# Patient Record
Sex: FEMALE | Race: WHITE | NOT HISPANIC OR LATINO | Employment: FULL TIME | URBAN - METROPOLITAN AREA
[De-identification: names, ages, dates, MRNs, and addresses within clinical notes are randomized per-mention and may not be internally consistent; named-entity substitution may affect disease eponyms.]

---

## 2017-01-10 RX ORDER — DICLOFENAC SODIUM 75 MG/1
TABLET, DELAYED RELEASE ORAL
Qty: 60 TABLET | Refills: 2 | Status: SHIPPED | OUTPATIENT
Start: 2017-01-10 | End: 2017-04-18 | Stop reason: SDUPTHER

## 2017-01-18 ENCOUNTER — OFFICE VISIT (OUTPATIENT)
Dept: DERMATOLOGY | Facility: CLINIC | Age: 77
End: 2017-01-18
Payer: MEDICARE

## 2017-01-18 DIAGNOSIS — L82.1 SK (SEBORRHEIC KERATOSIS): ICD-10-CM

## 2017-01-18 DIAGNOSIS — L82.0 INFLAMED SEBORRHEIC KERATOSIS: Primary | ICD-10-CM

## 2017-01-18 DIAGNOSIS — L81.4 LENTIGO: ICD-10-CM

## 2017-01-18 DIAGNOSIS — D22.9 NEVUS: ICD-10-CM

## 2017-01-18 DIAGNOSIS — Z85.828 PERSONAL HISTORY OF SKIN CANCER: ICD-10-CM

## 2017-01-18 DIAGNOSIS — L90.5 SCAR: ICD-10-CM

## 2017-01-18 DIAGNOSIS — L72.0 MILIA: ICD-10-CM

## 2017-01-18 PROCEDURE — 99213 OFFICE O/P EST LOW 20 MIN: CPT | Mod: 25,S$GLB,, | Performed by: DERMATOLOGY

## 2017-01-18 PROCEDURE — 1157F ADVNC CARE PLAN IN RCRD: CPT | Mod: S$GLB,,, | Performed by: DERMATOLOGY

## 2017-01-18 PROCEDURE — 1159F MED LIST DOCD IN RCRD: CPT | Mod: S$GLB,,, | Performed by: DERMATOLOGY

## 2017-01-18 PROCEDURE — 99999 PR PBB SHADOW E&M-EST. PATIENT-LVL II: CPT | Mod: PBBFAC,,, | Performed by: DERMATOLOGY

## 2017-01-18 PROCEDURE — 1160F RVW MEDS BY RX/DR IN RCRD: CPT | Mod: S$GLB,,, | Performed by: DERMATOLOGY

## 2017-01-18 PROCEDURE — 17110 DESTRUCTION B9 LES UP TO 14: CPT | Mod: S$GLB,,, | Performed by: DERMATOLOGY

## 2017-01-18 NOTE — PROGRESS NOTES
Subjective:       Patient ID:  Jo-Ann Escobedo is a 76 y.o. female who presents for   Chief Complaint   Patient presents with    Lesion     Lesion  - Initial  Affected locations: face (left lower eyelid)  Duration: 2 years  Signs and Symptoms: increased in size.  Severity: mild  Timing: constant  Aggravated by: nothing  Relieving factors/Treatments tried: nothing        Review of Systems   Skin: Positive for activity-related sunscreen use. Negative for daily sunscreen use and recent sunburn.   Hematologic/Lymphatic: Does not bruise/bleed easily.        Objective:    Physical Exam   Constitutional: She appears well-developed and well-nourished. No distress.   Neurological: She is alert and oriented to person, place, and time. She is not disoriented.   Psychiatric: She has a normal mood and affect.   Skin:   Areas Examined (abnormalities noted in diagram):   Scalp / Hair Palpated and Inspected  Head / Face Inspection Performed  Neck Inspection Performed  Chest / Axilla Inspection Performed  Abdomen Inspection Performed  Back Inspection Performed  RUE Inspected  LUE Inspection Performed  Nails and Digits Inspection Performed                       Diagram Legend     Erythematous scaling macule/papule c/w actinic keratosis       Vascular papule c/w angioma      Pigmented verrucoid papule/plaque c/w seborrheic keratosis      Yellow umbilicated papule c/w sebaceous hyperplasia      Irregularly shaped tan macule c/w lentigo     1-2 mm smooth white papules consistent with Milia      Movable subcutaneous cyst with punctum c/w epidermal inclusion cyst      Subcutaneous movable cyst c/w pilar cyst      Firm pink to brown papule c/w dermatofibroma      Pedunculated fleshy papule(s) c/w skin tag(s)      Evenly pigmented macule c/w junctional nevus     Mildly variegated pigmented, slightly irregular-bordered macule c/w mildly atypical nevus      Flesh colored to evenly pigmented papule c/w intradermal nevus       Pink pearly  papule/plaque c/w basal cell carcinoma      Erythematous hyperkeratotic cursted plaque c/w SCC      Surgical scar with no sign of skin cancer recurrence      Open and closed comedones      Inflammatory papules and pustules      Verrucoid papule consistent consistent with wart     Erythematous eczematous patches and plaques     Dystrophic onycholytic nail with subungual debris c/w onychomycosis     Umbilicated papule    Erythematous-base heme-crusted tan verrucoid plaque consistent with inflamed seborrheic keratosis     Erythematous Silvery Scaling Plaque c/w Psoriasis     See annotation      Assessment / Plan:        Inflamed seborrheic keratosis  Cryosurgery procedure note:    Verbal consent from the patient is obtained. Liquid nitrogen cryosurgery is applied to 1 lesions to produce a freeze injury. The patient is aware that blisters may form and is instructed on wound care with gentle cleansing and use of vaseline ointment to keep moist until healed. The patient is supplied a handout on cryosurgery and is instructed to call if lesions do not completely resolve.    Lentigo  This is a benign hyperpigmented sun induced lesion. Daily sun protection will reduce the number of new lesions. Treatment of these benign lesions are considered cosmetic.  The nature of sun-induced photo-aging and skin cancers is discussed.  Sun avoidance, protective clothing, and the use of 30-SPF sunscreens is advised. Observe closely for skin damage/changes, and call if such occurs.    Nevus  Discussed ABCDE's of nevi.  Monitor for new mole or moles that are becoming bigger, darker, irritated, or developing irregular borders. Brochure provided.    SK (seborrheic keratosis)  These are benign inherited growths without a malignant potential. Reassurance given to patient. No treatment is necessary.     Yousufia  Reassurance given to patient. No treatment is necessary.   Treatment of benign, asymptomatic lesions may be considered  cosmetic.    Personal history of skin cancer  Scar  Area(s) of previous NMSC evaluated with no signs of recurrence.    Upper body skin examination performed today including at least 6 points as noted in physical examination. No lesions suspicious for malignancy noted.           Return in about 1 year (around 1/18/2018).

## 2017-01-25 ENCOUNTER — OFFICE VISIT (OUTPATIENT)
Dept: OPTOMETRY | Facility: CLINIC | Age: 77
End: 2017-01-25
Payer: MEDICARE

## 2017-01-25 DIAGNOSIS — Z13.5 SCREENING FOR GLAUCOMA: ICD-10-CM

## 2017-01-25 DIAGNOSIS — H52.4 PRESBYOPIA OU: ICD-10-CM

## 2017-01-25 DIAGNOSIS — H26.493 CLOUDY POSTERIOR CAPSULE, BILATERAL: Primary | ICD-10-CM

## 2017-01-25 DIAGNOSIS — H04.123 DRY EYES, BILATERAL: ICD-10-CM

## 2017-01-25 PROCEDURE — 99999 PR PBB SHADOW E&M-EST. PATIENT-LVL II: CPT | Mod: PBBFAC,,, | Performed by: OPTOMETRIST

## 2017-01-25 PROCEDURE — 92014 COMPRE OPH EXAM EST PT 1/>: CPT | Mod: S$GLB,,, | Performed by: OPTOMETRIST

## 2017-01-25 PROCEDURE — 92015 DETERMINE REFRACTIVE STATE: CPT | Mod: S$GLB,,, | Performed by: OPTOMETRIST

## 2017-01-25 NOTE — PROGRESS NOTES
HPI     DLS: 4/19/2016  Pt states reading has decreased with glasses. Pt states she noticed   flashes of light 4 months ago and has not seen any since    Systane ou PRN              Last edited by Rosey Miranda on 1/25/2017  2:12 PM.     ROS     Positive for: Endocrine, Cardiovascular, Eyes    Negative for: Constitutional, Gastrointestinal, Neurological, Skin,   Genitourinary, Musculoskeletal, HENT, Respiratory, Psychiatric,   Allergic/Imm, Heme/Lymph    Last edited by Ariel Hinojosa, OD on 1/25/2017  2:32 PM. (History)        Assessment /Plan     For exam results, see Encounter Report.    Cloudy posterior capsule, bilateral    Dry eyes, bilateral    Screening for glaucoma    Presbyopia OU      1. Mild pco OD>OS--not ready for YAG.  Pt wishes new spex Rx  2. COREY--pt to inc SYSTANE ATs to QID    PLAN:    rtc 1 yr

## 2017-04-10 RX ORDER — LISINOPRIL 20 MG/1
20 TABLET ORAL NIGHTLY
Qty: 90 TABLET | Refills: 3 | Status: SHIPPED | OUTPATIENT
Start: 2017-04-10 | End: 2018-03-26 | Stop reason: SDUPTHER

## 2017-04-10 RX ORDER — DICLOFENAC SODIUM 75 MG/1
TABLET, DELAYED RELEASE ORAL
Qty: 60 TABLET | Refills: 2 | OUTPATIENT
Start: 2017-04-10

## 2017-04-18 ENCOUNTER — PATIENT MESSAGE (OUTPATIENT)
Dept: INTERNAL MEDICINE | Facility: CLINIC | Age: 77
End: 2017-04-18

## 2017-04-18 RX ORDER — DICLOFENAC SODIUM 75 MG/1
TABLET, DELAYED RELEASE ORAL
Qty: 60 TABLET | Refills: 2 | Status: SHIPPED | OUTPATIENT
Start: 2017-04-18 | End: 2017-08-03 | Stop reason: SDUPTHER

## 2017-05-01 ENCOUNTER — LAB VISIT (OUTPATIENT)
Dept: LAB | Facility: HOSPITAL | Age: 77
End: 2017-05-01
Attending: FAMILY MEDICINE
Payer: MEDICARE

## 2017-05-01 ENCOUNTER — PATIENT MESSAGE (OUTPATIENT)
Dept: INTERNAL MEDICINE | Facility: CLINIC | Age: 77
End: 2017-05-01

## 2017-05-01 ENCOUNTER — OFFICE VISIT (OUTPATIENT)
Dept: INTERNAL MEDICINE | Facility: CLINIC | Age: 77
End: 2017-05-01
Payer: MEDICARE

## 2017-05-01 VITALS
WEIGHT: 200.81 LBS | BODY MASS INDEX: 36.95 KG/M2 | SYSTOLIC BLOOD PRESSURE: 123 MMHG | HEART RATE: 70 BPM | HEIGHT: 62 IN | DIASTOLIC BLOOD PRESSURE: 62 MMHG

## 2017-05-01 DIAGNOSIS — E78.5 HYPERLIPIDEMIA, UNSPECIFIED HYPERLIPIDEMIA TYPE: ICD-10-CM

## 2017-05-01 DIAGNOSIS — Z00.00 PREVENTATIVE HEALTH CARE: ICD-10-CM

## 2017-05-01 DIAGNOSIS — E03.9 HYPOTHYROIDISM, UNSPECIFIED TYPE: ICD-10-CM

## 2017-05-01 DIAGNOSIS — M25.50 ARTHRALGIA, UNSPECIFIED JOINT: ICD-10-CM

## 2017-05-01 DIAGNOSIS — E03.9 HYPOTHYROIDISM, UNSPECIFIED TYPE: Primary | ICD-10-CM

## 2017-05-01 LAB
ALBUMIN SERPL BCP-MCNC: 4 G/DL
ALP SERPL-CCNC: 76 U/L
ALT SERPL W/O P-5'-P-CCNC: 25 U/L
ANION GAP SERPL CALC-SCNC: 15 MMOL/L
AST SERPL-CCNC: 23 U/L
BASOPHILS # BLD AUTO: 0.05 K/UL
BASOPHILS NFR BLD: 0.5 %
BILIRUB SERPL-MCNC: 0.2 MG/DL
BUN SERPL-MCNC: 23 MG/DL
CALCIUM SERPL-MCNC: 10.1 MG/DL
CHLORIDE SERPL-SCNC: 108 MMOL/L
CHOLEST/HDLC SERPL: 6.9 {RATIO}
CO2 SERPL-SCNC: 22 MMOL/L
CREAT SERPL-MCNC: 1.1 MG/DL
DIFFERENTIAL METHOD: NORMAL
EOSINOPHIL # BLD AUTO: 0.2 K/UL
EOSINOPHIL NFR BLD: 1.8 %
ERYTHROCYTE [DISTWIDTH] IN BLOOD BY AUTOMATED COUNT: 13.2 %
EST. GFR  (AFRICAN AMERICAN): 56.4 ML/MIN/1.73 M^2
EST. GFR  (NON AFRICAN AMERICAN): 48.9 ML/MIN/1.73 M^2
GLUCOSE SERPL-MCNC: 93 MG/DL
HCT VFR BLD AUTO: 44 %
HDL/CHOLESTEROL RATIO: 14.4 %
HDLC SERPL-MCNC: 243 MG/DL
HDLC SERPL-MCNC: 35 MG/DL
HGB BLD-MCNC: 14.2 G/DL
LDLC SERPL CALC-MCNC: ABNORMAL MG/DL
LYMPHOCYTES # BLD AUTO: 2.9 K/UL
LYMPHOCYTES NFR BLD: 31.9 %
MCH RBC QN AUTO: 30.9 PG
MCHC RBC AUTO-ENTMCNC: 32.3 %
MCV RBC AUTO: 96 FL
MONOCYTES # BLD AUTO: 1 K/UL
MONOCYTES NFR BLD: 10.8 %
NEUTROPHILS # BLD AUTO: 5 K/UL
NEUTROPHILS NFR BLD: 54.6 %
NONHDLC SERPL-MCNC: 208 MG/DL
PLATELET # BLD AUTO: 209 K/UL
PMV BLD AUTO: 11.1 FL
POTASSIUM SERPL-SCNC: 4.6 MMOL/L
PROT SERPL-MCNC: 8 G/DL
RBC # BLD AUTO: 4.59 M/UL
SODIUM SERPL-SCNC: 145 MMOL/L
T4 SERPL-MCNC: 8.3 UG/DL
TRIGL SERPL-MCNC: 519 MG/DL
TSH SERPL DL<=0.005 MIU/L-ACNC: 0.54 UIU/ML
WBC # BLD AUTO: 9.11 K/UL

## 2017-05-01 PROCEDURE — 3078F DIAST BP <80 MM HG: CPT | Mod: S$GLB,,, | Performed by: FAMILY MEDICINE

## 2017-05-01 PROCEDURE — 36415 COLL VENOUS BLD VENIPUNCTURE: CPT

## 2017-05-01 PROCEDURE — 84436 ASSAY OF TOTAL THYROXINE: CPT

## 2017-05-01 PROCEDURE — 84443 ASSAY THYROID STIM HORMONE: CPT

## 2017-05-01 PROCEDURE — 99397 PER PM REEVAL EST PAT 65+ YR: CPT | Mod: S$GLB,,, | Performed by: FAMILY MEDICINE

## 2017-05-01 PROCEDURE — 3074F SYST BP LT 130 MM HG: CPT | Mod: S$GLB,,, | Performed by: FAMILY MEDICINE

## 2017-05-01 PROCEDURE — 99999 PR PBB SHADOW E&M-EST. PATIENT-LVL III: CPT | Mod: PBBFAC,,, | Performed by: FAMILY MEDICINE

## 2017-05-01 PROCEDURE — 80061 LIPID PANEL: CPT

## 2017-05-01 PROCEDURE — 99499 UNLISTED E&M SERVICE: CPT | Mod: S$PBB,,, | Performed by: FAMILY MEDICINE

## 2017-05-01 PROCEDURE — 85025 COMPLETE CBC W/AUTO DIFF WBC: CPT

## 2017-05-01 PROCEDURE — 80053 COMPREHEN METABOLIC PANEL: CPT

## 2017-05-01 RX ORDER — SCOLOPAMINE TRANSDERMAL SYSTEM 1 MG/1
1 PATCH, EXTENDED RELEASE TRANSDERMAL
Qty: 3 PATCH | Refills: 0 | Status: SHIPPED | OUTPATIENT
Start: 2017-05-01 | End: 2017-05-31

## 2017-05-01 NOTE — MR AVS SNAPSHOT
[facility] - Internal Medicine  [address]  [facility] [address]  Phone: [phone]  Fax: [phone]                  Jo-Ann Escobedo   2017 1:00 PM   Office Visit    Description:  Female : 1940   Provider:  Jesse Ureña MD   Department:  [facility] - Internal Medicine           Reason for Visit     Annual Exam           Diagnoses this Visit        Comments    Hypothyroidism, unspecified type    -  Primary     Hyperlipidemia, unspecified hyperlipidemia type         Preventative health care         Arthralgia, unspecified joint                To Do List           Future Appointments        Provider Department Dept Phone    2017 1:45 PM [facility] MAMMO5 DX [facility] [phone]    2017 3:00 PM Logan Peace MD [place] - Hematology Oncology [phone]      Goals (5 Years of Data)     None      Follow-Up and Disposition     Return in about 6 months (around 2017), or if symptoms worsen or fail to improve.    Follow-up and Disposition History      [facility]     [facility] -  Assistance  Unless otherwise directed by your provider, please contact [facility], our nurse care line that is available for  assistance.     Registered nurses in the [facility] provide: appointment scheduling, clinical advisement, health education, and other advisory services.  Call: [phone] (toll free)               Medications           Message regarding Medications     Verify the changes and/or additions to your medication regime listed below are the same as discussed with your clinician today.  If any of these changes or additions are incorrect, please notify your healthcare provider.             Verify that the below list of medications is an accurate representation of the medications you are currently taking.  If none reported, the list may be blank. If incorrect, please contact your healthcare provider. Carry this  "list with you in case of emergency.           Current Medications     co-enzyme Q-10 30 mg capsule Take 30 mg by mouth once daily.     diclofenac (VOLTAREN) 75 MG EC tablet Take 1 tablet (75 mg total) by mouth 2 (two) times daily as needed.    diclofenac (VOLTAREN) 75 MG EC tablet TAKE 1 TABLET (75 MG TOTAL) BY MOUTH 2 (TWO) TIMES DAILY AS NEEDED.    ergocalciferol (VITAMIN D2) 50,000 unit Cap Take by mouth. * Capsule Oral As directed.   tab once a week times 8 weeksThen Vit D 2000 units once daily    levothyroxine (SYNTHROID) 125 MCG tablet TAKE 1 TABLET (125 MCG TOTAL) BY MOUTH ONCE DAILY.    lisinopril (PRINIVIL,ZESTRIL) 20 MG tablet Take 1 tablet (20 mg total) by mouth nightly.    senna-docusate 8.6-50 mg (PERICOLACE) 8.6-50 mg per tablet Take 1 tablet by mouth 2 (two) times daily. Take twice daily on any days that you take your prescription pain medications.           Clinical Reference Information           Your Vitals Were     BP Pulse Height Weight Last Period BMI    123/62 70 5' 2" (1.575 m) 91.1 kg (200 lb 13.4 oz) (LMP Unknown) 36.73 kg/m2      Blood Pressure          Most Recent Value    BP  123/62      Allergies as of 5/1/2017     Codeine    Niacin Preparations      Immunizations Administered on Date of Encounter - 5/1/2017     None      Orders Placed During Today's Visit      Normal Orders This Visit    Ambulatory Referral to Rheumatology     Future Labs/Procedures Expected by Expires    CBC auto differential  5/1/2017 5/1/2018    Comprehensive metabolic panel  5/1/2017 5/1/2018    Lipid panel  5/1/2017 5/1/2018    T4  5/1/2017 7/30/2017    TSH  5/1/2017 7/30/2017      Language Assistance Services     ATTENTION: Language assistance services are available, free of charge. Please call [phone].      ATENCIÓN: Si astrid mildred, tiene a escoto disposición servicios gratuitos de asistencia lingüística. Llame al [phone].     CHÚ Ý: N?u b?n nói Ti?ng Vi?t, có các d?ch v? h? tr? ngôn ng? mi?n phí dành " radha robin?n. G?i s? [phone].         [facility] complies with applicable Federal civil rights laws and does not discriminate on the basis of race, color, national origin, age, disability, or sex.

## 2017-05-01 NOTE — PROGRESS NOTES
"Subjective:       Patient ID: Jo-Ann Escobedo is a 76 y.o. female.    Chief Complaint: Annual Exam  Jo-Ann Escobedo 76 y.o. female is here for office visit to review care and physical exam, reports "needs Diclofenac" every day or has pain in joints.  Has discussed this with Rheumatology.  Reports has been told that this class of medicne has potentially serious sfx.  Also has fairly chronic runny nose.      HPI  Review of Systems   Constitutional: Negative for activity change and unexpected weight change.   HENT: Positive for rhinorrhea. Negative for hearing loss and trouble swallowing.    Eyes: Negative for discharge and visual disturbance.   Respiratory: Negative for chest tightness and wheezing.    Cardiovascular: Negative for chest pain and palpitations.   Gastrointestinal: Negative for blood in stool, constipation, diarrhea and vomiting.   Endocrine: Negative for polydipsia and polyuria.   Genitourinary: Negative for difficulty urinating, dysuria, hematuria and menstrual problem.   Musculoskeletal: Positive for arthralgias. Negative for joint swelling.   Neurological: Negative for weakness and headaches.   Psychiatric/Behavioral: Negative for confusion and dysphoric mood.       Objective:      Physical Exam   Constitutional: She is oriented to person, place, and time. She appears well-developed and well-nourished. No distress.   HENT:   Head: Normocephalic.   Mouth/Throat: No oropharyngeal exudate.   Normal looking sinus mucosa   Eyes: EOM are normal. Pupils are equal, round, and reactive to light. No scleral icterus.   Neck: Neck supple. No JVD present. No thyromegaly present.   Cardiovascular: Normal rate, regular rhythm and normal heart sounds.  Exam reveals no gallop and no friction rub.    No murmur heard.  Pulmonary/Chest: Effort normal and breath sounds normal. She has no wheezes. She has no rales.   Abdominal: Soft. Bowel sounds are normal. She exhibits no distension and no mass. There is no tenderness. " There is no guarding.   Musculoskeletal: Normal range of motion. She exhibits no edema.   Lymphadenopathy:     She has no cervical adenopathy.   Neurological: She is alert and oriented to person, place, and time. She has normal reflexes. She displays normal reflexes. No cranial nerve deficit. She exhibits normal muscle tone.   Skin: Skin is warm. No rash noted. No erythema.   Psychiatric: She has a normal mood and affect. Thought content normal.       Assessment:       No diagnosis found.    Plan:       Jo-Ann was seen today for annual exam.    Diagnoses and all orders for this visit:    Hypothyroidism, unspecified type  -     T4; Future  -     TSH; Future    Hyperlipidemia, unspecified hyperlipidemia type  -     Comprehensive metabolic panel; Future  -     Lipid panel; Future    Preventative health care  -     Comprehensive metabolic panel; Future  -     Lipid panel; Future  -     CBC auto differential; Future  -     T4; Future    Arthralgia, unspecified joint  -     Ambulatory Referral to Rheumatology

## 2017-05-02 ENCOUNTER — TELEPHONE (OUTPATIENT)
Dept: INTERNAL MEDICINE | Facility: CLINIC | Age: 77
End: 2017-05-02

## 2017-05-03 ENCOUNTER — TELEPHONE (OUTPATIENT)
Dept: INTERNAL MEDICINE | Facility: CLINIC | Age: 77
End: 2017-05-03

## 2017-05-03 NOTE — TELEPHONE ENCOUNTER
----- Message from Jennifer Boo sent at 5/2/2017  4:28 PM CDT -----  Contact: Self/ [phone]  Patient is returning a phone call.  Who left a message for the patient:  Anton  Does patient know what this is regarding: No   Comments: Returning call. Please call and advise.           Thank you.

## 2017-05-05 ENCOUNTER — OFFICE VISIT (OUTPATIENT)
Dept: INTERNAL MEDICINE | Facility: CLINIC | Age: 77
End: 2017-05-05
Payer: MEDICARE

## 2017-05-05 VITALS
HEIGHT: 63 IN | HEART RATE: 80 BPM | BODY MASS INDEX: 35.35 KG/M2 | SYSTOLIC BLOOD PRESSURE: 138 MMHG | DIASTOLIC BLOOD PRESSURE: 80 MMHG | WEIGHT: 199.5 LBS

## 2017-05-05 DIAGNOSIS — M19.079 ARTHRITIS OF FOOT: ICD-10-CM

## 2017-05-05 DIAGNOSIS — E03.9 HYPOTHYROIDISM, UNSPECIFIED TYPE: ICD-10-CM

## 2017-05-05 DIAGNOSIS — E78.5 HYPERLIPIDEMIA, UNSPECIFIED HYPERLIPIDEMIA TYPE: Primary | ICD-10-CM

## 2017-05-05 DIAGNOSIS — N18.30 CHRONIC KIDNEY INSUFFICIENCY, STAGE 3 (MODERATE): ICD-10-CM

## 2017-05-05 DIAGNOSIS — M88.9 PAGET'S DISEASE OF BONE: ICD-10-CM

## 2017-05-05 PROBLEM — N18.9 CHRONIC KIDNEY INSUFFICIENCY: Status: ACTIVE | Noted: 2017-05-05

## 2017-05-05 PROCEDURE — 3079F DIAST BP 80-89 MM HG: CPT | Mod: S$GLB,,, | Performed by: FAMILY MEDICINE

## 2017-05-05 PROCEDURE — 3075F SYST BP GE 130 - 139MM HG: CPT | Mod: S$GLB,,, | Performed by: FAMILY MEDICINE

## 2017-05-05 PROCEDURE — 99999 PR PBB SHADOW E&M-EST. PATIENT-LVL III: CPT | Mod: PBBFAC,,, | Performed by: FAMILY MEDICINE

## 2017-05-05 PROCEDURE — 1160F RVW MEDS BY RX/DR IN RCRD: CPT | Mod: S$GLB,,, | Performed by: FAMILY MEDICINE

## 2017-05-05 PROCEDURE — 1126F AMNT PAIN NOTED NONE PRSNT: CPT | Mod: S$GLB,,, | Performed by: FAMILY MEDICINE

## 2017-05-05 PROCEDURE — 99215 OFFICE O/P EST HI 40 MIN: CPT | Mod: S$GLB,,, | Performed by: FAMILY MEDICINE

## 2017-05-05 PROCEDURE — 1159F MED LIST DOCD IN RCRD: CPT | Mod: S$GLB,,, | Performed by: FAMILY MEDICINE

## 2017-05-05 PROCEDURE — 1157F ADVNC CARE PLAN IN RCRD: CPT | Mod: S$GLB,,, | Performed by: FAMILY MEDICINE

## 2017-05-05 PROCEDURE — 99499 UNLISTED E&M SERVICE: CPT | Mod: S$PBB,,, | Performed by: FAMILY MEDICINE

## 2017-05-05 RX ORDER — FENOFIBRATE 160 MG/1
160 TABLET ORAL DAILY
Qty: 90 TABLET | Refills: 3 | Status: SHIPPED | OUTPATIENT
Start: 2017-05-05 | End: 2018-04-15 | Stop reason: SDUPTHER

## 2017-05-05 NOTE — MR AVS SNAPSHOT
[facility]  [address]  [facility] [address]  Phone: [phone]  Fax: [phone]                  Jo-Ann Escobedo   2017 1:40 PM   Office Visit    Description:  Female : 1940   Provider:  Jesse Ureña MD   Department:  [facility]           Diagnoses this Visit        Comments    Hyperlipidemia, unspecified hyperlipidemia type    -  Primary     Chronic kidney insufficiency, stage 3 (moderate)         Hypothyroidism, unspecified type         Paget's disease of bone         Arthritis of foot                To Do List           Future Appointments        Provider Department Dept Phone    2017 1:45 PM [facility] DX [facility] [phone]    2017 3:00 PM MD [facility] - Hematology Oncology [phone]    2017 1:30 PM Yary Sparks MD [facility] - Rheumatology [phone]      Goals (5 Years of Data)     None      Follow-Up and Disposition     Return in about 3 months (around 2017), or if symptoms worsen or fail to improve.    Follow-up and Disposition History       These Medications        Disp Refills Start End    fenofibrate 160 MG Tab 90 tablet 3 2017    Take 1 tablet (160 mg total) by mouth once daily. - Oral    Pharmacy: [facility]/pharmacy #[id number] - [place], [place] - [address] [facility] Ph #: [phone]         [facility] On Call     [facility] -  Assistance  Unless otherwise directed by your provider, please contact [facility], our nurse care line that is available for  assistance.     Registered nurses in the [facility] provide: appointment scheduling, clinical advisement, health education, and other advisory services.  Call: [phone] (toll free)               Medications           Message regarding Medications     Verify the changes and/or additions to your medication regime listed below are the same as  "discussed with your clinician today.  If any of these changes or additions are incorrect, please notify your healthcare provider.        START taking these NEW medications        Refills    fenofibrate 160 MG Tab 3    Sig: Take 1 tablet (160 mg total) by mouth once daily.    Class: Normal    Route: Oral           Verify that the below list of medications is an accurate representation of the medications you are currently taking.  If none reported, the list may be blank. If incorrect, please contact your healthcare provider. Carry this list with you in case of emergency.           Current Medications     co-enzyme Q-10 30 mg capsule Take 30 mg by mouth once daily.     diclofenac (VOLTAREN) 75 MG EC tablet Take 1 tablet (75 mg total) by mouth 2 (two) times daily as needed.    diclofenac (VOLTAREN) 75 MG EC tablet TAKE 1 TABLET (75 MG TOTAL) BY MOUTH 2 (TWO) TIMES DAILY AS NEEDED.    ergocalciferol (VITAMIN D2) 50,000 unit Cap Take by mouth. * Capsule Oral As directed.   tab once a week times 8 weeksThen Vit D 2000 units once daily    levothyroxine (SYNTHROID) 125 MCG tablet TAKE 1 TABLET (125 MCG TOTAL) BY MOUTH ONCE DAILY.    lisinopril (PRINIVIL,ZESTRIL) 20 MG tablet Take 1 tablet (20 mg total) by mouth nightly.    senna-docusate 8.6-50 mg (PERICOLACE) 8.6-50 mg per tablet Take 1 tablet by mouth 2 (two) times daily. Take twice daily on any days that you take your prescription pain medications.    fenofibrate 160 MG Tab Take 1 tablet (160 mg total) by mouth once daily.    scopolamine (TRANSDERM-SCOP) 1.5 mg (1 mg over 3 days) Place 1 patch (1.5 mg total) onto the skin every 72 hours.           Clinical Reference Information           Your Vitals Were     BP Pulse Height Weight Last Period BMI    138/80 80 5' 3" (1.6 m) 90.5 kg (199 lb 8.3 oz) (LMP Unknown) 35.34 kg/m2      Blood Pressure          Most Recent Value    BP  138/80      Allergies as of 5/5/2017     Codeine    Niacin Preparations      Immunizations " Administered on Date of Encounter - 5/5/2017     None      Orders Placed During Today's Visit     Future Labs/Procedures Expected by Expires    Comprehensive metabolic panel  8/3/2017 7/4/2018    Lipid panel  8/3/2017 7/4/2018      Language Assistance Services     ATTENTION: Language assistance services are available, free of charge. Please call [phone].      ATENCIÓN: Si habla español, tiene a escoto disposición servicios gratuitos de asistencia lingüística. Llame al [phone].     CHÚ Ý: N?u b?n nói Ti?ng Vi?t, có các d?ch v? h? tr? ngôn ng? mi?n phí dành cho b?n. G?i s? [phone].         [facility] complies with applicable Federal civil rights laws and does not discriminate on the basis of race, color, national origin, age, disability, or sex.

## 2017-05-05 NOTE — PROGRESS NOTES
Subjective:       Patient ID: Jo-Ann Escobedo is a 76 y.o. female.    Chief Complaint: No chief complaint on file.  Jo-Ann Escobedo 76 y.o. female is here for office visit to review care and physical exam, here for lab review, has decreased GFR.  Advised when seen recently that Diclofenac can be toxic to kidneys, stated that it was felt fairly necessary for arthritic pain.  Advised to stop.  Has cut-down to one aday, though advise was to stop.  Has appt with her Rheumatologist but not until the end of June.  Note TG high.  Has essentially doubled since last year.  Diet not changed too much, does occ have ice cream.      HPI  Review of Systems   Constitutional: Negative for activity change, fatigue, fever and unexpected weight change.   HENT: Negative for congestion, hearing loss, postnasal drip and rhinorrhea.    Eyes: Negative for redness and visual disturbance.   Respiratory: Negative for chest tightness, shortness of breath and wheezing.    Cardiovascular: Negative for chest pain, palpitations and leg swelling.   Gastrointestinal: Negative for abdominal distention.   Genitourinary: Negative for decreased urine volume, dysuria, flank pain, hematuria, pelvic pain and urgency.   Musculoskeletal: Negative for back pain, gait problem, joint swelling and neck stiffness.   Skin: Negative for color change, rash and wound.   Neurological: Negative for dizziness, syncope, weakness and headaches.   Psychiatric/Behavioral: Negative for behavioral problems, confusion and sleep disturbance. The patient is not nervous/anxious.        Objective:      Physical Exam   Constitutional: She is oriented to person, place, and time. She appears well-developed and well-nourished. No distress.   HENT:   Head: Normocephalic.   Mouth/Throat: No oropharyngeal exudate.   Eyes: EOM are normal. Pupils are equal, round, and reactive to light. No scleral icterus.   Neck: Neck supple. No JVD present. No thyromegaly present.   Cardiovascular: Normal  rate, regular rhythm and normal heart sounds.  Exam reveals no gallop and no friction rub.    No murmur heard.  Pulmonary/Chest: Effort normal and breath sounds normal. She has no wheezes. She has no rales.   Abdominal: Soft. Bowel sounds are normal. She exhibits no distension and no mass. There is no tenderness. There is no guarding.   Musculoskeletal: Normal range of motion. She exhibits no edema.   Lymphadenopathy:     She has no cervical adenopathy.   Neurological: She is alert and oriented to person, place, and time. She has normal reflexes. She displays normal reflexes. No cranial nerve deficit. She exhibits normal muscle tone.   Skin: Skin is warm. No rash noted. No erythema.   Psychiatric: She has a normal mood and affect. Thought content normal.       Assessment:       No diagnosis found.    Plan:       Diagnoses and all orders for this visit:    Hyperlipidemia, unspecified hyperlipidemia type  -     Comprehensive metabolic panel; Future  -     Lipid panel; Future  -     fenofibrate 160 MG Tab; Take 1 tablet (160 mg total) by mouth once daily.    Chronic kidney insufficiency, stage 3 (moderate)  -     Comprehensive metabolic panel; Future    Hypothyroidism, unspecified type  - reviewed  Paget's disease of bone  - will see if can f/u with Rheumatology sooner  Arthritis of foot  - discussed

## 2017-05-22 ENCOUNTER — TELEPHONE (OUTPATIENT)
Dept: RHEUMATOLOGY | Facility: CLINIC | Age: 77
End: 2017-05-22

## 2017-05-22 NOTE — TELEPHONE ENCOUNTER
Called patient. Told her that since she cannot take anti-inflammatories, I cannot be of assistance in her pain management and recommend that she sees pain specialist.  Patient wishes to  discuss plan with Dr. Ureña.

## 2017-05-25 ENCOUNTER — HOSPITAL ENCOUNTER (OUTPATIENT)
Dept: RADIOLOGY | Facility: HOSPITAL | Age: 77
Discharge: HOME | End: 2017-05-25
Attending: INTERNAL MEDICINE
Payer: MEDICARE

## 2017-05-25 ENCOUNTER — OFFICE VISIT (OUTPATIENT)
Dept: HEMATOLOGY/ONCOLOGY | Facility: CLINIC | Age: 77
End: 2017-05-25
Payer: MEDICARE

## 2017-05-25 VITALS
HEART RATE: 70 BPM | TEMPERATURE: 98 F | WEIGHT: 191 LBS | RESPIRATION RATE: 18 BRPM | HEIGHT: 65 IN | SYSTOLIC BLOOD PRESSURE: 143 MMHG | OXYGEN SATURATION: 100 % | BODY MASS INDEX: 31.82 KG/M2 | DIASTOLIC BLOOD PRESSURE: 67 MMHG

## 2017-05-25 DIAGNOSIS — C50.612 MALIGNANT NEOPLASM OF AXILLARY TAIL OF LEFT FEMALE BREAST: Primary | ICD-10-CM

## 2017-05-25 DIAGNOSIS — C50.912 MALIGNANT NEOPLASM OF LEFT BREAST: ICD-10-CM

## 2017-05-25 PROCEDURE — 77061 BREAST TOMOSYNTHESIS UNI: CPT | Mod: 26,,, | Performed by: RADIOLOGY

## 2017-05-25 PROCEDURE — 77065 DX MAMMO INCL CAD UNI: CPT | Mod: 26,,, | Performed by: RADIOLOGY

## 2017-05-25 PROCEDURE — 1159F MED LIST DOCD IN RCRD: CPT | Mod: S$GLB,,, | Performed by: INTERNAL MEDICINE

## 2017-05-25 PROCEDURE — 99213 OFFICE O/P EST LOW 20 MIN: CPT | Mod: S$GLB,,, | Performed by: INTERNAL MEDICINE

## 2017-05-25 PROCEDURE — 99999 PR PBB SHADOW E&M-EST. PATIENT-LVL III: CPT | Mod: PBBFAC,,, | Performed by: INTERNAL MEDICINE

## 2017-05-25 NOTE — PROGRESS NOTES
Subjective:       Patient ID: Jo-Ann Escobedo is a 76 y.o. female.    Chief Complaint: No chief complaint on file.    HPI   Ms. Escobedo presents today for follow up. Briefly, she is a 75 year-old female with a remote history of breast cancer treated 16 years ago with a left modified radical   mastectomy. Her tumor was a T2 N1 M0 carcinoma. She was treated with four cycles of AC and 4 cycles of Taxotere in the adjuvant setting and has remained cancer free since then.     A mammogram earlier today was read as BIRADS I, and a one year follow up was recommended.    Review of Systems  Overall she feels well and she has no complaints.  ECOG PS remains 0.   She denies any anxiety, depression, easy bruising, fevers, chills, night sweats, weight loss, nausea, vomiting, diarrhea, constipation, diplopia, blurred vision, headache, chest pain, abdominal pain, or difficulty ambulating. All other systems are negative.     Objective:      Physical Exam  GENERAL: She is alert, oriented to time, place, person; well nourished;   pleasant; in no acute physical distress.   VITAL SIGNS: Reviewed.   HEENT: Normal. There are no nasal, oral, lip, gingival, auricular, lid,   or conjunctival lesions. Mucosae are moist and pink, and there is no   thrush. Pupils are equal, reactive to light and accommodation.   Extraocular muscle movements are intact.   NECK: Supple without JVD, thyromegaly, or adenopathy.   LUNGS: Clear to auscultation without wheezing, rales, or rhonchi.   CARDIOVASCULAR: Reveals an S1, S2, no murmurs, no rubs, no gallops.   BREASTS: She is status post left mastectomy with no evidence of chest wall   recurrence. There are no masses in the right breast.   ABDOMEN: Soft, nontender without organomegaly. Bowel sounds are   identified.   EXTREMITIES: Have no cyanosis, clubbing, or edema.   SKIN: Does not have petechiae, rashes, induration, or ecchymoses.   NEUROLOGIC: Motor function is 5/5, DTRs are 0-1+ bilaterally, symmetrical,    and cranial nerves within normal limits.   LYMPHATIC: There is no cervical, axillary, or supraclavicular adenopathy.    Assessment:       1. 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        Plan:        She will return in a year with another mammogram.  Her questions were answered to her satisfaction.

## 2017-05-26 ENCOUNTER — PATIENT MESSAGE (OUTPATIENT)
Dept: RHEUMATOLOGY | Facility: CLINIC | Age: 77
End: 2017-05-26

## 2017-05-26 RX ORDER — LEVOTHYROXINE SODIUM 125 UG/1
125 TABLET ORAL
Qty: 30 TABLET | Refills: 11 | Status: SHIPPED | OUTPATIENT
Start: 2017-05-26 | End: 2018-04-15 | Stop reason: SDUPTHER

## 2017-08-01 ENCOUNTER — LAB VISIT (OUTPATIENT)
Dept: LAB | Facility: HOSPITAL | Age: 77
End: 2017-08-01
Attending: FAMILY MEDICINE
Payer: MEDICARE

## 2017-08-01 DIAGNOSIS — E78.5 HYPERLIPIDEMIA, UNSPECIFIED HYPERLIPIDEMIA TYPE: ICD-10-CM

## 2017-08-01 DIAGNOSIS — N18.30 CHRONIC KIDNEY INSUFFICIENCY, STAGE 3 (MODERATE): ICD-10-CM

## 2017-08-01 LAB
ALBUMIN SERPL BCP-MCNC: 4 G/DL
ALP SERPL-CCNC: 56 U/L
ALT SERPL W/O P-5'-P-CCNC: 23 U/L
ANION GAP SERPL CALC-SCNC: 10 MMOL/L
AST SERPL-CCNC: 22 U/L
BILIRUB SERPL-MCNC: 0.6 MG/DL
BUN SERPL-MCNC: 21 MG/DL
CALCIUM SERPL-MCNC: 9.8 MG/DL
CHLORIDE SERPL-SCNC: 106 MMOL/L
CHOLEST/HDLC SERPL: 3.7 {RATIO}
CO2 SERPL-SCNC: 25 MMOL/L
CREAT SERPL-MCNC: 0.8 MG/DL
EST. GFR  (AFRICAN AMERICAN): >60 ML/MIN/1.73 M^2
EST. GFR  (NON AFRICAN AMERICAN): >60 ML/MIN/1.73 M^2
GLUCOSE SERPL-MCNC: 86 MG/DL
HDL/CHOLESTEROL RATIO: 27.1 %
HDLC SERPL-MCNC: 181 MG/DL
HDLC SERPL-MCNC: 49 MG/DL
LDLC SERPL CALC-MCNC: 106.8 MG/DL
NONHDLC SERPL-MCNC: 132 MG/DL
POTASSIUM SERPL-SCNC: 3.8 MMOL/L
PROT SERPL-MCNC: 7.4 G/DL
SODIUM SERPL-SCNC: 141 MMOL/L
TRIGL SERPL-MCNC: 126 MG/DL

## 2017-08-01 PROCEDURE — 80061 LIPID PANEL: CPT

## 2017-08-01 PROCEDURE — 80053 COMPREHEN METABOLIC PANEL: CPT

## 2017-08-01 PROCEDURE — 36415 COLL VENOUS BLD VENIPUNCTURE: CPT

## 2017-08-03 ENCOUNTER — OFFICE VISIT (OUTPATIENT)
Dept: ORTHOPEDICS | Facility: CLINIC | Age: 77
End: 2017-08-03
Payer: MEDICARE

## 2017-08-03 VITALS — WEIGHT: 188.94 LBS | BODY MASS INDEX: 33.48 KG/M2 | HEIGHT: 63 IN

## 2017-08-03 DIAGNOSIS — M19.079 ARTHRITIS OF FOOT: Primary | ICD-10-CM

## 2017-08-03 PROCEDURE — 3008F BODY MASS INDEX DOCD: CPT | Mod: S$GLB,,, | Performed by: ORTHOPAEDIC SURGERY

## 2017-08-03 PROCEDURE — 99999 PR PBB SHADOW E&M-EST. PATIENT-LVL II: CPT | Mod: PBBFAC,,, | Performed by: ORTHOPAEDIC SURGERY

## 2017-08-03 PROCEDURE — 99213 OFFICE O/P EST LOW 20 MIN: CPT | Mod: 25,S$GLB,, | Performed by: ORTHOPAEDIC SURGERY

## 2017-08-03 PROCEDURE — 1159F MED LIST DOCD IN RCRD: CPT | Mod: S$GLB,,, | Performed by: ORTHOPAEDIC SURGERY

## 2017-08-03 PROCEDURE — 20605 DRAIN/INJ JOINT/BURSA W/O US: CPT | Mod: RT,S$GLB,, | Performed by: ORTHOPAEDIC SURGERY

## 2017-08-03 RX ORDER — METHYLPREDNISOLONE ACETATE 80 MG/ML
80 INJECTION, SUSPENSION INTRA-ARTICULAR; INTRALESIONAL; INTRAMUSCULAR; SOFT TISSUE
Status: COMPLETED | OUTPATIENT
Start: 2017-08-03 | End: 2017-08-03

## 2017-08-03 RX ADMIN — METHYLPREDNISOLONE ACETATE 80 MG: 80 INJECTION, SUSPENSION INTRA-ARTICULAR; INTRALESIONAL; INTRAMUSCULAR; SOFT TISSUE at 01:08

## 2017-08-03 NOTE — PROGRESS NOTES
Ms Escobedo returns today.  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  I last saw her on 6/20/2016 and that was the last time she had had an injection.  She reports that for most of the past year she is actually done very well by being consistent with her shoewear as well as continuing to use diclofenac.  She reports that Dr. Ureña took her off of her diclofenac back in May and since that time she said recurrent symptoms.  She returns today to discuss possible surgery as well as to get another injection.  Exam: Ms. Escobedo walks in with an antalgic gait.  She continues to have marked decreased motion of her right subtalar joint with pain.  She has mild decreased motion of her ankle with no pain on motion.  She has tenderness over the sinus tarsi area of her hindfoot.  She is neurovascularly intact.  I did not obtain any new x-rays today.  Recommendation: After verbal consent and sterile prep I injected her right subtalar joint with 3 cc of lidocaine and 80 mg of Depo-Medrol.  Once again her joint was difficult to inject.                                 Subtalar arthrodesis was discussed with Mrs. Escobedo.  I explained the procedure and the postoperative course as well as the risks and benefits of surgery.                                 I reviewed some recent lab work and I did not see any evidence of any kidney disease and this Gregg does not report any difficulty and taking nonsteroidal anti-inflammatories from a GI                                standpoint.  She is going to see Dr. Ureña next week and I suggested she might ask him if he she could get back on the diclofenac unless it were some other reason not to.  Didn't ever make a return appointment in 3 months

## 2017-08-08 ENCOUNTER — OFFICE VISIT (OUTPATIENT)
Dept: INTERNAL MEDICINE | Facility: CLINIC | Age: 77
End: 2017-08-08
Payer: MEDICARE

## 2017-08-08 VITALS
HEART RATE: 78 BPM | HEIGHT: 63 IN | SYSTOLIC BLOOD PRESSURE: 120 MMHG | WEIGHT: 187.63 LBS | DIASTOLIC BLOOD PRESSURE: 60 MMHG | BODY MASS INDEX: 33.25 KG/M2

## 2017-08-08 DIAGNOSIS — M19.90 ARTHRITIS: ICD-10-CM

## 2017-08-08 DIAGNOSIS — M88.9 PAGET'S DISEASE OF BONE: ICD-10-CM

## 2017-08-08 DIAGNOSIS — E03.9 HYPOTHYROIDISM, UNSPECIFIED TYPE: Primary | ICD-10-CM

## 2017-08-08 DIAGNOSIS — E78.5 HYPERLIPIDEMIA, UNSPECIFIED HYPERLIPIDEMIA TYPE: ICD-10-CM

## 2017-08-08 PROCEDURE — 99999 PR PBB SHADOW E&M-EST. PATIENT-LVL IV: CPT | Mod: PBBFAC,,, | Performed by: FAMILY MEDICINE

## 2017-08-08 PROCEDURE — 3074F SYST BP LT 130 MM HG: CPT | Mod: S$GLB,,, | Performed by: FAMILY MEDICINE

## 2017-08-08 PROCEDURE — 1159F MED LIST DOCD IN RCRD: CPT | Mod: S$GLB,,, | Performed by: FAMILY MEDICINE

## 2017-08-08 PROCEDURE — 99499 UNLISTED E&M SERVICE: CPT | Mod: S$PBB,,, | Performed by: FAMILY MEDICINE

## 2017-08-08 PROCEDURE — 1126F AMNT PAIN NOTED NONE PRSNT: CPT | Mod: S$GLB,,, | Performed by: FAMILY MEDICINE

## 2017-08-08 PROCEDURE — 3078F DIAST BP <80 MM HG: CPT | Mod: S$GLB,,, | Performed by: FAMILY MEDICINE

## 2017-08-08 PROCEDURE — 99215 OFFICE O/P EST HI 40 MIN: CPT | Mod: S$GLB,,, | Performed by: FAMILY MEDICINE

## 2017-08-08 PROCEDURE — 3008F BODY MASS INDEX DOCD: CPT | Mod: S$GLB,,, | Performed by: FAMILY MEDICINE

## 2017-08-08 NOTE — PROGRESS NOTES
"Subjective:       Patient ID: Jo-Ann Escobedo is a 76 y.o. female.    Chief Complaint: Follow-up  Jo-Ann Escobedo 76 y.o. female is here for office visit to review care and physical exam, reports doing well other than pain.  Apparently told by Rheumatology that "she couldn't help her."  Was offered Pain mgt.  Pt did see Ortho for foot problem.  Told "come back in 90 days for injection.  Notes wants more Syntrhoid, says "I;m hyper thyroid one week and hypothyroid the next."      HPI  Review of Systems   Constitutional: Negative for activity change and unexpected weight change.   HENT: Positive for rhinorrhea. Negative for hearing loss and trouble swallowing.    Eyes: Negative for discharge and visual disturbance.   Respiratory: Positive for wheezing. Negative for chest tightness.    Cardiovascular: Negative for chest pain and palpitations.   Gastrointestinal: Negative for blood in stool, constipation, diarrhea and vomiting.   Endocrine: Negative for polydipsia and polyuria.   Genitourinary: Negative for difficulty urinating, dysuria, hematuria and menstrual problem.   Musculoskeletal: Positive for arthralgias and joint swelling. Negative for neck pain.   Neurological: Negative for weakness and headaches.   Psychiatric/Behavioral: Negative for confusion and dysphoric mood.       Objective:      Physical Exam   Constitutional: She is oriented to person, place, and time. She appears well-developed and well-nourished. No distress.   HENT:   Head: Normocephalic.   Mouth/Throat: No oropharyngeal exudate.   Boggy turbinates   Eyes: EOM are normal. Pupils are equal, round, and reactive to light. No scleral icterus.   Neck: Neck supple. No JVD present. No thyromegaly present.   Cardiovascular: Normal rate, regular rhythm and normal heart sounds.  Exam reveals no gallop and no friction rub.    No murmur heard.  Pulmonary/Chest: Effort normal and breath sounds normal. She has no wheezes. She has no rales.   Abdominal: Soft. Bowel " sounds are normal. She exhibits no distension and no mass. There is no tenderness. There is no guarding.   Musculoskeletal: Normal range of motion. She exhibits no edema.   Lymphadenopathy:     She has no cervical adenopathy.   Neurological: She is alert and oriented to person, place, and time. She has normal reflexes. She displays normal reflexes. No cranial nerve deficit. She exhibits normal muscle tone.   Skin: Skin is warm. No rash noted. No erythema.   Psychiatric: She has a normal mood and affect. Thought content normal.       Assessment:       No diagnosis found.    Plan:       Jo-Ann was seen today for follow-up.    Diagnoses and all orders for this visit:    Hypothyroidism, unspecified type  -     Ambulatory Referral to Endocrinology    Paget's disease of bone  -     Ambulatory Referral to Endocrinology    Arthritis  -     Ambulatory Referral to Physical Medicine Rehab    Hyperlipidemia, unspecified hyperlipidemia type    - Chart reviewed, labs and diet discussed

## 2017-11-03 ENCOUNTER — OFFICE VISIT (OUTPATIENT)
Dept: ORTHOPEDICS | Facility: CLINIC | Age: 77
End: 2017-11-03
Payer: MEDICARE

## 2017-11-03 ENCOUNTER — HOSPITAL ENCOUNTER (OUTPATIENT)
Dept: RADIOLOGY | Facility: HOSPITAL | Age: 77
Discharge: HOME | End: 2017-11-03
Attending: ORTHOPAEDIC SURGERY
Payer: MEDICARE

## 2017-11-03 DIAGNOSIS — M19.079 ARTHRITIS OF FOOT: ICD-10-CM

## 2017-11-03 DIAGNOSIS — M19.079 ARTHRITIS OF FOOT: Primary | ICD-10-CM

## 2017-11-03 PROCEDURE — 73610 X-RAY EXAM OF ANKLE: CPT | Mod: 26,RT,, | Performed by: RADIOLOGY

## 2017-11-03 PROCEDURE — 99213 OFFICE O/P EST LOW 20 MIN: CPT | Mod: 25,S$GLB,, | Performed by: ORTHOPAEDIC SURGERY

## 2017-11-03 PROCEDURE — 99999 PR PBB SHADOW E&M-EST. PATIENT-LVL I: CPT | Mod: PBBFAC,,, | Performed by: ORTHOPAEDIC SURGERY

## 2017-11-03 PROCEDURE — 20605 DRAIN/INJ JOINT/BURSA W/O US: CPT | Mod: RT,S$GLB,, | Performed by: ORTHOPAEDIC SURGERY

## 2017-11-03 PROCEDURE — 73610 X-RAY EXAM OF ANKLE: CPT | Mod: TC,RT

## 2017-11-03 RX ORDER — METHYLPREDNISOLONE ACETATE 80 MG/ML
80 INJECTION, SUSPENSION INTRA-ARTICULAR; INTRALESIONAL; INTRAMUSCULAR; SOFT TISSUE
Status: COMPLETED | OUTPATIENT
Start: 2017-11-03 | End: 2017-11-03

## 2017-11-03 RX ADMIN — METHYLPREDNISOLONE ACETATE 80 MG: 80 INJECTION, SUSPENSION INTRA-ARTICULAR; INTRALESIONAL; INTRAMUSCULAR; SOFT TISSUE at 01:11

## 2017-11-04 NOTE — PROGRESS NOTES
Ms. Escobedo returns today for followup of her right posttraumatic subtalar   arthritis.  I saw her last three months ago.  Prior to that, it had been over a   year since she had had an injection.  She reports that she was using diclofenac   at that time and was helping her quite a bit.  Dr. Ureña had taken her off of   the diclofenac and her symptoms recurred.  I injected her right subtalar joint   in August and at that time, I remember it was difficult to get the medicine in.    She reports that she had relief for about 30 days and then she started   developing some sharp, which she describes as vertical type pain in the lateral   aspect of her ankle.  This has only recently started to resolve and she is   almost back to baseline at this point.  Examination today does not reveal any   significant swelling.  She has a lot of tenderness in the sinus tarsi area of   her hindfoot and some tenderness over distal fibula.  It has been about two   years since I ordered an x-ray, so I ordered a new x-ray today and there is no   evidence of any acute injury.  The subtalar joint arthritis may have progressed   a bit, but not significantly since 2015.  She has been thinking about fusion   surgery, but she has some family matters she needs to take care of.  I offered   her another injection today, which she agreed to, so after verbal consent and a   sterile prep, I attempted to inject her right subtalar joint again with 3 mL of   lidocaine and 80 mg of Depo-Medrol.  It was again a very difficult injection.  I   was only able to get about two-thirds of the fluid into the joint and it caused   quite a bit of pain for Ms. Escobedo.  I do not think future injections would be   recommended at this time.  She is going to contact Dr. Ureña again to see if   she can get back on the diclofenac.  If and when she decides to do the fusion   surgery, she will let us know.      ARIELLE  dd: 11/03/2017 13:49:09 (JESSICA)  td: 11/04/2017 11:01:10  (CDT)  Doc ID   #[id number]  Job ID #[id number]    CC:

## 2018-01-25 ENCOUNTER — OFFICE VISIT (OUTPATIENT)
Dept: OPTOMETRY | Facility: CLINIC | Age: 78
End: 2018-01-25
Payer: MEDICARE

## 2018-01-25 DIAGNOSIS — Z96.1 PSEUDOPHAKIA OF BOTH EYES: Primary | ICD-10-CM

## 2018-01-25 DIAGNOSIS — Z13.5 SCREENING FOR GLAUCOMA: ICD-10-CM

## 2018-01-25 DIAGNOSIS — H52.4 PRESBYOPIA: ICD-10-CM

## 2018-01-25 DIAGNOSIS — H04.123 DRY EYES, BILATERAL: ICD-10-CM

## 2018-01-25 PROCEDURE — 92015 DETERMINE REFRACTIVE STATE: CPT | Mod: S$GLB,,, | Performed by: OPTOMETRIST

## 2018-01-25 PROCEDURE — 99999 PR PBB SHADOW E&M-EST. PATIENT-LVL II: CPT | Mod: PBBFAC,,, | Performed by: OPTOMETRIST

## 2018-01-25 PROCEDURE — 92014 COMPRE OPH EXAM EST PT 1/>: CPT | Mod: S$GLB,,, | Performed by: OPTOMETRIST

## 2018-01-25 NOTE — PROGRESS NOTES
[facility]     Ms. Jo-Ann Escobedo for annual eye exam.  Patient complains of dry eyes , uses drops systane 5 times  daily     Would patient like a refraction today? yes    (-)drops  (-)flashes  (-)floaters  (-)diplopia    Diabetic -  Hemoglobin A1C       Date                     Value               Ref Range             Status                06/16/2009               5.9                 4.0 - 6.2 %           Final                 02/12/2004               5.6                 4.5 - 6.2 %           Final            ----------    OCULAR HISTORY  Last Eye Exam 2017  (+)eye surgery Cataract   (-)diagnosed or treated for any eye conditions or diseases -    FAMILY HISTORY  (-)Glaucoma -        Last edited by Ariel Hinojosa, OD on 1/25/2018 10:24 AM. (History)        ROS     Positive for: Eyes (cat surgery OU)    Negative for: Constitutional, Gastrointestinal, Neurological, Skin,   Genitourinary, Musculoskeletal, HENT, Endocrine, Cardiovascular,   Respiratory, Psychiatric, Allergic/Imm, Heme/Lymph    Last edited by Ariel Hinojosa, OD on 1/25/2018 10:24 AM. (History)        Assessment /Plan     For exam results, see Encounter Report.    Pseudophakia of both eyes    Dry eyes, bilateral    Screening for glaucoma    Presbyopia        1. Mild pco OD>OS--not ready for YAG.  Pt wishes new spex Rx  2. COREY--pt to cont w SYSTANE ATs  QID    PLAN:    rtc 1 yr

## 2018-02-09 ENCOUNTER — OFFICE VISIT (OUTPATIENT)
Dept: ORTHOPEDICS | Facility: CLINIC | Age: 78
End: 2018-02-09
Payer: MEDICARE

## 2018-02-09 VITALS — WEIGHT: 187.63 LBS | BODY MASS INDEX: 33.25 KG/M2 | HEIGHT: 63 IN

## 2018-02-09 DIAGNOSIS — M19.079 ARTHRITIS OF FOOT: Primary | ICD-10-CM

## 2018-02-09 PROCEDURE — 99999 PR PBB SHADOW E&M-EST. PATIENT-LVL II: CPT | Mod: PBBFAC,,, | Performed by: ORTHOPAEDIC SURGERY

## 2018-02-09 PROCEDURE — 1159F MED LIST DOCD IN RCRD: CPT | Mod: S$GLB,,, | Performed by: ORTHOPAEDIC SURGERY

## 2018-02-09 PROCEDURE — 1125F AMNT PAIN NOTED PAIN PRSNT: CPT | Mod: S$GLB,,, | Performed by: ORTHOPAEDIC SURGERY

## 2018-02-09 PROCEDURE — 20605 DRAIN/INJ JOINT/BURSA W/O US: CPT | Mod: RT,S$GLB,, | Performed by: ORTHOPAEDIC SURGERY

## 2018-02-09 PROCEDURE — 3008F BODY MASS INDEX DOCD: CPT | Mod: S$GLB,,, | Performed by: ORTHOPAEDIC SURGERY

## 2018-02-09 PROCEDURE — 99213 OFFICE O/P EST LOW 20 MIN: CPT | Mod: 25,S$GLB,, | Performed by: ORTHOPAEDIC SURGERY

## 2018-02-09 RX ORDER — METHYLPREDNISOLONE ACETATE 80 MG/ML
80 INJECTION, SUSPENSION INTRA-ARTICULAR; INTRALESIONAL; INTRAMUSCULAR; SOFT TISSUE
Status: COMPLETED | OUTPATIENT
Start: 2018-02-09 | End: 2018-02-09

## 2018-02-09 RX ADMIN — METHYLPREDNISOLONE ACETATE 80 MG: 80 INJECTION, SUSPENSION INTRA-ARTICULAR; INTRALESIONAL; INTRAMUSCULAR; SOFT TISSUE at 02:02

## 2018-02-28 ENCOUNTER — HOSPITAL ENCOUNTER (OUTPATIENT)
Dept: RADIOLOGY | Facility: HOSPITAL | Age: 78
Discharge: HOME | End: 2018-02-28
Attending: FAMILY MEDICINE
Payer: MEDICARE

## 2018-02-28 ENCOUNTER — OFFICE VISIT (OUTPATIENT)
Dept: INTERNAL MEDICINE | Facility: CLINIC | Age: 78
End: 2018-02-28
Payer: MEDICARE

## 2018-02-28 VITALS
DIASTOLIC BLOOD PRESSURE: 76 MMHG | BODY MASS INDEX: 33.71 KG/M2 | SYSTOLIC BLOOD PRESSURE: 123 MMHG | OXYGEN SATURATION: 94 % | HEART RATE: 83 BPM | WEIGHT: 190.25 LBS | HEIGHT: 63 IN

## 2018-02-28 DIAGNOSIS — E03.9 ACQUIRED HYPOTHYROIDISM: ICD-10-CM

## 2018-02-28 DIAGNOSIS — S29.9XXA INJURY OF CHEST WALL, INITIAL ENCOUNTER: ICD-10-CM

## 2018-02-28 DIAGNOSIS — E89.0 HYPOTHYROIDISM, POSTSURGICAL: ICD-10-CM

## 2018-02-28 DIAGNOSIS — E78.00 PURE HYPERCHOLESTEROLEMIA: ICD-10-CM

## 2018-02-28 DIAGNOSIS — N18.30 CHRONIC RENAL IMPAIRMENT, STAGE 3 (MODERATE): Primary | ICD-10-CM

## 2018-02-28 DIAGNOSIS — I10 ESSENTIAL HYPERTENSION: ICD-10-CM

## 2018-02-28 DIAGNOSIS — C50.012 MALIGNANT NEOPLASM OF NIPPLE OF LEFT BREAST IN FEMALE, UNSPECIFIED ESTROGEN RECEPTOR STATUS: ICD-10-CM

## 2018-02-28 PROCEDURE — 99999 PR PBB SHADOW E&M-EST. PATIENT-LVL IV: CPT | Mod: PBBFAC,,, | Performed by: FAMILY MEDICINE

## 2018-02-28 PROCEDURE — 72100 X-RAY EXAM L-S SPINE 2/3 VWS: CPT | Mod: TC

## 2018-02-28 PROCEDURE — 99214 OFFICE O/P EST MOD 30 MIN: CPT | Mod: S$GLB,,, | Performed by: FAMILY MEDICINE

## 2018-02-28 PROCEDURE — 99499 UNLISTED E&M SERVICE: CPT | Mod: S$GLB,,, | Performed by: FAMILY MEDICINE

## 2018-02-28 PROCEDURE — 71100 X-RAY EXAM RIBS UNI 2 VIEWS: CPT | Mod: TC

## 2018-02-28 PROCEDURE — 72100 X-RAY EXAM L-S SPINE 2/3 VWS: CPT | Mod: 26,,, | Performed by: RADIOLOGY

## 2018-02-28 PROCEDURE — 71100 X-RAY EXAM RIBS UNI 2 VIEWS: CPT | Mod: 26,,, | Performed by: RADIOLOGY

## 2018-02-28 NOTE — PROGRESS NOTES
Subjective:       Patient ID: Jo-Ann Escobedo is a 77 y.o. female.    Chief Complaint: Rib Injury (2 weeks)  Jo-Ann Escobedo 77 y.o. female is here for office visit to review care and physical exam, though has pre-visit c/o hearing loss and sinus issues, really has one overriding concern being right chest-wall pain.  Reports noted when started after bending and twisting.  Pain worse with inspiration, but not with exertion.  No SOB or other concern.  Has some LBP as well, but temporally related to the chest-wall [paiin.  HPI  Review of Systems   Constitutional: Negative for activity change, fatigue, fever and unexpected weight change.   HENT: Positive for hearing loss and rhinorrhea. Negative for congestion, postnasal drip and trouble swallowing.    Eyes: Negative for discharge, redness and visual disturbance.   Respiratory: Positive for chest tightness. Negative for shortness of breath and wheezing.    Cardiovascular: Positive for chest pain. Negative for palpitations and leg swelling.   Gastrointestinal: Negative for abdominal distention, blood in stool, constipation, diarrhea and vomiting.   Endocrine: Negative for polydipsia and polyuria.   Genitourinary: Negative for decreased urine volume, difficulty urinating, dysuria, flank pain, hematuria, menstrual problem, pelvic pain and urgency.   Musculoskeletal: Negative for arthralgias, back pain, gait problem, joint swelling, neck pain and neck stiffness.   Skin: Negative for color change, rash and wound.   Neurological: Negative for dizziness, syncope, weakness and headaches.   Psychiatric/Behavioral: Negative for behavioral problems, confusion, dysphoric mood and sleep disturbance. The patient is not nervous/anxious.        Objective:      Physical Exam   Constitutional: She is oriented to person, place, and time. She appears well-developed and well-nourished. No distress.   HENT:   Head: Normocephalic.   Mouth/Throat: No oropharyngeal exudate.   Eyes: EOM are  normal. Pupils are equal, round, and reactive to light. No scleral icterus.   Neck: Neck supple. No JVD present. No thyromegaly present.   Cardiovascular: Normal rate, regular rhythm and normal heart sounds.  Exam reveals no gallop and no friction rub.    No murmur heard.  Pulmonary/Chest: Effort normal and breath sounds normal. She has no wheezes. She has no rales.   Abdominal: Soft. Bowel sounds are normal. She exhibits no distension and no mass. There is no tenderness. There is no guarding.   Musculoskeletal: Normal range of motion. She exhibits no edema.   Lymphadenopathy:     She has no cervical adenopathy.   Neurological: She is alert and oriented to person, place, and time. She has normal reflexes. She displays normal reflexes. No cranial nerve deficit. She exhibits normal muscle tone.   Skin: Skin is warm. No rash noted. No erythema.   Psychiatric: She has a normal mood and affect. Thought content normal.       Assessment:       1. Chronic renal impairment, stage 3 (moderate)    2. Essential hypertension    3. Acquired hypothyroidism    4. Hypothyroidism, postsurgical    5. Pure hypercholesterolemia    6. Malignant neoplasm of nipple of left breast in female, unspecified estrogen receptor status    7. Injury of chest wall, initial encounter        Plan:       Jo-Ann was seen today for rib injury.    Diagnoses and all orders for this visit:    Chronic renal impairment, stage 3 (moderate)  - Chart reviewed, risk controled  Essential hypertension  - Controled  Acquired hypothyroidism  - Followed  Hypothyroidism, postsurgical  - Chart reviewed  Pure hypercholesterolemia  - Controlled and followed, discussed  Malignant neoplasm of nipple of left breast in female, unspecified estrogen receptor status  - has f/u  Injury of chest wall, initial encounter  -     X-Ray Ribs 2 View Right; Future  -     X-Ray Lumbar Spine Ap And Lateral; Future  -     Ambulatory Referral to Pain Clinic

## 2018-03-05 ENCOUNTER — OFFICE VISIT (OUTPATIENT)
Dept: DERMATOLOGY | Facility: CLINIC | Age: 78
End: 2018-03-05
Payer: MEDICARE

## 2018-03-05 DIAGNOSIS — D22.9 NEVUS: ICD-10-CM

## 2018-03-05 DIAGNOSIS — L21.9 SEBORRHEIC DERMATITIS, UNSPECIFIED: Primary | ICD-10-CM

## 2018-03-05 DIAGNOSIS — L81.4 LENTIGO: ICD-10-CM

## 2018-03-05 DIAGNOSIS — Z85.828 PERSONAL HISTORY OF SKIN CANCER: ICD-10-CM

## 2018-03-05 DIAGNOSIS — L82.0 INFLAMED SEBORRHEIC KERATOSIS: ICD-10-CM

## 2018-03-05 DIAGNOSIS — L82.1 SK (SEBORRHEIC KERATOSIS): ICD-10-CM

## 2018-03-05 DIAGNOSIS — L90.5 SCAR: ICD-10-CM

## 2018-03-05 PROCEDURE — 99214 OFFICE O/P EST MOD 30 MIN: CPT | Mod: 25,S$GLB,, | Performed by: DERMATOLOGY

## 2018-03-05 PROCEDURE — 99999 PR PBB SHADOW E&M-EST. PATIENT-LVL II: CPT | Mod: PBBFAC,,, | Performed by: DERMATOLOGY

## 2018-03-05 PROCEDURE — 17110 DESTRUCTION B9 LES UP TO 14: CPT | Mod: S$GLB,,, | Performed by: DERMATOLOGY

## 2018-03-05 RX ORDER — GUAIFENESIN AND PHENYLEPHRINE HCL 400; 10 MG/1; MG/1
TABLET ORAL DAILY
COMMUNITY

## 2018-03-05 NOTE — PROGRESS NOTES
Subjective:       Patient ID:  Jo-Ann Escobedo is a 77 y.o. female who presents for   Chief Complaint   Patient presents with    Skin Check     High risk pt with hx NMSC here to check for the development of new lesions.    Pt c/o lesions on right forearm and left leg x several months. . Getting larger. No tx.  Not tender or bleeding but they are scaly. Lesion on left leg is irritated  C/o scaling around hairline for several weeks,  intermittent.  Does not use medicated shampoo. occ itches        Review of Systems   Constitutional: Negative for fever, chills, fatigue and malaise.   Skin: Positive for daily sunscreen use.   Hematologic/Lymphatic: Does not bruise/bleed easily.        Objective:    Physical Exam   Constitutional: She appears well-developed and well-nourished. No distress.   Neurological: She is alert and oriented to person, place, and time. She is not disoriented.   Psychiatric: She has a normal mood and affect.   Skin:   Areas Examined (abnormalities noted in diagram):   Scalp / Hair Palpated and Inspected  Head / Face Inspection Performed  Neck Inspection Performed  Chest / Axilla Inspection Performed  Abdomen Inspection Performed  Back Inspection Performed  RUE Inspected  LUE Inspection Performed  RLE Inspected  LLE Inspection Performed  Nails and Digits Inspection Performed                           Diagram Legend     Erythematous scaling macule/papule c/w actinic keratosis       Vascular papule c/w angioma      Pigmented verrucoid papule/plaque c/w seborrheic keratosis      Yellow umbilicated papule c/w sebaceous hyperplasia      Irregularly shaped tan macule c/w lentigo     1-2 mm smooth white papules consistent with Milia      Movable subcutaneous cyst with punctum c/w epidermal inclusion cyst      Subcutaneous movable cyst c/w pilar cyst      Firm pink to brown papule c/w dermatofibroma      Pedunculated fleshy papule(s) c/w skin tag(s)      Evenly pigmented macule c/w junctional nevus      Mildly variegated pigmented, slightly irregular-bordered macule c/w mildly atypical nevus      Flesh colored to evenly pigmented papule c/w intradermal nevus       Pink pearly papule/plaque c/w basal cell carcinoma      Erythematous hyperkeratotic cursted plaque c/w SCC      Surgical scar with no sign of skin cancer recurrence      Open and closed comedones      Inflammatory papules and pustules      Verrucoid papule consistent consistent with wart     Erythematous eczematous patches and plaques     Dystrophic onycholytic nail with subungual debris c/w onychomycosis     Umbilicated papule    Erythematous-base heme-crusted tan verrucoid plaque consistent with inflamed seborrheic keratosis     Erythematous Silvery Scaling Plaque c/w Psoriasis     See annotation      Assessment / Plan:        Seborrheic dermatitis, unspecified  otc medicated shampoos recommended    Inflamed seborrheic keratosis  Cryosurgery procedure note:    Verbal consent from the patient is obtained. Liquid nitrogen cryosurgery is applied to 3 lesions to produce a freeze injury. The patient is aware that blisters may form and is instructed on wound care with gentle cleansing and use of vaseline ointment to keep moist until healed. The patient is supplied a handout on cryosurgery and is instructed to call if lesions do not completely resolve.    SK (seborrheic keratosis)  These are benign inherited growths without a malignant potential. Reassurance given to patient. No treatment is necessary.     Lentigo  This is a benign hyperpigmented sun induced lesion. Daily sun protection will reduce the number of new lesions. Treatment of these benign lesions are considered cosmetic.  The nature of sun-induced photo-aging and skin cancers is discussed.  Sun avoidance, protective clothing, and the use of 30-SPF sunscreens is advised. Observe closely for skin damage/changes, and call if such occurs.    Nevus  Discussed ABCDE's of nevi.  Monitor for new mole or  moles that are becoming bigger, darker, irritated, or developing irregular borders. Brochure provided.    Personal history of skin cancer  Scar  Pt with history of non melanoma skin cancer  Total body skin examination performed today including at least 12 points as noted in physical examination. No suspicious lesions noted.             Follow-up in about 1 year (around 3/5/2019).

## 2018-03-19 ENCOUNTER — LAB VISIT (OUTPATIENT)
Dept: LAB | Facility: HOSPITAL | Age: 78
End: 2018-03-19
Attending: COLON & RECTAL SURGERY
Payer: MEDICARE

## 2018-03-19 ENCOUNTER — PATIENT MESSAGE (OUTPATIENT)
Dept: INTERNAL MEDICINE | Facility: CLINIC | Age: 78
End: 2018-03-19

## 2018-03-19 ENCOUNTER — OFFICE VISIT (OUTPATIENT)
Dept: SURGERY | Facility: CLINIC | Age: 78
End: 2018-03-19
Payer: MEDICARE

## 2018-03-19 VITALS
DIASTOLIC BLOOD PRESSURE: 73 MMHG | BODY MASS INDEX: 34.02 KG/M2 | SYSTOLIC BLOOD PRESSURE: 114 MMHG | HEIGHT: 63 IN | WEIGHT: 192 LBS | HEART RATE: 83 BPM

## 2018-03-19 DIAGNOSIS — K62.5 BRBPR (BRIGHT RED BLOOD PER RECTUM): Primary | ICD-10-CM

## 2018-03-19 DIAGNOSIS — K92.2 LOWER GI BLEED: ICD-10-CM

## 2018-03-19 DIAGNOSIS — K92.2 LOWER GI BLEED: Primary | ICD-10-CM

## 2018-03-19 LAB
BASOPHILS # BLD AUTO: 0.05 K/UL
BASOPHILS NFR BLD: 0.6 %
DIFFERENTIAL METHOD: ABNORMAL
EOSINOPHIL # BLD AUTO: 0.2 K/UL
EOSINOPHIL NFR BLD: 2.3 %
ERYTHROCYTE [DISTWIDTH] IN BLOOD BY AUTOMATED COUNT: 12.8 %
HCT VFR BLD AUTO: 40.8 %
HGB BLD-MCNC: 13 G/DL
IMM GRANULOCYTES # BLD AUTO: 0.08 K/UL
IMM GRANULOCYTES NFR BLD AUTO: 1 %
LYMPHOCYTES # BLD AUTO: 3.1 K/UL
LYMPHOCYTES NFR BLD: 36.5 %
MCH RBC QN AUTO: 31 PG
MCHC RBC AUTO-ENTMCNC: 31.9 G/DL
MCV RBC AUTO: 97 FL
MONOCYTES # BLD AUTO: 1 K/UL
MONOCYTES NFR BLD: 11.5 %
NEUTROPHILS # BLD AUTO: 4.1 K/UL
NEUTROPHILS NFR BLD: 48.1 %
NRBC BLD-RTO: 0 /100 WBC
PLATELET # BLD AUTO: 247 K/UL
PMV BLD AUTO: 10.6 FL
RBC # BLD AUTO: 4.19 M/UL
WBC # BLD AUTO: 8.42 K/UL

## 2018-03-19 PROCEDURE — 85025 COMPLETE CBC W/AUTO DIFF WBC: CPT

## 2018-03-19 PROCEDURE — 99213 OFFICE O/P EST LOW 20 MIN: CPT | Mod: 25,S$GLB,, | Performed by: COLON & RECTAL SURGERY

## 2018-03-19 PROCEDURE — 36415 COLL VENOUS BLD VENIPUNCTURE: CPT

## 2018-03-19 PROCEDURE — 99999 PR PBB SHADOW E&M-EST. PATIENT-LVL III: CPT | Mod: PBBFAC,,, | Performed by: COLON & RECTAL SURGERY

## 2018-03-19 PROCEDURE — 45330 DIAGNOSTIC SIGMOIDOSCOPY: CPT | Mod: S$GLB,,, | Performed by: COLON & RECTAL SURGERY

## 2018-03-19 PROCEDURE — 3078F DIAST BP <80 MM HG: CPT | Mod: CPTII,S$GLB,, | Performed by: COLON & RECTAL SURGERY

## 2018-03-19 PROCEDURE — 3074F SYST BP LT 130 MM HG: CPT | Mod: CPTII,S$GLB,, | Performed by: COLON & RECTAL SURGERY

## 2018-03-21 NOTE — PROGRESS NOTES
Subjective:       Patient ID: Jo-Ann Escobedo is a 77 y.o. female.    Chief Complaint: Rectal Bleeding    HPI Established pt. colonoscopy 2015.  Presents with 2 day history of painless dark blood mixed with stool.  No diarrhea.  No constipation or diarrhea.  No lightheadedness or palpitations    Review of Systems   Constitutional: Negative for chills and fever.   Respiratory: Negative for cough and shortness of breath.    Cardiovascular: Negative for chest pain and palpitations.   Gastrointestinal: Negative for nausea and vomiting.   Genitourinary: Negative for dysuria and urgency.   Neurological: Negative for seizures and numbness.       Objective:      Physical Exam   Constitutional: She is oriented to person, place, and time. She appears well-developed and well-nourished.   Eyes: Conjunctivae and EOM are normal.   Pulmonary/Chest: Effort normal. No respiratory distress.   Abdominal: Soft. She exhibits no distension.   Genitourinary:   Genitourinary Comments: Sigmoidoscopy: Verbal consent.  2 enema prep.  Scope number [id number].  Digital rectal exam-no mass, nontender.  Scope was entered via the anus and inserted up to 40 cm.  There was diverticulosis.  There was some dark old blood between 20 and 40 cm with formed green stool without any blood mixed in.  Patient of procedure well discharged home ambulatory.   Musculoskeletal: Normal range of motion. She exhibits no edema.   Neurological: She is alert and oriented to person, place, and time.   Skin: Skin is warm and dry.   Psychiatric: She has a normal mood and affect. Her behavior is normal.       Assessment:       1. Lower GI bleed      stable.  Plan:       CBC. Call pt for f/u.  Pt given instructions to call or return for significant bleeding.     Addendum: Hemoglobin 13, over the past 2 years which range from 13.2-14.2.  I called patient at about 11:30 on 3/ 21  - she reported no bleeding on 3/20, but had a small amount with her most recent bowel movement.   We'll continue to follow and instructions given for her to return immediately for significant bleeding.

## 2018-03-26 ENCOUNTER — TELEPHONE (OUTPATIENT)
Dept: SURGERY | Facility: CLINIC | Age: 78
End: 2018-03-26

## 2018-03-26 RX ORDER — LISINOPRIL 20 MG/1
20 TABLET ORAL NIGHTLY
Qty: 90 TABLET | Refills: 3 | Status: SHIPPED | OUTPATIENT
Start: 2018-03-26 | End: 2019-03-11 | Stop reason: SDUPTHER

## 2018-03-26 NOTE — TELEPHONE ENCOUNTER
----- Message from Artur Monet MD sent at 3/21/2018 11:47 AM CDT -----  Priyanka Gao, please call pt to make sure she has no further bleeding - Thursday    Thanks  cbw

## 2018-04-09 ENCOUNTER — OFFICE VISIT (OUTPATIENT)
Dept: PAIN MEDICINE | Facility: CLINIC | Age: 78
End: 2018-04-09
Attending: ANESTHESIOLOGY
Payer: MEDICARE

## 2018-04-09 VITALS — WEIGHT: 191.38 LBS | BODY MASS INDEX: 33.91 KG/M2 | HEIGHT: 63 IN

## 2018-04-09 DIAGNOSIS — M54.50 CHRONIC BILATERAL LOW BACK PAIN WITHOUT SCIATICA: ICD-10-CM

## 2018-04-09 DIAGNOSIS — M47.816 LUMBAR SPONDYLOSIS: Primary | ICD-10-CM

## 2018-04-09 DIAGNOSIS — M51.36 DDD (DEGENERATIVE DISC DISEASE), LUMBAR: ICD-10-CM

## 2018-04-09 DIAGNOSIS — G89.29 CHRONIC BILATERAL LOW BACK PAIN WITHOUT SCIATICA: ICD-10-CM

## 2018-04-09 PROCEDURE — 99204 OFFICE O/P NEW MOD 45 MIN: CPT | Mod: S$GLB,,, | Performed by: ANESTHESIOLOGY

## 2018-04-09 NOTE — LETTER
April 9, 2018      Jesse Ureña MD  [address]  [facility] [address]           [facility]  [address]  [facility] [address]  Phone: [phone]  Fax: [phone]          Patient: Jo-Ann Escobedo   MR Number: [id number]   YOB: 1940   Date of Visit: 4/9/2018       Dear Dr. Jesse Ureña:    Thank you for referring Jo-Ann Escobedo to me for evaluation. Attached you will find relevant portions of my assessment and plan of care.    If you have questions, please do not hesitate to call me. I look forward to following Jo-Ann Escobedo along with you.    Sincerely,    Jarvis Morales III, MD    Enclosure  CC:  No Recipients    If you would like to receive this communication electronically, please contact [email or web address] or [phone] to request more information on [facility] access.    For providers and/or their staff who would like to refer a patient to [facility], please contact us through our one-stop-shop provider referral line, [facility], at [phone].    If you feel you have received this communication in error or would no longer like to receive these types of communications, please e-mail [email or web address]

## 2018-04-09 NOTE — PROGRESS NOTES
Chronic Pain - New Consult    Referring Physician: Jesse Ureña MD      Chief Complaint   Patient presents with    Low-back Pain        SUBJECTIVE:    Jo-Ann Escobedo is a 76 y/o female with hx of lumbar surgery who presents to the clinic for the evaluation of low back pain. The low back pain started > 1 year ago and symptoms have been worsening.  She began to notice the pain after discontinuing the use of NSAIDs.  The pain is located in the bilateral lower back area and is non-radiating.  The pain is described as aching and is rated as 6/10. The pain is rated with a score of  1/10 on the BEST day and a score of 9/10 on the WORST day. The pain is exacerbated by morning time, laying down, getting out of bed/chair, cold weather and sitting for a long time.  The pain is mitigated by heat, medications (NSAIDS), rest, and walking. She reports spending 0 hours per day reclining. The patient reports 8 hours of uninterrupted sleep per night.    Patient reports chronic urinary incontinence. She denies bowel incontinence, significant motor weakness, and loss of sensation.    Physical Therapy/Home Exercise: yes, tried in the past      Pain Disability Index Review:  Last 3 PDI Scores 4/9/2018   Pain Disability Index (PDI) 25       Pain Medications:    - None     report:  Reviewed    Imaging:     Lumbar X-rays (2/28/2018):    DJD.  The disc spaces are narrowed between L3 and S1 vertebral segments.  No fracture, spondylolisthesis or bone destruction identified    Past Medical History:   Diagnosis Date    After-cataract of both eyes - Both Eyes 9/26/2012    Allergy     Arthritis     Diverticulosis     Hyperlipidemia     Hypertension     Hypothyroidism     Paget disease of bone     SQUAMOUS CELL CARCINOMA     in situ right neck     Thyroid disease      Past Surgical History:   Procedure Laterality Date    CATARACT EXTRACTION W/  INTRAOCULAR LENS IMPLANT Bilateral     EYE SURGERY      HYSTERECTOMY      KNEE  ARTHROSCOPY N/A     pt unsure of which knee     MASTECTOMY      SPINE SURGERY      TOTAL THYROIDECTOMY       Social History     Social History    Marital status:      Spouse name: N/A    Number of children: N/A    Years of education: N/A     Occupational History    realtor      [employer]     Social History Main Topics    Smoking status: Former Smoker     Packs/day: 2.00     Years: 44.00     Types: Cigarettes     Quit date: 1/1/1969    Smokeless tobacco: Never Used    Alcohol use 0.0 oz/week      Comment: rarely    Drug use: No    Sexual activity: Not Currently     Other Topics Concern    Are You Pregnant Or Think You May Be? No    Breast-Feeding No     Social History Narrative    No narrative on file     Family History   Problem Relation Age of Onset    Cancer Father      lung    Dementia Mother     Glaucoma Brother     Macular degeneration Brother     Diabetes Neg Hx     Amblyopia Neg Hx     Blindness Neg Hx     Cataracts Neg Hx     Retinal detachment Neg Hx     Strabismus Neg Hx     Melanoma Neg Hx        Review of patient's allergies indicates:   Allergen Reactions    Codeine      Other reaction(s): MENTAL CHANGES  Other reaction(s): Diarrhea    Niacin preparations        Current Outpatient Prescriptions   Medication Sig    co-enzyme Q-10 30 mg capsule Take 30 mg by mouth once daily.     ergocalciferol (VITAMIN D2) 50,000 unit Cap Take by mouth. * Capsule Oral As directed.   tab once a week times 8 weeksThen Vit D 2000 units once daily    fenofibrate 160 MG Tab Take 1 tablet (160 mg total) by mouth once daily.    LACTOBACILLUS COMBINATION NO.8 (ADULT PROBIOTIC ORAL) Take by mouth.    levothyroxine (SYNTHROID) 125 MCG tablet Take 1 tablet (125 mcg total) by mouth after dinner.    lisinopril (PRINIVIL,ZESTRIL) 20 MG tablet TAKE 1 TABLET (20 MG TOTAL) BY MOUTH NIGHTLY.    turmeric root extract 500 mg Cap Take by mouth.     No current  "facility-administered medications for this visit.        REVIEW OF SYSTEMS:    GENERAL:  No weight loss, malaise or fevers.  HEENT:  Negative for frequent or significant headaches.  NECK:  Negative for pain and significant neck swelling.  RESPIRATORY:  Negative for  wheezing or shortness of breath.  CARDIOVASCULAR:  Negative for chest pain  or palpitations.  GI:  Negative for abdominal discomfort, blood in stools or black stools or change in bowel habits.  MUSCULOSKELETAL:  See HPI.  SKIN:  Negative for lesions, rash, and itching.  PSYCH:  Negative for sleep disturbance, mood disorder and recent psychosocial stressors.  HEMATOLOGY/LYMPHOLOGY:  Negative for prolonged bleeding, bruising easily or swollen nodes.  NEURO:   No history of paralysis, seizures or tremors.  All other reviewed and negative other than HPI.    OBJECTIVE:    Ht 5' 3" (1.6 m)   Wt 86.8 kg (191 lb 5.8 oz)   LMP  (LMP Unknown)   BMI 33.90 kg/m²     PHYSICAL EXAMINATION:    General appearance: Well appearing, in no acute distress, alert and oriented x3.  Psych:  Mood and affect appropriate.  Skin: Skin color, texture, turgor normal, no rashes or lesions, in both upper and lower body.  Head/face:  Normocephalic, atraumatic.   Neck: No pain with neck flexion, extension, or lateral flexion.   Cor: RRR  Pulm: Breathing unlabored.  GI:  Soft and non-tender.  Back: Straight leg raising in the sitting and supine positions is negative to radicular pain. Tenderness to palpation over the lower lumbar spine and paraspinous muscles. + pain with facet loading.  Decreased range of motion on flexion and extension of lumbar spine.  Extremities: Peripheral joint ROM is full and pain free without obvious instability or laxity in all four extremities. No deformities, edema, or skin discoloration.   Musculoskeletal:  Bilateral upper and lower extremity strength is normal and symmetric.  No atrophy or tone abnormalities are noted. - ZEE  Neuro: Bilateral upper and " lower extremity coordination and muscle stretch reflexes are physiologic and symmetric. No loss of sensation is noted.  Gait: Normal.    ASSESSMENT: 77 y.o.  female with chronic low back pain, consistent with lumbar facet arthopathy.     1. Lumbar spondylosis    2. DDD (degenerative disc disease), lumbar    3. Chronic bilateral low back pain without sciatica      PLAN:     - I have stressed the importance of physical activity and a home exercise plan to help with pain and improve health.  - Okay to start Tylenol arthritis as needed for pain.  - We discussed performing bilateral lumbar facet medial branch blocks to determine if her back pain is arising from the lumbar facet joints.  The patient would like some time to consider procedure and will contact office if she elects to proceed    The above plan and management options were discussed at length with patient. Patient is in agreement with the above and verbalized understanding. It will be communicated with the referring physician via electronic record, fax, or mail.    Jarvis Morales III  04/09/2018

## 2018-04-15 DIAGNOSIS — E78.5 HYPERLIPIDEMIA, UNSPECIFIED HYPERLIPIDEMIA TYPE: ICD-10-CM

## 2018-04-16 RX ORDER — FENOFIBRATE 160 MG/1
160 TABLET ORAL DAILY
Qty: 90 TABLET | Refills: 3 | Status: SHIPPED | OUTPATIENT
Start: 2018-04-16 | End: 2019-04-05 | Stop reason: SDUPTHER

## 2018-04-16 RX ORDER — LEVOTHYROXINE SODIUM 125 UG/1
125 TABLET ORAL
Qty: 30 TABLET | Refills: 6 | Status: SHIPPED | OUTPATIENT
Start: 2018-04-16 | End: 2018-10-29 | Stop reason: SDUPTHER

## 2018-05-04 ENCOUNTER — OFFICE VISIT (OUTPATIENT)
Dept: INTERNAL MEDICINE | Facility: CLINIC | Age: 78
End: 2018-05-04
Payer: MEDICARE

## 2018-05-04 ENCOUNTER — LAB VISIT (OUTPATIENT)
Dept: LAB | Facility: HOSPITAL | Age: 78
End: 2018-05-04
Attending: FAMILY MEDICINE
Payer: MEDICARE

## 2018-05-04 VITALS
SYSTOLIC BLOOD PRESSURE: 124 MMHG | OXYGEN SATURATION: 98 % | HEIGHT: 63 IN | HEART RATE: 84 BPM | WEIGHT: 192.88 LBS | BODY MASS INDEX: 34.18 KG/M2 | DIASTOLIC BLOOD PRESSURE: 80 MMHG

## 2018-05-04 DIAGNOSIS — E78.00 PURE HYPERCHOLESTEROLEMIA: ICD-10-CM

## 2018-05-04 DIAGNOSIS — I10 ESSENTIAL HYPERTENSION: Primary | ICD-10-CM

## 2018-05-04 DIAGNOSIS — Z00.00 PREVENTATIVE HEALTH CARE: ICD-10-CM

## 2018-05-04 DIAGNOSIS — Z91.09 ENVIRONMENTAL ALLERGIES: ICD-10-CM

## 2018-05-04 DIAGNOSIS — E89.0 POSTABLATIVE HYPOTHYROIDISM: ICD-10-CM

## 2018-05-04 LAB
CHOLEST SERPL-MCNC: 196 MG/DL
CHOLEST/HDLC SERPL: 4.2 {RATIO}
ESTIMATED AVG GLUCOSE: 117 MG/DL
HBA1C MFR BLD HPLC: 5.7 %
HDLC SERPL-MCNC: 47 MG/DL
HDLC SERPL: 24 %
LDLC SERPL CALC-MCNC: 120.6 MG/DL
NONHDLC SERPL-MCNC: 149 MG/DL
TRIGL SERPL-MCNC: 142 MG/DL
TSH SERPL DL<=0.005 MIU/L-ACNC: 0.83 UIU/ML

## 2018-05-04 PROCEDURE — 99999 PR PBB SHADOW E&M-EST. PATIENT-LVL III: CPT | Mod: PBBFAC,,, | Performed by: FAMILY MEDICINE

## 2018-05-04 PROCEDURE — 3074F SYST BP LT 130 MM HG: CPT | Mod: CPTII,S$GLB,, | Performed by: FAMILY MEDICINE

## 2018-05-04 PROCEDURE — 99397 PER PM REEVAL EST PAT 65+ YR: CPT | Mod: S$GLB,,, | Performed by: FAMILY MEDICINE

## 2018-05-04 PROCEDURE — 3079F DIAST BP 80-89 MM HG: CPT | Mod: CPTII,S$GLB,, | Performed by: FAMILY MEDICINE

## 2018-05-04 PROCEDURE — 36415 COLL VENOUS BLD VENIPUNCTURE: CPT

## 2018-05-04 PROCEDURE — 83036 HEMOGLOBIN GLYCOSYLATED A1C: CPT

## 2018-05-04 PROCEDURE — 80061 LIPID PANEL: CPT

## 2018-05-04 PROCEDURE — 84443 ASSAY THYROID STIM HORMONE: CPT

## 2018-05-04 RX ORDER — MONTELUKAST SODIUM 10 MG/1
10 TABLET ORAL NIGHTLY
Qty: 30 TABLET | Refills: 6 | Status: SHIPPED | OUTPATIENT
Start: 2018-05-04 | End: 2018-06-03

## 2018-05-04 NOTE — PROGRESS NOTES
Subjective:       Patient ID: Jo-Ann Escobedo is a 77 y.o. female.    Chief Complaint: Annual Exam  Jo-Ann Escobedo 77 y.o. female is here for office visit to review care and physical exam, reports doing well in general.  Has allergies, thinks to her dog, controled mostly with Flonase and otc.  Has joint pains, using topuical tx, stopped NSAIDs.    HPI  Review of Systems   Constitutional: Positive for chills and unexpected weight change. Negative for activity change and fever.   HENT: Positive for hearing loss, postnasal drip, rhinorrhea and sore throat. Negative for ear pain and trouble swallowing.    Eyes: Negative for discharge and visual disturbance.   Respiratory: Positive for cough. Negative for chest tightness, shortness of breath and wheezing.    Cardiovascular: Negative for chest pain and palpitations.   Gastrointestinal: Negative for blood in stool, constipation, diarrhea and vomiting.   Endocrine: Negative for polydipsia and polyuria.   Genitourinary: Negative for difficulty urinating, dysuria, hematuria and menstrual problem.   Musculoskeletal: Positive for arthralgias and neck pain. Negative for joint swelling.   Neurological: Negative for weakness and headaches.   Psychiatric/Behavioral: Negative for confusion and dysphoric mood.       Objective:      Physical Exam   Constitutional: She is oriented to person, place, and time. She appears well-developed and well-nourished. No distress.   HENT:   Head: Normocephalic.   Mouth/Throat: No oropharyngeal exudate.   Eyes: EOM are normal. Pupils are equal, round, and reactive to light. No scleral icterus.   Neck: Neck supple. No JVD present. No thyromegaly present.   Cardiovascular: Normal rate, regular rhythm and normal heart sounds.  Exam reveals no gallop and no friction rub.    No murmur heard.  Pulmonary/Chest: Effort normal and breath sounds normal. She has no wheezes. She has no rales.   Abdominal: Soft. Bowel sounds are normal. She exhibits no distension  and no mass. There is no tenderness. There is no guarding.   Musculoskeletal: Normal range of motion. She exhibits no edema.   Lymphadenopathy:     She has no cervical adenopathy.   Neurological: She is alert and oriented to person, place, and time. She has normal reflexes. She displays normal reflexes. No cranial nerve deficit. She exhibits normal muscle tone.   Skin: Skin is warm. No rash noted. No erythema.   Psychiatric: She has a normal mood and affect. Thought content normal.       Assessment:       1. Essential hypertension    2. Pure hypercholesterolemia    3. Postablative hypothyroidism    4. Preventative health care    5. Environmental allergies        Plan:       Jo-Ann was seen today for annual exam.    Diagnoses and all orders for this visit:    Essential hypertension  - controled, stopped NSAIDs  Pure hypercholesterolemia  - review  Postablative hypothyroidism  - review, stable  Preventative health care  -     Lipid panel; Future  -     Hemoglobin A1c; Future  -     TSH; Future    Environmental allergies  -     montelukast (SINGULAIR) 10 mg tablet; Take 1 tablet (10 mg total) by mouth every evening.

## 2018-05-08 ENCOUNTER — TELEPHONE (OUTPATIENT)
Dept: SURGERY | Facility: CLINIC | Age: 78
End: 2018-05-08

## 2018-05-08 RX ORDER — CIPROFLOXACIN 500 MG/1
500 TABLET ORAL 2 TIMES DAILY
Qty: 20 TABLET | Refills: 0 | Status: SHIPPED | OUTPATIENT
Start: 2018-05-08 | End: 2018-05-18

## 2018-05-08 RX ORDER — METRONIDAZOLE 500 MG/1
500 TABLET ORAL 3 TIMES DAILY
Qty: 30 TABLET | Refills: 0 | Status: SHIPPED | OUTPATIENT
Start: 2018-05-08 | End: 2018-05-18

## 2018-05-08 NOTE — TELEPHONE ENCOUNTER
----- Message from Nadia Sethi MA sent at 5/8/2018  8:12 AM CDT -----  Contact: [phone]  Same Day Appointment Request    Caller is requesting a same day appointment.    Was an appointment with another provider offered?   No appt's available  Name of Caller: Self  When is the first available appointment? n/a  Symptoms: Diverticulitis flare up  Communication Preference (MyChart response to Pt. (or) Call Back # and timeframe): garry back [phone]  Additional Information: pt of Dr Monet but will see any provider available

## 2018-05-08 NOTE — TELEPHONE ENCOUNTER
Spoke with patient and offered an appointment with Vanessa, NP or offered an antibiotic per Vanessa and a follow up appointment with Dr. Monet 2 weeks from now.  She said that the symptoms have eased up and would like to have antibiotics called and and come see Dr. Monet on 5/24.  She said that if the symptoms get worse, she will call back.

## 2018-05-10 ENCOUNTER — OFFICE VISIT (OUTPATIENT)
Dept: ORTHOPEDICS | Facility: CLINIC | Age: 78
End: 2018-05-10
Payer: MEDICARE

## 2018-05-10 VITALS
WEIGHT: 191 LBS | HEART RATE: 78 BPM | BODY MASS INDEX: 33.84 KG/M2 | DIASTOLIC BLOOD PRESSURE: 72 MMHG | SYSTOLIC BLOOD PRESSURE: 124 MMHG | HEIGHT: 63 IN

## 2018-05-10 DIAGNOSIS — M19.079 ARTHRITIS OF FOOT: Primary | ICD-10-CM

## 2018-05-10 PROCEDURE — 99213 OFFICE O/P EST LOW 20 MIN: CPT | Mod: 25,S$GLB,, | Performed by: ORTHOPAEDIC SURGERY

## 2018-05-10 PROCEDURE — 3078F DIAST BP <80 MM HG: CPT | Mod: CPTII,S$GLB,, | Performed by: ORTHOPAEDIC SURGERY

## 2018-05-10 PROCEDURE — 3074F SYST BP LT 130 MM HG: CPT | Mod: CPTII,S$GLB,, | Performed by: ORTHOPAEDIC SURGERY

## 2018-05-10 PROCEDURE — 99999 PR PBB SHADOW E&M-EST. PATIENT-LVL II: CPT | Mod: PBBFAC,,, | Performed by: ORTHOPAEDIC SURGERY

## 2018-05-11 PROCEDURE — 20605 DRAIN/INJ JOINT/BURSA W/O US: CPT | Mod: RT,S$GLB,, | Performed by: ORTHOPAEDIC SURGERY

## 2018-05-11 RX ORDER — METHYLPREDNISOLONE ACETATE 80 MG/ML
80 INJECTION, SUSPENSION INTRA-ARTICULAR; INTRALESIONAL; INTRAMUSCULAR; SOFT TISSUE
Status: COMPLETED | OUTPATIENT
Start: 2018-05-11 | End: 2018-05-11

## 2018-05-11 RX ADMIN — METHYLPREDNISOLONE ACETATE 80 MG: 80 INJECTION, SUSPENSION INTRA-ARTICULAR; INTRALESIONAL; INTRAMUSCULAR; SOFT TISSUE at 07:05

## 2018-05-12 NOTE — PROGRESS NOTES
Ms. Escobedo returns today for followup of her right posttraumatic subtalar   arthritis.  Her last injection did very well and is currently having some degree   of relief, although her pain is starting to return.  She wants to go ahead and   get another injection today.  Her examination reveals continued tenderness and   decreased motion of the sinus tarsi area of her hindfoot.  After verbal consent   and sterile prep, I injected her right subtalar joint with 2 mL of lidocaine, 80   mg of Depo-Medrol.  I did have some difficulty injecting the medication unlike   last time.  We have had continued discussion about possible fusion surgery.  I   am going to have her return to see me in three to four months if necessary.      CR/DANIELA  dd: 05/11/2018 07:36:58 (CDT)  td: 05/11/2018 20:36:30 (CDSHAMIKA)  Doc ID   #[id number]  Job ID #[id number]    CC:

## 2018-05-28 ENCOUNTER — OFFICE VISIT (OUTPATIENT)
Dept: SURGERY | Facility: CLINIC | Age: 78
End: 2018-05-28
Payer: MEDICARE

## 2018-05-28 ENCOUNTER — OFFICE VISIT (OUTPATIENT)
Dept: HEMATOLOGY/ONCOLOGY | Facility: CLINIC | Age: 78
End: 2018-05-28
Payer: MEDICARE

## 2018-05-28 ENCOUNTER — HOSPITAL ENCOUNTER (OUTPATIENT)
Dept: RADIOLOGY | Facility: HOSPITAL | Age: 78
Discharge: HOME | End: 2018-05-28
Attending: INTERNAL MEDICINE
Payer: MEDICARE

## 2018-05-28 VITALS
BODY MASS INDEX: 33.51 KG/M2 | HEIGHT: 63 IN | HEART RATE: 81 BPM | SYSTOLIC BLOOD PRESSURE: 129 MMHG | WEIGHT: 189.13 LBS | DIASTOLIC BLOOD PRESSURE: 67 MMHG

## 2018-05-28 VITALS
HEART RATE: 68 BPM | DIASTOLIC BLOOD PRESSURE: 59 MMHG | HEIGHT: 63 IN | WEIGHT: 188.69 LBS | SYSTOLIC BLOOD PRESSURE: 121 MMHG | TEMPERATURE: 98 F | BODY MASS INDEX: 33.43 KG/M2 | RESPIRATION RATE: 16 BRPM | OXYGEN SATURATION: 94 %

## 2018-05-28 DIAGNOSIS — Z85.3 HISTORY OF BREAST CANCER IN FEMALE: ICD-10-CM

## 2018-05-28 DIAGNOSIS — C50.612 MALIGNANT NEOPLASM OF AXILLARY TAIL OF LEFT FEMALE BREAST: ICD-10-CM

## 2018-05-28 DIAGNOSIS — K57.32 DIVERTICULITIS OF LARGE INTESTINE WITHOUT PERFORATION OR ABSCESS WITHOUT BLEEDING: Primary | ICD-10-CM

## 2018-05-28 PROCEDURE — 3078F DIAST BP <80 MM HG: CPT | Mod: CPTII,S$GLB,, | Performed by: COLON & RECTAL SURGERY

## 2018-05-28 PROCEDURE — 77065 DX MAMMO INCL CAD UNI: CPT | Mod: TC,PO

## 2018-05-28 PROCEDURE — 77061 BREAST TOMOSYNTHESIS UNI: CPT | Mod: TC,PO

## 2018-05-28 PROCEDURE — 3074F SYST BP LT 130 MM HG: CPT | Mod: CPTII,S$GLB,, | Performed by: COLON & RECTAL SURGERY

## 2018-05-28 PROCEDURE — 77061 BREAST TOMOSYNTHESIS UNI: CPT | Mod: 26,,, | Performed by: RADIOLOGY

## 2018-05-28 PROCEDURE — 3078F DIAST BP <80 MM HG: CPT | Mod: CPTII,S$GLB,, | Performed by: INTERNAL MEDICINE

## 2018-05-28 PROCEDURE — 99213 OFFICE O/P EST LOW 20 MIN: CPT | Mod: S$GLB,,, | Performed by: INTERNAL MEDICINE

## 2018-05-28 PROCEDURE — 3074F SYST BP LT 130 MM HG: CPT | Mod: CPTII,S$GLB,, | Performed by: INTERNAL MEDICINE

## 2018-05-28 PROCEDURE — 99213 OFFICE O/P EST LOW 20 MIN: CPT | Mod: S$GLB,,, | Performed by: COLON & RECTAL SURGERY

## 2018-05-28 PROCEDURE — 99999 PR PBB SHADOW E&M-EST. PATIENT-LVL III: CPT | Mod: PBBFAC,,, | Performed by: COLON & RECTAL SURGERY

## 2018-05-28 PROCEDURE — 99999 PR PBB SHADOW E&M-EST. PATIENT-LVL III: CPT | Mod: PBBFAC,,, | Performed by: INTERNAL MEDICINE

## 2018-05-28 PROCEDURE — 77065 DX MAMMO INCL CAD UNI: CPT | Mod: 26,,, | Performed by: RADIOLOGY

## 2018-05-28 RX ORDER — MOXIFLOXACIN HYDROCHLORIDE 400 MG/1
400 TABLET ORAL DAILY
Qty: 10 TABLET | Refills: 1 | Status: SHIPPED | OUTPATIENT
Start: 2018-05-28 | End: 2018-10-25

## 2018-05-28 NOTE — PROGRESS NOTES
Subjective:       Patient ID: Jo-Ann Escobedo is a 77 y.o. female.    Chief Complaint: No chief complaint on file.    HPI   Ms. Escobedo presents today for follow up. Briefly, she is a 75 year-old female with a remote history of breast cancer treated 17 years ago with a left modified radical   mastectomy. Her tumor was a T2 N1 M0 carcinoma. She was treated with four cycles of AC and 4 cycles of Taxotere in the adjuvant setting and has remained cancer free since then.     A mammogram earlier today was read as BIRADS I, and a one year follow up was recommended.    Review of Systems  Overall she feels well and she has no complaints.  ECOG PS remains 0.   She denies any anxiety, depression, easy bruising, fevers, chills, night sweats, weight loss, nausea, vomiting, diarrhea, constipation, diplopia, blurred vision, headache, chest pain, abdominal pain, or difficulty ambulating. All other systems are negative.     Objective:      Physical Exam  GENERAL: She is alert, oriented to time, place, person; well nourished;   pleasant; in no acute physical distress.   VITAL SIGNS: Reviewed.   HEENT: Normal. There are no nasal, oral, lip, gingival, auricular, lid,   or conjunctival lesions. Mucosae are moist and pink, and there is no   thrush. Pupils are equal, reactive to light and accommodation.   Extraocular muscle movements are intact.   NECK: Supple without JVD, thyromegaly, or adenopathy.   LUNGS: Clear to auscultation without wheezing, rales, or rhonchi.   CARDIOVASCULAR: Reveals an S1, S2, no murmurs, no rubs, no gallops.   BREASTS: She is status post left mastectomy with no evidence of chest wall   recurrence. There are no masses in the right breast.   ABDOMEN: Soft, nontender without organomegaly. Bowel sounds are   identified.   EXTREMITIES: Have no cyanosis, clubbing, or edema.   SKIN: Does not have petechiae, rashes, induration, or ecchymoses.   NEUROLOGIC: Motor function is 5/5, DTRs are 0-1+ bilaterally, symmetrical,    and cranial nerves within normal limits.   LYMPHATIC: There is no cervical, axillary, or supraclavicular adenopathy.    Assessment:       1. 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        Plan:        She will return in a year with another mammogram.  Her questions were answered to her satisfaction.

## 2018-06-04 NOTE — PROGRESS NOTES
Subjective:       Patient ID: Jo-Ann Escobedo is a 77 y.o. female.    Chief Complaint: Follow-up and Diverticulitis    HPI Established pt.  Recent LGI bleed.  Single episode of presumed diverticulitis- managed as outpt - oral abx.  Pain has resolved.  Tolerating regular diet.  Cscope 2015.     Review of Systems   Constitutional: Negative for chills and fever.   Respiratory: Negative for cough and shortness of breath.    Cardiovascular: Negative for chest pain and palpitations.   Gastrointestinal: Negative for nausea and vomiting.   Genitourinary: Negative for dysuria and urgency.   Neurological: Negative for seizures and numbness.       Objective:      Physical Exam   Constitutional: She is oriented to person, place, and time. She appears well-developed and well-nourished.   Eyes: Conjunctivae and EOM are normal.   Pulmonary/Chest: Effort normal. No respiratory distress.   Abdominal: Soft. She exhibits no distension.   Musculoskeletal: Normal range of motion. She exhibits no edema.   Neurological: She is alert and oriented to person, place, and time.   Skin: Skin is warm and dry.   Psychiatric: She has a normal mood and affect. Her behavior is normal.       Assessment:       1. Diverticulitis of large intestine without perforation or abscess without bleeding        Plan:       Resolved.  F/u prn pain.

## 2018-07-31 ENCOUNTER — PATIENT MESSAGE (OUTPATIENT)
Dept: HEMATOLOGY/ONCOLOGY | Facility: CLINIC | Age: 78
End: 2018-07-31

## 2018-08-01 ENCOUNTER — PATIENT MESSAGE (OUTPATIENT)
Dept: HEMATOLOGY/ONCOLOGY | Facility: CLINIC | Age: 78
End: 2018-08-01

## 2018-08-01 DIAGNOSIS — Z85.3 HISTORY OF BREAST CANCER: Primary | ICD-10-CM

## 2018-08-10 ENCOUNTER — TELEPHONE (OUTPATIENT)
Dept: HEMATOLOGY/ONCOLOGY | Facility: CLINIC | Age: 78
End: 2018-08-10

## 2018-08-10 NOTE — TELEPHONE ENCOUNTER
Spoke w/ [facility] who is requesting orders and clinic notes be faxed for approval of breast prosthesis and bra orders. Fax number obtained will fax this afternoon.

## 2018-08-10 NOTE — TELEPHONE ENCOUNTER
----- Message from Jennifer Thomas sent at 8/10/2018 11:25 AM CDT -----  Contact: melissa torres with [facility] Needs orders   Callback#[phone]  Thank You  NOEMY Thomas

## 2018-08-27 ENCOUNTER — TELEPHONE (OUTPATIENT)
Dept: HEMATOLOGY/ONCOLOGY | Facility: CLINIC | Age: 78
End: 2018-08-27

## 2018-08-27 NOTE — TELEPHONE ENCOUNTER
----- Message from Mansi Butler sent at 8/27/2018  9:27 AM CDT -----  Contact: Radha Arceo calling regarding medical necessity form for prosthesis       Fax number [phone]    Marielos call back number  [phone]

## 2018-08-27 NOTE — TELEPHONE ENCOUNTER
Spoke to [facility], the only order sent was for the bra, they need an order for the prosthesis and the bra for both to be covered?

## 2018-08-29 ENCOUNTER — PATIENT MESSAGE (OUTPATIENT)
Dept: HEMATOLOGY/ONCOLOGY | Facility: CLINIC | Age: 78
End: 2018-08-29

## 2018-10-17 ENCOUNTER — TELEPHONE (OUTPATIENT)
Dept: INTERNAL MEDICINE | Facility: CLINIC | Age: 78
End: 2018-10-17

## 2018-10-17 ENCOUNTER — PATIENT MESSAGE (OUTPATIENT)
Dept: INTERNAL MEDICINE | Facility: CLINIC | Age: 78
End: 2018-10-17

## 2018-10-17 DIAGNOSIS — R73.03 PREDIABETES: ICD-10-CM

## 2018-10-17 DIAGNOSIS — I10 ESSENTIAL HYPERTENSION: Primary | ICD-10-CM

## 2018-10-17 NOTE — TELEPHONE ENCOUNTER
I have sent the patient a message in the portal as she requested after lab orders has been placed

## 2018-10-17 NOTE — TELEPHONE ENCOUNTER
Has already had some lab work previously this year which does not need to be repeated at this time.  Will recheck A1C (was elevated on last check 5 months ago) and ordered CMP.  Please process and notify patient once complete.

## 2018-10-24 ENCOUNTER — LAB VISIT (OUTPATIENT)
Dept: LAB | Facility: HOSPITAL | Age: 78
End: 2018-10-24
Payer: MEDICARE

## 2018-10-24 DIAGNOSIS — I10 ESSENTIAL HYPERTENSION: ICD-10-CM

## 2018-10-24 DIAGNOSIS — R73.03 PREDIABETES: ICD-10-CM

## 2018-10-24 LAB
ALBUMIN SERPL BCP-MCNC: 4 G/DL
ALP SERPL-CCNC: 59 U/L
ALT SERPL W/O P-5'-P-CCNC: 17 U/L
ANION GAP SERPL CALC-SCNC: 8 MMOL/L
AST SERPL-CCNC: 21 U/L
BILIRUB SERPL-MCNC: 0.4 MG/DL
BUN SERPL-MCNC: 21 MG/DL
CALCIUM SERPL-MCNC: 10.5 MG/DL
CHLORIDE SERPL-SCNC: 105 MMOL/L
CO2 SERPL-SCNC: 28 MMOL/L
CREAT SERPL-MCNC: 0.9 MG/DL
EST. GFR  (AFRICAN AMERICAN): >60 ML/MIN/1.73 M^2
EST. GFR  (NON AFRICAN AMERICAN): >60 ML/MIN/1.73 M^2
ESTIMATED AVG GLUCOSE: 117 MG/DL
GLUCOSE SERPL-MCNC: 133 MG/DL
HBA1C MFR BLD HPLC: 5.7 %
POTASSIUM SERPL-SCNC: 4.2 MMOL/L
PROT SERPL-MCNC: 7.4 G/DL
SODIUM SERPL-SCNC: 141 MMOL/L

## 2018-10-24 PROCEDURE — 80053 COMPREHEN METABOLIC PANEL: CPT

## 2018-10-24 PROCEDURE — 83036 HEMOGLOBIN GLYCOSYLATED A1C: CPT

## 2018-10-24 PROCEDURE — 36415 COLL VENOUS BLD VENIPUNCTURE: CPT

## 2018-10-25 ENCOUNTER — IMMUNIZATION (OUTPATIENT)
Dept: INTERNAL MEDICINE | Facility: CLINIC | Age: 78
End: 2018-10-25
Payer: MEDICARE

## 2018-10-25 ENCOUNTER — OFFICE VISIT (OUTPATIENT)
Dept: INTERNAL MEDICINE | Facility: CLINIC | Age: 78
End: 2018-10-25
Payer: MEDICARE

## 2018-10-25 VITALS
HEART RATE: 70 BPM | TEMPERATURE: 99 F | DIASTOLIC BLOOD PRESSURE: 60 MMHG | SYSTOLIC BLOOD PRESSURE: 122 MMHG | OXYGEN SATURATION: 96 % | WEIGHT: 191.38 LBS | HEIGHT: 63 IN | BODY MASS INDEX: 33.91 KG/M2

## 2018-10-25 DIAGNOSIS — E66.9 OBESITY (BMI 30.0-34.9): ICD-10-CM

## 2018-10-25 DIAGNOSIS — Z78.0 POST-MENOPAUSAL: ICD-10-CM

## 2018-10-25 DIAGNOSIS — Z23 NEED FOR TETANUS BOOSTER: ICD-10-CM

## 2018-10-25 DIAGNOSIS — N18.30 CHRONIC RENAL IMPAIRMENT, STAGE 3 (MODERATE): ICD-10-CM

## 2018-10-25 DIAGNOSIS — J32.9 CHRONIC SINUSITIS, UNSPECIFIED LOCATION: ICD-10-CM

## 2018-10-25 DIAGNOSIS — R73.03 PREDIABETES: ICD-10-CM

## 2018-10-25 DIAGNOSIS — E78.00 PURE HYPERCHOLESTEROLEMIA: ICD-10-CM

## 2018-10-25 DIAGNOSIS — M88.9 PAGET'S DISEASE OF BONE: ICD-10-CM

## 2018-10-25 DIAGNOSIS — Z85.3 HISTORY OF BREAST CANCER: ICD-10-CM

## 2018-10-25 DIAGNOSIS — I10 ESSENTIAL HYPERTENSION: ICD-10-CM

## 2018-10-25 DIAGNOSIS — E89.0 POSTABLATIVE HYPOTHYROIDISM: ICD-10-CM

## 2018-10-25 DIAGNOSIS — Z23 NEED FOR INFLUENZA VACCINATION: ICD-10-CM

## 2018-10-25 PROCEDURE — 90471 IMMUNIZATION ADMIN: CPT | Mod: PBBFAC

## 2018-10-25 PROCEDURE — 90662 IIV NO PRSV INCREASED AG IM: CPT | Mod: PBBFAC

## 2018-10-25 PROCEDURE — 1101F PT FALLS ASSESS-DOCD LE1/YR: CPT | Mod: CPTII,,, | Performed by: FAMILY MEDICINE

## 2018-10-25 PROCEDURE — 99999 PR PBB SHADOW E&M-EST. PATIENT-LVL IV: CPT | Mod: PBBFAC,,, | Performed by: FAMILY MEDICINE

## 2018-10-25 PROCEDURE — 90472 IMMUNIZATION ADMIN EACH ADD: CPT | Mod: PBBFAC

## 2018-10-25 PROCEDURE — 3078F DIAST BP <80 MM HG: CPT | Mod: CPTII,,, | Performed by: FAMILY MEDICINE

## 2018-10-25 PROCEDURE — 99213 OFFICE O/P EST LOW 20 MIN: CPT | Mod: S$PBB,,, | Performed by: FAMILY MEDICINE

## 2018-10-25 PROCEDURE — 3074F SYST BP LT 130 MM HG: CPT | Mod: CPTII,,, | Performed by: FAMILY MEDICINE

## 2018-10-25 PROCEDURE — 99214 OFFICE O/P EST MOD 30 MIN: CPT | Mod: PBBFAC | Performed by: FAMILY MEDICINE

## 2018-10-25 RX ORDER — IBUPROFEN 600 MG/1
TABLET ORAL
Refills: 0 | COMMUNITY
Start: 2018-09-12 | End: 2018-10-25

## 2018-10-25 RX ORDER — CHLORHEXIDINE GLUCONATE ORAL RINSE 1.2 MG/ML
SOLUTION DENTAL
Refills: 0 | COMMUNITY
Start: 2018-09-12 | End: 2018-12-11

## 2018-10-25 RX ORDER — AMOXICILLIN 500 MG/1
CAPSULE ORAL
Refills: 0 | COMMUNITY
Start: 2018-09-12 | End: 2018-10-25

## 2018-10-25 NOTE — PROGRESS NOTES
Subjective:      Patient ID: Jo-Ann Escobedo is a 77 y.o. female.    Chief Complaint: Establish Care and Follow-up      HPI:  Jo-Ann Escobedo is a 77 year old female with arthritis, history of CKD, DJD lumbar spine, history of breast cancer s/p left mastectomy, hyperlipidemia, hypertension, hypothyroidism, lipoma, multinodular goiter, obesity, Paget's disease of bone, and prediabetes who presents to clinic today to establish care and requesting ENT referral.    Patient complains of chronic rhinorrhea and voice change.  States she deals with congestion and rhinorrhea constantly for several years.  Was given a medication by her previous PCP which did not help.  Uses Flonase with transient relief of her symptoms.  Requests ENT referral.    CKD:  Normal GFR noted on most recent CMP 10/24/18.    Hx of breast cancer:  S/p left mastectomy 2001.  Last mammogram 5/28/18--no evidence of malignancy.    HLD:  Lipid panel 5/4/18 within normal limits.  Previously with elevated total cholesterol and hypertriglyceridemia; taking fenofibrate 160 mg by mouth daily.    HTN:  Within goal today.  Taking lisinopril 20 mg by mouth daily.    Hypothyroidism:  TSH within normal limits 5/4/18.  Taking levothyroxine 125 mcg by mouth daily.    Obesity:  Walks dog 1 mile daily.    Paget's disease of bone:  Stable.    Prediabetes:  A1C 5.7% 10/24/18, stable from previous.  States she does enjoy eating ice cream frequently.    Health Care Maintenance:  Influenza vaccination:  Will get today.  Last tetanus vaccination:  Due for this, will get today.  Last mammogram:  5/28/18  Last colonocopsy:  1/19/15  Pneumococcal vaccination:  Has not had.  DEXA:  Due for this.      Past Medical History:   Diagnosis Date    After-cataract of both eyes - Both Eyes 9/26/2012    Allergy     Arthritis     Breast cancer     Diverticulosis     Hyperlipidemia     Hypertension     Hypothyroidism     Paget disease of bone     Squamous Cell Carcinoma     in situ  right neck     Thyroid disease        Past Surgical History:   Procedure Laterality Date    BIOPSY-EXCISIONAL Right 2015    Performed by Joshua Goldberg[facility] at [facility] 2ND FLR    CATARACT EXTRACTION W/  INTRAOCULAR LENS IMPLANT Bilateral     COLONOSCOPY N/A 2015    Performed by Artur Monet MD at [facility] ENDO (4TH FLR)    EYE SURGERY      HYSTERECTOMY      KNEE ARTHROSCOPY N/A     pt unsure of which knee     MASTECTOMY      SPINE SURGERY      TOTAL THYROIDECTOMY         Family History   Problem Relation Age of Onset    Cancer Father         lung    Dementia Mother     Glaucoma Brother     Macular degeneration Brother     Diabetes Neg Hx     Amblyopia Neg Hx     Blindness Neg Hx     Cataracts Neg Hx     Retinal detachment Neg Hx     Strabismus Neg Hx     Melanoma Neg Hx        Social History     Socioeconomic History    Marital status:      Spouse name: None    Number of children: None    Years of education: None    Highest education level: None   Social Needs    Financial resource strain: None    Food insecurity - worry: None    Food insecurity - inability: None    Transportation needs - medical: None    Transportation needs - non-medical: None   Occupational History    Occupation: realtor     Employer: [employer]     Employer: [employer]   Tobacco Use    Smoking status: Former Smoker     Packs/day: 2.00     Years: 44.00     Pack years: 88.00     Types: Cigarettes     Last attempt to quit: 1969     Years since quittin.8    Smokeless tobacco: Never Used   Substance and Sexual Activity    Alcohol use: Yes     Alcohol/week: 0.0 oz     Comment: rarely    Drug use: No    Sexual activity: Not Currently   Other Topics Concern    Are you pregnant or think you may be? No    Breast-feeding No   Social History Narrative    None       Review of Systems   Constitutional: Negative for activity change, chills, fatigue, fever and unexpected weight  "change.   HENT: Positive for hearing loss and rhinorrhea. Negative for congestion, nosebleeds, sore throat and trouble swallowing.    Eyes: Positive for visual disturbance. Negative for pain and discharge.   Respiratory: Positive for wheezing. Negative for cough, chest tightness and shortness of breath.    Cardiovascular: Negative for chest pain and palpitations.   Gastrointestinal: Negative for abdominal distention, abdominal pain, blood in stool, constipation, diarrhea, nausea and vomiting.   Endocrine: Negative for polydipsia and polyuria.   Genitourinary: Negative for decreased urine volume, difficulty urinating, dysuria, hematuria, menstrual problem and urgency.   Musculoskeletal: Positive for arthralgias and neck pain. Negative for back pain, joint swelling and myalgias.   Skin: Negative for color change and rash.   Neurological: Negative for dizziness, tremors, weakness, light-headedness, numbness and headaches.   Psychiatric/Behavioral: Negative for agitation, behavioral problems, confusion and dysphoric mood. The patient is not nervous/anxious.      Objective:     Vitals:    10/25/18 1315   BP: 122/60   BP Location: Right arm   Patient Position: Sitting   BP Method: Large (Manual)   Pulse: 70   Temp: 98.6 °F (37 °C)   SpO2: 96%   Weight: 86.8 kg (191 lb 5.8 oz)   Height: 5' 3" (1.6 m)       Physical Exam   Constitutional: She is oriented to person, place, and time. She appears well-developed and well-nourished.   HENT:   Head: Normocephalic and atraumatic.   Right Ear: External ear normal.   Left Ear: External ear normal.   Nose: Nose normal.   Mouth/Throat: Oropharynx is clear and moist.   Eyes: Conjunctivae and EOM are normal. Pupils are equal, round, and reactive to light.   Neck: Normal range of motion. Neck supple. No tracheal deviation present.   Cardiovascular: Normal rate, regular rhythm and normal heart sounds. Exam reveals no gallop and no friction rub.   No murmur heard.  Pulmonary/Chest: Effort " normal and breath sounds normal. No respiratory distress. She has no wheezes. She has no rales.   Abdominal: Soft. Bowel sounds are normal. She exhibits no distension. There is no tenderness. There is no rebound and no guarding.   Musculoskeletal: Normal range of motion. She exhibits no edema or deformity.   Neurological: She is alert and oriented to person, place, and time. Coordination normal.   Skin: Skin is warm and dry.   Psychiatric: She has a normal mood and affect. Her behavior is normal.   Nursing note and vitals reviewed.     Assessment:      1. Chronic sinusitis, unspecified location    2. Chronic renal impairment, stage 3 (moderate)    3. History of breast cancer    4. Pure hypercholesterolemia    5. Essential hypertension    6. Postablative hypothyroidism    7. Obesity (BMI 30.0-34.9)    8. Paget's disease of bone    9. Post-menopausal    10. Prediabetes    11. Need for tetanus booster    12. Need for influenza vaccination      Plan:   Jo-Ann was seen today for establish care and follow-up.    Diagnoses and all orders for this visit:    Chronic sinusitis, unspecified location  -     Ambulatory Referral to ENT  -     Symptoms stable.  Continue Flonase PRN.    Chronic renal impairment, stage 3 (moderate)        -     Resolved per most recent CMP; continue to monitor with routine annual blood work.    History of breast cancer        -     S/p mastectomy.  Continue routine breast cancer screening.    Pure hypercholesterolemia        -     Improved; continue fibrate.    Essential hypertension        -     Within goal today.  Continue current regimen.    Postablative hypothyroidism        -     Stable, continue current regimen.    Obesity (BMI 30.0-34.9)        -     Counseled patient on the importance of increased daily aerobic exercise.    Paget's disease of bone        -     Stable; reviewed.    Post-menopausal  -     DXA Bone Density Spine And Hip; Future    Prediabetes        -     Counseled patient on  the importance of dietary modifications and diabetic diet.  Recheck A1C with routine labs in May.    Need for tetanus booster        -     Boostrix to be administered today.    Need for influenza vaccination        -     FluzoneHD to be administered today.

## 2018-10-29 RX ORDER — LEVOTHYROXINE SODIUM 125 UG/1
125 TABLET ORAL
Qty: 90 TABLET | Refills: 0 | Status: SHIPPED | OUTPATIENT
Start: 2018-10-29 | End: 2019-01-25 | Stop reason: SDUPTHER

## 2018-12-11 ENCOUNTER — OFFICE VISIT (OUTPATIENT)
Dept: OTOLARYNGOLOGY | Facility: CLINIC | Age: 78
End: 2018-12-11
Payer: MEDICARE

## 2018-12-11 VITALS
HEIGHT: 63 IN | BODY MASS INDEX: 33.87 KG/M2 | DIASTOLIC BLOOD PRESSURE: 60 MMHG | HEART RATE: 70 BPM | SYSTOLIC BLOOD PRESSURE: 126 MMHG | WEIGHT: 191.13 LBS

## 2018-12-11 DIAGNOSIS — R49.0 DYSPHONIA: ICD-10-CM

## 2018-12-11 DIAGNOSIS — K21.9 LARYNGOPHARYNGEAL REFLUX (LPR): ICD-10-CM

## 2018-12-11 DIAGNOSIS — J31.0 CHRONIC RHINITIS: Primary | ICD-10-CM

## 2018-12-11 PROCEDURE — 1101F PT FALLS ASSESS-DOCD LE1/YR: CPT | Mod: CPTII,S$GLB,, | Performed by: OTOLARYNGOLOGY

## 2018-12-11 PROCEDURE — 31575 DIAGNOSTIC LARYNGOSCOPY: CPT | Mod: S$GLB,,, | Performed by: OTOLARYNGOLOGY

## 2018-12-11 PROCEDURE — 99999 PR PBB SHADOW E&M-EST. PATIENT-LVL III: CPT | Mod: PBBFAC,,, | Performed by: OTOLARYNGOLOGY

## 2018-12-11 PROCEDURE — 3074F SYST BP LT 130 MM HG: CPT | Mod: CPTII,S$GLB,, | Performed by: OTOLARYNGOLOGY

## 2018-12-11 PROCEDURE — 3078F DIAST BP <80 MM HG: CPT | Mod: CPTII,S$GLB,, | Performed by: OTOLARYNGOLOGY

## 2018-12-11 PROCEDURE — 99204 OFFICE O/P NEW MOD 45 MIN: CPT | Mod: 25,S$GLB,, | Performed by: OTOLARYNGOLOGY

## 2018-12-11 RX ORDER — OMEPRAZOLE 40 MG/1
40 CAPSULE, DELAYED RELEASE ORAL
Qty: 90 CAPSULE | Refills: 1 | Status: SHIPPED | OUTPATIENT
Start: 2018-12-11 | End: 2019-03-28

## 2018-12-11 NOTE — PROGRESS NOTES
Subjective:      Jo-Ann Escobedo is a 78 y.o. female who was referred to me by Dr. Dakota Kerr in consultation for post-nasal drip.    She relates a long history for many years of chronic, daily, perennial, moderately severe postnasal drip, which is associated with hoarseness.  She describes a colorless phlegm when she expectorates, and notes associated variable aural fullness.  Flonase seems to improve these symptoms when used as needed.  She describes an appreciable amount of mucus when she blows her nose, and notes pruritic symptoms.  She is concerned that she might be allergic to her dog.  She denies notable sinus infections.    Current sinonasal medications as above.  She does not regularly use nasal decongestant sprays.    She does not recall previously having allergy testing.    She denies a history of asthma.    She relates a history of reflux symptoms which is currently managed with occasional Tums.  She has not previously had an EGD.    She denies have a diagnosis of obstructive sleep apnea.     She has not had sinonasal surgery.    She does not recall a prior history of nasal trauma.    SNOT-22 score = 32, NOSE score = 45%, ETDQ-7 score = 1.6    Global QOL = 55%    Days missed = Less than 5      Past Medical History  She has a past medical history of After-cataract of both eyes - Both Eyes, Allergy, Arthritis, Breast cancer, Diverticulosis, Hyperlipidemia, Hypertension, Hypothyroidism, Paget disease of bone, Squamous Cell Carcinoma, and Thyroid disease.    Past Surgical History  She has a past surgical history that includes Hysterectomy; Eye surgery; Spine surgery; Mastectomy; Total thyroidectomy; Cataract extraction w/  intraocular lens implant (Bilateral); Knee arthroscopy (N/A); BIOPSY-EXCISIONAL (Right, 7/27/2015); and COLONOSCOPY (N/A, 1/19/2015).    Family History  Her family history includes Cancer in her father; Dementia in her mother; Glaucoma in her brother; Macular degeneration in her  "brother.    Social History  She reports that she quit smoking about 49 years ago. Her smoking use included cigarettes. She has a 88.00 pack-year smoking history. she has never used smokeless tobacco. She reports that she drinks alcohol. She reports that she does not use drugs.    Allergies  She is allergic to codeine and niacin preparations.    Medications   She has a current medication list which includes the following prescription(s): co-enzyme q-10, ergocalciferol, fenofibrate, lactobacillus combination no.8, levothyroxine, lisinopril, and turmeric root extract.    Review of Systems  Review of Systems   Constitutional: Negative for fatigue, fever and unexpected weight change.   HENT: Positive for postnasal drip, rhinorrhea, sinus pressure and voice change. Negative for congestion, dental problem, ear discharge, ear pain, facial swelling, hearing loss, hoarse voice, nosebleeds, sore throat, tinnitus and trouble swallowing.    Eyes: Negative for photophobia, discharge, itching and visual disturbance.   Respiratory: Positive for wheezing. Negative for apnea, cough and shortness of breath.    Cardiovascular: Negative for chest pain and palpitations.   Gastrointestinal: Negative for abdominal pain, nausea and vomiting.   Endocrine: Negative for cold intolerance and heat intolerance.   Genitourinary: Negative for difficulty urinating.   Musculoskeletal: Negative for arthralgias, back pain, myalgias and neck pain.   Skin: Negative for rash.   Allergic/Immunologic: Negative for environmental allergies and food allergies.   Neurological: Negative for dizziness, seizures, syncope, weakness and headaches.   Hematological: Negative for adenopathy. Does not bruise/bleed easily.   Psychiatric/Behavioral: Negative for decreased concentration, dysphoric mood and sleep disturbance. The patient is not nervous/anxious.           Objective:     /60   Pulse 70   Ht 5' 3" (1.6 m)   Wt 86.7 kg (191 lb 2.2 oz)   LMP  (LMP " Unknown)   BMI 33.86 kg/m²        Constitutional:   She appears well-developed. She is cooperative. Normal speech.  No hoarse voice.      Head:  Normocephalic. Salivary glands normal.  Facial strength is normal.      Ears:    Right Ear: No drainage or tenderness. Tympanic membrane is not perforated. Tympanic membrane mobility is normal. No middle ear effusion. No decreased hearing is noted.   Left Ear: No drainage or tenderness. Tympanic membrane is not perforated. Tympanic membrane mobility is normal.  No middle ear effusion. No decreased hearing is noted.     Nose:  No mucosal edema, rhinorrhea, septal deviation or polyps. No epistaxis. Turbinates normal, no turbinate masses and no turbinate hypertrophy.  Right sinus exhibits no maxillary sinus tenderness and no frontal sinus tenderness. Left sinus exhibits no maxillary sinus tenderness and no frontal sinus tenderness.     Mouth/Throat  Oropharynx clear and moist without lesions or asymmetry and normal uvula midline. She does not have dentures. Normal dentition. No oral lesions or mucous membrane lesions. No oropharyngeal exudate or posterior oropharyngeal erythema. Mirror exam not performed due to patient tolerance.  Mirror exam not performed due to patient tolerance.      Neck:  Neck normal without thyromegaly masses, asymmetry, normal tracheal structure, crepitus, and tenderness, thyroid normal, trachea normal and no adenopathy. Normal range of motion present.     She has no cervical adenopathy.     Cardiovascular:   Regular rhythm.      Pulmonary/Chest:   Effort normal.     Psychiatric:   She has a normal mood and affect. Her speech is normal and behavior is normal.     Neurological:   No cranial nerve deficit.     Skin:   No rash noted.       Procedure    Flexible laryngoscopy performed.  See procedure note.     Left nasal valve     Left MT     Left septal deviation     Left posterior mucus flow     Right nasal valve     Right MT     Right choana     Larynx  with mild reflux changes        Data Reviewed    WBC (K/uL)   Date Value   03/19/2018 8.42     Eosinophil% (%)   Date Value   03/19/2018 2.3     Eos # (K/uL)   Date Value   03/19/2018 0.2     Platelets (K/uL)   Date Value   03/19/2018 247     Glucose (mg/dL)   Date Value   10/24/2018 133 (H)     No results found for: IGE    No sinus imaging available.       Assessment:     1. Chronic rhinitis    2. Laryngopharyngeal reflux (LPR)    3. Dysphonia         Plan:     I had a long discussion with the patient regarding her condition and the further workup and management options.  I reassured her as to the benign nature of today's findings, including the absence of an obvious sinus infection.  I prescribed omeprazole 40 mg nightly for empiric treatment of her reflux.  I also counseled her on the benefit of reducing her caffeine intake.  I prescribed the daily use of loratadine as needed to treat the allergic inflammation.    Follow-up in about 3 months (around 3/11/2019), or if symptoms worsen or fail to improve.

## 2018-12-11 NOTE — LETTER
2018    Dakota Kerr MD  [address] [place] [address]           OTOLARYNGOLOGY AND COMMUNICATION SCIENCES    Ariel Mae MD, FACS          SURGICAL AND MEDICAL DISEASES OF HEAD AND NECK  MD Ariel Hoffman MD, FACS  Porfirio Machuca III, MD    OTOLOGY, NEUROTOLOGY and SKULL-BASE SURGERY  Jimbo Melara MD, FACS  Anthony Bruner MD, Director    PEDIATRIC OTOLARYNGOLOGY & OTOLOGY  CRISTIAN Childress MD, FAAP  Jordan Pedraza MD, FACS    FACIAL PLASTIC and RECONSTRUCTIVE SURGERY  ROCIO Gambino III, MD, FACS    RHINOLOGY and SINUS SURGERY  Benji Prasad MD, MPH, FACS  Director   ROCIO Gambino III, MD, FACS    LARYNGOLOGY  Hugo Anaya MD    SPEECH-LANGUAGE PATHOLOGY  Kadie Gould, PhD, [facility]-SLP  Jeannie Cox, MS, CCC-SLP  Amie Peck, MS, CCC-SLP  Maribell Cosme MA, [facility]-SLP    AUDIOLOGY SECTION  Monet Sheets, MS, CCC-A  SAVANNA Brandon, CCC-A  Kimi Salgado, Silvestre, CCC-A  Silvestre Mares, CCC-A  Eleazar Lynne Jr., SAVANNA, CCA-A  SAVANNA Barth, CCC-A  Silvestre Helm, CCC-A    [facility]  Head and Neck Surgical Oncology  ANJELICA Garcia, FNP-C  Pedatric Otolaryngology  ANJELICA Hanley, PNP-C       Re:  Jo-Ann Michelleee  :  1940    Dear Dr. Kerr,    Thank you for referring your patient, Jo-Ann Escobedo, to us for evaluation and treatment.  I have enclosed a copy of my clinic note for your review and records.  If you have any questions please do not hesitate to contact our office.     Thank you for allowing me to participate in the care of your patient.    Sincerely,        Benji Prasad MD, MPH, FACS    Director, Rhinology and Sinus Surgery  Department of Otorhinolaryngology  [facility]    Encl:  Progress note       [facility] [address]  phone [phone]  fax [phone]   [email or web address]

## 2018-12-11 NOTE — Clinical Note
December 11, 2018      Dakota Kerr MD  [address]  [facility] [address]           [facility]  [address]  [facility] [address]  Phone: [phone]  Fax: [phone]          Patient: Jo-Ann Escobedo   MR Number: [id number]   YOB: 1940   Date of Visit: 12/11/2018       Dear Dr. Dakota Kerr:    Thank you for referring Jo-Ann Escobedo to me for evaluation. Attached you will find relevant portions of my assessment and plan of care.    If you have questions, please do not hesitate to call me. I look forward to following Jo-Ann Escobedo along with you.    Sincerely,    Benji Prasad MD    Enclosure  CC:  No Recipients    If you would like to receive this communication electronically, please contact [email or web address] or [phone] to request more information on [employer] access.    For providers and/or their staff who would like to refer a patient to [facility], please contact us through our one-stop-shop provider referral line, [facility] [place], at [phone].    If you feel you have received this communication in error or would no longer like to receive these types of communications, please e-mail [email or web address]

## 2018-12-11 NOTE — PROCEDURES
Laryngoscopy  Date/Time: 12/11/2018 3:03 PM  Performed by: Benji Prasad MD  Authorized by: Benji Prasad MD     Consent Done?:  Yes (Verbal)  Anesthesia:     Local anesthetic:  Lidocaine 4% and Talha-Synephrine 1/2%    Patient tolerance:  Patient tolerated the procedure well with no immediate complications  Laryngoscopy:     Areas examined:  Nasopharynx, oropharynx, hypopharynx, larynx, vocal cords and nasal cavities    Laryngoscope size:  4 mm  Nose Intranasal:      Mucosa no polyps     No mucosa lesions present     Septum gross deformity     Turbinates not enlarged  Nasopharynx:      No mucosa lesions     Adenoids not present     Posterior choanae patent     Eustachian tube patent  Larynx/hypopharynx:      No epiglottis lesions     No epiglottis edema     No AE folds lesions     No vocal cord polyps     Equal and normal bilateral     No hypopharynx lesions     No piriform sinus pooling     No piriform sinus lesions     Post cricoid edema     Post cricoid erythema     Posterior flow of nonpurulent mucus on left.  No polyps or purulence.  Isolated scab in left nasal valve.  Mild laryngeal reflux changes.

## 2018-12-18 ENCOUNTER — TELEPHONE (OUTPATIENT)
Dept: OPTOMETRY | Facility: CLINIC | Age: 78
End: 2018-12-18

## 2019-01-11 ENCOUNTER — HOSPITAL ENCOUNTER (OUTPATIENT)
Dept: RADIOLOGY | Facility: CLINIC | Age: 79
Discharge: HOME | End: 2019-01-11
Attending: FAMILY MEDICINE
Payer: MEDICARE

## 2019-01-11 DIAGNOSIS — Z78.0 POST-MENOPAUSAL: ICD-10-CM

## 2019-01-11 PROCEDURE — 77080 DXA BONE DENSITY AXIAL: CPT | Mod: TC

## 2019-01-11 PROCEDURE — 77080 DEXA BONE DENSITY SPINE HIP: ICD-10-PCS | Mod: 26,,, | Performed by: INTERNAL MEDICINE

## 2019-01-11 PROCEDURE — 77080 DXA BONE DENSITY AXIAL: CPT | Mod: 26,,, | Performed by: INTERNAL MEDICINE

## 2019-01-16 ENCOUNTER — PATIENT MESSAGE (OUTPATIENT)
Dept: INTERNAL MEDICINE | Facility: CLINIC | Age: 79
End: 2019-01-16

## 2019-01-16 NOTE — TELEPHONE ENCOUNTER
Please contact patient and ask how she would like this sent.  Send copies of most recent labs ordered by me (CMP and A1c) however patient would like this sent.

## 2019-01-25 RX ORDER — LEVOTHYROXINE SODIUM 125 UG/1
125 TABLET ORAL
Qty: 90 TABLET | Refills: 0 | Status: SHIPPED | OUTPATIENT
Start: 2019-01-25 | End: 2019-04-22 | Stop reason: SDUPTHER

## 2019-01-29 ENCOUNTER — TELEPHONE (OUTPATIENT)
Dept: OPTOMETRY | Facility: CLINIC | Age: 79
End: 2019-01-29

## 2019-02-07 ENCOUNTER — OFFICE VISIT (OUTPATIENT)
Dept: DERMATOLOGY | Facility: CLINIC | Age: 79
End: 2019-02-07
Payer: MEDICARE

## 2019-02-07 DIAGNOSIS — D23.10 HIDROCYSTOMA OF EYELID, RIGHT: ICD-10-CM

## 2019-02-07 DIAGNOSIS — L81.4 LENTIGO: ICD-10-CM

## 2019-02-07 DIAGNOSIS — Z85.828 PERSONAL HISTORY OF SKIN CANCER: ICD-10-CM

## 2019-02-07 DIAGNOSIS — L82.1 SK (SEBORRHEIC KERATOSIS): Primary | ICD-10-CM

## 2019-02-07 DIAGNOSIS — D22.9 NEVUS: ICD-10-CM

## 2019-02-07 DIAGNOSIS — L90.5 SCAR: ICD-10-CM

## 2019-02-07 PROCEDURE — 99999 PR PBB SHADOW E&M-EST. PATIENT-LVL II: ICD-10-PCS | Mod: PBBFAC,,, | Performed by: DERMATOLOGY

## 2019-02-07 PROCEDURE — 99999 PR PBB SHADOW E&M-EST. PATIENT-LVL II: CPT | Mod: PBBFAC,,, | Performed by: DERMATOLOGY

## 2019-02-07 PROCEDURE — 1101F PT FALLS ASSESS-DOCD LE1/YR: CPT | Mod: CPTII,S$GLB,, | Performed by: DERMATOLOGY

## 2019-02-07 PROCEDURE — 1101F PR PT FALLS ASSESS DOC 0-1 FALLS W/OUT INJ PAST YR: ICD-10-PCS | Mod: CPTII,S$GLB,, | Performed by: DERMATOLOGY

## 2019-02-07 PROCEDURE — 99214 OFFICE O/P EST MOD 30 MIN: CPT | Mod: S$GLB,,, | Performed by: DERMATOLOGY

## 2019-02-07 PROCEDURE — 99214 PR OFFICE/OUTPT VISIT, EST, LEVL IV, 30-39 MIN: ICD-10-PCS | Mod: S$GLB,,, | Performed by: DERMATOLOGY

## 2019-02-07 NOTE — PROGRESS NOTES
"  Subjective:       Patient ID:  Jo-Ann Escobedo is a 78 y.o. female who presents for   Chief Complaint   Patient presents with    Skin Check     Pt here today for a TBSE. Pt c/o bump on right eyelid x many months. No bleeding, pain or prev tx.  This is a high risk patient here to check for the development of new lesions.      Patient is here today for a "mole" check.   Pt has a history of extensive sun exposure in the past.   Pt recalls several blistering sunburns in the past- yes  Pt has history of tanning bed use- no  Pt has had moles removed in the past- yes, benign per pt  Pt has history of melanoma in first degree relatives- no          Review of Systems   Skin: Positive for daily sunscreen use and activity-related sunscreen use. Negative for tendency to form keloidal scars and recent sunburn.   Hematologic/Lymphatic: Does not bruise/bleed easily.        Objective:    Physical Exam   Constitutional: She appears well-developed and well-nourished. No distress.   Neurological: She is alert and oriented to person, place, and time. She is not disoriented.   Psychiatric: She has a normal mood and affect.   Skin:   Areas Examined (abnormalities noted in diagram):   Scalp / Hair Palpated and Inspected  Head / Face Inspection Performed  Neck Inspection Performed  Chest / Axilla Inspection Performed  Abdomen Inspection Performed  Back Inspection Performed  RUE Inspected  LUE Inspection Performed  RLE Inspected  LLE Inspection Performed  Nails and Digits Inspection Performed                           Diagram Legend     Erythematous scaling macule/papule c/w actinic keratosis       Vascular papule c/w angioma      Pigmented verrucoid papule/plaque c/w seborrheic keratosis      Yellow umbilicated papule c/w sebaceous hyperplasia      Irregularly shaped tan macule c/w lentigo     1-2 mm smooth white papules consistent with Milia      Movable subcutaneous cyst with punctum c/w epidermal inclusion cyst      Subcutaneous " movable cyst c/w pilar cyst      Firm pink to brown papule c/w dermatofibroma      Pedunculated fleshy papule(s) c/w skin tag(s)      Evenly pigmented macule c/w junctional nevus     Mildly variegated pigmented, slightly irregular-bordered macule c/w mildly atypical nevus      Flesh colored to evenly pigmented papule c/w intradermal nevus       Pink pearly papule/plaque c/w basal cell carcinoma      Erythematous hyperkeratotic cursted plaque c/w SCC      Surgical scar with no sign of skin cancer recurrence      Open and closed comedones      Inflammatory papules and pustules      Verrucoid papule consistent consistent with wart     Erythematous eczematous patches and plaques     Dystrophic onycholytic nail with subungual debris c/w onychomycosis     Umbilicated papule    Erythematous-base heme-crusted tan verrucoid plaque consistent with inflamed seborrheic keratosis     Erythematous Silvery Scaling Plaque c/w Psoriasis     See annotation      Assessment / Plan:        SK (seborrheic keratosis)  These are benign inherited growths without a malignant potential. Reassurance given to patient. No treatment is necessary.     Lentigo  This is a benign hyperpigmented sun induced lesion. Daily sun protection will reduce the number of new lesions. Treatment of these benign lesions are considered cosmetic.  The nature of sun-induced photo-aging and skin cancers is discussed.  Sun avoidance, protective clothing, and the use of 30-SPF sunscreens is advised. Observe closely for skin damage/changes, and call if such occurs.    Nevus  Discussed ABCDE's of nevi.  Monitor for new mole or moles that are becoming bigger, darker, irritated, or developing irregular borders. Brochure provided.    Hidrocystoma of eyelid, right  Lesion incised with 11 blade after prep with alcohol. Lesion collapsed. Discussed risk of recurrence    Personal history of skin cancer  Scar  Pt with history of non melanoma skin cancer  Total body skin  examination performed today including at least 12 points as noted in physical examination. No suspicious lesions noted.             Follow-up in about 1 year (around 2/7/2020).

## 2019-02-20 ENCOUNTER — OFFICE VISIT (OUTPATIENT)
Dept: OPTOMETRY | Facility: CLINIC | Age: 79
End: 2019-02-20
Payer: MEDICARE

## 2019-02-20 DIAGNOSIS — Z13.5 SCREENING FOR GLAUCOMA: ICD-10-CM

## 2019-02-20 DIAGNOSIS — H52.4 PRESBYOPIA: ICD-10-CM

## 2019-02-20 DIAGNOSIS — H04.123 DRY EYES, BILATERAL: Primary | ICD-10-CM

## 2019-02-20 PROCEDURE — 92015 PR REFRACTION: ICD-10-PCS | Mod: S$GLB,,, | Performed by: OPTOMETRIST

## 2019-02-20 PROCEDURE — 99999 PR PBB SHADOW E&M-EST. PATIENT-LVL II: CPT | Mod: PBBFAC,,, | Performed by: OPTOMETRIST

## 2019-02-20 PROCEDURE — 92014 COMPRE OPH EXAM EST PT 1/>: CPT | Mod: S$GLB,,, | Performed by: OPTOMETRIST

## 2019-02-20 PROCEDURE — 99999 PR PBB SHADOW E&M-EST. PATIENT-LVL II: ICD-10-PCS | Mod: PBBFAC,,, | Performed by: OPTOMETRIST

## 2019-02-20 PROCEDURE — 92014 PR EYE EXAM, EST PATIENT,COMPREHESV: ICD-10-PCS | Mod: S$GLB,,, | Performed by: OPTOMETRIST

## 2019-02-20 PROCEDURE — 92015 DETERMINE REFRACTIVE STATE: CPT | Mod: S$GLB,,, | Performed by: OPTOMETRIST

## 2019-02-20 NOTE — PROGRESS NOTES
[facility]     Ms. Jo-Ann Escoebdo for annual eye exam.  Patient complains of dry eyes , uses drops systane 5 times  daily     Would patient like a refraction today? yes    (-)drops  (-)flashes  (-)floaters  (-)diplopia    Diabetic -  Hemoglobin A1C       Date                     Value               Ref Range             Status                06/16/2009               5.9                 4.0 - 6.2 %           Final                 02/12/2004               5.6                 4.5 - 6.2 %           Final            ----------    OCULAR HISTORY  Last Eye Exam 2017  (+)eye surgery Cataract   (-)diagnosed or treated for any eye conditions or diseases -    FAMILY HISTORY  (-)Glaucoma -        Last edited by Jyotsna Hodges on 2/20/2019  2:12 PM. (History)        ROS     Positive for: Eyes (cat surgery OU)    Negative for: Constitutional, Gastrointestinal, Neurological, Skin,   Genitourinary, Musculoskeletal, HENT, Endocrine, Cardiovascular,   Respiratory, Psychiatric, Allergic/Imm, Heme/Lymph    Last edited by Ariel Hinojosa, OD on 2/20/2019  2:27 PM. (History)        Assessment /Plan     For exam results, see Encounter Report.    Dry eyes, bilateral    Screening for glaucoma    Presbyopia          1. Mild pco OD>OS--not ready for YAG.  Pt wishes new spex Rx  2. COREY--pt to cont w SYSTANE ATs  QID    PLAN:    rtc 1 yr

## 2019-03-11 RX ORDER — LISINOPRIL 20 MG/1
20 TABLET ORAL NIGHTLY
Qty: 90 TABLET | Refills: 3 | Status: SHIPPED | OUTPATIENT
Start: 2019-03-11 | End: 2019-04-18 | Stop reason: ALTCHOICE

## 2019-03-26 ENCOUNTER — TELEPHONE (OUTPATIENT)
Dept: SURGERY | Facility: CLINIC | Age: 79
End: 2019-03-26

## 2019-03-26 NOTE — TELEPHONE ENCOUNTER
Spoke with patient. Reports bright red blood mixed with stool x 4 days. States has lessened today. Denies fever, abdominal pain, diarrhea. Offered appointment today with NP. Patient prefers to wait to see Dr. Monet 3/28/19. She will call clinic if symptoms change/worsen before then.

## 2019-03-28 ENCOUNTER — LAB VISIT (OUTPATIENT)
Dept: LAB | Facility: HOSPITAL | Age: 79
End: 2019-03-28
Attending: COLON & RECTAL SURGERY
Payer: MEDICARE

## 2019-03-28 ENCOUNTER — OFFICE VISIT (OUTPATIENT)
Dept: SURGERY | Facility: CLINIC | Age: 79
End: 2019-03-28
Payer: MEDICARE

## 2019-03-28 VITALS
HEART RATE: 64 BPM | HEIGHT: 63 IN | WEIGHT: 184.94 LBS | DIASTOLIC BLOOD PRESSURE: 71 MMHG | BODY MASS INDEX: 32.77 KG/M2 | SYSTOLIC BLOOD PRESSURE: 128 MMHG

## 2019-03-28 DIAGNOSIS — K62.5 RECTAL BLEEDING: Primary | ICD-10-CM

## 2019-03-28 DIAGNOSIS — K62.5 RECTAL BLEEDING: ICD-10-CM

## 2019-03-28 LAB
BASOPHILS # BLD AUTO: 0.07 K/UL (ref 0–0.2)
BASOPHILS NFR BLD: 1.1 % (ref 0–1.9)
DIFFERENTIAL METHOD: ABNORMAL
EOSINOPHIL # BLD AUTO: 0.2 K/UL (ref 0–0.5)
EOSINOPHIL NFR BLD: 2.4 % (ref 0–8)
ERYTHROCYTE [DISTWIDTH] IN BLOOD BY AUTOMATED COUNT: 13.1 % (ref 11.5–14.5)
HCT VFR BLD AUTO: 37.3 % (ref 37–48.5)
HGB BLD-MCNC: 12.1 G/DL (ref 12–16)
IMM GRANULOCYTES # BLD AUTO: 0.05 K/UL (ref 0–0.04)
IMM GRANULOCYTES NFR BLD AUTO: 0.8 % (ref 0–0.5)
LYMPHOCYTES # BLD AUTO: 1.9 K/UL (ref 1–4.8)
LYMPHOCYTES NFR BLD: 29.5 % (ref 18–48)
MCH RBC QN AUTO: 30.9 PG (ref 27–31)
MCHC RBC AUTO-ENTMCNC: 32.4 G/DL (ref 32–36)
MCV RBC AUTO: 95 FL (ref 82–98)
MONOCYTES # BLD AUTO: 0.8 K/UL (ref 0.3–1)
MONOCYTES NFR BLD: 13.1 % (ref 4–15)
NEUTROPHILS # BLD AUTO: 3.4 K/UL (ref 1.8–7.7)
NEUTROPHILS NFR BLD: 53.1 % (ref 38–73)
NRBC BLD-RTO: 0 /100 WBC
PLATELET # BLD AUTO: 245 K/UL (ref 150–350)
PMV BLD AUTO: 10.2 FL (ref 9.2–12.9)
RBC # BLD AUTO: 3.91 M/UL (ref 4–5.4)
WBC # BLD AUTO: 6.33 K/UL (ref 3.9–12.7)

## 2019-03-28 PROCEDURE — 99999 PR PBB SHADOW E&M-EST. PATIENT-LVL III: CPT | Mod: PBBFAC,,, | Performed by: COLON & RECTAL SURGERY

## 2019-03-28 PROCEDURE — 1101F PR PT FALLS ASSESS DOC 0-1 FALLS W/OUT INJ PAST YR: ICD-10-PCS | Mod: CPTII,S$GLB,, | Performed by: COLON & RECTAL SURGERY

## 2019-03-28 PROCEDURE — 3074F PR MOST RECENT SYSTOLIC BLOOD PRESSURE < 130 MM HG: ICD-10-PCS | Mod: CPTII,S$GLB,, | Performed by: COLON & RECTAL SURGERY

## 2019-03-28 PROCEDURE — 3078F PR MOST RECENT DIASTOLIC BLOOD PRESSURE < 80 MM HG: ICD-10-PCS | Mod: CPTII,S$GLB,, | Performed by: COLON & RECTAL SURGERY

## 2019-03-28 PROCEDURE — 3078F DIAST BP <80 MM HG: CPT | Mod: CPTII,S$GLB,, | Performed by: COLON & RECTAL SURGERY

## 2019-03-28 PROCEDURE — 99999 PR PBB SHADOW E&M-EST. PATIENT-LVL III: ICD-10-PCS | Mod: PBBFAC,,, | Performed by: COLON & RECTAL SURGERY

## 2019-03-28 PROCEDURE — 85025 COMPLETE CBC W/AUTO DIFF WBC: CPT

## 2019-03-28 PROCEDURE — 36415 COLL VENOUS BLD VENIPUNCTURE: CPT

## 2019-03-28 PROCEDURE — 1101F PT FALLS ASSESS-DOCD LE1/YR: CPT | Mod: CPTII,S$GLB,, | Performed by: COLON & RECTAL SURGERY

## 2019-03-28 PROCEDURE — 3074F SYST BP LT 130 MM HG: CPT | Mod: CPTII,S$GLB,, | Performed by: COLON & RECTAL SURGERY

## 2019-03-28 PROCEDURE — 99212 PR OFFICE/OUTPT VISIT, EST, LEVL II, 10-19 MIN: ICD-10-PCS | Mod: S$GLB,,, | Performed by: COLON & RECTAL SURGERY

## 2019-03-28 PROCEDURE — 99212 OFFICE O/P EST SF 10 MIN: CPT | Mod: S$GLB,,, | Performed by: COLON & RECTAL SURGERY

## 2019-03-28 RX ORDER — IBUPROFEN 600 MG/1
600 TABLET ORAL EVERY 6 HOURS PRN
COMMUNITY
End: 2019-09-18 | Stop reason: ALTCHOICE

## 2019-03-28 NOTE — LETTER
March 28, 2019      Dakota Kerr MD  [address]  [facility] [address]           [facility]  [address]  [facility] [address]  Phone: [phone]          Patient: Jo-Ann Escobedo   MR Number: [id number]   YOB: 1940   Date of Visit: 3/28/2019       Dear Dr. Dakota Kerr:    Thank you for referring Jo-Ann Escobedo to me for evaluation. Attached you will find relevant portions of my assessment and plan of care.    If you have questions, please do not hesitate to call me. I look forward to following Jo-Ann Escobedo along with you.    Sincerely,    Artur Monet MD    Enclosure  CC:  No Recipients    If you would like to receive this communication electronically, please contact [email or web address] or [phone] to request more information on [employer] [facility] access.    For providers and/or their staff who would like to refer a patient to [facility], please contact us through our one-stop-shop provider referral line, [facility], at [phone].    If you feel you have received this communication in error or would no longer like to receive these types of communications, please e-mail [email or web address]

## 2019-03-28 NOTE — PROGRESS NOTES
"Subjective:       Patient ID: Jo-Ann Escobedo is a 78 y.o. female.    Chief Complaint: Rectal Bleeding    HPI established patient. 3 days of painless blood per rectum with multiple episodes each day.  Pattern most consistent with a diverticular bleed, not consistent with hemorrhoidal.  At this point it is cleared.  No similar episodes recently.  Does note that she had almonds last week prior to this starting.   Last colonoscopy was 2015.  I did do a flexible sigmoidoscopy last year with similar complaints.      Review of Systems   Constitutional: Negative for chills and fever.   Respiratory: Negative for cough and shortness of breath.    Cardiovascular: Negative for chest pain and palpitations.   Gastrointestinal: Negative for nausea and vomiting.   Genitourinary: Negative for dysuria and urgency.   Neurological: Negative for seizures and numbness.       Objective:      Physical Exam  /71 (BP Location: Right arm, Patient Position: Sitting, BP Method: Large (Automatic))   Pulse 64   Ht 5' 3" (1.6 m)   Wt 83.9 kg (184 lb 15.5 oz)   LMP  (LMP Unknown)   BMI 32.77 kg/m²   Well appearing  Assessment:       1. Rectal bleeding        Plan:       CBC today.  Colonoscopy within the next month.  Patient to call if she has bleeding prior to that time.       "

## 2019-04-01 DIAGNOSIS — K62.5 RECTAL BLEEDING: Primary | ICD-10-CM

## 2019-04-01 RX ORDER — POLYETHYLENE GLYCOL 3350, SODIUM SULFATE ANHYDROUS, SODIUM BICARBONATE, SODIUM CHLORIDE, POTASSIUM CHLORIDE 236; 22.74; 6.74; 5.86; 2.97 G/4L; G/4L; G/4L; G/4L; G/4L
POWDER, FOR SOLUTION ORAL
Qty: 4000 ML | Refills: 0 | Status: SHIPPED | OUTPATIENT
Start: 2019-04-01 | End: 2019-06-10 | Stop reason: ALTCHOICE

## 2019-04-05 ENCOUNTER — PATIENT OUTREACH (OUTPATIENT)
Dept: ADMINISTRATIVE | Facility: HOSPITAL | Age: 79
End: 2019-04-05

## 2019-04-05 DIAGNOSIS — E78.5 HYPERLIPIDEMIA, UNSPECIFIED HYPERLIPIDEMIA TYPE: ICD-10-CM

## 2019-04-05 RX ORDER — FENOFIBRATE 160 MG/1
160 TABLET ORAL DAILY
Qty: 90 TABLET | Refills: 3 | Status: SHIPPED | OUTPATIENT
Start: 2019-04-05 | End: 2020-04-14

## 2019-04-05 NOTE — PROGRESS NOTES
Chart review completed. Pt due for prevnar 13. Immunizations reconciled, HM modifiers updated, HM duplicate entries deleted, care team updated, old orders deleted.

## 2019-04-08 ENCOUNTER — PES CALL (OUTPATIENT)
Dept: ADMINISTRATIVE | Facility: CLINIC | Age: 79
End: 2019-04-08

## 2019-04-15 ENCOUNTER — HOSPITAL ENCOUNTER (OUTPATIENT)
Facility: HOSPITAL | Age: 79
Discharge: HOME | End: 2019-04-15
Attending: COLON & RECTAL SURGERY | Admitting: COLON & RECTAL SURGERY
Payer: MEDICARE

## 2019-04-15 ENCOUNTER — ANESTHESIA EVENT (OUTPATIENT)
Dept: ENDOSCOPY | Facility: HOSPITAL | Age: 79
End: 2019-04-15
Payer: MEDICARE

## 2019-04-15 ENCOUNTER — ANESTHESIA (OUTPATIENT)
Dept: ENDOSCOPY | Facility: HOSPITAL | Age: 79
End: 2019-04-15
Payer: MEDICARE

## 2019-04-15 VITALS
RESPIRATION RATE: 18 BRPM | HEIGHT: 63 IN | BODY MASS INDEX: 31.54 KG/M2 | WEIGHT: 178 LBS | SYSTOLIC BLOOD PRESSURE: 103 MMHG | HEART RATE: 75 BPM | TEMPERATURE: 98 F | DIASTOLIC BLOOD PRESSURE: 70 MMHG | OXYGEN SATURATION: 100 %

## 2019-04-15 DIAGNOSIS — Z12.11 SCREENING FOR COLON CANCER: ICD-10-CM

## 2019-04-15 PROCEDURE — 25000003 PHARM REV CODE 250: Performed by: NURSE PRACTITIONER

## 2019-04-15 PROCEDURE — 45385 PR COLONOSCOPY,REMV LESN,SNARE: ICD-10-PCS | Mod: ,,, | Performed by: COLON & RECTAL SURGERY

## 2019-04-15 PROCEDURE — 45385 COLONOSCOPY W/LESION REMOVAL: CPT | Mod: ,,, | Performed by: COLON & RECTAL SURGERY

## 2019-04-15 PROCEDURE — 37000008 HC ANESTHESIA 1ST 15 MINUTES: Performed by: COLON & RECTAL SURGERY

## 2019-04-15 PROCEDURE — 37000009 HC ANESTHESIA EA ADD 15 MINS: Performed by: COLON & RECTAL SURGERY

## 2019-04-15 PROCEDURE — 88305 TISSUE EXAM BY PATHOLOGIST: CPT | Mod: 26,,, | Performed by: PATHOLOGY

## 2019-04-15 PROCEDURE — 63600175 PHARM REV CODE 636 W HCPCS: Performed by: NURSE ANESTHETIST, CERTIFIED REGISTERED

## 2019-04-15 PROCEDURE — E9220 PRA ENDO ANESTHESIA: ICD-10-PCS | Mod: ,,, | Performed by: NURSE ANESTHETIST, CERTIFIED REGISTERED

## 2019-04-15 PROCEDURE — 27201089 HC SNARE, DISP (ANY): Performed by: COLON & RECTAL SURGERY

## 2019-04-15 PROCEDURE — 88305 TISSUE EXAM BY PATHOLOGIST: CPT | Mod: 59 | Performed by: PATHOLOGY

## 2019-04-15 PROCEDURE — E9220 PRA ENDO ANESTHESIA: HCPCS | Mod: ,,, | Performed by: NURSE ANESTHETIST, CERTIFIED REGISTERED

## 2019-04-15 PROCEDURE — 88305 TISSUE SPECIMEN TO PATHOLOGY - SURGERY: ICD-10-PCS | Mod: 26,,, | Performed by: PATHOLOGY

## 2019-04-15 PROCEDURE — 45385 COLONOSCOPY W/LESION REMOVAL: CPT | Performed by: COLON & RECTAL SURGERY

## 2019-04-15 RX ORDER — SODIUM CHLORIDE 9 MG/ML
INJECTION, SOLUTION INTRAVENOUS CONTINUOUS
Status: DISCONTINUED | OUTPATIENT
Start: 2019-04-15 | End: 2019-04-15 | Stop reason: HOSPADM

## 2019-04-15 RX ORDER — SODIUM CHLORIDE 0.9 % (FLUSH) 0.9 %
10 SYRINGE (ML) INJECTION
Status: DISCONTINUED | OUTPATIENT
Start: 2019-04-15 | End: 2019-04-15 | Stop reason: HOSPADM

## 2019-04-15 RX ORDER — LIDOCAINE HCL/PF 100 MG/5ML
SYRINGE (ML) INTRAVENOUS
Status: DISCONTINUED | OUTPATIENT
Start: 2019-04-15 | End: 2019-04-15

## 2019-04-15 RX ORDER — PROPOFOL 10 MG/ML
VIAL (ML) INTRAVENOUS CONTINUOUS PRN
Status: DISCONTINUED | OUTPATIENT
Start: 2019-04-15 | End: 2019-04-15

## 2019-04-15 RX ADMIN — PROPOFOL 125 MCG/KG/MIN: 10 INJECTION, EMULSION INTRAVENOUS at 02:04

## 2019-04-15 RX ADMIN — LIDOCAINE HYDROCHLORIDE 50 MG: 20 INJECTION, SOLUTION INTRAVENOUS at 02:04

## 2019-04-15 RX ADMIN — SODIUM CHLORIDE: 0.9 INJECTION, SOLUTION INTRAVENOUS at 01:04

## 2019-04-15 RX ADMIN — SODIUM CHLORIDE: 0.9 INJECTION, SOLUTION INTRAVENOUS at 02:04

## 2019-04-15 NOTE — [employer] PATIENT INSTRUCTIONS
Discharge Summary/Instructions after an Endoscopic Procedure  Patient Name: Jo-Ann Orr  Patient MRN: [id number]  Patient YOB: 1940  Monday, April 15, 2019  Artur Monet MD  RESTRICTIONS:  During your procedure today, you received medications for sedation.  These   medications may affect your judgment, balance and coordination.  Therefore,   for 24 hours, you have the following restrictions:   - DO NOT drive a car, operate machinery, make legal/financial decisions,   sign important papers or drink alcohol.    ACTIVITY:  Today: no heavy lifting, straining or running due to procedural   sedation/anesthesia.  The following day: return to full activity including work.  DIET:  Eat and drink normally unless instructed otherwise.     TREATMENT FOR COMMON SIDE EFFECTS:  - Mild abdominal pain, nausea, belching, bloating or excessive gas:  rest,   eat lightly and use a heating pad.  - Sore Throat: treat with throat lozenges and/or gargle with warm salt   water.  - Because air was used during the procedure, expelling large amounts of air   from your rectum or belching is normal.  - If a bowel prep was taken, you may not have a bowel movement for 1-3 days.    This is normal.  SYMPTOMS TO WATCH FOR AND REPORT TO YOUR PHYSICIAN:  1. Abdominal pain or bloating, other than gas cramps.  2. Chest pain.  3. Back pain.  4. Signs of infection such as: chills or fever occurring within 24 hours   after the procedure.  5. Rectal bleeding, which would show as bright red, maroon, or black stools.   (A tablespoon of blood from the rectum is not serious, especially if   hemorrhoids are present.)  6. Vomiting.  7. Weakness or dizziness.  GO DIRECTLY TO THE NEAREST EMERGENCY ROOM IF YOU HAVE ANY OF THE FOLLOWING:      Difficulty breathing              Chills and/or fever over 101 F   Persistent vomiting and/or vomiting blood   Severe abdominal pain   Severe chest pain   Black, tarry stools   Bleeding- more than one  tablespoon   Any other symptom or condition that you feel may need urgent attention  Your doctor recommends these additional instructions:  If any biopsies were taken, your doctors clinic will contact you in 1 to 2   weeks with any results.  - Patient has a contact number available for emergencies.  The signs and   symptoms of potential delayed complications were discussed with the   patient.  Return to normal activities tomorrow.  Written discharge   instructions were provided to the patient.   - Discharge patient to home (ambulatory).   - Resume regular diet indefinitely.   - Repeat colonoscopy in 5 years for surveillance.   - Continue present medications.  For questions, problems or results please call your physician - Artur Monet MD at Work:  [phone].  [facility] [place], EMERGENCY ROOM PHONE NUMBER: [phone]  IF A COMPLICATION OR EMERGENCY SITUATION ARISES AND YOU ARE UNABLE TO REACH   YOUR PHYSICIAN - GO DIRECTLY TO THE EMERGENCY ROOM.  Artur Monet MD  4/15/2019 3:43:36 PM  This report has been verified and signed electronically.  [employer]

## 2019-04-15 NOTE — TRANSFER OF CARE
"Anesthesia Transfer of Care Note    Patient: Jo-Ann Escobedo    Procedure(s) Performed: Procedure(s) (LRB):  COLONOSCOPY (N/A)    Patient location: PACU    Anesthesia Type: general    Transport from OR: Transported from OR on 100% O2 by closed face mask with adequate spontaneous ventilation    Post pain: adequate analgesia    Post assessment: no apparent anesthetic complications    Post vital signs: stable    Level of consciousness: awake    Nausea/Vomiting: no nausea/vomiting    Complications: none    Transfer of care protocol was followed      Last vitals:   Visit Vitals  BP (!) 155/71 (BP Location: Right arm, Patient Position: Lying)   Pulse 65   Temp 36.6 °C (97.9 °F) (Temporal)   Resp 16   Ht 5' 3" (1.6 m)   Wt 80.7 kg (178 lb)   LMP  (LMP Unknown)   SpO2 95%   Breastfeeding? No   BMI 31.53 kg/m²     "

## 2019-04-15 NOTE — DISCHARGE INSTRUCTIONS
Colonoscopy     A camera attached to a flexible tube with a viewing lens is used to take video pictures.     Colonoscopy is a test to view the inside of your lower digestive tract (colon and rectum). Sometimes it can show the last part of the small intestine (ileum). During the test, small pieces of tissue may be removed for testing. This is called a biopsy. Small growths, such as polyps, may also be removed.   Why is colonoscopy done?  The test is done to help look for colon cancer. And it can help find the source of abdominal pain, bleeding, and changes in bowel habits. It may be needed once a year, depending on factors such as your:  · Age  · Health history  · Family health history  · Symptoms  · Results from any prior colonoscopy  Risks and possible complications  These include:  · Bleeding               · A puncture or tear in the colon   · Risks of anesthesia  · A cancer lesion not being seen  Getting ready   To prepare for the test:  · Talk with your healthcare provider about the risks of the test (see below). Also ask your healthcare provider about alternatives to the test.  · Tell your healthcare provider about any medicines you take. Also tell him or her about any health conditions you may have.  · Make sure your rectum and colon are empty for the test. Follow the diet and bowel prep instructions exactly. If you dont, the test may need to be rescheduled.  · Plan for a friend or family member to drive you home after the test.     Colonoscopy provides an inside view of the entire colon.     You may discuss the results with your doctor right away or at a future visit.  During the test   The test is usually done in the hospital on an outpatient basis. This means you go home the same day. The procedure takes about 30 minutes. During that time:  · You are given relaxing (sedating) medicine through an IV line. You may be drowsy, or fully asleep.  · The healthcare provider will first give you a physical exam to  check for anal and rectal problems.  · Then the anus is lubricated and the scope inserted.  · If you are awake, you may have a feeling similar to needing to have a bowel movement. You may also feel pressure as air is pumped into the colon. Its OK to pass gas during the procedure.  · Biopsy, polyp removal, or other treatments may be done during the test.  After the test   You may have gas right after the test. It can help to try to pass it to help prevent later bloating. Your healthcare provider may discuss the results with you right away. Or you may need to schedule a follow-up visit to talk about the results. After the test, you can go back to your normal eating and other activities. You may be tired from the sedation and need to rest for a few hours.  Date Last Reviewed: 11/1/2016 © 2000-2017 [employer]. [address] [place] [address], [place] [address]. All rights reserved. This information is not intended as a substitute for professional medical care. Always follow your healthcare professional's instructions.

## 2019-04-15 NOTE — ANESTHESIA PREPROCEDURE EVALUATION
04/15/2019  Jo-Ann Escobedo is a 78 y.o., female.  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 - Both Eyes    Hypothyroid    Hyperlipidemia    Sensorineural hearing loss, bilateral    History of breast cancer    Paget's disease of bone    Loss of balance    Arthritis of foot    Obesity (BMI 30.0-34.9)    Multinodular goiter    DJD (degenerative joint disease) of lumbar spine    Diverticulitis    Encounter for screening colonoscopy for non-high-risk patient    Hypothyroidism, postsurgical    Lipoma of arm    Lipoma    Malignant neoplasm of left breast    Chronic kidney insufficiency    Arthritis    Hypertension    History of breast cancer in female    Chronic sinusitis    Prediabetes     Past Medical History:   Diagnosis Date    After-cataract of both eyes - Both Eyes 9/26/2012    Allergy     Arthritis     Breast cancer     Diverticulosis     Hyperlipidemia     Hypertension     Hypothyroidism     Paget disease of bone     Squamous Cell Carcinoma     in situ right neck     Thyroid disease      Past Surgical History:   Procedure Laterality Date    BIOPSY-EXCISIONAL Right 7/27/2015    Performed by Joshua Goldberg, [facility] at [facility] OR 2ND FLR    CATARACT EXTRACTION W/  INTRAOCULAR LENS IMPLANT Bilateral     COLONOSCOPY N/A 1/19/2015    Performed by Artur Monet MD at [facility] ENDO (4TH FLR)    EYE SURGERY      HYSTERECTOMY      KNEE ARTHROSCOPY N/A     pt unsure of which knee     MASTECTOMY      SPINE SURGERY      TOTAL THYROIDECTOMY           Anesthesia Evaluation    I have reviewed the Patient Summary Reports.     I have reviewed the Medications.     Review of Systems  Anesthesia Hx:   Denies Personal Hx of Anesthesia complications.       Physical Exam  General:  Well nourished    Airway/Jaw/Neck:  Airway Findings: Mouth  Opening: Normal Tongue: Normal  General Airway Assessment: Adult  Mallampati: II  TM Distance: Normal, at least 6 cm      Dental:  Dental Findings: In tact   Chest/Lungs:  Chest/Lungs Findings: Clear to auscultation, Normal Respiratory Rate     Heart/Vascular:  Heart Findings: Rate: Normal  Rhythm: Regular Rhythm  Sounds: Normal        Mental Status:  Mental Status Findings:  Cooperative, Alert and Oriented         Anesthesia Plan  Type of Anesthesia, risks & benefits discussed:  Anesthesia Type:  general  Patient's Preference:   Intra-op Monitoring Plan: standard ASA monitors  Intra-op Monitoring Plan Comments:   Post Op Pain Control Plan: per primary service following discharge from PACU  Post Op Pain Control Plan Comments:   Induction:   IV  Beta Blocker:  Patient is not currently on a Beta-Blocker (No further documentation required).       Informed Consent: Patient understands risks and agrees with Anesthesia plan.  Questions answered. Anesthesia consent signed with patient.  ASA Score: 2     Day of Surgery Review of History & Physical: I have interviewed and examined the patient. I have reviewed the patient's H&P dated:  There are no significant changes.  H&P update referred to the provider.         Ready For Surgery From Anesthesia Perspective.

## 2019-04-18 ENCOUNTER — OFFICE VISIT (OUTPATIENT)
Dept: INTERNAL MEDICINE | Facility: CLINIC | Age: 79
End: 2019-04-18
Payer: MEDICARE

## 2019-04-18 ENCOUNTER — LAB VISIT (OUTPATIENT)
Dept: LAB | Facility: HOSPITAL | Age: 79
End: 2019-04-18
Payer: MEDICARE

## 2019-04-18 ENCOUNTER — IMMUNIZATION (OUTPATIENT)
Dept: PHARMACY | Facility: CLINIC | Age: 79
End: 2019-04-18
Payer: MEDICARE

## 2019-04-18 VITALS
BODY MASS INDEX: 32.93 KG/M2 | HEART RATE: 69 BPM | TEMPERATURE: 99 F | SYSTOLIC BLOOD PRESSURE: 146 MMHG | DIASTOLIC BLOOD PRESSURE: 70 MMHG | OXYGEN SATURATION: 97 % | WEIGHT: 185.88 LBS | HEIGHT: 63 IN

## 2019-04-18 DIAGNOSIS — H90.3 SENSORINEURAL HEARING LOSS, BILATERAL: ICD-10-CM

## 2019-04-18 DIAGNOSIS — Z85.3 HISTORY OF BREAST CANCER: ICD-10-CM

## 2019-04-18 DIAGNOSIS — M47.816 OSTEOARTHRITIS OF LUMBAR SPINE, UNSPECIFIED SPINAL OSTEOARTHRITIS COMPLICATION STATUS: ICD-10-CM

## 2019-04-18 DIAGNOSIS — R73.03 PREDIABETES: ICD-10-CM

## 2019-04-18 DIAGNOSIS — Z12.39 SCREENING FOR BREAST CANCER: ICD-10-CM

## 2019-04-18 DIAGNOSIS — E78.00 PURE HYPERCHOLESTEROLEMIA: ICD-10-CM

## 2019-04-18 DIAGNOSIS — I10 ESSENTIAL HYPERTENSION: ICD-10-CM

## 2019-04-18 DIAGNOSIS — K57.91 DIVERTICULOSIS OF INTESTINE WITH BLEEDING, UNSPECIFIED INTESTINAL TRACT LOCATION: ICD-10-CM

## 2019-04-18 DIAGNOSIS — E89.0 POSTABLATIVE HYPOTHYROIDISM: ICD-10-CM

## 2019-04-18 LAB
ESTIMATED AVG GLUCOSE: 114 MG/DL (ref 68–131)
HBA1C MFR BLD HPLC: 5.6 % (ref 4–5.6)

## 2019-04-18 PROCEDURE — 3078F PR MOST RECENT DIASTOLIC BLOOD PRESSURE < 80 MM HG: ICD-10-PCS | Mod: CPTII,S$GLB,, | Performed by: FAMILY MEDICINE

## 2019-04-18 PROCEDURE — 99999 PR PBB SHADOW E&M-EST. PATIENT-LVL IV: CPT | Mod: PBBFAC,,, | Performed by: FAMILY MEDICINE

## 2019-04-18 PROCEDURE — 83036 HEMOGLOBIN GLYCOSYLATED A1C: CPT

## 2019-04-18 PROCEDURE — 36415 COLL VENOUS BLD VENIPUNCTURE: CPT

## 2019-04-18 PROCEDURE — 3077F PR MOST RECENT SYSTOLIC BLOOD PRESSURE >= 140 MM HG: ICD-10-PCS | Mod: CPTII,S$GLB,, | Performed by: FAMILY MEDICINE

## 2019-04-18 PROCEDURE — 3077F SYST BP >= 140 MM HG: CPT | Mod: CPTII,S$GLB,, | Performed by: FAMILY MEDICINE

## 2019-04-18 PROCEDURE — 99214 PR OFFICE/OUTPT VISIT, EST, LEVL IV, 30-39 MIN: ICD-10-PCS | Mod: S$GLB,,, | Performed by: FAMILY MEDICINE

## 2019-04-18 PROCEDURE — 99999 PR PBB SHADOW E&M-EST. PATIENT-LVL IV: ICD-10-PCS | Mod: PBBFAC,,, | Performed by: FAMILY MEDICINE

## 2019-04-18 PROCEDURE — 99214 OFFICE O/P EST MOD 30 MIN: CPT | Mod: S$GLB,,, | Performed by: FAMILY MEDICINE

## 2019-04-18 PROCEDURE — 3078F DIAST BP <80 MM HG: CPT | Mod: CPTII,S$GLB,, | Performed by: FAMILY MEDICINE

## 2019-04-18 RX ORDER — LOSARTAN POTASSIUM 50 MG/1
50 TABLET ORAL DAILY
Qty: 90 TABLET | Refills: 3 | Status: SHIPPED | OUTPATIENT
Start: 2019-04-18 | End: 2019-10-31

## 2019-04-18 NOTE — ANESTHESIA POSTPROCEDURE EVALUATION
Anesthesia Post Evaluation    Patient: Jo-Ann Escobedo    Procedure(s) Performed: Procedure(s) (LRB):  COLONOSCOPY (N/A)    Final Anesthesia Type: general  Patient location during evaluation: PACU  Patient participation: Yes- Able to Participate  Level of consciousness: awake and alert and oriented  Post-procedure vital signs: reviewed and stable  Pain management: adequate  Airway patency: patent  PONV status at discharge: No PONV  Anesthetic complications: no      Cardiovascular status: blood pressure returned to baseline  Respiratory status: unassisted  Hydration status: euvolemic  Follow-up not needed.          Vitals Value Taken Time   /70 4/18/2019 11:28 AM   Temp 37 °C (98.6 °F) 4/18/2019 10:31 AM   Pulse 69 4/18/2019 10:31 AM   Resp 18 4/15/2019  4:09 PM   SpO2 97 % 4/18/2019 10:31 AM         No case tracking events are documented in the log.      Pain/Parris Score: No data recorded

## 2019-04-18 NOTE — PROGRESS NOTES
Subjective:      Patient ID: Jo-Ann Escobedo is a 78 y.o. female.    Chief Complaint: Annual Exam      HPI:  Jo-Ann Escobedo is a 78 year old female with arthritis, history of CKD, DJD lumbar spine, history of breast cancer s/p left mastectomy, hyperlipidemia, hypertension, hypothyroidism, lipoma, multinodular goiter, obesity, Paget's disease of bone, and prediabetes who presents to clinic today for annual physical exam and complaining of knee pain.    Complains of back pain today.  Affects right and left low back.  States she met with a pain doctor 1 year ago; discussed performing bilateral lumbar facet medial branch blocks to determine if her back pain is arising from the lumbar facet joints.  Pains first began several years ago.  Will take Aleve when pain is really bad, takes this once every 3 weeks on average.  Uses heating pad.  Denies associated numbness, tingling, weakness in the legs.    States she had substantial rectal bleeding 2.5 weeks prior to her colonoscopy.  Dr. Monet, GI, stated it may be related to a diverticular bleed.  Bleeding stopped after 5 days.  No further bleeding since that time.  Had colonoscopy which did show some protruding granulation tissue from 2 large diverticula.  Path results still pending.    Endorses total hearing loss in L ear for 50 years, right ear hearing now decreasing as well.  States she has seen audiology in the past.      ROS pre visit questionnaire positive for hearing loss, blood in stool, weakness, and confusion.    CKD:  Normal GFR noted on most recent CMP 10/24/18.    Hx of breast cancer:  S/p left mastectomy 2001.  Last mammogram 5/28/18--no evidence of malignancy.    HLD:  Lipid panel 5/4/18 within normal limits.  Previously with elevated total cholesterol and hypertriglyceridemia; taking fenofibrate 160 mg by mouth daily.    HTN:  Above goal today.  Taking lisinopril 20 mg by mouth daily.    Hypothyroidism:  TSH within normal limits 5/4/18.  Taking levothyroxine  125 mcg by mouth daily.    Obesity:  Walks dog 1 mile daily.    Paget's disease of bone:  Stable.    Prediabetes:  A1C 5.7% 10/24/18, stable from previous.  States she does enjoy eating ice cream frequently.    Health Care Maintenance:  Influenza vaccination:  10/25/18.  Last tetanus vaccination:  10/25/18.  Last mammogram:  5/28/18  Last colonocopsy:  4/15/19; Three 3 to 6 mm polyps in the transverse colon; repeat 5 years  Pneumococcal vaccination:  Has not had.  DEXA:  10/25/18; normal        Past Medical History:   Diagnosis Date    After-cataract of both eyes - Both Eyes 9/26/2012    Allergy     Arthritis     Breast cancer     Diverticulosis     Hyperlipidemia     Hypertension     Hypothyroidism     Paget disease of bone     Squamous Cell Carcinoma     in situ right neck     Thyroid disease        Past Surgical History:   Procedure Laterality Date    BIOPSY-EXCISIONAL Right 7/27/2015    Performed by [facility] Goldberg[facility] at [facility] OR 2ND FLR    CATARACT EXTRACTION W/  INTRAOCULAR LENS IMPLANT Bilateral     COLONOSCOPY N/A 4/15/2019    [facility]formed by Artur [facility]et [facility] at [facility] ENDO (4TH FLR)    COLONOSCOPY N/A 1/19/2015    [facility] at [facility] ENDO (4TH FLR)    EYE SURGERY      HYSTERECTOMY      KNEE ARTHROSCOPY N/A     pt unsure of which knee     MASTECTOMY      SPINE SURGERY      TOTAL THYROIDECTOMY         Family History   Problem Relation Age of Onset    Cancer Father         lung    Dementia Mother     Glaucoma Brother     Macular degeneration Brother     Diabetes Neg Hx     Amblyopia Neg Hx     Blindness Neg Hx     Cataracts Neg Hx     Retinal detachment Neg Hx     Strabismus Neg Hx     Melanoma Neg Hx        Social History     Socioeconomic History    Marital status:      Spouse name: Not on file    Number of children: Not on file    Years of education: Not on file    Highest education level: Not on file   Occupational History    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     Employer: [employer]     Employer: [employer]   Social Needs    Financial resource strain: Not on file    Food insecurity:     Worry: Not on file     Inability: Not on file    Transportation needs:     Medical: Not on file     Non-medical: Not on file   Tobacco Use    Smoking status: Former Smoker     Packs/day: 2.00     Years: 44.00     Pack years: 88.00     Types: Cigarettes     Last attempt to quit: 1969     Years since quittin.3    Smokeless tobacco: Never Used   Substance and Sexual Activity    Alcohol use: Yes     Alcohol/week: 0.0 oz     Comment: rarely    Drug use: No    Sexual activity: Not Currently   Lifestyle    Physical activity:     Days per week: Not on file     Minutes per session: Not on file    Stress: Not on file   Relationships    Social connections:     Talks on phone: Not on file     Gets together: Not on file     Attends Pent[employer]al service: Not on file     Active member of club or organization: Not on file     Attends meetings of clubs or organizations: Not on file     Relationship status: Not on file   Other Topics Concern    Are you pregnant or think you may be? No    Breast-feeding No   Social History Narrative    Not on file       Review of Systems   Constitutional: Positive for activity change. Negative for chills, fatigue, fever and unexpected weight change.   HENT: Positive for hearing loss. Negative for congestion, nosebleeds, rhinorrhea, sore throat and trouble swallowing.    Eyes: Negative for pain, discharge and visual disturbance.   Respiratory: Negative for cough, chest tightness, shortness of breath and wheezing.    Cardiovascular: Negative for chest pain and palpitations.   Gastrointestinal: Negative for abdominal distention, abdominal pain, blood in stool, constipation, diarrhea, nausea and vomiting.   Endocrine: Negative for polydipsia and polyuria.   Genitourinary: Negative for decreased urine volume, difficulty urinating, dysuria,  "hematuria, menstrual problem and urgency.   Musculoskeletal: Positive for arthralgias and back pain. Negative for joint swelling, myalgias and neck pain.   Skin: Negative for color change and rash.   Neurological: Negative for dizziness, tremors, light-headedness, numbness and headaches.   Psychiatric/Behavioral: Negative for agitation, behavioral problems and dysphoric mood. The patient is not nervous/anxious.      Objective:     Vitals:    04/18/19 1031   BP: (!) 144/76   BP Location: Left arm   Patient Position: Sitting   BP Method: Medium (Manual)   Pulse: 69   Temp: 98.6 °F (37 °C)   TempSrc: Oral   SpO2: 97%   Weight: 84.3 kg (185 lb 13.6 oz)   Height: 5' 3" (1.6 m)       Physical Exam   Constitutional: She is oriented to person, place, and time. She appears well-developed and well-nourished.   HENT:   Head: Normocephalic and atraumatic.   Right Ear: External ear normal.   Left Ear: External ear normal.   Nose: Nose normal.   Mouth/Throat: Oropharynx is clear and moist.   Eyes: Pupils are equal, round, and reactive to light. Conjunctivae and EOM are normal.   Neck: Normal range of motion. Neck supple. No tracheal deviation present.   Cardiovascular: Normal rate, regular rhythm and normal heart sounds. Exam reveals no gallop and no friction rub.   No murmur heard.  Pulmonary/Chest: Effort normal and breath sounds normal. No respiratory distress. She has no wheezes. She has no rales.   Abdominal: Soft. Bowel sounds are normal. She exhibits no distension. There is no tenderness. There is no rebound and no guarding.   Musculoskeletal: Normal range of motion. She exhibits no edema or deformity.   Neurological: She is alert and oriented to person, place, and time. Coordination normal.   Skin: Skin is warm and dry.   Psychiatric: She has a normal mood and affect. Her behavior is normal.   Nursing note and vitals reviewed.     Assessment:      1. Diverticulosis of intestine with bleeding, unspecified intestinal tract " location    2. Pure hypercholesterolemia    3. Essential hypertension    4. Osteoarthritis of lumbar spine, unspecified spinal osteoarthritis complication status    5. Postablative hypothyroidism    6. Prediabetes    7. Sensorineural hearing loss, bilateral    8. Screening for breast cancer    9. History of breast cancer      Plan:   Jo-Ann was seen today for annual exam.    Diagnoses and all orders for this visit:    Diverticulosis of intestine with bleeding, unspecified intestinal tract location        -     Bleeding resolved.  Follow up with GI PRN.    Pure hypercholesterolemia  -     Lipid panel; Future    Essential hypertension  -     CBC auto differential; Future  -     Comprehensive metabolic panel; Future  -     Above goal today.  Discontinue lisinopril after discussion regarding ACEi study and risk for lung cancer.  Start losartan (COZAAR) 50 MG tablet; Take 1 tablet (50 mg total) by mouth once daily.  Low sodium diet.  Return to clinic in 1-2 weeks for nursing blood pressure check.    Osteoarthritis of lumbar spine, unspecified spinal osteoarthritis complication status        -     Continue current regimen, follow up with Dr. Morales    Postablative hypothyroidism  -     TSH; Future  -     T4, free; Future  -     Continue current regimen.    Prediabetes  -     Hemoglobin A1c; Future    Sensorineural hearing loss, bilateral  -     Ambulatory Referral to Audiology    Screening for breast cancer  -     Mammo Digital Diagnostic Bilateral With CAD; Future (5/29/19)    History of breast cancer  -     Mammo Digital Diagnostic Bilateral With CAD; Future (5/29/19)

## 2019-04-22 ENCOUNTER — CLINICAL SUPPORT (OUTPATIENT)
Dept: AUDIOLOGY | Facility: CLINIC | Age: 79
End: 2019-04-22
Payer: MEDICARE

## 2019-04-22 ENCOUNTER — TELEPHONE (OUTPATIENT)
Dept: ENDOSCOPY | Facility: HOSPITAL | Age: 79
End: 2019-04-22

## 2019-04-22 DIAGNOSIS — H90.3 SENSORINEURAL HEARING LOSS, BILATERAL: Primary | ICD-10-CM

## 2019-04-22 PROCEDURE — 92557 COMPREHENSIVE HEARING TEST: CPT | Mod: S$GLB,,, | Performed by: PHYSICIAN ASSISTANT

## 2019-04-22 PROCEDURE — 92557 PR COMPREHENSIVE HEARING TEST: ICD-10-PCS | Mod: S$GLB,,, | Performed by: PHYSICIAN ASSISTANT

## 2019-04-22 RX ORDER — LEVOTHYROXINE SODIUM 125 UG/1
125 TABLET ORAL
Qty: 90 TABLET | Refills: 3 | Status: ON HOLD | OUTPATIENT
Start: 2019-04-22 | End: 2019-12-14 | Stop reason: HOSPADM

## 2019-04-23 NOTE — PROGRESS NOTES
Audiological Evaluation:    Ms. Jo-Ann Escobedo was seen today for an audiological evaluation.  Test results indicated a mild to severe SNHL AD with good speech discrimination ability.  A profound, SNHL was noted AS with no measurable word discrimination ability, which she has suffered with for many years.  Ms. Escobedo currently has a Phonak hearing aid for her right ear.  Recommend:  1.  Otologic evaluation  2.  Annual hearing evaluations  3.  Noise protection  4.  Hearing Aid Follow-up appointment to check programming settings

## 2019-04-29 ENCOUNTER — CLINICAL SUPPORT (OUTPATIENT)
Dept: INTERNAL MEDICINE | Facility: CLINIC | Age: 79
End: 2019-04-29
Payer: MEDICARE

## 2019-04-29 DIAGNOSIS — Z01.30 BLOOD PRESSURE CHECK: Primary | ICD-10-CM

## 2019-04-30 ENCOUNTER — PATIENT MESSAGE (OUTPATIENT)
Dept: SURGERY | Facility: CLINIC | Age: 79
End: 2019-04-30

## 2019-05-02 ENCOUNTER — OFFICE VISIT (OUTPATIENT)
Dept: INTERNAL MEDICINE | Facility: CLINIC | Age: 79
End: 2019-05-02
Payer: MEDICARE

## 2019-05-02 ENCOUNTER — CLINICAL SUPPORT (OUTPATIENT)
Dept: AUDIOLOGY | Facility: CLINIC | Age: 79
End: 2019-05-02

## 2019-05-02 ENCOUNTER — HOSPITAL ENCOUNTER (OUTPATIENT)
Dept: RADIOLOGY | Facility: HOSPITAL | Age: 79
Discharge: HOME | End: 2019-05-02
Attending: FAMILY MEDICINE
Payer: MEDICARE

## 2019-05-02 VITALS
SYSTOLIC BLOOD PRESSURE: 110 MMHG | WEIGHT: 182.31 LBS | BODY MASS INDEX: 32.3 KG/M2 | HEART RATE: 73 BPM | TEMPERATURE: 99 F | HEIGHT: 63 IN | DIASTOLIC BLOOD PRESSURE: 70 MMHG | OXYGEN SATURATION: 97 %

## 2019-05-02 DIAGNOSIS — R05.9 COUGH: ICD-10-CM

## 2019-05-02 DIAGNOSIS — H90.3 SENSORINEURAL HEARING LOSS, BILATERAL: Primary | ICD-10-CM

## 2019-05-02 DIAGNOSIS — G89.29 CHRONIC PAIN OF BOTH SHOULDERS: ICD-10-CM

## 2019-05-02 DIAGNOSIS — M25.512 CHRONIC PAIN OF BOTH SHOULDERS: ICD-10-CM

## 2019-05-02 DIAGNOSIS — M25.511 CHRONIC PAIN OF BOTH SHOULDERS: ICD-10-CM

## 2019-05-02 DIAGNOSIS — R05.9 COUGH: Primary | ICD-10-CM

## 2019-05-02 PROCEDURE — 1101F PR PT FALLS ASSESS DOC 0-1 FALLS W/OUT INJ PAST YR: ICD-10-PCS | Mod: CPTII,S$GLB,, | Performed by: FAMILY MEDICINE

## 2019-05-02 PROCEDURE — 99999 PR PBB SHADOW E&M-EST. PATIENT-LVL V: CPT | Mod: PBBFAC,,, | Performed by: FAMILY MEDICINE

## 2019-05-02 PROCEDURE — 3074F PR MOST RECENT SYSTOLIC BLOOD PRESSURE < 130 MM HG: ICD-10-PCS | Mod: CPTII,S$GLB,, | Performed by: FAMILY MEDICINE

## 2019-05-02 PROCEDURE — 3078F DIAST BP <80 MM HG: CPT | Mod: CPTII,S$GLB,, | Performed by: FAMILY MEDICINE

## 2019-05-02 PROCEDURE — 3078F PR MOST RECENT DIASTOLIC BLOOD PRESSURE < 80 MM HG: ICD-10-PCS | Mod: CPTII,S$GLB,, | Performed by: FAMILY MEDICINE

## 2019-05-02 PROCEDURE — 73030 X-RAY EXAM OF SHOULDER: CPT | Mod: TC,50

## 2019-05-02 PROCEDURE — 71046 X-RAY EXAM CHEST 2 VIEWS: CPT | Mod: 26,,, | Performed by: RADIOLOGY

## 2019-05-02 PROCEDURE — 99213 PR OFFICE/OUTPT VISIT, EST, LEVL III, 20-29 MIN: ICD-10-PCS | Mod: S$GLB,,, | Performed by: FAMILY MEDICINE

## 2019-05-02 PROCEDURE — 3074F SYST BP LT 130 MM HG: CPT | Mod: CPTII,S$GLB,, | Performed by: FAMILY MEDICINE

## 2019-05-02 PROCEDURE — 71046 XR CHEST PA AND LATERAL: ICD-10-PCS | Mod: 26,,, | Performed by: RADIOLOGY

## 2019-05-02 PROCEDURE — 1101F PT FALLS ASSESS-DOCD LE1/YR: CPT | Mod: CPTII,S$GLB,, | Performed by: FAMILY MEDICINE

## 2019-05-02 PROCEDURE — 73030 XR SHOULDER COMPLETE 2 OR MORE VIEWS BILATERAL: ICD-10-PCS | Mod: 26,50,, | Performed by: RADIOLOGY

## 2019-05-02 PROCEDURE — 99999 PR PBB SHADOW E&M-EST. PATIENT-LVL V: ICD-10-PCS | Mod: PBBFAC,,, | Performed by: FAMILY MEDICINE

## 2019-05-02 PROCEDURE — 73030 X-RAY EXAM OF SHOULDER: CPT | Mod: 26,50,, | Performed by: RADIOLOGY

## 2019-05-02 PROCEDURE — 99499 UNLISTED E&M SERVICE: CPT | Mod: S$GLB,,, | Performed by: AUDIOLOGIST

## 2019-05-02 PROCEDURE — 99499 NO LOS: ICD-10-PCS | Mod: S$GLB,,, | Performed by: AUDIOLOGIST

## 2019-05-02 PROCEDURE — 71046 X-RAY EXAM CHEST 2 VIEWS: CPT | Mod: TC

## 2019-05-02 PROCEDURE — 99213 OFFICE O/P EST LOW 20 MIN: CPT | Mod: S$GLB,,, | Performed by: FAMILY MEDICINE

## 2019-05-02 NOTE — PROGRESS NOTES
"Subjective:      Patient ID: Jo-Ann Escobedo is a 78 y.o. female.    Chief Complaint: Shoulder Pain (4 months )      HPI:  Jo-Ann Escobedo is a 78 year old female with arthritis, history of CKD, DJD lumbar spine, history of breast cancer s/p left mastectomy, hyperlipidemia, hypertension, hypothyroidism, lipoma, multinodular goiter, obesity, Paget's disease of bone, and prediabetes who presents to clinic today with a chief complaint of shoulder pain.    Complains of bilateral shoulder pain which began 5-6 months ago.  States she noticed pain to her right shoulder around 5-6 months ago.  States she initially couldn't raise her right arm to brush her teeth. States she got online and determined she had a frozen shoulder.  Symptoms improved after a couple of weeks.  States she had pain to her right shoulder yesterday but denies pain currently.  States she can have pain to bilateral shoulders but it changes from day to day.  States the pain is "gnawing."  Symptoms gradually improved over the past 5-6 months.   States she might have taken ibuprofen (200 mg once) for her symptoms but tries to avoid medications if possible.  Denies associated numbness, tingling, or weakness.    States she ran across an article on the Internet which stated shoulder pain was in indicator of lung cancer.  States she has lost 12-13 lbs over the last year and 10 lbs over the last week.  States she is trying to lose weight currently, states she is trying to eat more healthy.  Smoked from age 16 to age 28, 1 PPD at that time.    States she can never clear her throat and has a raspy voice for the past 2-3 years.  Denies associated shortness of breath.  Can intermittently produce clear phlegm.  Denies associated fevers/chills.    States when she's walking she drifts off balance.  States the article mentioned this may be a sign of lung cancer.    Previously reported intermittent bilateral shoulder pain left worse than right relieved with diclofenac 75 mg " by mouth twice daily to Dr. Sparks, rheumatology.  X-ray bilateral shoulders 1/25/16 showing three views of each shoulder reveal moderate osteoarthrosis of the acromioclavicular joints and glenohumeral joints; no fracture, dislocation, radiopaque retained foreign body, lytic or blastic lesion or extraosseous calcification detected.      Past Medical History:   Diagnosis Date    After-cataract of both eyes - Both Eyes 9/26/2012    Allergy     Arthritis     Breast cancer     Diverticulosis     Hyperlipidemia     Hypertension     Hypothyroidism     Paget disease of bone     Squamous Cell Carcinoma     in situ right neck     Thyroid disease        Past Surgical History:   Procedure Laterality Date    BIOPSY-EXCISIONAL Right 7/27/2015    [facility] OR 2ND FLR    CATARACT EXTRACTION W/  INTRAOCULAR LENS IMPLANT Bilateral     COLONOSCOPY N/A 4/15/2019    [facility]ur [facility]et [facility] at [facility] ENDO (4TH FLR)    COLONOSCOPY N/A 1/19/2015    [facility] at [facility] ENDO (4TH FLR)    EYE SURGERY      HYSTERECTOMY      KNEE ARTHROSCOPY N/A     pt unsure of which knee     MASTECTOMY      SPINE SURGERY      TOTAL THYROIDECTOMY         Family History   Problem Relation Age of Onset    Cancer Father         lung    Dementia Mother     Glaucoma Brother     Macular degeneration Brother     Diabetes Neg Hx     Amblyopia Neg Hx     Blindness Neg Hx     Cataracts Neg Hx     Retinal detachment Neg Hx     Strabismus Neg Hx     Melanoma Neg Hx        Social History     Socioeconomic History    Marital status:      Spouse name: Not on file    Number of children: Not on file    Years of education: Not on file    Highest education level: Not on file   Occupational History    Occupation: realtor     Employer: [employer]     Employer: [employer]   Social Needs    Financial resource strain: Not hard at all    Food insecurity:      Worry: Never true     Inability: Never true    Transportation needs:     Medical: No     Non-medical: No   Tobacco Use    Smoking status: Former Smoker     Packs/day: 2.00     Years: 44.00     Pack years: 88.00     Types: Cigarettes     Last attempt to quit: 1969     Years since quittin.3    Smokeless tobacco: Never Used   Substance and Sexual Activity    Alcohol use: Yes     Alcohol/week: 0.0 oz     Frequency: Monthly or less     Drinks per session: 1 or 2     Binge frequency: Never     Comment: rarely    Drug use: No    Sexual activity: Not Currently   Lifestyle    Physical activity:     Days per week: 2 days     Minutes per session: 30 min    Stress: Only a little   Relationships    Social connections:     Talks on phone: More than three times a week     Gets together: More than three times a week     Attends Christianity service: Not on file     Active member of club or organization: Yes     Attends meetings of clubs or organizations: More than 4 times per year     Relationship status:    Other Topics Concern    Are you pregnant or think you may be? No    Breast-feeding No   Social History Narrative    Not on file       Review of Systems   Constitutional: Negative for activity change, chills, fatigue, fever and unexpected weight change.   HENT: Negative for congestion, hearing loss, nosebleeds, rhinorrhea, sore throat and trouble swallowing.    Eyes: Negative for pain and discharge.   Respiratory: Positive for cough and wheezing. Negative for chest tightness and shortness of breath.    Cardiovascular: Negative for chest pain and palpitations.   Gastrointestinal: Negative for abdominal distention, abdominal pain, blood in stool, constipation, diarrhea, nausea and vomiting.   Endocrine: Negative for polydipsia and polyuria.   Genitourinary: Negative for decreased urine volume, difficulty urinating, dysuria, hematuria, menstrual problem and urgency.   Musculoskeletal: Positive for  "arthralgias and joint swelling. Negative for back pain, myalgias and neck pain.   Skin: Negative for color change and rash.   Neurological: Negative for dizziness, tremors, weakness, light-headedness, numbness and headaches.   Psychiatric/Behavioral: Negative for agitation, behavioral problems and dysphoric mood. The patient is not nervous/anxious.      Objective:     Vitals:    05/02/19 1621   BP: 110/70   BP Location: Right arm   Patient Position: Sitting   BP Method: Medium (Manual)   Pulse: 73   Temp: 98.7 °F (37.1 °C)   TempSrc: Oral   SpO2: 97%   Weight: 82.7 kg (182 lb 5.1 oz)   Height: 5' 3" (1.6 m)       Physical Exam   Constitutional: She is oriented to person, place, and time. She appears well-developed and well-nourished.   HENT:   Head: Normocephalic and atraumatic.   Right Ear: External ear normal.   Left Ear: External ear normal.   Nose: Nose normal.   Mouth/Throat: Oropharynx is clear and moist.   Eyes: Pupils are equal, round, and reactive to light. Conjunctivae and EOM are normal.   Neck: Normal range of motion. Neck supple. No tracheal deviation present.   Cardiovascular: Normal rate, regular rhythm and normal heart sounds. Exam reveals no gallop and no friction rub.   No murmur heard.  Pulmonary/Chest: Effort normal and breath sounds normal. No respiratory distress. She has no wheezes. She has no rales.   Abdominal: Soft. Bowel sounds are normal. She exhibits no distension. There is no tenderness. There is no rebound and no guarding.   Musculoskeletal: Normal range of motion. She exhibits no edema or deformity.        Right shoulder: She exhibits tenderness. She exhibits no swelling, no effusion, no crepitus, no deformity, no laceration and normal strength.        Left shoulder: She exhibits no tenderness, no bony tenderness, no swelling, no effusion, no crepitus, no laceration and normal strength.   Neurological: She is alert and oriented to person, place, and time. Coordination normal.   Skin: " Skin is warm and dry.   Psychiatric: She has a normal mood and affect. Her behavior is normal.   Nursing note and vitals reviewed.     Assessment:      1. Cough    2. Chronic pain of both shoulders      Plan:   Jo-Ann was seen today for shoulder pain.    Diagnoses and all orders for this visit:    Cough  -     X-Ray Chest PA And Lateral; Future; notified patient that I suspect her shoulder pain is related to osteoarthritis rather than referred pain from any pulmonary malignancy.  Will check CXR to ensure no obvious masses or abnormalities considering patient's history of remote tobacco use.    Chronic pain of both shoulders  -     X-Ray Shoulder 2 or More views Bilateral; Future  -     Recommended ice packs/cold compresses, can continue OTC ibuprofen with food PRN, avoid exacerbating activities.  Consider PT vs. Ortho referral pending imaging results.

## 2019-05-02 NOTE — PROGRESS NOTES
Patient seen for HAFU. Patient has never worn hearing aids due to extreme problems with acclimatization. We discussed the acclimatization process in great detail. Hearing aid was programmed to updated audiogram. Patient reported that she has never had a BiCROS aid despite TOMMIE settings. We discussed pros of a BiCROS hearing aid. Hearing aids were set at a level that was subjectively comfortable for the patient. She was counseled that she was very much below her target settings. She was counseled on realistic expectations with these settings. She will return in 2 weeks for HAFU appointment and to possible begin to increase her settings closer to target.

## 2019-05-04 ENCOUNTER — LAB VISIT (OUTPATIENT)
Dept: LAB | Facility: HOSPITAL | Age: 79
End: 2019-05-04
Attending: FAMILY MEDICINE
Payer: MEDICARE

## 2019-05-04 DIAGNOSIS — I10 ESSENTIAL HYPERTENSION: ICD-10-CM

## 2019-05-04 DIAGNOSIS — E89.0 POSTABLATIVE HYPOTHYROIDISM: ICD-10-CM

## 2019-05-04 DIAGNOSIS — E78.00 PURE HYPERCHOLESTEROLEMIA: ICD-10-CM

## 2019-05-04 LAB
ALBUMIN SERPL BCP-MCNC: 3.7 G/DL (ref 3.5–5.2)
ALP SERPL-CCNC: 69 U/L (ref 55–135)
ALT SERPL W/O P-5'-P-CCNC: 14 U/L (ref 10–44)
ANION GAP SERPL CALC-SCNC: 10 MMOL/L (ref 8–16)
AST SERPL-CCNC: 20 U/L (ref 10–40)
BASOPHILS # BLD AUTO: 0.05 K/UL (ref 0–0.2)
BASOPHILS NFR BLD: 0.9 % (ref 0–1.9)
BILIRUB SERPL-MCNC: 0.4 MG/DL (ref 0.1–1)
BUN SERPL-MCNC: 22 MG/DL (ref 8–23)
CALCIUM SERPL-MCNC: 9.9 MG/DL (ref 8.7–10.5)
CHLORIDE SERPL-SCNC: 108 MMOL/L (ref 95–110)
CHOLEST SERPL-MCNC: 173 MG/DL (ref 120–199)
CHOLEST/HDLC SERPL: 4.3 {RATIO} (ref 2–5)
CO2 SERPL-SCNC: 25 MMOL/L (ref 23–29)
CREAT SERPL-MCNC: 0.9 MG/DL (ref 0.5–1.4)
DIFFERENTIAL METHOD: ABNORMAL
EOSINOPHIL # BLD AUTO: 0.1 K/UL (ref 0–0.5)
EOSINOPHIL NFR BLD: 2.6 % (ref 0–8)
ERYTHROCYTE [DISTWIDTH] IN BLOOD BY AUTOMATED COUNT: 13.3 % (ref 11.5–14.5)
EST. GFR  (AFRICAN AMERICAN): >60 ML/MIN/1.73 M^2
EST. GFR  (NON AFRICAN AMERICAN): >60 ML/MIN/1.73 M^2
GLUCOSE SERPL-MCNC: 107 MG/DL (ref 70–110)
HCT VFR BLD AUTO: 41.6 % (ref 37–48.5)
HDLC SERPL-MCNC: 40 MG/DL (ref 40–75)
HDLC SERPL: 23.1 % (ref 20–50)
HGB BLD-MCNC: 13 G/DL (ref 12–16)
LDLC SERPL CALC-MCNC: 114.6 MG/DL (ref 63–159)
LYMPHOCYTES # BLD AUTO: 1.6 K/UL (ref 1–4.8)
LYMPHOCYTES NFR BLD: 28.9 % (ref 18–48)
MCH RBC QN AUTO: 30.5 PG (ref 27–31)
MCHC RBC AUTO-ENTMCNC: 31.3 G/DL (ref 32–36)
MCV RBC AUTO: 98 FL (ref 82–98)
MONOCYTES # BLD AUTO: 0.7 K/UL (ref 0.3–1)
MONOCYTES NFR BLD: 13.6 % (ref 4–15)
NEUTROPHILS # BLD AUTO: 2.9 K/UL (ref 1.8–7.7)
NEUTROPHILS NFR BLD: 53.6 % (ref 38–73)
NONHDLC SERPL-MCNC: 133 MG/DL
PLATELET # BLD AUTO: 237 K/UL (ref 150–350)
PMV BLD AUTO: 10.8 FL (ref 9.2–12.9)
POTASSIUM SERPL-SCNC: 4.2 MMOL/L (ref 3.5–5.1)
PROT SERPL-MCNC: 7.1 G/DL (ref 6–8.4)
RBC # BLD AUTO: 4.26 M/UL (ref 4–5.4)
SODIUM SERPL-SCNC: 143 MMOL/L (ref 136–145)
T4 FREE SERPL-MCNC: 1.31 NG/DL (ref 0.71–1.51)
TRIGL SERPL-MCNC: 92 MG/DL (ref 30–150)
TSH SERPL DL<=0.005 MIU/L-ACNC: 0.54 UIU/ML (ref 0.4–4)
WBC # BLD AUTO: 5.44 K/UL (ref 3.9–12.7)

## 2019-05-04 PROCEDURE — 80053 COMPREHEN METABOLIC PANEL: CPT

## 2019-05-04 PROCEDURE — 80061 LIPID PANEL: CPT

## 2019-05-04 PROCEDURE — 85025 COMPLETE CBC W/AUTO DIFF WBC: CPT

## 2019-05-04 PROCEDURE — 36415 COLL VENOUS BLD VENIPUNCTURE: CPT

## 2019-05-04 PROCEDURE — 84443 ASSAY THYROID STIM HORMONE: CPT

## 2019-05-04 PROCEDURE — 84439 ASSAY OF FREE THYROXINE: CPT

## 2019-05-09 ENCOUNTER — TELEPHONE (OUTPATIENT)
Dept: INTERNAL MEDICINE | Facility: CLINIC | Age: 79
End: 2019-05-09

## 2019-05-09 DIAGNOSIS — R93.89 ABNORMAL CHEST X-RAY: Primary | ICD-10-CM

## 2019-05-11 ENCOUNTER — TELEPHONE (OUTPATIENT)
Dept: INTERNAL MEDICINE | Facility: CLINIC | Age: 79
End: 2019-05-11

## 2019-05-11 NOTE — TELEPHONE ENCOUNTER
----- Message from Dakota Kerr MD sent at 5/9/2019  2:38 PM CDT -----  Please also notify patient her blood work was normal; repeat in 1 year.

## 2019-05-11 NOTE — TELEPHONE ENCOUNTER
Spoke with pt and informed------placed on recall list for labs-----scheduled Ct---PT requested appointment notification via my [facility].

## 2019-05-15 ENCOUNTER — TELEPHONE (OUTPATIENT)
Dept: INTERNAL MEDICINE | Facility: CLINIC | Age: 79
End: 2019-05-15

## 2019-05-15 NOTE — TELEPHONE ENCOUNTER
----- Message from Bharati Roberts MA sent at 5/15/2019  3:32 PM CDT -----  Contact: PT Portal Request      ----- Message -----  From: Marielos Roque  Sent: 5/15/2019   3:29 PM  To: [facility] Staff    Appointment Request From: Jo-Ann Escobedo    With Provider: Dakota Kerr MD [[facility] - Internal Medicine]    Preferred Date Range: 5/14/2019 - 5/15/2019    Preferred Times: Any time    Reason for visit: Existing Patient    Comments:  High fever, back pain, dehydrated,  dark urine

## 2019-05-20 ENCOUNTER — OFFICE VISIT (OUTPATIENT)
Dept: PAIN MEDICINE | Facility: CLINIC | Age: 79
End: 2019-05-20
Attending: ANESTHESIOLOGY
Payer: MEDICARE

## 2019-05-20 VITALS
HEIGHT: 63 IN | WEIGHT: 184.31 LBS | DIASTOLIC BLOOD PRESSURE: 71 MMHG | HEART RATE: 89 BPM | SYSTOLIC BLOOD PRESSURE: 141 MMHG | BODY MASS INDEX: 32.66 KG/M2

## 2019-05-20 DIAGNOSIS — M54.50 CHRONIC BILATERAL LOW BACK PAIN WITHOUT SCIATICA: ICD-10-CM

## 2019-05-20 DIAGNOSIS — M51.36 DDD (DEGENERATIVE DISC DISEASE), LUMBAR: ICD-10-CM

## 2019-05-20 DIAGNOSIS — M47.816 LUMBAR SPONDYLOSIS: Primary | ICD-10-CM

## 2019-05-20 DIAGNOSIS — G89.29 CHRONIC BILATERAL LOW BACK PAIN WITHOUT SCIATICA: ICD-10-CM

## 2019-05-20 PROCEDURE — 99214 OFFICE O/P EST MOD 30 MIN: CPT | Mod: S$GLB,,, | Performed by: ANESTHESIOLOGY

## 2019-05-20 PROCEDURE — 3078F DIAST BP <80 MM HG: CPT | Mod: CPTII,S$GLB,, | Performed by: ANESTHESIOLOGY

## 2019-05-20 PROCEDURE — 3077F SYST BP >= 140 MM HG: CPT | Mod: CPTII,S$GLB,, | Performed by: ANESTHESIOLOGY

## 2019-05-20 PROCEDURE — 99214 PR OFFICE/OUTPT VISIT, EST, LEVL IV, 30-39 MIN: ICD-10-PCS | Mod: S$GLB,,, | Performed by: ANESTHESIOLOGY

## 2019-05-20 PROCEDURE — 1101F PR PT FALLS ASSESS DOC 0-1 FALLS W/OUT INJ PAST YR: ICD-10-PCS | Mod: CPTII,S$GLB,, | Performed by: ANESTHESIOLOGY

## 2019-05-20 PROCEDURE — 3078F PR MOST RECENT DIASTOLIC BLOOD PRESSURE < 80 MM HG: ICD-10-PCS | Mod: CPTII,S$GLB,, | Performed by: ANESTHESIOLOGY

## 2019-05-20 PROCEDURE — 3077F PR MOST RECENT SYSTOLIC BLOOD PRESSURE >= 140 MM HG: ICD-10-PCS | Mod: CPTII,S$GLB,, | Performed by: ANESTHESIOLOGY

## 2019-05-20 PROCEDURE — 1101F PT FALLS ASSESS-DOCD LE1/YR: CPT | Mod: CPTII,S$GLB,, | Performed by: ANESTHESIOLOGY

## 2019-05-20 NOTE — PROGRESS NOTES
Chronic Pain - Established    INTERVAL HISTORY (05/20/2019):    Jo-Ann Escobedo presents to the clinic today for a follow-up appointment for low back pain. Since the last visit, the pain has been persistent. The pain is located in the bilateral lumbosacral area. There is no radiation of the pain. Current pain intensity is 6/10. She describes the pain as grinding. The pain is exacerbated by activity. The pain is mitigated by heat, rest and walking for exercise. She will on rare occasion take Ibuprofen which helps.  At the last visit, we discussed trying lumbar medial branch blocks to determine if her back pain was facet mediated.  She would like to schedule procedure at this time.      SUBJECTIVE:    Jo-Ann Escobedo is a 76 y/o female with hx of lumbar surgery who presents to the clinic for the evaluation of low back pain. The low back pain started > 1 year ago and symptoms have been worsening.  She began to notice the pain after discontinuing the use of NSAIDs.  The pain is located in the bilateral lower back area and is non-radiating.  The pain is described as aching and is rated as 6/10. The pain is rated with a score of  1/10 on the BEST day and a score of 9/10 on the WORST day. The pain is exacerbated by morning time, laying down, getting out of bed/chair, cold weather and sitting for a long time.  The pain is mitigated by heat, medications (NSAIDS), rest, and walking. She reports spending 0 hours per day reclining. The patient reports 8 hours of uninterrupted sleep per night.    Patient reports chronic urinary incontinence. She denies bowel incontinence, significant motor weakness, and loss of sensation.    Physical Therapy/Home Exercise: yes, tried in the past      Pain Disability Index Review:  Last 3 PDI Scores 5/20/2019 4/9/2018   Pain Disability Index (PDI) 34 25       Pain Medications:    - Ibuprofen OTC as needed (takes seldom)  - Tylenol     report:  Reviewed    Imaging:     Lumbar X-rays  (2018):    DJD.  The disc spaces are narrowed between L3 and S1 vertebral segments.  No fracture, spondylolisthesis or bone destruction identified    Past Medical History:   Diagnosis Date    After-cataract of both eyes - Both Eyes 2012    Allergy     Arthritis     Breast cancer     Diverticulosis     Hyperlipidemia     Hypertension     Hypothyroidism     Paget disease of bone     Squamous Cell Carcinoma     in situ right neck     Thyroid disease      Past Surgical History:   Procedure Laterality Date    BIOPSY-EXCISIONAL Right 2015    [facility] by [facility] 2ND FLR    CATARACT EXTRACTION W/  INTRAOCULAR LENS IMPLANT Bilateral     COLONOSCOPY N/A 4/15/2019    Performed by Artur Monet MD at [facility] ENDO (4TH FLR)    COLONOSCOPY N/A 2015    [facility] at [facility] ENDO (4TH FLR)    EYE SURGERY      HYSTERECTOMY      KNEE ARTHROSCOPY N/A     pt unsure of which knee     MASTECTOMY      SPINE SURGERY      TOTAL THYROIDECTOMY       Social History     Socioeconomic History    Marital status:      Spouse name: Not on file    Number of children: Not on file    Years of education: Not on file    Highest education level: Not on file   Occupational History    Occupation: realtor     Employer: [employer]     Employer: [employer]   Social Needs    Financial resource strain: Not hard at all    Food insecurity:     Worry: Never true     Inability: Never true    Transportation needs:     Medical: No     Non-medical: No   Tobacco Use    Smoking status: Former Smoker     Packs/day: 2.00     Years: 44.00     Pack years: 88.00     Types: Cigarettes     Last attempt to quit: 1969     Years since quittin.4    Smokeless tobacco: Never Used   Substance and Sexual Activity    Alcohol use: Yes     Alcohol/week: 0.0 oz     Frequency: Monthly or less     Drinks per session: 1 or 2     Binge frequency: Never     Comment:  rarely    Drug use: No    Sexual activity: Not Currently   Lifestyle    Physical activity:     Days per week: 2 days     Minutes per session: 30 min    Stress: Only a little   Relationships    Social connections:     Talks on phone: More than three times a week     Gets together: More than three times a week     Attends Lutheran service: Not on file     Active member of club or organization: Yes     Attends meetings of clubs or organizations: More than 4 times per year     Relationship status:    Other Topics Concern    Are you pregnant or think you may be? No    Breast-feeding No   Social History Narrative    Not on file     Family History   Problem Relation Age of Onset    Cancer Father         lung    Dementia Mother     Glaucoma Brother     Macular degeneration Brother     Diabetes Neg Hx     Amblyopia Neg Hx     Blindness Neg Hx     Cataracts Neg Hx     Retinal detachment Neg Hx     Strabismus Neg Hx     Melanoma Neg Hx        Review of patient's allergies indicates:   Allergen Reactions    Codeine      Other reaction(s): MENTAL CHANGES  Other reaction(s): Diarrhea    Niacin preparations        Current Outpatient Medications   Medication Sig    AMOXICILLIN ORAL Take by mouth.    co-enzyme Q-10 30 mg capsule Take 30 mg by mouth once daily.     ergocalciferol (VITAMIN D2) 50,000 unit Cap Take by mouth. * Capsule Oral As directed.   tab once a week times 8 weeksThen Vit D 2000 units once daily    fenofibrate 160 MG Tab Take 1 tablet (160 mg total) by mouth once daily.    ferrous sulfate (IRON ORAL) Take by mouth.    ibuprofen (ADVIL,MOTRIN) 600 MG tablet Take 600 mg by mouth every 6 (six) hours as needed for Pain.    LACTOBACILLUS COMBINATION NO.8 (ADULT PROBIOTIC ORAL) Take by mouth.    levothyroxine (SYNTHROID) 125 MCG tablet TAKE 1 TABLET (125 MCG TOTAL) BY MOUTH AFTER DINNER.    losartan (COZAAR) 50 MG tablet Take 1 tablet (50 mg total) by mouth once daily.    turmeric  "root extract 500 mg Cap Take by mouth.    polyethylene glycol (GOLYTELY,NULYTELY) 236-22.74-6.74 -5.86 gram suspension As directed     No current facility-administered medications for this visit.        REVIEW OF SYSTEMS:    GENERAL:  No weight loss, malaise or fevers.  HEENT:  Negative for frequent or significant headaches.  NECK:  Negative for pain and significant neck swelling.  RESPIRATORY:  Negative for  wheezing or shortness of breath.  CARDIOVASCULAR:  Negative for chest pain  or palpitations.  GI:  Negative for abdominal discomfort, blood in stools or black stools or change in bowel habits.  MUSCULOSKELETAL:  See HPI.  SKIN:  Negative for lesions, rash, and itching.  PSYCH:  Negative for sleep disturbance, mood disorder and recent psychosocial stressors.  HEMATOLOGY/LYMPHOLOGY:  Negative for prolonged bleeding, bruising easily or swollen nodes.  NEURO:   No history of paralysis, seizures or tremors.  All other reviewed and negative other than HPI.    OBJECTIVE:    BP (!) 141/71   Pulse 89   Ht 5' 3" (1.6 m)   Wt 83.6 kg (184 lb 4.9 oz)   LMP  (LMP Unknown)   BMI 32.65 kg/m²     PHYSICAL EXAMINATION:    General appearance: Well appearing, in no acute distress, alert and oriented x3.  Psych:  Mood and affect appropriate.  Skin: Skin color, texture, turgor normal, no rashes or lesions, in both upper and lower body.  Head/face:  Normocephalic, atraumatic.   Cor: RRR  Pulm: Breathing unlabored.  GI:  Soft and non-tender.  Back: Straight leg raising in the sitting and supine positions is negative to radicular pain. Tenderness to palpation over the lower lumbar spine and paraspinous muscles. + pain with facet loading.  Decreased range of motion on flexion and extension of lumbar spine.  Extremities: Peripheral joint ROM is full and pain free without obvious instability or laxity in all four extremities. No deformities, edema, or skin discoloration.   Musculoskeletal:  No atrophy or tone abnormalities are " noted.   Neuro: Bilateral lower extremity coordination and muscle stretch reflexes are physiologic and symmetric. No loss of sensation is noted.  Strength testing:    Right hip flexion: 5/5  Left hip flexion: 5/5  Right knee extension: 5/5  Left knee extension: 5/5  Right knee flexion: 5/5  Left knee flexion: 5/5  Right ankle dorsiflexion: 5/5  Left ankle dorsiflexion: 5/5      Gait: Normal.    ASSESSMENT: 78 y.o.  female with chronic axial low back pain, consistent with lumbar facet arthopathy.     1. Lumbar spondylosis    2. DDD (degenerative disc disease), lumbar    3. Chronic bilateral low back pain without sciatica        PLAN:     - I have stressed the importance of physical activity and a home exercise plan to help with pain and improve health.  - Schedule for bilateral L4/5 and L5/S1 facet medial branch blocks.  If diagnostic will plan for RFA.  - RTC after procedure.    Over 25 minutes spent with the patient today with greater than 50% of the time spent in face-to-face counseling.      The above plan and management options were discussed at length with patient. Patient is in agreement with the above and verbalized understanding. It will be communicated with the referring physician via electronic record, fax, or mail.    Jarvis Morales III  05/20/2019

## 2019-05-20 NOTE — LETTER
May 20, 2019      Dakota Kerr MD  [address]  [facility] [address]           [facility]  [address]  [facility] [address]  Phone: [phone]  Fax: [phone]          Patient: Jo-Ann Escobedo   MR Number: [id number]   YOB: 1940   Date of Visit: 5/20/2019       Dear Dr. Dakota Kerr:    Thank you for referring Jo-Ann Escobedo to me for evaluation. Attached you will find relevant portions of my assessment and plan of care.    If you have questions, please do not hesitate to call me. I look forward to following Jo-Ann Escobedo along with you.    Sincerely,    Jarvis Morales III, MD    Enclosure  CC:  No Recipients    If you would like to receive this communication electronically, please contact [email or web address] or [phone] to request more information on [email or web address] Link access.    For providers and/or their staff who would like to refer a patient to [facility], please contact us through our one-stop-shop provider referral line, [facility] [place], at [phone].    If you feel you have received this communication in error or would no longer like to receive these types of communications, please e-mail [email or web address]

## 2019-05-20 NOTE — H&P (VIEW-ONLY)
Chronic Pain - Established    INTERVAL HISTORY (05/20/2019):    Jo-Ann Escobedo presents to the clinic today for a follow-up appointment for low back pain. Since the last visit, the pain has been persistent. The pain is located in the bilateral lumbosacral area. There is no radiation of the pain. Current pain intensity is 6/10. She describes the pain as grinding. The pain is exacerbated by activity. The pain is mitigated by heat, rest and walking for exercise. She will on rare occasion take Ibuprofen which helps.  At the last visit, we discussed trying lumbar medial branch blocks to determine if her back pain was facet mediated.  She would like to schedule procedure at this time.      SUBJECTIVE:    Jo-Ann Escobedo is a 78 y/o female with hx of lumbar surgery who presents to the clinic for the evaluation of low back pain. The low back pain started > 1 year ago and symptoms have been worsening.  She began to notice the pain after discontinuing the use of NSAIDs.  The pain is located in the bilateral lower back area and is non-radiating.  The pain is described as aching and is rated as 6/10. The pain is rated with a score of  1/10 on the BEST day and a score of 9/10 on the WORST day. The pain is exacerbated by morning time, laying down, getting out of bed/chair, cold weather and sitting for a long time.  The pain is mitigated by heat, medications (NSAIDS), rest, and walking. She reports spending 0 hours per day reclining. The patient reports 8 hours of uninterrupted sleep per night.    Patient reports chronic urinary incontinence. She denies bowel incontinence, significant motor weakness, and loss of sensation.    Physical Therapy/Home Exercise: yes, tried in the past      Pain Disability Index Review:  Last 3 PDI Scores 5/20/2019 4/9/2018   Pain Disability Index (PDI) 34 25       Pain Medications:    - Ibuprofen OTC as needed (takes seldom)  - Tylenol     report:  Reviewed    Imaging:     Lumbar X-rays  (2018):    DJD.  The disc spaces are narrowed between L3 and S1 vertebral segments.  No fracture, spondylolisthesis or bone destruction identified    Past Medical History:   Diagnosis Date    After-cataract of both eyes - Both Eyes 2012    Allergy     Arthritis     Breast cancer     Diverticulosis     Hyperlipidemia     Hypertension     Hypothyroidism     Paget disease of bone     Squamous Cell Carcinoma     in situ right neck     Thyroid disease      Past Surgical History:   Procedure Laterality Date    BIOPSY-EXCISIONAL Right 2015    [facility] 2ND FLR    CATARACT EXTRACTION W/  INTRAOCULAR LENS IMPLANT Bilateral     COLONOSCOPY N/A 4/15/2019    [facility]ur [facility] at [facility] ENDO (4TH FLR)    COLONOSCOPY N/A 2015    [facility] ENDO (4TH FLR)    EYE SURGERY      HYSTERECTOMY      KNEE ARTHROSCOPY N/A     pt unsure of which knee     MASTECTOMY      SPINE SURGERY      TOTAL THYROIDECTOMY       Social History     Socioeconomic History    Marital status:      Spouse name: Not on file    Number of children: Not on file    Years of education: Not on file    Highest education level: Not on file   Occupational History    Occupation: realtor     Employer: [employer]     Employer: [employer]   Social Needs    Financial resource strain: Not hard at all    Food insecurity:     Worry: Never true     Inability: Never true    Transportation needs:     Medical: No     Non-medical: No   Tobacco Use    Smoking status: Former Smoker     Packs/day: 2.00     Years: 44.00     Pack years: 88.00     Types: Cigarettes     Last attempt to quit: 1969     Years since quittin.4    Smokeless tobacco: Never Used   Substance and Sexual Activity    Alcohol use: Yes     Alcohol/week: 0.0 oz     Frequency: Monthly or less     Drinks per session: 1 or 2     Binge frequency: Never     Comment:  rarely    Drug use: No    Sexual activity: Not Currently   Lifestyle    Physical activity:     Days per week: 2 days     Minutes per session: 30 min    Stress: Only a little   Relationships    Social connections:     Talks on phone: More than three times a week     Gets together: More than three times a week     Attends Mandaen service: Not on file     Active member of club or organization: Yes     Attends meetings of clubs or organizations: More than 4 times per year     Relationship status:    Other Topics Concern    Are you pregnant or think you may be? No    Breast-feeding No   Social History Narrative    Not on file     Family History   Problem Relation Age of Onset    Cancer Father         lung    Dementia Mother     Glaucoma Brother     Macular degeneration Brother     Diabetes Neg Hx     Amblyopia Neg Hx     Blindness Neg Hx     Cataracts Neg Hx     Retinal detachment Neg Hx     Strabismus Neg Hx     Melanoma Neg Hx        Review of patient's allergies indicates:   Allergen Reactions    Codeine      Other reaction(s): MENTAL CHANGES  Other reaction(s): Diarrhea    Niacin preparations        Current Outpatient Medications   Medication Sig    AMOXICILLIN ORAL Take by mouth.    co-enzyme Q-10 30 mg capsule Take 30 mg by mouth once daily.     ergocalciferol (VITAMIN D2) 50,000 unit Cap Take by mouth. * Capsule Oral As directed.   tab once a week times 8 weeksThen Vit D 2000 units once daily    fenofibrate 160 MG Tab Take 1 tablet (160 mg total) by mouth once daily.    ferrous sulfate (IRON ORAL) Take by mouth.    ibuprofen (ADVIL,MOTRIN) 600 MG tablet Take 600 mg by mouth every 6 (six) hours as needed for Pain.    LACTOBACILLUS COMBINATION NO.8 (ADULT PROBIOTIC ORAL) Take by mouth.    levothyroxine (SYNTHROID) 125 MCG tablet TAKE 1 TABLET (125 MCG TOTAL) BY MOUTH AFTER DINNER.    losartan (COZAAR) 50 MG tablet Take 1 tablet (50 mg total) by mouth once daily.    turmeric  "root extract 500 mg Cap Take by mouth.    polyethylene glycol (GOLYTELY,NULYTELY) 236-22.74-6.74 -5.86 gram suspension As directed     No current facility-administered medications for this visit.        REVIEW OF SYSTEMS:    GENERAL:  No weight loss, malaise or fevers.  HEENT:  Negative for frequent or significant headaches.  NECK:  Negative for pain and significant neck swelling.  RESPIRATORY:  Negative for  wheezing or shortness of breath.  CARDIOVASCULAR:  Negative for chest pain  or palpitations.  GI:  Negative for abdominal discomfort, blood in stools or black stools or change in bowel habits.  MUSCULOSKELETAL:  See HPI.  SKIN:  Negative for lesions, rash, and itching.  PSYCH:  Negative for sleep disturbance, mood disorder and recent psychosocial stressors.  HEMATOLOGY/LYMPHOLOGY:  Negative for prolonged bleeding, bruising easily or swollen nodes.  NEURO:   No history of paralysis, seizures or tremors.  All other reviewed and negative other than HPI.    OBJECTIVE:    BP (!) 141/71   Pulse 89   Ht 5' 3" (1.6 m)   Wt 83.6 kg (184 lb 4.9 oz)   LMP  (LMP Unknown)   BMI 32.65 kg/m²     PHYSICAL EXAMINATION:    General appearance: Well appearing, in no acute distress, alert and oriented x3.  Psych:  Mood and affect appropriate.  Skin: Skin color, texture, turgor normal, no rashes or lesions, in both upper and lower body.  Head/face:  Normocephalic, atraumatic.   Cor: RRR  Pulm: Breathing unlabored.  GI:  Soft and non-tender.  Back: Straight leg raising in the sitting and supine positions is negative to radicular pain. Tenderness to palpation over the lower lumbar spine and paraspinous muscles. + pain with facet loading.  Decreased range of motion on flexion and extension of lumbar spine.  Extremities: Peripheral joint ROM is full and pain free without obvious instability or laxity in all four extremities. No deformities, edema, or skin discoloration.   Musculoskeletal:  No atrophy or tone abnormalities are " noted.   Neuro: Bilateral lower extremity coordination and muscle stretch reflexes are physiologic and symmetric. No loss of sensation is noted.  Strength testing:    Right hip flexion: 5/5  Left hip flexion: 5/5  Right knee extension: 5/5  Left knee extension: 5/5  Right knee flexion: 5/5  Left knee flexion: 5/5  Right ankle dorsiflexion: 5/5  Left ankle dorsiflexion: 5/5      Gait: Normal.    ASSESSMENT: 78 y.o.  female with chronic axial low back pain, consistent with lumbar facet arthopathy.     1. Lumbar spondylosis    2. DDD (degenerative disc disease), lumbar    3. Chronic bilateral low back pain without sciatica        PLAN:     - I have stressed the importance of physical activity and a home exercise plan to help with pain and improve health.  - Schedule for bilateral L4/5 and L5/S1 facet medial branch blocks.  If diagnostic will plan for RFA.  - RTC after procedure.    Over 25 minutes spent with the patient today with greater than 50% of the time spent in face-to-face counseling.      The above plan and management options were discussed at length with patient. Patient is in agreement with the above and verbalized understanding. It will be communicated with the referring physician via electronic record, fax, or mail.    Jarvis Morales III  05/20/2019

## 2019-05-29 ENCOUNTER — HOSPITAL ENCOUNTER (OUTPATIENT)
Dept: RADIOLOGY | Facility: HOSPITAL | Age: 79
Discharge: HOME | End: 2019-05-29
Attending: FAMILY MEDICINE
Payer: MEDICARE

## 2019-05-29 ENCOUNTER — TELEPHONE (OUTPATIENT)
Dept: INTERNAL MEDICINE | Facility: CLINIC | Age: 79
End: 2019-05-29

## 2019-05-29 DIAGNOSIS — Z85.3 HISTORY OF BREAST CANCER: ICD-10-CM

## 2019-05-29 PROCEDURE — 77065 DX MAMMO INCL CAD UNI: CPT | Mod: 26,,, | Performed by: RADIOLOGY

## 2019-05-29 PROCEDURE — 77061 MAMMO DIGITAL DIAGNOSTIC RIGHT WITH TOMOSYNTHESIS_CAD: ICD-10-PCS | Mod: 26,,, | Performed by: RADIOLOGY

## 2019-05-29 PROCEDURE — 77065 DX MAMMO INCL CAD UNI: CPT | Mod: TC,PO

## 2019-05-29 PROCEDURE — 77065 MAMMO DIGITAL DIAGNOSTIC RIGHT WITH TOMOSYNTHESIS_CAD: ICD-10-PCS | Mod: 26,,, | Performed by: RADIOLOGY

## 2019-05-29 PROCEDURE — 77061 BREAST TOMOSYNTHESIS UNI: CPT | Mod: TC,PO

## 2019-05-29 PROCEDURE — 77061 BREAST TOMOSYNTHESIS UNI: CPT | Mod: 26,,, | Performed by: RADIOLOGY

## 2019-05-29 NOTE — TELEPHONE ENCOUNTER
Called patient to review mammogram findings with suspicious calcifications in right breast.  Stated she was told the breast center would contact her later today about setting up breast biopsy.  States she was not able to complete the CT scan of the chest due to a stomach virus, would like to reschedule.  Please contact patient and assist with rescheduling CT scan of chest.

## 2019-05-30 ENCOUNTER — HOSPITAL ENCOUNTER (OUTPATIENT)
Dept: RADIOLOGY | Facility: HOSPITAL | Age: 79
Discharge: HOME | End: 2019-05-30
Attending: FAMILY MEDICINE
Payer: MEDICARE

## 2019-05-30 ENCOUNTER — TELEPHONE (OUTPATIENT)
Dept: RADIOLOGY | Facility: HOSPITAL | Age: 79
End: 2019-05-30

## 2019-05-30 DIAGNOSIS — K21.9 LARYNGOPHARYNGEAL REFLUX (LPR): ICD-10-CM

## 2019-05-30 DIAGNOSIS — R93.89 ABNORMAL CHEST X-RAY: ICD-10-CM

## 2019-05-30 PROCEDURE — 71260 CT THORAX DX C+: CPT | Mod: 26,,, | Performed by: RADIOLOGY

## 2019-05-30 PROCEDURE — 25500020 PHARM REV CODE 255: Performed by: FAMILY MEDICINE

## 2019-05-30 PROCEDURE — 71260 CT THORAX DX C+: CPT | Mod: TC

## 2019-05-30 PROCEDURE — 71260 CT CHEST WITH CONTRAST: ICD-10-PCS | Mod: 26,,, | Performed by: RADIOLOGY

## 2019-05-30 RX ADMIN — IOHEXOL 75 ML: 350 INJECTION, SOLUTION INTRAVENOUS at 10:05

## 2019-05-30 NOTE — TELEPHONE ENCOUNTER
Spoke with patient. Reviewed breast biopsy procedure and reviewed instructions for breast biopsy. Patient expressed understanding and all questions were answered. Provided patient with my phone number to call for any further concerns or questions.   Patient scheduled breast biopsy at the [facility] on 6/3/2019.

## 2019-06-03 ENCOUNTER — HOSPITAL ENCOUNTER (OUTPATIENT)
Dept: RADIOLOGY | Facility: HOSPITAL | Age: 79
Discharge: HOME | End: 2019-06-03
Attending: FAMILY MEDICINE
Payer: MEDICARE

## 2019-06-03 ENCOUNTER — RESEARCH ENCOUNTER (OUTPATIENT)
Dept: RESEARCH | Facility: HOSPITAL | Age: 79
End: 2019-06-03

## 2019-06-03 DIAGNOSIS — R92.8 ABNORMAL MAMMOGRAM: ICD-10-CM

## 2019-06-03 PROCEDURE — 19081 BX BREAST 1ST LESION STRTCTC: CPT | Mod: RT,,, | Performed by: RADIOLOGY

## 2019-06-03 PROCEDURE — 88305 TISSUE EXAM BY PATHOLOGIST: CPT | Mod: 59 | Performed by: PATHOLOGY

## 2019-06-03 PROCEDURE — 19081 BX BREAST 1ST LESION STRTCTC: CPT | Mod: PO,RT

## 2019-06-03 PROCEDURE — 88342 IMHCHEM/IMCYTCHM 1ST ANTB: CPT | Mod: 26,,, | Performed by: PATHOLOGY

## 2019-06-03 PROCEDURE — 88305 TISSUE SPECIMEN TO PATHOLOGY, RADIOLOGY: ICD-10-PCS | Mod: 26,,, | Performed by: PATHOLOGY

## 2019-06-03 PROCEDURE — 88341 TISSUE SPECIMEN TO PATHOLOGY, RADIOLOGY: ICD-10-PCS | Mod: 26,,, | Performed by: PATHOLOGY

## 2019-06-03 PROCEDURE — 88342 IMHCHEM/IMCYTCHM 1ST ANTB: CPT | Mod: 59 | Performed by: PATHOLOGY

## 2019-06-03 PROCEDURE — 27201044 MAMMO BREAST STEREOTACTIC BREAST BIOPSY RIGHT: Mod: PO

## 2019-06-03 PROCEDURE — 19081 MAMMO BREAST STEREOTACTIC BREAST BIOPSY RIGHT: ICD-10-PCS | Mod: RT,,, | Performed by: RADIOLOGY

## 2019-06-03 PROCEDURE — 88305 TISSUE EXAM BY PATHOLOGIST: CPT | Mod: 26,,, | Performed by: PATHOLOGY

## 2019-06-03 PROCEDURE — 25000003 PHARM REV CODE 250: Mod: PO | Performed by: FAMILY MEDICINE

## 2019-06-03 PROCEDURE — 88341 IMHCHEM/IMCYTCHM EA ADD ANTB: CPT | Mod: 26,,, | Performed by: PATHOLOGY

## 2019-06-03 PROCEDURE — 88342 TISSUE SPECIMEN TO PATHOLOGY, RADIOLOGY: ICD-10-PCS | Mod: 26,,, | Performed by: PATHOLOGY

## 2019-06-03 RX ORDER — LIDOCAINE HYDROCHLORIDE AND EPINEPHRINE 20; 10 MG/ML; UG/ML
7 INJECTION, SOLUTION INFILTRATION; PERINEURAL ONCE
Status: COMPLETED | OUTPATIENT
Start: 2019-06-03 | End: 2019-06-03

## 2019-06-03 RX ORDER — LIDOCAINE HYDROCHLORIDE 10 MG/ML
1.5 INJECTION, SOLUTION EPIDURAL; INFILTRATION; INTRACAUDAL; PERINEURAL ONCE
Status: COMPLETED | OUTPATIENT
Start: 2019-06-03 | End: 2019-06-03

## 2019-06-03 RX ORDER — OMEPRAZOLE 40 MG/1
40 CAPSULE, DELAYED RELEASE ORAL
Qty: 90 CAPSULE | Refills: 1 | Status: SHIPPED | OUTPATIENT
Start: 2019-06-03 | End: 2019-11-25 | Stop reason: SDUPTHER

## 2019-06-03 RX ADMIN — LIDOCAINE HYDROCHLORIDE,EPINEPHRINE BITARTRATE 7 ML: 20; .01 INJECTION, SOLUTION INFILTRATION; PERINEURAL at 01:06

## 2019-06-03 RX ADMIN — LIDOCAINE HYDROCHLORIDE 1.5 ML: 10 INJECTION, SOLUTION EPIDURAL; INFILTRATION; INTRACAUDAL; PERINEURAL at 01:06

## 2019-06-03 NOTE — PROGRESS NOTES
Pt approached in Mammography clinic regarding participation in IRB protocol #2018.314, [facility] Breast Biopsy study. Pt was agreeable.      The Informed Consent Form (ICF) was reviewed with pt. The discussion included:   - participation is voluntary  - pt can change her mind about participating  - pt was informed that participation in this study would not preclude her from participating in any other research if offered  - specimens may be used by [facility] researchers or community researchers  - specimens collected include only those discussed with the patient at the time of consent and are indicated on the ICF   - specimens may be used for DNA, RNA or protein studies investigating biomarkers for diagnostic, prognostic or treatment purposes  - breast biopsy will be collected from [facility] after their approval  - all specimens released to researchers will be stripped of identifiers  - no personal medical information will be released to any parties outside of this research study  - there will be no other physical risks outside of those involved in standard of care procedure      Dr. Deng approved of patient's participation in the study.  Pt did not have any questions. Pt willingly and independently signed ICF.     A copy of signed ICF was given to pt with instructions to call with any questions that may arise or if she should change her mind regarding participation in study

## 2019-06-05 ENCOUNTER — PATIENT MESSAGE (OUTPATIENT)
Dept: OTOLARYNGOLOGY | Facility: CLINIC | Age: 79
End: 2019-06-05

## 2019-06-05 ENCOUNTER — TELEPHONE (OUTPATIENT)
Dept: HEMATOLOGY/ONCOLOGY | Facility: CLINIC | Age: 79
End: 2019-06-05

## 2019-06-05 DIAGNOSIS — K21.9 LARYNGOPHARYNGEAL REFLUX (LPR): Primary | ICD-10-CM

## 2019-06-05 NOTE — TELEPHONE ENCOUNTER
Called Patient with results of breast biopsy from 6/3/2019.  Explained that the biopsy showed papillary lesion . Discussed what this means and that the next step is to meet with a breast surgeon. An appt was made for 6/12/2019 with .  Reviewed location of breast center. Patient verbalized understanding.

## 2019-06-07 ENCOUNTER — HOSPITAL ENCOUNTER (OUTPATIENT)
Facility: HOSPITAL | Age: 79
Discharge: HOME | End: 2019-06-07
Attending: ANESTHESIOLOGY | Admitting: ANESTHESIOLOGY
Payer: MEDICARE

## 2019-06-07 ENCOUNTER — TELEPHONE (OUTPATIENT)
Dept: OTOLARYNGOLOGY | Facility: CLINIC | Age: 79
End: 2019-06-07

## 2019-06-07 ENCOUNTER — PATIENT OUTREACH (OUTPATIENT)
Dept: ADMINISTRATIVE | Facility: HOSPITAL | Age: 79
End: 2019-06-07

## 2019-06-07 VITALS
SYSTOLIC BLOOD PRESSURE: 138 MMHG | WEIGHT: 175 LBS | TEMPERATURE: 97 F | OXYGEN SATURATION: 97 % | RESPIRATION RATE: 18 BRPM | HEART RATE: 69 BPM | DIASTOLIC BLOOD PRESSURE: 67 MMHG | HEIGHT: 63 IN | BODY MASS INDEX: 31.01 KG/M2

## 2019-06-07 DIAGNOSIS — M47.816 FACET ARTHRITIS OF LUMBAR REGION: ICD-10-CM

## 2019-06-07 DIAGNOSIS — M47.816 SPONDYLOSIS OF LUMBAR REGION WITHOUT MYELOPATHY OR RADICULOPATHY: Primary | ICD-10-CM

## 2019-06-07 PROCEDURE — 99152 PR MOD CONSCIOUS SEDATION, SAME PHYS, 5+ YRS, FIRST 15 MIN: ICD-10-PCS | Mod: ,,, | Performed by: ANESTHESIOLOGY

## 2019-06-07 PROCEDURE — 99152 MOD SED SAME PHYS/QHP 5/>YRS: CPT | Performed by: ANESTHESIOLOGY

## 2019-06-07 PROCEDURE — 64493 INJ PARAVERT F JNT L/S 1 LEV: CPT | Mod: 50 | Performed by: ANESTHESIOLOGY

## 2019-06-07 PROCEDURE — 99152 MOD SED SAME PHYS/QHP 5/>YRS: CPT | Mod: ,,, | Performed by: ANESTHESIOLOGY

## 2019-06-07 PROCEDURE — 63600175 PHARM REV CODE 636 W HCPCS: Performed by: ANESTHESIOLOGY

## 2019-06-07 PROCEDURE — 25000003 PHARM REV CODE 250: Performed by: ANESTHESIOLOGY

## 2019-06-07 PROCEDURE — 64493 INJ PARAVERT F JNT L/S 1 LEV: CPT | Mod: 50,,, | Performed by: ANESTHESIOLOGY

## 2019-06-07 PROCEDURE — 64494 INJ PARAVERT F JNT L/S 2 LEV: CPT | Mod: 50,,, | Performed by: ANESTHESIOLOGY

## 2019-06-07 PROCEDURE — 64493 PR INJ DX/THER AGNT PARAVERT FACET JOINT,IMG GUIDE,LUMBAR/SAC,1ST LVL: ICD-10-PCS | Mod: 50,,, | Performed by: ANESTHESIOLOGY

## 2019-06-07 PROCEDURE — 64494 INJ PARAVERT F JNT L/S 2 LEV: CPT | Mod: 50 | Performed by: ANESTHESIOLOGY

## 2019-06-07 PROCEDURE — S0020 INJECTION, BUPIVICAINE HYDRO: HCPCS | Performed by: ANESTHESIOLOGY

## 2019-06-07 PROCEDURE — 64494 PR INJ DX/THER AGNT PARAVERT FACET JOINT,IMG GUIDE,LUMBAR/SAC, 2ND LEVEL: ICD-10-PCS | Mod: 50,,, | Performed by: ANESTHESIOLOGY

## 2019-06-07 RX ORDER — FENTANYL CITRATE 50 UG/ML
INJECTION, SOLUTION INTRAMUSCULAR; INTRAVENOUS
Status: DISCONTINUED | OUTPATIENT
Start: 2019-06-07 | End: 2019-06-07 | Stop reason: HOSPADM

## 2019-06-07 RX ORDER — MIDAZOLAM HYDROCHLORIDE 1 MG/ML
INJECTION, SOLUTION INTRAMUSCULAR; INTRAVENOUS
Status: DISCONTINUED | OUTPATIENT
Start: 2019-06-07 | End: 2019-06-07 | Stop reason: HOSPADM

## 2019-06-07 RX ORDER — SODIUM CHLORIDE 9 MG/ML
500 INJECTION, SOLUTION INTRAVENOUS CONTINUOUS
Status: DISCONTINUED | OUTPATIENT
Start: 2019-06-07 | End: 2019-06-07 | Stop reason: HOSPADM

## 2019-06-07 RX ORDER — LIDOCAINE HYDROCHLORIDE 10 MG/ML
INJECTION INFILTRATION; PERINEURAL
Status: DISCONTINUED | OUTPATIENT
Start: 2019-06-07 | End: 2019-06-07 | Stop reason: HOSPADM

## 2019-06-07 RX ORDER — BUPIVACAINE HYDROCHLORIDE 5 MG/ML
INJECTION, SOLUTION EPIDURAL; INTRACAUDAL
Status: DISCONTINUED | OUTPATIENT
Start: 2019-06-07 | End: 2019-06-07 | Stop reason: HOSPADM

## 2019-06-07 NOTE — OP NOTE
"Patient Name: Jo-Ann Escobedo  MRN: [id number]    INFORMED CONSENT: The procedure, risks, benefits and options were discussed with patient. There are no contraindications to the procedure. The patient expressed understanding and agreed to proceed. The personnel performing the procedure was discussed. I verify that I personally obtained the patient's consent prior to the start of the procedure and the signed consent can be found on the patient's chart.    PROCEDURE: BILATERAL L4/5 AND L5/S1 MEDIAL BRANCH NERVE BLOCK     Procedure Date: 6/7/2019  Surgeon(s) and Role:     * Jarvis Morales III, MD - Primary  Anesthesia: RN IV Sedation  Procedure(s) (LRB):  BLOCK, NERVE, FACET JOINT, LUMBAR, MEDIAL BRANCH-deo MBB L4/5,L5/S1 (Bilateral)    Pre Procedure diagnosis:   Lumbar Spondylosis    Post-Procedure diagnosis: SAME      Sedation: Yes - Fentanyl 25 mcg and Midazolam 0.5 mg    DESCRIPTION OF PROCEDURE:The patient was brought to the procedure room. IV access was obtained prior to the procedure. The patient was positioned prone on the fluoroscopy table. Continuous hemodynamic monitoring was initiated including blood pressure, EKG, and pulse oximetry. The area of the lumbar spine was prepped with Chlorhexidine three times and draped into a sterile field. Fluoroscopy was used to identify the location of the right and left side L3, L4 medial branch nerves and L5 dorsal ramus at the junctions of the superior articular process and the transverse processes of L4, L5, and the sacral ala respectively. Skin anesthesia was achieved using Lidocaine 1% over the injection sites. A 23 gauge, 3 1/2" spinal needle was slowly inserted at each level using AP and oblique fluoroscopic imaging. Negative aspiration for blood or CSF was confirmed. 1 mL of Bupivacaine 0.5% was injected at each site. The needles were removed and bleeding was nil. A sterile dressing was applied. No specimens collected. Jo-Ann was taken back to the recovery room " for further observation.

## 2019-06-07 NOTE — PROGRESS NOTES
Chart review completed. Immunizations reconciled, HM modifiers updated, HM duplicate entries deleted, care team updated, old orders deleted. Pt due for shingles vaccine.

## 2019-06-07 NOTE — PLAN OF CARE
Problem: Adult Inpatient Plan of Care  Goal: Plan of Care Review  Outcome: Ongoing (interventions implemented as appropriate)  Received report from MELVINA Bang. Patient s/p lumbar injection, AAOx3. VSS, no c/o pain or discomfort at this time, resp even and unlabored. bandaid x6 to lower back is CDI. No active bleeding. No hematoma noted. Post procedure protocol reviewed with patient and patient's family. Understanding verbalized. Family members at bedside. Nurse call bell within reach. Will continue to monitor per post procedure protocol.

## 2019-06-07 NOTE — DISCHARGE INSTRUCTIONS

## 2019-06-07 NOTE — NURSING
Patient discharged per MD orders. Instructions given on medications, wound care, activity, signs of infection, when to call MD, and follow up appointments. Pt verbalized understanding.  Patient AAOx3, VSS, no c/o pain or discomfort at this time.PIV removed, catheter tip intact. Patient left unit via wheelchair with transport and family.

## 2019-06-07 NOTE — DISCHARGE SUMMARY
Discharge Note  Short Stay      SUMMARY     Admit Date: 6/7/2019    Attending Physician: Jarvis Morales III      Discharge Physician: Jarvis Morales III      Discharge Date: 6/7/2019 3:07 PM    Final Diagnosis:   1. Spondylosis of lumbar region without myelopathy or radiculopathy    2. Facet arthritis of lumbar region        Disposition: Home or self care    Patient Instructions:   Current Discharge Medication List      CONTINUE these medications which have NOT CHANGED    Details   levothyroxine (SYNTHROID) 125 MCG tablet TAKE 1 TABLET (125 MCG TOTAL) BY MOUTH AFTER DINNER.  Qty: 90 tablet, Refills: 3      co-enzyme Q-10 30 mg capsule Take 30 mg by mouth once daily.       ergocalciferol (VITAMIN D2) 50,000 unit Cap Take by mouth. * Capsule Oral As directed.   tab once a week times 8 weeksThen Vit D 2000 units once daily      fenofibrate 160 MG Tab Take 1 tablet (160 mg total) by mouth once daily.  Qty: 90 tablet, Refills: 3    Associated Diagnoses: Hyperlipidemia, unspecified hyperlipidemia type      ferrous sulfate (IRON ORAL) Take by mouth.      ibuprofen (ADVIL,MOTRIN) 600 MG tablet Take 600 mg by mouth every 6 (six) hours as needed for Pain.      LACTOBACILLUS COMBINATION NO.8 (ADULT PROBIOTIC ORAL) Take by mouth.      losartan (COZAAR) 50 MG tablet Take 1 tablet (50 mg total) by mouth once daily.  Qty: 90 tablet, Refills: 3    Associated Diagnoses: Essential hypertension      omeprazole (PRILOSEC) 40 MG capsule TAKE 1 CAPSULE (40 MG TOTAL) BY MOUTH BEFORE DINNER.  Qty: 90 capsule, Refills: 1    Associated Diagnoses: Laryngopharyngeal reflux (LPR)      polyethylene glycol (GOLYTELY,NULYTELY) 236-22.74-6.74 -5.86 gram suspension As directed  Qty: 4000 mL, Refills: 0    Associated Diagnoses: Rectal bleeding      turmeric root extract 500 mg Cap Take by mouth.         STOP taking these medications       AMOXICILLIN ORAL Comments:   Reason for Stopping:                   Discharge Diagnosis: Same as  Pre and Post Procedure  Condition on Discharge: Stable.  Diet on Discharge: Same as before.  Activity: as per instruction sheet.  Discharge to: Home with a responsible adult.  Follow up: 1 week

## 2019-06-10 ENCOUNTER — OFFICE VISIT (OUTPATIENT)
Dept: INTERNAL MEDICINE | Facility: CLINIC | Age: 79
End: 2019-06-10
Payer: MEDICARE

## 2019-06-10 VITALS
WEIGHT: 178.81 LBS | BODY MASS INDEX: 31.68 KG/M2 | OXYGEN SATURATION: 97 % | HEART RATE: 91 BPM | DIASTOLIC BLOOD PRESSURE: 76 MMHG | HEIGHT: 63 IN | TEMPERATURE: 99 F | SYSTOLIC BLOOD PRESSURE: 130 MMHG

## 2019-06-10 DIAGNOSIS — R89.7 ABNORMAL BREAST BIOPSY: ICD-10-CM

## 2019-06-10 DIAGNOSIS — H53.8 CLOUDY VISION: ICD-10-CM

## 2019-06-10 DIAGNOSIS — E89.0 POSTABLATIVE HYPOTHYROIDISM: ICD-10-CM

## 2019-06-10 DIAGNOSIS — R73.03 PREDIABETES: ICD-10-CM

## 2019-06-10 DIAGNOSIS — E78.00 PURE HYPERCHOLESTEROLEMIA: ICD-10-CM

## 2019-06-10 DIAGNOSIS — I10 ESSENTIAL HYPERTENSION: ICD-10-CM

## 2019-06-10 PROCEDURE — 99213 OFFICE O/P EST LOW 20 MIN: CPT | Mod: S$GLB,,, | Performed by: FAMILY MEDICINE

## 2019-06-10 PROCEDURE — 3075F SYST BP GE 130 - 139MM HG: CPT | Mod: CPTII,S$GLB,, | Performed by: FAMILY MEDICINE

## 2019-06-10 PROCEDURE — 3078F PR MOST RECENT DIASTOLIC BLOOD PRESSURE < 80 MM HG: ICD-10-PCS | Mod: CPTII,S$GLB,, | Performed by: FAMILY MEDICINE

## 2019-06-10 PROCEDURE — 99213 PR OFFICE/OUTPT VISIT, EST, LEVL III, 20-29 MIN: ICD-10-PCS | Mod: S$GLB,,, | Performed by: FAMILY MEDICINE

## 2019-06-10 PROCEDURE — 3075F PR MOST RECENT SYSTOLIC BLOOD PRESS GE 130-139MM HG: ICD-10-PCS | Mod: CPTII,S$GLB,, | Performed by: FAMILY MEDICINE

## 2019-06-10 PROCEDURE — 99999 PR PBB SHADOW E&M-EST. PATIENT-LVL III: CPT | Mod: PBBFAC,,, | Performed by: FAMILY MEDICINE

## 2019-06-10 PROCEDURE — 3078F DIAST BP <80 MM HG: CPT | Mod: CPTII,S$GLB,, | Performed by: FAMILY MEDICINE

## 2019-06-10 PROCEDURE — 1100F PR PT FALLS ASSESS DOC 2+ FALLS/FALL W/INJURY/YR: ICD-10-PCS | Mod: CPTII,S$GLB,, | Performed by: FAMILY MEDICINE

## 2019-06-10 PROCEDURE — 3288F FALL RISK ASSESSMENT DOCD: CPT | Mod: CPTII,S$GLB,, | Performed by: FAMILY MEDICINE

## 2019-06-10 PROCEDURE — 1100F PTFALLS ASSESS-DOCD GE2>/YR: CPT | Mod: CPTII,S$GLB,, | Performed by: FAMILY MEDICINE

## 2019-06-10 PROCEDURE — 99999 PR PBB SHADOW E&M-EST. PATIENT-LVL III: ICD-10-PCS | Mod: PBBFAC,,, | Performed by: FAMILY MEDICINE

## 2019-06-10 PROCEDURE — 3288F PR FALLS RISK ASSESSMENT DOCUMENTED: ICD-10-PCS | Mod: CPTII,S$GLB,, | Performed by: FAMILY MEDICINE

## 2019-06-10 NOTE — PROGRESS NOTES
Subjective:      Patient ID: Jo-Ann Escobedo is a 78 y.o. female.    Chief Complaint: Follow-up (6 month ) and Results (all recent labs)      HPI:  Jo-Ann Escobedo is a 78 year old female with arthritis, history of CKD, DJD lumbar spine, history of breast cancer s/p left mastectomy, hyperlipidemia, hypertension, hypothyroidism, lipoma, multinodular goiter, obesity, Paget's disease of bone, and prediabetes who presents to clinic today for a 6 month follow up on chronic conditions.    Endorses some cloudiness to her left eye for the past 2-3 months.  To schedule follow up with Dr. Hinojosa, optometry.    CKD:  Normal GFR noted on most recent CMP 5/4/19.    Hx of breast cancer:  S/p left mastectomy 2001.  Last mammogram 4/18/19 showed 23 mm amorphous calcifications in a linear distribution seen in the upper outer quadrant of the right breast in the middle depth.  Underwent biopsy which showed atypia with recommendations for excisional biopsy.  Excisional biopsy scheduled for Wednesday.    HLD:  Lipid panel 5/4/19 within normal limits.  Previously with elevated total cholesterol and hypertriglyceridemia; taking fenofibrate 160 mg by mouth daily.    HTN:  Within goal today.  Taking losartan 50 mg by mouth daily.    Hypothyroidism:  TSH within normal limits 5/4/19.  Taking levothyroxine 125 mcg by mouth daily.    Obesity:  Walks dog 1 mile daily.    Paget's disease of bone:  Stable.    Prediabetes:  A1C 5.6% 4/18/19, stable from previous.  States she does enjoy eating ice cream frequently.    Health Care Maintenance:  Last tetanus vaccination:  10/25/18  Last mammogram:  4/18/19  Last colonocopsy:  4/15/19; Three 3 to 6 mm polyps in the transverse colon and in the ascending colon--tubular adenomas; repeat 5 years  Prevnar:  4/18/19.  DEXA:  10/25/18; normal BMD  Zostavax:  7/10/09      Past Medical History:   Diagnosis Date    After-cataract of both eyes - Both Eyes 9/26/2012    Allergy     Arthritis     Breast cancer      Diverticulosis     Hyperlipidemia     Hypertension     Hypothyroidism     Paget disease of bone     Squamous Cell Carcinoma     in situ right neck     Thyroid disease        Past Surgical History:   Procedure Laterality Date    BIOPSY-EXCISIONAL Right 2015    Performed by Joshua Goldberg[facility] at [facility] 2ND FLR    CATARACT EXTRACTION W/  INTRAOCULAR LENS IMPLANT Bilateral     COLONOSCOPY N/A 4/15/2019    Performed by Artur Monet MD at [facility] (4TH FLR)    COLONOSCOPY N/A 2015    Performed by [facility]ur [facility] at [facility] (4TH FLR)    EYE SURGERY      HYSTERECTOMY      KNEE ARTHROSCOPY N/A     pt unsure of which knee     MASTECTOMY      SPINE SURGERY      TOTAL THYROIDECTOMY         Family History   Problem Relation Age of Onset    Cancer Father         lung    Dementia Mother     Glaucoma Brother     Macular degeneration Brother     Diabetes Neg Hx     Amblyopia Neg Hx     Blindness Neg Hx     Cataracts Neg Hx     Retinal detachment Neg Hx     Strabismus Neg Hx     Melanoma Neg Hx        Social History     Socioeconomic History    Marital status:      Spouse name: Not on file    Number of children: Not on file    Years of education: Not on file    Highest education level: Not on file   Occupational History    Occupation: realtor     Employer: [employer]     Employer: [employer]   Social Needs    Financial resource strain: Not hard at all    Food insecurity:     Worry: Never true     Inability: Never true    Transportation needs:     Medical: No     Non-medical: No   Tobacco Use    Smoking status: Former Smoker     Packs/day: 2.00     Years: 44.00     Pack years: 88.00     Types: Cigarettes     Last attempt to quit: 1969     Years since quittin.4    Smokeless tobacco: Never Used   Substance and Sexual Activity    Alcohol use: Yes     Alcohol/week: 0.0 oz     Frequency: Monthly or less     Drinks per session: 1 or 2     Binge  frequency: Never     Comment: rarely    Drug use: No    Sexual activity: Not Currently   Lifestyle    Physical activity:     Days per week: 5 days     Minutes per session: 20 min    Stress: Not at all   Relationships    Social connections:     Talks on phone: More than three times a week     Gets together: Twice a week     Attends Yazidism service: Not on file     Active member of club or organization: Yes     Attends meetings of clubs or organizations: More than 4 times per year     Relationship status:    Other Topics Concern    Are you pregnant or think you may be? No    Breast-feeding No   Social History Narrative    Not on file       Review of Systems   Constitutional: Negative for activity change, chills, fatigue, fever and unexpected weight change.   HENT: Positive for hearing loss. Negative for congestion, nosebleeds, sore throat and trouble swallowing.    Eyes: Positive for visual disturbance. Negative for pain and discharge.   Respiratory: Negative for cough, chest tightness, shortness of breath and wheezing.    Cardiovascular: Negative for chest pain and palpitations.   Gastrointestinal: Negative for abdominal distention, abdominal pain, blood in stool, constipation, diarrhea, nausea and vomiting.   Endocrine: Negative for polydipsia and polyuria.   Genitourinary: Negative for decreased urine volume, difficulty urinating, dysuria, hematuria, menstrual problem and urgency.   Musculoskeletal: Negative for arthralgias, back pain, joint swelling, myalgias and neck pain.   Skin: Negative for color change and rash.   Neurological: Negative for dizziness, tremors, weakness, light-headedness, numbness and headaches.   Psychiatric/Behavioral: Negative for agitation, behavioral problems, confusion and dysphoric mood. The patient is not nervous/anxious.      Objective:     Vitals:    06/10/19 1351   BP: 130/76   BP Location: Left arm   Patient Position: Sitting   BP Method: Medium (Manual)   Pulse:  "91   Temp: 98.8 °F (37.1 °C)   TempSrc: Oral   SpO2: 97%   Weight: 81.1 kg (178 lb 12.7 oz)   Height: 5' 3" (1.6 m)       Physical Exam   Constitutional: She is oriented to person, place, and time. She appears well-developed and well-nourished.   HENT:   Head: Normocephalic and atraumatic.   Right Ear: External ear normal.   Left Ear: External ear normal.   Nose: Nose normal.   Mouth/Throat: Oropharynx is clear and moist.   Eyes: Pupils are equal, round, and reactive to light. Conjunctivae and EOM are normal.   Neck: Normal range of motion. Neck supple. No tracheal deviation present.   Cardiovascular: Normal rate, regular rhythm and normal heart sounds. Exam reveals no gallop and no friction rub.   No murmur heard.  Pulmonary/Chest: Effort normal and breath sounds normal. No respiratory distress. She has no wheezes. She has no rales.   Abdominal: Soft. Bowel sounds are normal. She exhibits no distension. There is no tenderness. There is no rebound and no guarding.   Musculoskeletal: Normal range of motion. She exhibits no edema or deformity.   Neurological: She is alert and oriented to person, place, and time. Coordination normal.   Skin: Skin is warm and dry.   Psychiatric: She has a normal mood and affect. Her behavior is normal.   Nursing note and vitals reviewed.     Assessment:      1. Abnormal breast biopsy    2. Cloudy vision    3. Prediabetes    4. Essential hypertension    5. Pure hypercholesterolemia    6. Postablative hypothyroidism      Plan:   Jo-Ann was seen today for follow-up and results.    Diagnoses and all orders for this visit:    Abnormal breast biopsy        -     To go for excisional biopsy 6/12/19    Cloudy vision        -     Recommended patient schedule follow up appointment with optometry.    Prediabetes  -     Hemoglobin A1c; Future (10/18/19)    Essential hypertension        -     Stable, continue current regimen.    Pure hypercholesterolemia        -     Stable, " reviewed.    Postablative hypothyroidism        -     Stable, continue current regimen.

## 2019-06-11 ENCOUNTER — TELEPHONE (OUTPATIENT)
Dept: INTERNAL MEDICINE | Facility: CLINIC | Age: 79
End: 2019-06-11

## 2019-06-11 NOTE — TELEPHONE ENCOUNTER
Spoke with Mrs. Escobedo confirmed AWV 06/12/19 @ 10am [facility] [place] [facility]

## 2019-06-12 ENCOUNTER — OFFICE VISIT (OUTPATIENT)
Dept: INTERNAL MEDICINE | Facility: CLINIC | Age: 79
End: 2019-06-12
Payer: MEDICARE

## 2019-06-12 ENCOUNTER — OFFICE VISIT (OUTPATIENT)
Dept: SURGERY | Facility: CLINIC | Age: 79
End: 2019-06-12
Payer: MEDICARE

## 2019-06-12 VITALS
HEART RATE: 80 BPM | HEIGHT: 63 IN | SYSTOLIC BLOOD PRESSURE: 130 MMHG | WEIGHT: 178.81 LBS | DIASTOLIC BLOOD PRESSURE: 70 MMHG | BODY MASS INDEX: 31.68 KG/M2

## 2019-06-12 VITALS
SYSTOLIC BLOOD PRESSURE: 129 MMHG | BODY MASS INDEX: 31.84 KG/M2 | HEART RATE: 73 BPM | TEMPERATURE: 98 F | DIASTOLIC BLOOD PRESSURE: 77 MMHG | WEIGHT: 179.69 LBS | HEIGHT: 63 IN

## 2019-06-12 DIAGNOSIS — N18.9 CHRONIC RENAL IMPAIRMENT, UNSPECIFIED CKD STAGE: ICD-10-CM

## 2019-06-12 DIAGNOSIS — N60.91 ATYPICAL DUCTAL HYPERPLASIA OF RIGHT BREAST: ICD-10-CM

## 2019-06-12 DIAGNOSIS — R73.03 PREDIABETES: ICD-10-CM

## 2019-06-12 DIAGNOSIS — Z01.818 PREOP TESTING: ICD-10-CM

## 2019-06-12 DIAGNOSIS — M47.816 FACET ARTHRITIS OF LUMBAR REGION: ICD-10-CM

## 2019-06-12 DIAGNOSIS — Z85.3 PERSONAL HISTORY OF BREAST CANCER: ICD-10-CM

## 2019-06-12 DIAGNOSIS — I70.0 AORTIC ATHEROSCLEROSIS: ICD-10-CM

## 2019-06-12 DIAGNOSIS — Z85.3 HISTORY OF LEFT BREAST CANCER: ICD-10-CM

## 2019-06-12 DIAGNOSIS — I10 ESSENTIAL HYPERTENSION: ICD-10-CM

## 2019-06-12 DIAGNOSIS — K57.90 DIVERTICULOSIS OF INTESTINE WITHOUT BLEEDING, UNSPECIFIED INTESTINAL TRACT LOCATION: ICD-10-CM

## 2019-06-12 DIAGNOSIS — Z00.00 ENCOUNTER FOR PREVENTIVE HEALTH EXAMINATION: Primary | ICD-10-CM

## 2019-06-12 DIAGNOSIS — M88.9 PAGET'S DISEASE OF BONE: ICD-10-CM

## 2019-06-12 DIAGNOSIS — E66.9 OBESITY (BMI 30.0-34.9): ICD-10-CM

## 2019-06-12 DIAGNOSIS — E89.0 HYPOTHYROIDISM, POSTSURGICAL: ICD-10-CM

## 2019-06-12 DIAGNOSIS — N60.99 ATYPICAL DUCTAL HYPERPLASIA OF BREAST: Primary | ICD-10-CM

## 2019-06-12 DIAGNOSIS — Z90.12 ACQUIRED ABSENCE OF LEFT BREAST AND NIPPLE: ICD-10-CM

## 2019-06-12 DIAGNOSIS — E78.5 HYPERLIPIDEMIA, UNSPECIFIED HYPERLIPIDEMIA TYPE: ICD-10-CM

## 2019-06-12 DIAGNOSIS — E04.2 MULTINODULAR GOITER: ICD-10-CM

## 2019-06-12 PROCEDURE — G0439 PR MEDICARE ANNUAL WELLNESS SUBSEQUENT VISIT: ICD-10-PCS | Mod: S$GLB,,, | Performed by: NURSE PRACTITIONER

## 2019-06-12 PROCEDURE — 3078F DIAST BP <80 MM HG: CPT | Mod: CPTII,S$GLB,, | Performed by: SURGERY

## 2019-06-12 PROCEDURE — 3078F PR MOST RECENT DIASTOLIC BLOOD PRESSURE < 80 MM HG: ICD-10-PCS | Mod: CPTII,S$GLB,, | Performed by: NURSE PRACTITIONER

## 2019-06-12 PROCEDURE — G0439 PPPS, SUBSEQ VISIT: HCPCS | Mod: S$GLB,,, | Performed by: NURSE PRACTITIONER

## 2019-06-12 PROCEDURE — 1101F PT FALLS ASSESS-DOCD LE1/YR: CPT | Mod: CPTII,S$GLB,, | Performed by: SURGERY

## 2019-06-12 PROCEDURE — 3078F PR MOST RECENT DIASTOLIC BLOOD PRESSURE < 80 MM HG: ICD-10-PCS | Mod: CPTII,S$GLB,, | Performed by: SURGERY

## 2019-06-12 PROCEDURE — 99204 OFFICE O/P NEW MOD 45 MIN: CPT | Mod: S$GLB,,, | Performed by: SURGERY

## 2019-06-12 PROCEDURE — 99204 PR OFFICE/OUTPT VISIT, NEW, LEVL IV, 45-59 MIN: ICD-10-PCS | Mod: S$GLB,,, | Performed by: SURGERY

## 2019-06-12 PROCEDURE — 99499 RISK ADDL DX/OHS AUDIT: ICD-10-PCS | Mod: S$GLB,,, | Performed by: NURSE PRACTITIONER

## 2019-06-12 PROCEDURE — 99999 PR PBB SHADOW E&M-EST. PATIENT-LVL III: ICD-10-PCS | Mod: PBBFAC,,, | Performed by: NURSE PRACTITIONER

## 2019-06-12 PROCEDURE — 99999 PR PBB SHADOW E&M-EST. PATIENT-LVL III: ICD-10-PCS | Mod: PBBFAC,,, | Performed by: SURGERY

## 2019-06-12 PROCEDURE — 3074F PR MOST RECENT SYSTOLIC BLOOD PRESSURE < 130 MM HG: ICD-10-PCS | Mod: CPTII,S$GLB,, | Performed by: SURGERY

## 2019-06-12 PROCEDURE — 3074F SYST BP LT 130 MM HG: CPT | Mod: CPTII,S$GLB,, | Performed by: SURGERY

## 2019-06-12 PROCEDURE — 3078F DIAST BP <80 MM HG: CPT | Mod: CPTII,S$GLB,, | Performed by: NURSE PRACTITIONER

## 2019-06-12 PROCEDURE — 3075F PR MOST RECENT SYSTOLIC BLOOD PRESS GE 130-139MM HG: ICD-10-PCS | Mod: CPTII,S$GLB,, | Performed by: NURSE PRACTITIONER

## 2019-06-12 PROCEDURE — 99499 UNLISTED E&M SERVICE: CPT | Mod: S$GLB,,, | Performed by: NURSE PRACTITIONER

## 2019-06-12 PROCEDURE — 99999 PR PBB SHADOW E&M-EST. PATIENT-LVL III: CPT | Mod: PBBFAC,,, | Performed by: SURGERY

## 2019-06-12 PROCEDURE — 1101F PR PT FALLS ASSESS DOC 0-1 FALLS W/OUT INJ PAST YR: ICD-10-PCS | Mod: CPTII,S$GLB,, | Performed by: SURGERY

## 2019-06-12 PROCEDURE — 3075F SYST BP GE 130 - 139MM HG: CPT | Mod: CPTII,S$GLB,, | Performed by: NURSE PRACTITIONER

## 2019-06-12 PROCEDURE — 99999 PR PBB SHADOW E&M-EST. PATIENT-LVL III: CPT | Mod: PBBFAC,,, | Performed by: NURSE PRACTITIONER

## 2019-06-12 NOTE — PROGRESS NOTES
"Jo-Ann Escobedo presented for a  Medicare AWV and comprehensive Health Risk Assessment today. The following components were reviewed and updated:    · Medical history  · Family History  · Social history  · Allergies and Current Medications  · Health Risk Assessment  · Health Maintenance  · Care Team     ** See Completed Assessments for Annual Wellness Visit within the encounter summary.**       The following assessments were completed:  · Living Situation  · CAGE  · Depression Screening  · Timed Get Up and Go  · Whisper Test  · Cognitive Function Screening  ·   ·   ·   · Nutrition Screening  · ADL Screening  · PAQ Screening    Vitals:    06/12/19 0953   BP: 130/70   BP Location: Right arm   Pulse: 80   Weight: 81.1 kg (178 lb 12.7 oz)   Height: 5' 3" (1.6 m)     Body mass index is 31.67 kg/m².  Physical Exam   Constitutional: She is oriented to person, place, and time.   Obese   HENT:   Head: Normocephalic.   Cardiovascular: Normal rate and regular rhythm.   Pulmonary/Chest: Effort normal and breath sounds normal.   Abdominal: Soft. Bowel sounds are normal.   Musculoskeletal: Normal range of motion. She exhibits no edema.   Neurological: She is alert and oriented to person, place, and time.   Skin: Skin is warm and dry.   Psychiatric: She has a normal mood and affect.   Nursing note and vitals reviewed.        Diagnoses and health risks identified today and associated recommendations/orders:    1. Encounter for preventive health examination  Here for Health Risk Assessment/Annual Wellness Visit.  Health maintenance reviewed and updated. Follow up in one year.    2. Essential hypertension  Chronic, stable on current medications. Followed by PCP.    3. Aortic atherosclerosis  Chronic, stable. Noted CT abdomen/pelvis 10/08/14. Followed by PCP.    4. Hyperlipidemia, unspecified hyperlipidemia type  Chronic, stable on current medication. Followed by PCP.    5. Chronic renal impairment, unspecified CKD stage  Chronic, " stable. Followed by PCP.    6. History of left breast cancer  Stable. Followed by Oncology, PCP.    7. Atypical ductal hyperplasia of right breast  New onset. Abnormal mammogram/biopsy. Seeing  Surgery today.    8. Prediabetes  Chronic, stable with diet. Last A1c 5.6. Followed by PCP.    9. Obesity (BMI 30.0-34.9)  Chronic, weight stable. Followed by PCP.    10. Multinodular goiter  Chronic, stable. Followed by PCP.    11. Hypothyroidism, postsurgical  Chronic, stable on current medication. Followed by PCP.    12. Paget's disease of bone  Chronic, stable. Followed by PCP.    13. Facet arthritis of lumbar region  Chronic, stable on current medications. Followed by Pain Management, PCP.    14. Diverticulosis of intestine without bleeding, unspecified intestinal tract location  Chronic, stable. Followed by PCP.      Provided Jo-Ann with a 5-10 year written screening schedule and personal prevention plan. Recommendations were developed using the USPSTF age appropriate recommendations. Education, counseling, and referrals were provided as needed. After Visit Summary printed and given to patient which includes a list of additional screenings\tests needed.    Follow up in about 10 months (around 4/18/2020).with PCP    Kelsea Villegas NP

## 2019-06-12 NOTE — PATIENT INSTRUCTIONS
Counseling and Referral of Other Preventative  (Italic type indicates deductible and co-insurance are waived)    Patient Name: Jo-Ann Escobedo  Today's Date: 6/12/2019    Health Maintenance       Date Due Completion Date    Shingles Vaccine (2 of 3) 09/01/2020 (Originally 9/4/2009) 7/10/2009 - Obtain new shingles vaccine - SHINGRIX - when available    Pneumococcal Vaccine (65+ High/Highest Risk) (2 of 2 - PPSV23) 06/13/2019 4/18/2019 - will need pneumovax in 2020    Influenza Vaccine 08/01/2019 10/25/2018    Mammogram 05/29/2020 5/29/2019    DEXA SCAN 01/11/2023 1/11/2019    Colonoscopy 04/15/2024 4/15/2019    Lipid Panel 05/04/2024 5/4/2019    TETANUS VACCINE 10/25/2028 10/25/2018        No orders of the defined types were placed in this encounter.    The following information is provided to all patients.  This information is to help you find resources for any of the problems found today that may be affecting your health:                Living healthy guide: www.[facility].[place].gov      Understanding Diabetes: www.diabetes.org      Eating healthy: www.cdc.gov/healthyweight      CDC home safety checklist: www.cdc.gov/steadi/patient.html      Agency on Aging: www.goea.[place].gov      Alcoholics anonymous (AA): www.aa.org      Physical Activity: www.david.nih.gov/tn7giyv      Tobacco use: www.quitwithusla.org

## 2019-06-12 NOTE — LETTER
June 12, 2019      Dakota Kerr MD  [address]  [facility] [address]           [facility]  [address]  [facility] [address]  Phone: [phone]  Fax: [phone]          Patient: Jo-Ann Escobedo   MR Number: [id number]   YOB: 1940   Date of Visit: 6/12/2019       Dear Dr. Dakota Kerr:    Thank you for referring Jo-Ann Escobedo to me for evaluation. Attached you will find relevant portions of my assessment and plan of care.    If you have questions, please do not hesitate to call me. I look forward to following Jo-Ann Escobedo along with you.    Sincerely,    Suki Dill MD    Enclosure  CC:  No Recipients    If you would like to receive this communication electronically, please contact [email or web address] or [phone] to request more information on [employer] access.    For providers and/or their staff who would like to refer a patient to [facility], please contact us through our one-stop-shop provider referral line, [facility], at [phone].    If you feel you have received this communication in error or would no longer like to receive these types of communications, please e-mail [email or web address]

## 2019-06-12 NOTE — PROGRESS NOTES
History and Physical  [facility]  Department of Surgery    CHIEF COMPLAINT: papillary lesion right breast with atypia    REFERRING:  Dakota Kerr MD  [address] [place] [address]  Dakota Kerr MD    Subjective:      Jo-Ann Escobedo is a 78 y.o. postmenopausal female with history of left-sided mastectomy and adjuvant chemotherapy  for a T2 N1 M0 breast cancer in . She presents today for further evaluation after abnormal mammogram done 19 showed 23 mm amorphous calcifications at the upper outer middle position of the right breast. This was followed by a stereotactic biopsy 19 which showed a papillary lesion with atypia.     Patient does routinely do self breast exams.  Patient has not noted a change on breast exam.  Patient denies nipple discharge. Patient admits to previous breast biopsy. Patient has a personal history of breast cancer.    GYN History:  Age of menarche was 12. Age of menopause was 38 after hysterectomy and oophorectomy.  Last menstrual period was 38. Patient has hormonal therapy for several years and stopped in . Patient is . Age of first live birth was 25.  Patient did breast feed.    FAMILY history:  No family history of breast cancer, ovarian, or uterine cancer.     Past Medical History:   Diagnosis Date    After-cataract of both eyes - Both Eyes 2012    Allergy     Arthritis     Breast cancer     Diverticulosis     Hyperlipidemia     Hypertension     Hypothyroidism     Paget disease of bone     Squamous Cell Carcinoma     in situ right neck     Thyroid disease      Past Surgical History:   Procedure Laterality Date    BIOPSY-EXCISIONAL Right 2015    Performed by Joshua Goldberg, MD at [facility] OR 2ND FLR    BLOCK, NERVE, FACET JOINT, LUMBAR, MEDIAL BRANCH-deo MBB L4/5,L5/S1 Bilateral 2019    Performed by Jarvis Morales III, MD at [facility] CATH LAB    CATARACT EXTRACTION W/  INTRAOCULAR LENS IMPLANT Bilateral      COLONOSCOPY N/A 4/15/2019    [facility] ENDO (4TH FLR)    COLONOSCOPY N/A 1/19/2015    [facility] at [facility] ([facility]    EYE SURGERY      HYSTERECTOMY      KNEE ARTHROSCOPY N/A     pt unsure of which knee     MASTECTOMY      SPINE SURGERY      TOTAL THYROIDECTOMY       Current Outpatient Medications on File Prior to Visit   Medication Sig Dispense Refill    co-enzyme Q-10 30 mg capsule Take 30 mg by mouth once daily.       ergocalciferol (VITAMIN D2) 50,000 unit Cap Take by mouth. * Capsule Oral As directed.   tab once a week times 8 weeksThen Vit D 2000 units once daily      fenofibrate 160 MG Tab Take 1 tablet (160 mg total) by mouth once daily. 90 tablet 3    ferrous sulfate (IRON ORAL) Take by mouth.      ibuprofen (ADVIL,MOTRIN) 600 MG tablet Take 600 mg by mouth every 6 (six) hours as needed for Pain.      LACTOBACILLUS COMBINATION NO.8 (ADULT PROBIOTIC ORAL) Take by mouth.      levothyroxine (SYNTHROID) 125 MCG tablet TAKE 1 TABLET (125 MCG TOTAL) BY MOUTH AFTER DINNER. 90 tablet 3    losartan (COZAAR) 50 MG tablet Take 1 tablet (50 mg total) by mouth once daily. 90 tablet 3    omeprazole (PRILOSEC) 40 MG capsule TAKE 1 CAPSULE (40 MG TOTAL) BY MOUTH BEFORE DINNER. 90 capsule 1    turmeric root extract 500 mg Cap Take by mouth.       No current facility-administered medications on file prior to visit.      Social History     Socioeconomic History    Marital status:      Spouse name: Not on file    Number of children: Not on file    Years of education: Not on file    Highest education level: Not on file   Occupational History    Occupation: realtor     Employer: [employer]     Employer: [employer]   Social Needs    Financial resource strain: Not hard at all    Food insecurity:     Worry: Never true     Inability: Never true    Transportation needs:     Medical: No     Non-medical: No   Tobacco Use     Smoking status: Former Smoker     Packs/day: 2.00     Years: 44.00     Pack years: 88.00     Types: Cigarettes     Last attempt to quit: 1969     Years since quittin.4    Smokeless tobacco: Never Used   Substance and Sexual Activity    Alcohol use: Yes     Alcohol/week: 0.0 oz     Frequency: Monthly or less     Drinks per session: 1 or 2     Binge frequency: Never     Comment: rarely    Drug use: No    Sexual activity: Not Currently   Lifestyle    Physical activity:     Days per week: 5 days     Minutes per session: 20 min    Stress: Not at all   Relationships    Social connections:     Talks on phone: More than three times a week     Gets together: Twice a week     Attends Anglican service: Not on file     Active member of club or organization: Yes     Attends meetings of clubs or organizations: More than 4 times per year     Relationship status:    Other Topics Concern    Are you pregnant or think you may be? No    Breast-feeding No   Social History Narrative    Not on file     Family History   Problem Relation Age of Onset    Cancer Father         lung    Dementia Mother     Glaucoma Brother     Macular degeneration Brother     Diabetes Neg Hx     Amblyopia Neg Hx     Blindness Neg Hx     Cataracts Neg Hx     Retinal detachment Neg Hx     Strabismus Neg Hx     Melanoma Neg Hx        Review of Systems  A comprehensive review of systems was negative.       Objective:     General appearance: alert, appears stated age and cooperative  Head: Normocephalic, without obvious abnormality, atraumatic  Neck: no adenopathy and supple, symmetrical, trachea midline  Lungs: normal effort, nonlabored breathing  Left breast: Left mastectomy scar. No palpable masses or adenopathy.   Right breast: No nipple retraction or dimpling, No nipple discharge or bleeding, No axillary or supraclavicular adenopathy, positive findings: none  Heart: regular rate and rhythm  Abdomen: soft, non-tender; bowel  sounds normal; no masses,  no organomegaly  Extremities: extremities normal, atraumatic, no cyanosis or edema  Skin: normal, no edema and no lesions noted  Lymph nodes: Cervical, supraclavicular, and axillary nodes normal.  Neurologic: Grossly normal    Radiology review: Images personally reviewed by me in the clinic.  Mammogram 06/03/19  Findings:  This procedure was performed using tomosynthesis.  Computer-aided detection was utilized in the interpretation of this examination.  The breast has scattered areas of fibroglandular density.  There are 23 mm amorphous calcifications in a linear distribution seen in the upper outer quadrant of the right breast in the middle depth.   Impression:  Right  Calcifications: Right breast 23 mm calcifications at the upper outer middle position. Assessment: 4 - Suspicious finding. Biopsy is recommended.   BI-RADS Category:   Right: 4 - Suspicious  Overall: 4 - Suspicious    Pathology review:  1. Right breast with calcifications, biopsy:  - Papillary and focal micropapillary lesion with associated microcalcifications (see note)  - IHC preparations with appropriate controls for p63 and CK5/6 highlight myoepithelial cells in the papillary  fragments  2. Right breast without calcifications, biopsy:  - Papillary and micropapillary lesion with associated microcalcifications, 3mm in greatest dimension (see note)  - IHC preparations with appropriate controls for p63 and CK5/6 highlight myoepithelial cells in the papillary  fragments, while absent in the areas of micropapillary architecture.  Note: The core biopsy demonstrates dilated ducts with an intraductal epithelial proliferation with papillary and  micropapillary architecture. The cells show mild to moderate nuclear atypia. These findings warrant an excisional  biopsy for complete characterization. Dr. FRANCK Walden has reviewed the case and agrees with the above diagnosis.    Assessment:      Jo-Ann Escobedo is a 78 y.o. postmenopausal  female with history of left-sided breast cancer s/p mastectomy with now papillary lesion right breast with atypia.      Plan:   We discussed the options for management of papillary lesions and those with atypia.     We discussed observation vs. surgical excision. Surgical excision is usually recommended for papillary lesions with atypia. If we move forward with observation, we will need repeat imaging in 6 months and will need to ensure stability for 2 years, but would not be standard treatment for atypia.  Patient understands the options.  All questions were asked and answered.  Patient elects to proceed with right-sided excisional biopsy, seed localized as I was unable to identify the clip on US in clinic today.    Surgery will be coordinated with the patient's schedule.  Risks and benefits were explained including but not limited to bleeding, infection, pain, recurrence, and need for further surgery.    45 minutes were spent on this encounter, 30 of which was face to face counseling and 15 minutes were spent on chart review and coordination of care.

## 2019-06-12 NOTE — H&P (VIEW-ONLY)
History and Physical  [facility]  Department of Surgery    CHIEF COMPLAINT: papillary lesion right breast with atypia    REFERRING:  Dakota Kerr MD  [address] [place] [address]  Dakota Kerr MD    Subjective:      Jo-Ann Escobedo is a 78 y.o. postmenopausal female with history of left-sided mastectomy and adjuvant chemotherapy  for a T2 N1 M0 breast cancer in . She presents today for further evaluation after abnormal mammogram done 19 showed 23 mm amorphous calcifications at the upper outer middle position of the right breast. This was followed by a stereotactic biopsy 19 which showed a papillary lesion with atypia.     Patient does routinely do self breast exams.  Patient has not noted a change on breast exam.  Patient denies nipple discharge. Patient admits to previous breast biopsy. Patient has a personal history of breast cancer.    GYN History:  Age of menarche was 12. Age of menopause was 38 after hysterectomy and oophorectomy.  Last menstrual period was 38. Patient has hormonal therapy for several years and stopped in . Patient is . Age of first live birth was 25.  Patient did breast feed.    FAMILY history:  No family history of breast cancer, ovarian, or uterine cancer.     Past Medical History:   Diagnosis Date    After-cataract of both eyes - Both Eyes 2012    Allergy     Arthritis     Breast cancer     Diverticulosis     Hyperlipidemia     Hypertension     Hypothyroidism     Paget disease of bone     Squamous Cell Carcinoma     in situ right neck     Thyroid disease      Past Surgical History:   Procedure Laterality Date    BIOPSY-EXCISIONAL Right 2015    Performed by Joshua Goldberg, MD at [facility] OR 2ND FLR    BLOCK, NERVE, FACET JOINT, LUMBAR, MEDIAL BRANCH-deo MBB L4/5,L5/S1 Bilateral 2019    Performed by Jarvis Morales III, MD at [facility] CATH LAB    CATARACT EXTRACTION W/  INTRAOCULAR LENS IMPLANT Bilateral      COLONOSCOPY N/A 4/15/2019    Performed by Artur Monet MD at [facility] ENDO (4TH FLR)    COLONOSCOPY N/A 1/19/2015    Performed [facility] at [facility] (4TH FLR)    EYE SURGERY      HYSTERECTOMY      KNEE ARTHROSCOPY N/A     pt unsure of which knee     MASTECTOMY      SPINE SURGERY      TOTAL THYROIDECTOMY       Current Outpatient Medications on File Prior to Visit   Medication Sig Dispense Refill    co-enzyme Q-10 30 mg capsule Take 30 mg by mouth once daily.       ergocalciferol (VITAMIN D2) 50,000 unit Cap Take by mouth. * Capsule Oral As directed.   tab once a week times 8 weeksThen Vit D 2000 units once daily      fenofibrate 160 MG Tab Take 1 tablet (160 mg total) by mouth once daily. 90 tablet 3    ferrous sulfate (IRON ORAL) Take by mouth.      ibuprofen (ADVIL,MOTRIN) 600 MG tablet Take 600 mg by mouth every 6 (six) hours as needed for Pain.      LACTOBACILLUS COMBINATION NO.8 (ADULT PROBIOTIC ORAL) Take by mouth.      levothyroxine (SYNTHROID) 125 MCG tablet TAKE 1 TABLET (125 MCG TOTAL) BY MOUTH AFTER DINNER. 90 tablet 3    losartan (COZAAR) 50 MG tablet Take 1 tablet (50 mg total) by mouth once daily. 90 tablet 3    omeprazole (PRILOSEC) 40 MG capsule TAKE 1 CAPSULE (40 MG TOTAL) BY MOUTH BEFORE DINNER. 90 capsule 1    turmeric root extract 500 mg Cap Take by mouth.       No current facility-administered medications on file prior to visit.      Social History     Socioeconomic History    Marital status:      Spouse name: Not on file    Number of children: Not on file    Years of education: Not on file    Highest education level: Not on file   Occupational History    Occupation: realtor     Employer: [employer]     Employer: [employer]   Social Needs    Financial resource strain: Not hard at all    Food insecurity:     Worry: Never true     Inability: Never true    Transportation needs:     Medical: No     Non-medical: No   Tobacco Use     Smoking status: Former Smoker     Packs/day: 2.00     Years: 44.00     Pack years: 88.00     Types: Cigarettes     Last attempt to quit: 1969     Years since quittin.4    Smokeless tobacco: Never Used   Substance and Sexual Activity    Alcohol use: Yes     Alcohol/week: 0.0 oz     Frequency: Monthly or less     Drinks per session: 1 or 2     Binge frequency: Never     Comment: rarely    Drug use: No    Sexual activity: Not Currently   Lifestyle    Physical activity:     Days per week: 5 days     Minutes per session: 20 min    Stress: Not at all   Relationships    Social connections:     Talks on phone: More than three times a week     Gets together: Twice a week     Attends Faith service: Not on file     Active member of club or organization: Yes     Attends meetings of clubs or organizations: More than 4 times per year     Relationship status:    Other Topics Concern    Are you pregnant or think you may be? No    Breast-feeding No   Social History Narrative    Not on file     Family History   Problem Relation Age of Onset    Cancer Father         lung    Dementia Mother     Glaucoma Brother     Macular degeneration Brother     Diabetes Neg Hx     Amblyopia Neg Hx     Blindness Neg Hx     Cataracts Neg Hx     Retinal detachment Neg Hx     Strabismus Neg Hx     Melanoma Neg Hx        Review of Systems  A comprehensive review of systems was negative.       Objective:     General appearance: alert, appears stated age and cooperative  Head: Normocephalic, without obvious abnormality, atraumatic  Neck: no adenopathy and supple, symmetrical, trachea midline  Lungs: normal effort, nonlabored breathing  Left breast: Left mastectomy scar. No palpable masses or adenopathy.   Right breast: No nipple retraction or dimpling, No nipple discharge or bleeding, No axillary or supraclavicular adenopathy, positive findings: none  Heart: regular rate and rhythm  Abdomen: soft, non-tender; bowel  sounds normal; no masses,  no organomegaly  Extremities: extremities normal, atraumatic, no cyanosis or edema  Skin: normal, no edema and no lesions noted  Lymph nodes: Cervical, supraclavicular, and axillary nodes normal.  Neurologic: Grossly normal    Radiology review: Images personally reviewed by me in the clinic.  Mammogram 06/03/19  Findings:  This procedure was performed using tomosynthesis.  Computer-aided detection was utilized in the interpretation of this examination.  The breast has scattered areas of fibroglandular density.  There are 23 mm amorphous calcifications in a linear distribution seen in the upper outer quadrant of the right breast in the middle depth.   Impression:  Right  Calcifications: Right breast 23 mm calcifications at the upper outer middle position. Assessment: 4 - Suspicious finding. Biopsy is recommended.   BI-RADS Category:   Right: 4 - Suspicious  Overall: 4 - Suspicious    Pathology review:  1. Right breast with calcifications, biopsy:  - Papillary and focal micropapillary lesion with associated microcalcifications (see note)  - IHC preparations with appropriate controls for p63 and CK5/6 highlight myoepithelial cells in the papillary  fragments  2. Right breast without calcifications, biopsy:  - Papillary and micropapillary lesion with associated microcalcifications, 3mm in greatest dimension (see note)  - IHC preparations with appropriate controls for p63 and CK5/6 highlight myoepithelial cells in the papillary  fragments, while absent in the areas of micropapillary architecture.  Note: The core biopsy demonstrates dilated ducts with an intraductal epithelial proliferation with papillary and  micropapillary architecture. The cells show mild to moderate nuclear atypia. These findings warrant an excisional  biopsy for complete characterization. Dr. FRANCK Walden has reviewed the case and agrees with the above diagnosis.    Assessment:      Jo-Ann Escobedo is a 78 y.o. postmenopausal  female with history of left-sided breast cancer s/p mastectomy with now papillary lesion right breast with atypia.      Plan:   We discussed the options for management of papillary lesions and those with atypia.     We discussed observation vs. surgical excision. Surgical excision is usually recommended for papillary lesions with atypia. If we move forward with observation, we will need repeat imaging in 6 months and will need to ensure stability for 2 years, but would not be standard treatment for atypia.  Patient understands the options.  All questions were asked and answered.  Patient elects to proceed with right-sided excisional biopsy, seed localized as I was unable to identify the clip on US in clinic today.    Surgery will be coordinated with the patient's schedule.  Risks and benefits were explained including but not limited to bleeding, infection, pain, recurrence, and need for further surgery.    45 minutes were spent on this encounter, 30 of which was face to face counseling and 15 minutes were spent on chart review and coordination of care.

## 2019-06-14 ENCOUNTER — DOCUMENTATION ONLY (OUTPATIENT)
Dept: PAIN MEDICINE | Facility: CLINIC | Age: 79
End: 2019-06-14

## 2019-06-14 DIAGNOSIS — D24.1 PAPILLOMA OF RIGHT BREAST: Primary | ICD-10-CM

## 2019-06-14 NOTE — PROGRESS NOTES
Nurse spoke with patient regarding her procedure. Patient reports 30% pain relief after medial branch blocks which lasted 30 hours.

## 2019-06-19 ENCOUNTER — HOSPITAL ENCOUNTER (OUTPATIENT)
Dept: RADIOLOGY | Facility: HOSPITAL | Age: 79
Discharge: HOME | End: 2019-06-19
Attending: SURGERY
Payer: MEDICARE

## 2019-06-19 ENCOUNTER — HOSPITAL ENCOUNTER (OUTPATIENT)
Dept: CARDIOLOGY | Facility: CLINIC | Age: 79
Discharge: HOME | End: 2019-06-19
Payer: MEDICARE

## 2019-06-19 DIAGNOSIS — Z01.818 PREOP TESTING: ICD-10-CM

## 2019-06-19 DIAGNOSIS — N60.91 ATYPICAL DUCTAL HYPERPLASIA OF RIGHT BREAST: ICD-10-CM

## 2019-06-19 PROCEDURE — 93005 EKG 12-LEAD: ICD-10-PCS | Mod: S$GLB,,, | Performed by: SURGERY

## 2019-06-19 PROCEDURE — 19281 MAMMO RIGHT MAGSEED LOCALIZATION: ICD-10-PCS | Mod: RT,,, | Performed by: RADIOLOGY

## 2019-06-19 PROCEDURE — A4648 IMPLANTABLE TISSUE MARKER: HCPCS | Mod: PO

## 2019-06-19 PROCEDURE — 19281 PERQ DEVICE BREAST 1ST IMAG: CPT | Mod: RT,,, | Performed by: RADIOLOGY

## 2019-06-19 PROCEDURE — 93010 ELECTROCARDIOGRAM REPORT: CPT | Mod: S$GLB,,, | Performed by: INTERNAL MEDICINE

## 2019-06-19 PROCEDURE — 25000003 PHARM REV CODE 250: Mod: PO | Performed by: SURGERY

## 2019-06-19 PROCEDURE — 93010 EKG 12-LEAD: ICD-10-PCS | Mod: S$GLB,,, | Performed by: INTERNAL MEDICINE

## 2019-06-19 PROCEDURE — 93005 ELECTROCARDIOGRAM TRACING: CPT | Mod: S$GLB,,, | Performed by: SURGERY

## 2019-06-19 RX ORDER — LIDOCAINE HYDROCHLORIDE 20 MG/ML
3 INJECTION, SOLUTION INFILTRATION; PERINEURAL ONCE
Status: COMPLETED | OUTPATIENT
Start: 2019-06-19 | End: 2019-06-19

## 2019-06-19 RX ADMIN — LIDOCAINE HYDROCHLORIDE 3 ML: 20 INJECTION, SOLUTION INFILTRATION; PERINEURAL at 11:06

## 2019-06-20 ENCOUNTER — TELEPHONE (OUTPATIENT)
Dept: SURGERY | Facility: CLINIC | Age: 79
End: 2019-06-20

## 2019-06-20 ENCOUNTER — TELEPHONE (OUTPATIENT)
Dept: HEMATOLOGY/ONCOLOGY | Facility: CLINIC | Age: 79
End: 2019-06-20

## 2019-06-20 NOTE — TELEPHONE ENCOUNTER
----- Message from Evette Marquis sent at 6/20/2019  8:56 AM CDT -----  Contact: Patient  Pt wants to know if she should move her 06/28 appt with Dr Mueller back until the results from her 06/27 breast biopsy comes back. Please contact to advise.        Contact:: [phone]

## 2019-06-20 NOTE — TELEPHONE ENCOUNTER
Returned call to patient to discuss her appointment. Explained that we would delay her visit until after her bx being done on the 27th to allow results to be available. Explained to patient first available visit would be 730 on the 10th of July. Patient stated that time was too early. Placed patient on scheduled as ext est patient as this would be longer than the typical EP patient.  She voiced understanding and verbally confirmed. She expressed gratitude.

## 2019-06-20 NOTE — TELEPHONE ENCOUNTER
Spoke with pt regarding surgery arrive time. Informed pt that we do not receive the arrival times until the day prior to surgery at 12:00 pm. Informed pt that she will receive a call with the arrival time. Pt states she is not an early riser, and a midday arrival would be good for her.

## 2019-06-20 NOTE — TELEPHONE ENCOUNTER
----- Message from Jo-Ann Escobedo sent at 6/20/2019  8:52 AM CDT -----  Regarding: RE:Reminder for Upcoming Procedure  Contact: [phone]  I would like to know what time it is scheduled for so that I can be prepared.    ----- Message -----  From: Suki Dill MD  Sent: 6/20/2019  8:30 AM CDT  To: Jo-Ann Escobedo  Subject: Reminder for Upcoming Procedure  [facility] [address] [place] [address]    06/20/2019      Dear Jo-Ann,      This is a reminder for your upcoming procedure with Suki Dill MD on 6/27/2019. We will contact you again before the day of your procedure with your scheduled arrival time unless you have already received this information from your physicians office.         If you have questions or scheduling concerns, you can contact your physicians office:   Suki Dill MD  Phone Number: [phone]      Sincerely,     [facility] [address] [place] [address]

## 2019-06-26 ENCOUNTER — ANESTHESIA EVENT (OUTPATIENT)
Dept: SURGERY | Facility: HOSPITAL | Age: 79
End: 2019-06-26
Payer: MEDICARE

## 2019-06-26 ENCOUNTER — TELEPHONE (OUTPATIENT)
Dept: SURGERY | Facility: CLINIC | Age: 79
End: 2019-06-26

## 2019-06-26 RX ORDER — CHOLECALCIFEROL (VITAMIN D3) 50 MCG
1 TABLET ORAL DAILY
COMMUNITY

## 2019-06-26 NOTE — ANESTHESIA PREPROCEDURE EVALUATION
[facility]  Anesthesia Pre-Operative Evaluation         Patient Name: Jo-Ann Escobedo  YOB: 1940  MRN: [id number]    SUBJECTIVE:     Pre-operative evaluation for Procedure(s) (LRB):  EXCISIONAL BIOPSY-breast (Right)     06/26/2019    Jo-Ann Escobedo is a 78 y.o. female w/ a significant PMHx of CKD, HTN, arthritis, hypothyroidism, HLD, obesity, lumbar spondylosis and prediabetes.    Patient now presents for the above procedure(s).      LDA: None documented.    Prev airway: None documented.    Drips: None documented.    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 - Both Eyes    Hyperlipidemia    Sensorineural hearing loss, bilateral    Paget's disease of bone    Arthritis of foot    Obesity (BMI 30.0-34.9)    Multinodular goiter    Osteoarthritis of lumbar spine    Hypothyroidism, postsurgical    Lipoma of arm    Malignant neoplasm of left breast    Chronic kidney insufficiency    Hypertension    History of left breast cancer    Chronic sinusitis    Prediabetes    Facet arthritis of lumbar region    Abnormal breast biopsy    Aortic atherosclerosis    Atypical ductal hyperplasia of right breast       Review of patient's allergies indicates:   Allergen Reactions    Codeine Diarrhea and Hallucinations    Niacin preparations      Redness, warming       Current Outpatient Medications:  No current facility-administered medications for this encounter.     Current Outpatient Medications:     cholecalciferol, vitamin D3, (VITAMIN D3) 2,000 unit Tab, Take 1 tablet by mouth once daily., Disp: , Rfl:     co-enzyme Q-10 30 mg capsule, Take 30 mg by mouth once daily. , Disp: , Rfl:     fenofibrate 160 MG Tab, Take 1 tablet (160 mg total) by mouth once daily., Disp: 90 tablet, Rfl: 3    ferrous sulfate (IRON ORAL), Take by mouth once daily. , Disp: , Rfl:     LACTOBACILLUS COMBINATION NO.8 (ADULT PROBIOTIC ORAL), Take by mouth  once daily. , Disp: , Rfl:     levothyroxine (SYNTHROID) 125 MCG tablet, TAKE 1 TABLET (125 MCG TOTAL) BY MOUTH AFTER DINNER., Disp: 90 tablet, Rfl: 3    losartan (COZAAR) 50 MG tablet, Take 1 tablet (50 mg total) by mouth once daily., Disp: 90 tablet, Rfl: 3    omeprazole (PRILOSEC) 40 MG capsule, TAKE 1 CAPSULE (40 MG TOTAL) BY MOUTH BEFORE DINNER., Disp: 90 capsule, Rfl: 1    turmeric root extract 500 mg Cap, Take by mouth once daily. , Disp: , Rfl:     ibuprofen (ADVIL,MOTRIN) 600 MG tablet, Take 600 mg by mouth every 6 (six) hours as needed for Pain., Disp: , Rfl:     Past Surgical History:   Procedure Laterality Date    BIOPSY-EXCISIONAL Right 7/27/2015    Performed by Joshua Goldberg, MD at [facility] OR 2ND FLR    BLOCK, NERVE, FACET JOINT, LUMBAR, MEDIAL BRANCH-deo MBB L4/5,L5/S1 Bilateral 6/7/2019    Performed by Jarvis Morales III, MD at [facility] CATH LAB    CATARACT EXTRACTION W/  INTRAOCULAR LENS IMPLANT Bilateral     COLONOSCOPY N/A 4/15/2019    Performed by Artur Monet MD at [facility] ENDO (4TH FLR)    COLONOSCOPY N/A 1/19/2015    Performed by Artur Monet MD at [facility] ENDO (4TH FLR)    EYE SURGERY      HYSTERECTOMY      KNEE ARTHROSCOPY N/A     pt unsure of which knee     MASTECTOMY      SPINE SURGERY      TOTAL THYROIDECTOMY         Social History     Socioeconomic History    Marital status:      Spouse name: Not on file    Number of children: Not on file    Years of education: Not on file    Highest education level: Not on file   Occupational History    Occupation: realtor     Employer: [employer]     Employer: [employer]   Social Needs    Financial resource strain: Not hard at all    Food insecurity:     Worry: Never true     Inability: Never true    Transportation needs:     Medical: No     Non-medical: No   Tobacco Use    Smoking status: Former Smoker     Packs/day: 2.00     Years: 44.00     Pack years: 88.00     Types: Cigarettes     Last attempt  to quit: 1969     Years since quittin.5    Smokeless tobacco: Never Used   Substance and Sexual Activity    Alcohol use: Yes     Alcohol/week: 0.0 oz     Frequency: Monthly or less     Drinks per session: 1 or 2     Binge frequency: Never     Comment: maybe a beer every 3 months    Drug use: No    Sexual activity: Not Currently   Lifestyle    Physical activity:     Days per week: 5 days     Minutes per session: 20 min    Stress: Not at all   Relationships    Social connections:     Talks on phone: More than three times a week     Gets together: Twice a week     Attends Voodoo service: Not on file     Active member of club or organization: Yes     Attends meetings of clubs or organizations: More than 4 times per year     Relationship status:    Other Topics Concern    Are you pregnant or think you may be? No    Breast-feeding No   Social History Narrative    Not on file       OBJECTIVE:     Vital Signs Range (Last 24H):         Significant Labs:  Lab Results   Component Value Date    WBC 5.44 2019    HGB 13.0 2019    HCT 41.6 2019     2019    CHOL 173 2019    TRIG 92 2019    HDL 40 2019    ALT 14 2019    AST 20 2019     2019    K 4.2 2019     2019    CREATININE 0.9 2019    BUN 22 2019    CO2 25 2019    TSH 0.536 2019    GLUF 100 2004    HGBA1C 5.6 2019       Diagnostic Studies: No relevant studies.    EK19: Normal sinus rhythm  Voltage criteria for left ventricular hypertrophy  Abnormal ECG  When compared with ECG of 2015 12:00,  No significant change was found    2D ECHO:  No results found for this or any previous visit.      ASSESSMENT/PLAN:                                                                                                                2019  Jo-Ann Escobedo is a 78 y.o., female.    Anesthesia Evaluation    I have reviewed the  Patient Summary Reports.    I have reviewed the Nursing Notes.      Review of Systems  Anesthesia Hx:  No problems with previous Anesthesia Denies Hx of Anesthetic complications    Social:  Non-Smoker    Hematology/Oncology:  Hematology Normal   Oncology Normal   Oncology Comments: R Breast Papilloma     EENT/Dental:EENT/Dental Normal   Cardiovascular:   Hypertension    Pulmonary:  Pulmonary Normal    Renal/:   Chronic Renal Disease, CRI    Hepatic/GI:  Hepatic/GI Normal    Musculoskeletal:   Arthritis   Spine Disorders: (spondylosis) lumbar    Neurological:  Neurology Normal    Endocrine:   Hypothyroidism    Dermatological:  Skin Normal    Psych:  Psychiatric Normal           Physical Exam  General:  Obesity    Airway/Jaw/Neck:  Airway Findings: Mouth Opening: Normal Tongue: Normal  General Airway Assessment: Adult  Mallampati: II  Improves to II with phonation.  TM Distance: Normal, at least 6 cm  Jaw/Neck Findings:  Neck ROM: Normal ROM     Eyes/Ears/Nose:  EYES/EARS/NOSE FINDINGS: Normal   Dental:  Dental Findings: In tact   Chest/Lungs:  Chest/Lungs Findings: Clear to auscultation, Normal Respiratory Rate     Heart/Vascular:  Heart Findings: Rate: Normal  Rhythm: Regular Rhythm  Sounds: Normal     Abdomen:  Abdomen Findings: Normal    Musculoskeletal:  Musculoskeletal Findings: Normal   Skin:  Skin Findings: Normal    Mental Status:  Mental Status Findings: Normal        Anesthesia Plan  Type of Anesthesia, risks & benefits discussed:  Anesthesia Type:  general  Patient's Preference: General  Intra-op Monitoring Plan: standard ASA monitors  Intra-op Monitoring Plan Comments:   Post Op Pain Control Plan: multimodal analgesia, IV/PO Opioids PRN and per primary service following discharge from PACU  Post Op Pain Control Plan Comments:   Induction:   IV  Beta Blocker:  Patient is not currently on a Beta-Blocker (No further documentation required).       Informed Consent: Patient understands risks and agrees with  Anesthesia plan.  Questions answered. Anesthesia consent signed with patient.  ASA Score: 3     Day of Surgery Review of History & Physical: I have interviewed and examined the patient. I have reviewed the patient's H&P dated:  There are no significant changes.          Ready For Surgery From Anesthesia Perspective.

## 2019-06-26 NOTE — PRE-PROCEDURE INSTRUCTIONS
Preop instructions: NPO solids/ milk products after midnight and clears up to 2 hours before arrival (clear liquids are: water, apple juice, Gatorade & Jell-O, black coffee/no milk, cream or creamer), shower instructions, directions, leave all valuables at home, wear comfortable clothes (something that buttons up the front is best), medication instructions for PM prior & am of procedure explained. Patient stated an understanding.     Patient denies any side effects or issues with anesthesia or sedation.     Patient does not know arrival time. Explained that this information comes from the surgeons office and if they have not heard from them by 3pm, to call office. Patient stated an understanding.

## 2019-06-27 ENCOUNTER — HOSPITAL ENCOUNTER (OUTPATIENT)
Facility: HOSPITAL | Age: 79
Discharge: HOME | End: 2019-06-27
Attending: SURGERY | Admitting: SURGERY
Payer: MEDICARE

## 2019-06-27 ENCOUNTER — ANESTHESIA (OUTPATIENT)
Dept: SURGERY | Facility: HOSPITAL | Age: 79
End: 2019-06-27
Payer: MEDICARE

## 2019-06-27 ENCOUNTER — HOSPITAL ENCOUNTER (OUTPATIENT)
Dept: RADIOLOGY | Facility: HOSPITAL | Age: 79
Discharge: HOME | End: 2019-06-27
Attending: SURGERY | Admitting: SURGERY
Payer: MEDICARE

## 2019-06-27 VITALS
DIASTOLIC BLOOD PRESSURE: 72 MMHG | HEIGHT: 63 IN | HEART RATE: 72 BPM | TEMPERATURE: 98 F | OXYGEN SATURATION: 96 % | BODY MASS INDEX: 31.01 KG/M2 | SYSTOLIC BLOOD PRESSURE: 165 MMHG | RESPIRATION RATE: 18 BRPM | WEIGHT: 175 LBS

## 2019-06-27 DIAGNOSIS — C50.411 MALIGNANT NEOPLASM OF UPPER-OUTER QUADRANT OF RIGHT BREAST IN FEMALE, ESTROGEN RECEPTOR POSITIVE: Primary | ICD-10-CM

## 2019-06-27 DIAGNOSIS — C50.012 MALIGNANT NEOPLASM OF NIPPLE OF LEFT BREAST IN FEMALE, UNSPECIFIED ESTROGEN RECEPTOR STATUS: ICD-10-CM

## 2019-06-27 DIAGNOSIS — Z17.0 MALIGNANT NEOPLASM OF UPPER-OUTER QUADRANT OF RIGHT BREAST IN FEMALE, ESTROGEN RECEPTOR POSITIVE: Primary | ICD-10-CM

## 2019-06-27 DIAGNOSIS — N60.91 ATYPICAL DUCTAL HYPERPLASIA OF RIGHT BREAST: ICD-10-CM

## 2019-06-27 PROCEDURE — 19301 PARTIAL MASTECTOMY: CPT | Mod: RT,,, | Performed by: SURGERY

## 2019-06-27 PROCEDURE — 88342 TISSUE SPECIMEN TO PATHOLOGY - SURGERY: ICD-10-PCS | Mod: 26,,, | Performed by: PATHOLOGY

## 2019-06-27 PROCEDURE — D9220A PRA ANESTHESIA: ICD-10-PCS | Mod: ANES,,, | Performed by: ANESTHESIOLOGY

## 2019-06-27 PROCEDURE — 25000003 PHARM REV CODE 250: Performed by: STUDENT IN AN ORGANIZED HEALTH CARE EDUCATION/TRAINING PROGRAM

## 2019-06-27 PROCEDURE — 63600175 PHARM REV CODE 636 W HCPCS: Performed by: STUDENT IN AN ORGANIZED HEALTH CARE EDUCATION/TRAINING PROGRAM

## 2019-06-27 PROCEDURE — 19301 PR MASTECTOMY, PARTIAL: ICD-10-PCS | Mod: RT,,, | Performed by: SURGERY

## 2019-06-27 PROCEDURE — 88341 TISSUE SPECIMEN TO PATHOLOGY - SURGERY: ICD-10-PCS | Mod: 26,,, | Performed by: PATHOLOGY

## 2019-06-27 PROCEDURE — 36000707: Performed by: SURGERY

## 2019-06-27 PROCEDURE — 88341 IMHCHEM/IMCYTCHM EA ADD ANTB: CPT | Mod: 26,,, | Performed by: PATHOLOGY

## 2019-06-27 PROCEDURE — D9220A PRA ANESTHESIA: ICD-10-PCS | Mod: CRNA,,, | Performed by: NURSE ANESTHETIST, CERTIFIED REGISTERED

## 2019-06-27 PROCEDURE — 71000044 HC DOSC ROUTINE RECOVERY FIRST HOUR: Performed by: SURGERY

## 2019-06-27 PROCEDURE — 76098 X-RAY EXAM SURGICAL SPECIMEN: CPT | Mod: 26,,, | Performed by: RADIOLOGY

## 2019-06-27 PROCEDURE — 88305 TISSUE SPECIMEN TO PATHOLOGY - SURGERY: ICD-10-PCS | Mod: 26,,, | Performed by: PATHOLOGY

## 2019-06-27 PROCEDURE — 37000008 HC ANESTHESIA 1ST 15 MINUTES: Performed by: SURGERY

## 2019-06-27 PROCEDURE — 37000009 HC ANESTHESIA EA ADD 15 MINS: Performed by: SURGERY

## 2019-06-27 PROCEDURE — 76098 X-RAY EXAM SURGICAL SPECIMEN: CPT | Mod: TC,PO

## 2019-06-27 PROCEDURE — 88307 TISSUE SPECIMEN TO PATHOLOGY - SURGERY: ICD-10-PCS | Mod: 26,,, | Performed by: PATHOLOGY

## 2019-06-27 PROCEDURE — 88305 TISSUE EXAM BY PATHOLOGIST: CPT | Mod: 26,,, | Performed by: PATHOLOGY

## 2019-06-27 PROCEDURE — 76098 MAMMO BREAST SPECIMEN: ICD-10-PCS | Mod: 26,,, | Performed by: RADIOLOGY

## 2019-06-27 PROCEDURE — D9220A PRA ANESTHESIA: Mod: CRNA,,, | Performed by: NURSE ANESTHETIST, CERTIFIED REGISTERED

## 2019-06-27 PROCEDURE — 63600175 PHARM REV CODE 636 W HCPCS: Performed by: SURGERY

## 2019-06-27 PROCEDURE — 71000015 HC POSTOP RECOV 1ST HR: Performed by: SURGERY

## 2019-06-27 PROCEDURE — 76098 PR  X-RAY EXAM, BREAST SPECIMEN: ICD-10-PCS | Mod: 26,,, | Performed by: SURGERY

## 2019-06-27 PROCEDURE — 88307 TISSUE EXAM BY PATHOLOGIST: CPT | Mod: 26,,, | Performed by: PATHOLOGY

## 2019-06-27 PROCEDURE — 76098 X-RAY EXAM SURGICAL SPECIMEN: CPT | Mod: 26,,, | Performed by: SURGERY

## 2019-06-27 PROCEDURE — D9220A PRA ANESTHESIA: Mod: ANES,,, | Performed by: ANESTHESIOLOGY

## 2019-06-27 PROCEDURE — 88305 TISSUE EXAM BY PATHOLOGIST: CPT | Performed by: PATHOLOGY

## 2019-06-27 PROCEDURE — 25000003 PHARM REV CODE 250: Performed by: SURGERY

## 2019-06-27 PROCEDURE — 36000706: Performed by: SURGERY

## 2019-06-27 PROCEDURE — 71000016 HC POSTOP RECOV ADDL HR: Performed by: SURGERY

## 2019-06-27 PROCEDURE — 88342 IMHCHEM/IMCYTCHM 1ST ANTB: CPT | Mod: 26,,, | Performed by: PATHOLOGY

## 2019-06-27 RX ORDER — DEXAMETHASONE SODIUM PHOSPHATE 4 MG/ML
INJECTION, SOLUTION INTRA-ARTICULAR; INTRALESIONAL; INTRAMUSCULAR; INTRAVENOUS; SOFT TISSUE
Status: DISCONTINUED | OUTPATIENT
Start: 2019-06-27 | End: 2019-06-27

## 2019-06-27 RX ORDER — HYDROMORPHONE HYDROCHLORIDE 1 MG/ML
0.2 INJECTION, SOLUTION INTRAMUSCULAR; INTRAVENOUS; SUBCUTANEOUS EVERY 5 MIN PRN
Status: DISCONTINUED | OUTPATIENT
Start: 2019-06-27 | End: 2019-06-27 | Stop reason: HOSPADM

## 2019-06-27 RX ORDER — KETAMINE HYDROCHLORIDE 10 MG/ML
INJECTION, SOLUTION INTRAMUSCULAR; INTRAVENOUS
Status: DISCONTINUED | OUTPATIENT
Start: 2019-06-27 | End: 2019-06-27

## 2019-06-27 RX ORDER — CEFAZOLIN SODIUM 1 G/3ML
2 INJECTION, POWDER, FOR SOLUTION INTRAMUSCULAR; INTRAVENOUS
Status: COMPLETED | OUTPATIENT
Start: 2019-06-27 | End: 2019-06-27

## 2019-06-27 RX ORDER — HYDROCODONE BITARTRATE AND ACETAMINOPHEN 5; 325 MG/1; MG/1
1 TABLET ORAL ONCE AS NEEDED
Status: COMPLETED | OUTPATIENT
Start: 2019-06-27 | End: 2019-06-27

## 2019-06-27 RX ORDER — HYDROCODONE BITARTRATE AND ACETAMINOPHEN 5; 325 MG/1; MG/1
1 TABLET ORAL EVERY 6 HOURS PRN
Qty: 12 TABLET | Refills: 0 | Status: SHIPPED | OUTPATIENT
Start: 2019-06-27 | End: 2019-08-19 | Stop reason: ALTCHOICE

## 2019-06-27 RX ORDER — LIDOCAINE HCL/PF 100 MG/5ML
SYRINGE (ML) INTRAVENOUS
Status: DISCONTINUED | OUTPATIENT
Start: 2019-06-27 | End: 2019-06-27

## 2019-06-27 RX ORDER — SODIUM CHLORIDE 9 MG/ML
INJECTION, SOLUTION INTRAVENOUS CONTINUOUS
Status: DISCONTINUED | OUTPATIENT
Start: 2019-06-27 | End: 2019-06-27 | Stop reason: HOSPADM

## 2019-06-27 RX ORDER — SODIUM CHLORIDE 0.9 % (FLUSH) 0.9 %
3 SYRINGE (ML) INJECTION
Status: DISCONTINUED | OUTPATIENT
Start: 2019-06-27 | End: 2019-06-27 | Stop reason: HOSPADM

## 2019-06-27 RX ORDER — HYDROCODONE BITARTRATE AND ACETAMINOPHEN 5; 325 MG/1; MG/1
1 TABLET ORAL EVERY 6 HOURS PRN
Qty: 12 TABLET | Refills: 0 | Status: SHIPPED | OUTPATIENT
Start: 2019-06-27 | End: 2019-07-15

## 2019-06-27 RX ORDER — MIDAZOLAM HYDROCHLORIDE 5 MG/ML
INJECTION INTRAMUSCULAR; INTRAVENOUS
Status: DISCONTINUED | OUTPATIENT
Start: 2019-06-27 | End: 2019-06-27

## 2019-06-27 RX ORDER — FENTANYL CITRATE 50 UG/ML
INJECTION, SOLUTION INTRAMUSCULAR; INTRAVENOUS
Status: DISCONTINUED | OUTPATIENT
Start: 2019-06-27 | End: 2019-06-27

## 2019-06-27 RX ORDER — PROPOFOL 10 MG/ML
VIAL (ML) INTRAVENOUS
Status: DISCONTINUED | OUTPATIENT
Start: 2019-06-27 | End: 2019-06-27

## 2019-06-27 RX ORDER — LIDOCAINE HYDROCHLORIDE 10 MG/ML
1 INJECTION, SOLUTION EPIDURAL; INFILTRATION; INTRACAUDAL; PERINEURAL ONCE
Status: DISCONTINUED | OUTPATIENT
Start: 2019-06-27 | End: 2019-06-27 | Stop reason: HOSPADM

## 2019-06-27 RX ADMIN — SODIUM CHLORIDE: 0.9 INJECTION, SOLUTION INTRAVENOUS at 01:06

## 2019-06-27 RX ADMIN — MIDAZOLAM HYDROCHLORIDE 1 MG: 5 INJECTION, SOLUTION INTRAMUSCULAR; INTRAVENOUS at 02:06

## 2019-06-27 RX ADMIN — DEXAMETHASONE SODIUM PHOSPHATE 4 MG: 4 INJECTION, SOLUTION INTRAMUSCULAR; INTRAVENOUS at 02:06

## 2019-06-27 RX ADMIN — PROPOFOL 140 MG: 10 INJECTION, EMULSION INTRAVENOUS at 02:06

## 2019-06-27 RX ADMIN — FENTANYL CITRATE 50 MCG: 50 INJECTION, SOLUTION INTRAMUSCULAR; INTRAVENOUS at 02:06

## 2019-06-27 RX ADMIN — HYDROCODONE BITARTRATE AND ACETAMINOPHEN 1 TABLET: 5; 325 TABLET ORAL at 04:06

## 2019-06-27 RX ADMIN — LIDOCAINE HYDROCHLORIDE 100 MG: 20 INJECTION, SOLUTION INTRAVENOUS at 02:06

## 2019-06-27 RX ADMIN — FENTANYL CITRATE 50 MCG: 50 INJECTION, SOLUTION INTRAMUSCULAR; INTRAVENOUS at 03:06

## 2019-06-27 RX ADMIN — CEFAZOLIN 2 G: 330 INJECTION, POWDER, FOR SOLUTION INTRAMUSCULAR; INTRAVENOUS at 02:06

## 2019-06-27 RX ADMIN — KETAMINE HYDROCHLORIDE 10 MG: 10 INJECTION, SOLUTION INTRAMUSCULAR; INTRAVENOUS at 02:06

## 2019-06-27 NOTE — TRANSFER OF CARE
"Anesthesia Transfer of Care Note    Patient: Jo-Ann Escobedo    Procedure(s) Performed: Procedure(s) (LRB):  EXCISIONAL BIOPSY-breast (Right)    Patient location: [facility]    Anesthesia Type: general    Transport from OR: Transported from OR on 2-3 L/min O2 by NC with adequate spontaneous ventilation    Post pain: adequate analgesia    Post assessment: no apparent anesthetic complications and tolerated procedure well    Post vital signs: stable    Level of consciousness: awake, alert and oriented    Nausea/Vomiting: no nausea/vomiting    Complications: none    Transfer of care protocol was followed      Last vitals:   Visit Vitals  BP (!) 147/71   Pulse 75   Temp 36.5 °C (97.7 °F) (Temporal)   Resp 18   Ht 5' 3" (1.6 m)   Wt 79.4 kg (175 lb)   LMP  (LMP Unknown)   SpO2 (!) 94%   Breastfeeding? No   BMI 31.00 kg/m²     "

## 2019-06-27 NOTE — BRIEF OP NOTE
[facility]  Brief Operative Note     SUMMARY     Surgery Date: 6/27/2019     Surgeon(s) and Role:     * Suki Dill MD - Primary    Assisting Surgeon: None    Pre-op Diagnosis:  Papilloma of right breast [D24.1]    Post-op Diagnosis:  Post-Op Diagnosis Codes:     * Papilloma of right breast [D24.1]    Procedure(s) (LRB):  EXCISIONAL BIOPSY-breast (Right)    Anesthesia: General    Description of the findings of the procedure: Dense tissue surrounding localized site.  Calcs extend to medial edge of large excisional biopsy specimen.    Findings/Key Components: Medial margin re-excision taken    Estimated Blood Loss: minimal         Specimens:   Specimen (12h ago, onward)    Start     Ordered    06/27/19 1526  Specimen to Pathology - Surgery  Once     Comments:  1. Right lumpectomy: Green inferior, blue superior, orange lateral, yellow anterior, black posterior, red medial- perm2. New medial margin, ink marks new margin- perm     Start Status     06/27/19 1526 Collected (06/27/19 1528) Order ID: [id number]       06/27/19 1528          Discharge Note    SUMMARY     Admit Date: 6/27/2019    Discharge Date and Time:  06/27/2019 4:46 PM    Hospital Course (synopsis of major diagnoses, care, treatment, and services provided during the course of the hospital stay): TO OR for procedure then recovery in stable condition.  Discharged when criteria met.     Final Diagnosis: Post-Op Diagnosis Codes:     * Papilloma of right breast [D24.1]    Disposition: Home or Self Care    Follow Up/Patient Instructions:     Medications:  Reconciled Home Medications:      Medication List      START taking these medications    * HYDROcodone-acetaminophen 5-325 mg per tablet  Commonly known as:  NORCO  Take 1 tablet by mouth every 6 (six) hours as needed for Pain.     * HYDROcodone-acetaminophen 5-325 mg per tablet  Commonly known as:  NORCO  Take 1 tablet by mouth every 6 (six) hours as needed for Pain.         * This list  has 2 medication(s) that are the same as other medications prescribed for you. Read the directions carefully, and ask your doctor or other care provider to review them with you.            CONTINUE taking these medications    ADULT PROBIOTIC ORAL  Take by mouth once daily.     co-enzyme Q-10 30 mg capsule  Take 30 mg by mouth once daily.     fenofibrate 160 MG Tab  Take 1 tablet (160 mg total) by mouth once daily.     ibuprofen 600 MG tablet  Commonly known as:  ADVIL,MOTRIN  Take 600 mg by mouth every 6 (six) hours as needed for Pain.     IRON ORAL  Take by mouth once daily.     levothyroxine 125 MCG tablet  Commonly known as:  SYNTHROID  TAKE 1 TABLET (125 MCG TOTAL) BY MOUTH AFTER DINNER.     losartan 50 MG tablet  Commonly known as:  COZAAR  Take 1 tablet (50 mg total) by mouth once daily.     omeprazole 40 MG capsule  Commonly known as:  PRILOSEC  TAKE 1 CAPSULE (40 MG TOTAL) BY MOUTH BEFORE DINNER.     turmeric root extract 500 mg Cap  Take by mouth once daily.     VITAMIN D3 2,000 unit Tab  Generic drug:  cholecalciferol (vitamin D3)  Take 1 tablet by mouth once daily.          Discharge Procedure Orders   Diet Adult Regular     Notify your health care provider if you experience any of the following:  temperature >100.4     Notify your health care provider if you experience any of the following:  persistent nausea and vomiting or diarrhea     Notify your health care provider if you experience any of the following:  severe uncontrolled pain     Notify your health care provider if you experience any of the following:  redness, tenderness, or signs of infection (pain, swelling, redness, odor or green/yellow discharge around incision site)     Notify your health care provider if you experience any of the following:  difficulty breathing or increased cough     Notify your health care provider if you experience any of the following:  severe persistent headache     Notify your health care provider if you experience  any of the following:  worsening rash     Notify your health care provider if you experience any of the following:  persistent dizziness, light-headedness, or visual disturbances     Notify your health care provider if you experience any of the following:  increased confusion or weakness     Activity as tolerated     Shower on day dressing removed (No bath)   Order Comments: OK to shower tomorrow.     Follow-up Information     Suki Dill MD In 2 weeks.    Specialties:  General Surgery, Breast Surgery  Contact information:  [facility] [address] [phone]

## 2019-06-27 NOTE — INTERVAL H&P NOTE
The patient has been examined and the H&P has been reviewed:    I concur with the findings and no changes have occurred since H&P was written.    Anesthesia/Surgery risks, benefits and alternative options discussed and understood by patient/family.          Active Hospital Problems    Diagnosis  POA    Malignant neoplasm of upper-outer quadrant of right breast in female, estrogen receptor positive [C50.411, Z17.0]  Not Applicable      Resolved Hospital Problems   No resolved problems to display.

## 2019-06-27 NOTE — DISCHARGE INSTRUCTIONS
POSTOPERATIVE INSTRUCTIONS FOLLOWING BIOPSY OR LUMPECTOMY    The following are post-operative instructions that will help you to recover from your surgery.  Please read over these instructions carefully and contact us if we can answer any of your questions or concerns.    Dressing/breast binder (surgi-bra)  A surgical bra may be placed around your chest after your surgery.  If you are given the bra, please wear it as close to 24 hours a day as possible until your post-operative clinic appointment.  If the elastic around the bra irritates your skin, you may wear a soft t-shirt underneath the bra.  You may go without wearing the bra long enough to bath, to launder and dry the bra.  If the bra is extremely uncomfortable, you may wear a supportive sports bra instead after 2 days.  You may shower after 2 days.  Do not take a tub bath and do not soak the surgical site. Please do not remove the white strips of tape (steri-strips) that cover your incision.  They will be removed at your clinic visit.    Activity   You should be able to return to your regular activities 2 days after your surgery.  However, do not engage in strenuous activities in which you use your upper body such as:  golf, tennis, aerobics, washing windows, raking the yard, mopping, vacuuming, heavy lifting (e.g children) until you are seen for your follow-up appointment in clinic.    Medication for pain  You may find that over the counter pain medications may be sufficient for your pain.  You will be given a prescription for pain medication for more severe pain.  You should not drive or operate machinery while taking these.  Please take narcotics with food.  Narcotics can cause, or worse, constipation.  You will need to increase your fluid intake, eat high fiber foods (such as fruits and bran) and make sure that you are up and walking. You may need to take an over the counter stool softener for constipation.    Please report the following:   Temperature  greater than 101 degrees   Discharge or bad odor from the wound   Excessive bleeding, such as bloody dressing or extreme bruising   Redness at incision and/or drain sites   Swelling or buildup of fluid around incision     Additional information  I will see you approximately 1 week following your surgery.  If this follow-up appointment has not been made, please call the office.    If you have any questions or problems, please call my office or my nurse.

## 2019-06-27 NOTE — OP NOTE
DATE OF PROCEDURE: 6/27/2019    SURGEON: Surgeon(s) and Role:     * Suki Dill MD - Primary  FA - Deanna Romero    PREOPERATIVE DIAGNOSIS: Papillary lesion with atypia of the right breast upper outer quadrant    POSTOPERATIVE DIAGNOSIS: same    ANESTHESIA: general    PROCEDURES PERFORMED:   1. right breast magseed localization excisional biopsy (lumpectomy)      PROCEDURE IN DETAIL:   The patient underwent informed consent.  The films were reviewed.    She was then brought to the Operating Room and placed in the supine position. Anesthesia with general anesthesia care with sedation was administered.  The right breast, anterior chest, arm and axilla were then prepped and draped in a sterile fashion.     Next, we turned our attention to the right breast.   An incision was made in the peritumoral position of the right breast over the anticipated tract of the lesion. The specimen was dissected circumferentially around the lesion.  We did not dissect all the way down to the underlying pectoralis fascia. The magseed localization lumpectomy specimen was inked on the back table using green ink inferiorly, blue ink superiorly, orange ink laterally, yellow ink anteriorly, black ink posteriorly, and the red ink medially.  It was then fixed with acetic acid and submitted for specimen radiograph, which confirmed the clip and area of interest within the specimen. Given the appearance and location of the lesion, additional margins were taken including medial.       Within the lumpectomy cavity, hemostasis was achieved with cautery. The wound was irrigated until clear. There was no evidence of bleeding. It was closed in multiple layers with deep dermal and subcutaneous interrupted Vicryl sutures and a running 4-0 vicryl subcuticular skin closure.    Dermabond was applied. Sterile fluff gauze was placed and a post-procedure bra was placed. She tolerated the procedure well without complication and was turned over to Anesthesia  for transport to the recovery area in a satisfactory condition. All specimens were sent to Pathology for permanent sectioning.    ESTIMATED BLOOD LOSS: 15ml    COMPLICATIONS: none    DISPOSITION:  PACU--hemodynamically stable    ATTESTATION:  I performed the procedure.

## 2019-06-28 NOTE — ANESTHESIA POSTPROCEDURE EVALUATION
Anesthesia Post Evaluation    Patient: Jo-Ann Escobedo    Procedure(s) Performed: Procedure(s) (LRB):  EXCISIONAL BIOPSY-breast (Right)    Final Anesthesia Type: general  Patient location during evaluation: PACU  Patient participation: Yes- Able to Participate  Level of consciousness: awake and alert  Post-procedure vital signs: reviewed and stable  Pain management: adequate  Airway patency: patent  PONV status at discharge: No PONV  Anesthetic complications: no      Cardiovascular status: blood pressure returned to baseline and stable  Respiratory status: unassisted  Hydration status: euvolemic  Follow-up not needed.          Vitals Value Taken Time   /72 6/27/2019  4:45 PM   Temp 36.6 °C (97.9 °F) 6/27/2019  4:45 PM   Pulse 72 6/27/2019  4:45 PM   Resp 18 6/27/2019  4:45 PM   SpO2 96 % 6/27/2019  4:45 PM         No case tracking events are documented in the log.      Pain/Parris Score: Pain Rating Prior to Med Admin: 4 (6/27/2019  4:38 PM)

## 2019-07-03 ENCOUNTER — OFFICE VISIT (OUTPATIENT)
Dept: OPTOMETRY | Facility: CLINIC | Age: 79
End: 2019-07-03
Payer: MEDICARE

## 2019-07-03 DIAGNOSIS — H26.492 CLOUDY POSTERIOR CAPSULE, LEFT: Primary | ICD-10-CM

## 2019-07-03 PROCEDURE — 99999 PR PBB SHADOW E&M-EST. PATIENT-LVL II: CPT | Mod: PBBFAC,,, | Performed by: OPTOMETRIST

## 2019-07-03 PROCEDURE — 99999 PR PBB SHADOW E&M-EST. PATIENT-LVL II: ICD-10-PCS | Mod: PBBFAC,,, | Performed by: OPTOMETRIST

## 2019-07-03 PROCEDURE — 92012 PR EYE EXAM, EST PATIENT,INTERMED: ICD-10-PCS | Mod: S$GLB,,, | Performed by: OPTOMETRIST

## 2019-07-03 PROCEDURE — 92012 INTRM OPH EXAM EST PATIENT: CPT | Mod: S$GLB,,, | Performed by: OPTOMETRIST

## 2019-07-03 NOTE — PROGRESS NOTES
HPI     DLS; 2/20/19  Pt states she is having a cloud like shape in her left eye, states it   interfering with her [facility], states this has been going on for about 3 months   now.   No f/f  systane gtts   Hx cat surgery OU    Last edited by Ariel Hinojosa, OD on 7/3/2019  2:04 PM. (History)        ROS     Positive for: Eyes (cat surgery OU)    Negative for: Constitutional, Gastrointestinal, Neurological, Skin,   Genitourinary, Musculoskeletal, HENT, Endocrine, Cardiovascular,   Respiratory, Psychiatric, Allergic/Imm, Heme/Lymph    Last edited by Ariel Hinojosa, OD on 7/3/2019  2:04 PM. (History)        Assessment /Plan     For exam results, see Encounter Report.    Cloudy posterior capsule, left      Cloudy capsule OS sp pciol OU    PLAN:    ophth consult--YAG OS

## 2019-07-10 ENCOUNTER — TELEPHONE (OUTPATIENT)
Dept: HEMATOLOGY/ONCOLOGY | Facility: CLINIC | Age: 79
End: 2019-07-10

## 2019-07-10 NOTE — TELEPHONE ENCOUNTER
----- Message from Denise Rascon sent at 7/10/2019  9:38 AM CDT -----  Will need the Tomo Claseso appt rescheduled please contact her at [phone]

## 2019-07-10 NOTE — TELEPHONE ENCOUNTER
Contacted patient to confirm that her message was received. Patient assisted with rescheduling and rescheduled to Tuesday 7/16 at 1120. Patient verbally confirmed and expressed gratitude.

## 2019-07-15 ENCOUNTER — OFFICE VISIT (OUTPATIENT)
Dept: SURGERY | Facility: CLINIC | Age: 79
End: 2019-07-15
Payer: MEDICARE

## 2019-07-15 VITALS
BODY MASS INDEX: 31.95 KG/M2 | HEIGHT: 63 IN | DIASTOLIC BLOOD PRESSURE: 78 MMHG | TEMPERATURE: 98 F | SYSTOLIC BLOOD PRESSURE: 128 MMHG | WEIGHT: 180.31 LBS | HEART RATE: 70 BPM

## 2019-07-15 DIAGNOSIS — D05.11 DUCTAL CARCINOMA IN SITU (DCIS) OF RIGHT BREAST: Primary | ICD-10-CM

## 2019-07-15 PROCEDURE — 99999 PR PBB SHADOW E&M-EST. PATIENT-LVL III: CPT | Mod: PBBFAC,,, | Performed by: SURGERY

## 2019-07-15 PROCEDURE — 99024 POSTOP FOLLOW-UP VISIT: CPT | Mod: S$GLB,,, | Performed by: SURGERY

## 2019-07-15 PROCEDURE — 99999 PR PBB SHADOW E&M-EST. PATIENT-LVL III: ICD-10-PCS | Mod: PBBFAC,,, | Performed by: SURGERY

## 2019-07-15 PROCEDURE — 99024 PR POST-OP FOLLOW-UP VISIT: ICD-10-PCS | Mod: S$GLB,,, | Performed by: SURGERY

## 2019-07-15 NOTE — Clinical Note
You are seeing her tomorrow for routine follow up of L breast cancer from 2001.  I just did an excisional biopsy on her right that upstaged to grade I DCIS, ER+.  Will also have her meet with rad onc, but told her I thought it was reasonable to do nothing or both XRT and AI.  She does have history of Paget's of the bone.  I told her I wasn't sure if that would influence your decision on AI recommendations.Suki

## 2019-07-15 NOTE — PROGRESS NOTES
[facility]       Post-Op        REFERRING PHYSICIAN:  No referring provider defined for this encounter.       Dakota Kerr MD    MEDICAL ONCOLOGIST:    Dr. Mueller  RADIATION ONCOLOGIST:   Dr. Talamantes    DIAGNOSIS:    This is a 78 y.o. 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 She upstaged at excisional biopsy for atypical cells with papillary lesion.  She has a history of left-sided mastectomy and adjuvant chemotherapy  for a T2 N1 M0 breast cancer in 2001    TREATMENT SUMMARY:  The patient is status post excisional biopsy on 6/27/2019.  Final pathology upstaged to DCIS with papillary and micropapillary patterns. No necrosis was identified. The margins were negative with the distance from the closest margin being medial at 14 mm and additional margins >15 mm.     INTERVAL HISTORY:   Jo-Ann Escobedo comes in for a post-op check.  She denies fever, chills, chest pain or shortness of breath.  Her pain is well controlled.      MEDICATIONS:  Current Outpatient Medications   Medication Sig Dispense Refill    cholecalciferol, vitamin D3, (VITAMIN D3) 2,000 unit Tab Take 1 tablet by mouth once daily.      co-enzyme Q-10 30 mg capsule Take 30 mg by mouth once daily.       fenofibrate 160 MG Tab Take 1 tablet (160 mg total) by mouth once daily. 90 tablet 3    ferrous sulfate (IRON ORAL) Take by mouth once daily.       HYDROcodone-acetaminophen (NORCO) 5-325 mg per tablet Take 1 tablet by mouth every 6 (six) hours as needed for Pain. 12 tablet 0    HYDROcodone-acetaminophen (NORCO) 5-325 mg per tablet Take 1 tablet by mouth every 6 (six) hours as needed for Pain. 12 tablet 0    ibuprofen (ADVIL,MOTRIN) 600 MG tablet Take 600 mg by mouth every 6 (six) hours as needed for Pain.      LACTOBACILLUS COMBINATION NO.8 (ADULT PROBIOTIC ORAL) Take by mouth once daily.       levothyroxine (SYNTHROID) 125 MCG tablet TAKE 1 TABLET (125 MCG TOTAL) BY MOUTH  AFTER DINNER. 90 tablet 3    losartan (COZAAR) 50 MG tablet Take 1 tablet (50 mg total) by mouth once daily. 90 tablet 3    omeprazole (PRILOSEC) 40 MG capsule TAKE 1 CAPSULE (40 MG TOTAL) BY MOUTH BEFORE DINNER. 90 capsule 1    turmeric root extract 500 mg Cap Take by mouth once daily.        No current facility-administered medications for this visit.        ALLERGIES:   Review of patient's allergies indicates:   Allergen Reactions    Codeine Diarrhea and Hallucinations    Niacin preparations      Redness, warming       PHYSICAL EXAMINATION:   General:  This is a well appearing female with appropriate speech, affect and gait.     Breast:  Incision clean, dry, and intact    PATH:  Supplemental Diagnosis  HORMONE RECEPTOR ASSAYS:  ER-positive (95%)  PgR-negative (<1%)  Positive and negative controls reacted appropriately.  ER,PGR  (Electronically Signed: 2019-07-11 08:12:48 )  Diagnosed by: Toño Chacko M.D.  FINAL PATHOLOGIC DIAGNOSIS  1. Right breast, lumpectomy:  - Ductal carcinoma in-situ (DCIS), low nuclear grade, papillary and micropapillary patterns, 12mm in linear  length with associated microcalcifications  - Margins negative for DCIS  - IHC preparations with appropriate controls demonstrate intact myoepithelial cells with p63 while a mosaic  pattern is seen with CK5/6, supporting the above diagnosis of DCIS    - Background breast tissue with fibrocystic changes  2. New Medial margin, excision:  - Benign breast tissue with focal fibrocystic changes  - Negative for atypia or carcinoma  Note: Prognostic/predictive markers for ER and OH are ordered on block 1H, the results of which will be reported in  an addendum. Dr. FRANCK Walden has reviewed the case and agrees with the above diagnosis. DCIS is present on  blocks 1G and 1H.  CAP Summary: Breast DCIS  Procedure: Excision (less than total mastectomy)  Specimen laterality: Right  Tumor site: Not specified  Size (Extent) of DCIS:  Estimated size (extent) of  DCIS is at least (millimeters): 12 mm  Additional dimensions: 6 x 3 mm  Number of blocks with DCIS: 2  Number of blocks examined: 11  Histologic Type: Ductal carcinoma in situ (DCIS)  Architectural patterns: Papillary, micropapillary  Nuclear Grade: Grade I (low)  Necrosis: Not identified  Margins: Uninvolved by DCIS  Distance from closest margin: 14mm  Specify: Medial  Additional Margins: All additional margins >15mm  Regional Lymph Nodes: No lymph nodes submitted or found  Pathologic Stage Classification (pTNM, AJCC 8th Edition)  Primary Tumor (pT): pTis (DCIS): Ductal carcinoma in situ  Regional Lymph Nodes (pN): pNX: Regional lymph nodes cannot be assessed    IMPRESSION:   The patient has had an uneventful postoperative course, however her pathology has been upstaged to DCIS, low grade, ER+.    PLAN:   1. return in 6 months for a follow up office visit and breast exam  2. right mammogram in May, 2020  3. The patient is advised in continued exam of the breast chest wall and to report to this office sooner should she note any areas of abnormality or concern.   4.  She has been instructed to meet with med onc and rad onc for discussion of adjuvant treatment recommendations.  She would have low benefit from adjuvant treatment but could be considered.

## 2019-07-16 ENCOUNTER — TUMOR BOARD CONFERENCE (OUTPATIENT)
Dept: SURGERY | Facility: CLINIC | Age: 79
End: 2019-07-16

## 2019-07-16 ENCOUNTER — OFFICE VISIT (OUTPATIENT)
Dept: HEMATOLOGY/ONCOLOGY | Facility: CLINIC | Age: 79
End: 2019-07-16
Payer: MEDICARE

## 2019-07-16 VITALS
SYSTOLIC BLOOD PRESSURE: 136 MMHG | HEART RATE: 69 BPM | TEMPERATURE: 98 F | BODY MASS INDEX: 32.11 KG/M2 | WEIGHT: 181.19 LBS | HEIGHT: 63 IN | DIASTOLIC BLOOD PRESSURE: 70 MMHG | RESPIRATION RATE: 16 BRPM | OXYGEN SATURATION: 95 %

## 2019-07-16 DIAGNOSIS — Z85.3 HISTORY OF BREAST CANCER IN FEMALE: ICD-10-CM

## 2019-07-16 DIAGNOSIS — D05.11 DUCTAL CARCINOMA IN SITU (DCIS) OF RIGHT BREAST: Primary | ICD-10-CM

## 2019-07-16 PROCEDURE — 3075F SYST BP GE 130 - 139MM HG: CPT | Mod: CPTII,S$GLB,, | Performed by: INTERNAL MEDICINE

## 2019-07-16 PROCEDURE — 99999 PR PBB SHADOW E&M-EST. PATIENT-LVL III: CPT | Mod: PBBFAC,,, | Performed by: INTERNAL MEDICINE

## 2019-07-16 PROCEDURE — 3078F DIAST BP <80 MM HG: CPT | Mod: CPTII,S$GLB,, | Performed by: INTERNAL MEDICINE

## 2019-07-16 PROCEDURE — 99999 PR PBB SHADOW E&M-EST. PATIENT-LVL III: ICD-10-PCS | Mod: PBBFAC,,, | Performed by: INTERNAL MEDICINE

## 2019-07-16 PROCEDURE — 99215 PR OFFICE/OUTPT VISIT, EST, LEVL V, 40-54 MIN: ICD-10-PCS | Mod: S$GLB,,, | Performed by: INTERNAL MEDICINE

## 2019-07-16 PROCEDURE — 1101F PT FALLS ASSESS-DOCD LE1/YR: CPT | Mod: CPTII,S$GLB,, | Performed by: INTERNAL MEDICINE

## 2019-07-16 PROCEDURE — 99215 OFFICE O/P EST HI 40 MIN: CPT | Mod: S$GLB,,, | Performed by: INTERNAL MEDICINE

## 2019-07-16 PROCEDURE — 3075F PR MOST RECENT SYSTOLIC BLOOD PRESS GE 130-139MM HG: ICD-10-PCS | Mod: CPTII,S$GLB,, | Performed by: INTERNAL MEDICINE

## 2019-07-16 PROCEDURE — 1101F PR PT FALLS ASSESS DOC 0-1 FALLS W/OUT INJ PAST YR: ICD-10-PCS | Mod: CPTII,S$GLB,, | Performed by: INTERNAL MEDICINE

## 2019-07-16 PROCEDURE — 3078F PR MOST RECENT DIASTOLIC BLOOD PRESSURE < 80 MM HG: ICD-10-PCS | Mod: CPTII,S$GLB,, | Performed by: INTERNAL MEDICINE

## 2019-07-16 NOTE — PROGRESS NOTES
Subjective:       Patient ID: Jo-Ann Escobedo is a 78 y.o. female.    Chief Complaint: History of breast cancer in female    HPI   Ms. Escobedo presents today for follow up. Briefly, she is a 78 year-old female with a remote history of breast cancer treated 17 years ago with a left modified radical   mastectomy.  Her tumor was a T2 N1 M0 carcinoma.  She was treated with four cycles of AC and 4 cycles of Taxotere in the adjuvant setting and has remained cancer free since then.   A recent mammogram led to a contralateral biopsy and eventually to a lumpectomy on June 26th, 2019, for an area of DCIS, measuring 12mm.  Resection margins were clear, with the closest being 14 mm.  Her tumor was ER positive and IN negative.   She has made an uneventful recovery and presents today for follow up.      Review of Systems  Overall she feels well and she has no complaints.   She is anxious with the recent developments, but her ECOG PS remains 0.   She denies any depression, easy bruising, fevers, chills, night sweats, weight loss, nausea, vomiting, diarrhea, constipation, diplopia, blurred vision, headache, chest pain, abdominal pain, or difficulty ambulating. All other systems are negative.     Objective:      Physical Exam  GENERAL: She is alert, oriented to time, place, person; well nourished;   pleasant; in no acute physical distress.  She is here with her daughter.  VITAL SIGNS: Reviewed.   HEENT: Normal. There are no nasal, oral, lip, gingival, auricular, lid,   or conjunctival lesions. Mucosae are moist and pink, and there is no   thrush. Pupils are equal, reactive to light and accommodation.   Extraocular muscle movements are intact.   NECK: Supple without JVD, thyromegaly, or adenopathy.   LUNGS: Clear to auscultation without wheezing, rales, or rhonchi.   CARDIOVASCULAR: Reveals an S1, S2, no murmurs, no rubs, no gallops.   BREASTS: She is status post left mastectomy with no evidence of chest wall   recurrence. There are no  masses in the right breast.   There is a scar form her recent lumpectomy in the upper outer quadrant of the right breast.  It is healing nicely.  ABDOMEN: Soft, nontender without organomegaly. Bowel sounds are identified.   EXTREMITIES: Have no cyanosis, clubbing, or edema.   SKIN: Does not have petechiae, rashes, induration, or ecchymoses.   NEUROLOGIC: Motor function is 5/5, DTRs are 0-1+ bilaterally, symmetrical,   and cranial nerves within normal limits.   LYMPHATIC: There is no cervical, axillary, or supraclavicular adenopathy.    Assessment:       1. Remote history of breast cancer.    2.     Recent diagnosis of contralateral DCIS, now s/p lumpectomy, doing well.   Plan:        I had a very long discussion with Mrs Escobedo and her daughter.  We went over the options of XRT, adjuvant hormonal therapy, and also discussed the option of no further management.  She will be seeing the radiation oncologist later this week.  I suspect that given her age and the wide margin, she will not be offered XRT, however, we will wait until official recommendations are in her chart.  I will meet with Mrs Escobedo again in mid August to discuss further treartment options.  Her multiple questions and ehr daughter's questions were answered to their satisfaction.

## 2019-07-17 NOTE — PROGRESS NOTES
Malignant neoplasm of upper-outer quadrant of right breast in female, estrogen receptor positive    5/29/2019 Imaging Significant Findings     Mammogram  Impression:  Right  Calcifications: Right breast 23 mm calcifications at the upper outer middle position. Assessment: 4 - Suspicious finding. Biopsy is recommended.         Recommendation:  Biopsy is recommended         6/3/2019 Biopsy     Impression: Successful tomosynthesis guided biopsy of right breast calcifications. Bowtie marker.           6/3/2019 Initial Diagnosis     Intraductal Papilloma         6/27/2019 Surgery     EXCISIONAL BIOPSY-breast Right              6/27/2019 Initial Diagnosis     Ductal carcinoma in situ         6/27/2019 Tumor Markers     Estrogen Receptor: Positive >90%  Progesterone Receptor: Negative           6/27/2019 Cancer Staged     Cancer Staging  Tis Nx  Stage 0 per AJCC 8th ed. pathologic prognostic staging system           7/16/2019 Tumor Conference      Seeing medical oncology and radiation oncology. Can discuss radiation but would not strongly recommend.  Consider adjuvant endocrine therapy.

## 2019-07-25 ENCOUNTER — OFFICE VISIT (OUTPATIENT)
Dept: SURGERY | Facility: CLINIC | Age: 79
End: 2019-07-25
Payer: MEDICARE

## 2019-07-25 VITALS
WEIGHT: 178.56 LBS | TEMPERATURE: 98 F | RESPIRATION RATE: 20 BRPM | SYSTOLIC BLOOD PRESSURE: 137 MMHG | BODY MASS INDEX: 31.64 KG/M2 | DIASTOLIC BLOOD PRESSURE: 66 MMHG | HEART RATE: 65 BPM | HEIGHT: 63 IN

## 2019-07-25 DIAGNOSIS — D05.11 DUCTAL CARCINOMA IN SITU (DCIS) OF RIGHT BREAST: Primary | ICD-10-CM

## 2019-07-25 DIAGNOSIS — Z17.0 MALIGNANT NEOPLASM OF UPPER-OUTER QUADRANT OF RIGHT BREAST IN FEMALE, ESTROGEN RECEPTOR POSITIVE: ICD-10-CM

## 2019-07-25 DIAGNOSIS — C50.411 MALIGNANT NEOPLASM OF UPPER-OUTER QUADRANT OF RIGHT BREAST IN FEMALE, ESTROGEN RECEPTOR POSITIVE: ICD-10-CM

## 2019-07-25 PROCEDURE — 3078F DIAST BP <80 MM HG: CPT | Mod: CPTII,S$GLB,, | Performed by: RADIOLOGY

## 2019-07-25 PROCEDURE — 99205 OFFICE O/P NEW HI 60 MIN: CPT | Mod: S$GLB,,, | Performed by: RADIOLOGY

## 2019-07-25 PROCEDURE — 99205 PR OFFICE/OUTPT VISIT, NEW, LEVL V, 60-74 MIN: ICD-10-PCS | Mod: S$GLB,,, | Performed by: RADIOLOGY

## 2019-07-25 PROCEDURE — 1101F PR PT FALLS ASSESS DOC 0-1 FALLS W/OUT INJ PAST YR: ICD-10-PCS | Mod: CPTII,S$GLB,, | Performed by: RADIOLOGY

## 2019-07-25 PROCEDURE — 3075F PR MOST RECENT SYSTOLIC BLOOD PRESS GE 130-139MM HG: ICD-10-PCS | Mod: CPTII,S$GLB,, | Performed by: RADIOLOGY

## 2019-07-25 PROCEDURE — 3078F PR MOST RECENT DIASTOLIC BLOOD PRESSURE < 80 MM HG: ICD-10-PCS | Mod: CPTII,S$GLB,, | Performed by: RADIOLOGY

## 2019-07-25 PROCEDURE — 99999 PR PBB SHADOW E&M-EST. PATIENT-LVL III: CPT | Mod: PBBFAC,,, | Performed by: RADIOLOGY

## 2019-07-25 PROCEDURE — 99999 PR PBB SHADOW E&M-EST. PATIENT-LVL III: ICD-10-PCS | Mod: PBBFAC,,, | Performed by: RADIOLOGY

## 2019-07-25 PROCEDURE — 1101F PT FALLS ASSESS-DOCD LE1/YR: CPT | Mod: CPTII,S$GLB,, | Performed by: RADIOLOGY

## 2019-07-25 PROCEDURE — 3075F SYST BP GE 130 - 139MM HG: CPT | Mod: CPTII,S$GLB,, | Performed by: RADIOLOGY

## 2019-07-25 NOTE — PROGRESS NOTES
REFERRING PHYSICIAN:   DCIS of the right breast, pTisNxM0, stage 0.    DIAGNOSIS:  Suki Dill M.D    HISTORY OF PRESENT ILLNESS:   Ms. Escobedo is a 78-year-old female with history of left breast cancer in  (T2 N1 M0) who underwent left mastectomy and adjuvant chemotherapy who was recently diagnosed with right breast cancer after an abnormal mammogram in May 2019.  This revealed suspicious calcifications measuring 23 mm in the upper outer quadrant.  A core needle biopsy on Areli 3, 2019 revealed papillary and focal micro papillary lesion with atypia associated with the micro calcifications.  She underwent lumpectomy on 2019.  The pathology revealed the right breast with low-grade ductal carcinoma in situ with papillary and micro papillary patterns measuring 12 mm with the closest margin at 14 mm medially.  This lesion is ER positive (95%) and WA negative  No sentinel lymph node biopsy was done.  She is here today for discussion regarding further treatment.    At present, patient is healing from the surgery without any unexpected side effects.  She denies right breast pain, edema, erythema, or nipple discharge. She also denies fever, night sweats, or weight loss. She underwent oral surgery yesterday.    REVIEW OF SYSTEMS:  As above.  In addition, patient denies headaches, visual problems, dizziness, chest pain, shortness of breath, cough, nausea, vomiting, diarrhea, or any new bony pains. Patient also denies easy bruising, skin rashes, or numbness or tingling.    GYN HISTORY:   Menarche age 12.  .  Hysterectomy at the age of 38.  She has several year history of hormone replacement therapy which was discontinued after .    ECO-1    PAST MEDICAL HISTORY:  Past Medical History:   Diagnosis Date    After-cataract of both eyes - Both Eyes 2012    Allergy     Arthritis     Breast cancer     Diverticulosis     Hyperlipidemia     Hypertension     Hypothyroidism     Paget disease of bone      Squamous Cell Carcinoma     in situ right neck     Thyroid disease        PAST SURGICAL HISTORY:  Past Surgical History:   Procedure Laterality Date    BIOPSY-EXCISIONAL Right 7/27/2015    Performed by Joshua Goldberg[facility] at [facility] OR 2ND FLR    BLOCK, NERVE, FACET JOINT, LUMBAR, MEDIAL BRANCH-deo MBB L4/5,L5/S1 Bilateral 6/7/2019    Performed by Jarvis Morales III, MD at [facility] CATH LAB    CATARACT EXTRACTION W/  INTRAOCULAR LENS IMPLANT Bilateral     COLONOSCOPY N/A 4/15/2019    Performed by Artur Monet MD at [facility] ENDO (4TH FLR)    COLONOSCOPY N/A 1/19/2015    Performed by Artur Monet MD at [facility] ENDO (4TH FLR)    EXCISIONAL BIOPSY-breast Right 6/27/2019    Performed by Suki Dill MD at [facility] OR 2ND FLR    EYE SURGERY      HYSTERECTOMY      KNEE ARTHROSCOPY N/A     pt unsure of which knee     MASTECTOMY      SPINE SURGERY      TOTAL THYROIDECTOMY         ALLERGIES:   Review of patient's allergies indicates:   Allergen Reactions    Codeine Diarrhea and Hallucinations    Niacin preparations      Redness, warming       MEDICATIONS:  Current Outpatient Medications   Medication Sig    cholecalciferol, vitamin D3, (VITAMIN D3) 2,000 unit Tab Take 1 tablet by mouth once daily.    co-enzyme Q-10 30 mg capsule Take 30 mg by mouth once daily.     fenofibrate 160 MG Tab Take 1 tablet (160 mg total) by mouth once daily.    ferrous sulfate (IRON ORAL) Take by mouth once daily.     ibuprofen (ADVIL,MOTRIN) 600 MG tablet Take 600 mg by mouth every 6 (six) hours as needed for Pain.    levothyroxine (SYNTHROID) 125 MCG tablet TAKE 1 TABLET (125 MCG TOTAL) BY MOUTH AFTER DINNER.    losartan (COZAAR) 50 MG tablet Take 1 tablet (50 mg total) by mouth once daily.    turmeric root extract 500 mg Cap Take by mouth once daily.     HYDROcodone-acetaminophen (NORCO) 5-325 mg per tablet Take 1 tablet by mouth every 6 (six) hours as needed for Pain.    LACTOBACILLUS COMBINATION NO.8 (ADULT  PROBIOTIC ORAL) Take by mouth once daily.     omeprazole (PRILOSEC) 40 MG capsule TAKE 1 CAPSULE (40 MG TOTAL) BY MOUTH BEFORE DINNER.     No current facility-administered medications for this visit.        SOCIAL HISTORY:  Social History     Socioeconomic History    Marital status:      Spouse name: Not on file    Number of children: Not on file    Years of education: Not on file    Highest education level: Not on file   Occupational History    Occupation: [employer]     Employer: [employer]     Employer: [employer]   Social Needs    Financial resource strain: Not hard at all    Food insecurity:     Worry: Never true     Inability: Never true    Transportation needs:     Medical: No     Non-medical: No   Tobacco Use    Smoking status: Former Smoker     Packs/day: 2.00     Years: 44.00     Pack years: 88.00     Types: Cigarettes     Last attempt to quit: 1969     Years since quittin.5    Smokeless tobacco: Never Used   Substance and Sexual Activity    Alcohol use: Yes     Alcohol/week: 0.0 oz     Frequency: Monthly or less     Drinks per session: 1 or 2     Binge frequency: Never     Comment: maybe a beer every 3 months    Drug use: No    Sexual activity: Not Currently   Lifestyle    Physical activity:     Days per week: 5 days     Minutes per session: 20 min    Stress: Not at all   Relationships    Social connections:     Talks on phone: More than three times a week     Gets together: Twice a week     Attends Synagogue service: Not on file     Active member of club or organization: Yes     Attends meetings of clubs or organizations: More than 4 times per year     Relationship status:    Other Topics Concern    Are you pregnant or think you may be? No    Breast-feeding No   Social History Narrative    Not on file       FAMILY HISTORY:  Family History   Problem Relation Age of Onset    Cancer Father         lung    Dementia Mother     Glaucoma Brother      "Macular degeneration Brother     Diabetes Neg Hx     Amblyopia Neg Hx     Blindness Neg Hx     Cataracts Neg Hx     Retinal detachment Neg Hx     Strabismus Neg Hx     Melanoma Neg Hx          PHYSICAL EXAMINATION:  Vitals:    07/25/19 1031   BP: 137/66   BP Location: Right arm   Pulse: 65   Resp: 20   Temp: 98.1 °F (36.7 °C)   TempSrc: Oral   Weight: 81 kg (178 lb 9.2 oz)   Height: 5' 3" (1.6 m)   Body mass index is 31.63 kg/m².  GENERAL: Patient is alert and oriented, in no acute distress.  HEENT:Extraocular muscles are intact.  Oropharynx is clear without lesions.  There is no cervical or supraclavicular lymphadenopathy palpated.  No thyromegaly noted.  HEART: Regular rate and rhythm.  LUNGS: Clear to auscultation bilaterally.  BREAST EXAM:  The scar secondary to lumpectomy is noted in the upper outer quadrant of the right breast.  No abnormal masses palpated in the right breast or right axilla.  On the left chest wall, a well-healed scar is noted secondary to previous mastectomy.  No abnormal masses palpated in the left chest wall or axilla.  ABDOMEN:Soft, nontender, nondistended, without hepatosplenomegaly.  Normoactive bowel sounds.  EXTREMITIES: No clubbing, cyanosis, or edema.  NEUROLOGICAL: Cranial nerve II through XII grossly intact.  Sensation is intact.  Strength is 5 out of 5 in the upper and lower extremities bilaterally.     ASSESSMENT:   This is a 78-year-old female with history of left breast cancer in 2001 who underwent left mastectomy and chemotherapy, now with low-grade DCIS of the right breast measuring 12 mm for which she underwent lumpectomy on June 27, 2019 with widely negative margins, ER positive, CO negative.    PLAN:   After discussion the multidisciplinary breast Conference and review of the available pathology and radiological images, Ms. Escobedo is noted to have low-grade DCIS of the right breast which was excised with margins greater than 1 cm.  I had a long discussion with the " patient and her daughter regarding the risks, benefits, and side effects of radiation versus observation.  All of her questions were answered.  She declines radiation at this time.  She will follow up with Dr. Peace as planned regarding endocrine therapy.  I plan to see her back as needed, unless symptoms warrant otherwise.  Contact information for our office was given to the patient in case of further questions.    Psychosocial Distress screening score of Distress Score: 0 noted and reviewed. No intervention indicated.    I spent approximately 60 minutes reviewing the available records and evaluating the patient, out of which over 50% of the time was spent face to face with the patient in counseling and coordinating this patient's care.

## 2019-07-29 ENCOUNTER — OFFICE VISIT (OUTPATIENT)
Dept: OPHTHALMOLOGY | Facility: CLINIC | Age: 79
End: 2019-07-29
Payer: MEDICARE

## 2019-07-29 VITALS — SYSTOLIC BLOOD PRESSURE: 113 MMHG | DIASTOLIC BLOOD PRESSURE: 71 MMHG | HEART RATE: 70 BPM

## 2019-07-29 DIAGNOSIS — I10 ESSENTIAL HYPERTENSION: ICD-10-CM

## 2019-07-29 DIAGNOSIS — Z96.1 PSEUDOPHAKIA: ICD-10-CM

## 2019-07-29 DIAGNOSIS — H26.493 PCO (POSTERIOR CAPSULAR OPACIFICATION), BILATERAL: Primary | ICD-10-CM

## 2019-07-29 PROCEDURE — 99999 PR PBB SHADOW E&M-EST. PATIENT-LVL III: ICD-10-PCS | Mod: PBBFAC,,, | Performed by: OPHTHALMOLOGY

## 2019-07-29 PROCEDURE — 99999 PR PBB SHADOW E&M-EST. PATIENT-LVL III: CPT | Mod: PBBFAC,,, | Performed by: OPHTHALMOLOGY

## 2019-07-29 PROCEDURE — 92012 INTRM OPH EXAM EST PATIENT: CPT | Mod: 57,S$GLB,, | Performed by: OPHTHALMOLOGY

## 2019-07-29 PROCEDURE — 66821 AFTER CATARACT LASER SURGERY: CPT | Mod: LT,S$GLB,, | Performed by: OPHTHALMOLOGY

## 2019-07-29 PROCEDURE — 66821 PR DISCISSION,2ND CATARACT,LASER: ICD-10-PCS | Mod: LT,S$GLB,, | Performed by: OPHTHALMOLOGY

## 2019-07-29 PROCEDURE — 92012 PR EYE EXAM, EST PATIENT,INTERMED: ICD-10-PCS | Mod: 57,S$GLB,, | Performed by: OPHTHALMOLOGY

## 2019-07-29 NOTE — PROGRESS NOTES
Subjective:       Patient ID: Jo-Ann Escobedo is a 78 y.o. female.    Chief Complaint: Laser Treatment    HPI     DSL- 7/3/19     77 y/o female is here for Poss Yag Laser. Pt c/o of blurry Va OS. Glare is   a bother.     Eyemeds  Systane OU PRN     Last edited by Divina Blue on 7/29/2019  3:23 PM. (History)             Assessment:       1. PCO (posterior capsular opacification), bilateral    2. Essential hypertension    3. Pseudophakia        Plan:       Visually significant  PCO OS- Pt. Wants Laser.     Early PCO OD- Not Visually Significant.  HTN-No retinopathy OS.      YAG CAP OS today. TE=   35.0 mj, Avg. 0.7 mj, 50 pulses.  PF taper OS.  Control HTN.  RTC 3 wks.

## 2019-08-19 ENCOUNTER — OFFICE VISIT (OUTPATIENT)
Dept: OPHTHALMOLOGY | Facility: CLINIC | Age: 79
End: 2019-08-19
Payer: MEDICARE

## 2019-08-19 DIAGNOSIS — Z98.890 POST-OPERATIVE STATE: Primary | ICD-10-CM

## 2019-08-19 DIAGNOSIS — H52.7 REFRACTIVE ERROR: ICD-10-CM

## 2019-08-19 DIAGNOSIS — Z96.1 PSEUDOPHAKIA: ICD-10-CM

## 2019-08-19 DIAGNOSIS — H26.491 PCO (POSTERIOR CAPSULAR OPACIFICATION), RIGHT: ICD-10-CM

## 2019-08-19 PROCEDURE — 99024 PR POST-OP FOLLOW-UP VISIT: ICD-10-PCS | Mod: S$GLB,,, | Performed by: OPHTHALMOLOGY

## 2019-08-19 PROCEDURE — 99999 PR PBB SHADOW E&M-EST. PATIENT-LVL II: CPT | Mod: PBBFAC,,, | Performed by: OPHTHALMOLOGY

## 2019-08-19 PROCEDURE — 99999 PR PBB SHADOW E&M-EST. PATIENT-LVL II: ICD-10-PCS | Mod: PBBFAC,,, | Performed by: OPHTHALMOLOGY

## 2019-08-19 PROCEDURE — 99024 POSTOP FOLLOW-UP VISIT: CPT | Mod: S$GLB,,, | Performed by: OPHTHALMOLOGY

## 2019-08-19 NOTE — PROGRESS NOTES
Subjective:       Patient ID: Jo-Ann Escobedo is a 78 y.o. female.    Chief Complaint: Post-op Evaluation (3 week PO OS. )    HPI     Post-op Evaluation      Additional comments: 3 week PO OS.               Comments     78 y.o. Female is here for 3 week PO YAG CAP OS. Denies eye pain and   flashes.  Notice an increase in floaters a couple of days after YAG Laser.   Floaters has subsided over the last two days. Vision has improve left eye.       Eye Meds: Systane TID to QID OU          Last edited by ZACK Mart on 8/19/2019  2:02 PM. (History)             Assessment:       1. Post-operative state    2. PCO (posterior capsular opacification), right    3. Refractive error    4. Pseudophakia        Plan:       S/p YAG CAP OS- Doing well.     Early PCO OD- Not Visually Significant.  RE-Pt wants MRx.      Give MRx.  RTC Dr Hinojosa in 6 mos.

## 2019-08-20 ENCOUNTER — OFFICE VISIT (OUTPATIENT)
Dept: HEMATOLOGY/ONCOLOGY | Facility: CLINIC | Age: 79
End: 2019-08-20
Payer: MEDICARE

## 2019-08-20 VITALS
HEIGHT: 63 IN | BODY MASS INDEX: 32.15 KG/M2 | TEMPERATURE: 99 F | SYSTOLIC BLOOD PRESSURE: 134 MMHG | HEART RATE: 65 BPM | DIASTOLIC BLOOD PRESSURE: 66 MMHG | WEIGHT: 181.44 LBS | RESPIRATION RATE: 16 BRPM | OXYGEN SATURATION: 95 %

## 2019-08-20 DIAGNOSIS — Z17.0 MALIGNANT NEOPLASM OF UPPER-OUTER QUADRANT OF RIGHT BREAST IN FEMALE, ESTROGEN RECEPTOR POSITIVE: Primary | ICD-10-CM

## 2019-08-20 DIAGNOSIS — Z85.3 HISTORY OF BREAST CANCER IN FEMALE: ICD-10-CM

## 2019-08-20 DIAGNOSIS — C50.411 MALIGNANT NEOPLASM OF UPPER-OUTER QUADRANT OF RIGHT BREAST IN FEMALE, ESTROGEN RECEPTOR POSITIVE: Primary | ICD-10-CM

## 2019-08-20 PROCEDURE — 3075F SYST BP GE 130 - 139MM HG: CPT | Mod: CPTII,S$GLB,, | Performed by: INTERNAL MEDICINE

## 2019-08-20 PROCEDURE — 3075F PR MOST RECENT SYSTOLIC BLOOD PRESS GE 130-139MM HG: ICD-10-PCS | Mod: CPTII,S$GLB,, | Performed by: INTERNAL MEDICINE

## 2019-08-20 PROCEDURE — 99214 PR OFFICE/OUTPT VISIT, EST, LEVL IV, 30-39 MIN: ICD-10-PCS | Mod: S$GLB,,, | Performed by: INTERNAL MEDICINE

## 2019-08-20 PROCEDURE — 3078F PR MOST RECENT DIASTOLIC BLOOD PRESSURE < 80 MM HG: ICD-10-PCS | Mod: CPTII,S$GLB,, | Performed by: INTERNAL MEDICINE

## 2019-08-20 PROCEDURE — 99999 PR PBB SHADOW E&M-EST. PATIENT-LVL III: ICD-10-PCS | Mod: PBBFAC,,, | Performed by: INTERNAL MEDICINE

## 2019-08-20 PROCEDURE — 1101F PT FALLS ASSESS-DOCD LE1/YR: CPT | Mod: CPTII,S$GLB,, | Performed by: INTERNAL MEDICINE

## 2019-08-20 PROCEDURE — 1101F PR PT FALLS ASSESS DOC 0-1 FALLS W/OUT INJ PAST YR: ICD-10-PCS | Mod: CPTII,S$GLB,, | Performed by: INTERNAL MEDICINE

## 2019-08-20 PROCEDURE — 99999 PR PBB SHADOW E&M-EST. PATIENT-LVL III: CPT | Mod: PBBFAC,,, | Performed by: INTERNAL MEDICINE

## 2019-08-20 PROCEDURE — 99214 OFFICE O/P EST MOD 30 MIN: CPT | Mod: S$GLB,,, | Performed by: INTERNAL MEDICINE

## 2019-08-20 PROCEDURE — 3078F DIAST BP <80 MM HG: CPT | Mod: CPTII,S$GLB,, | Performed by: INTERNAL MEDICINE

## 2019-08-20 NOTE — PROGRESS NOTES
Subjective:       Patient ID: Jo-Ann Escobedo is a 78 y.o. female.    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 Escobedo presents today for follow up.  Briefly, she is a 78 year-old female with a remote history of breast cancer treated 17 years ago with a left modified radical   mastectomy.  Her tumor was a T2 N1 M0 carcinoma.  She was treated with four cycles of AC and 4 cycles of Taxotere in the adjuvant setting and has remained cancer free since then.   A recent mammogram led to a contralateral biopsy and eventually to a lumpectomy on June 26th, 2019, for an area of DCIS, measuring 12mm.  Resection margins were clear, with the closest being 14 mm.  Her tumor was ER positive and TN negative.   She has made an uneventful recovery.  She has also met with the radiation oncologist and decided against XRT.      Review of Systems  Overall she feels well and she has no complaints.   She has fully recovered from her surgery, and her ECOG PS remains 0.   She denies any depression, easy bruising, fevers, chills, night sweats, weight loss, nausea, vomiting, diarrhea, constipation, diplopia, blurred vision headache, chest pain, abdominal pain, or difficulty ambulating. All other systems are negative.     Objective:      Physical Exam  GENERAL: She is alert, oriented to time, place, person; well nourished;   pleasant; in no acute physical distress.    VITAL SIGNS: Reviewed.   HEENT: Normal. There are no nasal, oral, lip, gingival, auricular, lid,   or conjunctival lesions. Mucosae are moist and pink, and there is no   thrush. Pupils are equal, reactive to light and accommodation.   Extraocular muscle movements are intact.   NECK: Supple without JVD, thyromegaly, or adenopathy.   LUNGS: Clear to auscultation without wheezing, rales, or rhonchi.   CARDIOVASCULAR: Reveals an S1, S2, no murmurs, no rubs, no gallops.   BREASTS: She is status post left mastectomy with no evidence of chest wall    recurrence. There are no masses in the right breast.   There is a scar form her recent lumpectomy in the upper outer quadrant of the right breast.  It is healing nicely.  ABDOMEN: Soft, nontender without organomegaly. Bowel sounds are identified.   EXTREMITIES: Have no cyanosis, clubbing, or edema.   SKIN: Does not have petechiae, rashes, induration, or ecchymoses.   NEUROLOGIC: Motor function is 5/5, DTRs are 0-1+ bilaterally, symmetrical,   and cranial nerves within normal limits.   LYMPHATIC: There is no cervical, axillary, or supraclavicular adenopathy.    Assessment:       1. Remote history of breast cancer.    2.     Recent diagnosis of contralateral DCIS, now s/p lumpectomy, doing well.   Plan:        I had a very long discussion with Mrs Escobedo.   As mentioned above, she decided against XRT.  We went over the option of adjuvant hormonal therapy, and also discussed the option of no further management.    Mrs Escobedo stated that she would feel comfortable foregoing adjuvant hormonal therapy, which, given her age, is not unreasonable.    Her multiple questions were answered to her satisfaction.  I have asked her to return in three months for follow up.

## 2019-09-18 ENCOUNTER — OFFICE VISIT (OUTPATIENT)
Dept: INTERNAL MEDICINE | Facility: CLINIC | Age: 79
End: 2019-09-18
Payer: MEDICARE

## 2019-09-18 ENCOUNTER — IMMUNIZATION (OUTPATIENT)
Dept: PHARMACY | Facility: CLINIC | Age: 79
End: 2019-09-18
Payer: MEDICARE

## 2019-09-18 VITALS
HEIGHT: 63 IN | SYSTOLIC BLOOD PRESSURE: 120 MMHG | WEIGHT: 182.56 LBS | TEMPERATURE: 99 F | HEART RATE: 74 BPM | DIASTOLIC BLOOD PRESSURE: 66 MMHG | OXYGEN SATURATION: 97 % | BODY MASS INDEX: 32.35 KG/M2

## 2019-09-18 DIAGNOSIS — B02.9 HERPES ZOSTER WITHOUT COMPLICATION: ICD-10-CM

## 2019-09-18 DIAGNOSIS — Z23 NEED FOR IMMUNIZATION AGAINST INFLUENZA: ICD-10-CM

## 2019-09-18 DIAGNOSIS — M19.90 OSTEOARTHRITIS, UNSPECIFIED OSTEOARTHRITIS TYPE, UNSPECIFIED SITE: ICD-10-CM

## 2019-09-18 DIAGNOSIS — Z79.899 MEDICATION MANAGEMENT: ICD-10-CM

## 2019-09-18 PROCEDURE — 3074F SYST BP LT 130 MM HG: CPT | Mod: CPTII,S$GLB,, | Performed by: FAMILY MEDICINE

## 2019-09-18 PROCEDURE — 99213 OFFICE O/P EST LOW 20 MIN: CPT | Mod: S$GLB,,, | Performed by: FAMILY MEDICINE

## 2019-09-18 PROCEDURE — 99999 PR PBB SHADOW E&M-EST. PATIENT-LVL III: CPT | Mod: PBBFAC,,, | Performed by: FAMILY MEDICINE

## 2019-09-18 PROCEDURE — 99999 PR PBB SHADOW E&M-EST. PATIENT-LVL III: ICD-10-PCS | Mod: PBBFAC,,, | Performed by: FAMILY MEDICINE

## 2019-09-18 PROCEDURE — 99213 PR OFFICE/OUTPT VISIT, EST, LEVL III, 20-29 MIN: ICD-10-PCS | Mod: S$GLB,,, | Performed by: FAMILY MEDICINE

## 2019-09-18 PROCEDURE — 1101F PR PT FALLS ASSESS DOC 0-1 FALLS W/OUT INJ PAST YR: ICD-10-PCS | Mod: CPTII,S$GLB,, | Performed by: FAMILY MEDICINE

## 2019-09-18 PROCEDURE — 3074F PR MOST RECENT SYSTOLIC BLOOD PRESSURE < 130 MM HG: ICD-10-PCS | Mod: CPTII,S$GLB,, | Performed by: FAMILY MEDICINE

## 2019-09-18 PROCEDURE — 3078F PR MOST RECENT DIASTOLIC BLOOD PRESSURE < 80 MM HG: ICD-10-PCS | Mod: CPTII,S$GLB,, | Performed by: FAMILY MEDICINE

## 2019-09-18 PROCEDURE — 3078F DIAST BP <80 MM HG: CPT | Mod: CPTII,S$GLB,, | Performed by: FAMILY MEDICINE

## 2019-09-18 PROCEDURE — 1101F PT FALLS ASSESS-DOCD LE1/YR: CPT | Mod: CPTII,S$GLB,, | Performed by: FAMILY MEDICINE

## 2019-09-18 RX ORDER — DICLOFENAC SODIUM 75 MG/1
75 TABLET, DELAYED RELEASE ORAL 2 TIMES DAILY PRN
Qty: 60 TABLET | Refills: 6 | Status: ON HOLD | OUTPATIENT
Start: 2019-09-18 | End: 2019-12-09 | Stop reason: SINTOL

## 2019-09-18 RX ORDER — OMEPRAZOLE 40 MG/1
CAPSULE, DELAYED RELEASE ORAL
Refills: 1 | Status: ON HOLD | COMMUNITY
Start: 2019-09-01 | End: 2019-12-14 | Stop reason: HOSPADM

## 2019-09-18 RX ORDER — VALACYCLOVIR HYDROCHLORIDE 1 G/1
1000 TABLET, FILM COATED ORAL 3 TIMES DAILY
Qty: 21 TABLET | Refills: 0 | Status: ON HOLD | OUTPATIENT
Start: 2019-09-18 | End: 2019-12-14 | Stop reason: HOSPADM

## 2019-09-18 NOTE — PROGRESS NOTES
"Subjective:      Patient ID: Jo-Ann Escobedo is a 78 y.o. female.    Chief Complaint: Hypertension (f/u); Diverticulitis (f/u); and Follow-up (6 month cc)      HPI:  Jo-Ann Escobedo is a 78 year old female with arthritis, history of CKD, DJD lumbar spine, history of breast cancer s/p left mastectomy, hyperlipidemia, hypertension, hypothyroidism, lipoma, multinodular goiter, obesity, Paget's disease of bone, and prediabetes who presents to clinic today for a "6 month follow up."    Last seen 6/10/19.  Unsure why patient presenting 3 months later for a 6 month follow up.    States she is having a lot of pain with her arthirits.  Mainly noted to her hands.  Used to take diclofenac for ~ 20 years but previous PCP, Dr. Ureña, discontinued this.  States she took some ibuprofen after a dental procedure.    States she read the medication insert for omeprazole and was noted not to take this beyond 1 year, has been on 18 - 24 months.  Denies stuart GERD symptoms but endorses history of hoarse voice and chronic sinus drainage and was recommended to start omeprazole at that time.    Complains of rash to the left flank, first noticed this morning.  Denies associated itching, pain, numbness, tingling.      Past Medical History:   Diagnosis Date    After-cataract of both eyes - Both Eyes 9/26/2012    Allergy     Arthritis     Breast cancer     Diverticulosis     Hyperlipidemia     Hypertension     Hypothyroidism     Paget disease of bone     Squamous Cell Carcinoma     in situ right neck     Thyroid disease        Past Surgical History:   Procedure Laterality Date    BIOPSY-EXCISIONAL Right 7/27/2015    Performed by Joshua Goldberg, MD at [facility] OR 2ND FLR    BLOCK, NERVE, FACET JOINT, LUMBAR, MEDIAL BRANCH-deo MBB L4/5,L5/S1 Bilateral 6/7/2019    Performed by Jarvis Morales III, MD at [facility] CATH LAB    CATARACT EXTRACTION W/  INTRAOCULAR LENS IMPLANT Bilateral     COLONOSCOPY N/A 4/15/2019    Performed by Artur" JESUS Monet MD at [facility] ENDO (4TH FLR)    COLONOSCOPY N/A 2015    Performed by Artur Monet MD at [facility] ENDO (4TH FLR)    EXCISIONAL BIOPSY-breast Right 2019    Performed by Suki Dill MD at [facility] OR 2ND FLR    EYE SURGERY      HYSTERECTOMY      KNEE ARTHROSCOPY N/A     pt unsure of which knee     MASTECTOMY      SPINE SURGERY      TOTAL THYROIDECTOMY      YAG Laser Capsulotomy  Left 2019    Dr. Hahn       Family History   Problem Relation Age of Onset    Cancer Father         lung    Dementia Mother     Glaucoma Brother     Macular degeneration Brother     Diabetes Neg Hx     Amblyopia Neg Hx     Blindness Neg Hx     Cataracts Neg Hx     Retinal detachment Neg Hx     Strabismus Neg Hx     Melanoma Neg Hx        Social History     Socioeconomic History    Marital status:      Spouse name: Not on file    Number of children: Not on file    Years of education: Not on file    Highest education level: Not on file   Occupational History    Occupation: realtor     Employer: [employer]     Employer: [employer]   Social Needs    Financial resource strain: Not hard at all    Food insecurity:     Worry: Never true     Inability: Never true    Transportation needs:     Medical: No     Non-medical: No   Tobacco Use    Smoking status: Former Smoker     Packs/day: 2.00     Years: 44.00     Pack years: 88.00     Types: Cigarettes     Last attempt to quit: 1969     Years since quittin.7    Smokeless tobacco: Never Used   Substance and Sexual Activity    Alcohol use: Yes     Alcohol/week: 0.0 oz     Frequency: Monthly or less     Drinks per session: 1 or 2     Binge frequency: Never     Comment: maybe a beer every 3 months    Drug use: No    Sexual activity: Not Currently   Lifestyle    Physical activity:     Days per week: 5 days     Minutes per session: 20 min    Stress: Not at all   Relationships    Social connections:     Talks on phone:  "More than three times a week     Gets together: Twice a week     Attends Worship service: Not on file     Active member of club or organization: Yes     Attends meetings of clubs or organizations: More than 4 times per year     Relationship status:    Other Topics Concern    Are you pregnant or think you may be? No    Breast-feeding No   Social History Narrative    Not on file       Review of Systems   Constitutional: Negative for activity change, chills, fatigue, fever and unexpected weight change.   HENT: Positive for rhinorrhea. Negative for congestion, hearing loss, nosebleeds, sore throat and trouble swallowing.    Eyes: Negative for pain, discharge and visual disturbance.   Respiratory: Negative for cough, chest tightness, shortness of breath and wheezing.    Cardiovascular: Negative for chest pain and palpitations.   Gastrointestinal: Negative for abdominal distention, abdominal pain, blood in stool, constipation, diarrhea, nausea and vomiting.   Endocrine: Negative for polydipsia and polyuria.   Genitourinary: Negative for decreased urine volume, difficulty urinating, dysuria, hematuria, menstrual problem and urgency.   Musculoskeletal: Positive for arthralgias. Negative for back pain, joint swelling, myalgias and neck pain.   Skin: Positive for rash. Negative for color change.   Neurological: Negative for dizziness, tremors, weakness, light-headedness, numbness and headaches.   Psychiatric/Behavioral: Negative for agitation, behavioral problems, confusion and dysphoric mood. The patient is not nervous/anxious.      Objective:     Vitals:    09/18/19 1359   BP: 120/66   BP Location: Right arm   Patient Position: Sitting   BP Method: Medium (Manual)   Pulse: 74   Temp: 98.7 °F (37.1 °C)   TempSrc: Oral   SpO2: 97%   Weight: 82.8 kg (182 lb 8.7 oz)   Height: 5' 3" (1.6 m)       Physical Exam   Constitutional: She is oriented to person, place, and time. She appears well-developed and " well-nourished.   HENT:   Head: Normocephalic and atraumatic.   Right Ear: External ear normal.   Left Ear: External ear normal.   Nose: Nose normal.   Mouth/Throat: Oropharynx is clear and moist.   Eyes: Pupils are equal, round, and reactive to light. Conjunctivae and EOM are normal.   Neck: Neck supple. No tracheal deviation present.   Cardiovascular: Normal rate, regular rhythm and normal heart sounds. Exam reveals no gallop and no friction rub.   No murmur heard.  Pulmonary/Chest: Effort normal and breath sounds normal. No respiratory distress. She has no wheezes. She has no rales.   Abdominal: Soft. Bowel sounds are normal. She exhibits no distension. There is no tenderness. There is no rebound and no guarding.   Musculoskeletal: She exhibits no deformity.   Neurological: She is alert and oriented to person, place, and time. Coordination normal.   Skin: Skin is warm and dry. Rash noted. Rash is vesicular. There is erythema.        Erythematous vesicular rash noted over left hip/flank, no tenderness to palpation, no exudate   Psychiatric: She has a normal mood and affect. Her behavior is normal.   Nursing note and vitals reviewed.     Assessment:      1. Medication management    2. Osteoarthritis, unspecified osteoarthritis type, unspecified site    3. Herpes zoster without complication    4. Need for immunization against influenza      Plan:   Jo-Ann was seen today for hypertension, diverticulitis and follow-up.    Diagnoses and all orders for this visit:    Medication management  -     Basic metabolic panel; Future in 1 month after restarting diclofenac; recommended adequate hydration, avoidance of other NSAIDs, ok to use Tylenol as needed for breakthrough pain.    Osteoarthritis, unspecified osteoarthritis type, unspecified site  -     Start diclofenac (VOLTAREN) 75 MG EC tablet; Take 1 tablet (75 mg total) by mouth 2 (two) times daily as needed (pain); recommended adequate hydration, avoidance of other  NSAIDs, ok to use Tylenol as needed for breakthrough pain.    Herpes zoster without complication  -     Start valACYclovir (VALTREX) 1000 MG tablet; Take 1 tablet (1,000 mg total) by mouth 3 (three) times daily. for 7 days.  Recommended patient notify me if she develops any associated pain, numbness or tingling.  Return to clinic if no improvement after completion of valacyclovir.    Need for immunization against influenza        -     FluzoneHD to be administered today.

## 2019-09-29 ENCOUNTER — HOSPITAL ENCOUNTER (EMERGENCY)
Facility: HOSPITAL | Age: 79
Discharge: HOME | End: 2019-09-29
Attending: EMERGENCY MEDICINE
Payer: MEDICARE

## 2019-09-29 VITALS
RESPIRATION RATE: 19 BRPM | WEIGHT: 182 LBS | DIASTOLIC BLOOD PRESSURE: 60 MMHG | OXYGEN SATURATION: 96 % | TEMPERATURE: 99 F | SYSTOLIC BLOOD PRESSURE: 134 MMHG | BODY MASS INDEX: 32.25 KG/M2 | HEART RATE: 56 BPM | HEIGHT: 63 IN

## 2019-09-29 DIAGNOSIS — N13.30 HYDROURETERONEPHROSIS: Primary | ICD-10-CM

## 2019-09-29 DIAGNOSIS — N20.0 RENAL CALCULUS, LEFT: ICD-10-CM

## 2019-09-29 LAB
ALBUMIN SERPL BCP-MCNC: 4.1 G/DL (ref 3.5–5.2)
ALP SERPL-CCNC: 85 U/L (ref 55–135)
ALT SERPL W/O P-5'-P-CCNC: 18 U/L (ref 10–44)
ANION GAP SERPL CALC-SCNC: 8 MMOL/L (ref 8–16)
AST SERPL-CCNC: 24 U/L (ref 10–40)
BACTERIA #/AREA URNS AUTO: ABNORMAL /HPF
BASOPHILS # BLD AUTO: 0.04 K/UL (ref 0–0.2)
BASOPHILS NFR BLD: 0.4 % (ref 0–1.9)
BILIRUB SERPL-MCNC: 0.4 MG/DL (ref 0.1–1)
BILIRUB UR QL STRIP: NEGATIVE
BUN SERPL-MCNC: 26 MG/DL (ref 8–23)
CALCIUM SERPL-MCNC: 9.8 MG/DL (ref 8.7–10.5)
CHLORIDE SERPL-SCNC: 107 MMOL/L (ref 95–110)
CLARITY UR REFRACT.AUTO: CLEAR
CO2 SERPL-SCNC: 25 MMOL/L (ref 23–29)
COLOR UR AUTO: YELLOW
CREAT SERPL-MCNC: 1.2 MG/DL (ref 0.5–1.4)
DIFFERENTIAL METHOD: ABNORMAL
EOSINOPHIL # BLD AUTO: 0 K/UL (ref 0–0.5)
EOSINOPHIL NFR BLD: 0.1 % (ref 0–8)
ERYTHROCYTE [DISTWIDTH] IN BLOOD BY AUTOMATED COUNT: 14 % (ref 11.5–14.5)
EST. GFR  (AFRICAN AMERICAN): 50 ML/MIN/1.73 M^2
EST. GFR  (NON AFRICAN AMERICAN): 43.4 ML/MIN/1.73 M^2
GLUCOSE SERPL-MCNC: 131 MG/DL (ref 70–110)
GLUCOSE UR QL STRIP: NEGATIVE
HCT VFR BLD AUTO: 39.4 % (ref 37–48.5)
HGB BLD-MCNC: 12.9 G/DL (ref 12–16)
HGB UR QL STRIP: ABNORMAL
IMM GRANULOCYTES # BLD AUTO: 0.05 K/UL (ref 0–0.04)
IMM GRANULOCYTES NFR BLD AUTO: 0.5 % (ref 0–0.5)
KETONES UR QL STRIP: NEGATIVE
LACTATE SERPL-SCNC: 1 MMOL/L (ref 0.5–2.2)
LEUKOCYTE ESTERASE UR QL STRIP: NEGATIVE
LIPASE SERPL-CCNC: 22 U/L (ref 4–60)
LYMPHOCYTES # BLD AUTO: 1.3 K/UL (ref 1–4.8)
LYMPHOCYTES NFR BLD: 12.3 % (ref 18–48)
MCH RBC QN AUTO: 31 PG (ref 27–31)
MCHC RBC AUTO-ENTMCNC: 32.7 G/DL (ref 32–36)
MCV RBC AUTO: 95 FL (ref 82–98)
MICROSCOPIC COMMENT: ABNORMAL
MONOCYTES # BLD AUTO: 0.6 K/UL (ref 0.3–1)
MONOCYTES NFR BLD: 5.7 % (ref 4–15)
NEUTROPHILS # BLD AUTO: 8.6 K/UL (ref 1.8–7.7)
NEUTROPHILS NFR BLD: 81 % (ref 38–73)
NITRITE UR QL STRIP: NEGATIVE
NRBC BLD-RTO: 0 /100 WBC
PH UR STRIP: 6 [PH] (ref 5–8)
PLATELET # BLD AUTO: 236 K/UL (ref 150–350)
PMV BLD AUTO: 10.4 FL (ref 9.2–12.9)
POTASSIUM SERPL-SCNC: 4 MMOL/L (ref 3.5–5.1)
PROT SERPL-MCNC: 7.7 G/DL (ref 6–8.4)
PROT UR QL STRIP: NEGATIVE
RBC # BLD AUTO: 4.16 M/UL (ref 4–5.4)
RBC #/AREA URNS AUTO: 6 /HPF (ref 0–4)
SODIUM SERPL-SCNC: 140 MMOL/L (ref 136–145)
SP GR UR STRIP: 1.02 (ref 1–1.03)
SQUAMOUS #/AREA URNS AUTO: 1 /HPF
URN SPEC COLLECT METH UR: ABNORMAL
WBC # BLD AUTO: 10.56 K/UL (ref 3.9–12.7)
WBC #/AREA URNS AUTO: 4 /HPF (ref 0–5)

## 2019-09-29 PROCEDURE — 83605 ASSAY OF LACTIC ACID: CPT

## 2019-09-29 PROCEDURE — 99284 PR EMERGENCY DEPT VISIT,LEVEL IV: ICD-10-PCS | Mod: ,,, | Performed by: EMERGENCY MEDICINE

## 2019-09-29 PROCEDURE — 25500020 PHARM REV CODE 255: Performed by: EMERGENCY MEDICINE

## 2019-09-29 PROCEDURE — 85025 COMPLETE CBC W/AUTO DIFF WBC: CPT

## 2019-09-29 PROCEDURE — 96360 HYDRATION IV INFUSION INIT: CPT | Mod: 59

## 2019-09-29 PROCEDURE — 25000003 PHARM REV CODE 250: Performed by: STUDENT IN AN ORGANIZED HEALTH CARE EDUCATION/TRAINING PROGRAM

## 2019-09-29 PROCEDURE — 81001 URINALYSIS AUTO W/SCOPE: CPT

## 2019-09-29 PROCEDURE — 63600175 PHARM REV CODE 636 W HCPCS: Performed by: STUDENT IN AN ORGANIZED HEALTH CARE EDUCATION/TRAINING PROGRAM

## 2019-09-29 PROCEDURE — 99284 EMERGENCY DEPT VISIT MOD MDM: CPT | Mod: ,,, | Performed by: EMERGENCY MEDICINE

## 2019-09-29 PROCEDURE — 99285 EMERGENCY DEPT VISIT HI MDM: CPT | Mod: 25

## 2019-09-29 PROCEDURE — 96361 HYDRATE IV INFUSION ADD-ON: CPT

## 2019-09-29 PROCEDURE — 80053 COMPREHEN METABOLIC PANEL: CPT

## 2019-09-29 PROCEDURE — 83690 ASSAY OF LIPASE: CPT

## 2019-09-29 RX ORDER — ACETAMINOPHEN 500 MG
1000 TABLET ORAL
Status: COMPLETED | OUTPATIENT
Start: 2019-09-29 | End: 2019-09-29

## 2019-09-29 RX ORDER — ONDANSETRON 2 MG/ML
4 INJECTION INTRAMUSCULAR; INTRAVENOUS EVERY 6 HOURS PRN
Status: DISCONTINUED | OUTPATIENT
Start: 2019-09-29 | End: 2019-09-29

## 2019-09-29 RX ORDER — ONDANSETRON 2 MG/ML
4 INJECTION INTRAMUSCULAR; INTRAVENOUS EVERY 6 HOURS PRN
Status: DISCONTINUED | OUTPATIENT
Start: 2019-09-29 | End: 2019-09-29 | Stop reason: HOSPADM

## 2019-09-29 RX ADMIN — ACETAMINOPHEN 1000 MG: 500 TABLET ORAL at 03:09

## 2019-09-29 RX ADMIN — SODIUM CHLORIDE, SODIUM LACTATE, POTASSIUM CHLORIDE, AND CALCIUM CHLORIDE 1000 ML: .6; .31; .03; .02 INJECTION, SOLUTION INTRAVENOUS at 03:09

## 2019-09-29 RX ADMIN — IOHEXOL 100 ML: 350 INJECTION, SOLUTION INTRAVENOUS at 06:09

## 2019-09-29 NOTE — ED PROVIDER NOTES
Encounter Date: 9/29/2019       History     Chief Complaint   Patient presents with    Abdominal Pain     Hx of diverticulitis. Denies fever, chills, rectal bleeding.      78 year old female with arthritis, history of CKD, DJD lumbar spine, h/o breast cancer s/p left mastectomy, HLD, HTN, hypothyroidism, Paget's disease of bone, and prediabetes who presents with lower abd pain that woke her 5:30am this morning. It is sharp, constant, and made worse with movement. She has not tried anything. She also had two episodes of non bloody emesis. She denies diarrhea, fever, swelling, bleeding. She has had diverticulitis previously and this pain is similar but worse. She has no h/o reflux or ulcer disease. She had a hysterectomy in the 1970s which included an appendectomy.     BMP 9/29/19 at 1528 (lab downtime)  Na 140  K 4  Cl 107  CO2 = 25  Gluc 131  BUN 26  SCr 1.24  Ca 9.8  TProt 7.7  Albumin 4.1   TBili 0.39  Alkphos 85  AST 24  ALT 18  Lipase 22    CBC 9/29/19 at 1528 (lab downtime)  WBC 10.5  RBC 4.16  Hgb 12.9  Hct 39.4  MCV 94.7  MCH 31  MCHC 32.7  Plt 236  Neut 81%        Review of patient's allergies indicates:   Allergen Reactions    Codeine Diarrhea and Hallucinations    Niacin preparations      Redness, warming     Past Medical History:   Diagnosis Date    After-cataract of both eyes - Both Eyes 9/26/2012    Allergy     Arthritis     Breast cancer     Diverticulosis     Hyperlipidemia     Hypertension     Hypothyroidism     Paget disease of bone     Squamous Cell Carcinoma     in situ right neck     Thyroid disease      Past Surgical History:   Procedure Laterality Date    CATARACT EXTRACTION W/  INTRAOCULAR LENS IMPLANT Bilateral     COLONOSCOPY N/A 4/15/2019    Procedure: COLONOSCOPY;  Surgeon: Artur Monet MD;  Location: [address]);  Service: Endoscopy;  Laterality: N/A;  within 1 month    EXCISIONAL BIOPSY Right 6/27/2019    Procedure: EXCISIONAL BIOPSY-breast;  Surgeon: Suki  NOEMY Dill MD;  [facility];  Service: General;  Laterality: Right;    EYE SURGERY      HYSTERECTOMY      INJECTION OF ANESTHETIC AGENT AROUND MEDIAL BRANCH NERVES INNERVATING LUMBAR FACET JOINT Bilateral 2019    Procedure: BLOCK, NERVE, FACET JOINT, LUMBAR, MEDIAL BRANCH-deo MBB L4/5,L5/S1;  Surgeon: Jarvis Morales III, MD;  Location: [facility] CATH LAB;  Service: Pain Management;  Laterality: Bilateral;    KNEE ARTHROSCOPY N/A     pt unsure of which knee     MASTECTOMY      SPINE SURGERY      TOTAL THYROIDECTOMY      YAG Laser Capsulotomy  Left 2019    Dr. Hahn     Family History   Problem Relation Age of Onset    Cancer Father         lung    Dementia Mother     Glaucoma Brother     Macular degeneration Brother     Diabetes Neg Hx     Amblyopia Neg Hx     Blindness Neg Hx     Cataracts Neg Hx     Retinal detachment Neg Hx     Strabismus Neg Hx     Melanoma Neg Hx      Social History     Tobacco Use    Smoking status: Former Smoker     Packs/day: 2.00     Years: 44.00     Pack years: 88.00     Types: Cigarettes     Last attempt to quit: 1969     Years since quittin.7    Smokeless tobacco: Never Used   Substance Use Topics    Alcohol use: Yes     Alcohol/week: 0.0 standard drinks     Frequency: Monthly or less     Drinks per session: 1 or 2     Binge frequency: Never     Comment: maybe a beer every 3 months    Drug use: No     Review of Systems   Constitutional: Positive for diaphoresis. Negative for chills and fever.   HENT: Positive for congestion.    Eyes: Negative for visual disturbance.   Respiratory: Negative for cough and shortness of breath.    Cardiovascular: Negative for chest pain, palpitations and leg swelling.   Gastrointestinal: Positive for abdominal distention, abdominal pain, nausea and vomiting. Negative for anal bleeding and blood in stool.   Endocrine: Negative for polyuria.   Genitourinary: Negative for decreased urine volume,  hematuria and urgency.   Musculoskeletal: Negative for arthralgias and back pain.   Skin: Negative for color change.   Neurological: Negative for dizziness, light-headedness and headaches.   Hematological: Negative for adenopathy.   Psychiatric/Behavioral: Negative for agitation.       Physical Exam     Initial Vitals   BP Pulse Resp Temp SpO2   09/29/19 1406 09/29/19 1406 09/29/19 1405 09/29/19 1406 09/29/19 1406   (!) 162/76 76 16 98.7 °F (37.1 °C) 95 %      MAP       --                Physical Exam    Constitutional: She appears well-developed and well-nourished. She is not diaphoretic. No distress.   HENT:   Head: Normocephalic and atraumatic.   Nose: Nose normal.   Mouth/Throat: No oropharyngeal exudate.   Eyes: Conjunctivae and EOM are normal. Right eye exhibits no discharge. Left eye exhibits no discharge. No scleral icterus.   Neck: Normal range of motion. Neck supple. No tracheal deviation present. No JVD present.   Cardiovascular: Normal rate, regular rhythm, normal heart sounds and intact distal pulses.   No murmur heard.  Pulmonary/Chest: Breath sounds normal. No stridor. No respiratory distress. She has no wheezes. She has no rales.   Abdominal: Soft. Bowel sounds are normal. She exhibits no distension. There is tenderness. There is no rebound and no guarding.   Musculoskeletal: She exhibits no edema or tenderness.   Neurological: She is alert and oriented to person, place, and time. She has normal strength.   Skin: Skin is warm and dry. Capillary refill takes less than 2 seconds.   Psychiatric: She has a normal mood and affect.         ED Course   Procedures  Labs Reviewed   COMPREHENSIVE METABOLIC PANEL   CBC W/ AUTO DIFFERENTIAL   LACTIC ACID, PLASMA   LIPASE   URINALYSIS, REFLEX TO URINE CULTURE          Imaging Results    None          Medical Decision Making:   Initial Assessment:   79 yo woman with h/o diverticulosis disease and previous episodes that included hospitalization and abx for a few  days. She presents with lower abd pain that woke her 5:30am this morning. It is sharp, constant, and made worse with movement. She has not tried anything. She also had two episodes of non bloody emesis. She denies diarrhea, fever, swelling, bleeding. She has had diverticulitis previously and this pain is similar but worse. She has no h/o reflux or ulcer disease. She had a hysterectomy in the 1970s which included an appendectomy.   Differential Diagnosis:   Diverticulitis - history  Gastritis - N/V, abd pain  Pancreatitis - abd pain  SBO - previous abd surgeries    Clinical Tests:   Lab Tests: Ordered  Radiological Study: Ordered  ED Management:  Studies: CBC, BMP, Lipase, CT abd w/ IV contrast once SCr back     Treatments: LR x 1L, Zofran IV prn, APAP 1gm po x 1              Attending Attestation:   Physician Attestation Statement for Resident:  As the supervising MD   Physician Attestation Statement: I have personally seen and examined this patient.   I agree with the above history. -:   As the supervising MD I agree with the above PE.    As the supervising MD I agree with the above treatment, course, plan, and disposition.   -: 78-year-old female presents with left lower quadrant pain that started this morning, nonradiating, continuous, with associated nausea no vomiting, no diarrhea no constipation, no dysuria no frequency of urination no hematuria, no melena or hematemesis or hematochezia.  Pain is 8 to 9/10 similar to previous pain episodes associated with diverticulitis        Labs reviewed   Hemoglobin 12.9, platelets 236  White count 10.5  BMP with creatinine of 1.2    Lipase 22  AST and total bili within normal limits    Patient was signed-out to  at the change of shift with plan for:  CT a/p pending r/o diverticulitis                      ED Course as of Sep 29 2147   Sun Sep 29, 2019   1800 Cr 1.2 can go for CT    [EB]      ED Course User Index  [EB] Wilda Chaney MD     Clinical  Impression:   No diagnosis found.    Abdominal Pain  ED Management:  Studies: CBC, BMP, Lipase, CT abd w/ IV contrast  Treatments: LR x 1L, Zofran IV prn, APAP 1gm po x 1    F/u - CT Abdomen for acute process                         Bibiana eMndoza MD  Resident  09/29/19 0230       Zo Olivas MD  09/29/19 [id number]

## 2019-09-29 NOTE — ED TRIAGE NOTES
Pt presents with c/o left lower abdominal discomfort and nausea. Symptoms started early in the morning. Pt denies bowel/bladder problems. Last Bm today.

## 2019-09-30 NOTE — PROGRESS NOTES
7:50 PM  RN notified me that upon returning from CT pt reported 9/10 LLQ/Left flank pain and nausea, elevated SBP to 190s and HR in 40s (previously 60s). I evaluated the pt and she reported that the nausea and pain had subsided spontaneously. She was not given any anti-emetics or analgesics (received Tylenol earlier without significant change in pain). HR mostly high 50s while I was in the room with the pt. BP cuff on right arm, pt's elbow bent. Re-cycled BP with pt's arm straightened out and SBP 140s.    UA 1+ blood, neg for infection. Pt stable for d/c home with po hydration and sparing use of NSAIDs for severe pain. Will  on s/sx to return to ED.

## 2019-10-18 ENCOUNTER — LAB VISIT (OUTPATIENT)
Dept: LAB | Facility: HOSPITAL | Age: 79
End: 2019-10-18
Attending: FAMILY MEDICINE
Payer: MEDICARE

## 2019-10-18 DIAGNOSIS — R73.03 PREDIABETES: ICD-10-CM

## 2019-10-18 DIAGNOSIS — Z79.899 MEDICATION MANAGEMENT: ICD-10-CM

## 2019-10-18 LAB
ANION GAP SERPL CALC-SCNC: 8 MMOL/L (ref 8–16)
BUN SERPL-MCNC: 21 MG/DL (ref 8–23)
CALCIUM SERPL-MCNC: 9.5 MG/DL (ref 8.7–10.5)
CHLORIDE SERPL-SCNC: 108 MMOL/L (ref 95–110)
CO2 SERPL-SCNC: 26 MMOL/L (ref 23–29)
CREAT SERPL-MCNC: 0.9 MG/DL (ref 0.5–1.4)
EST. GFR  (AFRICAN AMERICAN): >60 ML/MIN/1.73 M^2
EST. GFR  (NON AFRICAN AMERICAN): >60 ML/MIN/1.73 M^2
ESTIMATED AVG GLUCOSE: 114 MG/DL (ref 68–131)
GLUCOSE SERPL-MCNC: 85 MG/DL (ref 70–110)
HBA1C MFR BLD HPLC: 5.6 % (ref 4–5.6)
POTASSIUM SERPL-SCNC: 4.1 MMOL/L (ref 3.5–5.1)
SODIUM SERPL-SCNC: 142 MMOL/L (ref 136–145)

## 2019-10-18 PROCEDURE — 80048 BASIC METABOLIC PNL TOTAL CA: CPT

## 2019-10-18 PROCEDURE — 83036 HEMOGLOBIN GLYCOSYLATED A1C: CPT

## 2019-10-18 PROCEDURE — 36415 COLL VENOUS BLD VENIPUNCTURE: CPT

## 2019-10-21 ENCOUNTER — PATIENT OUTREACH (OUTPATIENT)
Dept: ADMINISTRATIVE | Facility: HOSPITAL | Age: 79
End: 2019-10-21

## 2019-10-21 ENCOUNTER — TELEPHONE (OUTPATIENT)
Dept: INTERNAL MEDICINE | Facility: CLINIC | Age: 79
End: 2019-10-21

## 2019-10-21 DIAGNOSIS — R73.03 PREDIABETES: Primary | ICD-10-CM

## 2019-10-21 NOTE — TELEPHONE ENCOUNTER
A1c in 6 months to monitor prediabetes; please process/notify patient.  Explanation sent via portal.

## 2019-10-24 ENCOUNTER — OFFICE VISIT (OUTPATIENT)
Dept: INTERNAL MEDICINE | Facility: CLINIC | Age: 79
End: 2019-10-24
Payer: MEDICARE

## 2019-10-24 VITALS
HEART RATE: 62 BPM | OXYGEN SATURATION: 97 % | HEIGHT: 63 IN | WEIGHT: 185.88 LBS | DIASTOLIC BLOOD PRESSURE: 70 MMHG | SYSTOLIC BLOOD PRESSURE: 162 MMHG | TEMPERATURE: 99 F | BODY MASS INDEX: 32.93 KG/M2

## 2019-10-24 DIAGNOSIS — K62.5 BRBPR (BRIGHT RED BLOOD PER RECTUM): ICD-10-CM

## 2019-10-24 DIAGNOSIS — N20.0 NEPHROLITHIASIS: ICD-10-CM

## 2019-10-24 DIAGNOSIS — I10 ESSENTIAL HYPERTENSION: ICD-10-CM

## 2019-10-24 DIAGNOSIS — N13.30 HYDROURETERONEPHROSIS: ICD-10-CM

## 2019-10-24 DIAGNOSIS — H81.10 BENIGN PAROXYSMAL POSITIONAL VERTIGO, UNSPECIFIED LATERALITY: ICD-10-CM

## 2019-10-24 PROCEDURE — 3078F DIAST BP <80 MM HG: CPT | Mod: CPTII,S$GLB,, | Performed by: FAMILY MEDICINE

## 2019-10-24 PROCEDURE — 3077F PR MOST RECENT SYSTOLIC BLOOD PRESSURE >= 140 MM HG: ICD-10-PCS | Mod: CPTII,S$GLB,, | Performed by: FAMILY MEDICINE

## 2019-10-24 PROCEDURE — 99214 PR OFFICE/OUTPT VISIT, EST, LEVL IV, 30-39 MIN: ICD-10-PCS | Mod: S$GLB,,, | Performed by: FAMILY MEDICINE

## 2019-10-24 PROCEDURE — 1101F PR PT FALLS ASSESS DOC 0-1 FALLS W/OUT INJ PAST YR: ICD-10-PCS | Mod: CPTII,S$GLB,, | Performed by: FAMILY MEDICINE

## 2019-10-24 PROCEDURE — 99999 PR PBB SHADOW E&M-EST. PATIENT-LVL IV: ICD-10-PCS | Mod: PBBFAC,,, | Performed by: FAMILY MEDICINE

## 2019-10-24 PROCEDURE — 1101F PT FALLS ASSESS-DOCD LE1/YR: CPT | Mod: CPTII,S$GLB,, | Performed by: FAMILY MEDICINE

## 2019-10-24 PROCEDURE — 3077F SYST BP >= 140 MM HG: CPT | Mod: CPTII,S$GLB,, | Performed by: FAMILY MEDICINE

## 2019-10-24 PROCEDURE — 3078F PR MOST RECENT DIASTOLIC BLOOD PRESSURE < 80 MM HG: ICD-10-PCS | Mod: CPTII,S$GLB,, | Performed by: FAMILY MEDICINE

## 2019-10-24 PROCEDURE — 99999 PR PBB SHADOW E&M-EST. PATIENT-LVL IV: CPT | Mod: PBBFAC,,, | Performed by: FAMILY MEDICINE

## 2019-10-24 PROCEDURE — 99214 OFFICE O/P EST MOD 30 MIN: CPT | Mod: S$GLB,,, | Performed by: FAMILY MEDICINE

## 2019-10-24 RX ORDER — MECLIZINE HCL 12.5 MG 12.5 MG/1
12.5 TABLET ORAL 3 TIMES DAILY PRN
Qty: 30 TABLET | Refills: 2 | Status: SHIPPED | OUTPATIENT
Start: 2019-10-24 | End: 2022-07-14

## 2019-10-24 NOTE — PROGRESS NOTES
Subjective:      Patient ID: Jo-Ann Escobedo is a 78 y.o. female.    Chief Complaint: Follow-up (ER )      HPI:  Jo-Ann Escobedo is a 78 year old female with arthritis, history of CKD, DJD lumbar spine, history of breast cancer s/p left mastectomy, hyperlipidemia, hypertension, hypothyroidism, lipoma, multinodular goiter, obesity, Paget's disease of bone, and prediabetes who presents to clinic today for emergency department follow up.    Presented to ED 9/29/19 due to lower abdominal pain that woke her up the morning of presentation.  It was sharp, constant, and made worse with movement.  Endorses associated non-bloody emesis x2.   Labs showed Hemoglobin 12.9, platelets 236, White count 10.5, BMP with creatinine of 1.2, Lipase 22, AST and total bili within normal limits.  CT showed mild left-sided hydroureteronephrosis secondary to a 2 mm calculus at the UVJ; right renal nonobstructing nephrolith; bilateral renal hypoattenuating masses some of which are simple cysts and others are too small to characterize.  Given IV fluids, IV Zofran, Tylenol PO x1.  UA 1+ blood, neg for infection. Pt d/c home with po hydration and sparing use of NSAIDs for severe pain.  Has not passed the stone that she knows of.  States she has had some small spots of blood in her urine intermittently.  States she has had intermittent abdominal pain since her ED visit; takes Diclofenac which does help.      States she passed about a quarter of a cup of pure blood from her rectum shortly after her hospital visit about 3 weeks ago.  Had colonoscopy earlier this year that showed In the sigmoid colon there were two diverticula with granulation protruding, which may be the source of her recent bleeding.    Elevated blood pressure noted today.  Was elevated in ED improved with monitoring.  States she had a crazy morning this morning.  Was rushed this morning.  Denies pain currently.    Has had 3 episodes of vertigo over the past 6 months.  States the  symptoms typically do not last very long (2-3 minutes) and improve with rest.  Has had associated room spinning and nausea.      Past Medical History:   Diagnosis Date    After-cataract of both eyes - Both Eyes 9/26/2012    Allergy     Arthritis     Breast cancer     Diverticulosis     Hyperlipidemia     Hypertension     Hypothyroidism     Paget disease of bone     Squamous Cell Carcinoma     in situ right neck     Thyroid disease        Past Surgical History:   Procedure Laterality Date    CATARACT EXTRACTION W/  INTRAOCULAR LENS IMPLANT Bilateral     COLONOSCOPY N/A 4/15/2019    Procedure: COLONOSCOPY;  Surgeon: Artur Monet MD;  Location: [facility] ENDO (4TH FLR);  Service: Endoscopy;  Laterality: N/A;  within 1 month    EXCISIONAL BIOPSY Right 6/27/2019    Procedure: EXCISIONAL BIOPSY-breast;  Surgeon: Suki Dill MD;  Location: [facility] OR 2ND FLR;  Service: General;  Laterality: Right;    EYE SURGERY      HYSTERECTOMY      INJECTION OF ANESTHETIC AGENT AROUND MEDIAL BRANCH NERVES INNERVATING LUMBAR FACET JOINT Bilateral 6/7/2019    Procedure: BLOCK, NERVE, FACET JOINT, LUMBAR, MEDIAL BRANCH-deo MBB L4/5,L5/S1;  Surgeon: Jarvis Morales III, MD;  Location: [facility] CATH LAB;  Service: Pain Management;  Laterality: Bilateral;    KNEE ARTHROSCOPY N/A     pt unsure of which knee     MASTECTOMY      SPINE SURGERY      TOTAL THYROIDECTOMY      YAG Laser Capsulotomy  Left 07/29/2019    Dr. Hahn       Family History   Problem Relation Age of Onset    Cancer Father         lung    Dementia Mother     Glaucoma Brother     Macular degeneration Brother     Diabetes Neg Hx     Amblyopia Neg Hx     Blindness Neg Hx     Cataracts Neg Hx     Retinal detachment Neg Hx     Strabismus Neg Hx     Melanoma Neg Hx        Social History     Socioeconomic History    Marital status:      Spouse name: Not on file    Number of children: Not on file    Years of education: Not on file     Highest education level: Not on file   Occupational History    Occupation: [employer]     Employer: [email or web address]     Employer: [employer]   Social Needs    Financial resource strain: Not hard at all    Food insecurity:     Worry: Never true     Inability: Never true    Transportation needs:     Medical: No     Non-medical: No   Tobacco Use    Smoking status: Former Smoker     Packs/day: 2.00     Years: 44.00     Pack years: 88.00     Types: Cigarettes     Last attempt to quit: 1969     Years since quittin.8    Smokeless tobacco: Never Used   Substance and Sexual Activity    Alcohol use: Yes     Alcohol/week: 0.0 standard drinks     Frequency: Monthly or less     Drinks per session: 1 or 2     Binge frequency: Never     Comment: maybe a beer every 3 months    Drug use: No    Sexual activity: Not Currently   Lifestyle    Physical activity:     Days per week: 5 days     Minutes per session: 20 min    Stress: Not at all   Relationships    Social connections:     Talks on phone: More than three times a week     Gets together: Twice a week     Attends Jewish service: Not on file     Active member of club or organization: Yes     Attends meetings of clubs or organizations: More than 4 times per year     Relationship status:    Other Topics Concern    Are you pregnant or think you may be? No    Breast-feeding No   Social History Narrative    Not on file       Review of Systems   Constitutional: Negative for activity change, chills, fatigue, fever and unexpected weight change.   HENT: Negative for congestion, hearing loss, nosebleeds, rhinorrhea, sore throat and trouble swallowing.    Eyes: Negative for pain, discharge and visual disturbance.   Respiratory: Negative for cough, chest tightness, shortness of breath and wheezing.    Cardiovascular: Negative for chest pain and palpitations.   Gastrointestinal: Negative for abdominal distention, constipation, diarrhea, nausea and  "vomiting.   Endocrine: Negative for polydipsia.   Genitourinary: Negative for decreased urine volume, difficulty urinating, dysuria, hematuria, menstrual problem and urgency.   Musculoskeletal: Negative for back pain, joint swelling, myalgias and neck pain.   Skin: Negative for color change and rash.   Neurological: Negative for tremors, weakness, light-headedness, numbness and headaches.   Psychiatric/Behavioral: Negative for agitation, behavioral problems and dysphoric mood. The patient is not nervous/anxious.      Objective:     Vitals:    10/24/19 0920 10/24/19 0953   BP: (!) 154/68 (!) 162/70   BP Location: Left arm Right arm   Patient Position: Sitting Sitting   BP Method: Medium (Manual)    Pulse: 62    Temp: 98.8 °F (37.1 °C)    TempSrc: Oral    SpO2: 97%    Weight: 84.3 kg (185 lb 13.6 oz)    Height: 5' 3" (1.6 m)        Physical Exam   Constitutional: She appears well-developed and well-nourished.   HENT:   Head: Normocephalic and atraumatic.   Right Ear: External ear normal.   Left Ear: External ear normal.   Nose: Nose normal.   Mouth/Throat: Oropharynx is clear and moist.   Eyes: Pupils are equal, round, and reactive to light. Conjunctivae are normal.   Neck: Neck supple. No tracheal deviation present.   Cardiovascular: Normal rate, regular rhythm and normal heart sounds. Exam reveals no gallop and no friction rub.   No murmur heard.  Pulmonary/Chest: Effort normal and breath sounds normal. No respiratory distress. She has no wheezes. She has no rales.   Abdominal: Soft. Bowel sounds are normal. She exhibits no distension. There is no tenderness. There is no rebound, no guarding and no CVA tenderness.   Musculoskeletal: She exhibits no deformity.   Neurological: She is alert. Coordination normal.   Skin: Skin is warm and dry.   Psychiatric: She has a normal mood and affect. Her behavior is normal.   Nursing note and vitals reviewed.     Assessment:      1. Nephrolithiasis    2. Hydroureteronephrosis  "   3. BRBPR (bright red blood per rectum)    4. Essential hypertension    5. Benign paroxysmal positional vertigo, unspecified laterality      Plan:   Jo-Ann was seen today for follow-up.    Diagnoses and all orders for this visit:    Nephrolithiasis; Hydroureteronephrosis  -     Ambulatory Referral to Urology; recommended adequate hydration, OTC NSAIDs PRN with food.  To notify me if pain not controlled.    BRBPR (bright red blood per rectum)        -     Recommended follow up with Dr. Monet    Essential hypertension        -     Above goal on recheck.  Pt states she is sure this is due to being rushed this morning.  Continue current regimen at present.  Return to clinic in one week for nursing BP check; if persistently elevated increase losartan at that time.    Benign paroxysmal positional vertigo, unspecified laterality  -     Start meclizine (ANTIVERT) 12.5 mg tablet; Take 1 tablet (12.5 mg total) by mouth 3 (three) times daily as needed for Dizziness.  -     Recommended home Epley maneuver; hand out provided.

## 2019-10-31 ENCOUNTER — PATIENT MESSAGE (OUTPATIENT)
Dept: ADMINISTRATIVE | Facility: OTHER | Age: 79
End: 2019-10-31

## 2019-10-31 ENCOUNTER — CLINICAL SUPPORT (OUTPATIENT)
Dept: INTERNAL MEDICINE | Facility: CLINIC | Age: 79
End: 2019-10-31
Payer: MEDICARE

## 2019-10-31 DIAGNOSIS — I10 ESSENTIAL HYPERTENSION: ICD-10-CM

## 2019-10-31 PROCEDURE — 99999 PR PBB SHADOW E&M-EST. PATIENT-LVL I: CPT | Mod: PBBFAC,,,

## 2019-10-31 PROCEDURE — 99999 PR PBB SHADOW E&M-EST. PATIENT-LVL I: ICD-10-PCS | Mod: PBBFAC,,,

## 2019-10-31 RX ORDER — LOSARTAN POTASSIUM 50 MG/1
75 TABLET ORAL DAILY
Qty: 135 TABLET | Refills: 3 | Status: SHIPPED | OUTPATIENT
Start: 2019-10-31 | End: 2019-12-23 | Stop reason: SDUPTHER

## 2019-10-31 NOTE — PROGRESS NOTES
Pt here for nursing BP check.  Above goal.  Increase losartan to 75 mg by mouth daily; Rx sent electronically to reflect this change.  Recommend low sodium diet and daily aerobic exercise.  RTC 1-2 weeks for repeat nursing BP check.  Start digital HTN.

## 2019-11-01 ENCOUNTER — PATIENT OUTREACH (OUTPATIENT)
Dept: OTHER | Facility: OTHER | Age: 79
End: 2019-11-01

## 2019-11-01 NOTE — LETTER
December 5, 2019     Jo-Ann Escobedo  [address] [facility] [address]       Dear Jo-Ann,    Thank you for your interest in [facility], a clinically-proven program designed to help you manage your chronic condition more conveniently and effectively. [facility] gives you:   Technology that lets you monitor your health at home and send readings directly to your care team at [facility]   Medications managed by a dedicated pharmacist or clinician    A  who calls and helps you take manageable steps towards a healthier lifestyle       We have tried to reach you via phone and [facility] Message to complete your enrollment in the Digital Medicine program. Unfortunately, weve been unable to reach you.     Please call us to complete your enrollment. Our number is [phone]. If we dont hear from you by 12/26/2019, we will be unable to complete your enrollment at this time.     We look forward to hearing from you soon.    Sincerely,     The Digital Medicine Team

## 2019-11-01 NOTE — LETTER
December 5, 2019     Jo-Ann Escobedo  [address] [facility] [address]       Dear Jo-Ann,    Thank you for your interest in [facility], a clinically-proven program designed to help you manage your chronic condition more conveniently and effectively. [facility] gives you:   Technology that lets you monitor your health at home and send readings directly to your care team at [facility]   Medications managed by a dedicated pharmacist or clinician    A  who calls and helps you take manageable steps towards a healthier lifestyle       We have tried to reach you via phone and [facility] Message to complete your enrollment in the Digital Medicine program. Unfortunately, weve been unable to reach you.     Please call us to complete your enrollment. Our number is [phone]. If we dont hear from you by 12/26/2019, we will be unable to complete your enrollment at this time.     We look forward to hearing from you soon.    Sincerely,     The Digital Medicine Team

## 2019-11-01 NOTE — LETTER
December 5, 2019     Jo-Ann Escobedo  [address] [facility] [address]       Dear Jo-Ann,    Welcome to [facility]! Our goal is to make care effective, proactive and convenient by using data you send us from home to better treat your chronic conditions.              My name is Yoli Quinonez, and I am your dedicated Digital Medicine clinician. As an expert in medication management, I will help ensure that the medications you are taking continue to provide the intended benefits and help you reach your goals. You can reach me directly at [phone] or by sending me a message directly through your [facility] account.      I am Christopher Vázquez and I will be your health . My job is to help you identify lifestyle changes to improve your disease control. We will talk about nutrition, exercise, and other ways you may be able to adjust your current habits to better your health. Additionally, we will help ensure you are completing the tests and screenings that are necessary to help manage your conditions. You can reach me directly at [phone] or by sending me a message directly through your [facility] account.    Most importantly, YOU are at the center of this team. Together, we will work to improve your overall health and encourage you to meet your goals for a healthier lifestyle.     What we expect from YOU:  · Please take frequent home blood pressure measurements. We ask that you take at least 1 blood pressure reading per week, but more information will better help us get you know you. Be sure you rest for a few minutes before taking the reading in a quiet, comfortable place.     Be available to receive phone calls or [facility] messages, when appropriate, from your care team. Please let us know if there are any specific days or times that work best for us to reach you via phone.     Complete routine tests and screenings. Dont worry, we will help keep you on track!           What you should  expect from your Digital Medicine Care Team:   We will work with you to create a personalized plan of care and provide you with encouragement and education, including regarding lifestyle changes, that could help you manage your disease states.     We will adjust your current medications, if needed, and continue to monitor your long-term progress.     We will provide you and your physician with monthly progress reports after you have been in the program for more than 30 days.     We will send you reminders through [facility] and text messages to help ensure you do not miss any testing deadlines to help manage your disease states.    You will be able to reach us by phone or through your [facility] account by clicking our names under Care Team on the right side of the home screen.    I look forward to working with you to achieve your blood pressure goals!    We look forward to working with you to help manage your health,    Sincerely,    Your Digital Medicine Team    Please visit our websites to learn more:   · Hypertension: [email or web address]      Remember, we are not available for emergencies. If you have an emergency, please contact your doctors office directly or call [facility] on-call ([phone] or [phone]) or [phone].

## 2019-11-01 NOTE — LETTER
December 5, 2019     Jo-Ann Escobedo  [address] [facility] [address]       Dear Jo-Ann,    Welcome to [facility]! Our goal is to make care effective, proactive and convenient by using data you send us from home to better treat your chronic conditions.              My name is Yoli Quinonez, and I am your dedicated Digital Medicine clinician. As an expert in medication management, I will help ensure that the medications you are taking continue to provide the intended benefits and help you reach your goals. You can reach me directly at [phone] or by sending me a message directly through your [facility] account.      I am Christopher Vázquez and I will be your health . My job is to help you identify lifestyle changes to improve your disease control. We will talk about nutrition, exercise, and other ways you may be able to adjust your current habits to better your health. Additionally, we will help ensure you are completing the tests and screenings that are necessary to help manage your conditions. You can reach me directly at [phone] or by sending me a message directly through your [facility] account.    Most importantly, YOU are at the center of this team. Together, we will work to improve your overall health and encourage you to meet your goals for a healthier lifestyle.     What we expect from YOU:  · Please take frequent home blood pressure measurements. We ask that you take at least 1 blood pressure reading per week, but more information will better help us get you know you. Be sure you rest for a few minutes before taking the reading in a quiet, comfortable place.     Be available to receive phone calls or [facility] messages, when appropriate, from your care team. Please let us know if there are any specific days or times that work best for us to reach you via phone.     Complete routine tests and screenings. Dont worry, we will help keep you on track!           What you should  expect from your Digital Medicine Care Team:   We will work with you to create a personalized plan of care and provide you with encouragement and education, including regarding lifestyle changes, that could help you manage your disease states.     We will adjust your current medications, if needed, and continue to monitor your long-term progress.     We will provide you and your physician with monthly progress reports after you have been in the program for more than 30 days.     We will send you reminders through [facility] and text messages to help ensure you do not miss any testing deadlines to help manage your disease states.    You will be able to reach us by phone or through your [facility] account by clicking our names under Care Team on the right side of the home screen.    I look forward to working with you to achieve your blood pressure goals!    We look forward to working with you to help manage your health,    Sincerely,    Your Digital Medicine Team    Please visit our websites to learn more:   · Hypertension: [email or web address]      Remember, we are not available for emergencies. If you have an emergency, please contact your doctors office directly or call [facility] on-call ([phone] or [phone]) or [phone].

## 2019-11-18 ENCOUNTER — OFFICE VISIT (OUTPATIENT)
Dept: UROLOGY | Facility: CLINIC | Age: 79
End: 2019-11-18
Payer: MEDICARE

## 2019-11-18 VITALS
BODY MASS INDEX: 32.03 KG/M2 | WEIGHT: 180.75 LBS | HEIGHT: 63 IN | DIASTOLIC BLOOD PRESSURE: 65 MMHG | HEART RATE: 71 BPM | SYSTOLIC BLOOD PRESSURE: 134 MMHG

## 2019-11-18 DIAGNOSIS — N20.0 KIDNEY STONES: Primary | ICD-10-CM

## 2019-11-18 LAB
AMORPH CRY UR QL COMP ASSIST: ABNORMAL
BACTERIA #/AREA URNS AUTO: ABNORMAL /HPF
BILIRUB UR QL STRIP: NEGATIVE
CAOX CRY UR QL COMP ASSIST: ABNORMAL
CLARITY UR REFRACT.AUTO: ABNORMAL
COLOR UR AUTO: YELLOW
GLUCOSE UR QL STRIP: NEGATIVE
HGB UR QL STRIP: NEGATIVE
KETONES UR QL STRIP: NEGATIVE
LEUKOCYTE ESTERASE UR QL STRIP: NEGATIVE
MICROSCOPIC COMMENT: ABNORMAL
NITRITE UR QL STRIP: NEGATIVE
PH UR STRIP: 7 [PH] (ref 5–8)
PROT UR QL STRIP: NEGATIVE
SP GR UR STRIP: 1.01 (ref 1–1.03)
URN SPEC COLLECT METH UR: ABNORMAL
WBC #/AREA URNS AUTO: 2 /HPF (ref 0–5)

## 2019-11-18 PROCEDURE — 3078F PR MOST RECENT DIASTOLIC BLOOD PRESSURE < 80 MM HG: ICD-10-PCS | Mod: CPTII,S$GLB,, | Performed by: UROLOGY

## 2019-11-18 PROCEDURE — 99204 PR OFFICE/OUTPT VISIT, NEW, LEVL IV, 45-59 MIN: ICD-10-PCS | Mod: S$GLB,,, | Performed by: UROLOGY

## 2019-11-18 PROCEDURE — 87088 URINE BACTERIA CULTURE: CPT

## 2019-11-18 PROCEDURE — 1101F PT FALLS ASSESS-DOCD LE1/YR: CPT | Mod: CPTII,S$GLB,, | Performed by: UROLOGY

## 2019-11-18 PROCEDURE — 87186 SC STD MICRODIL/AGAR DIL: CPT

## 2019-11-18 PROCEDURE — 87077 CULTURE AEROBIC IDENTIFY: CPT

## 2019-11-18 PROCEDURE — 1101F PR PT FALLS ASSESS DOC 0-1 FALLS W/OUT INJ PAST YR: ICD-10-PCS | Mod: CPTII,S$GLB,, | Performed by: UROLOGY

## 2019-11-18 PROCEDURE — 87086 URINE CULTURE/COLONY COUNT: CPT

## 2019-11-18 PROCEDURE — 3075F SYST BP GE 130 - 139MM HG: CPT | Mod: CPTII,S$GLB,, | Performed by: UROLOGY

## 2019-11-18 PROCEDURE — 99204 OFFICE O/P NEW MOD 45 MIN: CPT | Mod: S$GLB,,, | Performed by: UROLOGY

## 2019-11-18 PROCEDURE — 99999 PR PBB SHADOW E&M-EST. PATIENT-LVL III: ICD-10-PCS | Mod: PBBFAC,,, | Performed by: UROLOGY

## 2019-11-18 PROCEDURE — 3078F DIAST BP <80 MM HG: CPT | Mod: CPTII,S$GLB,, | Performed by: UROLOGY

## 2019-11-18 PROCEDURE — 3075F PR MOST RECENT SYSTOLIC BLOOD PRESS GE 130-139MM HG: ICD-10-PCS | Mod: CPTII,S$GLB,, | Performed by: UROLOGY

## 2019-11-18 PROCEDURE — 99999 PR PBB SHADOW E&M-EST. PATIENT-LVL III: CPT | Mod: PBBFAC,,, | Performed by: UROLOGY

## 2019-11-18 PROCEDURE — 81001 URINALYSIS AUTO W/SCOPE: CPT

## 2019-11-18 NOTE — PROGRESS NOTES
Subjective:      Patient ID: Jo-Ann Escobedo is a 78 y.o. female.    Chief Complaint: Nephrolithiasis    Patient is a 78 y.o. female who is new to our clinic and referred by their PCP, Dr. Kerr for evaluation of kidney stones.     HPI    Urolithiasis  Patient complained of left abdominal pain without radiation to the groin. Onset of symptoms was abrupt starting 6 weeks ago with controlled course since that time. Patient describes the pain as sharp and rated as severe at the onset.   She felt like she was having a bout of diverticulitis similar to what she has had in the past. The patient has had no nausea/no vomiting and no diaphoresis. There has been no fever or chills. The patient is not complaining of dysuria or frequency. Risk factors for urolithiasis: history of stone disease.      Her pain has largely resolved.  She occasionally gets morning pain that is of minimal severity compared to the pain that brought her to the emergency department.    Review of Systems   All other systems reviewed and negative except pertinent positives noted in HPI.    Objective:     Physical Exam   Constitutional: She is oriented to person, place, and time. She appears well-developed and well-nourished. She is cooperative.  Non-toxic appearance.   HENT:   Head: Normocephalic.   Cardiovascular: Normal rate, intact distal pulses and normal pulses.    Pulmonary/Chest: Effort normal. No accessory muscle usage. No tachypnea. No respiratory distress.   Abdominal: Soft. Normal appearance. She exhibits no distension, no fluid wave, no ascites and no mass. There is no tenderness. There is no rebound and no CVA tenderness. No hernia.   Liver/spleen non-palpable   Musculoskeletal: Normal range of motion.   Neurological: She is alert and oriented to person, place, and time. She has normal strength.   Skin: No bruising and no rash noted.     Psychiatric: She has a normal mood and affect. Her speech is normal and behavior is normal. Thought  content normal.     Assessment:     1. Kidney stones      Plan:     1. Kidney stones        Orders Placed This Encounter   Procedures    US Retroperitoneal Complete (Kidney and     Standing Status:   Future     Standing Expiration Date:   11/17/2020     Order Specific Question:   Reason for Exam:     Answer:   microhematuria    X-Ray Abdomen AP 1 View     Standing Status:   Future     Standing Expiration Date:   11/17/2020     1. Kidney stones:  -General risk factors for kidney stones and the conservative measures to prevent kidney stones in the future were discussed with the patient in detail.  The patient was encouraged to drink 2-3 liters of water a day, limit iced tea and neha as well as foods high in oxalate.  They were cautioned to try to limit salt and red meat intake.  We also discussed adding citrate to the diet with the addition of lacy or lemon juice to their water or alternatively with crystal light.   -CT scan was independently reviewed today and reveals   2 mm left UVJ stone with hydronephrosis.  -Urinalysis/urine culture  -renal US and KUB   To assess for stone passage

## 2019-11-18 NOTE — LETTER
November 18, 2019      Dakota Kerr MD  [address]  [facility] [address]           [facility]  [address]  [facility] [address]  Phone: [phone]          Patient: Jo-Ann Escobedo   MR Number: [id number]   YOB: 1940   Date of Visit: 11/18/2019       Dear Dr. Dakota Kerr:    Thank you for referring Jo-Ann Escobedo to me for evaluation. Attached you will find relevant portions of my assessment and plan of care.    If you have questions, please do not hesitate to call me. I look forward to following Jo-Ann Escobedo along with you.    Sincerely,    Federico Rachel MD    Enclosure  CC:  No Recipients    If you would like to receive this communication electronically, please contact [email or web address] or [phone] to request more information on e-contratos Link access.    For providers and/or their staff who would like to refer a patient to [facility], please contact us through our one-stop-shop provider referral line, [facility] [place], at [phone].    If you feel you have received this communication in error or would no longer like to receive these types of communications, please e-mail [email or web address]

## 2019-11-19 ENCOUNTER — HOSPITAL ENCOUNTER (OUTPATIENT)
Dept: RADIOLOGY | Facility: HOSPITAL | Age: 79
Discharge: HOME | End: 2019-11-19
Attending: UROLOGY
Payer: MEDICARE

## 2019-11-19 DIAGNOSIS — N20.0 KIDNEY STONES: ICD-10-CM

## 2019-11-19 PROCEDURE — 74018 RADEX ABDOMEN 1 VIEW: CPT | Mod: 26,,, | Performed by: RADIOLOGY

## 2019-11-19 PROCEDURE — 76770 US EXAM ABDO BACK WALL COMP: CPT | Mod: TC

## 2019-11-19 PROCEDURE — 76770 US EXAM ABDO BACK WALL COMP: CPT | Mod: 26,,, | Performed by: RADIOLOGY

## 2019-11-19 PROCEDURE — 74018 XR ABDOMEN AP 1 VIEW: ICD-10-PCS | Mod: 26,,, | Performed by: RADIOLOGY

## 2019-11-19 PROCEDURE — 74018 RADEX ABDOMEN 1 VIEW: CPT | Mod: TC

## 2019-11-19 PROCEDURE — 76770 US RETROPERITONEAL COMPLETE: ICD-10-PCS | Mod: 26,,, | Performed by: RADIOLOGY

## 2019-11-20 LAB
BACTERIA UR CULT: ABNORMAL
BACTERIA UR CULT: ABNORMAL

## 2019-11-21 ENCOUNTER — TELEPHONE (OUTPATIENT)
Dept: UROLOGY | Facility: CLINIC | Age: 79
End: 2019-11-21

## 2019-11-21 DIAGNOSIS — N20.0 KIDNEY STONES: Primary | ICD-10-CM

## 2019-11-21 RX ORDER — CIPROFLOXACIN 500 MG/1
500 TABLET ORAL 2 TIMES DAILY
Qty: 14 TABLET | Refills: 0 | Status: SHIPPED | OUTPATIENT
Start: 2019-11-21 | End: 2019-11-28

## 2019-11-21 NOTE — PROGRESS NOTES
Please also notify the patient that her renal US showed no hydronephrosis suggesting she has passed her stone.  Thank you.  She can f/u in 6 months with a renal US.

## 2019-11-25 ENCOUNTER — OFFICE VISIT (OUTPATIENT)
Dept: HEMATOLOGY/ONCOLOGY | Facility: CLINIC | Age: 79
End: 2019-11-25
Payer: MEDICARE

## 2019-11-25 VITALS
TEMPERATURE: 98 F | BODY MASS INDEX: 32.46 KG/M2 | HEIGHT: 63 IN | HEART RATE: 74 BPM | SYSTOLIC BLOOD PRESSURE: 133 MMHG | OXYGEN SATURATION: 95 % | RESPIRATION RATE: 16 BRPM | WEIGHT: 183.19 LBS | DIASTOLIC BLOOD PRESSURE: 71 MMHG

## 2019-11-25 DIAGNOSIS — Z17.0 MALIGNANT NEOPLASM OF UPPER-OUTER QUADRANT OF RIGHT BREAST IN FEMALE, ESTROGEN RECEPTOR POSITIVE: Primary | ICD-10-CM

## 2019-11-25 DIAGNOSIS — C50.411 MALIGNANT NEOPLASM OF UPPER-OUTER QUADRANT OF RIGHT BREAST IN FEMALE, ESTROGEN RECEPTOR POSITIVE: Primary | ICD-10-CM

## 2019-11-25 DIAGNOSIS — K21.9 LARYNGOPHARYNGEAL REFLUX (LPR): ICD-10-CM

## 2019-11-25 PROCEDURE — 1101F PR PT FALLS ASSESS DOC 0-1 FALLS W/OUT INJ PAST YR: ICD-10-PCS | Mod: CPTII,S$GLB,, | Performed by: INTERNAL MEDICINE

## 2019-11-25 PROCEDURE — 3078F PR MOST RECENT DIASTOLIC BLOOD PRESSURE < 80 MM HG: ICD-10-PCS | Mod: CPTII,S$GLB,, | Performed by: INTERNAL MEDICINE

## 2019-11-25 PROCEDURE — 1126F AMNT PAIN NOTED NONE PRSNT: CPT | Mod: S$GLB,,, | Performed by: INTERNAL MEDICINE

## 2019-11-25 PROCEDURE — 1101F PT FALLS ASSESS-DOCD LE1/YR: CPT | Mod: CPTII,S$GLB,, | Performed by: INTERNAL MEDICINE

## 2019-11-25 PROCEDURE — 1126F PR PAIN SEVERITY QUANTIFIED, NO PAIN PRESENT: ICD-10-PCS | Mod: S$GLB,,, | Performed by: INTERNAL MEDICINE

## 2019-11-25 PROCEDURE — 1159F PR MEDICATION LIST DOCUMENTED IN MEDICAL RECORD: ICD-10-PCS | Mod: S$GLB,,, | Performed by: INTERNAL MEDICINE

## 2019-11-25 PROCEDURE — 1159F MED LIST DOCD IN RCRD: CPT | Mod: S$GLB,,, | Performed by: INTERNAL MEDICINE

## 2019-11-25 PROCEDURE — 99999 PR PBB SHADOW E&M-EST. PATIENT-LVL V: ICD-10-PCS | Mod: PBBFAC,,, | Performed by: INTERNAL MEDICINE

## 2019-11-25 PROCEDURE — 99213 OFFICE O/P EST LOW 20 MIN: CPT | Mod: S$GLB,,, | Performed by: INTERNAL MEDICINE

## 2019-11-25 PROCEDURE — 3078F DIAST BP <80 MM HG: CPT | Mod: CPTII,S$GLB,, | Performed by: INTERNAL MEDICINE

## 2019-11-25 PROCEDURE — 3075F PR MOST RECENT SYSTOLIC BLOOD PRESS GE 130-139MM HG: ICD-10-PCS | Mod: CPTII,S$GLB,, | Performed by: INTERNAL MEDICINE

## 2019-11-25 PROCEDURE — 99213 PR OFFICE/OUTPT VISIT, EST, LEVL III, 20-29 MIN: ICD-10-PCS | Mod: S$GLB,,, | Performed by: INTERNAL MEDICINE

## 2019-11-25 PROCEDURE — 3075F SYST BP GE 130 - 139MM HG: CPT | Mod: CPTII,S$GLB,, | Performed by: INTERNAL MEDICINE

## 2019-11-25 PROCEDURE — 99999 PR PBB SHADOW E&M-EST. PATIENT-LVL V: CPT | Mod: PBBFAC,,, | Performed by: INTERNAL MEDICINE

## 2019-11-25 RX ORDER — OMEPRAZOLE 40 MG/1
40 CAPSULE, DELAYED RELEASE ORAL
Qty: 90 CAPSULE | Refills: 1 | Status: ON HOLD | OUTPATIENT
Start: 2019-11-25 | End: 2019-12-14 | Stop reason: HOSPADM

## 2019-11-25 NOTE — PROGRESS NOTES
Subjective:       Patient ID: Jo-Ann Escobedo is a 79 y.o. female.    Chief Complaint: No chief complaint on file.    HPI   Ms. Escobedo presents today for follow up.  Briefly, she is a 79 year-old female with a remote history of breast cancer treated 17 years ago with a left modified radical   mastectomy.  Her tumor was a T2 N1 M0 carcinoma.  She was treated with four cycles of AC and 4 cycles of Taxotere in the adjuvant setting and has remained cancer free since then.   A recent mammogram led to a contralateral biopsy and eventually to a lumpectomy on June 26th, 2019, for an area of DCIS, measuring 12mm.  Resection margins were clear, with the closest being 14 mm.  Her tumor was ER positive and OH negative.   She met with the radiation oncologist and decided against XRT.      Review of Systems  Overall she feels well and she has no complaints.   She has fully recovered from her surgery, and her ECOG PS remains 0.   She denies any depression, easy bruising, fevers, chills, night sweats, weight loss, nausea, vomiting, diarrhea, constipation, diplopia, blurred vision headache, chest pain, abdominal pain, or difficulty ambulating. All other systems are negative.     Objective:      Physical Exam  GENERAL: She is alert, oriented to time, place, person; well nourished;   pleasant; in no acute physical distress.    VITAL SIGNS: Reviewed.   HEENT: Normal. There are no nasal, oral, lip, gingival, auricular, lid,   or conjunctival lesions. Mucosae are moist and pink, and there is no   thrush. Pupils are equal, reactive to light and accommodation.   Extraocular muscle movements are intact.   NECK: Supple without JVD, thyromegaly, or adenopathy.   LUNGS: Clear to auscultation without wheezing, rales, or rhonchi.   CARDIOVASCULAR: Reveals an S1, S2, no murmurs, no rubs, no gallops.   BREASTS: She is status post left mastectomy with no evidence of chest wall   recurrence. There are no masses in the right breast.   There is a scar  form her recent lumpectomy in the upper outer quadrant of the right breast.    ABDOMEN: Soft, nontender without organomegaly. Bowel sounds are identified.   EXTREMITIES: Have no cyanosis, clubbing, or edema.   SKIN: Does not have petechiae, rashes, induration, or ecchymoses.   NEUROLOGIC: Motor function is 5/5, DTRs are 0-1+ bilaterally, symmetrical,   and cranial nerves within normal limits.   LYMPHATIC: There is no cervical, axillary, or supraclavicular adenopathy.    Assessment:       1. Remote history of breast cancer.    2.     Recent diagnosis of contralateral DCIS, now s/p lumpectomy, doing well.   Plan:        I had a very long discussion with Mrs Escobedo.   I have asked her to return in February with a mammogram.  Her multiple questions were answered to her satisfaction.

## 2019-11-27 ENCOUNTER — PATIENT MESSAGE (OUTPATIENT)
Dept: HEMATOLOGY/ONCOLOGY | Facility: CLINIC | Age: 79
End: 2019-11-27

## 2019-12-02 ENCOUNTER — DOCUMENTATION ONLY (OUTPATIENT)
Dept: SURGERY | Facility: CLINIC | Age: 79
End: 2019-12-02

## 2019-12-05 ENCOUNTER — PATIENT OUTREACH (OUTPATIENT)
Dept: ADMINISTRATIVE | Facility: OTHER | Age: 79
End: 2019-12-05

## 2019-12-05 NOTE — PROGRESS NOTES
Digital Medicine: Health  Introduction    Introduced Jo-Ann Escobedo to Digital Medicine. Discussed health  role and recommended lifestyle modifications.    The history is provided by the patient. No  was used.     HYPERTENSION  Our goal is to get BP to consistently below 130/80mmHg and make the process convenient so patient can avoid extra trips to the office. Getting your blood pressure below 130/80mmHg (definition of control) will reduce your risk for heart attack, kidney failure, stroke and death (as well as kidney failure, eye disease, & dementia)      Reviewed that the Digital Medicine care team - consisting of a clinician and a health  - will follow the most current evidence-based national guidelines for treating your condition.  The health  will focus on lifestyle modifications and motivation while the clinician will focus on medication therapy.  The care team will review all data on a regular basis and reach out as needed.      Explained that one of the key parts of the program is communication with the care team.  Asked patient to respond to outreach attempts and complete questionnaires.  Stressed importance of medication adherence.    Explained that we expect patient to obtain several blood pressures per week at random times of day.  Instructed patient not to allow anyone else to use phone and monitoring device.  Confirmed appropriate BP monitoring technique.      Explained to patient that the digital medicine team is not available for emergencies.  Patient will call [facility] on-call ([phone] or [phone]) or [phone] if needed.      Patient's BP goal is 130/80.Patient's BP average is 139/72 mmHg, which is above goal, per 2017 ACC/AHA Hypertension Guidelines.          Last 5 Patient Entered Readings                                      Current 30 Day Average: 139/72     Recent Readings 12/5/2019 12/5/2019 12/4/2019 12/3/2019 12/3/2019    SBP (mmHg) 111 123 138 132  143    DBP (mmHg) 64 65 87 62 66    Pulse 86 86 83 72 70            Intervention/Plan    There are no preventive care reminders to display for this patient.    Reviewed the importance of self-monitoring, medication adherence, and that the health  can be used as a resource for lifestyle modifications to help reduce or maintain a healthy lifestyle.    Sent link to [facility] webpages and my contact information via [email or web address] for future questions. Follow up scheduled.         Screenings    SDOH

## 2019-12-06 ENCOUNTER — HOSPITAL ENCOUNTER (INPATIENT)
Facility: HOSPITAL | Age: 79
LOS: 8 days | Discharge: HOME HEALTH | DRG: 378 | End: 2019-12-14
Attending: EMERGENCY MEDICINE | Admitting: HOSPITALIST
Payer: MEDICARE

## 2019-12-06 ENCOUNTER — OFFICE VISIT (OUTPATIENT)
Dept: SURGERY | Facility: CLINIC | Age: 79
End: 2019-12-06
Payer: MEDICARE

## 2019-12-06 VITALS
BODY MASS INDEX: 32.66 KG/M2 | HEART RATE: 74 BPM | TEMPERATURE: 99 F | WEIGHT: 184.31 LBS | HEIGHT: 63 IN | SYSTOLIC BLOOD PRESSURE: 127 MMHG | DIASTOLIC BLOOD PRESSURE: 70 MMHG

## 2019-12-06 DIAGNOSIS — R00.0 TACHYCARDIA: ICD-10-CM

## 2019-12-06 DIAGNOSIS — K57.33 DIVERTICULITIS OF LARGE INTESTINE WITHOUT PERFORATION OR ABSCESS WITH BLEEDING: ICD-10-CM

## 2019-12-06 DIAGNOSIS — K52.9 COLITIS: Primary | ICD-10-CM

## 2019-12-06 DIAGNOSIS — K92.1 MELENA: Primary | ICD-10-CM

## 2019-12-06 DIAGNOSIS — K92.2 LOWER GI BLEED: ICD-10-CM

## 2019-12-06 DIAGNOSIS — K92.2 GASTROINTESTINAL HEMORRHAGE, UNSPECIFIED GASTROINTESTINAL HEMORRHAGE TYPE: ICD-10-CM

## 2019-12-06 DIAGNOSIS — K57.90 DIVERTICULOSIS: ICD-10-CM

## 2019-12-06 DIAGNOSIS — K92.2 GI BLEED: ICD-10-CM

## 2019-12-06 DIAGNOSIS — K92.2 GIB (GASTROINTESTINAL BLEEDING): ICD-10-CM

## 2019-12-06 DIAGNOSIS — I49.9 ARRHYTHMIA: ICD-10-CM

## 2019-12-06 DIAGNOSIS — R41.3 MEMORY DIFFICULTY: ICD-10-CM

## 2019-12-06 LAB
ALBUMIN SERPL BCP-MCNC: 3.8 G/DL (ref 3.5–5.2)
ALP SERPL-CCNC: 66 U/L (ref 55–135)
ALT SERPL W/O P-5'-P-CCNC: 16 U/L (ref 10–44)
ANION GAP SERPL CALC-SCNC: 7 MMOL/L (ref 8–16)
AST SERPL-CCNC: 23 U/L (ref 10–40)
BACTERIA #/AREA URNS AUTO: ABNORMAL /HPF
BASOPHILS # BLD AUTO: 0.06 K/UL (ref 0–0.2)
BASOPHILS NFR BLD: 0.7 % (ref 0–1.9)
BILIRUB SERPL-MCNC: 0.4 MG/DL (ref 0.1–1)
BILIRUB UR QL STRIP: NEGATIVE
BUN SERPL-MCNC: 30 MG/DL (ref 8–23)
CALCIUM SERPL-MCNC: 9.7 MG/DL (ref 8.7–10.5)
CHLORIDE SERPL-SCNC: 108 MMOL/L (ref 95–110)
CLARITY UR REFRACT.AUTO: CLEAR
CO2 SERPL-SCNC: 26 MMOL/L (ref 23–29)
COLOR UR AUTO: YELLOW
CREAT SERPL-MCNC: 0.9 MG/DL (ref 0.5–1.4)
DIFFERENTIAL METHOD: ABNORMAL
EOSINOPHIL # BLD AUTO: 0.2 K/UL (ref 0–0.5)
EOSINOPHIL NFR BLD: 2.4 % (ref 0–8)
ERYTHROCYTE [DISTWIDTH] IN BLOOD BY AUTOMATED COUNT: 13.9 % (ref 11.5–14.5)
EST. GFR  (AFRICAN AMERICAN): >60 ML/MIN/1.73 M^2
EST. GFR  (NON AFRICAN AMERICAN): >60 ML/MIN/1.73 M^2
GLUCOSE SERPL-MCNC: 94 MG/DL (ref 70–110)
GLUCOSE UR QL STRIP: NEGATIVE
HCT VFR BLD AUTO: 34.5 % (ref 37–48.5)
HGB BLD-MCNC: 10.5 G/DL (ref 12–16)
HGB UR QL STRIP: ABNORMAL
IMM GRANULOCYTES # BLD AUTO: 0.06 K/UL (ref 0–0.04)
IMM GRANULOCYTES NFR BLD AUTO: 0.7 % (ref 0–0.5)
INR PPP: 1.1 (ref 0.8–1.2)
KETONES UR QL STRIP: NEGATIVE
LEUKOCYTE ESTERASE UR QL STRIP: ABNORMAL
LIPASE SERPL-CCNC: 47 U/L (ref 4–60)
LYMPHOCYTES # BLD AUTO: 2.9 K/UL (ref 1–4.8)
LYMPHOCYTES NFR BLD: 33.3 % (ref 18–48)
MCH RBC QN AUTO: 30.4 PG (ref 27–31)
MCHC RBC AUTO-ENTMCNC: 30.4 G/DL (ref 32–36)
MCV RBC AUTO: 100 FL (ref 82–98)
MICROSCOPIC COMMENT: ABNORMAL
MONOCYTES # BLD AUTO: 0.8 K/UL (ref 0.3–1)
MONOCYTES NFR BLD: 9 % (ref 4–15)
NEUTROPHILS # BLD AUTO: 4.7 K/UL (ref 1.8–7.7)
NEUTROPHILS NFR BLD: 53.9 % (ref 38–73)
NITRITE UR QL STRIP: NEGATIVE
NRBC BLD-RTO: 0 /100 WBC
PH UR STRIP: 5 [PH] (ref 5–8)
PLATELET # BLD AUTO: 264 K/UL (ref 150–350)
PMV BLD AUTO: 10.4 FL (ref 9.2–12.9)
POTASSIUM SERPL-SCNC: 4.1 MMOL/L (ref 3.5–5.1)
PROT SERPL-MCNC: 6.9 G/DL (ref 6–8.4)
PROT UR QL STRIP: NEGATIVE
PROTHROMBIN TIME: 11.2 SEC (ref 9–12.5)
RBC # BLD AUTO: 3.45 M/UL (ref 4–5.4)
RBC #/AREA URNS AUTO: >100 /HPF (ref 0–4)
SODIUM SERPL-SCNC: 141 MMOL/L (ref 136–145)
SP GR UR STRIP: 1.02 (ref 1–1.03)
SQUAMOUS #/AREA URNS AUTO: 0 /HPF
URN SPEC COLLECT METH UR: ABNORMAL
WBC # BLD AUTO: 8.71 K/UL (ref 3.9–12.7)
WBC #/AREA URNS AUTO: 12 /HPF (ref 0–5)

## 2019-12-06 PROCEDURE — 96365 THER/PROPH/DIAG IV INF INIT: CPT

## 2019-12-06 PROCEDURE — 46600 DIAGNOSTIC ANOSCOPY SPX: CPT | Mod: S$GLB,,, | Performed by: NURSE PRACTITIONER

## 2019-12-06 PROCEDURE — 25500020 PHARM REV CODE 255: Performed by: EMERGENCY MEDICINE

## 2019-12-06 PROCEDURE — 83690 ASSAY OF LIPASE: CPT

## 2019-12-06 PROCEDURE — C9113 INJ PANTOPRAZOLE SODIUM, VIA: HCPCS | Performed by: STUDENT IN AN ORGANIZED HEALTH CARE EDUCATION/TRAINING PROGRAM

## 2019-12-06 PROCEDURE — 85025 COMPLETE CBC W/AUTO DIFF WBC: CPT

## 2019-12-06 PROCEDURE — 3078F DIAST BP <80 MM HG: CPT | Mod: CPTII,S$GLB,, | Performed by: NURSE PRACTITIONER

## 2019-12-06 PROCEDURE — 99999 PR PBB SHADOW E&M-EST. PATIENT-LVL III: CPT | Mod: PBBFAC,,, | Performed by: NURSE PRACTITIONER

## 2019-12-06 PROCEDURE — 12000002 HC ACUTE/MED SURGE SEMI-PRIVATE ROOM

## 2019-12-06 PROCEDURE — 1101F PT FALLS ASSESS-DOCD LE1/YR: CPT | Mod: CPTII,S$GLB,, | Performed by: NURSE PRACTITIONER

## 2019-12-06 PROCEDURE — 93010 EKG 12-LEAD: ICD-10-PCS | Mod: ,,, | Performed by: INTERNAL MEDICINE

## 2019-12-06 PROCEDURE — 99284 PR EMERGENCY DEPT VISIT,LEVEL IV: ICD-10-PCS | Mod: ,,, | Performed by: EMERGENCY MEDICINE

## 2019-12-06 PROCEDURE — 63600175 PHARM REV CODE 636 W HCPCS: Performed by: STUDENT IN AN ORGANIZED HEALTH CARE EDUCATION/TRAINING PROGRAM

## 2019-12-06 PROCEDURE — 1159F MED LIST DOCD IN RCRD: CPT | Mod: S$GLB,,, | Performed by: NURSE PRACTITIONER

## 2019-12-06 PROCEDURE — 93005 ELECTROCARDIOGRAM TRACING: CPT

## 2019-12-06 PROCEDURE — 99284 EMERGENCY DEPT VISIT MOD MDM: CPT | Mod: ,,, | Performed by: EMERGENCY MEDICINE

## 2019-12-06 PROCEDURE — 1159F PR MEDICATION LIST DOCUMENTED IN MEDICAL RECORD: ICD-10-PCS | Mod: S$GLB,,, | Performed by: NURSE PRACTITIONER

## 2019-12-06 PROCEDURE — 3078F PR MOST RECENT DIASTOLIC BLOOD PRESSURE < 80 MM HG: ICD-10-PCS | Mod: CPTII,S$GLB,, | Performed by: NURSE PRACTITIONER

## 2019-12-06 PROCEDURE — 96375 TX/PRO/DX INJ NEW DRUG ADDON: CPT

## 2019-12-06 PROCEDURE — 93010 ELECTROCARDIOGRAM REPORT: CPT | Mod: ,,, | Performed by: INTERNAL MEDICINE

## 2019-12-06 PROCEDURE — 87086 URINE CULTURE/COLONY COUNT: CPT

## 2019-12-06 PROCEDURE — 80053 COMPREHEN METABOLIC PANEL: CPT

## 2019-12-06 PROCEDURE — 46600 PR DIAG2STIC A2SCOPY: ICD-10-PCS | Mod: S$GLB,,, | Performed by: NURSE PRACTITIONER

## 2019-12-06 PROCEDURE — 1126F AMNT PAIN NOTED NONE PRSNT: CPT | Mod: S$GLB,,, | Performed by: NURSE PRACTITIONER

## 2019-12-06 PROCEDURE — 1126F PR PAIN SEVERITY QUANTIFIED, NO PAIN PRESENT: ICD-10-PCS | Mod: S$GLB,,, | Performed by: NURSE PRACTITIONER

## 2019-12-06 PROCEDURE — 99999 PR PBB SHADOW E&M-EST. PATIENT-LVL III: ICD-10-PCS | Mod: PBBFAC,,, | Performed by: NURSE PRACTITIONER

## 2019-12-06 PROCEDURE — 3074F SYST BP LT 130 MM HG: CPT | Mod: CPTII,S$GLB,, | Performed by: NURSE PRACTITIONER

## 2019-12-06 PROCEDURE — 99215 OFFICE O/P EST HI 40 MIN: CPT | Mod: 25,S$GLB,, | Performed by: NURSE PRACTITIONER

## 2019-12-06 PROCEDURE — 86850 RBC ANTIBODY SCREEN: CPT

## 2019-12-06 PROCEDURE — 99285 EMERGENCY DEPT VISIT HI MDM: CPT | Mod: 25

## 2019-12-06 PROCEDURE — 3074F PR MOST RECENT SYSTOLIC BLOOD PRESSURE < 130 MM HG: ICD-10-PCS | Mod: CPTII,S$GLB,, | Performed by: NURSE PRACTITIONER

## 2019-12-06 PROCEDURE — 85610 PROTHROMBIN TIME: CPT

## 2019-12-06 PROCEDURE — 1101F PR PT FALLS ASSESS DOC 0-1 FALLS W/OUT INJ PAST YR: ICD-10-PCS | Mod: CPTII,S$GLB,, | Performed by: NURSE PRACTITIONER

## 2019-12-06 PROCEDURE — 99215 PR OFFICE/OUTPT VISIT, EST, LEVL V, 40-54 MIN: ICD-10-PCS | Mod: 25,S$GLB,, | Performed by: NURSE PRACTITIONER

## 2019-12-06 PROCEDURE — 81001 URINALYSIS AUTO W/SCOPE: CPT

## 2019-12-06 RX ORDER — PANTOPRAZOLE SODIUM 40 MG/10ML
40 INJECTION, POWDER, LYOPHILIZED, FOR SOLUTION INTRAVENOUS
Status: COMPLETED | OUTPATIENT
Start: 2019-12-06 | End: 2019-12-06

## 2019-12-06 RX ADMIN — PANTOPRAZOLE SODIUM 40 MG: 40 INJECTION, POWDER, FOR SOLUTION INTRAVENOUS at 08:12

## 2019-12-06 RX ADMIN — IOHEXOL 100 ML: 350 INJECTION, SOLUTION INTRAVENOUS at 09:12

## 2019-12-06 NOTE — PROGRESS NOTES
Patient ID:  Jo-Ann Escobedo is a 79 y.o. female     Chief Complaint: No chief complaint on file.       HPI:  Jo-Ann Escobedo presents to colon and rectal surgery with a complaint of rectal bleeding for the past 24 hrs.  Yesterday afternoon she had a large bloody bowel movement and has continued to have bloody bowel movements (only blood, no stool) every 20 or 30 min all day.  She denies any rectal pain or abdominal pain.  She is afebrile.  She denies shortness of breath, chest pain, dizziness.  She is not any anticoagulation.     Colonoscopy 2019  Impression:           - Preparation of the colon was fair.                        - Three 3 to 6 mm polyps in the transverse colon                         and in the ascending colon, removed with a cold                         snare. Resected and retrieved.                        - Diverticulosis in the sigmoid colon and in the                         descending colon.      ROS:     Review of Systems   Constitutional: Negative for fatigue, fever and unexpected weight change.   Respiratory: Negative for shortness of breath.    Cardiovascular: Negative for chest pain.   Gastrointestinal: Positive for blood in stool and diarrhea. Negative for abdominal distention, abdominal pain, anal bleeding, constipation, nausea, rectal pain and vomiting.       Review of patient's allergies indicates:   Allergen Reactions    Codeine Diarrhea and Hallucinations    Niacin preparations      Redness, warming     Past Medical History:   Diagnosis Date    After-cataract of both eyes - Both Eyes 9/26/2012    Allergy     Arthritis     Breast cancer     Diverticulosis     Hyperlipidemia     Hypertension     Hypothyroidism     Paget disease of bone     Squamous Cell Carcinoma     in situ right neck     Thyroid disease      Past Surgical History:   Procedure Laterality Date    CATARACT EXTRACTION W/  INTRAOCULAR LENS IMPLANT Bilateral     COLONOSCOPY N/A 4/15/2019    Procedure:  COLONOSCOPY;  Surgeon: Artur Monet MD;  Location: [facility] ENDO (4TH FLR);  Service: Endoscopy;  Laterality: N/A;  within 1 month    EXCISIONAL BIOPSY Right 6/27/2019    Procedure: EXCISIONAL BIOPSY-breast;  Surgeon: Suki Dill MD;  Location: [facility] OR 2ND FLR;  Service: General;  Laterality: Right;    EYE SURGERY      HYSTERECTOMY      INJECTION OF ANESTHETIC AGENT AROUND MEDIAL BRANCH NERVES INNERVATING LUMBAR FACET JOINT Bilateral 6/7/2019    Procedure: BLOCK, NERVE, FACET JOINT, LUMBAR, MEDIAL BRANCH-deo MBB L4/5,L5/S1;  Surgeon: Jarvis Morales III, MD;  Location: [facility] CATH LAB;  Service: Pain Management;  Laterality: Bilateral;    KNEE ARTHROSCOPY N/A     pt unsure of which knee     MASTECTOMY      SPINE SURGERY      TOTAL THYROIDECTOMY      YAG Laser Capsulotomy  Left 07/29/2019    Dr. Hahn     Family History   Problem Relation Age of Onset    Cancer Father         lung    Dementia Mother     Glaucoma Brother     Macular degeneration Brother     Diabetes Neg Hx     Amblyopia Neg Hx     Blindness Neg Hx     Cataracts Neg Hx     Retinal detachment Neg Hx     Strabismus Neg Hx     Melanoma Neg Hx      Current Outpatient Medications on File Prior to Visit   Medication Sig    cholecalciferol, vitamin D3, (VITAMIN D3) 2,000 unit Tab Take 1 tablet by mouth once daily.    co-enzyme Q-10 30 mg capsule Take 30 mg by mouth once daily.     diclofenac (VOLTAREN) 75 MG EC tablet Take 1 tablet (75 mg total) by mouth 2 (two) times daily as needed (pain).    fenofibrate 160 MG Tab Take 1 tablet (160 mg total) by mouth once daily.    ferrous sulfate (IRON ORAL) Take by mouth once daily.     LACTOBACILLUS COMBINATION NO.8 (ADULT PROBIOTIC ORAL) Take by mouth once daily.     levothyroxine (SYNTHROID) 125 MCG tablet TAKE 1 TABLET (125 MCG TOTAL) BY MOUTH AFTER DINNER.    losartan (COZAAR) 50 MG tablet Take 1.5 tablets (75 mg total) by mouth once daily.    meclizine (ANTIVERT) 12.5  mg tablet Take 1 tablet (12.5 mg total) by mouth 3 (three) times daily as needed for Dizziness.    omeprazole (PRILOSEC) 40 MG capsule TAKE 1 CAPSULE (40 MG TOTAL) BY MOUTH BEFORE DINNER.    omeprazole (PRILOSEC) 40 MG capsule TAKE 1 CAPSULE (40 MG TOTAL) BY MOUTH BEFORE DINNER.    turmeric root extract 500 mg Cap Take by mouth once daily.     valACYclovir (VALTREX) 1000 MG tablet Take 1 tablet (1,000 mg total) by mouth 3 (three) times daily. for 7 days     No current facility-administered medications on file prior to visit.        PE:  Vitals:    12/06/19 1440   BP: 127/70   Pulse: 74   Temp: 98.9 °F (37.2 °C)     Physical Exam   Constitutional: She is oriented to person, place, and time and well-developed, well-nourished, and in no distress. No distress.   HENT:   Head: Normocephalic and atraumatic.   Pulmonary/Chest: Effort normal. No respiratory distress.   Abdominal: Soft. She exhibits no distension. There is no tenderness.   Genitourinary:   Genitourinary Comments: Normal perianal skin. eversion of anus revealed no abnormality or fissure, GYPSY revealed no masses, melanotic blood present  Anoscopy > anoscope immediatly filled with melanotic stool with foul odor.      Musculoskeletal: Normal range of motion.   Neurological: She is alert and oriented to person, place, and time. GCS score is 15.   Skin: Skin is warm and dry. No rash noted. No erythema.   Psychiatric: Affect normal.       Impression:  Encounter Diagnosis   Name Primary?    Melena Yes       PLAN:  Diagnoses and all orders for this visit:    Melena    Patient advised to present to ER for active GI bleed. She understood and was in agreement with plan.

## 2019-12-06 NOTE — LETTER
December 6, 2019      Dakota Kerr MD  [address]  [facility] [address]           [facility]  [address]  [facility] [address]  Phone: [phone]          Patient: Jo-Ann Escobedo   MR Number: [id number]   YOB: 1940   Date of Visit: 12/6/2019       Dear Dr. Dakota Kerr:    Thank you for referring Jo-Ann Escobedo to me for evaluation. Attached you will find relevant portions of my assessment and plan of care.    If you have questions, please do not hesitate to call me. I look forward to following Jo-Ann Escobedo along with you.    Sincerely,    Grisel Watkins, NP    Enclosure  CC:  No Recipients    If you would like to receive this communication electronically, please contact [email or web address] or [phone] to request more information on [employer] Link access.    For providers and/or their staff who would like to refer a patient to [facility], please contact us through our one-stop-shop provider referral line, [facility], at [phone].    If you feel you have received this communication in error or would no longer like to receive these types of communications, please e-mail [email or web address]

## 2019-12-07 ENCOUNTER — TELEPHONE (OUTPATIENT)
Dept: GASTROENTEROLOGY | Facility: HOSPITAL | Age: 79
End: 2019-12-07

## 2019-12-07 ENCOUNTER — ANESTHESIA EVENT (OUTPATIENT)
Dept: ENDOSCOPY | Facility: HOSPITAL | Age: 79
DRG: 378 | End: 2019-12-07
Payer: MEDICARE

## 2019-12-07 ENCOUNTER — ANESTHESIA (OUTPATIENT)
Dept: ENDOSCOPY | Facility: HOSPITAL | Age: 79
DRG: 378 | End: 2019-12-07
Payer: MEDICARE

## 2019-12-07 DIAGNOSIS — K31.89 GASTRIC NODULE: Primary | ICD-10-CM

## 2019-12-07 PROBLEM — K52.9 COLITIS: Status: ACTIVE | Noted: 2019-12-07

## 2019-12-07 PROBLEM — K57.33 DIVERTICULITIS OF LARGE INTESTINE WITHOUT PERFORATION OR ABSCESS WITH BLEEDING: Status: ACTIVE | Noted: 2019-12-07

## 2019-12-07 PROBLEM — K57.32 DIVERTICULITIS OF COLON WITHOUT HEMORRHAGE: Status: ACTIVE | Noted: 2019-12-07

## 2019-12-07 PROBLEM — D62 ACUTE BLOOD LOSS ANEMIA: Status: ACTIVE | Noted: 2019-12-07

## 2019-12-07 PROBLEM — R31.9 HEMATURIA: Status: ACTIVE | Noted: 2019-12-07

## 2019-12-07 LAB
ABO + RH BLD: NORMAL
ABO + RH BLD: NORMAL
ALBUMIN SERPL BCP-MCNC: 3.1 G/DL (ref 3.5–5.2)
ALP SERPL-CCNC: 55 U/L (ref 55–135)
ALT SERPL W/O P-5'-P-CCNC: 12 U/L (ref 10–44)
ANION GAP SERPL CALC-SCNC: 5 MMOL/L (ref 8–16)
AST SERPL-CCNC: 19 U/L (ref 10–40)
BASOPHILS # BLD AUTO: 0.06 K/UL (ref 0–0.2)
BASOPHILS # BLD AUTO: 0.06 K/UL (ref 0–0.2)
BASOPHILS NFR BLD: 0.7 % (ref 0–1.9)
BASOPHILS NFR BLD: 0.9 % (ref 0–1.9)
BILIRUB SERPL-MCNC: 0.4 MG/DL (ref 0.1–1)
BLD GP AB SCN CELLS X3 SERPL QL: NORMAL
BLD GP AB SCN CELLS X3 SERPL QL: NORMAL
BLD PROD TYP BPU: NORMAL
BLOOD UNIT EXPIRATION DATE: NORMAL
BLOOD UNIT TYPE CODE: 5100
BLOOD UNIT TYPE: NORMAL
BUN SERPL-MCNC: 23 MG/DL (ref 8–23)
CALCIUM SERPL-MCNC: 8.4 MG/DL (ref 8.7–10.5)
CHLORIDE SERPL-SCNC: 111 MMOL/L (ref 95–110)
CO2 SERPL-SCNC: 24 MMOL/L (ref 23–29)
CODING SYSTEM: NORMAL
CREAT SERPL-MCNC: 0.8 MG/DL (ref 0.5–1.4)
DIFFERENTIAL METHOD: ABNORMAL
DIFFERENTIAL METHOD: ABNORMAL
DISPENSE STATUS: NORMAL
EOSINOPHIL # BLD AUTO: 0.2 K/UL (ref 0–0.5)
EOSINOPHIL # BLD AUTO: 0.3 K/UL (ref 0–0.5)
EOSINOPHIL NFR BLD: 1.8 % (ref 0–8)
EOSINOPHIL NFR BLD: 4 % (ref 0–8)
ERYTHROCYTE [DISTWIDTH] IN BLOOD BY AUTOMATED COUNT: 13.9 % (ref 11.5–14.5)
ERYTHROCYTE [DISTWIDTH] IN BLOOD BY AUTOMATED COUNT: 14.6 % (ref 11.5–14.5)
EST. GFR  (AFRICAN AMERICAN): >60 ML/MIN/1.73 M^2
EST. GFR  (NON AFRICAN AMERICAN): >60 ML/MIN/1.73 M^2
FERRITIN SERPL-MCNC: 143 NG/ML (ref 20–300)
FOLATE SERPL-MCNC: 9.4 NG/ML (ref 4–24)
GLUCOSE SERPL-MCNC: 95 MG/DL (ref 70–110)
HCT VFR BLD AUTO: 27.9 % (ref 37–48.5)
HCT VFR BLD AUTO: 28.4 % (ref 37–48.5)
HCT VFR BLD AUTO: 29.9 % (ref 37–48.5)
HGB BLD-MCNC: 8.4 G/DL (ref 12–16)
HGB BLD-MCNC: 8.8 G/DL (ref 12–16)
HGB BLD-MCNC: 9.5 G/DL (ref 12–16)
IMM GRANULOCYTES # BLD AUTO: 0.04 K/UL (ref 0–0.04)
IMM GRANULOCYTES # BLD AUTO: 0.08 K/UL (ref 0–0.04)
IMM GRANULOCYTES NFR BLD AUTO: 0.6 % (ref 0–0.5)
IMM GRANULOCYTES NFR BLD AUTO: 1 % (ref 0–0.5)
IRON SERPL-MCNC: 110 UG/DL (ref 30–160)
LYMPHOCYTES # BLD AUTO: 2 K/UL (ref 1–4.8)
LYMPHOCYTES # BLD AUTO: 2.1 K/UL (ref 1–4.8)
LYMPHOCYTES NFR BLD: 23.8 % (ref 18–48)
LYMPHOCYTES NFR BLD: 31.8 % (ref 18–48)
MAGNESIUM SERPL-MCNC: 1.9 MG/DL (ref 1.6–2.6)
MCH RBC QN AUTO: 30.9 PG (ref 27–31)
MCH RBC QN AUTO: 31.3 PG (ref 27–31)
MCHC RBC AUTO-ENTMCNC: 31 G/DL (ref 32–36)
MCHC RBC AUTO-ENTMCNC: 31.8 G/DL (ref 32–36)
MCV RBC AUTO: 101 FL (ref 82–98)
MCV RBC AUTO: 97 FL (ref 82–98)
MONOCYTES # BLD AUTO: 0.6 K/UL (ref 0.3–1)
MONOCYTES # BLD AUTO: 0.8 K/UL (ref 0.3–1)
MONOCYTES NFR BLD: 8.4 % (ref 4–15)
MONOCYTES NFR BLD: 9 % (ref 4–15)
NEUTROPHILS # BLD AUTO: 3.6 K/UL (ref 1.8–7.7)
NEUTROPHILS # BLD AUTO: 5.3 K/UL (ref 1.8–7.7)
NEUTROPHILS NFR BLD: 54.3 % (ref 38–73)
NEUTROPHILS NFR BLD: 63.7 % (ref 38–73)
NRBC BLD-RTO: 0 /100 WBC
NRBC BLD-RTO: 0 /100 WBC
PHOSPHATE SERPL-MCNC: 3.4 MG/DL (ref 2.7–4.5)
PLATELET # BLD AUTO: 201 K/UL (ref 150–350)
PLATELET # BLD AUTO: 225 K/UL (ref 150–350)
PMV BLD AUTO: 10.2 FL (ref 9.2–12.9)
PMV BLD AUTO: 10.6 FL (ref 9.2–12.9)
POTASSIUM SERPL-SCNC: 3.8 MMOL/L (ref 3.5–5.1)
PROT SERPL-MCNC: 5.6 G/DL (ref 6–8.4)
RBC # BLD AUTO: 2.81 M/UL (ref 4–5.4)
RBC # BLD AUTO: 3.07 M/UL (ref 4–5.4)
SATURATED IRON: 31 % (ref 20–50)
SODIUM SERPL-SCNC: 140 MMOL/L (ref 136–145)
TOTAL IRON BINDING CAPACITY: 355 UG/DL (ref 250–450)
TRANS ERYTHROCYTES VOL PATIENT: NORMAL ML
TRANSFERRIN SERPL-MCNC: 240 MG/DL (ref 200–375)
TRANSFERRIN SERPL-MCNC: 240 MG/DL (ref 200–375)
TSH SERPL DL<=0.005 MIU/L-ACNC: 2.37 UIU/ML (ref 0.4–4)
VIT B12 SERPL-MCNC: 224 PG/ML (ref 210–950)
WBC # BLD AUTO: 6.69 K/UL (ref 3.9–12.7)
WBC # BLD AUTO: 8.31 K/UL (ref 3.9–12.7)

## 2019-12-07 PROCEDURE — 84100 ASSAY OF PHOSPHORUS: CPT

## 2019-12-07 PROCEDURE — 88305 TISSUE EXAM BY PATHOLOGIST: CPT | Mod: 59 | Performed by: PATHOLOGY

## 2019-12-07 PROCEDURE — D9220A PRA ANESTHESIA: Mod: CRNA,,, | Performed by: REGISTERED NURSE

## 2019-12-07 PROCEDURE — 99215 PR OFFICE/OUTPT VISIT, EST, LEVL V, 40-54 MIN: ICD-10-PCS | Mod: 25,GC,, | Performed by: INTERNAL MEDICINE

## 2019-12-07 PROCEDURE — 88342 IMHCHEM/IMCYTCHM 1ST ANTB: CPT | Mod: 26,,, | Performed by: PATHOLOGY

## 2019-12-07 PROCEDURE — C9113 INJ PANTOPRAZOLE SODIUM, VIA: HCPCS | Performed by: PHYSICIAN ASSISTANT

## 2019-12-07 PROCEDURE — 84443 ASSAY THYROID STIM HORMONE: CPT

## 2019-12-07 PROCEDURE — 99220 PR INITIAL OBSERVATION CARE,LEVL III: ICD-10-PCS | Mod: ,,, | Performed by: PHYSICIAN ASSISTANT

## 2019-12-07 PROCEDURE — 96372 THER/PROPH/DIAG INJ SC/IM: CPT

## 2019-12-07 PROCEDURE — D9220A PRA ANESTHESIA: ICD-10-PCS | Mod: ANES,,, | Performed by: ANESTHESIOLOGY

## 2019-12-07 PROCEDURE — 37000009 HC ANESTHESIA EA ADD 15 MINS: Performed by: INTERNAL MEDICINE

## 2019-12-07 PROCEDURE — 82728 ASSAY OF FERRITIN: CPT

## 2019-12-07 PROCEDURE — 82607 VITAMIN B-12: CPT

## 2019-12-07 PROCEDURE — 27201012 HC FORCEPS, HOT/COLD, DISP: Performed by: INTERNAL MEDICINE

## 2019-12-07 PROCEDURE — 63700000 PHARM REV CODE 250 ALT 637 W/O HCPCS: Performed by: PHYSICIAN ASSISTANT

## 2019-12-07 PROCEDURE — 43239 EGD BIOPSY SINGLE/MULTIPLE: CPT | Mod: ,,, | Performed by: INTERNAL MEDICINE

## 2019-12-07 PROCEDURE — 83735 ASSAY OF MAGNESIUM: CPT

## 2019-12-07 PROCEDURE — 36415 COLL VENOUS BLD VENIPUNCTURE: CPT

## 2019-12-07 PROCEDURE — D9220A PRA ANESTHESIA: ICD-10-PCS | Mod: CRNA,,, | Performed by: REGISTERED NURSE

## 2019-12-07 PROCEDURE — 37000008 HC ANESTHESIA 1ST 15 MINUTES: Performed by: INTERNAL MEDICINE

## 2019-12-07 PROCEDURE — 88305 TISSUE EXAM BY PATHOLOGIST: ICD-10-PCS | Mod: 26,,, | Performed by: PATHOLOGY

## 2019-12-07 PROCEDURE — 85018 HEMOGLOBIN: CPT

## 2019-12-07 PROCEDURE — G0378 HOSPITAL OBSERVATION PER HR: HCPCS

## 2019-12-07 PROCEDURE — P9021 RED BLOOD CELLS UNIT: HCPCS

## 2019-12-07 PROCEDURE — 63600175 PHARM REV CODE 636 W HCPCS: Performed by: PHYSICIAN ASSISTANT

## 2019-12-07 PROCEDURE — 88342 IMHCHEM/IMCYTCHM 1ST ANTB: CPT | Performed by: PATHOLOGY

## 2019-12-07 PROCEDURE — 85014 HEMATOCRIT: CPT

## 2019-12-07 PROCEDURE — 25000003 PHARM REV CODE 250: Performed by: REGISTERED NURSE

## 2019-12-07 PROCEDURE — 80053 COMPREHEN METABOLIC PANEL: CPT

## 2019-12-07 PROCEDURE — 88305 TISSUE EXAM BY PATHOLOGIST: CPT | Mod: 26,,, | Performed by: PATHOLOGY

## 2019-12-07 PROCEDURE — 83540 ASSAY OF IRON: CPT

## 2019-12-07 PROCEDURE — 25000003 PHARM REV CODE 250: Performed by: PHYSICIAN ASSISTANT

## 2019-12-07 PROCEDURE — 99215 OFFICE O/P EST HI 40 MIN: CPT | Mod: 25,GC,, | Performed by: INTERNAL MEDICINE

## 2019-12-07 PROCEDURE — 88342 CHG IMMUNOCYTOCHEMISTRY: ICD-10-PCS | Mod: 26,,, | Performed by: PATHOLOGY

## 2019-12-07 PROCEDURE — 85025 COMPLETE CBC W/AUTO DIFF WBC: CPT | Mod: 91

## 2019-12-07 PROCEDURE — D9220A PRA ANESTHESIA: Mod: ANES,,, | Performed by: ANESTHESIOLOGY

## 2019-12-07 PROCEDURE — 43239 PR EGD, FLEX, W/BIOPSY, SGL/MULTI: ICD-10-PCS | Mod: ,,, | Performed by: INTERNAL MEDICINE

## 2019-12-07 PROCEDURE — 96376 TX/PRO/DX INJ SAME DRUG ADON: CPT

## 2019-12-07 PROCEDURE — 11000001 HC ACUTE MED/SURG PRIVATE ROOM

## 2019-12-07 PROCEDURE — 99220 PR INITIAL OBSERVATION CARE,LEVL III: CPT | Mod: ,,, | Performed by: PHYSICIAN ASSISTANT

## 2019-12-07 PROCEDURE — 82746 ASSAY OF FOLIC ACID SERUM: CPT

## 2019-12-07 PROCEDURE — 86901 BLOOD TYPING SEROLOGIC RH(D): CPT

## 2019-12-07 PROCEDURE — 63600175 PHARM REV CODE 636 W HCPCS: Performed by: EMERGENCY MEDICINE

## 2019-12-07 PROCEDURE — 86920 COMPATIBILITY TEST SPIN: CPT

## 2019-12-07 PROCEDURE — 43239 EGD BIOPSY SINGLE/MULTIPLE: CPT | Performed by: INTERNAL MEDICINE

## 2019-12-07 PROCEDURE — 63600175 PHARM REV CODE 636 W HCPCS: Performed by: REGISTERED NURSE

## 2019-12-07 RX ORDER — CYANOCOBALAMIN 1000 UG/ML
1000 INJECTION, SOLUTION INTRAMUSCULAR; SUBCUTANEOUS ONCE
Status: COMPLETED | OUTPATIENT
Start: 2019-12-07 | End: 2019-12-07

## 2019-12-07 RX ORDER — PANTOPRAZOLE SODIUM 40 MG/10ML
40 INJECTION, POWDER, LYOPHILIZED, FOR SOLUTION INTRAVENOUS 2 TIMES DAILY
Status: DISCONTINUED | OUTPATIENT
Start: 2019-12-07 | End: 2019-12-14

## 2019-12-07 RX ORDER — PROPOFOL 10 MG/ML
VIAL (ML) INTRAVENOUS CONTINUOUS PRN
Status: DISCONTINUED | OUTPATIENT
Start: 2019-12-07 | End: 2019-12-07

## 2019-12-07 RX ORDER — HYDROCODONE BITARTRATE AND ACETAMINOPHEN 500; 5 MG/1; MG/1
TABLET ORAL
Status: DISCONTINUED | OUTPATIENT
Start: 2019-12-07 | End: 2019-12-09 | Stop reason: SDUPTHER

## 2019-12-07 RX ORDER — SODIUM CHLORIDE 9 MG/ML
INJECTION, SOLUTION INTRAVENOUS CONTINUOUS
Status: ACTIVE | OUTPATIENT
Start: 2019-12-07 | End: 2019-12-07

## 2019-12-07 RX ORDER — SODIUM CHLORIDE 0.9 % (FLUSH) 0.9 %
10 SYRINGE (ML) INJECTION
Status: DISCONTINUED | OUTPATIENT
Start: 2019-12-07 | End: 2019-12-09

## 2019-12-07 RX ORDER — TALC
8 POWDER (GRAM) TOPICAL NIGHTLY PRN
Status: DISCONTINUED | OUTPATIENT
Start: 2019-12-07 | End: 2019-12-14 | Stop reason: HOSPADM

## 2019-12-07 RX ORDER — ACETAMINOPHEN 325 MG/1
650 TABLET ORAL EVERY 4 HOURS PRN
Status: DISCONTINUED | OUTPATIENT
Start: 2019-12-07 | End: 2019-12-14 | Stop reason: HOSPADM

## 2019-12-07 RX ORDER — KETOROLAC TROMETHAMINE 30 MG/ML
15 INJECTION, SOLUTION INTRAMUSCULAR; INTRAVENOUS EVERY 6 HOURS PRN
Status: DISCONTINUED | OUTPATIENT
Start: 2019-12-07 | End: 2019-12-10

## 2019-12-07 RX ORDER — LIDOCAINE HCL/PF 100 MG/5ML
SYRINGE (ML) INTRAVENOUS
Status: DISCONTINUED | OUTPATIENT
Start: 2019-12-07 | End: 2019-12-07

## 2019-12-07 RX ORDER — LEVOTHYROXINE SODIUM 125 UG/1
125 TABLET ORAL
Status: DISCONTINUED | OUTPATIENT
Start: 2019-12-07 | End: 2019-12-14 | Stop reason: HOSPADM

## 2019-12-07 RX ORDER — GLUCAGON 1 MG
1 KIT INJECTION
Status: DISCONTINUED | OUTPATIENT
Start: 2019-12-07 | End: 2019-12-14 | Stop reason: HOSPADM

## 2019-12-07 RX ORDER — PROPOFOL 10 MG/ML
VIAL (ML) INTRAVENOUS
Status: DISCONTINUED | OUTPATIENT
Start: 2019-12-07 | End: 2019-12-07

## 2019-12-07 RX ORDER — FERROUS SULFATE 325(65) MG
325 TABLET, DELAYED RELEASE (ENTERIC COATED) ORAL DAILY
Status: DISCONTINUED | OUTPATIENT
Start: 2019-12-07 | End: 2019-12-14 | Stop reason: HOSPADM

## 2019-12-07 RX ORDER — ONDANSETRON 4 MG/1
4 TABLET, ORALLY DISINTEGRATING ORAL EVERY 8 HOURS PRN
Status: DISCONTINUED | OUTPATIENT
Start: 2019-12-07 | End: 2019-12-09

## 2019-12-07 RX ORDER — FENOFIBRATE 160 MG/1
160 TABLET ORAL DAILY
Status: DISCONTINUED | OUTPATIENT
Start: 2019-12-07 | End: 2019-12-14 | Stop reason: HOSPADM

## 2019-12-07 RX ORDER — IBUPROFEN 200 MG
24 TABLET ORAL
Status: DISCONTINUED | OUTPATIENT
Start: 2019-12-07 | End: 2019-12-14 | Stop reason: HOSPADM

## 2019-12-07 RX ORDER — AMOXICILLIN 250 MG
1 CAPSULE ORAL 2 TIMES DAILY PRN
Status: DISCONTINUED | OUTPATIENT
Start: 2019-12-07 | End: 2019-12-14 | Stop reason: HOSPADM

## 2019-12-07 RX ORDER — SODIUM CHLORIDE 9 MG/ML
INJECTION, SOLUTION INTRAVENOUS CONTINUOUS PRN
Status: DISCONTINUED | OUTPATIENT
Start: 2019-12-07 | End: 2019-12-07

## 2019-12-07 RX ORDER — SODIUM CHLORIDE 0.9 % (FLUSH) 0.9 %
10 SYRINGE (ML) INJECTION
Status: DISCONTINUED | OUTPATIENT
Start: 2019-12-07 | End: 2019-12-14 | Stop reason: HOSPADM

## 2019-12-07 RX ORDER — FENTANYL CITRATE 50 UG/ML
INJECTION, SOLUTION INTRAMUSCULAR; INTRAVENOUS
Status: DISCONTINUED | OUTPATIENT
Start: 2019-12-07 | End: 2019-12-07

## 2019-12-07 RX ORDER — IBUPROFEN 200 MG
16 TABLET ORAL
Status: DISCONTINUED | OUTPATIENT
Start: 2019-12-07 | End: 2019-12-14 | Stop reason: HOSPADM

## 2019-12-07 RX ORDER — CYANOCOBALAMIN (VITAMIN B-12) 250 MCG
500 TABLET ORAL DAILY
Status: DISCONTINUED | OUTPATIENT
Start: 2019-12-08 | End: 2019-12-14 | Stop reason: HOSPADM

## 2019-12-07 RX ORDER — GLYCOPYRROLATE 0.2 MG/ML
INJECTION INTRAMUSCULAR; INTRAVENOUS
Status: DISCONTINUED | OUTPATIENT
Start: 2019-12-07 | End: 2019-12-07

## 2019-12-07 RX ORDER — IPRATROPIUM BROMIDE AND ALBUTEROL SULFATE 2.5; .5 MG/3ML; MG/3ML
3 SOLUTION RESPIRATORY (INHALATION) EVERY 4 HOURS PRN
Status: DISCONTINUED | OUTPATIENT
Start: 2019-12-07 | End: 2019-12-14 | Stop reason: HOSPADM

## 2019-12-07 RX ADMIN — SODIUM CHLORIDE: 0.9 INJECTION, SOLUTION INTRAVENOUS at 03:12

## 2019-12-07 RX ADMIN — PROPOFOL 25 MCG/KG/MIN: 10 INJECTION, EMULSION INTRAVENOUS at 03:12

## 2019-12-07 RX ADMIN — ONDANSETRON 4 MG: 4 TABLET, ORALLY DISINTEGRATING ORAL at 09:12

## 2019-12-07 RX ADMIN — PIPERACILLIN AND TAZOBACTAM 4.5 G: 4; .5 INJECTION, POWDER, FOR SOLUTION INTRAVENOUS at 12:12

## 2019-12-07 RX ADMIN — SODIUM CHLORIDE 1000 ML: 0.9 INJECTION, SOLUTION INTRAVENOUS at 12:12

## 2019-12-07 RX ADMIN — FERROUS SULFATE TAB EC 325 MG (65 MG FE EQUIVALENT) 325 MG: 325 (65 FE) TABLET DELAYED RESPONSE at 08:12

## 2019-12-07 RX ADMIN — PROPOFOL 20 MG: 10 INJECTION, EMULSION INTRAVENOUS at 03:12

## 2019-12-07 RX ADMIN — Medication 10 MG: at 03:12

## 2019-12-07 RX ADMIN — GLYCOPYRROLATE 0.1 MG: 0.2 INJECTION, SOLUTION INTRAMUSCULAR; INTRAVENOUS at 03:12

## 2019-12-07 RX ADMIN — PIPERACILLIN AND TAZOBACTAM 4.5 G: 4; .5 INJECTION, POWDER, LYOPHILIZED, FOR SOLUTION INTRAVENOUS; PARENTERAL at 06:12

## 2019-12-07 RX ADMIN — FENTANYL CITRATE 25 MCG: 50 INJECTION, SOLUTION INTRAMUSCULAR; INTRAVENOUS at 03:12

## 2019-12-07 RX ADMIN — PANTOPRAZOLE SODIUM 40 MG: 40 INJECTION, POWDER, FOR SOLUTION INTRAVENOUS at 09:12

## 2019-12-07 RX ADMIN — LEVOTHYROXINE SODIUM 125 MCG: 25 TABLET ORAL at 05:12

## 2019-12-07 RX ADMIN — LIDOCAINE HYDROCHLORIDE 75 MG: 20 INJECTION, SOLUTION INTRAVENOUS at 03:12

## 2019-12-07 RX ADMIN — CYANOCOBALAMIN 1000 MCG: 1000 INJECTION, SOLUTION INTRAMUSCULAR; SUBCUTANEOUS at 08:12

## 2019-12-07 RX ADMIN — PIPERACILLIN AND TAZOBACTAM 4.5 G: 4; .5 INJECTION, POWDER, LYOPHILIZED, FOR SOLUTION INTRAVENOUS; PARENTERAL at 10:12

## 2019-12-07 RX ADMIN — FENOFIBRATE 160 MG: 160 TABLET ORAL at 08:12

## 2019-12-07 RX ADMIN — PANTOPRAZOLE SODIUM 40 MG: 40 INJECTION, POWDER, FOR SOLUTION INTRAVENOUS at 08:12

## 2019-12-07 NOTE — ANESTHESIA POSTPROCEDURE EVALUATION
Anesthesia Post Evaluation    Patient: Jo-Ann Escobedo    Procedure(s) Performed: Procedure(s) (LRB):  EGD (ESOPHAGOGASTRODUODENOSCOPY) (N/A)    Final Anesthesia Type: general    Patient location during evaluation: PACU  Patient participation: Yes- Able to Participate  Level of consciousness: awake and alert and oriented  Post-procedure vital signs: reviewed and stable  Pain management: adequate  Airway patency: patent    PONV status at discharge: No PONV  Anesthetic complications: no      Cardiovascular status: blood pressure returned to baseline and hemodynamically stable  Respiratory status: unassisted, spontaneous ventilation and room air  Hydration status: euvolemic  Follow-up not needed.          Vitals Value Taken Time   /70 12/7/2019  4:19 PM   Temp 36.7 °C (98.1 °F) 12/7/2019  4:19 PM   Pulse 71 12/7/2019  4:19 PM   Resp 18 12/7/2019  4:19 PM   SpO2 95 % 12/7/2019  4:19 PM         Event Time     Out of Recovery 12/07/2019 16:06:07          Pain/Parris Score: Pain Rating Prior to Med Admin: 0 (12/7/2019  4:00 PM)  Pain Rating Post Med Admin: 0 (12/7/2019  4:00 PM)  Parris Score: 10 (12/7/2019  4:00 PM)

## 2019-12-07 NOTE — HOSPITAL COURSE
Mrs. Escobedo was admitted to observation for diverticulitis. Patient started on zosyn, protonix IV, IVF. Patient with multiple episodes of maroon colored blood in stool. Hgb 10.5-->8.4. Type and screened, patient transfused 1U pRBC in setting of weakness and continued bleeding. GI consulted, plan for endoscopy tomorrow. UA positive for infection and hematuria. Hematuria likely from nephrolithiasis. Endoscopy with erythematous mucosa in pylorus, biopsied, and 10 mm gastric nodule, biopsied. Will need upper endoscopic ultrasound at next available appointment for follow up 10 mm nodule. Repeat CT with mild diverticulitis. Patient with syncopal episode and fell off toilet while having large amount of hematochezia. Rapid response called, transfused 2U pRBC. CTH without intracranial bleed, with 3.5 cm sclerotic lesion right frontal calvarium with few additional smaller sclerotic foci.  Additional large well-circumscribed lucent lesions in the parietal bones bilaterally.  Findings further detailed above, thought to reflect changes of Paget's disease.  Correlation with nuclear medicine bone scan might be helpful to exclude metastatic disease in patient reported history of breast cancer. Will defer to PCP. CRS consulted, ok for colonoscopy today.

## 2019-12-07 NOTE — [employer] PATIENT INSTRUCTIONS
Discharge Summary/Instructions after an Endoscopic Procedure  Patient Name: Jo-Ann Orr  Patient MRN: [id number]  Patient YOB: 1940 Saturday, December 07, 2019  Clyde Welch MD  RESTRICTIONS:  During your procedure today, you received medications for sedation.  These   medications may affect your judgment, balance and coordination.  Therefore,   for 24 hours, you have the following restrictions:   - DO NOT drive a car, operate machinery, make legal/financial decisions,   sign important papers or drink alcohol.    ACTIVITY:  Today: no heavy lifting, straining or running due to procedural   sedation/anesthesia.  The following day: return to full activity including work.  DIET:  Eat and drink normally unless instructed otherwise.     TREATMENT FOR COMMON SIDE EFFECTS:  - Mild abdominal pain, nausea, belching, bloating or excessive gas:  rest,   eat lightly and use a heating pad.  - Sore Throat: treat with throat lozenges and/or gargle with warm salt   water.  - Because air was used during the procedure, expelling large amounts of air   from your rectum or belching is normal.  - If a bowel prep was taken, you may not have a bowel movement for 1-3 days.    This is normal.  SYMPTOMS TO WATCH FOR AND REPORT TO YOUR PHYSICIAN:  1. Abdominal pain or bloating, other than gas cramps.  2. Chest pain.  3. Back pain.  4. Signs of infection such as: chills or fever occurring within 24 hours   after the procedure.  5. Rectal bleeding, which would show as bright red, maroon, or black stools.   (A tablespoon of blood from the rectum is not serious, especially if   hemorrhoids are present.)  6. Vomiting.  7. Weakness or dizziness.  GO DIRECTLY TO THE NEAREST EMERGENCY ROOM IF YOU HAVE ANY OF THE FOLLOWING:      Difficulty breathing              Chills and/or fever over 101 F   Persistent vomiting and/or vomiting blood   Severe abdominal pain   Severe chest pain   Black, tarry stools   Bleeding- more than one  tablespoon   Any other symptom or condition that you feel may need urgent attention  Your doctor recommends these additional instructions:  If any biopsies were taken, your doctors clinic will contact you in 1 to 2   weeks with any results.  - Await pathology results.   - Return patient to [facility] for ongoing care.   - Resume previous diet.   - Continue present medications.   - Await pathology results.   - Telephone endoscopist for pathology results in 1 week.   - Recommend primary team Perform a CT abdomen/pelvis with IV and rectal   contrast to rule in or rule out diverticulitis. If no evidence of   Diverticulitits, will perform colonoscopy in 2 days.   - Perform an upper endoscopic ultrasound (UEUS) at the next available   appointment for follow up 10mm gastric nodule.   - The findings and recommendations were discussed with the patient's primary   physician.   - The findings and recommendations were discussed with the patient.  For questions, problems or results please call your physician - Clyde Welch MD at Work:  [phone].  [facility], EMERGENCY ROOM PHONE NUMBER: [phone]  IF A COMPLICATION OR EMERGENCY SITUATION ARISES AND YOU ARE UNABLE TO REACH   YOUR PHYSICIAN - GO DIRECTLY TO THE EMERGENCY ROOM.  Clyde Welch MD  12/7/2019 3:49:10 PM  This report has been verified and signed electronically.  [employer]

## 2019-12-07 NOTE — PROVIDER PROGRESS NOTES - EMERGENCY DEPT.
"Encounter Date: 12/6/2019    ED Physician Progress Notes        Physician Note:   Medical screening exam completed.  I have conducted a focused provider triage encounter, findings are as follows:    Brief history of present illness:  79-year-old female past medical history of diverticulitis, internal hemorrhoids, sent from clinic today for multiple episodes of melanotic stool.  Patient endorses some intermittent lower abdominal cramping but no overt pain.  Denies lightheadedness, shortness of breath, fever chills. She reports having several bloody bowel movements while she has been waiting in the emergency department.    ---------------------------               12/06/19                      1651         ---------------------------   BP:          119/67         BP Location:    Right arm       Patient Position:     Sitting        Pulse:         73           Resp:          18           Temp:   98.9 °F (37.2 °C)   TempSrc:      Oral          SpO2:          97%          Weight: 81.6 kg (180 lb)    Height:   5' 3" (1.6 m)    ---------------------------    Pertinent physical exam:  NAD, vital signs stable, abdomen soft and nontender, CTAB, RRR no MRG.    Brief workup plan:  CBC, CMP, lipase, type and screen, PT INR    Preliminary workup initiated; this workup will be continued and followed by the physician or advanced practice provider that is assigned to the patient when roomed.       "

## 2019-12-07 NOTE — CONSULTS
"[facility]  Gastroenterology  Consult Note    Patient Name: Jo-Ann Escobedo  MRN: [id number]  Admission Date: 12/6/2019  Hospital Length of Stay: 0 days  Code Status: Full Code   Attending Provider: Dirk Garcias MD   Consulting Provider: Matthew Gresham MD  Primary Care Physician: Dakota Kerr MD  Principal Problem:Diverticulitis of large intestine without perforation or abscess with bleeding    Inpatient consult to Gastroenterology  Consult performed by: Matthew Gresham MD  Consult ordered by: Nenita Zavala PA-C        Subjective:     HPI: Jo-Ann Escobedo is a 79 y.o. female with history of hx of diverticulitis + abscess(2014), CKD2, HTN, remote h/o breast cancer s/p L modified radical mastectomy + chemo, arthritis on NSAIDS comes in with chief complaint of BRBPR.     Started Tuesday, she noticed "darker stools" and starting Thursday noticed numerous episodes of BRBPR, bright red mixed with dark red, no black or tarry stools, no nausea, vomiting, or abdominal pain. It has been happening since then, numerous episodes, and since admission today has had atleast 4-5 episodes of painless BRBPR. She feels the blood just comes out and sometimes comes mixed with her bowel movement. She has been taking voltaren 75mg BID for almost 15 years for arthritis, PPI 40 daily, and PO iron daily. She is vitally stable, HR wnl, hb 10.5 drop to 8.8 today (baseline around 12.9), normal iron stuides, BUN 30, cr 0.9, lipase wnl. CTA shows uncomplicated diverticulitis vs colitis in descending and sigmoid colon junction, negative for active bleed.     CTA does not show AAA.     Had colonoscopy on 4/15/19: fair prep, 3 to 6 mm polyps in transverse colon and ascending colon, diverticulosis in sigmoid colon. Colonoscopy done on 1/19/15 showed 5 mm polyp in transverse colon, and diverticulosis in sigmoid and descending colon. She has never had EGD before.       NSAID (+)  Aspirin(antiplatelet) " (--)  Alcohol Use (--)  Liver disease/varices (--)  Severe retching (--)  History of GI bleed (+)  Past endoscopy (+)  Prior abdominal surgery (--)  Aortic aneurysms/grafts (--)   Trauma (--)  Coagulopathy (--)  Anticoagulation (--)  Malignancy +        Past Medical History:   Diagnosis Date    After-cataract of both eyes - Both Eyes 9/26/2012    Allergy     Arthritis     Breast cancer     Diverticulosis     Hyperlipidemia     Hypertension     Hypothyroidism     Paget disease of bone     Squamous Cell Carcinoma     in situ right neck     Thyroid disease        Past Surgical History:   Procedure Laterality Date    CATARACT EXTRACTION W/  INTRAOCULAR LENS IMPLANT Bilateral     COLONOSCOPY N/A 4/15/2019    Procedure: COLONOSCOPY;  Surgeon: Artur Monet MD;  Location: [facility] ENDO ([facility]);  Service: Endoscopy;  Laterality: N/A;  within 1 month    EXCISIONAL BIOPSY Right 6/27/2019    Procedure: EXCISIONAL BIOPSY-breast;  Surgeon: Suki Dill MD;  Location: [facility] OR [address] [place] [address];  Service: General;  Laterality: Right;    EYE SURGERY      HYSTERECTOMY      INJECTION OF ANESTHETIC AGENT AROUND MEDIAL BRANCH NERVES INNERVATING LUMBAR FACET JOINT Bilateral 6/7/2019    Procedure: BLOCK, NERVE, FACET JOINT, LUMBAR, MEDIAL BRANCH-deo MBB L4/5,L5/S1;  Surgeon: Jarvis Morales III, MD;  Location: [facility] CATH LAB;  Service: Pain Management;  Laterality: Bilateral;    KNEE ARTHROSCOPY N/A     pt unsure of which knee     MASTECTOMY      SPINE SURGERY      TOTAL THYROIDECTOMY      YAG Laser Capsulotomy  Left 07/29/2019    Dr. Hahn       Family History   Problem Relation Age of Onset    Cancer Father         lung    Dementia Mother     Glaucoma Brother     Macular degeneration Brother     Diabetes Neg Hx     Amblyopia Neg Hx     Blindness Neg Hx     Cataracts Neg Hx     Retinal detachment Neg Hx     Strabismus Neg Hx     Melanoma Neg Hx        Social History     Socioeconomic History     Marital status:      Spouse name: Not on file    Number of children: Not on file    Years of education: Not on file    Highest education level: Not on file   Occupational History    Occupation: realtor     Employer: [employer]     Employer: [employer]   Social Needs    Financial resource strain: Not hard at all    Food insecurity:     Worry: Never true     Inability: Never true    Transportation needs:     Medical: No     Non-medical: No   Tobacco Use    Smoking status: Former Smoker     Packs/day: 2.00     Years: 44.00     Pack years: 88.00     Types: Cigarettes     Last attempt to quit: 1969     Years since quittin.9    Smokeless tobacco: Never Used   Substance and Sexual Activity    Alcohol use: Yes     Alcohol/week: 0.0 standard drinks     Frequency: Monthly or less     Drinks per session: 1 or 2     Binge frequency: Never     Comment: maybe a beer every 3 months    Drug use: No    Sexual activity: Not Currently   Lifestyle    Physical activity:     Days per week: 5 days     Minutes per session: 20 min    Stress: Not at all   Relationships    Social connections:     Talks on phone: More than three times a week     Gets together: Twice a week     Attends Gnosticism service: Never     Active member of club or organization: Yes     Attends meetings of clubs or organizations: More than 4 times per year     Relationship status:    Other Topics Concern    Are you pregnant or think you may be? No    Breast-feeding No   Social History Narrative    Not on file       No current facility-administered medications on file prior to encounter.      Current Outpatient Medications on File Prior to Encounter   Medication Sig Dispense Refill    cholecalciferol, vitamin D3, (VITAMIN D3) 2,000 unit Tab Take 1 tablet by mouth once daily.      co-enzyme Q-10 30 mg capsule Take 30 mg by mouth once daily.       diclofenac (VOLTAREN) 75 MG EC tablet Take 1 tablet (75 mg total) by  mouth 2 (two) times daily as needed (pain). 60 tablet 6    fenofibrate 160 MG Tab Take 1 tablet (160 mg total) by mouth once daily. 90 tablet 3    ferrous sulfate (IRON ORAL) Take by mouth once daily.       LACTOBACILLUS COMBINATION NO.8 (ADULT PROBIOTIC ORAL) Take by mouth once daily.       levothyroxine (SYNTHROID) 125 MCG tablet TAKE 1 TABLET (125 MCG TOTAL) BY MOUTH AFTER DINNER. 90 tablet 3    losartan (COZAAR) 50 MG tablet Take 1.5 tablets (75 mg total) by mouth once daily. 135 tablet 3    meclizine (ANTIVERT) 12.5 mg tablet Take 1 tablet (12.5 mg total) by mouth 3 (three) times daily as needed for Dizziness. 30 tablet 2    omeprazole (PRILOSEC) 40 MG capsule TAKE 1 CAPSULE (40 MG TOTAL) BY MOUTH BEFORE DINNER.  1    omeprazole (PRILOSEC) 40 MG capsule TAKE 1 CAPSULE (40 MG TOTAL) BY MOUTH BEFORE DINNER. 90 capsule 1    turmeric root extract 500 mg Cap Take by mouth once daily.       valACYclovir (VALTREX) 1000 MG tablet Take 1 tablet (1,000 mg total) by mouth 3 (three) times daily. for 7 days 21 tablet 0       Review of patient's allergies indicates:   Allergen Reactions    Codeine Diarrhea and Hallucinations    Niacin preparations      Redness, warming       Review of Systems   Constitutional: Negative for chills, fever and weight loss.   HENT: Positive for hearing loss. Negative for sore throat.    Eyes: Negative for blurred vision and double vision.   Respiratory: Negative for cough, hemoptysis and shortness of breath.    Cardiovascular: Negative for chest pain and palpitations.   Gastrointestinal: Positive for blood in stool and diarrhea. Negative for abdominal pain, nausea and vomiting.   Genitourinary: Negative for dysuria and urgency.   Musculoskeletal: Positive for joint pain. Negative for myalgias.   Skin: Negative for itching and rash.   Neurological: Negative for dizziness and headaches.        Objective:     Vitals:    12/07/19 0800   BP:    Pulse: 68   Resp:    Temp:          Physical  Exam   Constitutional: She is oriented to person, place, and time. No distress.   HENT:   Head: Normocephalic and atraumatic.   Mouth/Throat: Oropharynx is clear and moist.   Eyes: Conjunctivae are normal. No scleral icterus.   Neck: Neck supple.   Cardiovascular: Normal rate and regular rhythm.   Pulmonary/Chest: Effort normal.   Abdominal: Soft. Normal appearance and bowel sounds are normal. She exhibits no distension. There is tenderness in the left lower quadrant. There is no rigidity, no rebound and no guarding.   Musculoskeletal: She exhibits no edema or tenderness.   Lymphadenopathy:     She has no cervical adenopathy.   Neurological: She is alert and oriented to person, place, and time.   Skin: Skin is warm and dry. Capillary refill takes less than 2 seconds. She is not diaphoretic.   Psychiatric: She has a normal mood and affect.   Nursing note and vitals reviewed.       Significant Labs:  Recent Labs   Lab 12/06/19  1912 12/07/19  0417   HGB 10.5* 8.8*       Lab Results   Component Value Date    WBC 6.69 12/07/2019    HGB 8.8 (L) 12/07/2019    HCT 28.4 (L) 12/07/2019     (H) 12/07/2019     12/07/2019       Lab Results   Component Value Date     12/07/2019    K 3.8 12/07/2019     (H) 12/07/2019    CO2 24 12/07/2019    BUN 23 12/07/2019    CREATININE 0.8 12/07/2019    CALCIUM 8.4 (L) 12/07/2019    ANIONGAP 5 (L) 12/07/2019    ESTGFRAFRICA >60.0 12/07/2019    EGFRNONAA >60.0 12/07/2019       Lab Results   Component Value Date    ALT 12 12/07/2019    AST 19 12/07/2019    ALKPHOS 55 12/07/2019    BILITOT 0.4 12/07/2019       Lab Results   Component Value Date    INR 1.1 12/06/2019       Significant Imaging:  Reviewed pertinent radiology findings.       Assessment/Plan:     Jo-Ann Escobedo is a 79 y.o. female with history of hx of diverticulitis + abscess(2014), CKD2, HTN, remote h/o breast cancer s/p L modified radical mastectomy + chemo, arthritis on NSAIDS comes in with chief  "complaint of BRBPR.     Started Tuesday, she noticed "darker stools" and starting Thursday noticed numerous episodes of BRBPR, bright red mixed with dark red, no black or tarry stools, no nausea, vomiting, or abdominal pain. It has been happening since then, numerous episodes, and since admission today has had atleast 4-5 episodes of painless BRBPR. She feels the blood just comes out and sometimes comes mixed with her bowel movement. She has been taking voltaren 75mg BID for almost 15 years for arthritis, PPI 40 daily, and PO iron daily. She is vitally stable, HR wnl, hb 10.5 drop to 8.8 today (baseline around 12.9), normal iron stuides, BUN 30, cr 0.9, lipase wnl. CTA shows uncomplicated diverticulitis vs colitis in descending and sigmoid colon junction, negative for active bleed.     CTA does not show AAA.     Had colonoscopy on 4/15/19: fair prep, 3 to 6 mm polyps in transverse colon and ascending colon, diverticulosis in sigmoid colon. Colonoscopy done on 1/19/15 showed 5 mm polyp in transverse colon, and diverticulosis in sigmoid and descending colon. She has never had EGD before.       NSAID (+)  Aspirin(antiplatelet) (--)  Alcohol Use (--)  Liver disease/varices (--)  Severe retching (--)  History of GI bleed (+)  Past endoscopy (+)  Prior abdominal surgery (--)  Aortic aneurysms/grafts (--)   Trauma (--)  Coagulopathy (--)  Anticoagulation (--)  Malignancy +      Problem List:  1. She has painless hematochezia mixed with dark red bowel movements, this is likely a lower GI bleed, but given NSAID use, and elevated BUN:cr ratio, there are risk factors for PUD and UGIB. CTA negative for active bleed or AAA.   2. She has LLQ abdominal pain only on palpation, CTA showing diverticulitis vs colitis, will hold off on colonoscopy given risk of perforation while performing procedure with active diverticulitis. Recommend abx therapy and follow up with CRS(who she normally follows for colonoscopy).     Plan:  -Place " patient NPO  -Place 2 large bore IVs, volume resuscitation per primary  -Transfuse pRBC for Hb < 7 g/dL (Consider a higher Hb target if there is clinical evidence of intravascular volume depletion or comorbidities, such as CAD or if high suspicion of vigorous active ongoing bleeding or an uncorrected coagulopathy exists.).  -Correct coagulopathy (goal plt >50, INR <1.5) if present in patients without absolute contraindications.  -PPI 80 mg IV bolus once, then 8 mg/hour infusion or IV PPI 40 BID  -Avoid nonsteroidal agents, antiplatelet agents and anticoagulants if possible in patients without absolute contraindications  -Will plan for Endoscopy today. If negative, will recommend CRS evaluation for colonoscopy and we feel colonoscopy is contraindicated in patient with active diverticulitis. Earliest we would be able to do colonoscopy is in 4 to 6 weeks.   -abx for 10 to 14 days  -Please call with any additional questions, concerns or changes in the patient's clinical status.    High-quality evidence per [employer]      Thank you for involving us in the care of Jo-Ann Escobedo. Please call with any additional questions, concerns or changes in the patient's clinical status.    Matthew Gresham MD  Gastroenterology Fellow PGY IV   [facility]

## 2019-12-07 NOTE — HPI
"Jo-Ann Escobedo is a 79 y.o. F with diverticulosis with previous hx of diverticulitis + abscess, CKD2, HTN, remote h/o breast cancer s/p L modified radical mastectomy + chemo who presents to [facility] ED 12/6 for acute onset BRBPR x24 hours with associated melena, diarrhea.  Patient was seen by CRS today in the clinic and underwent a GYPSY and anoscope which showed normal exterior anus with melanotic stool on anoscope.  She was advised to present to ED for possible upper GI bleed.  Patient denies any f/c/sweats, abd pain, nausea, hematemesis, CP, SOB, dizziness, lightheadedness.  Of note, patient was recently restarted on diclofenac 75 mg PO BID in September.     ED: AFVSS, no leukocytosis. UA with 3+ occult blood, >100 RBCs, 12 WBCs.  CTA A/P shows no evidence of acute bleeding but does show "Findings concerning for uncomplicated diverticulitis or colitis about the descending and sigmoid colon junction".  Given 1L IVF, protonix, zosyn.  Hg 10. (previously 13-14)  "

## 2019-12-07 NOTE — ASSESSMENT & PLAN NOTE
78 y/o with h/o diverticulitis with abscess presents with BRBPR and melena.  GYPSY/anoscope unrevealing per CRS note. CTA A/P show uncomplicated diverticulitis (vs colitis) of descending/sigmoid colon without active bleeding.    - GIB pathway started; sxs likely 2/2 diverticulitis and should improve with in 2-3 days with abx + supportive care  - c/w zosyn, mIVF  - Hg 10.5 (BL 12-13); plts 234 ; HDS  - NPO, PPI BID  - Orthostatics Q shift; Place 2 large bore IVs, volume resuscitation as needed  - Trend H/H (Transfuse pRBC for Hb < 7 g/dL); checking anemia labs   - Avoid nonsteroidal agents, antiplatelet agents and anticoagulants uf possible  - Colonoscopy 4/2019: Three 3 to 6 mm polyps in the transverse colon and in the ascending colon, removed with a cold snare. Resected and retrieved.  Diverticulosis in the sigmoid colon and in the descending colon.

## 2019-12-07 NOTE — ED PROVIDER NOTES
Encounter Date: 12/6/2019       History     Chief Complaint   Patient presents with    Rectal Bleeding     saw np today. instructed to come to ER. mild intermittent abd pain. not on blood thinners. mixture of BRB and dark     Patient 78 yo f w/ PMHx of diverticulosis, previous hx of diverticulitis with abscess formation, CKD, DJD lumbar spine, history of breast cancer s/p left mastectomy, hyperlipidemia, hypertension, hypothyroidism, lipoma, multinodular goiter, obesity, Paget's disease of bone, and prediabetes.    Patient presents to ED today from Colorectal Surgery for concerns of UGIB as patient has new onset of maroon colored stools. Patient endorses a 1.5 day history of maroon-colored stool present with BMs, of which she has had 10-12 BM's since the initiation of the bleeding. Patient states that total blood loss from BM's are around 1pint in volume. Patient denies any abdominal pain, nausea, vomiting, hematemesis, chest pain, lightheadness, dizziness.     Patient does report chronic NSAID use. States that within the past 6-8 months patient has been taking diclofenac 75mg BID for back pain. Patient states prior to this she had been using ibuprofen for pain relief. Patient does report a 20 year history of NSAID use. No recent head trauma or burns.    Patient does have a history of diverticulitis of which she was hospitalized for previously.          Review of patient's allergies indicates:   Allergen Reactions    Codeine Diarrhea and Hallucinations    Niacin preparations      Redness, warming     Past Medical History:   Diagnosis Date    After-cataract of both eyes - Both Eyes 9/26/2012    Allergy     Arthritis     Breast cancer     Diverticulosis     Hyperlipidemia     Hypertension     Hypothyroidism     Paget disease of bone     Squamous Cell Carcinoma     in situ right neck     Thyroid disease      Past Surgical History:   Procedure Laterality Date    CATARACT EXTRACTION W/  INTRAOCULAR LENS  IMPLANT Bilateral     COLONOSCOPY N/A 4/15/2019    Procedure: COLONOSCOPY;  Surgeon: Artur Monet MD;  Location: [facility] ENDO (4TH FLR);  Service: Endoscopy;  Laterality: N/A;  within 1 month    EXCISIONAL BIOPSY Right 2019    Procedure: EXCISIONAL BIOPSY-breast;  Surgeon: Suki Dill MD;  Location: [facility] OR 2ND FLR;  Service: General;  Laterality: Right;    EYE SURGERY      HYSTERECTOMY      INJECTION OF ANESTHETIC AGENT AROUND MEDIAL BRANCH NERVES INNERVATING LUMBAR FACET JOINT Bilateral 2019    Procedure: BLOCK, NERVE, FACET JOINT, LUMBAR, MEDIAL BRANCH-deo MBB L4/5,L5/S1;  Surgeon: Jarvis Morales III, MD;  Location: [facility] CATH LAB;  Service: Pain Management;  Laterality: Bilateral;    KNEE ARTHROSCOPY N/A     pt unsure of which knee     MASTECTOMY      SPINE SURGERY      TOTAL THYROIDECTOMY      YAG Laser Capsulotomy  Left 2019    Dr. Hahn     Family History   Problem Relation Age of Onset    Cancer Father         lung    Dementia Mother     Glaucoma Brother     Macular degeneration Brother     Diabetes Neg Hx     Amblyopia Neg Hx     Blindness Neg Hx     Cataracts Neg Hx     Retinal detachment Neg Hx     Strabismus Neg Hx     Melanoma Neg Hx      Social History     Tobacco Use    Smoking status: Former Smoker     Packs/day: 2.00     Years: 44.00     Pack years: 88.00     Types: Cigarettes     Last attempt to quit: 1969     Years since quittin.9    Smokeless tobacco: Never Used   Substance Use Topics    Alcohol use: Yes     Alcohol/week: 0.0 standard drinks     Frequency: Monthly or less     Drinks per session: 1 or 2     Binge frequency: Never     Comment: maybe a beer every 3 months    Drug use: No     Review of Systems   Constitutional: Negative for chills, fatigue, fever and unexpected weight change.   HENT: Negative for sore throat and trouble swallowing.    Eyes: Negative for photophobia and visual disturbance.   Respiratory: Negative for  cough, chest tightness and shortness of breath.    Cardiovascular: Negative for chest pain, palpitations and leg swelling.   Gastrointestinal: Positive for abdominal pain (chronic cramping), anal bleeding and blood in stool. Negative for abdominal distention, constipation, diarrhea, nausea, rectal pain and vomiting.   Genitourinary: Negative for difficulty urinating, dysuria and hematuria.   Musculoskeletal: Negative for neck pain and neck stiffness.   Skin: Negative for color change.   Neurological: Negative for dizziness, tremors, syncope, light-headedness, numbness and headaches.   Psychiatric/Behavioral: Negative for confusion.       Physical Exam     Initial Vitals [12/06/19 1651]   BP Pulse Resp Temp SpO2   119/67 73 18 98.9 °F (37.2 °C) 97 %      MAP       --         Physical Exam    Constitutional: She appears well-developed and well-nourished. She is not diaphoretic. No distress.   HENT:   Head: Normocephalic and atraumatic.   Mouth/Throat: No oropharyngeal exudate.   Eyes: Conjunctivae and EOM are normal. Pupils are equal, round, and reactive to light. No scleral icterus.   Neck: Normal range of motion. No JVD present.   Cardiovascular: Normal rate, regular rhythm, normal heart sounds and intact distal pulses.   No murmur heard.  Pulmonary/Chest: Breath sounds normal. No respiratory distress. She has no wheezes. She exhibits no tenderness.   Abdominal: Soft. Bowel sounds are normal. She exhibits no distension. There is tenderness (diffusely tender to deep palpation). There is no rebound and no guarding.   Genitourinary:   Genitourinary Comments: GYPSY examination revealing maroon colored stool   Musculoskeletal: Normal range of motion. She exhibits no edema or tenderness.   Lymphadenopathy:     She has no cervical adenopathy.   Neurological: She is alert and oriented to person, place, and time. No cranial nerve deficit or sensory deficit. GCS score is 15. GCS eye subscore is 4. GCS verbal subscore is 5. GCS  motor subscore is 6.   Skin: Skin is warm and dry. Capillary refill takes less than 2 seconds.   Psychiatric: She has a normal mood and affect. Her behavior is normal. Judgment and thought content normal.         ED Course   Procedures  Labs Reviewed   CBC W/ AUTO DIFFERENTIAL - Abnormal; Notable for the following components:       Result Value    RBC 3.45 (*)     Hemoglobin 10.5 (*)     Hematocrit 34.5 (*)     Mean Corpuscular Volume 100 (*)     Mean Corpuscular Hemoglobin Conc 30.4 (*)     Immature Granulocytes 0.7 (*)     Immature Grans (Abs) 0.06 (*)     All other components within normal limits   COMPREHENSIVE METABOLIC PANEL - Abnormal; Notable for the following components:    BUN, Bld 30 (*)     Anion Gap 7 (*)     All other components within normal limits   URINALYSIS, REFLEX TO URINE CULTURE - Abnormal; Notable for the following components:    Occult Blood UA 3+ (*)     Leukocytes, UA Trace (*)     All other components within normal limits    Narrative:     Preferred Collection Type->Urine, Clean Catch   URINALYSIS MICROSCOPIC - Abnormal; Notable for the following components:    RBC, UA >100 (*)     WBC, UA 12 (*)     All other components within normal limits    Narrative:     Preferred Collection Type->Urine, Clean Catch   CULTURE, URINE   PROTIME-INR   LIPASE   MAGNESIUM   PHOSPHORUS   CBC W/ AUTO DIFFERENTIAL   COMPREHENSIVE METABOLIC PANEL   TYPE AND SCREEN PREOP          Imaging Results           CTA Abdomen and Pelvis (Final result)  Result time 12/06/19 22:55:46    Final result by Efe Polk MD (12/06/19 22:55:46)                 Impression:      Findings concerning for uncomplicated diverticulitis or colitis about the descending and sigmoid colon junction.    No evidence of intraluminal contrast extravasation to suggest ongoing acute GI bleeding.    Calcified gallstone the neck of the gallbladder.    Other findings as above.    This report was flagged in Epic as abnormal.    Electronically  signed by resident: Xavi Hernandez  Date:    12/06/2019  Time:    21:48    Electronically signed by: Efe Polk MD  Date:    12/06/2019  Time:    22:55             Narrative:    EXAMINATION:  CTA ABDOMEN AND PELVIS    CLINICAL HISTORY:  GI bleeding;    TECHNIQUE:  Low dose axial images, sagittal and coronal reformations were obtained from the lung bases to the pubic symphysis before and following the IV administration of 100 mL of Omnipaque 350 .  Oral contrast was not administered..    COMPARISON:  CT abdomen pelvis with contrast 09/29/2019 retroperitoneal ultrasound 11/19/2019    FINDINGS:  Abdomen:    - Lung bases: No infiltrates and no nodules.    - Liver: Multiple hypoattenuating liver lesions likely cysts but incompletely characterized on this exam.  Ultrasound could be obtained for further evaluation on a nonemergent basis.    - Gallbladder: Calcified gallstone in the neck of the gallbladder.  No gallbladder wall thickening or pericholecystic fluid or fat stranding to suggest acute cholecystitis.    - Bile Ducts: No evidence of intra or extra hepatic biliary ductal dilation.    - Spleen: Probable splenic cyst as seen on series 5, image 14 versus delayed enhancement    - Kidneys: Multiple renal cysts some consistent with simple cysts, and some below the threshold of CT characterization.  Nonobstructive right nephrolith.    - Adrenals: Unremarkable.    - Pancreas: No mass or peripancreatic fat stranding.    - Retroperitoneum:  No significant adenopathy.    - Vascular: Mild vascular atherosclerosis without definite stenosis of the renal or mesenteric vasculature.  Multiple right renal arteries.  Loss of normal tapering of the infrarenal aorta without aneurysm right common iliac appears mildly ectatic measuring 1.6 cm.    - Abdominal wall:  Fat containing umbilical hernia.  Left inguinal fat containing hernia.    Pelvis:    Uterus is surgically absent.  No pelvic mass.  No free fluid.  Bladder appears  unremarkable.    Bowel/Mesentery:    There is long segment colonic wall thickening and mild pericolonic fat stranding and prominent mesenteric lymph nodes about the descending sigmoid colon junction.  These findings are concerning for uncomplicated diverticulitis/colitis.  No active GI bleeding noted on this examination.  No small bowel wall thickening.    Bones:  Osseous degenerative change with multilevel posterior disc bulges and osteophytes.  No definite high-grade spinal canal stenosis.  Spinal canal stenosis likely worst at L3-4 at least moderate.  No fracture or dislocation.                                 Medical Decision Making:   History:   Old Medical Records: I decided to obtain old medical records.  Old Records Summarized: records from clinic visits and records from previous admission(s).       <> Summary of Records: H/H 2 months ago 12.9/39.4  Initial Assessment:   Patient 78 yo f w/ PMHx of diverticulosis, previous hx of diverticulitis with abscess formation, CKD, DJD lumbar spine, history of breast cancer s/p left mastectomy, hyperlipidemia, hypertension, hypothyroidism, lipoma, multinodular goiter, obesity, Paget's disease of bone, and prediabetes.    Patient presents to ED today from Colorectal Surgery for concerns of UGIB as patient has new onset of maroon colored stools. Patient endorses a 1.5 day history of maroon-colored stool present with BMs, of which she has had 10-12 BM's since the initiation of the bleeding. Patient states that total blood loss from BM's are around 1pint in volume. Patient denies any abdominal pain, nausea, vomiting, hematemesis, chest pain, lightheadness, dizziness.   Chronic NSAID use for 6-8 months inclusive of diclofenac.  Differential Diagnosis:   Gastritis  PUD  Angiodysplasia  Aortoenteric fistula  Mesenteric Ischemia    Clinical Tests:   Lab Tests: Ordered and Reviewed  Radiological Study: Ordered and Reviewed  ED Management:  UGIB protocol initiated  Patient to  receive 40mg IV protonix  CTA Abdomen Pelvis Ordered  Patient vitally stable at present  T&Screen in process    Will collect stooling output in collection hat to measure output    CTA Abdomen Pelvis shows colitis vs. Diverticulitis  Patient started on IVF and Zosyn  Patient's WBC WNL, remains afebrile  Patient admitted under observation to Hospital Medicine  Primary to follow Hgb                 Attending Attestation:   Physician Attestation Statement for Resident:  As the supervising MD   Physician Attestation Statement: I have personally seen and examined this patient.   I agree with the above history. -:   As the supervising MD I agree with the above PE.    As the supervising MD I agree with the above treatment, course, plan, and disposition.   -: Patient given Protonix for GI bleed and IV fluids.    H/H shows drop of hemoglobin by 2 in the past 2 months, but otherwise NAD and hemodynamically stable, will hold blood transfusion at this time.    CTA shows colitis but no obvious vascular bleed, given Zosyn and admitted with plan GI consult while in hospital.   I have reviewed and agree with the residents interpretation of the following: lab data and CT scans.  I have reviewed the following: old records at this facility.                    ED Course as of Dec 07 0345   Fri Dec 06, 2019   2328 BUN, Bld(!): 30 [VV]   2328 Hemoglobin(!): 10.5 [VV]   2329 CTA Abdomen and Pelvis(!) [VV]      ED Course User Index  [VV] Rae Arnold MD              Clinical Impression:       ICD-10-CM ICD-9-CM   1. Colitis K52.9 558.9   2. GI bleed K92.2 578.9   3. Lower GI bleed K92.2 578.9                             aRe Arnold MD  Resident  12/07/19 5320       Danna Burnett MD  12/07/19 1167

## 2019-12-07 NOTE — SUBJECTIVE & OBJECTIVE
Past Medical History:   Diagnosis Date    After-cataract of both eyes - Both Eyes 9/26/2012    Allergy     Arthritis     Breast cancer     Diverticulosis     Hyperlipidemia     Hypertension     Hypothyroidism     Paget disease of bone     Squamous Cell Carcinoma     in situ right neck     Thyroid disease        Past Surgical History:   Procedure Laterality Date    CATARACT EXTRACTION W/  INTRAOCULAR LENS IMPLANT Bilateral     COLONOSCOPY N/A 4/15/2019    Procedure: COLONOSCOPY;  Surgeon: Artur Monet MD;  Location: [facility] ENDO (4TH FLR);  Service: Endoscopy;  Laterality: N/A;  within 1 month    EXCISIONAL BIOPSY Right 6/27/2019    Procedure: EXCISIONAL BIOPSY-breast;  Surgeon: Suki Dill MD;  Location: [facility] OR 2ND FLR;  Service: General;  Laterality: Right;    EYE SURGERY      HYSTERECTOMY      INJECTION OF ANESTHETIC AGENT AROUND MEDIAL BRANCH NERVES INNERVATING LUMBAR FACET JOINT Bilateral 6/7/2019    Procedure: BLOCK, NERVE, FACET JOINT, LUMBAR, MEDIAL BRANCH-deo MBB L4/5,L5/S1;  Surgeon: Jarvis Morales III, MD;  Location: [facility] CATH LAB;  Service: Pain Management;  Laterality: Bilateral;    KNEE ARTHROSCOPY N/A     pt unsure of which knee     MASTECTOMY      SPINE SURGERY      TOTAL THYROIDECTOMY      YAG Laser Capsulotomy  Left 07/29/2019    Dr. Hahn       Review of patient's allergies indicates:   Allergen Reactions    Codeine Diarrhea and Hallucinations    Niacin preparations      Redness, warming       No current facility-administered medications on file prior to encounter.      Current Outpatient Medications on File Prior to Encounter   Medication Sig    cholecalciferol, vitamin D3, (VITAMIN D3) 2,000 unit Tab Take 1 tablet by mouth once daily.    co-enzyme Q-10 30 mg capsule Take 30 mg by mouth once daily.     diclofenac (VOLTAREN) 75 MG EC tablet Take 1 tablet (75 mg total) by mouth 2 (two) times daily as needed (pain).    fenofibrate 160 MG Tab Take 1  tablet (160 mg total) by mouth once daily.    ferrous sulfate (IRON ORAL) Take by mouth once daily.     LACTOBACILLUS COMBINATION NO.8 (ADULT PROBIOTIC ORAL) Take by mouth once daily.     levothyroxine (SYNTHROID) 125 MCG tablet TAKE 1 TABLET (125 MCG TOTAL) BY MOUTH AFTER DINNER.    losartan (COZAAR) 50 MG tablet Take 1.5 tablets (75 mg total) by mouth once daily.    meclizine (ANTIVERT) 12.5 mg tablet Take 1 tablet (12.5 mg total) by mouth 3 (three) times daily as needed for Dizziness.    omeprazole (PRILOSEC) 40 MG capsule TAKE 1 CAPSULE (40 MG TOTAL) BY MOUTH BEFORE DINNER.    omeprazole (PRILOSEC) 40 MG capsule TAKE 1 CAPSULE (40 MG TOTAL) BY MOUTH BEFORE DINNER.    turmeric root extract 500 mg Cap Take by mouth once daily.     valACYclovir (VALTREX) 1000 MG tablet Take 1 tablet (1,000 mg total) by mouth 3 (three) times daily. for 7 days     Family History     Problem Relation (Age of Onset)    Cancer Father    Dementia Mother    Glaucoma Brother    Macular degeneration Brother        Tobacco Use    Smoking status: Former Smoker     Packs/day: 2.00     Years: 44.00     Pack years: 88.00     Types: Cigarettes     Last attempt to quit: 1969     Years since quittin.9    Smokeless tobacco: Never Used   Substance and Sexual Activity    Alcohol use: Yes     Alcohol/week: 0.0 standard drinks     Frequency: Monthly or less     Drinks per session: 1 or 2     Binge frequency: Never     Comment: maybe a beer every 3 months    Drug use: No    Sexual activity: Not Currently     Review of Systems   Constitutional: Negative for chills, fatigue and fever.   HENT: Negative for congestion and rhinorrhea.    Eyes: Negative for photophobia and visual disturbance.   Respiratory: Negative for cough, chest tightness and shortness of breath.    Cardiovascular: Negative for chest pain and palpitations.   Gastrointestinal: Positive for blood in stool and diarrhea. Negative for abdominal pain, nausea, rectal pain  and vomiting.   Genitourinary: Negative for difficulty urinating, dysuria and hematuria.   Musculoskeletal: Negative for back pain and gait problem.   Skin: Negative for rash and wound.   Neurological: Negative for dizziness and headaches.   Psychiatric/Behavioral: Negative for agitation and confusion.     Objective:     Vital Signs (Most Recent):  Temp: 97.4 °F (36.3 °C) (12/07/19 0224)  Pulse: 66 (12/07/19 0224)  Resp: 16 (12/07/19 0224)  BP: (!) 140/74 (12/07/19 0224)  SpO2: 97 % (12/07/19 0224) Vital Signs (24h Range):  Temp:  [97.4 °F (36.3 °C)-98.9 °F (37.2 °C)] 97.4 °F (36.3 °C)  Pulse:  [62-76] 66  Resp:  [16-20] 16  SpO2:  [93 %-98 %] 97 %  BP: (110-140)/(53-74) 140/74     Weight: 86.1 kg (189 lb 13.1 oz)  Body mass index is 33.62 kg/m².    Physical Exam   Constitutional: She is oriented to person, place, and time. She appears well-developed and well-nourished.   HENT:   Head: Normocephalic and atraumatic.   Poor hearing   Eyes: Pupils are equal, round, and reactive to light. EOM are normal.   Neck: Normal range of motion. Neck supple.   Cardiovascular: Normal rate, regular rhythm, normal heart sounds and intact distal pulses.   Pulmonary/Chest: Effort normal and breath sounds normal.   Abdominal: Soft. She exhibits no distension. There is no tenderness.   Hyperactive BS   Musculoskeletal: Normal range of motion.   Neurological: She is alert and oriented to person, place, and time.   Skin: Skin is warm and dry. Capillary refill takes 2 to 3 seconds.   Psychiatric: She has a normal mood and affect. Her behavior is normal. Judgment and thought content normal.   Nursing note and vitals reviewed.        CRANIAL NERVES     CN III, IV, VI   Pupils are equal, round, and reactive to light.  Extraocular motions are normal.        Significant Labs:   CBC:   Recent Labs   Lab 12/06/19 1912   WBC 8.71   HGB 10.5*   HCT 34.5*        CMP:   Recent Labs   Lab 12/06/19 1912      K 4.1      CO2 26   GLU  94   BUN 30*   CREATININE 0.9   CALCIUM 9.7   PROT 6.9   ALBUMIN 3.8   BILITOT 0.4   ALKPHOS 66   AST 23   ALT 16   ANIONGAP 7*   EGFRNONAA >60.0       Significant Imaging: I have reviewed all pertinent imaging results/findings within the past 24 hours.     Cta Abdomen And Pelvis    Result Date: 12/6/2019  EXAMINATION: CTA ABDOMEN AND PELVIS CLINICAL HISTORY: GI bleeding; TECHNIQUE: Low dose axial images, sagittal and coronal reformations were obtained from the lung bases to the pubic symphysis before and following the IV administration of 100 mL of Omnipaque 350 .  Oral contrast was not administered.. COMPARISON: CT abdomen pelvis with contrast 09/29/2019 retroperitoneal ultrasound 11/19/2019 FINDINGS: Abdomen: - Lung bases: No infiltrates and no nodules. - Liver: Multiple hypoattenuating liver lesions likely cysts but incompletely characterized on this exam.  Ultrasound could be obtained for further evaluation on a nonemergent basis. - Gallbladder: Calcified gallstone in the neck of the gallbladder.  No gallbladder wall thickening or pericholecystic fluid or fat stranding to suggest acute cholecystitis. - Bile Ducts: No evidence of intra or extra hepatic biliary ductal dilation. - Spleen: Probable splenic cyst as seen on series 5, image 14 versus delayed enhancement - Kidneys: Multiple renal cysts some consistent with simple cysts, and some below the threshold of CT characterization.  Nonobstructive right nephrolith. - Adrenals: Unremarkable. - Pancreas: No mass or peripancreatic fat stranding. - Retroperitoneum:  No significant adenopathy. - Vascular: Mild vascular atherosclerosis without definite stenosis of the renal or mesenteric vasculature.  Multiple right renal arteries.  Loss of normal tapering of the infrarenal aorta without aneurysm right common iliac appears mildly ectatic measuring 1.6 cm. - Abdominal wall:  Fat containing umbilical hernia.  Left inguinal fat containing hernia. Pelvis: Uterus is  surgically absent.  No pelvic mass.  No free fluid.  Bladder appears unremarkable. Bowel/Mesentery: There is long segment colonic wall thickening and mild pericolonic fat stranding and prominent mesenteric lymph nodes about the descending sigmoid colon junction.  These findings are concerning for uncomplicated diverticulitis/colitis.  No active GI bleeding noted on this examination.  No small bowel wall thickening. Bones:  Osseous degenerative change with multilevel posterior disc bulges and osteophytes.  No definite high-grade spinal canal stenosis.  Spinal canal stenosis likely worst at L3-4 at least moderate.  No fracture or dislocation.     Findings concerning for uncomplicated diverticulitis or colitis about the descending and sigmoid colon junction. No evidence of intraluminal contrast extravasation to suggest ongoing acute GI bleeding. Calcified gallstone the neck of the gallbladder. Other findings as above. This report was flagged in Epic as abnormal.

## 2019-12-07 NOTE — ANESTHESIA PREPROCEDURE EVALUATION
[facility]  Anesthesia Pre-Operative Evaluation         Patient Name: Jo-Ann Escobedo  YOB: 1940  MRN: [id number]    SUBJECTIVE:     Pre-operative evaluation for Procedure(s) (LRB):  EGD (ESOPHAGOGASTRODUODENOSCOPY) (N/A)     12/07/2019    Jo-Ann Escobedo is a 79 y.o. female w/ a significant PMHx of diverticulosis, HTN, breast ca s/p modified radical mastectomy on the L who presents with BRBPR. She was evaluated in CRS clinic with anoscope that showed melanotic stools. Denies any coffee ground emesis but does endorse nausea. CTA negative for acute bleeding but does show diverticulitis of descending and sigmoid colon.     Confirmed NPO.   Hb 8.4 from 10.5 on 12/6.  Hemodynamically stable.    Patient now presents for the above procedure(s).      LDA:        Peripheral IV - Single Lumen 12/06/19 1911 20 G Right Antecubital (Active)   Site Assessment Clean;Dry;Intact;No redness;No swelling 12/7/2019  8:00 AM   Line Status Flushed;Saline locked 12/7/2019  2:25 AM   Dressing Status Clean;Dry;Intact 12/7/2019  2:25 AM   Dressing Intervention New dressing 12/6/2019  7:11 PM   Number of days: 0            Peripheral IV - Single Lumen 12/06/19 2108 18 G Right Hand (Active)   Site Assessment Clean;Dry;Intact;No redness;No swelling 12/7/2019  8:00 AM   Line Status Infusing 12/7/2019  2:25 AM   Dressing Status Clean;Dry;Intact 12/7/2019  2:25 AM   Number of days: 0       Prev airway: None documented.    Drips:   sodium chloride 0.9% 75 mL/hr at 12/07/19 0330       Patient Active Problem List   Diagnosis    Diverticulosis    PCO (posterior capsular opacification), right    Hyperlipidemia    Sensorineural hearing loss, bilateral    Paget's disease of bone    Arthritis of foot    Obesity (BMI 30.0-34.9)    Multinodular goiter    Osteoarthritis of lumbar spine    Hypothyroidism, postsurgical    Lipoma of arm    Malignant neoplasm of left breast    Chronic kidney insufficiency     Essential hypertension    History of left breast cancer    Chronic sinusitis    Prediabetes    Facet arthritis of lumbar region    Abnormal breast biopsy    Aortic atherosclerosis    Atypical ductal hyperplasia of right breast    Ductal carcinoma in situ (DCIS) of right breast    History of breast cancer in female    Pseudophakia    Refractive error    Post-operative state    Nephrolithiasis    Hydroureteronephrosis    BPPV (benign paroxysmal positional vertigo)    Diverticulitis of large intestine without perforation or abscess with bleeding    Acute blood loss anemia    Hematuria       Review of patient's allergies indicates:   Allergen Reactions    Codeine Diarrhea and Hallucinations    Niacin preparations      Redness, warming       Current Inpatient Medications:   [START ON 12/8/2019] cyanocobalamin  500 mcg Oral Daily    fenofibrate  160 mg Oral Daily    ferrous sulfate  325 mg Oral Daily    levothyroxine  125 mcg Oral Before breakfast    pantoprazole  40 mg Intravenous BID    piperacillin-tazobactam (ZOSYN) IVPB  4.5 g Intravenous Q8H       No current facility-administered medications on file prior to encounter.      Current Outpatient Medications on File Prior to Encounter   Medication Sig Dispense Refill    cholecalciferol, vitamin D3, (VITAMIN D3) 2,000 unit Tab Take 1 tablet by mouth once daily.      co-enzyme Q-10 30 mg capsule Take 30 mg by mouth once daily.       diclofenac (VOLTAREN) 75 MG EC tablet Take 1 tablet (75 mg total) by mouth 2 (two) times daily as needed (pain). 60 tablet 6    fenofibrate 160 MG Tab Take 1 tablet (160 mg total) by mouth once daily. 90 tablet 3    ferrous sulfate (IRON ORAL) Take by mouth once daily.       LACTOBACILLUS COMBINATION NO.8 (ADULT PROBIOTIC ORAL) Take by mouth once daily.       levothyroxine (SYNTHROID) 125 MCG tablet TAKE 1 TABLET (125 MCG TOTAL) BY MOUTH AFTER DINNER. 90 tablet 3    losartan (COZAAR) 50 MG tablet Take 1.5  tablets (75 mg total) by mouth once daily. 135 tablet 3    meclizine (ANTIVERT) 12.5 mg tablet Take 1 tablet (12.5 mg total) by mouth 3 (three) times daily as needed for Dizziness. 30 tablet 2    omeprazole (PRILOSEC) 40 MG capsule TAKE 1 CAPSULE (40 MG TOTAL) BY MOUTH BEFORE DINNER.  1    omeprazole (PRILOSEC) 40 MG capsule TAKE 1 CAPSULE (40 MG TOTAL) BY MOUTH BEFORE DINNER. 90 capsule 1    turmeric root extract 500 mg Cap Take by mouth once daily.       valACYclovir (VALTREX) 1000 MG tablet Take 1 tablet (1,000 mg total) by mouth 3 (three) times daily. for 7 days 21 tablet 0       Past Surgical History:   Procedure Laterality Date    CATARACT EXTRACTION W/  INTRAOCULAR LENS IMPLANT Bilateral     COLONOSCOPY N/A 4/15/2019    Procedure: COLONOSCOPY;  Surgeon: Artur Monet MD;  Location: [facility] ENDO (4TH FLR);  Service: Endoscopy;  Laterality: N/A;  within 1 month    EXCISIONAL BIOPSY Right 6/27/2019    Procedure: EXCISIONAL BIOPSY-breast;  Surgeon: Suki Dill MD;  Location: [facility] OR 2ND FLR;  Service: General;  Laterality: Right;    EYE SURGERY      HYSTERECTOMY      INJECTION OF ANESTHETIC AGENT AROUND MEDIAL BRANCH NERVES INNERVATING LUMBAR FACET JOINT Bilateral 6/7/2019    Procedure: BLOCK, NERVE, FACET JOINT, LUMBAR, MEDIAL BRANCH-deo MBB L4/5,L5/S1;  Surgeon: Jarvis Morales III, MD;  Location: [facility] CATH LAB;  Service: Pain Management;  Laterality: Bilateral;    KNEE ARTHROSCOPY N/A     pt unsure of which knee     MASTECTOMY      SPINE SURGERY      TOTAL THYROIDECTOMY      YAG Laser Capsulotomy  Left 07/29/2019    Dr. Hahn       Social History     Socioeconomic History    Marital status:      Spouse name: Not on file    Number of children: Not on file    Years of education: Not on file    Highest education level: Not on file   Occupational History    Occupation: realtor     Employer: [employer]     Employer: [employer]   Social Needs    Financial  resource strain: Not hard at all    Food insecurity:     Worry: Never true     Inability: Never true    Transportation needs:     Medical: No     Non-medical: No   Tobacco Use    Smoking status: Former Smoker     Packs/day: 2.00     Years: 44.00     Pack years: 88.00     Types: Cigarettes     Last attempt to quit: 1969     Years since quittin.9    Smokeless tobacco: Never Used   Substance and Sexual Activity    Alcohol use: Yes     Alcohol/week: 0.0 standard drinks     Frequency: Monthly or less     Drinks per session: 1 or 2     Binge frequency: Never     Comment: maybe a beer every 3 months    Drug use: No    Sexual activity: Not Currently   Lifestyle    Physical activity:     Days per week: 5 days     Minutes per session: 20 min    Stress: Not at all   Relationships    Social connections:     Talks on phone: More than three times a week     Gets together: Twice a week     Attends Anglican service: Never     Active member of club or organization: Yes     Attends meetings of clubs or organizations: More than 4 times per year     Relationship status:    Other Topics Concern    Are you pregnant or think you may be? No    Breast-feeding No   Social History Narrative    Not on file       OBJECTIVE:     Vital Signs Range (Last 24H):  Temp:  [36.3 °C (97.4 °F)-37.2 °C (98.9 °F)]   Pulse:  [62-78]   Resp:  [16-20]   BP: (110-164)/(53-74)   SpO2:  [93 %-98 %]       Significant Labs:  Lab Results   Component Value Date    WBC 6.69 2019    HGB 8.4 (L) 2019    HCT 27.9 (L) 2019     2019    CHOL 173 2019    TRIG 92 2019    HDL 40 2019    ALT 12 2019    AST 19 2019     2019    K 3.8 2019     (H) 2019    CREATININE 0.8 2019    BUN 23 2019    CO2 24 2019    TSH 2.372 2019    INR 1.1 2019    GLUF 100 2004    HGBA1C 5.6 10/18/2019       Diagnostic Studies: No relevant  studies.    EKG:   Results for orders placed or performed during the hospital encounter of 06/19/19   EKG 12-lead    Collection Time: 06/19/19 12:16 PM    Narrative    Test Reason : Z01.818,Z01.818,    Vent. Rate : 068 BPM     Atrial Rate : 068 BPM     P-R Int : 160 ms          QRS Dur : 080 ms      QT Int : 392 ms       P-R-T Axes : 032 -14 021 degrees     QTc Int : 416 ms    Normal sinus rhythm  Voltage criteria for left ventricular hypertrophy  Abnormal ECG  When compared with ECG of 20-JUL-2015 12:00,  No significant change was found  Confirmed by Ayla Mireles MD ([id number]) on 6/19/2019 2:27:42 PM    Referred By: KATYA MAJANO           Confirmed By:Ayla Mireles MD       2D ECHO:  TTE:  No results found for this or any previous visit.    ERIC:  No results found for this or any previous visit.    ASSESSMENT/PLAN:         Anesthesia Evaluation    I have reviewed the Patient Summary Reports.    I have reviewed the Nursing Notes.      Review of Systems  Anesthesia Hx:  No problems with previous Anesthesia Denies Hx of Anesthetic complications  History of prior surgery of interest to airway management or planning:  Denies Personal Hx of Anesthesia complications.   Social:  Non-Smoker    Hematology/Oncology:     Oncology Normal    -- Anemia: Oncology Comments: R Breast Papilloma     EENT/Dental:EENT/Dental Normal   Cardiovascular:   Hypertension    Pulmonary:  Pulmonary Normal    Renal/:   Chronic Renal Disease, CRI    Hepatic/GI:  Hepatic/GI Normal    Musculoskeletal:   Arthritis   Spine Disorders: (spondylosis) lumbar    Neurological:  Neurology Normal    Endocrine:   Hypothyroidism    Dermatological:  Skin Normal    Psych:  Psychiatric Normal           Physical Exam  General:  Obesity    Airway/Jaw/Neck:  Airway Findings: Mouth Opening: Normal Tongue: Normal  General Airway Assessment: Adult  Mallampati: II  Improves to II with phonation.  TM Distance: Normal, at least 6 cm  Jaw/Neck Findings:  Neck ROM: Normal  ROM      Dental:  Dental Findings: In tact   Chest/Lungs:  Chest/Lungs Findings: Clear to auscultation, Normal Respiratory Rate     Heart/Vascular:  Heart Findings: Rate: Normal  Rhythm: Regular Rhythm  Sounds: Normal     Abdomen:  Abdomen Findings: Normal    Musculoskeletal:  Musculoskeletal Findings: Normal   Skin:  Skin Findings: Normal    Mental Status:  Mental Status Findings:  Cooperative, Alert and Oriented         Anesthesia Plan  Type of Anesthesia, risks & benefits discussed:  Anesthesia Type:  general, MAC  Patient's Preference:   Intra-op Monitoring Plan: standard ASA monitors  Intra-op Monitoring Plan Comments:   Post Op Pain Control Plan: per primary service following discharge from PACU  Post Op Pain Control Plan Comments:   Induction:   IV  Beta Blocker:  Patient is not currently on a Beta-Blocker (No further documentation required).       Informed Consent: Patient understands risks and agrees with Anesthesia plan.  Questions answered. Anesthesia consent signed with patient.  ASA Score: 3     Day of Surgery Review of History & Physical:    H&P update referred to the provider.         Ready For Surgery From Anesthesia Perspective.

## 2019-12-07 NOTE — H&P
"[facility] Hospital Medicine  History & Physical    Patient Name: Jo-Ann Escobedo  MRN: [id number]  Admission Date: 12/6/2019  Attending Physician: Dirk Garcias MD   Primary Care Provider: Dakota Kerr MD    [facility] Medicine Team: [facility] HOSP MED F Sridhar Bai PA-C     Patient information was obtained from patient, past medical records and ER records.     Subjective:     Principal Problem:Diverticulitis of large intestine without perforation or abscess with bleeding    Chief Complaint:   Chief Complaint   Patient presents with    Rectal Bleeding     saw np today. instructed to come to ER. mild intermittent abd pain. not on blood thinners. mixture of BRB and dark        HPI: Jo-Ann Escobedo is a 79 y.o. F with diverticulosis with previous hx of diverticulitis + abscess, CKD2, HTN, remote h/o breast cancer s/p L modified radical mastectomy + chemo who presents to [facility] ED 12/6 for acute onset BRBPR x24 hours with associated melena, diarrhea.  Patient was seen by CRS today in the clinic and underwent a GYPSY and anoscope which showed normal exterior anus with melanotic stool on anoscope.  She was advised to present to ED for possible upper GI bleed.  Patient denies any f/c/sweats, abd pain, nausea, hematemesis, CP, SOB, dizziness, lightheadedness.  Of note, patient was recently restarted on diclofenac 75 mg PO BID in September.     ED: AFVSS, no leukocytosis. UA with 3+ occult blood, >100 RBCs, 12 WBCs.  CTA A/P shows no evidence of acute bleeding but does show "Findings concerning for uncomplicated diverticulitis or colitis about the descending and sigmoid colon junction".  Given 1L IVF, protonix, zosyn.  Hg 10. (previously 13-14)    Past Medical History:   Diagnosis Date    After-cataract of both eyes - Both Eyes 9/26/2012    Allergy     Arthritis     Breast cancer     Diverticulosis     Hyperlipidemia     Hypertension     Hypothyroidism     Paget disease of bone     Squamous Cell " Carcinoma     in situ right neck     Thyroid disease        Past Surgical History:   Procedure Laterality Date    CATARACT EXTRACTION W/  INTRAOCULAR LENS IMPLANT Bilateral     COLONOSCOPY N/A 4/15/2019    Procedure: COLONOSCOPY;  Surgeon: Artur Monet MD;  Location: [facility] ENDO (4TH FLR);  Service: Endoscopy;  Laterality: N/A;  within 1 month    EXCISIONAL BIOPSY Right 6/27/2019    Procedure: EXCISIONAL BIOPSY-breast;  Surgeon: Suki Dill MD;  Location: [facility] OR 2ND FLR;  Service: General;  Laterality: Right;    EYE SURGERY      HYSTERECTOMY      INJECTION OF ANESTHETIC AGENT AROUND MEDIAL BRANCH NERVES INNERVATING LUMBAR FACET JOINT Bilateral 6/7/2019    Procedure: BLOCK, NERVE, FACET JOINT, LUMBAR, MEDIAL BRANCH-deo MBB L4/5,L5/S1;  Surgeon: Jarvis Morales III, MD;  Location: [facility] CATH LAB;  Service: Pain Management;  Laterality: Bilateral;    KNEE ARTHROSCOPY N/A     pt unsure of which knee     MASTECTOMY      SPINE SURGERY      TOTAL THYROIDECTOMY      YAG Laser Capsulotomy  Left 07/29/2019    Dr. Hahn       Review of patient's allergies indicates:   Allergen Reactions    Codeine Diarrhea and Hallucinations    Niacin preparations      Redness, warming       No current facility-administered medications on file prior to encounter.      Current Outpatient Medications on File Prior to Encounter   Medication Sig    cholecalciferol, vitamin D3, (VITAMIN D3) 2,000 unit Tab Take 1 tablet by mouth once daily.    co-enzyme Q-10 30 mg capsule Take 30 mg by mouth once daily.     diclofenac (VOLTAREN) 75 MG EC tablet Take 1 tablet (75 mg total) by mouth 2 (two) times daily as needed (pain).    fenofibrate 160 MG Tab Take 1 tablet (160 mg total) by mouth once daily.    ferrous sulfate (IRON ORAL) Take by mouth once daily.     LACTOBACILLUS COMBINATION NO.8 (ADULT PROBIOTIC ORAL) Take by mouth once daily.     levothyroxine (SYNTHROID) 125 MCG tablet TAKE 1 TABLET (125 MCG TOTAL) BY  MOUTH AFTER DINNER.    losartan (COZAAR) 50 MG tablet Take 1.5 tablets (75 mg total) by mouth once daily.    meclizine (ANTIVERT) 12.5 mg tablet Take 1 tablet (12.5 mg total) by mouth 3 (three) times daily as needed for Dizziness.    omeprazole (PRILOSEC) 40 MG capsule TAKE 1 CAPSULE (40 MG TOTAL) BY MOUTH BEFORE DINNER.    omeprazole (PRILOSEC) 40 MG capsule TAKE 1 CAPSULE (40 MG TOTAL) BY MOUTH BEFORE DINNER.    turmeric root extract 500 mg Cap Take by mouth once daily.     valACYclovir (VALTREX) 1000 MG tablet Take 1 tablet (1,000 mg total) by mouth 3 (three) times daily. for 7 days     Family History     Problem Relation (Age of Onset)    Cancer Father    Dementia Mother    Glaucoma Brother    Macular degeneration Brother        Tobacco Use    Smoking status: Former Smoker     Packs/day: 2.00     Years: 44.00     Pack years: 88.00     Types: Cigarettes     Last attempt to quit: 1969     Years since quittin.9    Smokeless tobacco: Never Used   Substance and Sexual Activity    Alcohol use: Yes     Alcohol/week: 0.0 standard drinks     Frequency: Monthly or less     Drinks per session: 1 or 2     Binge frequency: Never     Comment: maybe a beer every 3 months    Drug use: No    Sexual activity: Not Currently     Review of Systems   Constitutional: Negative for chills, fatigue and fever.   HENT: Negative for congestion and rhinorrhea.    Eyes: Negative for photophobia and visual disturbance.   Respiratory: Negative for cough, chest tightness and shortness of breath.    Cardiovascular: Negative for chest pain and palpitations.   Gastrointestinal: Positive for blood in stool and diarrhea. Negative for abdominal pain, nausea, rectal pain and vomiting.   Genitourinary: Negative for difficulty urinating, dysuria and hematuria.   Musculoskeletal: Negative for back pain and gait problem.   Skin: Negative for rash and wound.   Neurological: Negative for dizziness and headaches.   Psychiatric/Behavioral:  Negative for agitation and confusion.     Objective:     Vital Signs (Most Recent):  Temp: 97.4 °F (36.3 °C) (12/07/19 0224)  Pulse: 66 (12/07/19 0224)  Resp: 16 (12/07/19 0224)  BP: (!) 140/74 (12/07/19 0224)  SpO2: 97 % (12/07/19 0224) Vital Signs (24h Range):  Temp:  [97.4 °F (36.3 °C)-98.9 °F (37.2 °C)] 97.4 °F (36.3 °C)  Pulse:  [62-76] 66  Resp:  [16-20] 16  SpO2:  [93 %-98 %] 97 %  BP: (110-140)/(53-74) 140/74     Weight: 86.1 kg (189 lb 13.1 oz)  Body mass index is 33.62 kg/m².    Physical Exam   Constitutional: She is oriented to person, place, and time. She appears well-developed and well-nourished.   HENT:   Head: Normocephalic and atraumatic.   Poor hearing   Eyes: Pupils are equal, round, and reactive to light. EOM are normal.   Neck: Normal range of motion. Neck supple.   Cardiovascular: Normal rate, regular rhythm, normal heart sounds and intact distal pulses.   Pulmonary/Chest: Effort normal and breath sounds normal.   Abdominal: Soft. She exhibits no distension. There is no tenderness.   Hyperactive BS   Musculoskeletal: Normal range of motion.   Neurological: She is alert and oriented to person, place, and time.   Skin: Skin is warm and dry. Capillary refill takes 2 to 3 seconds.   Psychiatric: She has a normal mood and affect. Her behavior is normal. Judgment and thought content normal.   Nursing note and vitals reviewed.        CRANIAL NERVES     CN III, IV, VI   Pupils are equal, round, and reactive to light.  Extraocular motions are normal.        Significant Labs:   CBC:   Recent Labs   Lab 12/06/19 1912   WBC 8.71   HGB 10.5*   HCT 34.5*        CMP:   Recent Labs   Lab 12/06/19 1912      K 4.1      CO2 26   GLU 94   BUN 30*   CREATININE 0.9   CALCIUM 9.7   PROT 6.9   ALBUMIN 3.8   BILITOT 0.4   ALKPHOS 66   AST 23   ALT 16   ANIONGAP 7*   EGFRNONAA >60.0       Significant Imaging: I have reviewed all pertinent imaging results/findings within the past 24 hours.     Cta  Abdomen And Pelvis    Result Date: 12/6/2019  EXAMINATION: CTA ABDOMEN AND PELVIS CLINICAL HISTORY: GI bleeding; TECHNIQUE: Low dose axial images, sagittal and coronal reformations were obtained from the lung bases to the pubic symphysis before and following the IV administration of 100 mL of Omnipaque 350 .  Oral contrast was not administered.. COMPARISON: CT abdomen pelvis with contrast 09/29/2019 retroperitoneal ultrasound 11/19/2019 FINDINGS: Abdomen: - Lung bases: No infiltrates and no nodules. - Liver: Multiple hypoattenuating liver lesions likely cysts but incompletely characterized on this exam.  Ultrasound could be obtained for further evaluation on a nonemergent basis. - Gallbladder: Calcified gallstone in the neck of the gallbladder.  No gallbladder wall thickening or pericholecystic fluid or fat stranding to suggest acute cholecystitis. - Bile Ducts: No evidence of intra or extra hepatic biliary ductal dilation. - Spleen: Probable splenic cyst as seen on series 5, image 14 versus delayed enhancement - Kidneys: Multiple renal cysts some consistent with simple cysts, and some below the threshold of CT characterization.  Nonobstructive right nephrolith. - Adrenals: Unremarkable. - Pancreas: No mass or peripancreatic fat stranding. - Retroperitoneum:  No significant adenopathy. - Vascular: Mild vascular atherosclerosis without definite stenosis of the renal or mesenteric vasculature.  Multiple right renal arteries.  Loss of normal tapering of the infrarenal aorta without aneurysm right common iliac appears mildly ectatic measuring 1.6 cm. - Abdominal wall:  Fat containing umbilical hernia.  Left inguinal fat containing hernia. Pelvis: Uterus is surgically absent.  No pelvic mass.  No free fluid.  Bladder appears unremarkable. Bowel/Mesentery: There is long segment colonic wall thickening and mild pericolonic fat stranding and prominent mesenteric lymph nodes about the descending sigmoid colon junction.   These findings are concerning for uncomplicated diverticulitis/colitis.  No active GI bleeding noted on this examination.  No small bowel wall thickening. Bones:  Osseous degenerative change with multilevel posterior disc bulges and osteophytes.  No definite high-grade spinal canal stenosis.  Spinal canal stenosis likely worst at L3-4 at least moderate.  No fracture or dislocation.     Findings concerning for uncomplicated diverticulitis or colitis about the descending and sigmoid colon junction. No evidence of intraluminal contrast extravasation to suggest ongoing acute GI bleeding. Calcified gallstone the neck of the gallbladder. Other findings as above. This report was flagged in Epic as abnormal.     Assessment/Plan:     * Diverticulitis of large intestine without perforation or abscess with bleeding  78 y/o with h/o diverticulitis with abscess presents with BRBPR and melena.  GYPSY/anoscope unrevealing per CRS note. CTA A/P show uncomplicated diverticulitis (vs colitis) of descending/sigmoid colon without active bleeding.    - GIB pathway started; sxs likely 2/2 diverticulitis and should improve with in 2-3 days with abx + supportive care  - c/w zosyn, mIVF  - Hg 10.5 (BL 12-13); plts 234 ; HDS  - NPO, PPI BID  - Orthostatics Q shift; Place 2 large bore IVs, volume resuscitation as needed  - Trend H/H (Transfuse pRBC for Hb < 7 g/dL); checking anemia labs   - Avoid nonsteroidal agents, antiplatelet agents and anticoagulants uf possible  - Colonoscopy 4/2019: Three 3 to 6 mm polyps in the transverse colon and in the ascending colon, removed with a cold snare. Resected and retrieved.  Diverticulosis in the sigmoid colon and in the descending colon.    Essential hypertension  Stable  - holding home losartan 75 in setting of bleeding    Acute blood loss anemia  See above    Chronic kidney insufficiency  Stable    Hypothyroidism, postsurgical  - c/w synthroid    Hematuria  likely 2/2 nephrolithiasis  - f.u with  urology    VTE Risk Mitigation (From admission, onward)         Ordered     IP VTE HIGH RISK PATIENT  Once      12/07/19 0317     Place PETRA hose  Until discontinued      12/07/19 0317     Place sequential compression device  Until discontinued      12/07/19 0317     Place PETRA hose  Until discontinued      12/07/19 0155     Place sequential compression device  Until discontinued      12/07/19 0155     Place sequential compression device  Until discontinued      12/07/19 0117                   Sridhar Bai PA-C  Department of Hospital Medicine   [facility]

## 2019-12-07 NOTE — TRANSFER OF CARE
"Anesthesia Transfer of Care Note    Patient: Jo-Ann Escobedo    Procedure(s) Performed: Procedure(s) (LRB):  EGD (ESOPHAGOGASTRODUODENOSCOPY) (N/A)    Patient location: PACU    Anesthesia Type: general    Transport from OR: Transported from OR on room air with adequate spontaneous ventilation    Post pain: adequate analgesia    Post assessment: tolerated procedure well and no apparent anesthetic complications    Post vital signs: stable    Level of consciousness: awake, alert and oriented    Nausea/Vomiting: no nausea/vomiting    Complications: none    Transfer of care protocol was followed      Last vitals:   Visit Vitals  BP (!) 141/69 (BP Location: Left arm, Patient Position: Sitting)   Pulse 71   Temp 36.6 °C (97.9 °F) (Temporal)   Resp 16   Ht 5' 3" (1.6 m)   Wt 86.2 kg (190 lb 0.6 oz)   LMP  (LMP Unknown)   SpO2 98%   Breastfeeding? No   BMI 33.66 kg/m²     "

## 2019-12-07 NOTE — NURSING TRANSFER
Nursing Transfer Note      12/7/2019     Transfer To: obs 4a    Transfer via stretcher    Transfer with  O2, cardiac monitoring    Transported byt    Medicines sent: no    Chart send with patient: Yes    Notified: family

## 2019-12-08 LAB
ALBUMIN SERPL BCP-MCNC: 2.9 G/DL (ref 3.5–5.2)
ALP SERPL-CCNC: 51 U/L (ref 55–135)
ALT SERPL W/O P-5'-P-CCNC: 12 U/L (ref 10–44)
ANION GAP SERPL CALC-SCNC: 7 MMOL/L (ref 8–16)
AST SERPL-CCNC: 18 U/L (ref 10–40)
BACTERIA UR CULT: NORMAL
BACTERIA UR CULT: NORMAL
BASOPHILS # BLD AUTO: 0.04 K/UL (ref 0–0.2)
BASOPHILS # BLD AUTO: 0.05 K/UL (ref 0–0.2)
BASOPHILS NFR BLD: 0.6 % (ref 0–1.9)
BASOPHILS NFR BLD: 0.7 % (ref 0–1.9)
BILIRUB SERPL-MCNC: 0.5 MG/DL (ref 0.1–1)
BUN SERPL-MCNC: 20 MG/DL (ref 8–23)
CALCIUM SERPL-MCNC: 8.5 MG/DL (ref 8.7–10.5)
CHLORIDE SERPL-SCNC: 111 MMOL/L (ref 95–110)
CO2 SERPL-SCNC: 25 MMOL/L (ref 23–29)
CREAT SERPL-MCNC: 1 MG/DL (ref 0.5–1.4)
DIFFERENTIAL METHOD: ABNORMAL
DIFFERENTIAL METHOD: ABNORMAL
EOSINOPHIL # BLD AUTO: 0.1 K/UL (ref 0–0.5)
EOSINOPHIL # BLD AUTO: 0.2 K/UL (ref 0–0.5)
EOSINOPHIL NFR BLD: 2.2 % (ref 0–8)
EOSINOPHIL NFR BLD: 2.4 % (ref 0–8)
ERYTHROCYTE [DISTWIDTH] IN BLOOD BY AUTOMATED COUNT: 14.2 % (ref 11.5–14.5)
ERYTHROCYTE [DISTWIDTH] IN BLOOD BY AUTOMATED COUNT: 14.5 % (ref 11.5–14.5)
EST. GFR  (AFRICAN AMERICAN): >60 ML/MIN/1.73 M^2
EST. GFR  (NON AFRICAN AMERICAN): 53.7 ML/MIN/1.73 M^2
GLUCOSE SERPL-MCNC: 108 MG/DL (ref 70–110)
HCT VFR BLD AUTO: 28.2 % (ref 37–48.5)
HCT VFR BLD AUTO: 29.4 % (ref 37–48.5)
HGB BLD-MCNC: 8.6 G/DL (ref 12–16)
HGB BLD-MCNC: 9.1 G/DL (ref 12–16)
IMM GRANULOCYTES # BLD AUTO: 0.05 K/UL (ref 0–0.04)
IMM GRANULOCYTES # BLD AUTO: 0.06 K/UL (ref 0–0.04)
IMM GRANULOCYTES NFR BLD AUTO: 0.7 % (ref 0–0.5)
IMM GRANULOCYTES NFR BLD AUTO: 0.9 % (ref 0–0.5)
LYMPHOCYTES # BLD AUTO: 1.9 K/UL (ref 1–4.8)
LYMPHOCYTES # BLD AUTO: 2.4 K/UL (ref 1–4.8)
LYMPHOCYTES NFR BLD: 28.9 % (ref 18–48)
LYMPHOCYTES NFR BLD: 32.9 % (ref 18–48)
MAGNESIUM SERPL-MCNC: 2.1 MG/DL (ref 1.6–2.6)
MCH RBC QN AUTO: 30.3 PG (ref 27–31)
MCH RBC QN AUTO: 30.6 PG (ref 27–31)
MCHC RBC AUTO-ENTMCNC: 30.5 G/DL (ref 32–36)
MCHC RBC AUTO-ENTMCNC: 31 G/DL (ref 32–36)
MCV RBC AUTO: 99 FL (ref 82–98)
MCV RBC AUTO: 99 FL (ref 82–98)
MONOCYTES # BLD AUTO: 0.6 K/UL (ref 0.3–1)
MONOCYTES # BLD AUTO: 0.8 K/UL (ref 0.3–1)
MONOCYTES NFR BLD: 10.7 % (ref 4–15)
MONOCYTES NFR BLD: 9.7 % (ref 4–15)
NEUTROPHILS # BLD AUTO: 3 K/UL (ref 1.8–7.7)
NEUTROPHILS # BLD AUTO: 4.8 K/UL (ref 1.8–7.7)
NEUTROPHILS NFR BLD: 52.4 % (ref 38–73)
NEUTROPHILS NFR BLD: 57.9 % (ref 38–73)
NRBC BLD-RTO: 0 /100 WBC
NRBC BLD-RTO: 0 /100 WBC
PHOSPHATE SERPL-MCNC: 3.4 MG/DL (ref 2.7–4.5)
PLATELET # BLD AUTO: 200 K/UL (ref 150–350)
PLATELET # BLD AUTO: 213 K/UL (ref 150–350)
PMV BLD AUTO: 10.3 FL (ref 9.2–12.9)
PMV BLD AUTO: 10.6 FL (ref 9.2–12.9)
POTASSIUM SERPL-SCNC: 3.9 MMOL/L (ref 3.5–5.1)
PROT SERPL-MCNC: 5.4 G/DL (ref 6–8.4)
RBC # BLD AUTO: 2.84 M/UL (ref 4–5.4)
RBC # BLD AUTO: 2.97 M/UL (ref 4–5.4)
SODIUM SERPL-SCNC: 143 MMOL/L (ref 136–145)
WBC # BLD AUTO: 5.78 K/UL (ref 3.9–12.7)
WBC # BLD AUTO: 8.28 K/UL (ref 3.9–12.7)

## 2019-12-08 PROCEDURE — C9113 INJ PANTOPRAZOLE SODIUM, VIA: HCPCS | Performed by: PHYSICIAN ASSISTANT

## 2019-12-08 PROCEDURE — 83735 ASSAY OF MAGNESIUM: CPT

## 2019-12-08 PROCEDURE — 63700000 PHARM REV CODE 250 ALT 637 W/O HCPCS: Performed by: PHYSICIAN ASSISTANT

## 2019-12-08 PROCEDURE — 85025 COMPLETE CBC W/AUTO DIFF WBC: CPT

## 2019-12-08 PROCEDURE — 25000003 PHARM REV CODE 250: Performed by: PHYSICIAN ASSISTANT

## 2019-12-08 PROCEDURE — G0378 HOSPITAL OBSERVATION PER HR: HCPCS

## 2019-12-08 PROCEDURE — 36415 COLL VENOUS BLD VENIPUNCTURE: CPT

## 2019-12-08 PROCEDURE — 63600175 PHARM REV CODE 636 W HCPCS: Performed by: PHYSICIAN ASSISTANT

## 2019-12-08 PROCEDURE — 11000001 HC ACUTE MED/SURG PRIVATE ROOM

## 2019-12-08 PROCEDURE — 96376 TX/PRO/DX INJ SAME DRUG ADON: CPT

## 2019-12-08 PROCEDURE — 25000003 PHARM REV CODE 250

## 2019-12-08 PROCEDURE — 25500020 PHARM REV CODE 255: Performed by: HOSPITALIST

## 2019-12-08 PROCEDURE — 84100 ASSAY OF PHOSPHORUS: CPT

## 2019-12-08 PROCEDURE — 99233 PR SUBSEQUENT HOSPITAL CARE,LEVL III: ICD-10-PCS | Mod: ,,, | Performed by: PHYSICIAN ASSISTANT

## 2019-12-08 PROCEDURE — 80053 COMPREHEN METABOLIC PANEL: CPT

## 2019-12-08 PROCEDURE — 96361 HYDRATE IV INFUSION ADD-ON: CPT

## 2019-12-08 PROCEDURE — 94761 N-INVAS EAR/PLS OXIMETRY MLT: CPT

## 2019-12-08 PROCEDURE — 99233 SBSQ HOSP IP/OBS HIGH 50: CPT | Mod: ,,, | Performed by: PHYSICIAN ASSISTANT

## 2019-12-08 RX ORDER — POLYETHYLENE GLYCOL 3350, SODIUM SULFATE ANHYDROUS, SODIUM BICARBONATE, SODIUM CHLORIDE, POTASSIUM CHLORIDE 236; 22.74; 6.74; 5.86; 2.97 G/4L; G/4L; G/4L; G/4L; G/4L
4000 POWDER, FOR SOLUTION ORAL ONCE
Status: COMPLETED | OUTPATIENT
Start: 2019-12-08 | End: 2019-12-08

## 2019-12-08 RX ORDER — SODIUM CHLORIDE, SODIUM LACTATE, POTASSIUM CHLORIDE, CALCIUM CHLORIDE 600; 310; 30; 20 MG/100ML; MG/100ML; MG/100ML; MG/100ML
INJECTION, SOLUTION INTRAVENOUS CONTINUOUS
Status: ACTIVE | OUTPATIENT
Start: 2019-12-08 | End: 2019-12-09

## 2019-12-08 RX ADMIN — PIPERACILLIN AND TAZOBACTAM 4.5 G: 4; .5 INJECTION, POWDER, LYOPHILIZED, FOR SOLUTION INTRAVENOUS; PARENTERAL at 11:12

## 2019-12-08 RX ADMIN — PANTOPRAZOLE SODIUM 40 MG: 40 INJECTION, POWDER, FOR SOLUTION INTRAVENOUS at 08:12

## 2019-12-08 RX ADMIN — PIPERACILLIN AND TAZOBACTAM 4.5 G: 4; .5 INJECTION, POWDER, LYOPHILIZED, FOR SOLUTION INTRAVENOUS; PARENTERAL at 06:12

## 2019-12-08 RX ADMIN — IOHEXOL 75 ML: 350 INJECTION, SOLUTION INTRAVENOUS at 04:12

## 2019-12-08 RX ADMIN — POLYETHYLENE GLYCOL 3350, SODIUM SULFATE ANHYDROUS, SODIUM BICARBONATE, SODIUM CHLORIDE, POTASSIUM CHLORIDE 4000 ML: 236; 22.74; 6.74; 5.86; 2.97 POWDER, FOR SOLUTION ORAL at 10:12

## 2019-12-08 RX ADMIN — SODIUM CHLORIDE, SODIUM LACTATE, POTASSIUM CHLORIDE, AND CALCIUM CHLORIDE: .6; .31; .03; .02 INJECTION, SOLUTION INTRAVENOUS at 05:12

## 2019-12-08 RX ADMIN — PIPERACILLIN AND TAZOBACTAM 4.5 G: 4; .5 INJECTION, POWDER, LYOPHILIZED, FOR SOLUTION INTRAVENOUS; PARENTERAL at 12:12

## 2019-12-08 RX ADMIN — FERROUS SULFATE TAB EC 325 MG (65 MG FE EQUIVALENT) 325 MG: 325 (65 FE) TABLET DELAYED RESPONSE at 09:12

## 2019-12-08 RX ADMIN — FENOFIBRATE 160 MG: 160 TABLET ORAL at 09:12

## 2019-12-08 RX ADMIN — CYANOCOBALAMIN TAB 250 MCG 500 MCG: 250 TAB at 09:12

## 2019-12-08 RX ADMIN — PANTOPRAZOLE SODIUM 40 MG: 40 INJECTION, POWDER, FOR SOLUTION INTRAVENOUS at 09:12

## 2019-12-08 RX ADMIN — LEVOTHYROXINE SODIUM 125 MCG: 25 TABLET ORAL at 05:12

## 2019-12-08 NOTE — SUBJECTIVE & OBJECTIVE
Interval History: Patient without further episodes of bleeding overnight. She is currently without complaints. Hgb 9.5--> 8.8. CT abdomen pelvis today to rule out/ in diverticulitis and ischemic colitis.     Review of Systems   Constitutional: Positive for fatigue. Negative for chills and fever.   HENT: Negative for congestion and rhinorrhea.    Respiratory: Negative for cough and shortness of breath.    Cardiovascular: Negative for chest pain and palpitations.   Gastrointestinal: Negative for abdominal pain, blood in stool and nausea.   Genitourinary: Negative for difficulty urinating and hematuria.   Musculoskeletal: Negative for arthralgias and joint swelling.   Neurological: Negative for dizziness, weakness and light-headedness.   Psychiatric/Behavioral: Negative for agitation and behavioral problems.     Objective:     Vital Signs (Most Recent):  Temp: 98.6 °F (37 °C) (12/08/19 0722)  Pulse: 71 (12/08/19 0722)  Resp: 17 (12/08/19 0722)  BP: (!) 118/59 (12/08/19 0722)  SpO2: 96 % (12/08/19 1154) Vital Signs (24h Range):  Temp:  [97.7 °F (36.5 °C)-99.5 °F (37.5 °C)] 98.6 °F (37 °C)  Pulse:  [67-88] 71  Resp:  [15-18] 17  SpO2:  [93 %-98 %] 96 %  BP: (118-162)/(59-72) 118/59     Weight: 86.4 kg (190 lb 7.6 oz)  Body mass index is 33.74 kg/m².    Intake/Output Summary (Last 24 hours) at 12/8/2019 1216  Last data filed at 12/8/2019 0525  Gross per 24 hour   Intake 400 ml   Output 1650 ml   Net -1250 ml      Physical Exam   Constitutional: She is oriented to person, place, and time. She appears well-developed and well-nourished.   HENT:   Head: Normocephalic and atraumatic.   Eyes: Pupils are equal, round, and reactive to light. EOM are normal.   Neck: Normal range of motion. Neck supple.   Cardiovascular: Normal rate and regular rhythm.   Pulmonary/Chest: Effort normal and breath sounds normal.   Abdominal: Soft. Bowel sounds are normal. She exhibits no distension. There is no tenderness. There is no guarding.    Musculoskeletal: Normal range of motion.   Neurological: She is alert and oriented to person, place, and time.   Skin: Skin is warm and dry.   Psychiatric: She has a normal mood and affect. Her behavior is normal.   Nursing note and vitals reviewed.      Significant Labs:   CBC:   Recent Labs   Lab 12/07/19  0417 12/07/19  0800 12/07/19  1701 12/08/19  0434   WBC 6.69  --  8.31 5.78   HGB 8.8* 8.4* 9.5* 8.6*   HCT 28.4* 27.9* 29.9* 28.2*     --  201 200     CMP:   Recent Labs   Lab 12/06/19  1912 12/07/19  0417 12/08/19  0434    140 143   K 4.1 3.8 3.9    111* 111*   CO2 26 24 25   GLU 94 95 108   BUN 30* 23 20   CREATININE 0.9 0.8 1.0   CALCIUM 9.7 8.4* 8.5*   PROT 6.9 5.6* 5.4*   ALBUMIN 3.8 3.1* 2.9*   BILITOT 0.4 0.4 0.5   ALKPHOS 66 55 51*   AST 23 19 18   ALT 16 12 12   ANIONGAP 7* 5* 7*   EGFRNONAA >60.0 >60.0 53.7*       Significant Imaging: I have reviewed all pertinent imaging results/findings within the past 24 hours.

## 2019-12-08 NOTE — NURSING
PT WITH RECTAL BLEEDING THIS AM LAST BM AT 10AM. PT RECEIVED ONE UNIT OF PRBC . TOLERATED WELL. EGD PERFORMED UNDER ANESTHESIA BACK TO ROOM AT 1619. ALERT AND ORIENTED. NO DISTRESS NOTED VS WNL. MONITORING PT. CALL LIGHT IN REACH.

## 2019-12-08 NOTE — PLAN OF CARE
Pt is a/ox4, up ad winifred, VSS. She denies pain, n/v, dizziness, and sob. No occurences of bloody stool on this shift. Fall risks and precautions, as well as pain management were discussed, and any questions addressed. Pt remains free from falls and injury. Personal belongings and call light are within reach. Will continue to monitor.

## 2019-12-08 NOTE — ASSESSMENT & PLAN NOTE
78 y/o with h/o diverticulitis with abscess presents with BRBPR and melena.  GYPSY/anoscope unrevealing per CRS note. CTA A/P show uncomplicated diverticulitis (vs colitis) of descending/sigmoid colon without active bleeding.    - GIB pathway started  - c/w zosyn 10-14 days, mIVF  - Hg 10.5 (BL 12-13)--> 8.4, transfused 1U pRBC--> 9.5--> 8.6  - transfused 1U pRBC   - GI consulted, appreciate recs  - EGD with erythematous mucosa and 10 mm polyp, both biopsied  - CT abdomen pelvis today to rule out/ in diverticulitis and ischemic colitis - if no diverticulitis will proceed with colonoscopy  - NPO, PPI BID  - Place 2 large bore IVs, volume resuscitation as needed  - Trend H/H (Transfuse pRBC for Hb < 7 g/dL)  - anemia labs unremarkable, B12 224, started on supplements  - Avoid nonsteroidal agents, antiplatelet agents and anticoagulants if possible  - Colonoscopy 4/2019: Three 3 to 6 mm polyps in the transverse colon and in the ascending colon, removed with a cold snare. Resected and retrieved.  Diverticulosis in the sigmoid colon and in the descending colon.

## 2019-12-09 ENCOUNTER — ANESTHESIA EVENT (OUTPATIENT)
Dept: ENDOSCOPY | Facility: HOSPITAL | Age: 79
DRG: 378 | End: 2019-12-09
Payer: MEDICARE

## 2019-12-09 ENCOUNTER — ANESTHESIA (OUTPATIENT)
Dept: ENDOSCOPY | Facility: HOSPITAL | Age: 79
DRG: 378 | End: 2019-12-09
Payer: MEDICARE

## 2019-12-09 PROBLEM — R55 SYNCOPE: Status: ACTIVE | Noted: 2019-12-09

## 2019-12-09 PROBLEM — K92.2 GIB (GASTROINTESTINAL BLEEDING): Status: ACTIVE | Noted: 2019-12-09

## 2019-12-09 PROBLEM — R93.0 ABNORMAL CT OF THE HEAD: Status: ACTIVE | Noted: 2019-12-09

## 2019-12-09 PROBLEM — R41.3 MEMORY DIFFICULTY: Status: ACTIVE | Noted: 2019-12-09

## 2019-12-09 LAB
ALBUMIN SERPL BCP-MCNC: 3.2 G/DL (ref 3.5–5.2)
ALP SERPL-CCNC: 53 U/L (ref 55–135)
ALT SERPL W/O P-5'-P-CCNC: 14 U/L (ref 10–44)
ANION GAP SERPL CALC-SCNC: 5 MMOL/L (ref 8–16)
AST SERPL-CCNC: 24 U/L (ref 10–40)
BASOPHILS # BLD AUTO: 0.04 K/UL (ref 0–0.2)
BASOPHILS # BLD AUTO: 0.04 K/UL (ref 0–0.2)
BASOPHILS # BLD AUTO: 0.07 K/UL (ref 0–0.2)
BASOPHILS # BLD AUTO: 0.08 K/UL (ref 0–0.2)
BASOPHILS NFR BLD: 0.4 % (ref 0–1.9)
BASOPHILS NFR BLD: 0.4 % (ref 0–1.9)
BASOPHILS NFR BLD: 0.5 % (ref 0–1.9)
BASOPHILS NFR BLD: 0.7 % (ref 0–1.9)
BILIRUB SERPL-MCNC: 0.4 MG/DL (ref 0.1–1)
BLD PROD TYP BPU: NORMAL
BLOOD UNIT EXPIRATION DATE: NORMAL
BLOOD UNIT TYPE CODE: 5100
BLOOD UNIT TYPE: NORMAL
BUN SERPL-MCNC: 15 MG/DL (ref 8–23)
CALCIUM SERPL-MCNC: 9 MG/DL (ref 8.7–10.5)
CHLORIDE SERPL-SCNC: 108 MMOL/L (ref 95–110)
CO2 SERPL-SCNC: 27 MMOL/L (ref 23–29)
CODING SYSTEM: NORMAL
CREAT SERPL-MCNC: 0.9 MG/DL (ref 0.5–1.4)
CRP SERPL-MCNC: 5.1 MG/L (ref 0–8.2)
DIFFERENTIAL METHOD: ABNORMAL
DISPENSE STATUS: NORMAL
EOSINOPHIL # BLD AUTO: 0 K/UL (ref 0–0.5)
EOSINOPHIL # BLD AUTO: 0.1 K/UL (ref 0–0.5)
EOSINOPHIL # BLD AUTO: 0.1 K/UL (ref 0–0.5)
EOSINOPHIL # BLD AUTO: 0.2 K/UL (ref 0–0.5)
EOSINOPHIL NFR BLD: 0 % (ref 0–8)
EOSINOPHIL NFR BLD: 0.7 % (ref 0–8)
EOSINOPHIL NFR BLD: 0.8 % (ref 0–8)
EOSINOPHIL NFR BLD: 1.8 % (ref 0–8)
ERYTHROCYTE [DISTWIDTH] IN BLOOD BY AUTOMATED COUNT: 14 % (ref 11.5–14.5)
ERYTHROCYTE [DISTWIDTH] IN BLOOD BY AUTOMATED COUNT: 14.1 % (ref 11.5–14.5)
ERYTHROCYTE [DISTWIDTH] IN BLOOD BY AUTOMATED COUNT: 14.3 % (ref 11.5–14.5)
ERYTHROCYTE [DISTWIDTH] IN BLOOD BY AUTOMATED COUNT: 14.9 % (ref 11.5–14.5)
EST. GFR  (AFRICAN AMERICAN): >60 ML/MIN/1.73 M^2
EST. GFR  (NON AFRICAN AMERICAN): >60 ML/MIN/1.73 M^2
GLUCOSE SERPL-MCNC: 120 MG/DL (ref 70–110)
HCT VFR BLD AUTO: 24.3 % (ref 37–48.5)
HCT VFR BLD AUTO: 25.9 % (ref 37–48.5)
HCT VFR BLD AUTO: 30.4 % (ref 37–48.5)
HCT VFR BLD AUTO: 30.9 % (ref 37–48.5)
HGB BLD-MCNC: 7.3 G/DL (ref 12–16)
HGB BLD-MCNC: 8.1 G/DL (ref 12–16)
HGB BLD-MCNC: 9.4 G/DL (ref 12–16)
HGB BLD-MCNC: 9.6 G/DL (ref 12–16)
IMM GRANULOCYTES # BLD AUTO: 0.06 K/UL (ref 0–0.04)
IMM GRANULOCYTES # BLD AUTO: 0.06 K/UL (ref 0–0.04)
IMM GRANULOCYTES # BLD AUTO: 0.07 K/UL (ref 0–0.04)
IMM GRANULOCYTES # BLD AUTO: 0.47 K/UL (ref 0–0.04)
IMM GRANULOCYTES NFR BLD AUTO: 0.5 % (ref 0–0.5)
IMM GRANULOCYTES NFR BLD AUTO: 0.7 % (ref 0–0.5)
IMM GRANULOCYTES NFR BLD AUTO: 0.7 % (ref 0–0.5)
IMM GRANULOCYTES NFR BLD AUTO: 2.6 % (ref 0–0.5)
LYMPHOCYTES # BLD AUTO: 1.3 K/UL (ref 1–4.8)
LYMPHOCYTES # BLD AUTO: 1.4 K/UL (ref 1–4.8)
LYMPHOCYTES # BLD AUTO: 1.6 K/UL (ref 1–4.8)
LYMPHOCYTES # BLD AUTO: 3 K/UL (ref 1–4.8)
LYMPHOCYTES NFR BLD: 12.3 % (ref 18–48)
LYMPHOCYTES NFR BLD: 19.1 % (ref 18–48)
LYMPHOCYTES NFR BLD: 25.7 % (ref 18–48)
LYMPHOCYTES NFR BLD: 7.7 % (ref 18–48)
MAGNESIUM SERPL-MCNC: 1.9 MG/DL (ref 1.6–2.6)
MCH RBC QN AUTO: 30.2 PG (ref 27–31)
MCH RBC QN AUTO: 30.7 PG (ref 27–31)
MCH RBC QN AUTO: 30.7 PG (ref 27–31)
MCH RBC QN AUTO: 30.8 PG (ref 27–31)
MCHC RBC AUTO-ENTMCNC: 30 G/DL (ref 32–36)
MCHC RBC AUTO-ENTMCNC: 30.9 G/DL (ref 32–36)
MCHC RBC AUTO-ENTMCNC: 31.1 G/DL (ref 32–36)
MCHC RBC AUTO-ENTMCNC: 31.3 G/DL (ref 32–36)
MCV RBC AUTO: 102 FL (ref 82–98)
MCV RBC AUTO: 97 FL (ref 82–98)
MCV RBC AUTO: 99 FL (ref 82–98)
MCV RBC AUTO: 99 FL (ref 82–98)
MONOCYTES # BLD AUTO: 0.7 K/UL (ref 0.3–1)
MONOCYTES # BLD AUTO: 0.8 K/UL (ref 0.3–1)
MONOCYTES # BLD AUTO: 1 K/UL (ref 0.3–1)
MONOCYTES # BLD AUTO: 1.2 K/UL (ref 0.3–1)
MONOCYTES NFR BLD: 10.6 % (ref 4–15)
MONOCYTES NFR BLD: 5.6 % (ref 4–15)
MONOCYTES NFR BLD: 7.1 % (ref 4–15)
MONOCYTES NFR BLD: 8.8 % (ref 4–15)
NEUTROPHILS # BLD AUTO: 15.3 K/UL (ref 1.8–7.7)
NEUTROPHILS # BLD AUTO: 5.9 K/UL (ref 1.8–7.7)
NEUTROPHILS # BLD AUTO: 7 K/UL (ref 1.8–7.7)
NEUTROPHILS # BLD AUTO: 8.4 K/UL (ref 1.8–7.7)
NEUTROPHILS NFR BLD: 60.7 % (ref 38–73)
NEUTROPHILS NFR BLD: 70.1 % (ref 38–73)
NEUTROPHILS NFR BLD: 78.8 % (ref 38–73)
NEUTROPHILS NFR BLD: 83.7 % (ref 38–73)
NRBC BLD-RTO: 0 /100 WBC
PHOSPHATE SERPL-MCNC: 2.7 MG/DL (ref 2.7–4.5)
PLATELET # BLD AUTO: 178 K/UL (ref 150–350)
PLATELET # BLD AUTO: 198 K/UL (ref 150–350)
PLATELET # BLD AUTO: 198 K/UL (ref 150–350)
PLATELET # BLD AUTO: 224 K/UL (ref 150–350)
PMV BLD AUTO: 10.3 FL (ref 9.2–12.9)
PMV BLD AUTO: 10.6 FL (ref 9.2–12.9)
PMV BLD AUTO: 10.7 FL (ref 9.2–12.9)
PMV BLD AUTO: 10.7 FL (ref 9.2–12.9)
POTASSIUM SERPL-SCNC: 4.1 MMOL/L (ref 3.5–5.1)
PROT SERPL-MCNC: 5.8 G/DL (ref 6–8.4)
RBC # BLD AUTO: 2.38 M/UL (ref 4–5.4)
RBC # BLD AUTO: 2.63 M/UL (ref 4–5.4)
RBC # BLD AUTO: 3.06 M/UL (ref 4–5.4)
RBC # BLD AUTO: 3.18 M/UL (ref 4–5.4)
SODIUM SERPL-SCNC: 140 MMOL/L (ref 136–145)
TRANS ERYTHROCYTES VOL PATIENT: NORMAL ML
WBC # BLD AUTO: 10.7 K/UL (ref 3.9–12.7)
WBC # BLD AUTO: 11.51 K/UL (ref 3.9–12.7)
WBC # BLD AUTO: 18.26 K/UL (ref 3.9–12.7)
WBC # BLD AUTO: 8.37 K/UL (ref 3.9–12.7)

## 2019-12-09 PROCEDURE — 27200997: Performed by: INTERNAL MEDICINE

## 2019-12-09 PROCEDURE — 37000008 HC ANESTHESIA 1ST 15 MINUTES: Performed by: INTERNAL MEDICINE

## 2019-12-09 PROCEDURE — 37000009 HC ANESTHESIA EA ADD 15 MINS: Performed by: INTERNAL MEDICINE

## 2019-12-09 PROCEDURE — 45378 DIAGNOSTIC COLONOSCOPY: CPT | Mod: ,,, | Performed by: INTERNAL MEDICINE

## 2019-12-09 PROCEDURE — G0378 HOSPITAL OBSERVATION PER HR: HCPCS

## 2019-12-09 PROCEDURE — 85025 COMPLETE CBC W/AUTO DIFF WBC: CPT | Mod: 91

## 2019-12-09 PROCEDURE — 25000003 PHARM REV CODE 250: Performed by: PHYSICIAN ASSISTANT

## 2019-12-09 PROCEDURE — P9021 RED BLOOD CELLS UNIT: HCPCS

## 2019-12-09 PROCEDURE — 63600175 PHARM REV CODE 636 W HCPCS: Performed by: PHYSICIAN ASSISTANT

## 2019-12-09 PROCEDURE — D9220A PRA ANESTHESIA: ICD-10-PCS | Mod: CRNA,,, | Performed by: NURSE ANESTHETIST, CERTIFIED REGISTERED

## 2019-12-09 PROCEDURE — 27000221 HC OXYGEN, UP TO 24 HOURS

## 2019-12-09 PROCEDURE — D9220A PRA ANESTHESIA: ICD-10-PCS | Mod: ANES,,, | Performed by: ANESTHESIOLOGY

## 2019-12-09 PROCEDURE — 99233 PR SUBSEQUENT HOSPITAL CARE,LEVL III: ICD-10-PCS | Mod: ,,, | Performed by: PHYSICIAN ASSISTANT

## 2019-12-09 PROCEDURE — 83735 ASSAY OF MAGNESIUM: CPT

## 2019-12-09 PROCEDURE — 94761 N-INVAS EAR/PLS OXIMETRY MLT: CPT

## 2019-12-09 PROCEDURE — 96361 HYDRATE IV INFUSION ADD-ON: CPT

## 2019-12-09 PROCEDURE — 63600175 PHARM REV CODE 636 W HCPCS: Performed by: NURSE ANESTHETIST, CERTIFIED REGISTERED

## 2019-12-09 PROCEDURE — 80053 COMPREHEN METABOLIC PANEL: CPT

## 2019-12-09 PROCEDURE — 45378 PR COLONOSCOPY,DIAGNOSTIC: ICD-10-PCS | Mod: ,,, | Performed by: INTERNAL MEDICINE

## 2019-12-09 PROCEDURE — D9220A PRA ANESTHESIA: Mod: ANES,,, | Performed by: ANESTHESIOLOGY

## 2019-12-09 PROCEDURE — 96375 TX/PRO/DX INJ NEW DRUG ADDON: CPT

## 2019-12-09 PROCEDURE — 45378 DIAGNOSTIC COLONOSCOPY: CPT | Performed by: INTERNAL MEDICINE

## 2019-12-09 PROCEDURE — 99900035 HC TECH TIME PER 15 MIN (STAT)

## 2019-12-09 PROCEDURE — 86140 C-REACTIVE PROTEIN: CPT

## 2019-12-09 PROCEDURE — 36415 COLL VENOUS BLD VENIPUNCTURE: CPT

## 2019-12-09 PROCEDURE — D9220A PRA ANESTHESIA: Mod: CRNA,,, | Performed by: NURSE ANESTHETIST, CERTIFIED REGISTERED

## 2019-12-09 PROCEDURE — 93010 EKG 12-LEAD: ICD-10-PCS | Mod: ,,, | Performed by: INTERNAL MEDICINE

## 2019-12-09 PROCEDURE — 93010 ELECTROCARDIOGRAM REPORT: CPT | Mod: ,,, | Performed by: INTERNAL MEDICINE

## 2019-12-09 PROCEDURE — 99233 SBSQ HOSP IP/OBS HIGH 50: CPT | Mod: ,,, | Performed by: PHYSICIAN ASSISTANT

## 2019-12-09 PROCEDURE — 93005 ELECTROCARDIOGRAM TRACING: CPT

## 2019-12-09 PROCEDURE — 25000003 PHARM REV CODE 250: Performed by: NURSE ANESTHETIST, CERTIFIED REGISTERED

## 2019-12-09 PROCEDURE — 99222 PR INITIAL HOSPITAL CARE,LEVL II: ICD-10-PCS | Mod: ,,, | Performed by: NURSE PRACTITIONER

## 2019-12-09 PROCEDURE — 99222 1ST HOSP IP/OBS MODERATE 55: CPT | Mod: ,,, | Performed by: NURSE PRACTITIONER

## 2019-12-09 PROCEDURE — 84100 ASSAY OF PHOSPHORUS: CPT

## 2019-12-09 PROCEDURE — 63700000 PHARM REV CODE 250 ALT 637 W/O HCPCS: Performed by: PHYSICIAN ASSISTANT

## 2019-12-09 PROCEDURE — 63600175 PHARM REV CODE 636 W HCPCS: Performed by: RADIOLOGY

## 2019-12-09 PROCEDURE — C9113 INJ PANTOPRAZOLE SODIUM, VIA: HCPCS | Performed by: PHYSICIAN ASSISTANT

## 2019-12-09 PROCEDURE — 96376 TX/PRO/DX INJ SAME DRUG ADON: CPT

## 2019-12-09 PROCEDURE — 20000000 HC ICU ROOM

## 2019-12-09 PROCEDURE — 36430 TRANSFUSION BLD/BLD COMPNT: CPT

## 2019-12-09 RX ORDER — HYDROCODONE BITARTRATE AND ACETAMINOPHEN 500; 5 MG/1; MG/1
TABLET ORAL
Status: DISCONTINUED | OUTPATIENT
Start: 2019-12-09 | End: 2019-12-11

## 2019-12-09 RX ORDER — ONDANSETRON 2 MG/ML
4 INJECTION INTRAMUSCULAR; INTRAVENOUS EVERY 8 HOURS PRN
Status: DISCONTINUED | OUTPATIENT
Start: 2019-12-10 | End: 2019-12-14 | Stop reason: HOSPADM

## 2019-12-09 RX ORDER — ONDANSETRON 2 MG/ML
INJECTION INTRAMUSCULAR; INTRAVENOUS
Status: COMPLETED
Start: 2019-12-09 | End: 2019-12-10

## 2019-12-09 RX ORDER — FENTANYL CITRATE 50 UG/ML
INJECTION, SOLUTION INTRAMUSCULAR; INTRAVENOUS CODE/TRAUMA/SEDATION MEDICATION
Status: COMPLETED | OUTPATIENT
Start: 2019-12-09 | End: 2019-12-09

## 2019-12-09 RX ORDER — ROCURONIUM BROMIDE 10 MG/ML
INJECTION, SOLUTION INTRAVENOUS
Status: DISCONTINUED | OUTPATIENT
Start: 2019-12-09 | End: 2019-12-09

## 2019-12-09 RX ORDER — ONDANSETRON 2 MG/ML
INJECTION INTRAMUSCULAR; INTRAVENOUS
Status: DISCONTINUED | OUTPATIENT
Start: 2019-12-09 | End: 2019-12-09

## 2019-12-09 RX ORDER — PROPOFOL 10 MG/ML
VIAL (ML) INTRAVENOUS
Status: DISCONTINUED | OUTPATIENT
Start: 2019-12-09 | End: 2019-12-09

## 2019-12-09 RX ORDER — SUCCINYLCHOLINE CHLORIDE 20 MG/ML
INJECTION INTRAMUSCULAR; INTRAVENOUS
Status: DISCONTINUED | OUTPATIENT
Start: 2019-12-09 | End: 2019-12-09

## 2019-12-09 RX ORDER — HYDROCODONE BITARTRATE AND ACETAMINOPHEN 500; 5 MG/1; MG/1
TABLET ORAL
Status: DISCONTINUED | OUTPATIENT
Start: 2019-12-09 | End: 2019-12-09

## 2019-12-09 RX ORDER — ONDANSETRON 2 MG/ML
INJECTION INTRAMUSCULAR; INTRAVENOUS CODE/TRAUMA/SEDATION MEDICATION
Status: COMPLETED | OUTPATIENT
Start: 2019-12-09 | End: 2019-12-09

## 2019-12-09 RX ORDER — MIDAZOLAM HYDROCHLORIDE 1 MG/ML
INJECTION INTRAMUSCULAR; INTRAVENOUS CODE/TRAUMA/SEDATION MEDICATION
Status: COMPLETED | OUTPATIENT
Start: 2019-12-09 | End: 2019-12-09

## 2019-12-09 RX ORDER — HYDRALAZINE HYDROCHLORIDE 20 MG/ML
10 INJECTION INTRAMUSCULAR; INTRAVENOUS ONCE
Status: COMPLETED | OUTPATIENT
Start: 2019-12-09 | End: 2019-12-10

## 2019-12-09 RX ORDER — FENTANYL CITRATE 50 UG/ML
INJECTION, SOLUTION INTRAMUSCULAR; INTRAVENOUS
Status: DISCONTINUED | OUTPATIENT
Start: 2019-12-09 | End: 2019-12-09

## 2019-12-09 RX ORDER — SODIUM CHLORIDE 0.9 % (FLUSH) 0.9 %
10 SYRINGE (ML) INJECTION
Status: DISCONTINUED | OUTPATIENT
Start: 2019-12-09 | End: 2019-12-10 | Stop reason: HOSPADM

## 2019-12-09 RX ORDER — SODIUM CHLORIDE, SODIUM LACTATE, POTASSIUM CHLORIDE, CALCIUM CHLORIDE 600; 310; 30; 20 MG/100ML; MG/100ML; MG/100ML; MG/100ML
INJECTION, SOLUTION INTRAVENOUS CONTINUOUS
Status: ACTIVE | OUTPATIENT
Start: 2019-12-09 | End: 2019-12-09

## 2019-12-09 RX ORDER — SODIUM CHLORIDE 9 MG/ML
INJECTION, SOLUTION INTRAVENOUS CONTINUOUS PRN
Status: DISCONTINUED | OUTPATIENT
Start: 2019-12-09 | End: 2019-12-09

## 2019-12-09 RX ORDER — LIDOCAINE HCL/PF 100 MG/5ML
SYRINGE (ML) INTRAVENOUS
Status: DISCONTINUED | OUTPATIENT
Start: 2019-12-09 | End: 2019-12-09

## 2019-12-09 RX ADMIN — FENTANYL CITRATE 25 MCG: 50 INJECTION, SOLUTION INTRAMUSCULAR; INTRAVENOUS at 09:12

## 2019-12-09 RX ADMIN — KETOROLAC TROMETHAMINE 15 MG: 30 INJECTION, SOLUTION INTRAMUSCULAR; INTRAVENOUS at 01:12

## 2019-12-09 RX ADMIN — PIPERACILLIN AND TAZOBACTAM 4.5 G: 4; .5 INJECTION, POWDER, LYOPHILIZED, FOR SOLUTION INTRAVENOUS; PARENTERAL at 01:12

## 2019-12-09 RX ADMIN — SODIUM CHLORIDE: 9 INJECTION, SOLUTION INTRAVENOUS at 05:12

## 2019-12-09 RX ADMIN — SODIUM CHLORIDE, SODIUM LACTATE, POTASSIUM CHLORIDE, AND CALCIUM CHLORIDE 500 ML: .6; .31; .03; .02 INJECTION, SOLUTION INTRAVENOUS at 11:12

## 2019-12-09 RX ADMIN — ONDANSETRON 4 MG: 2 INJECTION INTRAMUSCULAR; INTRAVENOUS at 05:12

## 2019-12-09 RX ADMIN — ONDANSETRON 4 MG: 2 INJECTION INTRAMUSCULAR; INTRAVENOUS at 10:12

## 2019-12-09 RX ADMIN — ROCURONIUM BROMIDE 5 MG: 10 INJECTION, SOLUTION INTRAVENOUS at 05:12

## 2019-12-09 RX ADMIN — PANTOPRAZOLE SODIUM 40 MG: 40 INJECTION, POWDER, FOR SOLUTION INTRAVENOUS at 11:12

## 2019-12-09 RX ADMIN — PIPERACILLIN AND TAZOBACTAM 4.5 G: 4; .5 INJECTION, POWDER, FOR SOLUTION INTRAVENOUS at 12:12

## 2019-12-09 RX ADMIN — LEVOTHYROXINE SODIUM 125 MCG: 25 TABLET ORAL at 06:12

## 2019-12-09 RX ADMIN — MIDAZOLAM HYDROCHLORIDE 0.5 MG: 1 INJECTION, SOLUTION INTRAMUSCULAR; INTRAVENOUS at 09:12

## 2019-12-09 RX ADMIN — PROPOFOL 150 MG: 10 INJECTION, EMULSION INTRAVENOUS at 05:12

## 2019-12-09 RX ADMIN — FENTANYL CITRATE 50 MCG: 50 INJECTION, SOLUTION INTRAMUSCULAR; INTRAVENOUS at 05:12

## 2019-12-09 RX ADMIN — FENOFIBRATE 160 MG: 160 TABLET ORAL at 11:12

## 2019-12-09 RX ADMIN — ONDANSETRON 4 MG: 4 TABLET, ORALLY DISINTEGRATING ORAL at 12:12

## 2019-12-09 RX ADMIN — LIDOCAINE HYDROCHLORIDE 75 MG: 20 INJECTION, SOLUTION INTRAVENOUS at 05:12

## 2019-12-09 RX ADMIN — CYANOCOBALAMIN TAB 250 MCG 500 MCG: 250 TAB at 11:12

## 2019-12-09 RX ADMIN — SUCCINYLCHOLINE CHLORIDE 140 MG: 20 INJECTION, SOLUTION INTRAMUSCULAR; INTRAVENOUS at 05:12

## 2019-12-09 RX ADMIN — FERROUS SULFATE TAB EC 325 MG (65 MG FE EQUIVALENT) 325 MG: 325 (65 FE) TABLET DELAYED RESPONSE at 11:12

## 2019-12-09 RX ADMIN — SODIUM CHLORIDE, SODIUM LACTATE, POTASSIUM CHLORIDE, AND CALCIUM CHLORIDE: .6; .31; .03; .02 INJECTION, SOLUTION INTRAVENOUS at 08:12

## 2019-12-09 NOTE — ANESTHESIA PREPROCEDURE EVALUATION
12/09/2019  Jo-Ann Escobedo is a 79 y.o., female.    Per 12/7/preop:  Jo-Ann Escobedo is a 79 y.o. female w/ a significant PMHx of diverticulosis, HTN, breast ca s/p modified radical mastectomy on the L who presents with BRBPR. She was evaluated in CRS clinic with anoscope that showed melanotic stools. Denies any coffee ground emesis but does endorse nausea. CTA negative for acute bleeding but does show diverticulitis of descending and sigmoid colon.      Confirmed NPO.   Hemodynamically stable.  2u PRBC currently infusing for H/H 7/22    Past Medical History:   Diagnosis Date    After-cataract of both eyes - Both Eyes 9/26/2012    Allergy     Arthritis     Breast cancer     Diverticulosis     Hyperlipidemia     Hypertension     Hypothyroidism     Paget disease of bone     Squamous Cell Carcinoma     in situ right neck     Thyroid disease      Past Surgical History:   Procedure Laterality Date    CATARACT EXTRACTION W/  INTRAOCULAR LENS IMPLANT Bilateral     COLONOSCOPY N/A 4/15/2019    Procedure: COLONOSCOPY;  Surgeon: Artur Monet MD;  Location: [facility] (4TH FLR);  Service: Endoscopy;  Laterality: N/A;  within 1 month    ESOPHAGOGASTRODUODENOSCOPY N/A 12/7/2019    Procedure: EGD (ESOPHAGOGASTRODUODENOSCOPY);  Surgeon: Clyde Welch MD;  Location: [facility] (2ND FLR);  Service: Endoscopy;  Laterality: N/A;    EXCISIONAL BIOPSY Right 6/27/2019    Procedure: EXCISIONAL BIOPSY-breast;  Surgeon: Suki Dill MD;  Location: [facility] OR 2ND FLR;  Service: General;  Laterality: Right;    EYE SURGERY      HYSTERECTOMY      INJECTION OF ANESTHETIC AGENT AROUND MEDIAL BRANCH NERVES INNERVATING LUMBAR FACET JOINT Bilateral 6/7/2019    Procedure: BLOCK, NERVE, FACET JOINT, LUMBAR, MEDIAL BRANCH-deo MBB L4/5,L5/S1;  Surgeon: Jarvis Morales III, MD;  Location: [facility] CATH LAB;  Service:  Pain Management;  Laterality: Bilateral;    KNEE ARTHROSCOPY N/A     pt unsure of which knee     MASTECTOMY      SPINE SURGERY      TOTAL THYROIDECTOMY      YAG Laser Capsulotomy  Left 07/29/2019    Dr. Hahn     Patient Active Problem List   Diagnosis    Diverticulosis    PCO (posterior capsular opacification), right    Hyperlipidemia    Sensorineural hearing loss, bilateral    Paget's disease of bone    Arthritis of foot    Obesity (BMI 30.0-34.9)    Multinodular goiter    Osteoarthritis of lumbar spine    Hypothyroidism, postsurgical    Lipoma of arm    Malignant neoplasm of left breast    Chronic kidney insufficiency    Essential hypertension    History of left breast cancer    Chronic sinusitis    Prediabetes    Facet arthritis of lumbar region    Abnormal breast biopsy    Aortic atherosclerosis    Atypical ductal hyperplasia of right breast    Ductal carcinoma in situ (DCIS) of right breast    History of breast cancer in female    Pseudophakia    Refractive error    Post-operative state    Nephrolithiasis    Hydroureteronephrosis    BPPV (benign paroxysmal positional vertigo)    Diverticulitis of large intestine without perforation or abscess with bleeding    Acute blood loss anemia    Hematuria    GIB (gastrointestinal bleeding)    Syncope    Abnormal CT of the head         Anesthesia Evaluation    I have reviewed the Patient Summary Reports.    I have reviewed the Nursing Notes.   I have reviewed the Medications.     Review of Systems  Anesthesia Hx:  No problems with previous Anesthesia  Denies Family Hx of Anesthesia complications.   Denies Personal Hx of Anesthesia complications.   Cardiovascular:   Hypertension    Renal/:   Chronic Renal Disease    Endocrine:   Hypothyroidism        Physical Exam   Airway/Jaw/Neck:  Airway Findings: Mouth Opening: Normal Tongue: Normal  General Airway Assessment: Adult, Good  Mallampati: II  TM Distance: Normal, at least 6  cm  Jaw/Neck Findings:  Neck ROM: Normal ROM       Chest/Lungs:  Chest/Lungs Findings: Clear to auscultation     Heart/Vascular:  Heart Findings: Rate: Normal  Rhythm: Regular Rhythm        Mental Status:  Mental Status Findings:  Alert and Oriented, Cooperative         Anesthesia Plan  Type of Anesthesia, risks & benefits discussed:  Anesthesia Type:  general  Patient's Preference:   Intra-op Monitoring Plan: standard ASA monitors  Intra-op Monitoring Plan Comments:   Post Op Pain Control Plan: per primary service following discharge from PACU  Post Op Pain Control Plan Comments:   Induction:   IV  Beta Blocker:         Informed Consent: Patient understands risks and agrees with Anesthesia plan.  Questions answered. Anesthesia consent signed with patient.  ASA Score: 3     Day of Surgery Review of History & Physical:    H&P update referred to the surgeon.     Anesthesia Plan Notes: Pt started dry heaving in preop and had nausea. Will proceed with GETA.         Ready For Surgery From Anesthesia Perspective.

## 2019-12-09 NOTE — TREATMENT PLAN
Discussed with radiology, CT abdomen with rectal and IV contrast results: no evidence of diverticulitis, shows diverticulosis. Final read pending. Will order bowel prep and do colonoscopy tomorrow.     Matthew Gresham MD  Gastroenterology Fellow PGY IV   [facility]

## 2019-12-09 NOTE — PROGRESS NOTES
Rapid Response was called on pt w/ GIB who had fallen in bathroom.  Order placed to transfuse 1 unit PRBC.  Blood was verified by 2 RNs  (Chloe and myself).  Upon pt being rolled off the unit, the blood was started (per [facility], who was accompanying pt to CT Scan and stated that they would perform the first 15- minute monitoring of transfusion)  The PRBC were not scanned into Epic, and this was reported to MELVINA Fleipe who will be accepting care of pt in new room assignment 1006.  Transfusion was started at 75cc/hr to rac iv.

## 2019-12-09 NOTE — HPI
"Jo-Ann Escobedo is a 79 y.o. female with history of hx of diverticulitis + abscess(2014), CKD2, HTN, remote h/o breast cancer s/p L modified radical mastectomy + chemo, arthritis on NSAIDS comes in with chief complaint of BRBPR.      Started Tuesday, she noticed "darker stools" and starting Thursday noticed numerous episodes of BRBPR, bright red mixed with dark red, no black or tarry stools, no nausea, vomiting, or abdominal pain. It has been happening since then, numerous episodes, and since admission today has had atleast 4-5 episodes of painless BRBPR. She feels the blood just comes out and sometimes comes mixed with her bowel movement. She has been taking voltaren 75mg BID for almost 15 years for arthritis, PPI 40 daily, and PO iron daily. She is vitally stable, HR wnl, hb 10.5 drop to 8.8 today (baseline around 12.9), normal iron stuides, BUN 30, cr 0.9, lipase wnl. CTA shows uncomplicated diverticulitis vs colitis in descending and sigmoid colon junction, negative for active bleed.      CTA does not show AAA.      Had colonoscopy on 4/15/19: fair prep, 3 to 6 mm polyps in transverse colon and ascending colon, diverticulosis in sigmoid colon. Colonoscopy done on 1/19/15 showed 5 mm polyp in transverse colon, and diverticulosis in sigmoid and descending colon. She has never had EGD before.   12/7 EGD done:  The Z-line was regular and was found 39 cm from the incisors.       The examined esophagus was normal.       Localized mildly erythematous mucosa without bleeding was found at        the pylorus. Biopsies were taken with a cold forceps for        Helicobacter pylori testing. Estimated blood loss was minimal.       The duodenal bulb, first portion of the duodenum, second portion of        the duodenum and third portion of the duodenum were normal.       A 10mm, submucosal, soft lesion, with no bleeding and no stigmata of        recent bleeding was found at the incisura. This was biopsied with a        cold " forceps for histology. After biopsy X 2, no fat tissue was seen        in the submucsoal lesion.  Path still pending    CT scan 12/7:  Diverticulosis with decreased conspicuity of previously identified pericolonic inflammatory change involving the sigmoid colon suggestive of improving/resolving uncomplicated sigmoid diverticulitis.  No evidence of new superimposed acute abnormality on today's examination.  2. Cholelithiasis.  3. Bilateral renal cortical hypodensities the larger of which likely represent cysts and many which are too small to definitively characterize.  Nonobstructive right nephrolithiasis.  4. Hysterectomy.

## 2019-12-09 NOTE — PROGRESS NOTES
"[facility] Medicine  Progress Note    Patient Name: Jo-Ann Escobedo  MRN: [id number]  Patient Class: IP- Inpatient   Admission Date: 12/6/2019  Length of Stay: 0 days  Attending Physician: Dirk Garcias MD  Primary Care Provider: Dakota Kerr MD    [facility] Medicine Team: [facility] Nenita Zavala PA-C    Subjective:     Principal Problem:GIB (gastrointestinal bleeding)        HPI:  Jo-Ann Escobedo is a 79 y.o. F with diverticulosis with previous hx of diverticulitis + abscess, CKD2, HTN, remote h/o breast cancer s/p L modified radical mastectomy + chemo who presents to [facility] 12[place]6 for acute onset BRBPR x24 hours with associated melena, diarrhea.  Patient was seen by CRS today in the clinic and underwent a GYPSY and anoscope which showed normal exterior anus with melanotic stool on anoscope.  She was advised to present to ED for possible upper GI bleed.  Patient denies any f/c/sweats, abd pain, nausea, hematemesis, CP, SOB, dizziness, lightheadedness.  Of note, patient was recently restarted on diclofenac 75 mg PO BID in September.     ED: AFVSS, no leukocytosis. UA with 3+ occult blood, >100 RBCs, 12 WBCs.  CTA A/P shows no evidence of acute bleeding but does show "Findings concerning for uncomplicated diverticulitis or colitis about the descending and sigmoid colon junction".  Given 1L IVF, protonix, zosyn.  Hg 10. (previously 13-14)    Overview/Hospital Course:  Mrs. Escobedo was admitted to observation for diverticulitis. Patient started on zosyn, protonix IV, IVF. Patient with multiple episodes of maroon colored blood in stool. Hgb 10.5-->8.4. Type and screened, patient transfused 1U pRBC in setting of weakness and continued bleeding. GI consulted, plan for endoscopy tomorrow. UA positive for infection and hematuria. Hematuria likely from nephrolithiasis. Endoscopy with erythematous mucosa in pylorus, biopsied, and 10 mm gastric nodule, biopsied. Will need upper endoscopic " ultrasound at next available appointment for follow up 10 mm nodule. Repeat CT with mild diverticulitis.    Interval History: Rapid response called this am for syncopal event with head trauma while patient was on toilet. Tele with HR in 30s during event. Suspect vasovagal. 800cc of BRB in toilet. Patient with N/V after episode. VSS. Stat CT head ordered. Hgb 7.1, transfusing 2U pRBC. Patient back at baseline. Patient restarted on Zosyn, CRS consulted to evaluate for colonoscopy in setting of diverticulitis.    Review of Systems   Constitutional: Positive for fatigue. Negative for chills and fever.   HENT: Negative for congestion and rhinorrhea.    Respiratory: Negative for cough and shortness of breath.    Cardiovascular: Negative for chest pain, palpitations and leg swelling.   Gastrointestinal: Positive for blood in stool, nausea and vomiting.   Genitourinary: Negative for difficulty urinating and hematuria.   Musculoskeletal: Negative for arthralgias and back pain.   Neurological: Positive for syncope, weakness and headaches. Negative for dizziness and light-headedness.   Psychiatric/Behavioral: Negative for agitation and behavioral problems.     Objective:     Vital Signs (Most Recent):  Temp: 97.9 °F (36.6 °C) (12/09/19 0815)  Pulse: 70 (12/09/19 0815)  Resp: 20 (12/09/19 0815)  BP: (!) 105/55 (12/09/19 0815)  SpO2: 99 % (12/09/19 0815) Vital Signs (24h Range):  Temp:  [97.9 °F (36.6 °C)-98.4 °F (36.9 °C)] 97.9 °F (36.6 °C)  Pulse:  [64-81] 70  Resp:  [15-20] 20  SpO2:  [91 %-99 %] 99 %  BP: (105-176)/(54-80) 105/55     Weight: 84.2 kg (185 lb 10 oz)  Body mass index is 32.88 kg/m².    Intake/Output Summary (Last 24 hours) at 12/9/2019 0916  Last data filed at 12/9/2019 0404  Gross per 24 hour   Intake --   Output 3700 ml   Net -3700 ml      Physical Exam   Constitutional: She is oriented to person, place, and time. She appears well-developed and well-nourished.   HENT:   Head: Normocephalic.   Right frontal  forehead bruising and swelling   Eyes: Pupils are equal, round, and reactive to light. EOM are normal.   Neck: Normal range of motion. Neck supple.   Cardiovascular: Normal rate and regular rhythm.   Pulmonary/Chest: Effort normal and breath sounds normal.   Abdominal: Soft. Bowel sounds are normal.   Musculoskeletal: Normal range of motion. She exhibits no edema.   Neurological: She is alert and oriented to person, place, and time.   Skin: Skin is warm and dry. Capillary refill takes less than 2 seconds.   Psychiatric: She has a normal mood and affect. Her behavior is normal.   Nursing note and vitals reviewed.      Significant Labs:   Bilirubin:   Recent Labs   Lab 12/06/19  1912 12/07/19  0417 12/08/19  0434 12/09/19  0400   BILITOT 0.4 0.4 0.5 0.4     CBC:   Recent Labs   Lab 12/09/19  0029 12/09/19  0400 12/09/19  0817   WBC 11.51 10.70 8.37   HGB 9.4* 8.1* 7.3*   HCT 30.4* 25.9* 24.3*    198 198     CMP:   Recent Labs   Lab 12/08/19  0434 12/09/19  0400    140   K 3.9 4.1   * 108   CO2 25 27    120*   BUN 20 15   CREATININE 1.0 0.9   CALCIUM 8.5* 9.0   PROT 5.4* 5.8*   ALBUMIN 2.9* 3.2*   BILITOT 0.5 0.4   ALKPHOS 51* 53*   AST 18 24   ALT 12 14   ANIONGAP 7* 5*   EGFRNONAA 53.7* >60.0     Coagulation: No results for input(s): PT, INR, APTT in the last 48 hours.  Magnesium:   Recent Labs   Lab 12/08/19  0434 12/09/19  0400   MG 2.1 1.9     POCT Glucose: No results for input(s): POCTGLUCOSE in the last 48 hours.    Significant Imaging: I have reviewed all pertinent imaging results/findings within the past 24 hours.      Assessment/Plan:      * GIB (gastrointestinal bleeding)        Abnormal CT of the head  - CT head with 3.5 cm sclerotic lesion right frontal calvarium with few additional smaller sclerotic foci.  Additional large well-circumscribed lucent lesions in the parietal bones bilaterally.  Findings further detailed above, thought to reflect changes of Paget's disease.   Correlation with nuclear medicine bone scan might be helpful to exclude metastatic disease in patient reported history of breast cancer.  - will defer further imaging to PCP      Syncope  Patient with syncoal episode this am while passing 800 cc hematochezia with 3 episodes of vomiting after and HR drop to 30s. No hematemesis. Suspect vasovagal  - Rapid response called, VSS   - CTH with Right frontal extracranial soft tissue swelling without fracture or acute ICH;  Concern for paget's disease, see abnormal CT  - transfused 2U pRBC  - continue to monitor     Hematuria  likely 2/2 nephrolithiasis  - f.u with urology    Acute blood loss anemia  See above    Diverticulitis of large intestine without perforation or abscess with bleeding  GIB  78 y/o with h/o diverticulitis with abscess presents with BRBPR and melena.  GYPSY/anoscope unrevealing per CRS note. CTA A/P show uncomplicated diverticulitis (vs colitis) of descending/sigmoid colon without active bleeding.    - GIB pathway started  - GI consulted, appreciate recs  - c/w zosyn 10-14 days, mIVF  - Hg 10.5 (BL 12-13), transfusing as necessary  - EGD with erythematous mucosa and 10 mm polyp, both biopsied  - CT abdomen pelvis with mild diverticulitis  - GI to proceed with colonoscopy if cleared by CRS  - Hgb 7.3 this am, transfusing 2U pRBC in setting of active bleeding. Syncopal episode this am  - CRS consulted to clear patient for colonoscopy  - NPO, PPI BID  - Place 2 large bore IVs, volume resuscitation as needed  - Trend H/H (Transfuse pRBC for Hb < 7 g/dL)  - anemia labs unremarkable, B12 224, started on supplements  - Avoid nonsteroidal agents, antiplatelet agents and anticoagulants if possible  - Colonoscopy 4/2019: Three 3 to 6 mm polyps in the transverse colon and in the ascending colon, removed with a cold snare. Resected and retrieved.  Diverticulosis in the sigmoid colon and in the descending colon.    Essential hypertension  Stable  - holding home  losartan 75 in setting of bleeding    Chronic kidney insufficiency  Stable    Hypothyroidism, postsurgical  - c/w synthroid      VTE Risk Mitigation (From admission, onward)         Ordered     IP VTE HIGH RISK PATIENT  Once      12/07/19 0317     Place PETRA hose  Until discontinued      12/07/19 0317     Place sequential compression device  Until discontinued      12/07/19 0317                      Nenita Zavala PA-C  Department of Hospital Medicine   [facility]

## 2019-12-09 NOTE — CONSULTS
"[facility]  Colorectal Surgery  Consult Note    Patient Name: Jo-Ann Escobedo  MRN: [id number]  Admission Date: 12/6/2019  Hospital Length of Stay: 0 days  Attending Physician: Dirk Garcias MD  Primary Care Provider: Dakota Kerr MD    Inpatient consult to Colorectal Surgery  Consult performed by: Vanessa Grant NP  Consult ordered by: Nenita Zavala PA-C        Subjective:     Chief Complaint/Reason for Admission: rectal bleeding    History of Present Illness:  Jo-Ann Escobedo is a 79 y.o. female with history of hx of diverticulitis + abscess(2014), CKD2, HTN, remote h/o breast cancer s/p L modified radical mastectomy + chemo, arthritis on NSAIDS comes in with chief complaint of BRBPR.      Started Tuesday, she noticed "darker stools" and starting Thursday noticed numerous episodes of BRBPR, bright red mixed with dark red, no black or tarry stools, no nausea, vomiting, or abdominal pain. It has been happening since then, numerous episodes, and since admission today has had atleast 4-5 episodes of painless BRBPR. She feels the blood just comes out and sometimes comes mixed with her bowel movement. She has been taking voltaren 75mg BID for almost 15 years for arthritis, PPI 40 daily, and PO iron daily. She is vitally stable, HR wnl, hb 10.5 drop to 8.8 today (baseline around 12.9), normal iron stuides, BUN 30, cr 0.9, lipase wnl. CTA shows uncomplicated diverticulitis vs colitis in descending and sigmoid colon junction, negative for active bleed.      CTA does not show AAA.      Had colonoscopy on 4/15/19: fair prep, 3 to 6 mm polyps in transverse colon and ascending colon, diverticulosis in sigmoid colon. Colonoscopy done on 1/19/15 showed 5 mm polyp in transverse colon, and diverticulosis in sigmoid and descending colon. She has never had EGD before.   12/7 EGD done:  The Z-line was regular and was found 39 cm from the incisors.       The examined esophagus was normal.       " Localized mildly erythematous mucosa without bleeding was found at        the pylorus. Biopsies were taken with a cold forceps for        Helicobacter pylori testing. Estimated blood loss was minimal.       The duodenal bulb, first portion of the duodenum, second portion of        the duodenum and third portion of the duodenum were normal.       A 10mm, submucosal, soft lesion, with no bleeding and no stigmata of        recent bleeding was found at the incisura. This was biopsied with a        cold forceps for histology. After biopsy X 2, no fat tissue was seen        in the submucsoal lesion.  Path still pending    CT scan 12/7:  Diverticulosis with decreased conspicuity of previously identified pericolonic inflammatory change involving the sigmoid colon suggestive of improving/resolving uncomplicated sigmoid diverticulitis.  No evidence of new superimposed acute abnormality on today's examination.  2. Cholelithiasis.  3. Bilateral renal cortical hypodensities the larger of which likely represent cysts and many which are too small to definitively characterize.  Nonobstructive right nephrolithiasis.  4. Hysterectomy.     CRS asked to comment on safety of having colonoscopy in light of recent diverticulitis    PTA Medications   Medication Sig    cholecalciferol, vitamin D3, (VITAMIN D3) 2,000 unit Tab Take 1 tablet by mouth once daily.    co-enzyme Q-10 30 mg capsule Take 30 mg by mouth once daily.     diclofenac (VOLTAREN) 75 MG EC tablet Take 1 tablet (75 mg total) by mouth 2 (two) times daily as needed (pain).    fenofibrate 160 MG Tab Take 1 tablet (160 mg total) by mouth once daily.    ferrous sulfate (IRON ORAL) Take by mouth once daily.     LACTOBACILLUS COMBINATION NO.8 (ADULT PROBIOTIC ORAL) Take by mouth once daily.     levothyroxine (SYNTHROID) 125 MCG tablet TAKE 1 TABLET (125 MCG TOTAL) BY MOUTH AFTER DINNER.    losartan (COZAAR) 50 MG tablet Take 1.5 tablets (75 mg total) by mouth once daily.     meclizine (ANTIVERT) 12.5 mg tablet Take 1 tablet (12.5 mg total) by mouth 3 (three) times daily as needed for Dizziness.    omeprazole (PRILOSEC) 40 MG capsule TAKE 1 CAPSULE (40 MG TOTAL) BY MOUTH BEFORE DINNER.    omeprazole (PRILOSEC) 40 MG capsule TAKE 1 CAPSULE (40 MG TOTAL) BY MOUTH BEFORE DINNER.    turmeric root extract 500 mg Cap Take by mouth once daily.     valACYclovir (VALTREX) 1000 MG tablet Take 1 tablet (1,000 mg total) by mouth 3 (three) times daily. for 7 days       Review of patient's allergies indicates:   Allergen Reactions    Codeine Diarrhea and Hallucinations    Niacin preparations      Redness, warming       Past Medical History:   Diagnosis Date    After-cataract of both eyes - Both Eyes 9/26/2012    Allergy     Arthritis     Breast cancer     Diverticulosis     Hyperlipidemia     Hypertension     Hypothyroidism     Paget disease of bone     Squamous Cell Carcinoma     in situ right neck     Thyroid disease      Past Surgical History:   Procedure Laterality Date    CATARACT EXTRACTION W/  INTRAOCULAR LENS IMPLANT Bilateral     COLONOSCOPY N/A 4/15/2019    Procedure: COLONOSCOPY;  Surgeon: Artur Monet MD;  Location: [facility] (4TH FLR);  Service: Endoscopy;  Laterality: N/A;  within 1 month    ESOPHAGOGASTRODUODENOSCOPY N/A 12/7/2019    Procedure: EGD (ESOPHAGOGASTRODUODENOSCOPY);  Surgeon: Clyde Welch MD;  Location: [facility] (2ND FLR);  Service: Endoscopy;  Laterality: N/A;    EXCISIONAL BIOPSY Right 6/27/2019    Procedure: EXCISIONAL BIOPSY-breast;  Surgeon: Suki Dill MD;  Location: [facility] OR 2ND FLR;  Service: General;  Laterality: Right;    EYE SURGERY      HYSTERECTOMY      INJECTION OF ANESTHETIC AGENT AROUND MEDIAL BRANCH NERVES INNERVATING LUMBAR FACET JOINT Bilateral 6/7/2019    Procedure: BLOCK, NERVE, FACET JOINT, LUMBAR, MEDIAL BRANCH-deo MBB L4/5,L5/S1;  Surgeon: Jarvis Morales III, MD;  Location: [facility] CATH LAB;  Service:  Pain Management;  Laterality: Bilateral;    KNEE ARTHROSCOPY N/A     pt unsure of which knee     MASTECTOMY      SPINE SURGERY      TOTAL THYROIDECTOMY      YAG Laser Capsulotomy  Left 2019    Dr. Hahn     Family History     Problem Relation (Age of Onset)    Cancer Father    Dementia Mother    Glaucoma Brother    Macular degeneration Brother        Tobacco Use    Smoking status: Former Smoker     Packs/day: 2.00     Years: 44.00     Pack years: 88.00     Types: Cigarettes     Last attempt to quit: 1969     Years since quittin.9    Smokeless tobacco: Never Used   Substance and Sexual Activity    Alcohol use: Yes     Alcohol/week: 0.0 standard drinks     Frequency: Monthly or less     Drinks per session: 1 or 2     Binge frequency: Never     Comment: maybe a beer every 3 months    Drug use: No    Sexual activity: Not Currently     Review of Systems   Constitutional: Negative for appetite change, chills, fatigue, fever and unexpected weight change.   Respiratory: Negative for cough, chest tightness, shortness of breath and wheezing.    Cardiovascular: Negative for chest pain and leg swelling.   Gastrointestinal: Positive for anal bleeding and blood in stool. Negative for abdominal distention, abdominal pain, constipation, diarrhea, nausea, rectal pain and vomiting.   Genitourinary: Negative for difficulty urinating, dysuria, flank pain, frequency and hematuria.   Musculoskeletal: Negative for back pain, gait problem and joint swelling.   Neurological: Negative for syncope, weakness and numbness.   Hematological: Does not bruise/bleed easily.   Psychiatric/Behavioral: Negative for agitation, confusion, decreased concentration and hallucinations. The patient is not nervous/anxious and is not hyperactive.      Objective:     Vital Signs (Most Recent):  Temp: 97.5 °F (36.4 °C) (19 112)  Pulse: 84 (19)  Resp: 16 (19)  BP: (!) 144/66 (19)  SpO2: 98 %  (12/09/19 1122) Vital Signs (24h Range):  Temp:  [97.5 °F (36.4 °C)-98.4 °F (36.9 °C)] 97.5 °F (36.4 °C)  Pulse:  [70-84] 84  Resp:  [15-20] 16  SpO2:  [91 %-99 %] 98 %  BP: (105-176)/(54-80) 144/66     Weight: 84.2 kg (185 lb 10 oz)  Body mass index is 32.88 kg/m².    Physical Exam   Constitutional: She is oriented to person, place, and time. She appears well-developed and well-nourished.   HENT:   Head: Normocephalic.   Eyes: Pupils are equal, round, and reactive to light.   Cardiovascular: Normal rate and regular rhythm.   Pulmonary/Chest: Breath sounds normal.   Abdominal: Soft. She exhibits no distension and no mass. There is no tenderness. There is no rebound and no guarding.   Genitourinary:   Genitourinary Comments: Rectal performed 12/6   Neurological: She is alert and oriented to person, place, and time.   Skin: Skin is warm and dry.   Psychiatric: She has a normal mood and affect.         Significant Labs:  BMP (Last 3 Results):   Recent Labs   Lab 12/07/19  0417 12/08/19  0434 12/09/19  0400   GLU 95 108 120*    143 140   K 3.8 3.9 4.1   * 111* 108   CO2 24 25 27   BUN 23 20 15   CREATININE 0.8 1.0 0.9   CALCIUM 8.4* 8.5* 9.0   MG 1.9 2.1 1.9     CBC (Last 3 Results):   Recent Labs   Lab 12/09/19  0029 12/09/19  0400 12/09/19  0817   WBC 11.51 10.70 8.37   RBC 3.06* 2.63* 2.38*   HGB 9.4* 8.1* 7.3*   HCT 30.4* 25.9* 24.3*    198 198   MCV 99* 99* 102*   MCH 30.7 30.8 30.7   MCHC 30.9* 31.3* 30.0*       Significant Diagnostics:  CT: I have reviewed all pertinent results/findings within the past 24 hours:  will review with Dr. Portillo/Carson    Assessment/Plan:     Chronic kidney insufficiency  79 year old female with rectal bleeding, melena at first now mixed with new and old blood   CRS asked to comment on safety of performing colonoscopy in light of recent diagnosis of diverticulits, recent ct done 12/7 showed improvement of inflammation      Scan reviewed by Dr. Byrd and she is  agreeing with colonoscopy today         Thank you for your consult.   Vanessa Grant NP  Colorectal Surgery  [facility]

## 2019-12-09 NOTE — TREATMENT PLAN
GI Treatment Plan    Discussed with Radiology staff this morning after final read on CT.  Still with some mild inflammation suggesting very mild sigmoid diverticulitis but not as pronounced as previous imaging years ago.    - Recommend CRS consult given hematochezia in setting of possible diverticulitis  - If ok per CRS, will proceed with Colonoscopy today  - cont Abx x10-14 days for diverticulitis      Inocencio Reynolds MD  Gastroenterology Fellow, PGY4  [facility]

## 2019-12-09 NOTE — ASSESSMENT & PLAN NOTE
Patient with syncoal episode this am while passing 800 cc hematochezia with 3 episodes of vomiting after and HR drop to 30s. No hematemesis. Suspect vasovagal  - Rapid response called, VSS   - CTH with Right frontal extracranial soft tissue swelling without fracture or acute ICH;  Concern for paget's disease, see abnormal CT  - transfused 2U pRBC  - continue to monitor

## 2019-12-09 NOTE — PT/OT/SLP PROGRESS
Physical Therapy      Patient Name:  Jo-Ann Escobedo   MRN:  [id number]    Patient not seen today secondary to Unavailable (Comment)(pt in endoscopy). Will follow-up 12/10/2019.    Patrice Roque, PT

## 2019-12-09 NOTE — ASSESSMENT & PLAN NOTE
79 year old female with rectal bleeding, melena at first now mixed with new and old blood   CRS asked to comment on safety of performing colonoscopy in light of recent diagnosis of diverticulits, recent ct done 12/7 showed improvement of inflammation      Scan reviewed by Dr. Byrd and she is agreeing with colonoscopy today

## 2019-12-09 NOTE — CODE/ RAPID DOCUMENTATION
Rapid Response Respiratory Therapy Note      Code Status: Full Code   : 1940  Age: 79 y.o.  Weight:   Wt Readings from Last 1 Encounters:   19 84.2 kg (185 lb 10 oz)     Sex: female  Race: White   Bed: Room/bed info not found:   MRN: [id number]  Time page Received:1451  Time Rapid Response RT at Bedside: 1455  Time Rapid Response RT left Bedside: 1503  Report given to: Lea CHAVES    SITUATION     Evaluated patient for: rapid response paged for GI bleed.    BACKGROUND     Patient has a past medical history of After-cataract of both eyes - Both Eyes, Allergy, Arthritis, Breast cancer, Diverticulosis, Hyperlipidemia, Hypertension, Hypothyroidism, Paget disease of bone, Squamous Cell Carcinoma, and Thyroid disease.    ASSESSMENT/INTERVENTIONS     Upon arrival in room patient lying in bed with bloody stool. Patient awake and alert on 2L nasal cannula. Patient awaiting colonoscopy. Patient to receive more units of blood.     Pulse: 96  Respiratory rate: 18 Temperature:   BP:   SpO2:99%  Level of Consciousness: Level of Consciousness (AVPU): alert  Respiratory Effort: Respiratory Effort: Normal, Unlabored  Expansion/Accessory Muscle Usage: Expansion/Accessory Muscles/Retractions: no retractions, no use of accessory muscles, expansion symmetric  All Lung Field Breath Sounds: All Lung Fields Breath Sounds: Anterior:, clear, Lateral:  O2 Device/Concentration: nasal cannula/ 2L  Most recent blood gas: No results for input(s): PH, PCO2, PO2, HCO3, POCSATURATED, BE, LACTATE in the last 72 hours.  PF Ratio Calculator    Current Respiratory Care Orders:   19 0254  albuterol-ipratropium 2.5 mg-0.5 mg/3 mL nebulizer solution 3 mL (albuterol-ipratropium (DUO-NEB) 0.5 - 3 mg (2.5 mg base)/ 3 mL nebulizer panel)    Discontinue    -- Verified NEBULIZATION Every 4 hours PRN 19 0155       NIPPV: No Surgical airway: No Vent: No  ETCO2 monitored:    Ambu at bedside: Ambu bag with the patient?: Yes, Adult  Ambu    RECOMMENDATIONS   ?  We recommend: patient to receive colonoscopy. No further respiratory interventions needed at this time.    ESCALATION      Discussed plan of care with Dr. MORTENSEN and primary RT, Lea.     FOLLOW-UP     Disposition: Remain in room 1006.    Please call back the Rapid Response RT, Margaret Gamboa, RRT at x [phone] for any questions or concerns.

## 2019-12-09 NOTE — SUBJECTIVE & OBJECTIVE
Interval History: Rapid response called this am for syncopal event with head trauma while patient was on toilet. Tele with HR in 30s during event. Suspect vasovagal. 800cc of BRB in toilet. Patient with N/V after episode. VSS. Stat CT head ordered. Hgb 7.1, transfusing 2U pRBC. Patient back at baseline. Patient restarted on Zosyn, CRS consulted to evaluate for colonoscopy in setting of diverticulitis.    Review of Systems   Constitutional: Positive for fatigue. Negative for chills and fever.   HENT: Negative for congestion and rhinorrhea.    Respiratory: Negative for cough and shortness of breath.    Cardiovascular: Negative for chest pain, palpitations and leg swelling.   Gastrointestinal: Positive for blood in stool, nausea and vomiting.   Genitourinary: Negative for difficulty urinating and hematuria.   Musculoskeletal: Negative for arthralgias and back pain.   Neurological: Positive for syncope, weakness and headaches. Negative for dizziness and light-headedness.   Psychiatric/Behavioral: Negative for agitation and behavioral problems.     Objective:     Vital Signs (Most Recent):  Temp: 97.9 °F (36.6 °C) (12/09/19 0815)  Pulse: 70 (12/09/19 0815)  Resp: 20 (12/09/19 0815)  BP: (!) 105/55 (12/09/19 0815)  SpO2: 99 % (12/09/19 0815) Vital Signs (24h Range):  Temp:  [97.9 °F (36.6 °C)-98.4 °F (36.9 °C)] 97.9 °F (36.6 °C)  Pulse:  [64-81] 70  Resp:  [15-20] 20  SpO2:  [91 %-99 %] 99 %  BP: (105-176)/(54-80) 105/55     Weight: 84.2 kg (185 lb 10 oz)  Body mass index is 32.88 kg/m².    Intake/Output Summary (Last 24 hours) at 12/9/2019 0916  Last data filed at 12/9/2019 0404  Gross per 24 hour   Intake --   Output 3700 ml   Net -3700 ml      Physical Exam   Constitutional: She is oriented to person, place, and time. She appears well-developed and well-nourished.   HENT:   Head: Normocephalic.   Right frontal forehead bruising and swelling   Eyes: Pupils are equal, round, and reactive to light. EOM are normal.   Neck:  Normal range of motion. Neck supple.   Cardiovascular: Normal rate and regular rhythm.   Pulmonary/Chest: Effort normal and breath sounds normal.   Abdominal: Soft. Bowel sounds are normal.   Musculoskeletal: Normal range of motion. She exhibits no edema.   Neurological: She is alert and oriented to person, place, and time.   Skin: Skin is warm and dry. Capillary refill takes less than 2 seconds.   Psychiatric: She has a normal mood and affect. Her behavior is normal.   Nursing note and vitals reviewed.      Significant Labs:   Bilirubin:   Recent Labs   Lab 12/06/19  1912 12/07/19  0417 12/08/19 0434 12/09/19  0400   BILITOT 0.4 0.4 0.5 0.4     CBC:   Recent Labs   Lab 12/09/19  0029 12/09/19 0400 12/09/19  0817   WBC 11.51 10.70 8.37   HGB 9.4* 8.1* 7.3*   HCT 30.4* 25.9* 24.3*    198 198     CMP:   Recent Labs   Lab 12/08/19 0434 12/09/19  0400    140   K 3.9 4.1   * 108   CO2 25 27    120*   BUN 20 15   CREATININE 1.0 0.9   CALCIUM 8.5* 9.0   PROT 5.4* 5.8*   ALBUMIN 2.9* 3.2*   BILITOT 0.5 0.4   ALKPHOS 51* 53*   AST 18 24   ALT 12 14   ANIONGAP 7* 5*   EGFRNONAA 53.7* >60.0     Coagulation: No results for input(s): PT, INR, APTT in the last 48 hours.  Magnesium:   Recent Labs   Lab 12/08/19 0434 12/09/19  0400   MG 2.1 1.9     POCT Glucose: No results for input(s): POCTGLUCOSE in the last 48 hours.    Significant Imaging: I have reviewed all pertinent imaging results/findings within the past 24 hours.

## 2019-12-09 NOTE — NURSING
Alerted to patient room by unit secretary. Upon entering patient observed lying on the bathroom floor with RN at her side. RN reports patient pulled bathroom assist cord and fell off of commode before he could get to her. Rapid response called. Remains alert and oriented X 4 with no confusion/disorientation.Hematoma noted to right forehead. Large amount of liquid, bright red, bloody stool noted in commode as well as on bathroom floor. Patient denies pain, however did have vomiting X 3 episodes after fall. Patient placed on back board and transferred to bed. IV Zofran administered. Report called to Destinee HEIN in [facility]. Patient transferred to CT with rapid response team.

## 2019-12-09 NOTE — NURSING
"I spoke with the pt and educated her on her upcoming colonoscopy and the bowel prep that goes with it. The pt was very upset because she felt the whole process was taking too long, and that she didn't understand why we had to wait for pharmacy to send the Golytely. The charge nurse and I both spoke to her about this, as well as her drinking the bowel prep before midnight. I continue to educate her on why she needs to finish the bowel prep, as she wants to "just finish it in the morning". PRN nausea medication was offered to the pt but she declined.  I spoke with the on-call about moving the pt to a higher level of care, possibly the GIS, due to her frequent grossly bloody stools, even without the bowel prep. A stat CBC was ordered, and it was decided that she was going to stay on observation.  Call light within reach, I will continue to monitor.  "

## 2019-12-09 NOTE — PLAN OF CARE
CM met with patient at the bedside to discuss discharge planning assessment. Pt lives alone and has transportation at discharge. Pt verified PCP and Pharmacy. CM will continue to follow for discharge needs.        12/09/19 1327   Discharge Assessment   Confirmed/corrected address and phone number on facesheet? Yes   Assessment information obtained from? Patient   Expected Length of Stay (days) 4   Communicated expected length of stay with patient/caregiver yes   Prior to hospitilization cognitive status: Alert/Oriented   Prior to hospitalization functional status: Independent   Current cognitive status: Alert/Oriented   Current Functional Status: Independent   Lives With alone   Able to Return to Prior Arrangements yes   Is patient able to care for self after discharge? Yes   Who are your caregiver(s) and their phone number(s)? Natalie Tyler- daughter- [phone]   Patient's perception of discharge disposition home or selfcare   Readmission Within the Last 30 Days no previous admission in last 30 days   Patient currently being followed by outpatient case management? No   Patient currently receives any other outside agency services? No   Equipment Currently Used at Home none   Do you have any problems affording any of your prescribed medications? No   Is the patient taking medications as prescribed? yes   Does the patient have transportation home? Yes   Transportation Anticipated family or friend will provide   Does the patient receive services at the [facility]mad[facility] Clinic? No   Discharge Plan A Home   Discharge Plan B Home with family   DME Needed Upon Discharge  none   Patient/Family in Agreement with Plan yes

## 2019-12-09 NOTE — CODE/ RAPID DOCUMENTATION
Rapid Response Respiratory Therapy Note      Code Status: Full Code   : 1940  Age: 79 y.o.  Weight:   Wt Readings from Last 1 Encounters:   19 84.2 kg (185 lb 10 oz)     Sex: female  Race: White   Bed: 1006/1006 A:   MRN: [id number]  Time page Received: 0735  Time Rapid Response RT at Bedside: 0737  Time Rapid Response RT left Bedside: 0857  Report given to: Damien CHAN    SITUATION     Evaluated patient for: rapid response paged for patient who fell while using the bathroom. GI bleed.    BACKGROUND     Patient has a past medical history of After-cataract of both eyes - Both Eyes, Allergy, Arthritis, Breast cancer, Diverticulosis, Hyperlipidemia, Hypertension, Hypothyroidism, Paget disease of bone, Squamous Cell Carcinoma, and Thyroid disease.    ASSESSMENT/INTERVENTIONS     Upon arrival in room patient on bathroom floor, awake and alert. Patient is a GI bleed, and went to use the bathroom and is believed to have vagal down causing her to faint. Patient states she may have lost consciousness for a few seconds. Patient careful picked up from ground and placed in bed. Patient also has episodes of vomiting as well. Patient has bloody stool, and a copious amount of bloody stool was in the toilet from when she went. Patient also has a hematoma knot on forehead and taken to CT. Placed patient on 3L nasal cannula for comfort and sats had dropped to 88% but improved back to 96%    Pulse: 77  Respiratory rate:18  Temperature: Temp: 97.9 °F (36.6 °C) BP: BP: (!) 105/55 SpO2:95%  Level of Consciousness: Level of Consciousness (AVPU): alert  Respiratory Effort: Respiratory Effort: Normal, Unlabored  Expansion/Accessory Muscle Usage: Expansion/Accessory Muscles/Retractions: no use of accessory muscles, expansion symmetric, no retractions  All Lung Field Breath Sounds: All Lung Fields Breath Sounds: Anterior:, Lateral:, clear  O2 Device/Concentration: room air/ 21%    Most recent blood gas: No results for  input(s): PH, PCO2, PO2, HCO3, POCSATURATED, BE, LACTATE in the last 72 hours.  PF Ratio Calculator  Current Respiratory Care Orders:   12/07/19 0400  Pulse Oximetry Q4H Every 4 hours (18 of 41 released)    Release    12/07/19 0155   Unscheduled  Inhalation Treatment Q4H PRN Every 4 hours PRN (0 of 09555 released)    Release    12/07/19 0155      IP Meds - Nasal and Inhaled   Comment  Hide   (From admission, onward)      Last edited by  on  at    Start   Stop Status Route Frequency Ordered   12/07/19 0254  albuterol-ipratropium 2.5 mg-0.5 mg/3 mL nebulizer solution 3 mL (albuterol-ipratropium (DUO-NEB) 0.5 - 3 mg (2.5 mg base)/ 3 mL nebulizer panel)    Discontinue    -- Verified NEBULIZATION Every 4 hours PRN 12/07/19 0155       NIPPV: No Surgical airway: No Vent: No  ETCO2 monitored:    Ambu at bedside: Ambu bag with the patient?: Yes, Adult Ambu    RECOMMENDATIONS   ?  We recommend: transferred patient to stepdown unit for closer monitoring. Continue to monitor patient's status. Will continue to monitor.    ESCALATION      Discussed plan of care with Dr. gomes RT, Damien CHAN    FOLLOW-UP     Disposition: Tx to [facility], bed 1006.    Please call back the Rapid Response RT, Margaret Gamboa, RRT at x [phone] for any questions or concerns.

## 2019-12-09 NOTE — SUBJECTIVE & OBJECTIVE
PTA Medications   Medication Sig    cholecalciferol, vitamin D3, (VITAMIN D3) 2,000 unit Tab Take 1 tablet by mouth once daily.    co-enzyme Q-10 30 mg capsule Take 30 mg by mouth once daily.     diclofenac (VOLTAREN) 75 MG EC tablet Take 1 tablet (75 mg total) by mouth 2 (two) times daily as needed (pain).    fenofibrate 160 MG Tab Take 1 tablet (160 mg total) by mouth once daily.    ferrous sulfate (IRON ORAL) Take by mouth once daily.     LACTOBACILLUS COMBINATION NO.8 (ADULT PROBIOTIC ORAL) Take by mouth once daily.     levothyroxine (SYNTHROID) 125 MCG tablet TAKE 1 TABLET (125 MCG TOTAL) BY MOUTH AFTER DINNER.    losartan (COZAAR) 50 MG tablet Take 1.5 tablets (75 mg total) by mouth once daily.    meclizine (ANTIVERT) 12.5 mg tablet Take 1 tablet (12.5 mg total) by mouth 3 (three) times daily as needed for Dizziness.    omeprazole (PRILOSEC) 40 MG capsule TAKE 1 CAPSULE (40 MG TOTAL) BY MOUTH BEFORE DINNER.    omeprazole (PRILOSEC) 40 MG capsule TAKE 1 CAPSULE (40 MG TOTAL) BY MOUTH BEFORE DINNER.    turmeric root extract 500 mg Cap Take by mouth once daily.     valACYclovir (VALTREX) 1000 MG tablet Take 1 tablet (1,000 mg total) by mouth 3 (three) times daily. for 7 days       Review of patient's allergies indicates:   Allergen Reactions    Codeine Diarrhea and Hallucinations    Niacin preparations      Redness, warming       Past Medical History:   Diagnosis Date    After-cataract of both eyes - Both Eyes 9/26/2012    Allergy     Arthritis     Breast cancer     Diverticulosis     Hyperlipidemia     Hypertension     Hypothyroidism     Paget disease of bone     Squamous Cell Carcinoma     in situ right neck     Thyroid disease      Past Surgical History:   Procedure Laterality Date    CATARACT EXTRACTION W/  INTRAOCULAR LENS IMPLANT Bilateral     COLONOSCOPY N/A 4/15/2019    Procedure: COLONOSCOPY;  Surgeon: Artur Monet MD;  Location: [facility] ([address] [place] [address]);  Service:  Endoscopy;  Laterality: N/A;  within 1 month    ESOPHAGOGASTRODUODENOSCOPY N/A 2019    Procedure: EGD (ESOPHAGOGASTRODUODENOSCOPY);  Surgeon: Clyde Welch MD;  Location: [address]);  Service: Endoscopy;  Laterality: N/A;    EXCISIONAL BIOPSY Right 2019    Procedure: EXCISIONAL BIOPSY-breast;  Surgeon: Suki Dill MD;  Location: [facility] OR [address] [place] [address];  Service: General;  Laterality: Right;    EYE SURGERY      HYSTERECTOMY      INJECTION OF ANESTHETIC AGENT AROUND MEDIAL BRANCH NERVES INNERVATING LUMBAR FACET JOINT Bilateral 2019    Procedure: BLOCK, NERVE, FACET JOINT, LUMBAR, MEDIAL BRANCH-deo MBB L4/5,L5/S1;  Surgeon: Jarvis Morales III, MD;  Location: [facility] CATH LAB;  Service: Pain Management;  Laterality: Bilateral;    KNEE ARTHROSCOPY N/A     pt unsure of which knee     MASTECTOMY      SPINE SURGERY      TOTAL THYROIDECTOMY      YAG Laser Capsulotomy  Left 2019    Dr. Hahn     Family History     Problem Relation (Age of Onset)    Cancer Father    Dementia Mother    Glaucoma Brother    Macular degeneration Brother        Tobacco Use    Smoking status: Former Smoker     Packs/day: 2.00     Years: 44.00     Pack years: 88.00     Types: Cigarettes     Last attempt to quit: 1969     Years since quittin.9    Smokeless tobacco: Never Used   Substance and Sexual Activity    Alcohol use: Yes     Alcohol/week: 0.0 standard drinks     Frequency: Monthly or less     Drinks per session: 1 or 2     Binge frequency: Never     Comment: maybe a beer every 3 months    Drug use: No    Sexual activity: Not Currently     Review of Systems   Constitutional: Negative for appetite change, chills, fatigue, fever and unexpected weight change.   Respiratory: Negative for cough, chest tightness, shortness of breath and wheezing.    Cardiovascular: Negative for chest pain and leg swelling.   Gastrointestinal: Positive for anal bleeding and blood in stool. Negative for  abdominal distention, abdominal pain, constipation, diarrhea, nausea, rectal pain and vomiting.   Genitourinary: Negative for difficulty urinating, dysuria, flank pain, frequency and hematuria.   Musculoskeletal: Negative for back pain, gait problem and joint swelling.   Neurological: Negative for syncope, weakness and numbness.   Hematological: Does not bruise/bleed easily.   Psychiatric/Behavioral: Negative for agitation, confusion, decreased concentration and hallucinations. The patient is not nervous/anxious and is not hyperactive.      Objective:     Vital Signs (Most Recent):  Temp: 97.5 °F (36.4 °C) (12/09/19 1122)  Pulse: 84 (12/09/19 1122)  Resp: 16 (12/09/19 1122)  BP: (!) 144/66 (12/09/19 1122)  SpO2: 98 % (12/09/19 1122) Vital Signs (24h Range):  Temp:  [97.5 °F (36.4 °C)-98.4 °F (36.9 °C)] 97.5 °F (36.4 °C)  Pulse:  [70-84] 84  Resp:  [15-20] 16  SpO2:  [91 %-99 %] 98 %  BP: (105-176)/(54-80) 144/66     Weight: 84.2 kg (185 lb 10 oz)  Body mass index is 32.88 kg/m².    Physical Exam   Constitutional: She is oriented to person, place, and time. She appears well-developed and well-nourished.   HENT:   Head: Normocephalic.   Eyes: Pupils are equal, round, and reactive to light.   Cardiovascular: Normal rate and regular rhythm.   Pulmonary/Chest: Breath sounds normal.   Abdominal: Soft. She exhibits no distension and no mass. There is no tenderness. There is no rebound and no guarding.   Genitourinary:   Genitourinary Comments: Rectal performed 12/6   Neurological: She is alert and oriented to person, place, and time.   Skin: Skin is warm and dry.   Psychiatric: She has a normal mood and affect.         Significant Labs:  [facility] (Last 3 Results):   Recent Labs   Lab 12/07/19  0417 12/08/19  0434 12/09/19  0400   GLU 95 108 120*    143 140   K 3.8 3.9 4.1   * 111* 108   CO2 24 25 27   BUN 23 20 15   CREATININE 0.8 1.0 0.9   CALCIUM 8.4* 8.5* 9.0   MG 1.9 2.1 1.9     CBC (Last 3 Results):   Recent  Labs   Lab 12/09/19  0029 12/09/19  0400 12/09/19  0817   WBC 11.51 10.70 8.37   RBC 3.06* 2.63* 2.38*   HGB 9.4* 8.1* 7.3*   HCT 30.4* 25.9* 24.3*    198 198   MCV 99* 99* 102*   MCH 30.7 30.8 30.7   MCHC 30.9* 31.3* 30.0*       Significant Diagnostics:  CT: I have reviewed all pertinent results/findings within the past 24 hours:  will review with Dr. Portillo/Carson

## 2019-12-09 NOTE — CODE/ RAPID DOCUMENTATION
"RAPID RESPONSE NURSE NOTE     Admit Date: 2019  LOS: 0  Code Status: Full Code   Date of Consult: 2019  : 1940  Age: 79 y.o.  Weight:   Wt Readings from Last 1 Encounters:   19 84.2 kg (185 lb 10 oz)     Sex: female  Race: White   Bed: [facility] ROOM 2ND *:   MRN: [id number]  Time Rapid Response Team page Received: 07:33  Time Rapid Response Team at Bedside: 07:36  Time Rapid Response Team left Bedside: 08:30  Was the patient discharged from an ICU this admission?   no  Was the patient discharged from a PACU within last 24 hours?  no  Did the patient receive conscious sedation/general anesthesia within last 24 hours?  no  Was the patient in the ED within the past 24 hours?  no  Was the patient started on NIPPV within the past 24 hours?  no  Did this progress into an ARC or CPA:  no  Attending Physician: Dirk Garcias MD  Primary Service: [facility] [place] HOSP MED F  Consult Requested By: Dirk Garcias MD     SITUATION     Reason for Call: Fall/GI bleed  Called to evaluate the patient for Circulatory    BACKGROUND     Why is the patient in the hospital?: GIB (gastrointestinal bleeding)    Patient has a past medical history of After-cataract of both eyes - Both Eyes, Allergy, Arthritis, Breast cancer, Diverticulosis, Hyperlipidemia, Hypertension, Hypothyroidism, Paget disease of bone, Squamous Cell Carcinoma, and Thyroid disease.    ASSESSMENT/INTERVENTIONS     BP (!) 175/75   Pulse 93   Temp 98.6 °F (37 °C) (Temporal)   Resp 16   Ht 5' 3" (1.6 m)   Wt 84.2 kg (185 lb 10 oz)   LMP  (LMP Unknown)   SpO2 97%   Breastfeeding? No   BMI 32.88 kg/m²     What did you find: Patient was lying supine in bathroom of OBS room 4. She was awake but lethargic. Copious amounts of bright red blood noted in toilet. Per report from staff present the patient fell while using the toilet and unknown loss of consciousness during event. Hematoma noted to right side of forehead. Primary team to bedside. The " patient was lifted from floor via spine board and placed in bed without incidence. Blood products initiated per primary team and CBC sent. Patient was brought to CT scan by rapid response team on portable telemetry and with oxygen via n/c at 2 LPM. VSS- bp 110s/50s, HR 70s, Oxygen Sat 99%. Patient upgraded to [facility] room 1006 for further workup.     RECOMMENDATIONS     We recommend: upgrade for closer monitoring, trend h/h, GI consult, blood products.     FOLLOW-UP/CONTINGENCY PLAN     Patient needs a second visit at : this afternnoon    Call the Rapid Response Nurse, Will Barba RN at x [phone] for additional questions or concerns.    PHYSICIAN ESCALATION     Orders received and case discussed with ATIYA Arnett.    Disposition: Tx to [facility], bed 1006.

## 2019-12-09 NOTE — ASSESSMENT & PLAN NOTE
GIB  80 y/o with h/o diverticulitis with abscess presents with BRBPR and melena.  GYPSY/anoscope unrevealing per CRS note. CTA A/P show uncomplicated diverticulitis (vs colitis) of descending/sigmoid colon without active bleeding.    - GIB pathway started  - GI consulted, appreciate recs  - c/w zosyn 10-14 days, mIVF  - Hg 10.5 (BL 12-13), transfusing as necessary  - EGD with erythematous mucosa and 10 mm polyp, both biopsied  - CT abdomen pelvis with mild diverticulitis  - GI to proceed with colonoscopy if cleared by CRS  - Hgb 7.3 this am, transfusing 2U pRBC in setting of active bleeding. Syncopal episode this am  - CRS consulted to clear patient for colonoscopy  - NPO, PPI BID  - Place 2 large bore IVs, volume resuscitation as needed  - Trend H/H (Transfuse pRBC for Hb < 7 g/dL)  - anemia labs unremarkable, B12 224, started on supplements  - Avoid nonsteroidal agents, antiplatelet agents and anticoagulants if possible  - Colonoscopy 4/2019: Three 3 to 6 mm polyps in the transverse colon and in the ascending colon, removed with a cold snare. Resected and retrieved.  Diverticulosis in the sigmoid colon and in the descending colon.

## 2019-12-09 NOTE — ASSESSMENT & PLAN NOTE
- CT head with 3.5 cm sclerotic lesion right frontal calvarium with few additional smaller sclerotic foci.  Additional large well-circumscribed lucent lesions in the parietal bones bilaterally.  Findings further detailed above, thought to reflect changes of Paget's disease.  Correlation with nuclear medicine bone scan might be helpful to exclude metastatic disease in patient reported history of breast cancer.  - will defer further imaging to PCP

## 2019-12-09 NOTE — H&P
[facility]  History & Physical    Subjective:      Chief Complaint/Reason for Admission:    Colonoscopy    Jo-Ann Escobedo is a 79 y.o. female.    Past Medical History:   Diagnosis Date    After-cataract of both eyes - Both Eyes 9/26/2012    Allergy     Arthritis     Breast cancer     Diverticulosis     Hyperlipidemia     Hypertension     Hypothyroidism     Paget disease of bone     Squamous Cell Carcinoma     in situ right neck     Thyroid disease      Past Surgical History:   Procedure Laterality Date    CATARACT EXTRACTION W/  INTRAOCULAR LENS IMPLANT Bilateral     COLONOSCOPY N/A 4/15/2019    Procedure: COLONOSCOPY;  Surgeon: Artur Monet MD;  [facility] (4TH FLR);  Service: Endoscopy;  Laterality: N/A;  within 1 month    ESOPHAGOGASTRODUODENOSCOPY N/A 12/7/2019    Procedure: EGD (ESOPHAGOGASTRODUODENOSCOPY);  Surgeon: Clyde Welch MD;  Location: [facility] (2ND FLR);  Service: Endoscopy;  Laterality: N/A;    EXCISIONAL BIOPSY Right 6/27/2019    Procedure: EXCISIONAL BIOPSY-breast;  Surgeon: Suki Dill MD;  Location: [facility] OR 2ND FLR;  Service: General;  Laterality: Right;    EYE SURGERY      HYSTERECTOMY      INJECTION OF ANESTHETIC AGENT AROUND MEDIAL BRANCH NERVES INNERVATING LUMBAR FACET JOINT Bilateral 6/7/2019    Procedure: BLOCK, NERVE, FACET JOINT, LUMBAR, MEDIAL BRANCH-deo MBB L4/5,L5/S1;  Surgeon: Jarvis Morales III, MD;  Location: [facility] CATH LAB;  Service: Pain Management;  Laterality: Bilateral;    KNEE ARTHROSCOPY N/A     pt unsure of which knee     MASTECTOMY      SPINE SURGERY      TOTAL THYROIDECTOMY      YAG Laser Capsulotomy  Left 07/29/2019    Dr. Hahn     Family History   Problem Relation Age of Onset    Cancer Father         lung    Dementia Mother     Glaucoma Brother     Macular degeneration Brother     Diabetes Neg Hx     Amblyopia Neg Hx     Blindness Neg Hx     Cataracts Neg Hx     Retinal detachment  Neg Hx     Strabismus Neg Hx     Melanoma Neg Hx      Social History     Tobacco Use    Smoking status: Former Smoker     Packs/day: 2.00     Years: 44.00     Pack years: 88.00     Types: Cigarettes     Last attempt to quit: 1969     Years since quittin.9    Smokeless tobacco: Never Used   Substance Use Topics    Alcohol use: Yes     Alcohol/week: 0.0 standard drinks     Frequency: Monthly or less     Drinks per session: 1 or 2     Binge frequency: Never     Comment: maybe a beer every 3 months    Drug use: No       PTA Medications   Medication Sig    cholecalciferol, vitamin D3, (VITAMIN D3) 2,000 unit Tab Take 1 tablet by mouth once daily.    co-enzyme Q-10 30 mg capsule Take 30 mg by mouth once daily.     diclofenac (VOLTAREN) 75 MG EC tablet Take 1 tablet (75 mg total) by mouth 2 (two) times daily as needed (pain).    fenofibrate 160 MG Tab Take 1 tablet (160 mg total) by mouth once daily.    ferrous sulfate (IRON ORAL) Take by mouth once daily.     LACTOBACILLUS COMBINATION NO.8 (ADULT PROBIOTIC ORAL) Take by mouth once daily.     levothyroxine (SYNTHROID) 125 MCG tablet TAKE 1 TABLET (125 MCG TOTAL) BY MOUTH AFTER DINNER.    losartan (COZAAR) 50 MG tablet Take 1.5 tablets (75 mg total) by mouth once daily.    meclizine (ANTIVERT) 12.5 mg tablet Take 1 tablet (12.5 mg total) by mouth 3 (three) times daily as needed for Dizziness.    omeprazole (PRILOSEC) 40 MG capsule TAKE 1 CAPSULE (40 MG TOTAL) BY MOUTH BEFORE DINNER.    omeprazole (PRILOSEC) 40 MG capsule TAKE 1 CAPSULE (40 MG TOTAL) BY MOUTH BEFORE DINNER.    turmeric root extract 500 mg Cap Take by mouth once daily.     valACYclovir (VALTREX) 1000 MG tablet Take 1 tablet (1,000 mg total) by mouth 3 (three) times daily. for 7 days     Review of patient's allergies indicates:   Allergen Reactions    Codeine Diarrhea and Hallucinations    Niacin preparations      Redness, warming        Review of Systems   Constitutional:  Negative for chills, fever and weight loss.   Respiratory: Negative for shortness of breath and wheezing.    Cardiovascular: Negative for chest pain.   Gastrointestinal: Positive for blood in stool. Negative for abdominal pain and melena.       Objective:      Vital Signs (Most Recent)  Temp: 98.6 °F (37 °C) (12/09/19 1627)  Pulse: 93 (12/09/19 1627)  Resp: 16 (12/09/19 1627)  BP: (!) 175/75 (12/09/19 1627)  SpO2: 97 % (12/09/19 1627)    Vital Signs Range (Last 24H):  Temp:  [97.5 °F (36.4 °C)-99.3 °F (37.4 °C)]   Pulse:  [70-99]   Resp:  [15-20]   BP: (105-176)/(54-80)   SpO2:  [91 %-100 %]     Physical Exam   Constitutional: She is oriented to person, place, and time. She appears well-developed and well-nourished.   Cardiovascular: Normal rate.   Pulmonary/Chest: Effort normal.   Abdominal: Soft.   Neurological: She is alert and oriented to person, place, and time.   Skin: Skin is warm and dry.   Psychiatric: She has a normal mood and affect. Her behavior is normal. Judgment and thought content normal.       .     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       Plan:    CT scan reading reviewed in Radiology with radiology and no sure if diverticulitis and if so very mild and discussed with CRS and ok to do colonoscopy in light of history of benign abdominal exam and hematochezia.

## 2019-12-10 LAB
ABO + RH BLD: NORMAL
ALBUMIN SERPL BCP-MCNC: 2.6 G/DL (ref 3.5–5.2)
ALP SERPL-CCNC: 39 U/L (ref 55–135)
ALT SERPL W/O P-5'-P-CCNC: 13 U/L (ref 10–44)
ANION GAP SERPL CALC-SCNC: 16 MMOL/L (ref 8–16)
ANISOCYTOSIS BLD QL SMEAR: SLIGHT
ANISOCYTOSIS BLD QL SMEAR: SLIGHT
AST SERPL-CCNC: 20 U/L (ref 10–40)
BASO STIPL BLD QL SMEAR: ABNORMAL
BASO STIPL BLD QL SMEAR: ABNORMAL
BASOPHILS # BLD AUTO: 0.03 K/UL (ref 0–0.2)
BASOPHILS # BLD AUTO: 0.03 K/UL (ref 0–0.2)
BASOPHILS # BLD AUTO: 0.05 K/UL (ref 0–0.2)
BASOPHILS # BLD AUTO: 0.06 K/UL (ref 0–0.2)
BASOPHILS # BLD AUTO: 0.1 K/UL (ref 0–0.2)
BASOPHILS NFR BLD: 0.2 % (ref 0–1.9)
BASOPHILS NFR BLD: 0.2 % (ref 0–1.9)
BASOPHILS NFR BLD: 0.3 % (ref 0–1.9)
BASOPHILS NFR BLD: 0.4 % (ref 0–1.9)
BASOPHILS NFR BLD: 0.5 % (ref 0–1.9)
BILIRUB SERPL-MCNC: 0.4 MG/DL (ref 0.1–1)
BLD GP AB SCN CELLS X3 SERPL QL: NORMAL
BLD PROD TYP BPU: NORMAL
BLOOD UNIT EXPIRATION DATE: NORMAL
BLOOD UNIT TYPE CODE: 5100
BLOOD UNIT TYPE: NORMAL
BUN SERPL-MCNC: 16 MG/DL (ref 8–23)
CA-I BLDV-SCNC: 0.68 MMOL/L (ref 1.06–1.42)
CALCIUM SERPL-MCNC: 7.5 MG/DL (ref 8.7–10.5)
CHLORIDE SERPL-SCNC: 110 MMOL/L (ref 95–110)
CO2 SERPL-SCNC: 18 MMOL/L (ref 23–29)
CODING SYSTEM: NORMAL
CREAT SERPL-MCNC: 0.8 MG/DL (ref 0.5–1.4)
DIFFERENTIAL METHOD: ABNORMAL
DISPENSE STATUS: NORMAL
EOSINOPHIL # BLD AUTO: 0 K/UL (ref 0–0.5)
EOSINOPHIL NFR BLD: 0 % (ref 0–8)
ERYTHROCYTE [DISTWIDTH] IN BLOOD BY AUTOMATED COUNT: 14.6 % (ref 11.5–14.5)
ERYTHROCYTE [DISTWIDTH] IN BLOOD BY AUTOMATED COUNT: 14.8 % (ref 11.5–14.5)
ERYTHROCYTE [DISTWIDTH] IN BLOOD BY AUTOMATED COUNT: 15.2 % (ref 11.5–14.5)
ERYTHROCYTE [DISTWIDTH] IN BLOOD BY AUTOMATED COUNT: 15.4 % (ref 11.5–14.5)
ERYTHROCYTE [DISTWIDTH] IN BLOOD BY AUTOMATED COUNT: 15.9 % (ref 11.5–14.5)
EST. GFR  (AFRICAN AMERICAN): >60 ML/MIN/1.73 M^2
EST. GFR  (NON AFRICAN AMERICAN): >60 ML/MIN/1.73 M^2
FIBRINOGEN PPP-MCNC: 139 MG/DL (ref 182–366)
GLUCOSE SERPL-MCNC: 154 MG/DL (ref 70–110)
HCT VFR BLD AUTO: 21.1 % (ref 37–48.5)
HCT VFR BLD AUTO: 23 % (ref 37–48.5)
HCT VFR BLD AUTO: 23.8 % (ref 37–48.5)
HCT VFR BLD AUTO: 24 % (ref 37–48.5)
HCT VFR BLD AUTO: 26.6 % (ref 37–48.5)
HGB BLD-MCNC: 6.5 G/DL (ref 12–16)
HGB BLD-MCNC: 7.3 G/DL (ref 12–16)
HGB BLD-MCNC: 7.7 G/DL (ref 12–16)
HGB BLD-MCNC: 8 G/DL (ref 12–16)
HGB BLD-MCNC: 8.7 G/DL (ref 12–16)
IMM GRANULOCYTES # BLD AUTO: 0.26 K/UL (ref 0–0.04)
IMM GRANULOCYTES # BLD AUTO: 0.27 K/UL (ref 0–0.04)
IMM GRANULOCYTES # BLD AUTO: 0.28 K/UL (ref 0–0.04)
IMM GRANULOCYTES # BLD AUTO: 0.28 K/UL (ref 0–0.04)
IMM GRANULOCYTES # BLD AUTO: 0.43 K/UL (ref 0–0.04)
IMM GRANULOCYTES NFR BLD AUTO: 1.6 % (ref 0–0.5)
IMM GRANULOCYTES NFR BLD AUTO: 1.7 % (ref 0–0.5)
IMM GRANULOCYTES NFR BLD AUTO: 1.7 % (ref 0–0.5)
IMM GRANULOCYTES NFR BLD AUTO: 1.8 % (ref 0–0.5)
IMM GRANULOCYTES NFR BLD AUTO: 2 % (ref 0–0.5)
INR PPP: 1.3 (ref 0.8–1.2)
LACTATE SERPL-SCNC: 1.2 MMOL/L (ref 0.5–2.2)
LYMPHOCYTES # BLD AUTO: 1.3 K/UL (ref 1–4.8)
LYMPHOCYTES # BLD AUTO: 1.5 K/UL (ref 1–4.8)
LYMPHOCYTES # BLD AUTO: 1.6 K/UL (ref 1–4.8)
LYMPHOCYTES # BLD AUTO: 2.4 K/UL (ref 1–4.8)
LYMPHOCYTES # BLD AUTO: 2.6 K/UL (ref 1–4.8)
LYMPHOCYTES NFR BLD: 14.4 % (ref 18–48)
LYMPHOCYTES NFR BLD: 14.9 % (ref 18–48)
LYMPHOCYTES NFR BLD: 7.2 % (ref 18–48)
LYMPHOCYTES NFR BLD: 8.3 % (ref 18–48)
LYMPHOCYTES NFR BLD: 9.8 % (ref 18–48)
MAGNESIUM SERPL-MCNC: 1.8 MG/DL (ref 1.6–2.6)
MAGNESIUM SERPL-MCNC: 1.9 MG/DL (ref 1.6–2.6)
MCH RBC QN AUTO: 29.7 PG (ref 27–31)
MCH RBC QN AUTO: 30.4 PG (ref 27–31)
MCH RBC QN AUTO: 30.7 PG (ref 27–31)
MCH RBC QN AUTO: 30.7 PG (ref 27–31)
MCH RBC QN AUTO: 31.7 PG (ref 27–31)
MCHC RBC AUTO-ENTMCNC: 30.8 G/DL (ref 32–36)
MCHC RBC AUTO-ENTMCNC: 31.7 G/DL (ref 32–36)
MCHC RBC AUTO-ENTMCNC: 32.1 G/DL (ref 32–36)
MCHC RBC AUTO-ENTMCNC: 32.7 G/DL (ref 32–36)
MCHC RBC AUTO-ENTMCNC: 33.6 G/DL (ref 32–36)
MCV RBC AUTO: 91 FL (ref 82–98)
MCV RBC AUTO: 94 FL (ref 82–98)
MCV RBC AUTO: 96 FL (ref 82–98)
MCV RBC AUTO: 97 FL (ref 82–98)
MCV RBC AUTO: 99 FL (ref 82–98)
MONOCYTES # BLD AUTO: 0.6 K/UL (ref 0.3–1)
MONOCYTES # BLD AUTO: 1.4 K/UL (ref 0.3–1)
MONOCYTES # BLD AUTO: 2 K/UL (ref 0.3–1)
MONOCYTES # BLD AUTO: 2.1 K/UL (ref 0.3–1)
MONOCYTES # BLD AUTO: 2.3 K/UL (ref 0.3–1)
MONOCYTES NFR BLD: 10.5 % (ref 4–15)
MONOCYTES NFR BLD: 11.6 % (ref 4–15)
MONOCYTES NFR BLD: 12.1 % (ref 4–15)
MONOCYTES NFR BLD: 3.6 % (ref 4–15)
MONOCYTES NFR BLD: 9.1 % (ref 4–15)
NEUTROPHILS # BLD AUTO: 12 K/UL (ref 1.8–7.7)
NEUTROPHILS # BLD AUTO: 12.1 K/UL (ref 1.8–7.7)
NEUTROPHILS # BLD AUTO: 12.2 K/UL (ref 1.8–7.7)
NEUTROPHILS # BLD AUTO: 13.5 K/UL (ref 1.8–7.7)
NEUTROPHILS # BLD AUTO: 17.6 K/UL (ref 1.8–7.7)
NEUTROPHILS NFR BLD: 71.4 % (ref 38–73)
NEUTROPHILS NFR BLD: 71.6 % (ref 38–73)
NEUTROPHILS NFR BLD: 79.1 % (ref 38–73)
NEUTROPHILS NFR BLD: 79.8 % (ref 38–73)
NEUTROPHILS NFR BLD: 86.2 % (ref 38–73)
NRBC BLD-RTO: 0 /100 WBC
PHOSPHATE SERPL-MCNC: 3.5 MG/DL (ref 2.7–4.5)
PLATELET # BLD AUTO: 124 K/UL (ref 150–350)
PLATELET # BLD AUTO: 143 K/UL (ref 150–350)
PLATELET # BLD AUTO: 169 K/UL (ref 150–350)
PLATELET # BLD AUTO: 174 K/UL (ref 150–350)
PLATELET # BLD AUTO: 188 K/UL (ref 150–350)
PLATELET BLD QL SMEAR: ABNORMAL
PLATELET BLD QL SMEAR: ABNORMAL
PMV BLD AUTO: 10.5 FL (ref 9.2–12.9)
PMV BLD AUTO: 10.7 FL (ref 9.2–12.9)
PMV BLD AUTO: 10.8 FL (ref 9.2–12.9)
PMV BLD AUTO: 10.9 FL (ref 9.2–12.9)
PMV BLD AUTO: 11 FL (ref 9.2–12.9)
POLYCHROMASIA BLD QL SMEAR: ABNORMAL
POTASSIUM SERPL-SCNC: 3.8 MMOL/L (ref 3.5–5.1)
PROT SERPL-MCNC: 4.9 G/DL (ref 6–8.4)
PROTHROMBIN TIME: 13.3 SEC (ref 9–12.5)
RBC # BLD AUTO: 2.14 M/UL (ref 4–5.4)
RBC # BLD AUTO: 2.38 M/UL (ref 4–5.4)
RBC # BLD AUTO: 2.51 M/UL (ref 4–5.4)
RBC # BLD AUTO: 2.52 M/UL (ref 4–5.4)
RBC # BLD AUTO: 2.93 M/UL (ref 4–5.4)
SODIUM SERPL-SCNC: 144 MMOL/L (ref 136–145)
TRANS ERYTHROCYTES VOL PATIENT: NORMAL ML
WBC # BLD AUTO: 15.19 K/UL (ref 3.9–12.7)
WBC # BLD AUTO: 15.61 K/UL (ref 3.9–12.7)
WBC # BLD AUTO: 16.91 K/UL (ref 3.9–12.7)
WBC # BLD AUTO: 17.06 K/UL (ref 3.9–12.7)
WBC # BLD AUTO: 22.02 K/UL (ref 3.9–12.7)
WBC TOXIC VACUOLES BLD QL SMEAR: PRESENT

## 2019-12-10 PROCEDURE — 85025 COMPLETE CBC W/AUTO DIFF WBC: CPT | Mod: 91

## 2019-12-10 PROCEDURE — 63600175 PHARM REV CODE 636 W HCPCS: Performed by: PHYSICIAN ASSISTANT

## 2019-12-10 PROCEDURE — 80053 COMPREHEN METABOLIC PANEL: CPT

## 2019-12-10 PROCEDURE — 86901 BLOOD TYPING SEROLOGIC RH(D): CPT

## 2019-12-10 PROCEDURE — C9113 INJ PANTOPRAZOLE SODIUM, VIA: HCPCS | Performed by: PHYSICIAN ASSISTANT

## 2019-12-10 PROCEDURE — 84100 ASSAY OF PHOSPHORUS: CPT

## 2019-12-10 PROCEDURE — P9021 RED BLOOD CELLS UNIT: HCPCS

## 2019-12-10 PROCEDURE — 25000003 PHARM REV CODE 250: Performed by: PHYSICIAN ASSISTANT

## 2019-12-10 PROCEDURE — 93010 ELECTROCARDIOGRAM REPORT: CPT | Mod: ,,, | Performed by: INTERNAL MEDICINE

## 2019-12-10 PROCEDURE — 99291 CRITICAL CARE FIRST HOUR: CPT | Mod: GC,,, | Performed by: INTERNAL MEDICINE

## 2019-12-10 PROCEDURE — 80048 BASIC METABOLIC PNL TOTAL CA: CPT

## 2019-12-10 PROCEDURE — 36415 COLL VENOUS BLD VENIPUNCTURE: CPT

## 2019-12-10 PROCEDURE — 93005 ELECTROCARDIOGRAM TRACING: CPT

## 2019-12-10 PROCEDURE — 99291 PR CRITICAL CARE, E/M 30-74 MINUTES: ICD-10-PCS | Mod: GC,,, | Performed by: INTERNAL MEDICINE

## 2019-12-10 PROCEDURE — 83735 ASSAY OF MAGNESIUM: CPT | Mod: 91

## 2019-12-10 PROCEDURE — 85610 PROTHROMBIN TIME: CPT

## 2019-12-10 PROCEDURE — 93010 EKG 12-LEAD: ICD-10-PCS | Mod: ,,, | Performed by: INTERNAL MEDICINE

## 2019-12-10 PROCEDURE — 94761 N-INVAS EAR/PLS OXIMETRY MLT: CPT

## 2019-12-10 PROCEDURE — 63600175 PHARM REV CODE 636 W HCPCS: Performed by: STUDENT IN AN ORGANIZED HEALTH CARE EDUCATION/TRAINING PROGRAM

## 2019-12-10 PROCEDURE — 36430 TRANSFUSION BLD/BLD COMPNT: CPT

## 2019-12-10 PROCEDURE — 27000221 HC OXYGEN, UP TO 24 HOURS

## 2019-12-10 PROCEDURE — 86920 COMPATIBILITY TEST SPIN: CPT

## 2019-12-10 PROCEDURE — 20000000 HC ICU ROOM

## 2019-12-10 PROCEDURE — 82330 ASSAY OF CALCIUM: CPT

## 2019-12-10 PROCEDURE — 85384 FIBRINOGEN ACTIVITY: CPT

## 2019-12-10 PROCEDURE — 63700000 PHARM REV CODE 250 ALT 637 W/O HCPCS: Performed by: PHYSICIAN ASSISTANT

## 2019-12-10 PROCEDURE — 83735 ASSAY OF MAGNESIUM: CPT

## 2019-12-10 PROCEDURE — 63600175 PHARM REV CODE 636 W HCPCS

## 2019-12-10 PROCEDURE — 83605 ASSAY OF LACTIC ACID: CPT

## 2019-12-10 RX ORDER — HYDRALAZINE HYDROCHLORIDE 20 MG/ML
10 INJECTION INTRAMUSCULAR; INTRAVENOUS EVERY 6 HOURS PRN
Status: DISCONTINUED | OUTPATIENT
Start: 2019-12-10 | End: 2019-12-14 | Stop reason: HOSPADM

## 2019-12-10 RX ORDER — MAGNESIUM SULFATE HEPTAHYDRATE 40 MG/ML
2 INJECTION, SOLUTION INTRAVENOUS ONCE
Status: COMPLETED | OUTPATIENT
Start: 2019-12-10 | End: 2019-12-11

## 2019-12-10 RX ORDER — AMIODARONE HYDROCHLORIDE 150 MG/3ML
INJECTION, SOLUTION INTRAVENOUS
Status: DISPENSED
Start: 2019-12-10 | End: 2019-12-11

## 2019-12-10 RX ADMIN — PIPERACILLIN AND TAZOBACTAM 4.5 G: 4; .5 INJECTION, POWDER, FOR SOLUTION INTRAVENOUS at 06:12

## 2019-12-10 RX ADMIN — FENOFIBRATE 160 MG: 160 TABLET ORAL at 09:12

## 2019-12-10 RX ADMIN — ONDANSETRON 4 MG: 2 INJECTION INTRAMUSCULAR; INTRAVENOUS at 06:12

## 2019-12-10 RX ADMIN — ONDANSETRON 4 MG: 2 INJECTION INTRAMUSCULAR; INTRAVENOUS at 12:12

## 2019-12-10 RX ADMIN — MAGNESIUM SULFATE IN WATER 2 G: 40 INJECTION, SOLUTION INTRAVENOUS at 10:12

## 2019-12-10 RX ADMIN — PANTOPRAZOLE SODIUM 40 MG: 40 INJECTION, POWDER, FOR SOLUTION INTRAVENOUS at 08:12

## 2019-12-10 RX ADMIN — SODIUM CHLORIDE, SODIUM LACTATE, POTASSIUM CHLORIDE, AND CALCIUM CHLORIDE 500 ML: .6; .31; .03; .02 INJECTION, SOLUTION INTRAVENOUS at 08:12

## 2019-12-10 RX ADMIN — PIPERACILLIN AND TAZOBACTAM 4.5 G: 4; .5 INJECTION, POWDER, FOR SOLUTION INTRAVENOUS at 01:12

## 2019-12-10 RX ADMIN — CALCIUM GLUCONATE 1000 MG: 98 INJECTION, SOLUTION INTRAVENOUS at 10:12

## 2019-12-10 RX ADMIN — ONDANSETRON 4 MG: 2 INJECTION INTRAMUSCULAR; INTRAVENOUS at 05:12

## 2019-12-10 RX ADMIN — FERROUS SULFATE TAB EC 325 MG (65 MG FE EQUIVALENT) 325 MG: 325 (65 FE) TABLET DELAYED RESPONSE at 08:12

## 2019-12-10 RX ADMIN — PIPERACILLIN AND TAZOBACTAM 4.5 G: 4; .5 INJECTION, POWDER, FOR SOLUTION INTRAVENOUS at 09:12

## 2019-12-10 RX ADMIN — LEVOTHYROXINE SODIUM 125 MCG: 25 TABLET ORAL at 09:12

## 2019-12-10 RX ADMIN — CYANOCOBALAMIN TAB 250 MCG 500 MCG: 250 TAB at 09:12

## 2019-12-10 RX ADMIN — HYDRALAZINE HYDROCHLORIDE 10 MG: 20 INJECTION INTRAMUSCULAR; INTRAVENOUS at 04:12

## 2019-12-10 RX ADMIN — HYDRALAZINE HYDROCHLORIDE 10 MG: 20 INJECTION INTRAMUSCULAR; INTRAVENOUS at 03:12

## 2019-12-10 RX ADMIN — PANTOPRAZOLE SODIUM 40 MG: 40 INJECTION, POWDER, FOR SOLUTION INTRAVENOUS at 10:12

## 2019-12-10 NOTE — ASSESSMENT & PLAN NOTE
S/p 2U pRBCs during this admission    - Monitoring CBC q8h  - Transfuse for Hgb <7 unless hemodynamically stable

## 2019-12-10 NOTE — PROGRESS NOTES
Dr. Quintanilla notified of am labs results, also notified of pt.'s bloody bowel movements. VSS, no new orders at this time, will continue to monitor pt.

## 2019-12-10 NOTE — SUBJECTIVE & OBJECTIVE
Subjective:     Interval History: She underwent colonoscopy with clip placement in the sigmoid, then went with IR though there was no evidence of active GIB and no embolization was performed. Her GI bleeding continues and she received 1u pRBC today.     Post-Op Info:  Procedure(s) (LRB):  COLONOSCOPY (N/A)   1 Day Post-Op      Medications:  Continuous Infusions:  Scheduled Meds:   cyanocobalamin  500 mcg Oral Daily    fenofibrate  160 mg Oral Daily    ferrous sulfate  325 mg Oral Daily    levothyroxine  125 mcg Oral Before breakfast    pantoprazole  40 mg Intravenous BID    piperacillin-tazobactam (ZOSYN) IVPB  4.5 g Intravenous Q8H     PRN Meds:   sodium chloride    acetaminophen    albuterol-ipratropium    Dextrose 10% Bolus    Dextrose 10% Bolus    glucagon (human recombinant)    glucose    glucose    hydrALAZINE    melatonin    ondansetron    promethazine (PHENERGAN) IVPB    senna-docusate 8.6-50 mg    sodium chloride 0.9%    sodium chloride 0.9%        Objective:     Vital Signs (Most Recent):  Temp: 98.9 °F (37.2 °C) (12/10/19 1245)  Pulse: (!) 115 (12/10/19 1245)  Resp: 20 (12/10/19 1250)  BP: (!) 168/70 (12/10/19 1245)  SpO2: 99 % (12/10/19 1250) Vital Signs (24h Range):  Temp:  [98.1 °F (36.7 °C)-99.5 °F (37.5 °C)] 98.9 °F (37.2 °C)  Pulse:  [] 115  Resp:  [5-36] 20  SpO2:  [91 %-100 %] 99 %  BP: (111-201)/(54-86) 168/70     Intake/Output - Last 3 Shifts       12/08 0700 - 12/09 0659 12/09 0700 - 12/10 0659 12/10 0700 - 12/11 0659    I.V. (mL/kg)  1475 (17.5)     Blood  528 290    IV Piggyback  200     Total Intake(mL/kg)  2203 (26.2) 290 (3.4)    Urine (mL/kg/hr) 1000 (0.5) 525 (0.3)     Stool 2700 0     Total Output 3700 525     Net -3700 +1678 +290           Urine Occurrence  2 x     Stool Occurrence 2 x 8 x           Physical Exam   Constitutional: She is oriented to person, place, and time. She appears well-developed and well-nourished.   HENT:   Head: Normocephalic.    Eyes: Pupils are equal, round, and reactive to light.   Cardiovascular: Normal rate and regular rhythm.   Pulmonary/Chest: Breath sounds normal.   Abdominal: Soft. She exhibits no distension and no mass. There is no tenderness. There is no rebound and no guarding.   Genitourinary:   Genitourinary Comments: Rectal performed 12/6   Neurological: She is alert and oriented to person, place, and time.   Skin: Skin is warm and dry.   Psychiatric: She has a normal mood and affect.         Significant Labs:  CBC (Last 3 Results):   Recent Labs   Lab 12/09/19  1959 12/10/19  0342 12/10/19  0949   WBC 18.26* 15.61* 15.19*   RBC 3.18* 2.51* 2.38*   HGB 9.6* 7.7* 7.3*   HCT 30.9* 24.0* 23.0*    174 188   MCV 97 96 97   MCH 30.2 30.7 30.7   MCHC 31.1* 32.1 31.7*     CMP:   Recent Labs   Lab 12/10/19  0342   *   CALCIUM 7.5*   ALBUMIN 2.6*   PROT 4.9*      K 3.8   CO2 18*      BUN 16   CREATININE 0.8   ALKPHOS 39*   ALT 13   AST 20   BILITOT 0.4     CRP:   Recent Labs   Lab 12/09/19  0400   CRP 5.1       Significant Diagnostics:  I have reviewed all pertinent imaging results/findings within the past 24 hours.

## 2019-12-10 NOTE — ASSESSMENT & PLAN NOTE
On losartan 75mg daily at home    - Hold antihypertensives in setting of active GI bleed  - Monitor BP, restart meds as necessary after bleed is controlled

## 2019-12-10 NOTE — HPI
Ms. 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 History obtained via chart review as patient was still somnolent on examination by critical care team. She was seen by CRS outpatient day of admission during which GYPSY performed revealed no masses, but positive for melanotic blood; during anoscopy, anoscope filled with melanotic stool and foul odor prompting presentation to ED for further evaluation of GI bleed. Of note, patient was seen by primary care physician 9/2019 and restarted on diclofenac PRN at that time, unsure of frequency she has been taking that recently. Renal function unremarkable.     On initial presentation in the ED, vital signs were stable and no leukocytosis noted. UA showed 3+ occult blood with >100 RBCs and 12 WBCs. CTA abdo/pelvis without evidence of acute bleed, but was concerning for uncomplicated diverticulitis or colitis in the descending and sigmoid colon junction. She was given IVF and started on protonix and zosyn for intra-abdominal coverage. Hgb noted to be 10 from a baseline of 13. She was admitted to hospital medicine for observation. Hgb continued to downtrend to 8 and was transfused for weakness and active bleeding. GI was consulted and she underwent endoscopy 12/7 with normal esophagus, erythematous mucosa in the pylorus (biopsied), normal duodenum, 10mm lesion at incisura (biopsied). She subsequently underwent colonoscopy 12/9 and several large diverticula in the sigmoid colon was seen with hematin throughout the colon; blood pooling also noted in the sigmoid colon, during which clip was  placed for IR localization. She received 1U pRBC during the procedure and IR was contacted by GI for angio and possible intervention that same night. IR angio did not reveal any bleeding from SMA or NATALIO branches. MICU was consulted given patient's active GI bleed and risk of instability. Patient was hemodynamically stable and satting well on room air when seen in PACU. Will be admitted to MICU for close observation overnight after IR evaluation.

## 2019-12-10 NOTE — ASSESSMENT & PLAN NOTE
History of multinodular goiter s/p total thyroidectomy 9/2011    - Continue synthroid 125mcg daily

## 2019-12-10 NOTE — H&P
Radiology Consult    Jo-Ann Escobedo is a 79 y.o. female with a history of diverticulitis and melena for whom IR is consulted for IR angiogram with possible intervention.   Patient had colonoscopy done earlier today with clipping in the sigmoid colon.    Past Medical History:   Diagnosis Date    After-cataract of both eyes - Both Eyes 9/26/2012    Allergy     Arthritis     Breast cancer     Diverticulosis     Hyperlipidemia     Hypertension     Hypothyroidism     Paget disease of bone     Squamous Cell Carcinoma     in situ right neck     Thyroid disease      Past Surgical History:   Procedure Laterality Date    CATARACT EXTRACTION W/  INTRAOCULAR LENS IMPLANT Bilateral     COLONOSCOPY N/A 4/15/2019    Procedure: COLONOSCOPY;  Surgeon: Artur Monet MD;  Location: [facility] (4TH FLR);  Service: Endoscopy;  Laterality: N/A;  within 1 month    ESOPHAGOGASTRODUODENOSCOPY N/A 12/7/2019    Procedure: EGD (ESOPHAGOGASTRODUODENOSCOPY);  Surgeon: Clyde Welch MD;  Location: [facility] (2ND FLR);  Service: Endoscopy;  Laterality: N/A;    EXCISIONAL BIOPSY Right 6/27/2019    Procedure: EXCISIONAL BIOPSY-breast;  Surgeon: Suki Dill MD;  Location: [facility] OR 2ND FLR;  Service: General;  Laterality: Right;    EYE SURGERY      HYSTERECTOMY      INJECTION OF ANESTHETIC AGENT AROUND MEDIAL BRANCH NERVES INNERVATING LUMBAR FACET JOINT Bilateral 6/7/2019    Procedure: BLOCK, NERVE, FACET JOINT, LUMBAR, MEDIAL BRANCH-deo MBB L4/5,L5/S1;  Surgeon: Jarvis Morales III, MD;  Location: [facility] CATH LAB;  Service: Pain Management;  Laterality: Bilateral;    KNEE ARTHROSCOPY N/A     pt unsure of which knee     MASTECTOMY      SPINE SURGERY      TOTAL THYROIDECTOMY      YAG Laser Capsulotomy  Left 07/29/2019    Dr. Hahn     Discussed with GI team.     Imaging reviewed with Radiology staff, Dr. Richards.     Procedure: visceral angiogram    Scheduled Meds:    cyanocobalamin  500 mcg Oral Daily     fenofibrate  160 mg Oral Daily    ferrous sulfate  325 mg Oral Daily    levothyroxine  125 mcg Oral Before breakfast    pantoprazole  40 mg Intravenous BID    piperacillin-tazobactam (ZOSYN) IVPB  4.5 g Intravenous Q8H     Continuous Infusions:    lactated ringers       PRN Meds:sodium chloride, sodium chloride, sodium chloride, sodium chloride, acetaminophen, albuterol-ipratropium, Dextrose 10% Bolus, Dextrose 10% Bolus, glucagon (human recombinant), glucose, glucose, ketorolac, melatonin, ondansetron, promethazine (PHENERGAN) IVPB, senna-docusate 8.6-50 mg, sodium chloride 0.9%, sodium chloride 0.9%, sodium chloride 0.9%    Allergies:   Review of patient's allergies indicates:   Allergen Reactions    Voltaren [diclofenac sodium] Other (See Comments)     Hematochezia and hospitalization for hematochezia.    Codeine Diarrhea and Hallucinations    Niacin preparations      Redness, warming       Labs:  Recent Labs   Lab 12/06/19  1912   INR 1.1       Recent Labs   Lab 12/09/19  0817   WBC 8.37   HGB 7.3*   HCT 24.3*   *         Recent Labs   Lab 12/09/19  0400   *      K 4.1      CO2 27   BUN 15   CREATININE 0.9   CALCIUM 9.0   MG 1.9   ALT 14   AST 24   ALBUMIN 3.2*   BILITOT 0.4     Vitals (Most Recent):  Temp: 99.1 °F (37.3 °C) (12/09/19 1901)  Pulse: 83 (12/09/19 1945)  Resp: 18 (12/09/19 1945)  BP: (Abnormal) 160/73 (12/09/19 1945)  SpO2: (Abnormal) 92 % (12/09/19 1945)    Plan:   Keep NPO.   Plan for visceral angiogram with possible intervention.    Merry Pappas, PGY-3

## 2019-12-10 NOTE — SUBJECTIVE & OBJECTIVE
Past Medical History:   Diagnosis Date    After-cataract of both eyes - Both Eyes 9/26/2012    Allergy     Arthritis     Breast cancer     Diverticulosis     Hyperlipidemia     Hypertension     Hypothyroidism     Paget disease of bone     Squamous Cell Carcinoma     in situ right neck     Thyroid disease        Past Surgical History:   Procedure Laterality Date    CATARACT EXTRACTION W/  INTRAOCULAR LENS IMPLANT Bilateral     COLONOSCOPY N/A 4/15/2019    Procedure: COLONOSCOPY;  Surgeon: Artur Monet MD;  Location: [facility] (4TH FLR);  Service: Endoscopy;  Laterality: N/A;  within 1 month    ESOPHAGOGASTRODUODENOSCOPY N/A 12/7/2019    Procedure: EGD (ESOPHAGOGASTRODUODENOSCOPY);  Surgeon: Clyde Welch MD;  Location: [facility] (2ND FLR);  Service: Endoscopy;  Laterality: N/A;    EXCISIONAL BIOPSY Right 6/27/2019    Procedure: EXCISIONAL BIOPSY-breast;  Surgeon: Suki Dill MD;  Location: [facility] OR 2ND FLR;  Service: General;  Laterality: Right;    EYE SURGERY      HYSTERECTOMY      INJECTION OF ANESTHETIC AGENT AROUND MEDIAL BRANCH NERVES INNERVATING LUMBAR FACET JOINT Bilateral 6/7/2019    Procedure: BLOCK, NERVE, FACET JOINT, LUMBAR, MEDIAL BRANCH-deo MBB L4/5,L5/S1;  Surgeon: Jarvis Morales III, MD;  Location: [facility] CATH LAB;  Service: Pain Management;  Laterality: Bilateral;    KNEE ARTHROSCOPY N/A     pt unsure of which knee     MASTECTOMY      SPINE SURGERY      TOTAL THYROIDECTOMY      YAG Laser Capsulotomy  Left 07/29/2019    Dr. Hahn       Review of patient's allergies indicates:   Allergen Reactions    Voltaren [diclofenac sodium] Other (See Comments)     Hematochezia and hospitalization for hematochezia.    Codeine Diarrhea and Hallucinations    Niacin preparations      Redness, warming       Family History     Problem Relation (Age of Onset)    Cancer Father    Dementia Mother    Glaucoma Brother    Macular degeneration Brother        Tobacco Use     Smoking status: Former Smoker     Packs/day: 2.00     Years: 44.00     Pack years: 88.00     Types: Cigarettes     Last attempt to quit: 1969     Years since quittin.9    Smokeless tobacco: Never Used   Substance and Sexual Activity    Alcohol use: Yes     Alcohol/week: 0.0 standard drinks     Frequency: Monthly or less     Drinks per session: 1 or 2     Binge frequency: Never     Comment: maybe a beer every 3 months    Drug use: No    Sexual activity: Not Currently      Review of Systems   Unable to perform ROS: Mental status change   Still sedated from procedure    Objective:     Vital Signs (Most Recent):  Temp: 98.1 °F (36.7 °C) (19)  Pulse: 91 (19)  Resp: 17 (19)  BP: (!) 146/72 (19)  SpO2: 97 % (19) Vital Signs (24h Range):  Temp:  [97.5 °F (36.4 °C)-99.3 °F (37.4 °C)] 98.1 °F (36.7 °C)  Pulse:  [70-99] 91  Resp:  [15-21] 17  SpO2:  [91 %-100 %] 97 %  BP: (105-191)/(54-84) 146/72   Weight: 84.2 kg (185 lb 10 oz)  Body mass index is 32.88 kg/m².      Intake/Output Summary (Last 24 hours) at 2019  Last data filed at 2019 2045  Gross per 24 hour   Intake 2103 ml   Output 2725 ml   Net -622 ml       Physical Exam   Constitutional: She appears well-developed and well-nourished. No distress.   HENT:   Mouth/Throat: Oropharynx is clear and moist. No oropharyngeal exudate.   Bruising over L cranium   Eyes: Pupils are equal, round, and reactive to light. EOM are normal. No scleral icterus.   Neck: Normal range of motion. Neck supple.   Cardiovascular: Normal rate, regular rhythm and normal heart sounds.   No murmur heard.  Pulmonary/Chest: Effort normal and breath sounds normal. No respiratory distress.   Abdominal: Soft. Bowel sounds are normal. She exhibits no distension. There is no tenderness. There is no guarding.   Musculoskeletal: She exhibits no edema or tenderness.   Neurological:   Drowsy and confused from sedation, but  answered questions appropriately, not overly somnolent   Skin: Skin is warm and dry. She is not diaphoretic. No erythema.   Nursing note and vitals reviewed.      Vents:     Lines/Drains/Airways     Peripheral Intravenous Line                 Peripheral IV - Single Lumen 12/09/19 1153 20 G Right Antecubital less than 1 day         Peripheral IV - Single Lumen 12/09/19 1549 20 G Right Hand less than 1 day              Significant Labs:    CBC/Anemia Profile:  Recent Labs   Lab 12/09/19  0400 12/09/19  0817 12/09/19  1959   WBC 10.70 8.37 18.26*   HGB 8.1* 7.3* 9.6*   HCT 25.9* 24.3* 30.9*    198 178   MCV 99* 102* 97   RDW 14.0 14.3 14.9*        Chemistries:  Recent Labs   Lab 12/08/19  0434 12/09/19  0400    140   K 3.9 4.1   * 108   CO2 25 27   BUN 20 15   CREATININE 1.0 0.9   CALCIUM 8.5* 9.0   ALBUMIN 2.9* 3.2*   PROT 5.4* 5.8*   BILITOT 0.5 0.4   ALKPHOS 51* 53*   ALT 12 14   AST 18 24   MG 2.1 1.9   PHOS 3.4 2.7         Significant Imaging: I have reviewed all pertinent imaging results/findings within the past 24 hours.     CT Head Without Contrast  Narrative: EXAMINATION:  CT HEAD WITHOUT CONTRAST    CLINICAL HISTORY:  Head trauma, headache;    TECHNIQUE:  Low dose axial CT images obtained throughout the head without the use of intravenous contrast.  Axial, sagittal and coronal reconstructions were performed.    COMPARISON:  None.    FINDINGS:  Intracranial compartment:    Prominence of the ventricles and sulci compatible with mild generalized cerebral volume loss.  No hydrocephalus.    Mild chronic microvascular ischemic change in supratentorial white matter.  No parenchymal mass, hemorrhage, edema or major vascular distribution infarct.    No extra-axial blood or fluid collections.    Skull/extracranial contents (limited evaluation):    Moderate-sized right frontal extracranial hematoma.  No subjacent calvarial fracture.    3.5 cm well delineated sclerotic focus in the right frontal  calvarium, corresponds to a lucent lesion on the study from 2012.  Few scattered additional bifrontal sclerotic foci.  New sharply circumscribed lucent lesion in the right parietal bone measuring over 8 cm.  Additional 6.7 cm sharply circumscribed lucent focus in the posterior left parietal bone.  No obvious associated soft tissue component.  Impression: Right frontal extracranial soft tissue swelling without evidence of underlying fracture or acute intracranial hemorrhage.    Mild generalized cerebral volume loss and chronic microvascular ischemic change.    3.5 cm sclerotic lesion right frontal calvarium with few additional smaller sclerotic foci.  Additional large well-circumscribed lucent lesions in the parietal bones bilaterally.  Findings further detailed above, thought to reflect changes of Paget's disease.  Correlation with nuclear medicine bone scan might be helpful to exclude metastatic disease in patient reported history of breast cancer.    This report was flagged in Epic as abnormal.    Electronically signed by: Sridhar Peng MD  Date:    12/09/2019  Time:    09:18  CT Abdomen Pelvis With Contrast  Narrative: EXAMINATION:  CT ABDOMEN PELVIS WITH CONTRAST    CLINICAL HISTORY:  Rule out/in diverticulitis;    TECHNIQUE:  Low dose axial images, sagittal and coronal reformations were obtained from the lung bases to the pubic symphysis following the IV administration of 100 mL of Omnipaque 350 and the oral and rectal administration of omni 350 IV contrast.    COMPARISON:  CTA abdomen and pelvis 12/06/2019    FINDINGS:  The visualized lung bases are free of pleural fluid or focal consolidation.  The visualized portions of the heart and pericardium are unremarkable.    The liver the upper limit of normal size.  There is a hypoattenuating 1.9 cm hypodensity in the left hepatic lobe, similar to prior semen a shin in likely reflecting a cyst.  There are additional subcentimeter left hepatic hypodensities too  small to definitively characterize.  There is a  punctate calcification within the caudate lobe.  The portal vein and splenic vein are patent.  There is cholelithiasis.  No significant pericholecystic inflammatory change.  The spleen, pancreas, and adrenal glands are unremarkable.  There is a small hiatal hernia.    The kidneys enhance symmetrically and excrete contrast on delayed phase imaging.  There is no evidence of hydronephrosis.  There is a nonobstructing nephroliths in the inferior pole of the right kidney.  There are bilateral renal cortical hypodensities the larger of which likely represent cysts and several of which are too small to definitively characterize.  The urinary bladder is unremarkable.  The uterus is surgically absent.  The adnexal regions are within normal limits.    There is diverticulosis most predominantly involving the sigmoid colon.  On today's examination there is decreased conspicuity of previously identified pericolonic inflammatory change about the sigmoid colon suggestive of improved/resolving uncomplicated diverticulitis.  There are small pericolonic lymph nodes about the sigmoid colon.  The visualized loops of small bowel demonstrate no evidence of obstruction.  The appendix is unremarkable.  There is no ascites, free intraperitoneal air or portal venous gas.    The abdominal aorta is nonaneurysmal with atherosclerosis. No bulky lymphadenopathy. The visualized osseous structures demonstrate degenerative change without acute abnormality.  There is a small fat containing umbilical hernia soft tissues are unremarkable.  Impression: 1. Diverticulosis with decreased conspicuity of previously identified pericolonic inflammatory change involving the sigmoid colon suggestive of improving/resolving uncomplicated sigmoid diverticulitis.  No evidence of new superimposed acute abnormality on today's examination.  2. Cholelithiasis.  3. Bilateral renal cortical hypodensities the larger of which  likely represent cysts and many which are too small to definitively characterize.  Nonobstructive right nephrolithiasis.  4. Hysterectomy.  5. Additional findings as detailed above.    Electronically signed by: Petrona Alex MD  Date:    12/09/2019  Time:    01:05

## 2019-12-10 NOTE — PROVATION PATIENT INSTRUCTIONS
Discharge Summary/Instructions after an Endoscopic Procedure  Patient Name: Jo-Ann Orr  Patient MRN: [id number]  Patient YOB: 1940 Monday, December 09, 2019  Clyde Welch MD  RESTRICTIONS:  During your procedure today, you received medications for sedation.  These   medications may affect your judgment, balance and coordination.  Therefore,   for 24 hours, you have the following restrictions:   - DO NOT drive a car, operate machinery, make legal/financial decisions,   sign important papers or drink alcohol.    ACTIVITY:  Today: no heavy lifting, straining or running due to procedural   sedation/anesthesia.  The following day: return to full activity including work.  DIET:  Eat and drink normally unless instructed otherwise.     TREATMENT FOR COMMON SIDE EFFECTS:  - Mild abdominal pain, nausea, belching, bloating or excessive gas:  rest,   eat lightly and use a heating pad.  - Sore Throat: treat with throat lozenges and/or gargle with warm salt   water.  - Because air was used during the procedure, expelling large amounts of air   from your rectum or belching is normal.  - If a bowel prep was taken, you may not have a bowel movement for 1-3 days.    This is normal.  SYMPTOMS TO WATCH FOR AND REPORT TO YOUR PHYSICIAN:  1. Abdominal pain or bloating, other than gas cramps.  2. Chest pain.  3. Back pain.  4. Signs of infection such as: chills or fever occurring within 24 hours   after the procedure.  5. Rectal bleeding, which would show as bright red, maroon, or black stools.   (A tablespoon of blood from the rectum is not serious, especially if   hemorrhoids are present.)  6. Vomiting.  7. Weakness or dizziness.  GO DIRECTLY TO THE NEAREST EMERGENCY ROOM IF YOU HAVE ANY OF THE FOLLOWING:      Difficulty breathing              Chills and/or fever over 101 F   Persistent vomiting and/or vomiting blood   Severe abdominal pain   Severe chest pain   Black, tarry stools   Bleeding- more than one  tablespoon   Any other symptom or condition that you feel may need urgent attention  Your doctor recommends these additional instructions:  If any biopsies were taken, your doctors clinic will contact you in 1 to 2   weeks with any results.  - Return patient to ICU for ongoing care due to significant bleeding.   - Refer to an interventional radiologist today for possible embolization -   One hemoclip placed for marking in sigmoid colon in the region of red blood   pooling but no source could be seen. This is the area of the most   restricted mobility of the colon and most tortuous part of the colon with   multiple large and deep diverticulosis.  - The findings and recommendations were discussed with the patient.   - The findings and recommendations were discussed with the patient's primary   physician.   - Refer to a colo-rectal surgeon case discussed with Dr. Byrd CRS staff   who came to endoscopy suite and agrees with IR for possible embolization.   - Repeat colonoscopy (date not yet determined).  For questions, problems or results please call your physician - Clyde Welch MD at Work:  [phone].  [facility], EMERGENCY ROOM PHONE NUMBER: [phone]  IF A COMPLICATION OR EMERGENCY SITUATION ARISES AND YOU ARE UNABLE TO REACH   YOUR PHYSICIAN - GO DIRECTLY TO THE EMERGENCY ROOM.  Clyde Welch MD  12/9/2019 6:59:34 PM  This report has been verified and signed electronically.  [employer]

## 2019-12-10 NOTE — TREATMENT PLAN
GI Treatment Plan    Interval History  Colonoscopy completed. TI normal. Several large diverticula in the sigmoid colon. Hematin throughout colon. Blood pooling in sigmoid colon, area clipped.    Plan  - s/p colonoscopy (see procedure note). Suspect active bleed from sigmoid diverticula.  - Spoke with IR for embolization tonight given active LGIB. Site of expected bleed was clipped for identification  - discussed with Dr. Byrd from CRS  - 1U PRBC during procedure  - Recommend Medical ICU consult given active bleed and risk of instability    Thank you for involving us in the care of Jo-Ann Escobedo. We will continue to follow. Please call with any additional questions, concerns or changes in the patient's clinical status.      Inocencio Reynolds MD  Gastroenterology Fellow, PGY4  [facility]

## 2019-12-10 NOTE — PROGRESS NOTES
[facility]  Colorectal Surgery  Progress Note    Patient Name: Jo-Ann Escobedo  MRN: [id number]  Admission Date: 12/6/2019  Hospital Length of Stay: 1 days  Attending Physician: Alejandra Polanco MD    Subjective:     Interval History: She underwent colonoscopy with clip placement in the sigmoid, then went with IR though there was no evidence of active GIB and no embolization was performed. Her GI bleeding continues and she received 1u pRBC today.     Post-Op Info:  Procedure(s) (LRB):  COLONOSCOPY (N/A)   1 Day Post-Op      Medications:  Continuous Infusions:  Scheduled Meds:   cyanocobalamin  500 mcg Oral Daily    fenofibrate  160 mg Oral Daily    ferrous sulfate  325 mg Oral Daily    levothyroxine  125 mcg Oral Before breakfast    pantoprazole  40 mg Intravenous BID    piperacillin-tazobactam (ZOSYN) IVPB  4.5 g Intravenous Q8H     PRN Meds:   sodium chloride    acetaminophen    albuterol-ipratropium    Dextrose 10% Bolus    Dextrose 10% Bolus    glucagon (human recombinant)    glucose    glucose    hydrALAZINE    melatonin    ondansetron    promethazine (PHENERGAN) IVPB    senna-docusate 8.6-50 mg    sodium chloride 0.9%    sodium chloride 0.9%        Objective:     Vital Signs (Most Recent):  Temp: 98.9 °F (37.2 °C) (12/10/19 1245)  Pulse: (!) 115 (12/10/19 1245)  Resp: 20 (12/10/19 1250)  BP: (!) 168/70 (12/10/19 1245)  SpO2: 99 % (12/10/19 1250) Vital Signs (24h Range):  Temp:  [98.1 °F (36.7 °C)-99.5 °F (37.5 °C)] 98.9 °F (37.2 °C)  Pulse:  [] 115  Resp:  [5-36] 20  SpO2:  [91 %-100 %] 99 %  BP: (111-201)/(54-86) 168/70     Intake/Output - Last 3 Shifts       12/08 0700 - 12/09 0659 12/09 0700 - 12/10 0659 12/10 0700 - 12/11 0659    I.V. (mL/kg)  1475 (17.5)     Blood  528 290    IV Piggyback  200     Total Intake(mL/kg)  2203 (26.2) 290 (3.4)    Urine (mL/kg/hr) 1000 (0.5) 525 (0.3)     Stool 2700 0     Total Output 3700 525     Net -3700 +1678 +290           Urine  Occurrence  2 x     Stool Occurrence 2 x 8 x           Physical Exam   Constitutional: She is oriented to person, place, and time. She appears well-developed and well-nourished.   HENT:   Head: Normocephalic.   Eyes: Pupils are equal, round, and reactive to light.   Cardiovascular: Normal rate and regular rhythm.   Pulmonary/Chest: Breath sounds normal.   Abdominal: Soft. She exhibits no distension and no mass. There is no tenderness. There is no rebound and no guarding.   Genitourinary:   Genitourinary Comments: Rectal performed 12/6   Neurological: She is alert and oriented to person, place, and time.   Skin: Skin is warm and dry.   Psychiatric: She has a normal mood and affect.         Significant Labs:  CBC (Last 3 Results):   Recent Labs   Lab 12/09/19  1959 12/10/19  0342 12/10/19  0949   WBC 18.26* 15.61* 15.19*   RBC 3.18* 2.51* 2.38*   HGB 9.6* 7.7* 7.3*   HCT 30.9* 24.0* 23.0*    174 188   MCV 97 96 97   MCH 30.2 30.7 30.7   MCHC 31.1* 32.1 31.7*     CMP:   Recent Labs   Lab 12/10/19  0342   *   CALCIUM 7.5*   ALBUMIN 2.6*   PROT 4.9*      K 3.8   CO2 18*      BUN 16   CREATININE 0.8   ALKPHOS 39*   ALT 13   AST 20   BILITOT 0.4     CRP:   Recent Labs   Lab 12/09/19  0400   CRP 5.1       Significant Diagnostics:  I have reviewed all pertinent imaging results/findings within the past 24 hours.    Assessment/Plan:     Chronic kidney insufficiency  79 year old female with rectal bleeding, melena at first now mixed with new and old blood. She underwent colonoscopy and IR (though no embolization could be performed) on 12/9/19.   With ongoing GI bleeding, recommend repeat colonoscopy or IR embolization attempts for control. Do not recommend pursing surgical therapy unless multiple failed attempts have occurred.   Please call with questions or concerns, we will continue to follow.           Luis Fuentes MD  Colorectal Surgery  [facility]

## 2019-12-10 NOTE — PROGRESS NOTES
Received pt. From IR via stretcher per CRNA x 2. Assumed care, pt. Transferred  to bedside monitor. Please see flow sheet for frequent vitals and assessment. Will continue to monitor pt.

## 2019-12-10 NOTE — PLAN OF CARE
CMICU DAILY GOALS       A: Awake    RASS: Goal - RASS Goal: 0-->alert and calm  Actual - RASS (Ron Agitation-Sedation Scale): 0-->alert and calm   Restraint necessity: n/a   B: Breath   SBT: Not intubated   C: Coordinate A & B, analgesics/sedatives   Pain: managed    SAT: Not intubated  D: Delirium   CAM-ICU:    E: Early Mobility   MOVE Screen: Pass   Activity: Activity Management: activity adjusted per tolerance  FAS: Feeding/Nutrition   Diet order: Diet/Nutrition Received: NPO   T: Thrombus   DVT prophylaxis: VTE Required Core Measure: (SCDs) Sequential compression device initiated/maintained, (TEDs) Compression stocking therapy initiated/maintained  H: HOB Elevation   Head of Bed (HOB): HOB at 20 degrees  U: Ulcer Prophylaxis   GI: yes  G: Glucose control   managed    S: Skin   Bundle compliance: yes     Date:   B: Bowel Function   Bloody stools    I: Indwelling Catheters   Conner necessity:     CVC necessity: No   IPAD offered: No  D: De-escalation Antibx   No  Plan for the day   Monitor for bleeding  Family/Goals of care/Code Status   Code Status: Full Code     No acute events throughout the night, VS and assessment per flow sheet, patient progressing towards goals as tolerated, plan of care reviewed with Jo-Ann Escobedo and family, all concerns addressed, will continue to monitor.

## 2019-12-10 NOTE — PROGRESS NOTES
Procedure complete. Patient tolerated well. Right groin site closed with 6fr angioseal. Sterile dressing clean dry and intact.Patient to remain on bedrest x2 hours with hob flat. Patient to recover in pacu accompanied per ir nursing staff. Report to be given at bedside.

## 2019-12-10 NOTE — ASSESSMENT & PLAN NOTE
Bruising noted on examination  12/9 CT head non con - R frontal extracranial soft tissue swelling without evidence of underlying fracture or acute intracranial hemorrhage. Mild generalized cerebral volume loss and chronic microvascular ischemic change. 3.5cm sclerotic lesion R frontal calvarium with few additional smaller sclerotic foci; additional large well-circumscribed lucent lesions in the parietal bones bilaterally - may represent findings of Paget's disease (in patient's history).    - Monitor for acute neurologic changes

## 2019-12-10 NOTE — TRANSFER OF CARE
"Anesthesia Transfer of Care Note    Patient: Jo-Ann Escobedo    Procedure(s) Performed: Procedure(s) (LRB):  COLONOSCOPY (N/A)    Patient location: PACU    Anesthesia Type: general    Transport from OR: Transported from OR on 6-10 L/min O2 by face mask with adequate spontaneous ventilation    Post pain: adequate analgesia    Post assessment: no apparent anesthetic complications and tolerated procedure well    Post vital signs: stable    Level of consciousness: awake, alert and oriented    Nausea/Vomiting: no nausea/vomiting    Complications: none    Transfer of care protocol was followed      Last vitals:   Visit Vitals  BP (!) 175/75   Pulse 93   Temp 37 °C (98.6 °F) (Temporal)   Resp 16   Ht 5' 3" (1.6 m)   Wt 84.2 kg (185 lb 10 oz)   LMP  (LMP Unknown)   SpO2 97%   Breastfeeding? No   BMI 32.88 kg/m²     "

## 2019-12-10 NOTE — PROGRESS NOTES
SB 200s, Dr. Quintanilla with MICU notified, orders received for hydralazine 10mg. Orders carried out, will continue to monitor pt.

## 2019-12-10 NOTE — ASSESSMENT & PLAN NOTE
79 year old female with rectal bleeding, melena at first now mixed with new and old blood. She underwent colonoscopy and IR (though no embolization could be performed) on 12/9/19.   With ongoing GI bleeding, recommend repeat colonoscopy or IR embolization attempts for control. Do not recommend pursing surgical therapy unless multiple failed attempts have occurred.   Please call with questions or concerns, we will continue to follow.

## 2019-12-10 NOTE — PLAN OF CARE
Pt arrived to  [facility] for visceral angiogram with possible intervention. Pt oriented to unit and staff. Plan of care reviewed with patient, patient verbalizes understanding. Comfort measures utilized. Pt safely transferred from stretcher to procedural table. Fall risk reviewed with patient, fall risk interventions maintained. Safety strap applied, positioner pillows utilized to minimize pressure points. Blankets applied. Pt prepped and draped utilizing standard sterile technique. Patient placed on continuous monitoring, as required by sedation policy. Timeouts completed utilizing standard universal time-out, per department and facility policy. RN to remain at bedside, continuous monitoring maintained. Pt resting comfortably. Denies pain/discomfort. Will continue to monitor. See flow sheets for monitoring, medication administration, and updates.

## 2019-12-10 NOTE — ASSESSMENT & PLAN NOTE
Nonobstructing nephrolithiasis on CT abdo  UA with 3+ occult blood, trace leukocytes, >100 RBCs, 12 WBC  UCx with growth of multiple organisms  Renal function stable    - Continue to monitor daily renal function

## 2019-12-10 NOTE — H&P
[facility]  Critical Care Medicine  History & Physical    Patient Name: Jo-Ann Escobedo  MRN: [id number]  Admission Date: 12/6/2019  Hospital Length of Stay: 1 days  Code Status: Full Code  Attending Physician: Alejandra Polanco MD   Primary Care Provider: Dakota Kerr MD   Principal Problem: Diverticulitis of large intestine without perforation or abscess with bleeding    Subjective:     HPI:  Ms. 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 History obtained via chart review as patient was still somnolent on examination by critical care team. She was seen by CRS outpatient day of admission during which GYPSY performed revealed no masses, but positive for melanotic blood; during anoscopy, anoscope filled with melanotic stool and foul odor prompting presentation to ED for further evaluation of GI bleed. Of note, patient was seen by primary care physician 9/2019 and restarted on diclofenac PRN at that time, unsure of frequency she has been taking that recently. Renal function unremarkable.     On initial presentation in the ED, vital signs were stable and no leukocytosis noted. UA showed 3+ occult blood with >100 RBCs and 12 WBCs. CTA abdo/pelvis without evidence of acute bleed, but was concerning for uncomplicated diverticulitis or colitis in the descending and sigmoid colon junction. She was given IVF and started on protonix and zosyn for intra-abdominal coverage. Hgb noted to be 10 from a baseline of 13. She was admitted to hospital medicine for observation. Hgb continued to downtrend to 8 and was transfused  for weakness and active bleeding. GI was consulted and she underwent endoscopy 12/7 with normal esophagus, erythematous mucosa in the pylorus (biopsied), normal duodenum, 10mm lesion at incisura (biopsied). She subsequently underwent colonoscopy 12/9 and several large diverticula in the sigmoid colon was seen with hematin throughout the colon; blood pooling also noted in the sigmoid colon, which was clipped. She received 1U pRBC during the procedure and IR was contacted by GI for angio and possible intervention that same night. MICU was consulted given patient's active GI bleed and risk of instability. Patient was hemodynamically stable and satting well on room air when seen in PACU. Will be admitted to MICU for close observation overnight after IR evaluation.     Hospital/ICU Course:  No notes on file     Past Medical History:   Diagnosis Date    After-cataract of both eyes - Both Eyes 9/26/2012    Allergy     Arthritis     Breast cancer     Diverticulosis     Hyperlipidemia     Hypertension     Hypothyroidism     Paget disease of bone     Squamous Cell Carcinoma     in situ right neck     Thyroid disease        Past Surgical History:   Procedure Laterality Date    CATARACT EXTRACTION W/  INTRAOCULAR LENS IMPLANT Bilateral     COLONOSCOPY N/A 4/15/2019    Procedure: COLONOSCOPY;  Surgeon: Artur Monet MD;  Location: [facility] ([facility]TH [facility]);  Service: Endoscopy;  Laterality: N/A;  within 1 month    ESOPHAGOGASTRODUODENOSCOPY N/A 12/7/2019    Procedure: EGD (ESOPHAGOGASTRODUODENOSCOPY);  Surgeon: Clyde Welch MD;  Location: [facility] (2ND FLR);  Service: Endoscopy;  Laterality: N/A;    EXCISIONAL BIOPSY Right 6/27/2019    Procedure: EXCISIONAL BIOPSY-breast;  Surgeon: Suki Dill MD;  Location: [address];  Service: General;  Laterality: Right;    EYE SURGERY      HYSTERECTOMY      INJECTION OF ANESTHETIC AGENT AROUND MEDIAL BRANCH NERVES INNERVATING LUMBAR FACET JOINT Bilateral  2019    Procedure: BLOCK, NERVE, FACET JOINT, LUMBAR, MEDIAL BRANCH-deo MBB L4/5,L5/S1;  Surgeon: Jarvis Morales III, MD;  Location: [facility];  Service: Pain Management;  Laterality: Bilateral;    KNEE ARTHROSCOPY N/A     pt unsure of which knee     MASTECTOMY      SPINE SURGERY      TOTAL THYROIDECTOMY      YAG Laser Capsulotomy  Left 2019    Dr. Hahn       Review of patient's allergies indicates:   Allergen Reactions    Voltaren [diclofenac sodium] Other (See Comments)     Hematochezia and hospitalization for hematochezia.    Codeine Diarrhea and Hallucinations    Niacin preparations      Redness, warming       Family History     Problem Relation (Age of Onset)    Cancer Father    Dementia Mother    Glaucoma Brother    Macular degeneration Brother        Tobacco Use    Smoking status: Former Smoker     Packs/day: 2.00     Years: 44.00     Pack years: 88.00     Types: Cigarettes     Last attempt to quit: 1969     Years since quittin.9    Smokeless tobacco: Never Used   Substance and Sexual Activity    Alcohol use: Yes     Alcohol/week: 0.0 standard drinks     Frequency: Monthly or less     Drinks per session: 1 or 2     Binge frequency: Never     Comment: maybe a beer every 3 months    Drug use: No    Sexual activity: Not Currently      Review of Systems   Unable to perform ROS: Mental status change   Still sedated from procedure    Objective:     Vital Signs (Most Recent):  Temp: 98.1 °F (36.7 °C) (19)  Pulse: 91 (19)  Resp: 17 (19)  BP: (!) 146/72 (19)  SpO2: 97 % (19) Vital Signs (24h Range):  Temp:  [97.5 °F (36.4 °C)-99.3 °F (37.4 °C)] 98.1 °F (36.7 °C)  Pulse:  [70-99] 91  Resp:  [15-21] 17  SpO2:  [91 %-100 %] 97 %  BP: (105-191)/(54-84) 146/72   Weight: 84.2 kg (185 lb 10 oz)  Body mass index is 32.88 kg/m².      Intake/Output Summary (Last 24 hours) at 2019  Last data filed at 2019  2045  Gross per 24 hour   Intake 2103 ml   Output 2725 ml   Net -622 ml       Physical Exam   Constitutional: She appears well-developed and well-nourished. No distress.   HENT:   Mouth/Throat: Oropharynx is clear and moist. No oropharyngeal exudate.   Bruising over L cranium   Eyes: Pupils are equal, round, and reactive to light. EOM are normal. No scleral icterus.   Neck: Normal range of motion. Neck supple.   Cardiovascular: Normal rate, regular rhythm and normal heart sounds.   No murmur heard.  Pulmonary/Chest: Effort normal and breath sounds normal. No respiratory distress.   Abdominal: Soft. Bowel sounds are normal. She exhibits no distension. There is no tenderness. There is no guarding.   Musculoskeletal: She exhibits no edema or tenderness.   Neurological:   Drowsy and confused from sedation, but answered questions appropriately, not overly somnolent   Skin: Skin is warm and dry. She is not diaphoretic. No erythema.   Nursing note and vitals reviewed.      Vents:     Lines/Drains/Airways     Peripheral Intravenous Line                 Peripheral IV - Single Lumen 12/09/19 1153 20 G Right Antecubital less than 1 day         Peripheral IV - Single Lumen 12/09/19 1549 20 G Right Hand less than 1 day              Significant Labs:    CBC/Anemia Profile:  Recent Labs   Lab 12/09/19  0400 12/09/19  0817 12/09/19  1959   WBC 10.70 8.37 18.26*   HGB 8.1* 7.3* 9.6*   HCT 25.9* 24.3* 30.9*    198 178   MCV 99* 102* 97   RDW 14.0 14.3 14.9*        Chemistries:  Recent Labs   Lab 12/08/19  0434 12/09/19  0400    140   K 3.9 4.1   * 108   CO2 25 27   BUN 20 15   CREATININE 1.0 0.9   CALCIUM 8.5* 9.0   ALBUMIN 2.9* 3.2*   PROT 5.4* 5.8*   BILITOT 0.5 0.4   ALKPHOS 51* 53*   ALT 12 14   AST 18 24   MG 2.1 1.9   PHOS 3.4 2.7         Significant Imaging: I have reviewed all pertinent imaging results/findings within the past 24 hours.     CT Head Without Contrast  Narrative: EXAMINATION:  CT HEAD WITHOUT  CONTRAST    CLINICAL HISTORY:  Head trauma, headache;    TECHNIQUE:  Low dose axial CT images obtained throughout the head without the use of intravenous contrast.  Axial, sagittal and coronal reconstructions were performed.    COMPARISON:  None.    FINDINGS:  Intracranial compartment:    Prominence of the ventricles and sulci compatible with mild generalized cerebral volume loss.  No hydrocephalus.    Mild chronic microvascular ischemic change in supratentorial white matter.  No parenchymal mass, hemorrhage, edema or major vascular distribution infarct.    No extra-axial blood or fluid collections.    Skull/extracranial contents (limited evaluation):    Moderate-sized right frontal extracranial hematoma.  No subjacent calvarial fracture.    3.5 cm well delineated sclerotic focus in the right frontal calvarium, corresponds to a lucent lesion on the study from 2012.  Few scattered additional bifrontal sclerotic foci.  New sharply circumscribed lucent lesion in the right parietal bone measuring over 8 cm.  Additional 6.7 cm sharply circumscribed lucent focus in the posterior left parietal bone.  No obvious associated soft tissue component.  Impression: Right frontal extracranial soft tissue swelling without evidence of underlying fracture or acute intracranial hemorrhage.    Mild generalized cerebral volume loss and chronic microvascular ischemic change.    3.5 cm sclerotic lesion right frontal calvarium with few additional smaller sclerotic foci.  Additional large well-circumscribed lucent lesions in the parietal bones bilaterally.  Findings further detailed above, thought to reflect changes of Paget's disease.  Correlation with nuclear medicine bone scan might be helpful to exclude metastatic disease in patient reported history of breast cancer.    This report was flagged in Epic as abnormal.    Electronically signed by: Sridhar Peng MD  Date:    12/09/2019  Time:    09:18  CT Abdomen Pelvis With  Contrast  Narrative: EXAMINATION:  CT ABDOMEN PELVIS WITH CONTRAST    CLINICAL HISTORY:  Rule out/in diverticulitis;    TECHNIQUE:  Low dose axial images, sagittal and coronal reformations were obtained from the lung bases to the pubic symphysis following the IV administration of 100 mL of Omnipaque 350 and the oral and rectal administration of omni 350 IV contrast.    COMPARISON:  CTA abdomen and pelvis 12/06/2019    FINDINGS:  The visualized lung bases are free of pleural fluid or focal consolidation.  The visualized portions of the heart and pericardium are unremarkable.    The liver the upper limit of normal size.  There is a hypoattenuating 1.9 cm hypodensity in the left hepatic lobe, similar to prior semen a shin in likely reflecting a cyst.  There are additional subcentimeter left hepatic hypodensities too small to definitively characterize.  There is a  punctate calcification within the caudate lobe.  The portal vein and splenic vein are patent.  There is cholelithiasis.  No significant pericholecystic inflammatory change.  The spleen, pancreas, and adrenal glands are unremarkable.  There is a small hiatal hernia.    The kidneys enhance symmetrically and excrete contrast on delayed phase imaging.  There is no evidence of hydronephrosis.  There is a nonobstructing nephroliths in the inferior pole of the right kidney.  There are bilateral renal cortical hypodensities the larger of which likely represent cysts and several of which are too small to definitively characterize.  The urinary bladder is unremarkable.  The uterus is surgically absent.  The adnexal regions are within normal limits.    There is diverticulosis most predominantly involving the sigmoid colon.  On today's examination there is decreased conspicuity of previously identified pericolonic inflammatory change about the sigmoid colon suggestive of improved/resolving uncomplicated diverticulitis.  There are small pericolonic lymph nodes about the  sigmoid colon.  The visualized loops of small bowel demonstrate no evidence of obstruction.  The appendix is unremarkable.  There is no ascites, free intraperitoneal air or portal venous gas.    The abdominal aorta is nonaneurysmal with atherosclerosis. No bulky lymphadenopathy. The visualized osseous structures demonstrate degenerative change without acute abnormality.  There is a small fat containing umbilical hernia soft tissues are unremarkable.  Impression: 1. Diverticulosis with decreased conspicuity of previously identified pericolonic inflammatory change involving the sigmoid colon suggestive of improving/resolving uncomplicated sigmoid diverticulitis.  No evidence of new superimposed acute abnormality on today's examination.  2. Cholelithiasis.  3. Bilateral renal cortical hypodensities the larger of which likely represent cysts and many which are too small to definitively characterize.  Nonobstructive right nephrolithiasis.  4. Hysterectomy.  5. Additional findings as detailed above.    Electronically signed by: Petrona Alex MD  Date:    12/09/2019  Time:    01:05        Assessment/Plan:     Cardiac/Vascular  Essential hypertension  On losartan 75mg daily at home    - Hold antihypertensives in setting of active GI bleed  - Monitor BP, restart meds as necessary after bleed is controlled    Renal/  Hematuria  Nonobstructing nephrolithiasis on CT abdo  UA with 3+ occult blood, trace leukocytes, >100 RBCs, 12 WBC  UCx with growth of multiple organisms  Renal function stable    - Continue to monitor daily renal function    Oncology  Acute blood loss anemia  S/p 2U pRBCs during this admission    - Monitoring CBC q8h  - Transfuse for Hgb <7 unless hemodynamically stable     Endocrine  Hypothyroidism, postsurgical  History of multinodular goiter s/p total thyroidectomy 9/2011    - Continue synthroid 125mcg daily    GI  * Diverticulitis of large intestine without perforation or abscess with bleeding  History of  "diverticulosis, last episode in chart was 9/2016 with outpatient management  Last colonoscopy 4/2019  - three 3-6mm polyps in transverse colon and ascending colon (removed), diverticulosis in the sigmoid colon and descending colon  Prior to admission, was seen by CRS outpatient, anoscopy with melanotic stool on exam  12/7 EGD - Normal esophagus, erythematous mucosa in pylorus (biopsied), normal duodenal bulb, 2nd and 3rd portions, 10mm submucosal soft lesion at incisura (biopsied)  12/9 colonoscopy - Suspect active bleed from sigmoid diverticula. Site of expected bleed clipped.   S/p 2U pRBCs since admit (one intra-op during c-scope)    - CRS and GI following - follow-up recs  - IR for angio 12/9 - follow-up findings and recs  - CBC q8h  - BUN trending down (30 on admit, down to 15 today) - monitor CMP daily  - Monitor for active bleeding after intervention  - Transfuse for Hgb <7 or if patient develops hemodynamic instability in setting of large bleed  - Continue zosyn - started 12/7  - Continue protonix 40mg IV bid    GIB (gastrointestinal bleeding)  See "diverticulitis"    Other  Abnormal CT of the head  Bruising noted on examination  12/9 CT head non con - R frontal extracranial soft tissue swelling without evidence of underlying fracture or acute intracranial hemorrhage. Mild generalized cerebral volume loss and chronic microvascular ischemic change. 3.5cm sclerotic lesion R frontal calvarium with few additional smaller sclerotic foci; additional large well-circumscribed lucent lesions in the parietal bones bilaterally - may represent findings of Paget's disease (in patient's history).    - Monitor for acute neurologic changes        Critical Care Daily Checklist:    A: Awake: RASS Goal/Actual Goal: RASS Goal: 0-->alert and calm  Actual:     B: Spontaneous Breathing Trial Performed?     C: SAT & SBT Coordinated?  n/a                      D: Delirium: CAM-ICU     E: Early Mobility Performed? Yes   F: Feeding " Goal:    Status:     Current Diet Order   Procedures    Diet NPO Except for: Medication     Order Specific Question:   Except for     Answer:   Medication      AS: Analgesia/Sedation n/a   T: Thromboembolic Prophylaxis N/a - GIB   H: HOB > 300 Yes   U: Stress Ulcer Prophylaxis (if needed) protonix   G: Glucose Control n/a   B: Bowel Function Stool Occurrence: 1   I: Indwelling Catheter (Lines & Conner) Necessity PIV   D: De-escalation of Antimicrobials/Pharmacotherapies zosyn    Plan for the day/ETD - admit to MICU for observation  - monitor for bleeding  - touch base with CRS in am    Code Status:  Family/Goals of Care: Full Code         Critical secondary to Patient has a condition that poses threat to life and bodily function: active GI bleed     Critical care was time spent personally by me on the following activities: development of treatment plan with patient or surrogate and bedside caregivers, discussions with consultants, evaluation of patient's response to treatment, examination of patient, ordering and performing treatments and interventions, ordering and review of laboratory studies, ordering and review of radiographic studies, pulse oximetry, re-evaluation of patient's condition. This critical care time did not overlap with that of any other provider or involve time for any procedures.     Radha Quintanilla MD  Critical Care Medicine  [facility]

## 2019-12-10 NOTE — NURSING TRANSFER
Nursing Transfer Note      12/9/2019     Transfer : IR    Transfer via stretcher    Transfer with IVFs    Transported by IR RN    Medicines sent: none    Chart send with patient: YES     Notified: daughter @ BS    Patient reassessed at: 12/09/2019 @ 2055    Awaiting ICU bed  IR MD and RN @ BS - discussed procedure with pt/daughter.

## 2019-12-10 NOTE — TREATMENT PLAN
GI Treatment Plan    Interval History  Colonoscopy yesterday with blood / hematin throughout colon. Unable to identify source of bleeding despite multiple passes through sigmoid colon - multiple diverticula with potential sources of GIB.  To IR last night but no active GIB from branches of NATALIO or SMA including region of clip in sigmoid so no embolization performed.  Continues to have hematochezia. S/p 3U PRBCs yesterday (Hgb 7.3 -> 9.6) and 1U this morning (Hgb 7.3 -> 8).  HR 100s, BP elevated.    Plan  - s/p Colonoscopy 12/9 during active GIB with extensive time spent with lavage but unable to identify source of bleed, likely sigmoid diverticula  - to IR 12/9 with no extravasation and no site for embolization  - CRS on board, would like to avoid surgery until other measures have been exhausted  - at this time, no plan for repeat colonoscopy given difficulty identifying source of bleed. Likely that once diclofenac is out of her system, she will stop bleeding  - Appreciate assistance from IR and CRS, would consider CTA or straight to IR if hemodynamically significant bleed    Thank you for involving us in the care of Jo-Ann Escobedo. We will continue to follow. Please call with any additional questions, concerns or changes in the patient's clinical status.      Inocencio Reynolds MD  Gastroenterology Fellow, PGY4  [facility]

## 2019-12-10 NOTE — PROCEDURES
Interventional Radiology Post-Procedure Note    Pre Op Diagnosis: GIB  Post Op Diagnosis: Same    Procedure: Angiography    Procedure performed by: Susie    Written Informed Consent Obtained: Yes  Specimen Sent: No: NA  Estimated Blood Loss: Less than 100     Findings:   No evidence of active GIB from branches of the NATALIO or SMA, including in the region of the clip in the sigmoid colon. No embolization was performed. Rt CFA closed with Angio-Seal.    No immediate complications. Patient tolerated procedure well. Please see full dictated procedure report for additional details and recommendations.      Sridhar Richards MD  [facility]  Pager [phone]

## 2019-12-10 NOTE — PROGRESS NOTES
Family updated. Findings d/w Dr. Quintanilla in the ICU.    Thank you for the opportunity to assist in the care of this patient.      Sridhar Richards MD  [facility]  Pager [phone]

## 2019-12-10 NOTE — CONSULTS
Consult received. Full h and p to follow.    Renay Cedillo M.D.  [facility] Pulmonary/Critical Care Fellow

## 2019-12-10 NOTE — OR NURSING
Fellow Suki Puentes notified of several moderate to large bloody BMs in last 2 hours. Both bright and dark red mixed, clots also present. Vitals remain stable, -115. Previous cbc post transfusion of 1 PRBC however BMs have occurred after labs sent. New order for stat cbc.

## 2019-12-10 NOTE — ASSESSMENT & PLAN NOTE
History of diverticulosis, last episode in chart was 9/2016 with outpatient management  Last colonoscopy 4/2019  - three 3-6mm polyps in transverse colon and ascending colon (removed), diverticulosis in the sigmoid colon and descending colon  Prior to admission, was seen by CRS outpatient, anoscopy with melanotic stool on exam  12/7 EGD - Normal esophagus, erythematous mucosa in pylorus (biopsied), normal duodenal bulb, 2nd and 3rd portions, 10mm submucosal soft lesion at incisura (biopsied)  12/9 colonoscopy - Suspect active bleed from sigmoid diverticula. Site of expected bleed clipped.   S/p 2U pRBCs since admit (one intra-op during c-scope)    - CRS and GI following - follow-up recs  - IR for angio 12/9 - follow-up findings and recs  - CBC q8h  - BUN trending down (30 on admit, down to 15 today) - monitor CMP daily  - Monitor for active bleeding after intervention  - Transfuse for Hgb <7 or if patient develops hemodynamic instability in setting of large bleed  - Continue zosyn - started 12/7  - Continue protonix 40mg IV bid

## 2019-12-10 NOTE — OR NURSING
Fellow Suki notified of new h/h 6.5/21.1, and vitals signs, new order for blood to be put in. Will wait for orders.

## 2019-12-10 NOTE — PLAN OF CARE
Denies pain or nausea. Infused 2 bags of blood, pt sent to endoscopy in the middle of the second bag. Pt is very dizzy, due to excessive blood loss with each BM. Had 4 BMs in day shift, dark red and clots. Pt still in endoscopy at the end of shift

## 2019-12-10 NOTE — PT/OT/SLP PROGRESS
Physical Therapy      Patient Name:  Jo-Ann Escobedo   MRN:  [id number]    Patient not seen today secondary to Other (Comment)(Pt transferred to ICU.PT order will be discontinued. Please re-consult as indicated). Extensive chart review required to determine location of pt.  Last ADT event documented at this time is @12/9/2019 13:27, pt transfer to Endo. Upon extensive chart review found nurse's note 12/09/2019 @ 20:55 indicated a transfer to ICU.  Patrice Roque, PT

## 2019-12-11 ENCOUNTER — PATIENT OUTREACH (OUTPATIENT)
Dept: ADMINISTRATIVE | Facility: OTHER | Age: 79
End: 2019-12-11

## 2019-12-11 LAB
ALBUMIN SERPL BCP-MCNC: 2.2 G/DL (ref 3.5–5.2)
ALP SERPL-CCNC: 40 U/L (ref 55–135)
ALT SERPL W/O P-5'-P-CCNC: 12 U/L (ref 10–44)
ANION GAP SERPL CALC-SCNC: 11 MMOL/L (ref 8–16)
ANION GAP SERPL CALC-SCNC: 8 MMOL/L (ref 8–16)
ANION GAP SERPL CALC-SCNC: 9 MMOL/L (ref 8–16)
AST SERPL-CCNC: 24 U/L (ref 10–40)
BASOPHILS # BLD AUTO: 0.06 K/UL (ref 0–0.2)
BASOPHILS # BLD AUTO: 0.09 K/UL (ref 0–0.2)
BASOPHILS NFR BLD: 0.4 % (ref 0–1.9)
BASOPHILS NFR BLD: 0.6 % (ref 0–1.9)
BILIRUB SERPL-MCNC: 0.4 MG/DL (ref 0.1–1)
BLD PROD TYP BPU: NORMAL
BLOOD UNIT EXPIRATION DATE: NORMAL
BLOOD UNIT TYPE CODE: 5100
BLOOD UNIT TYPE CODE: 5100
BLOOD UNIT TYPE CODE: 6200
BLOOD UNIT TYPE: NORMAL
BUN SERPL-MCNC: 25 MG/DL (ref 8–23)
BUN SERPL-MCNC: 29 MG/DL (ref 8–23)
BUN SERPL-MCNC: 32 MG/DL (ref 8–23)
CA-I BLDV-SCNC: 1.07 MMOL/L (ref 1.06–1.42)
CA-I BLDV-SCNC: 1.15 MMOL/L (ref 1.06–1.42)
CALCIUM SERPL-MCNC: 7.8 MG/DL (ref 8.7–10.5)
CALCIUM SERPL-MCNC: 7.9 MG/DL (ref 8.7–10.5)
CALCIUM SERPL-MCNC: 7.9 MG/DL (ref 8.7–10.5)
CHLORIDE SERPL-SCNC: 110 MMOL/L (ref 95–110)
CHLORIDE SERPL-SCNC: 111 MMOL/L (ref 95–110)
CHLORIDE SERPL-SCNC: 112 MMOL/L (ref 95–110)
CO2 SERPL-SCNC: 19 MMOL/L (ref 23–29)
CO2 SERPL-SCNC: 22 MMOL/L (ref 23–29)
CO2 SERPL-SCNC: 23 MMOL/L (ref 23–29)
CODING SYSTEM: NORMAL
CREAT SERPL-MCNC: 0.9 MG/DL (ref 0.5–1.4)
DIFFERENTIAL METHOD: ABNORMAL
DIFFERENTIAL METHOD: ABNORMAL
DISPENSE STATUS: NORMAL
EOSINOPHIL # BLD AUTO: 0 K/UL (ref 0–0.5)
EOSINOPHIL # BLD AUTO: 0 K/UL (ref 0–0.5)
EOSINOPHIL NFR BLD: 0 % (ref 0–8)
EOSINOPHIL NFR BLD: 0.1 % (ref 0–8)
ERYTHROCYTE [DISTWIDTH] IN BLOOD BY AUTOMATED COUNT: 15.9 % (ref 11.5–14.5)
ERYTHROCYTE [DISTWIDTH] IN BLOOD BY AUTOMATED COUNT: 16.5 % (ref 11.5–14.5)
EST. GFR  (AFRICAN AMERICAN): >60 ML/MIN/1.73 M^2
EST. GFR  (NON AFRICAN AMERICAN): >60 ML/MIN/1.73 M^2
GLUCOSE SERPL-MCNC: 124 MG/DL (ref 70–110)
GLUCOSE SERPL-MCNC: 137 MG/DL (ref 70–110)
GLUCOSE SERPL-MCNC: 140 MG/DL (ref 70–110)
HCT VFR BLD AUTO: 19.9 % (ref 37–48.5)
HCT VFR BLD AUTO: 23.8 % (ref 37–48.5)
HGB BLD-MCNC: 6.5 G/DL (ref 12–16)
HGB BLD-MCNC: 7.9 G/DL (ref 12–16)
IMM GRANULOCYTES # BLD AUTO: 0.39 K/UL (ref 0–0.04)
IMM GRANULOCYTES # BLD AUTO: 0.58 K/UL (ref 0–0.04)
IMM GRANULOCYTES NFR BLD AUTO: 2.4 % (ref 0–0.5)
IMM GRANULOCYTES NFR BLD AUTO: 3.9 % (ref 0–0.5)
LYMPHOCYTES # BLD AUTO: 1.2 K/UL (ref 1–4.8)
LYMPHOCYTES # BLD AUTO: 1.9 K/UL (ref 1–4.8)
LYMPHOCYTES NFR BLD: 11.8 % (ref 18–48)
LYMPHOCYTES NFR BLD: 7.9 % (ref 18–48)
MAGNESIUM SERPL-MCNC: 2.2 MG/DL (ref 1.6–2.6)
MAGNESIUM SERPL-MCNC: 2.3 MG/DL (ref 1.6–2.6)
MCH RBC QN AUTO: 30.5 PG (ref 27–31)
MCH RBC QN AUTO: 30.6 PG (ref 27–31)
MCHC RBC AUTO-ENTMCNC: 32.7 G/DL (ref 32–36)
MCHC RBC AUTO-ENTMCNC: 33.2 G/DL (ref 32–36)
MCV RBC AUTO: 92 FL (ref 82–98)
MCV RBC AUTO: 93 FL (ref 82–98)
MONOCYTES # BLD AUTO: 1.4 K/UL (ref 0.3–1)
MONOCYTES # BLD AUTO: 1.9 K/UL (ref 0.3–1)
MONOCYTES NFR BLD: 11.7 % (ref 4–15)
MONOCYTES NFR BLD: 9.3 % (ref 4–15)
NEUTROPHILS # BLD AUTO: 11.6 K/UL (ref 1.8–7.7)
NEUTROPHILS # BLD AUTO: 11.7 K/UL (ref 1.8–7.7)
NEUTROPHILS NFR BLD: 73.4 % (ref 38–73)
NEUTROPHILS NFR BLD: 78.5 % (ref 38–73)
NRBC BLD-RTO: 1 /100 WBC
NRBC BLD-RTO: 1 /100 WBC
NUM UNITS TRANS FFP: NORMAL
PHOSPHATE SERPL-MCNC: 2.5 MG/DL (ref 2.7–4.5)
PHOSPHATE SERPL-MCNC: 2.9 MG/DL (ref 2.7–4.5)
PLATELET # BLD AUTO: 145 K/UL (ref 150–350)
PLATELET # BLD AUTO: 147 K/UL (ref 150–350)
PMV BLD AUTO: 10.8 FL (ref 9.2–12.9)
PMV BLD AUTO: 10.9 FL (ref 9.2–12.9)
POTASSIUM SERPL-SCNC: 3.8 MMOL/L (ref 3.5–5.1)
POTASSIUM SERPL-SCNC: 3.9 MMOL/L (ref 3.5–5.1)
POTASSIUM SERPL-SCNC: 3.9 MMOL/L (ref 3.5–5.1)
PROT SERPL-MCNC: 4.1 G/DL (ref 6–8.4)
RBC # BLD AUTO: 2.13 M/UL (ref 4–5.4)
RBC # BLD AUTO: 2.58 M/UL (ref 4–5.4)
SODIUM SERPL-SCNC: 141 MMOL/L (ref 136–145)
SODIUM SERPL-SCNC: 142 MMOL/L (ref 136–145)
SODIUM SERPL-SCNC: 142 MMOL/L (ref 136–145)
TRANS ERYTHROCYTES VOL PATIENT: NORMAL ML
TRANS ERYTHROCYTES VOL PATIENT: NORMAL ML
WBC # BLD AUTO: 14.77 K/UL (ref 3.9–12.7)
WBC # BLD AUTO: 15.92 K/UL (ref 3.9–12.7)

## 2019-12-11 PROCEDURE — 84484 ASSAY OF TROPONIN QUANT: CPT

## 2019-12-11 PROCEDURE — 25000003 PHARM REV CODE 250: Performed by: PHYSICIAN ASSISTANT

## 2019-12-11 PROCEDURE — 83735 ASSAY OF MAGNESIUM: CPT

## 2019-12-11 PROCEDURE — 20000000 HC ICU ROOM

## 2019-12-11 PROCEDURE — 36415 COLL VENOUS BLD VENIPUNCTURE: CPT

## 2019-12-11 PROCEDURE — 93005 ELECTROCARDIOGRAM TRACING: CPT

## 2019-12-11 PROCEDURE — 63600175 PHARM REV CODE 636 W HCPCS: Performed by: STUDENT IN AN ORGANIZED HEALTH CARE EDUCATION/TRAINING PROGRAM

## 2019-12-11 PROCEDURE — 82330 ASSAY OF CALCIUM: CPT

## 2019-12-11 PROCEDURE — 93010 EKG 12-LEAD: ICD-10-PCS | Mod: ,,, | Performed by: INTERNAL MEDICINE

## 2019-12-11 PROCEDURE — 63600175 PHARM REV CODE 636 W HCPCS: Performed by: PHYSICIAN ASSISTANT

## 2019-12-11 PROCEDURE — 36410 VNPNXR 3YR/> PHY/QHP DX/THER: CPT

## 2019-12-11 PROCEDURE — 82330 ASSAY OF CALCIUM: CPT | Mod: 91

## 2019-12-11 PROCEDURE — C1751 CATH, INF, PER/CENT/MIDLINE: HCPCS

## 2019-12-11 PROCEDURE — 63700000 PHARM REV CODE 250 ALT 637 W/O HCPCS: Performed by: PHYSICIAN ASSISTANT

## 2019-12-11 PROCEDURE — 99900035 HC TECH TIME PER 15 MIN (STAT)

## 2019-12-11 PROCEDURE — 84100 ASSAY OF PHOSPHORUS: CPT | Mod: 91

## 2019-12-11 PROCEDURE — P9021 RED BLOOD CELLS UNIT: HCPCS

## 2019-12-11 PROCEDURE — 36430 TRANSFUSION BLD/BLD COMPNT: CPT

## 2019-12-11 PROCEDURE — 99291 CRITICAL CARE FIRST HOUR: CPT | Mod: GC,,, | Performed by: INTERNAL MEDICINE

## 2019-12-11 PROCEDURE — 94761 N-INVAS EAR/PLS OXIMETRY MLT: CPT

## 2019-12-11 PROCEDURE — C9113 INJ PANTOPRAZOLE SODIUM, VIA: HCPCS | Performed by: PHYSICIAN ASSISTANT

## 2019-12-11 PROCEDURE — 84100 ASSAY OF PHOSPHORUS: CPT

## 2019-12-11 PROCEDURE — 80053 COMPREHEN METABOLIC PANEL: CPT

## 2019-12-11 PROCEDURE — 80048 BASIC METABOLIC PNL TOTAL CA: CPT

## 2019-12-11 PROCEDURE — 85025 COMPLETE CBC W/AUTO DIFF WBC: CPT | Mod: 91

## 2019-12-11 PROCEDURE — 76937 US GUIDE VASCULAR ACCESS: CPT

## 2019-12-11 PROCEDURE — 99291 PR CRITICAL CARE, E/M 30-74 MINUTES: ICD-10-PCS | Mod: GC,,, | Performed by: INTERNAL MEDICINE

## 2019-12-11 PROCEDURE — 93010 ELECTROCARDIOGRAM REPORT: CPT | Mod: ,,, | Performed by: INTERNAL MEDICINE

## 2019-12-11 PROCEDURE — 83735 ASSAY OF MAGNESIUM: CPT | Mod: 91

## 2019-12-11 PROCEDURE — 27000221 HC OXYGEN, UP TO 24 HOURS

## 2019-12-11 RX ORDER — HYDROCODONE BITARTRATE AND ACETAMINOPHEN 500; 5 MG/1; MG/1
TABLET ORAL
Status: DISCONTINUED | OUTPATIENT
Start: 2019-12-11 | End: 2019-12-12

## 2019-12-11 RX ORDER — METOPROLOL TARTRATE 1 MG/ML
5 INJECTION, SOLUTION INTRAVENOUS ONCE
Status: COMPLETED | OUTPATIENT
Start: 2019-12-12 | End: 2019-12-12

## 2019-12-11 RX ADMIN — PANTOPRAZOLE SODIUM 40 MG: 40 INJECTION, POWDER, FOR SOLUTION INTRAVENOUS at 09:12

## 2019-12-11 RX ADMIN — FERROUS SULFATE TAB EC 325 MG (65 MG FE EQUIVALENT) 325 MG: 325 (65 FE) TABLET DELAYED RESPONSE at 09:12

## 2019-12-11 RX ADMIN — LEVOTHYROXINE SODIUM 125 MCG: 25 TABLET ORAL at 06:12

## 2019-12-11 RX ADMIN — CYANOCOBALAMIN TAB 250 MCG 500 MCG: 250 TAB at 09:12

## 2019-12-11 RX ADMIN — PIPERACILLIN AND TAZOBACTAM 4.5 G: 4; .5 INJECTION, POWDER, FOR SOLUTION INTRAVENOUS at 09:12

## 2019-12-11 RX ADMIN — PIPERACILLIN AND TAZOBACTAM 4.5 G: 4; .5 INJECTION, POWDER, FOR SOLUTION INTRAVENOUS at 02:12

## 2019-12-11 RX ADMIN — PIPERACILLIN AND TAZOBACTAM 4.5 G: 4; .5 INJECTION, POWDER, FOR SOLUTION INTRAVENOUS at 07:12

## 2019-12-11 RX ADMIN — FENOFIBRATE 160 MG: 160 TABLET ORAL at 09:12

## 2019-12-11 RX ADMIN — SODIUM CHLORIDE, SODIUM LACTATE, POTASSIUM CHLORIDE, AND CALCIUM CHLORIDE 1000 ML: .6; .31; .03; .02 INJECTION, SOLUTION INTRAVENOUS at 03:12

## 2019-12-11 NOTE — NURSING TRANSFER
Nursing Transfer Note      12/10/2019     Transfer To: [id number]    Transfer via bed    Transfer with 2 L NC to O2, cardiac monitoring    Transported by RN x2    Medicines sent: NA    Chart send with patient: Yes    Notified: Family    Patient reassessed at: 2030 12/10/19

## 2019-12-11 NOTE — ASSESSMENT & PLAN NOTE
On losartan 75mg daily at home  -PRN hydralazine to keep SBP<160    - Hold home antihypertensives in setting of active GI bleed  - Monitor BP, restart meds as necessary after bleed is controlled

## 2019-12-11 NOTE — TREATMENT PLAN
GI Treatment Plan    Interval History  Continues to have hematochezia. Hgb drop 8.7 last night to 6.5 this afternoon.  BP elevated, tachy to 100-110s.    Plan  - At this time, we have exhausted endoscopic options for control of bleeding. EGD 12/7 unremarkable from standpoint of GI bleed. Colonoscopy 12/9 done during ongoing hematochezia with withdrawal time of 1-hour 5-minutes: TI normal, blood in entire colon, large and deep diverticulosis which are not easily accessible. Most likely source of GI bleed diverticula. No further role for repeating colonoscopy at this time.  - Will defer to IR and/or CRS for further intervention. Consider provocative studies with IR and/or repeat angiogram +/- embolization.    Discussed with Dr. Welch.    Thank you for involving us in the care of Jo-Ann Escobedo. Please call with any additional questions, concerns or changes in the patient's clinical status.      Inocencio Reynolds MD  Gastroenterology Fellow, PGY4  [facility]

## 2019-12-11 NOTE — PROGRESS NOTES
[facility]  Critical Care Medicine  Progress Note    Patient Name: Jo-Ann Escobedo  MRN: [id number]  Admission Date: 12/6/2019  Hospital Length of Stay: 2 days  Code Status: Full Code  Attending Provider: Alejandra Polanco MD  Primary Care Provider: Dakota Kerr MD   Principal Problem: Diverticulitis of large intestine without perforation or abscess with bleeding    Subjective:     HPI:  Ms. 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 History obtained via chart review as patient was still somnolent on examination by critical care team. She was seen by CRS outpatient day of admission during which GYPSY performed revealed no masses, but positive for melanotic blood; during anoscopy, anoscope filled with melanotic stool and foul odor prompting presentation to ED for further evaluation of GI bleed. Of note, patient was seen by primary care physician 9/2019 and restarted on diclofenac PRN at that time, unsure of frequency she has been taking that recently. Renal function unremarkable.     On initial presentation in the ED, vital signs were stable and no leukocytosis noted. UA showed 3+ occult blood with >100 RBCs and 12 WBCs. CTA abdo/pelvis without evidence of acute bleed, but was concerning for uncomplicated diverticulitis or colitis in the descending and sigmoid colon junction. She was given IVF and started on protonix and zosyn for intra-abdominal coverage. Hgb noted to be 10 from a baseline of 13. She was admitted to hospital medicine for observation. Hgb continued to downtrend to 8 and was transfused for  weakness and active bleeding. GI was consulted and she underwent endoscopy 12/7 with normal esophagus, erythematous mucosa in the pylorus (biopsied), normal duodenum, 10mm lesion at incisura (biopsied). She subsequently underwent colonoscopy 12/9 and several large diverticula in the sigmoid colon was seen with hematin throughout the colon; blood pooling also noted in the sigmoid colon, during which clip was placed for IR localization. She received 1U pRBC during the procedure and IR was contacted by GI for angio and possible intervention that same night. IR angio did not reveal any bleeding from SMA or NATALIO branches. MICU was consulted given patient's active GI bleed and risk of instability. Patient was hemodynamically stable and satting well on room air when seen in PACU. Will be admitted to MICU for close observation overnight after IR evaluation.     Hospital/ICU Course:  Patient hgb continues to trend down despite a total of 9 units PRBCs, 1 unit FFP, 1 unit plts. Patient intermittently tachycardic to 110s, but remains hypertensive. Attempting to keep SBP <160 to help reduce bleeding. CRC, GI, and IR continue to follow without any further interventions at this time. Pt continued to have dark, loose stools with clots, which have decreased on 12/11.     Interval History/Significant Events: Hgb 8.5 s/p units yesterday evening. Hgb trended down to 6.5 this afternoon. Hemodynamically stable. Will give 2 additional units. CRS not recommending colectomy at this time but will continue to follow. GI does not think further colonoscopy would be helpful. IR following, however, in the setting of gradual bleed, CTA unlikely to be informative. Ionized garry low overnight, repleted and corrected. Will recheck this evening after additional blood    Review of Systems  Objective:     Vital Signs (Most Recent):  Temp: 99.2 °F (37.3 °C) (12/11/19 1553)  Pulse: 102 (12/11/19 1553)  Resp: 20 (12/11/19 1553)  BP: (!) 165/70 (12/11/19  1553)  SpO2: (!) 92 % (12/11/19 1553) Vital Signs (24h Range):  Temp:  [97.4 °F (36.3 °C)-99.2 °F (37.3 °C)] 99.2 °F (37.3 °C)  Pulse:  [] 102  Resp:  [10-49] 20  SpO2:  [91 %-100 %] 92 %  BP: (100-179)/(58-74) 165/70   Weight: 83.6 kg (184 lb 4.9 oz)  Body mass index is 32.65 kg/m².      Intake/Output Summary (Last 24 hours) at 12/11/2019 1613  Last data filed at 12/11/2019 1400  Gross per 24 hour   Intake 1636 ml   Output --   Net 1636 ml       Physical Exam   Constitutional: She appears well-developed and well-nourished. No distress.   HENT:   Mouth/Throat: Oropharynx is clear and moist. No oropharyngeal exudate.   Bruising over L cranium   Eyes: Pupils are equal, round, and reactive to light. EOM are normal. No scleral icterus.   Neck: Normal range of motion. Neck supple.   Cardiovascular: Normal rate, regular rhythm and normal heart sounds.   No murmur heard.  Pulmonary/Chest: Effort normal and breath sounds normal. No respiratory distress. She exhibits no tenderness.   Abdominal: Soft. Bowel sounds are normal. She exhibits no distension. There is no tenderness. There is no guarding.   Continued to have 2+ dark loose stools with clots this AM.    Musculoskeletal: Normal range of motion. She exhibits no edema, tenderness or deformity.   Neurological:   Drowsy and confused from sedation, but answered questions appropriately, not overly somnolent   Skin: Skin is warm and dry. She is not diaphoretic. No erythema.   Small dark purple ecchymoses over forearms   Psychiatric: She has a normal mood and affect. Her behavior is normal. Judgment and thought content normal.   Nursing note and vitals reviewed.      Vents:  Oxygen Concentration (%): 28 (12/10/19 2129)  Lines/Drains/Airways     Peripheral Intravenous Line                 Peripheral IV - Single Lumen 12/09/19 1153 20 G Right Antecubital 2 days         Midline Catheter Insertion/Assessment  - Single Lumen 12/11/19 0935 Right basilic vein (medial side of  arm) 18g x 10cm less than 1 day         Peripheral IV - Single Lumen 12/10/19 1745 18 G Right Upper Arm less than 1 day              Significant Labs:    CBC/Anemia Profile:  Recent Labs   Lab 12/10/19  2038 12/11/19  0431 12/11/19  1427   WBC 22.02* 15.92* 14.77*   HGB 8.7* 7.9* 6.5*   HCT 26.6* 23.8* 19.9*   * 147* 145*   MCV 91 92 93   RDW 15.9* 15.9* 16.5*        Chemistries:  Recent Labs   Lab 12/10/19  0342 12/10/19  2008 12/11/19  0431    141 142   K 3.8 3.9 3.9    111* 112*   CO2 18* 22* 19*   BUN 16 25* 29*   CREATININE 0.8 0.9 0.9   CALCIUM 7.5* 7.9* 7.9*   ALBUMIN 2.6*  --  2.2*   PROT 4.9*  --  4.1*   BILITOT 0.4  --  0.4   ALKPHOS 39*  --  40*   ALT 13  --  12   AST 20  --  24   MG 1.8 1.9 2.3   PHOS 3.5  --  2.9       ABG  No results for input(s): PH, PO2, PCO2, HCO3, BE in the last 168 hours.  Assessment/Plan:     Cardiac/Vascular  Essential hypertension  On losartan 75mg daily at home  -PRN hydralazine to keep SBP<160    - Hold home antihypertensives in setting of active GI bleed  - Monitor BP, restart meds as necessary after bleed is controlled    Renal/  Hematuria  Nonobstructing nephrolithiasis on CT abdo  UA with 3+ occult blood, trace leukocytes, >100 RBCs, 12 WBC  UCx with growth of multiple organisms  Renal function stable    - Continue to monitor daily renal function    Oncology  Acute blood loss anemia  S/p 9U pRBCs, 1 u FFP, 1 u Plts during this admission    8.0>6.5 (2 units)>8.7>7.9>6.5 (2 units)    - Monitoring CBC q8h  - Transfuse for Hgb <7 unless hemodynamically stable     Endocrine  Hypothyroidism, postsurgical  History of multinodular goiter s/p total thyroidectomy 9/2011    - Continue synthroid 125mcg daily    GI  * Diverticulitis of large intestine without perforation or abscess with bleeding  History of diverticulosis, last episode in chart was 9/2016 with outpatient management  Last colonoscopy 4/2019  - three 3-6mm polyps in transverse colon and ascending  "colon (removed), diverticulosis in the sigmoid colon and descending colon  Prior to admission, was seen by CRS outpatient, anoscopy with melanotic stool on exam  12/7 EGD - Normal esophagus, erythematous mucosa in pylorus (biopsied), normal duodenal bulb, 2nd and 3rd portions, 10mm submucosal soft lesion at incisura (biopsied)  12/9 colonoscopy - Suspect active bleed from sigmoid diverticula, clip placed for IR localization  -S/p 9U pRBCs since admit (one intra-op during c-scope)  -12/9 IR angio did not reveal any bleeding branches of NATALIO, SMA including area around clip placed by GI  Patient continues to have persistently dark loose stools with clot      - CRS and GI following - follow-up recs  - CBC q6h  - Transfuse for Hgb <7 or if patient develops hemodynamic instability in setting of large bleed  - Continue zosyn - started 12/7  - Continue protonix 40mg IV bid    GIB (gastrointestinal bleeding)  See "diverticulitis"    Other  Abnormal CT of the head  Bruising noted on examination  12/9 CT head non con - R frontal extracranial soft tissue swelling without evidence of underlying fracture or acute intracranial hemorrhage. Mild generalized cerebral volume loss and chronic microvascular ischemic change. 3.5cm sclerotic lesion R frontal calvarium with few additional smaller sclerotic foci; additional large well-circumscribed lucent lesions in the parietal bones bilaterally - may represent findings of Paget's disease (in patient's history).    - Monitor for acute neurologic changes       Critical Care Daily Checklist:    A: Awake: RASS Goal/Actual Goal: RASS Goal: 0-->alert and calm  Actual: Ron Agitation Sedation Scale (RASS): Alert and calm   B: Spontaneous Breathing Trial Performed?     C: SAT & SBT Coordinated?  N/A                      D: Delirium: CAM-ICU Overall CAM-ICU: Negative   E: Early Mobility Performed?    F: Feeding Goal:    Status:     Current Diet Order   Procedures    Diet NPO Except for: " Medication     Order Specific Question:   Except for     Answer:   Medication      AS: Analgesia/Sedation    T: Thromboembolic Prophylaxis    H: HOB > 300 Yes   U: Stress Ulcer Prophylaxis (if needed) PPI   G: Glucose Control    B: Bowel Function Stool Occurrence: 1   I: Indwelling Catheter (Lines & Conner) Necessity 2 PIV, midline placed 12/11   D: De-escalation of Antimicrobials/Pharmacotherapies     Plan for the day/ETD     Code Status:  Family/Goals of Care: Full Code         Critical secondary to Patient has a condition that poses threat to life and bodily function: Acute lower GI bleed     Critical care was time spent personally by me on the following activities: development of treatment plan with patient or surrogate and bedside caregivers, discussions with consultants, evaluation of patient's response to treatment, examination of patient, ordering and performing treatments and interventions, ordering and review of laboratory studies, ordering and review of radiographic studies, pulse oximetry, re-evaluation of patient's condition. This critical care time did not overlap with that of any other provider or involve time for any procedures.     Ronaldo Flores MD  Critical Care Medicine  [facility]

## 2019-12-11 NOTE — PLAN OF CARE
CM at bedside performing discharge planning assessment.  Patient is able to answer questions.  Patient states she was independent prior to hospitalization.  Patient states she lives alone, but her daughter helps her.  No discharge needs are noted at this time.  My health packet given, after informed of it.  Patient verbalized understanding.      Dakota Kerr MD  [address] / [place] [address]      [id number] - [place], [place] - [address] AT [facility]  [address]  [facility] [address]  Phone: [phone] Fax: [phone]      Payor: [employer] MEDICARE / Plan: [employer] 65 / Product Type: Medicare Advantage /     Extended Emergency Contact Information  Primary Emergency Contact: Natalie Tyler   [facility]  Home Phone: [phone]  Relation: Daughter    Future Appointments   Date Time Provider Department Center   12/12/2019 10:15 AM Artur Monet MD [facility] COLON [facility]   2/21/2020  9:00 AM NOM MAMMO3 DX [facility] M[facility]MO [facility]   2/24/2020  7:20 AM Logan Peace MD [facility] HEM ONC [facility]   4/21/2020  7:00 AM LAB, APPOINTMENT [facility] INTMED [facility] LAB IM [facility]          12/11/19 6741   Discharge Assessment   Assessment Type Discharge Planning Assessment   Confirmed/corrected address and phone number on facesheet? Yes   Assessment information obtained from? Patient   Prior to hospitilization cognitive status: Alert/Oriented   Prior to hospitalization functional status: Independent   Current cognitive status: Alert/Oriented   Current Functional Status:   (unable to assess)   Lives With alone   Able to Return to Prior Arrangements yes   Is patient able to care for self after discharge? Unable to determine at this time (comments)   Who are your caregiver(s) and their phone number(s)? Natalie Tyler (daughter)- [phone]   Patient's perception of discharge disposition home health   Readmission Within the Last 30 Days no  previous admission in last 30 days   Patient currently being followed by outpatient case management? No   Patient currently receives any other outside agency services? No   Equipment Currently Used at Home none   Do you have any problems affording any of your prescribed medications? No   Is the patient taking medications as prescribed? yes   Does the patient have transportation home? Yes   Transportation Anticipated family or friend will provide   Dialysis Name and Scheduled days N/A   Does the patient receive services at the [facility]? No   Discharge Plan A Home Health   Discharge Plan B Home with family;Other   DME Needed Upon Discharge  other (see comments)  (TBD)   Patient/Family in Agreement with Plan yes       Mary Cheney MPH, RN, CM  Ext. [phone]

## 2019-12-11 NOTE — PLAN OF CARE
CMICU DAILY GOALS       A: Awake    RASS: Goal - RASS Goal: 0-->alert and calm  Actual - RASS (Ron Agitation-Sedation Scale): 0-->alert and calm   Restraint necessity:    B: Breath   SBT: Not intubated   C: Coordinate A & B, analgesics/sedatives   Pain: managed    SAT: Not intubated  D: Delirium   CAM-ICU: Overall CAM-ICU: Negative  E: Early Mobility   MOVE Screen: Pass   Activity: Activity Management: activity adjusted per tolerance  FAS: Feeding/Nutrition   Diet order: Diet/Nutrition Received: NPO, sips of water,   Fluid restriction:    T: Thrombus   DVT prophylaxis: VTE Required Core Measure: (SCDs) Sequential compression device initiated/maintained  H: HOB Elevation   Head of Bed (HOB): HOB at 20-30 degrees  U: Ulcer Prophylaxis   GI: yes  G: Glucose control   managed    S: Skin   Bundle compliance: yes   Bathing/Skin Care: incontinence care Date: 12/10/19 pm shift  B: Bowel Function   Gi bleed with dark red clots    I: Indwelling Catheters   Conner necessity:     CVC necessity: Yes   IPAD offered: Yes  D: De-escalation Antibx   Yes  Plan for the day   Continue to trend H/H, monitor bp, and transfuse blood.   Family/Goals of care/Code Status   Code Status: Full Code     No acute events throughout day, VS and assessment per flow sheet, patient progressing towards goals as tolerated, plan of care reviewed with Jo-Ann Escobedo and family, all concerns addressed, will continue to monitor.

## 2019-12-11 NOTE — PLAN OF CARE
Pt AOx4. Calm and cooperative with care. Pt had several bloody BMs throughout the day. Critical care team updated as needed. Pt received 2 units PRBCs this shift and serial labs followed. Pt hypertensive requiring prn hydralazine. Remains ST, afebrile. Critical care team at bedside approx 1845, reviewed pt vitals, total BM's, lab work, transfusions and update pt on plan of care.  Also notified of limited IV access. 18g placed by anesthsia however MD plan to gain another access. Pt family at bedside during visiting hours. Awaiting critical care bed. Safety maintained, call light in reach. Report given to night nurse.

## 2019-12-11 NOTE — SUBJECTIVE & OBJECTIVE
Interval History/Significant Events: Hgb 8.5 s/p units yesterday evening. Hgb trended down to 6.5 this afternoon. Hemodynamically stable. Will give 2 additional units. CRS not recommending colectomy at this time but will continue to follow. GI does not think further colonoscopy would be helpful. IR following, however, in the setting of gradual bleed, CTA unlikely to be informative. Ionized garry low overnight, repleted and corrected. Will recheck this evening after additional blood    Review of Systems  Objective:     Vital Signs (Most Recent):  Temp: 99.2 °F (37.3 °C) (12/11/19 1553)  Pulse: 102 (12/11/19 1553)  Resp: 20 (12/11/19 1553)  BP: (!) 165/70 (12/11/19 1553)  SpO2: (!) 92 % (12/11/19 1553) Vital Signs (24h Range):  Temp:  [97.4 °F (36.3 °C)-99.2 °F (37.3 °C)] 99.2 °F (37.3 °C)  Pulse:  [] 102  Resp:  [10-49] 20  SpO2:  [91 %-100 %] 92 %  BP: (100-179)/(58-74) 165/70   Weight: 83.6 kg (184 lb 4.9 oz)  Body mass index is 32.65 kg/m².      Intake/Output Summary (Last 24 hours) at 12/11/2019 1613  Last data filed at 12/11/2019 1400  Gross per 24 hour   Intake 1636 ml   Output --   Net 1636 ml       Physical Exam   Constitutional: She appears well-developed and well-nourished. No distress.   HENT:   Mouth/Throat: Oropharynx is clear and moist. No oropharyngeal exudate.   Bruising over L cranium   Eyes: Pupils are equal, round, and reactive to light. EOM are normal. No scleral icterus.   Neck: Normal range of motion. Neck supple.   Cardiovascular: Normal rate, regular rhythm and normal heart sounds.   No murmur heard.  Pulmonary/Chest: Effort normal and breath sounds normal. No respiratory distress. She exhibits no tenderness.   Abdominal: Soft. Bowel sounds are normal. She exhibits no distension. There is no tenderness. There is no guarding.   Continued to have 2+ dark loose stools with clots this AM.    Musculoskeletal: Normal range of motion. She exhibits no edema, tenderness or deformity.    Neurological:   Drowsy and confused from sedation, but answered questions appropriately, not overly somnolent   Skin: Skin is warm and dry. She is not diaphoretic. No erythema.   Small dark purple ecchymoses over forearms   Psychiatric: She has a normal mood and affect. Her behavior is normal. Judgment and thought content normal.   Nursing note and vitals reviewed.      Vents:  Oxygen Concentration (%): 28 (12/10/19 2129)  Lines/Drains/Airways     Peripheral Intravenous Line                 Peripheral IV - Single Lumen 12/09/19 1153 20 G Right Antecubital 2 days         Midline Catheter Insertion/Assessment  - Single Lumen 12/11/19 0935 Right basilic vein (medial side of arm) 18g x 10cm less than 1 day         Peripheral IV - Single Lumen 12/10/19 1745 18 G Right Upper Arm less than 1 day              Significant Labs:    CBC/Anemia Profile:  Recent Labs   Lab 12/10/19  2038 12/11/19 0431 12/11/19  1427   WBC 22.02* 15.92* 14.77*   HGB 8.7* 7.9* 6.5*   HCT 26.6* 23.8* 19.9*   * 147* 145*   MCV 91 92 93   RDW 15.9* 15.9* 16.5*        Chemistries:  Recent Labs   Lab 12/10/19  0342 12/10/19  2008 12/11/19  0431    141 142   K 3.8 3.9 3.9    111* 112*   CO2 18* 22* 19*   BUN 16 25* 29*   CREATININE 0.8 0.9 0.9   CALCIUM 7.5* 7.9* 7.9*   ALBUMIN 2.6*  --  2.2*   PROT 4.9*  --  4.1*   BILITOT 0.4  --  0.4   ALKPHOS 39*  --  40*   ALT 13  --  12   AST 20  --  24   MG 1.8 1.9 2.3   PHOS 3.5  --  2.9

## 2019-12-11 NOTE — PROGRESS NOTES
Pt seen and examined  COntinued bleeding.  Did receive blood overnight.  2 units?  Passing clots.  HDS    Slightly tachy but otherwise stable  Lower midline incision    A/P  No acute plan for surgical intervention.  Recommend repeated attempts at localizing with c-scope with clipping/ ect.  Or repeat IR.  Could consider provacative angio.  Currently diverticular bleed is suspected but has not been confirmed .  Surgery would entail total abdominal colectomy with ileostomy which confers significant risk of morbidity over procedural intervention.  Will continue to follow, agree that waiting for possible anticoagulants to be cleared is appropriate as the majority of diverticular bleeds are self limited.    Will follow.   Would consider more urgent intervention if pt is hemodynamically unstable and is in hemorraghic shock that can not be compensated

## 2019-12-11 NOTE — CONSULTS
Single lumen 18G x 10CM midline placed right basilic vein. Max dwell date 1/9/20, Lot# [id number].  Needle advanced into the vessel under real time ultrasound guidance.  Image recorded and saved.

## 2019-12-11 NOTE — PLAN OF CARE
CMICU DAILY GOALS       A: Awake    RASS: Goal - RASS Goal: 0-->alert and calm  Actual - RASS (Ron Agitation-Sedation Scale): 0-->alert and calm   Restraint necessity:    B: Breath   SBT: Not intubated   C: Coordinate A & B, analgesics/sedatives   Pain: managed    SAT: Not intubated  D: Delirium   CAM-ICU: Overall CAM-ICU: Negative  E: Early Mobility   MOVE Screen: Pass   Activity: Activity Management: activity adjusted per tolerance, activity clustered for rest period  FAS: Feeding/Nutrition   Diet order: Diet/Nutrition Received: NPO,   Fluid restriction:    T: Thrombus   DVT prophylaxis: VTE Required Core Measure: (SCDs) Sequential compression device initiated/maintained  H: HOB Elevation   Head of Bed (HOB): HOB at 20-30 degrees  U: Ulcer Prophylaxis   GI: yes  G: Glucose control   managed    S: Skin   Bundle compliance: yes   Bathing/Skin Care: bath, complete, bath, chlorhexidine, back care, incontinence care, linen changed Date: 12/10 shift pm   B: Bowel Function   diarrhea   I: Indwelling Catheters   Conner necessity:     CVC necessity: No   IPAD offered: Yes  D: De-escalation Antibx   N/a  Plan for the day   NPO since midnight, consult w/ GI for possible scope. H/h stable over night WCTM   Family/Goals of care/Code Status   Code Status: Full Code     No acute events throughout day, VS and assessment per flow sheet, patient progressing towards goals as tolerated, plan of care reviewed with Jo-Ann Escobedo and family, all concerns addressed, will continue to monitor.

## 2019-12-11 NOTE — ASSESSMENT & PLAN NOTE
S/p 9U pRBCs, 1 u FFP, 1 u Plts during this admission    8.0>6.5 (2 units)>8.7>7.9>6.5 (2 units)    - Monitoring CBC q8h  - Transfuse for Hgb <7 unless hemodynamically stable

## 2019-12-11 NOTE — ASSESSMENT & PLAN NOTE
History of diverticulosis, last episode in chart was 9/2016 with outpatient management  Last colonoscopy 4/2019  - three 3-6mm polyps in transverse colon and ascending colon (removed), diverticulosis in the sigmoid colon and descending colon  Prior to admission, was seen by CRS outpatient, anoscopy with melanotic stool on exam  12/7 EGD - Normal esophagus, erythematous mucosa in pylorus (biopsied), normal duodenal bulb, 2nd and 3rd portions, 10mm submucosal soft lesion at incisura (biopsied)  12/9 colonoscopy - Suspect active bleed from sigmoid diverticula, clip placed for IR localization  -S/p 9U pRBCs since admit (one intra-op during c-scope)  -12/9 IR angio did not reveal any bleeding branches of NATALIO, SMA including area around clip placed by GI  Patient continues to have persistently dark loose stools with clot      - CRS and GI following - follow-up recs  - CBC q6h  - Transfuse for Hgb <7 or if patient develops hemodynamic instability in setting of large bleed  - Continue zosyn - started 12/7  - Continue protonix 40mg IV bid

## 2019-12-11 NOTE — HOSPITAL COURSE
Patient hgb continues to trend down despite a total of 10 units PRBCs, 1 unit FFP, 1 unit plts. Patient intermittently tachycardic to 110s, but remains hypertensive. Attempting to keep SBP <160 to help reduce bleeding. CRC, GI, and IR continue to follow without any further interventions at this time. Pt continued to have dark, loose stools with clots, which have decreased on 12/11. One more unit overnight on 12/12. Hgb stable x4 over 12/12-12/13. Bleeding likely stopped. Patient stable for stepdown.

## 2019-12-12 ENCOUNTER — PATIENT MESSAGE (OUTPATIENT)
Dept: SURGERY | Facility: CLINIC | Age: 79
End: 2019-12-12

## 2019-12-12 LAB
ALBUMIN SERPL BCP-MCNC: 2 G/DL (ref 3.5–5.2)
ALP SERPL-CCNC: 33 U/L (ref 55–135)
ALT SERPL W/O P-5'-P-CCNC: 11 U/L (ref 10–44)
ANION GAP SERPL CALC-SCNC: 5 MMOL/L (ref 8–16)
ANION GAP SERPL CALC-SCNC: 6 MMOL/L (ref 8–16)
AST SERPL-CCNC: 21 U/L (ref 10–40)
BASOPHILS # BLD AUTO: 0.03 K/UL (ref 0–0.2)
BASOPHILS # BLD AUTO: 0.05 K/UL (ref 0–0.2)
BASOPHILS # BLD AUTO: 0.06 K/UL (ref 0–0.2)
BASOPHILS # BLD AUTO: 0.07 K/UL (ref 0–0.2)
BASOPHILS # BLD AUTO: 0.07 K/UL (ref 0–0.2)
BASOPHILS NFR BLD: 0.3 % (ref 0–1.9)
BASOPHILS NFR BLD: 0.5 % (ref 0–1.9)
BASOPHILS NFR BLD: 0.6 % (ref 0–1.9)
BILIRUB SERPL-MCNC: 0.4 MG/DL (ref 0.1–1)
BLD PROD TYP BPU: NORMAL
BLOOD UNIT EXPIRATION DATE: NORMAL
BLOOD UNIT TYPE CODE: 5100
BLOOD UNIT TYPE: NORMAL
BUN SERPL-MCNC: 21 MG/DL (ref 8–23)
BUN SERPL-MCNC: 29 MG/DL (ref 8–23)
CALCIUM SERPL-MCNC: 7.1 MG/DL (ref 8.7–10.5)
CALCIUM SERPL-MCNC: 7.4 MG/DL (ref 8.7–10.5)
CHLORIDE SERPL-SCNC: 109 MMOL/L (ref 95–110)
CHLORIDE SERPL-SCNC: 111 MMOL/L (ref 95–110)
CO2 SERPL-SCNC: 24 MMOL/L (ref 23–29)
CO2 SERPL-SCNC: 25 MMOL/L (ref 23–29)
CODING SYSTEM: NORMAL
CREAT SERPL-MCNC: 0.7 MG/DL (ref 0.5–1.4)
CREAT SERPL-MCNC: 0.8 MG/DL (ref 0.5–1.4)
DIFFERENTIAL METHOD: ABNORMAL
DISPENSE STATUS: NORMAL
EOSINOPHIL # BLD AUTO: 0.1 K/UL (ref 0–0.5)
EOSINOPHIL # BLD AUTO: 0.2 K/UL (ref 0–0.5)
EOSINOPHIL NFR BLD: 0.6 % (ref 0–8)
EOSINOPHIL NFR BLD: 1.4 % (ref 0–8)
EOSINOPHIL NFR BLD: 1.7 % (ref 0–8)
EOSINOPHIL NFR BLD: 2 % (ref 0–8)
EOSINOPHIL NFR BLD: 2.4 % (ref 0–8)
ERYTHROCYTE [DISTWIDTH] IN BLOOD BY AUTOMATED COUNT: 15.5 % (ref 11.5–14.5)
ERYTHROCYTE [DISTWIDTH] IN BLOOD BY AUTOMATED COUNT: 15.5 % (ref 11.5–14.5)
ERYTHROCYTE [DISTWIDTH] IN BLOOD BY AUTOMATED COUNT: 15.6 % (ref 11.5–14.5)
ERYTHROCYTE [DISTWIDTH] IN BLOOD BY AUTOMATED COUNT: 15.6 % (ref 11.5–14.5)
ERYTHROCYTE [DISTWIDTH] IN BLOOD BY AUTOMATED COUNT: 15.9 % (ref 11.5–14.5)
EST. GFR  (AFRICAN AMERICAN): >60 ML/MIN/1.73 M^2
EST. GFR  (AFRICAN AMERICAN): >60 ML/MIN/1.73 M^2
EST. GFR  (NON AFRICAN AMERICAN): >60 ML/MIN/1.73 M^2
EST. GFR  (NON AFRICAN AMERICAN): >60 ML/MIN/1.73 M^2
GLUCOSE SERPL-MCNC: 60 MG/DL (ref 70–110)
GLUCOSE SERPL-MCNC: 90 MG/DL (ref 70–110)
HCT VFR BLD AUTO: 21.7 % (ref 37–48.5)
HCT VFR BLD AUTO: 22.3 % (ref 37–48.5)
HCT VFR BLD AUTO: 24.4 % (ref 37–48.5)
HCT VFR BLD AUTO: 24.4 % (ref 37–48.5)
HCT VFR BLD AUTO: 25.1 % (ref 37–48.5)
HGB BLD-MCNC: 6.7 G/DL (ref 12–16)
HGB BLD-MCNC: 7.1 G/DL (ref 12–16)
HGB BLD-MCNC: 7.8 G/DL (ref 12–16)
HGB BLD-MCNC: 7.8 G/DL (ref 12–16)
HGB BLD-MCNC: 7.9 G/DL (ref 12–16)
IMM GRANULOCYTES # BLD AUTO: 0.33 K/UL (ref 0–0.04)
IMM GRANULOCYTES # BLD AUTO: 0.38 K/UL (ref 0–0.04)
IMM GRANULOCYTES # BLD AUTO: 0.39 K/UL (ref 0–0.04)
IMM GRANULOCYTES # BLD AUTO: 0.46 K/UL (ref 0–0.04)
IMM GRANULOCYTES # BLD AUTO: 0.52 K/UL (ref 0–0.04)
IMM GRANULOCYTES NFR BLD AUTO: 3.8 % (ref 0–0.5)
IMM GRANULOCYTES NFR BLD AUTO: 4.1 % (ref 0–0.5)
IMM GRANULOCYTES NFR BLD AUTO: 4.1 % (ref 0–0.5)
IMM GRANULOCYTES NFR BLD AUTO: 4.2 % (ref 0–0.5)
IMM GRANULOCYTES NFR BLD AUTO: 4.3 % (ref 0–0.5)
LYMPHOCYTES # BLD AUTO: 1 K/UL (ref 1–4.8)
LYMPHOCYTES # BLD AUTO: 1.1 K/UL (ref 1–4.8)
LYMPHOCYTES # BLD AUTO: 1.1 K/UL (ref 1–4.8)
LYMPHOCYTES # BLD AUTO: 1.5 K/UL (ref 1–4.8)
LYMPHOCYTES # BLD AUTO: 1.7 K/UL (ref 1–4.8)
LYMPHOCYTES NFR BLD: 11.5 % (ref 18–48)
LYMPHOCYTES NFR BLD: 11.8 % (ref 18–48)
LYMPHOCYTES NFR BLD: 12.1 % (ref 18–48)
LYMPHOCYTES NFR BLD: 13.5 % (ref 18–48)
LYMPHOCYTES NFR BLD: 14 % (ref 18–48)
MAGNESIUM SERPL-MCNC: 2.1 MG/DL (ref 1.6–2.6)
MCH RBC QN AUTO: 29.3 PG (ref 27–31)
MCH RBC QN AUTO: 29.5 PG (ref 27–31)
MCH RBC QN AUTO: 29.7 PG (ref 27–31)
MCH RBC QN AUTO: 29.8 PG (ref 27–31)
MCH RBC QN AUTO: 30 PG (ref 27–31)
MCHC RBC AUTO-ENTMCNC: 30.9 G/DL (ref 32–36)
MCHC RBC AUTO-ENTMCNC: 31.5 G/DL (ref 32–36)
MCHC RBC AUTO-ENTMCNC: 31.8 G/DL (ref 32–36)
MCHC RBC AUTO-ENTMCNC: 32 G/DL (ref 32–36)
MCHC RBC AUTO-ENTMCNC: 32 G/DL (ref 32–36)
MCV RBC AUTO: 93 FL (ref 82–98)
MCV RBC AUTO: 93 FL (ref 82–98)
MCV RBC AUTO: 94 FL (ref 82–98)
MCV RBC AUTO: 94 FL (ref 82–98)
MCV RBC AUTO: 95 FL (ref 82–98)
MONOCYTES # BLD AUTO: 1 K/UL (ref 0.3–1)
MONOCYTES # BLD AUTO: 1 K/UL (ref 0.3–1)
MONOCYTES # BLD AUTO: 1.1 K/UL (ref 0.3–1)
MONOCYTES # BLD AUTO: 1.3 K/UL (ref 0.3–1)
MONOCYTES # BLD AUTO: 1.5 K/UL (ref 0.3–1)
MONOCYTES NFR BLD: 11 % (ref 4–15)
MONOCYTES NFR BLD: 11.2 % (ref 4–15)
MONOCYTES NFR BLD: 11.3 % (ref 4–15)
MONOCYTES NFR BLD: 11.7 % (ref 4–15)
MONOCYTES NFR BLD: 12.2 % (ref 4–15)
NEUTROPHILS # BLD AUTO: 6.2 K/UL (ref 1.8–7.7)
NEUTROPHILS # BLD AUTO: 6.5 K/UL (ref 1.8–7.7)
NEUTROPHILS # BLD AUTO: 6.7 K/UL (ref 1.8–7.7)
NEUTROPHILS # BLD AUTO: 7.4 K/UL (ref 1.8–7.7)
NEUTROPHILS # BLD AUTO: 8.6 K/UL (ref 1.8–7.7)
NEUTROPHILS NFR BLD: 68 % (ref 38–73)
NEUTROPHILS NFR BLD: 68.9 % (ref 38–73)
NEUTROPHILS NFR BLD: 70.2 % (ref 38–73)
NEUTROPHILS NFR BLD: 70.6 % (ref 38–73)
NEUTROPHILS NFR BLD: 70.8 % (ref 38–73)
NRBC BLD-RTO: 2 /100 WBC
NRBC BLD-RTO: 2 /100 WBC
NRBC BLD-RTO: 3 /100 WBC
PHOSPHATE SERPL-MCNC: 2.4 MG/DL (ref 2.7–4.5)
PLATELET # BLD AUTO: 105 K/UL (ref 150–350)
PLATELET # BLD AUTO: 107 K/UL (ref 150–350)
PLATELET # BLD AUTO: 94 K/UL (ref 150–350)
PLATELET # BLD AUTO: 97 K/UL (ref 150–350)
PLATELET # BLD AUTO: 97 K/UL (ref 150–350)
PMV BLD AUTO: 10.2 FL (ref 9.2–12.9)
PMV BLD AUTO: 10.3 FL (ref 9.2–12.9)
PMV BLD AUTO: 10.3 FL (ref 9.2–12.9)
PMV BLD AUTO: 10.5 FL (ref 9.2–12.9)
PMV BLD AUTO: 10.6 FL (ref 9.2–12.9)
POTASSIUM SERPL-SCNC: 3.5 MMOL/L (ref 3.5–5.1)
POTASSIUM SERPL-SCNC: 3.7 MMOL/L (ref 3.5–5.1)
PROT SERPL-MCNC: 3.5 G/DL (ref 6–8.4)
RBC # BLD AUTO: 2.29 M/UL (ref 4–5.4)
RBC # BLD AUTO: 2.41 M/UL (ref 4–5.4)
RBC # BLD AUTO: 2.6 M/UL (ref 4–5.4)
RBC # BLD AUTO: 2.62 M/UL (ref 4–5.4)
RBC # BLD AUTO: 2.66 M/UL (ref 4–5.4)
SODIUM SERPL-SCNC: 139 MMOL/L (ref 136–145)
SODIUM SERPL-SCNC: 141 MMOL/L (ref 136–145)
TRANS ERYTHROCYTES VOL PATIENT: NORMAL ML
TROPONIN I SERPL DL<=0.01 NG/ML-MCNC: 0.14 NG/ML (ref 0–0.03)
WBC # BLD AUTO: 10.82 K/UL (ref 3.9–12.7)
WBC # BLD AUTO: 12.43 K/UL (ref 3.9–12.7)
WBC # BLD AUTO: 8.69 K/UL (ref 3.9–12.7)
WBC # BLD AUTO: 9.18 K/UL (ref 3.9–12.7)
WBC # BLD AUTO: 9.57 K/UL (ref 3.9–12.7)

## 2019-12-12 PROCEDURE — 63700000 PHARM REV CODE 250 ALT 637 W/O HCPCS: Performed by: PHYSICIAN ASSISTANT

## 2019-12-12 PROCEDURE — 80053 COMPREHEN METABOLIC PANEL: CPT

## 2019-12-12 PROCEDURE — 25000003 PHARM REV CODE 250: Performed by: STUDENT IN AN ORGANIZED HEALTH CARE EDUCATION/TRAINING PROGRAM

## 2019-12-12 PROCEDURE — 84100 ASSAY OF PHOSPHORUS: CPT

## 2019-12-12 PROCEDURE — 25000003 PHARM REV CODE 250: Performed by: PHYSICIAN ASSISTANT

## 2019-12-12 PROCEDURE — 94761 N-INVAS EAR/PLS OXIMETRY MLT: CPT

## 2019-12-12 PROCEDURE — 63600175 PHARM REV CODE 636 W HCPCS: Performed by: STUDENT IN AN ORGANIZED HEALTH CARE EDUCATION/TRAINING PROGRAM

## 2019-12-12 PROCEDURE — 63600175 PHARM REV CODE 636 W HCPCS: Performed by: INTERNAL MEDICINE

## 2019-12-12 PROCEDURE — 20000000 HC ICU ROOM

## 2019-12-12 PROCEDURE — 63600175 PHARM REV CODE 636 W HCPCS: Performed by: PHYSICIAN ASSISTANT

## 2019-12-12 PROCEDURE — 99233 SBSQ HOSP IP/OBS HIGH 50: CPT | Mod: GC,,, | Performed by: INTERNAL MEDICINE

## 2019-12-12 PROCEDURE — P9021 RED BLOOD CELLS UNIT: HCPCS

## 2019-12-12 PROCEDURE — 85025 COMPLETE CBC W/AUTO DIFF WBC: CPT | Mod: 91

## 2019-12-12 PROCEDURE — 80048 BASIC METABOLIC PNL TOTAL CA: CPT

## 2019-12-12 PROCEDURE — C9113 INJ PANTOPRAZOLE SODIUM, VIA: HCPCS | Performed by: PHYSICIAN ASSISTANT

## 2019-12-12 PROCEDURE — 99233 PR SUBSEQUENT HOSPITAL CARE,LEVL III: ICD-10-PCS | Mod: GC,,, | Performed by: INTERNAL MEDICINE

## 2019-12-12 PROCEDURE — 83735 ASSAY OF MAGNESIUM: CPT

## 2019-12-12 RX ORDER — HYDROCODONE BITARTRATE AND ACETAMINOPHEN 500; 5 MG/1; MG/1
TABLET ORAL
Status: DISCONTINUED | OUTPATIENT
Start: 2019-12-12 | End: 2019-12-14 | Stop reason: HOSPADM

## 2019-12-12 RX ORDER — SODIUM,POTASSIUM PHOSPHATES 280-250MG
2 POWDER IN PACKET (EA) ORAL 3 TIMES DAILY
Status: DISCONTINUED | OUTPATIENT
Start: 2019-12-12 | End: 2019-12-12

## 2019-12-12 RX ORDER — METOPROLOL TARTRATE 1 MG/ML
INJECTION, SOLUTION INTRAVENOUS
Status: DISPENSED
Start: 2019-12-12 | End: 2019-12-12

## 2019-12-12 RX ORDER — POTASSIUM CHLORIDE 20 MEQ/1
40 TABLET, EXTENDED RELEASE ORAL ONCE
Status: COMPLETED | OUTPATIENT
Start: 2019-12-12 | End: 2019-12-12

## 2019-12-12 RX ORDER — SODIUM,POTASSIUM PHOSPHATES 280-250MG
2 POWDER IN PACKET (EA) ORAL 3 TIMES DAILY
Status: COMPLETED | OUTPATIENT
Start: 2019-12-12 | End: 2019-12-12

## 2019-12-12 RX ADMIN — PIPERACILLIN AND TAZOBACTAM 4.5 G: 4; .5 INJECTION, POWDER, FOR SOLUTION INTRAVENOUS at 11:12

## 2019-12-12 RX ADMIN — POTASSIUM & SODIUM PHOSPHATES POWDER PACK 280-160-250 MG 2 PACKET: 280-160-250 PACK at 08:12

## 2019-12-12 RX ADMIN — FERROUS SULFATE TAB EC 325 MG (65 MG FE EQUIVALENT) 325 MG: 325 (65 FE) TABLET DELAYED RESPONSE at 08:12

## 2019-12-12 RX ADMIN — CYANOCOBALAMIN TAB 250 MCG 500 MCG: 250 TAB at 08:12

## 2019-12-12 RX ADMIN — FENOFIBRATE 160 MG: 160 TABLET ORAL at 08:12

## 2019-12-12 RX ADMIN — PANTOPRAZOLE SODIUM 40 MG: 40 INJECTION, POWDER, FOR SOLUTION INTRAVENOUS at 09:12

## 2019-12-12 RX ADMIN — POTASSIUM & SODIUM PHOSPHATES POWDER PACK 280-160-250 MG 2 PACKET: 280-160-250 PACK at 03:12

## 2019-12-12 RX ADMIN — POTASSIUM CHLORIDE 40 MEQ: 1500 TABLET, EXTENDED RELEASE ORAL at 06:12

## 2019-12-12 RX ADMIN — SODIUM CHLORIDE, SODIUM LACTATE, POTASSIUM CHLORIDE, AND CALCIUM CHLORIDE 1000 ML: .6; .31; .03; .02 INJECTION, SOLUTION INTRAVENOUS at 03:12

## 2019-12-12 RX ADMIN — PANTOPRAZOLE SODIUM 40 MG: 40 INJECTION, POWDER, FOR SOLUTION INTRAVENOUS at 08:12

## 2019-12-12 RX ADMIN — PIPERACILLIN AND TAZOBACTAM 4.5 G: 4; .5 INJECTION, POWDER, FOR SOLUTION INTRAVENOUS at 05:12

## 2019-12-12 RX ADMIN — LEVOTHYROXINE SODIUM 125 MCG: 25 TABLET ORAL at 06:12

## 2019-12-12 RX ADMIN — PIPERACILLIN AND TAZOBACTAM 4.5 G: 4; .5 INJECTION, POWDER, FOR SOLUTION INTRAVENOUS at 03:12

## 2019-12-12 RX ADMIN — METOPROLOL TARTRATE 5 MG: 5 INJECTION INTRAVENOUS at 12:12

## 2019-12-12 RX ADMIN — POTASSIUM & SODIUM PHOSPHATES POWDER PACK 280-160-250 MG 2 PACKET: 280-160-250 PACK at 06:12

## 2019-12-12 NOTE — PROGRESS NOTES
[facility]  Critical Care Medicine  Progress Note    Patient Name: Jo-Ann Escobedo  MRN: [id number]  Admission Date: 12/6/2019  Hospital Length of Stay: 3 days  Code Status: Full Code  Attending Provider: Alejandra Polanco MD  Primary Care Provider: Dakota Kerr MD   Principal Problem: Diverticulitis of large intestine without perforation or abscess with bleeding    Subjective:     HPI:  Ms. 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 History obtained via chart review as patient was still somnolent on examination by critical care team. She was seen by CRS outpatient day of admission during which GYPSY performed revealed no masses, but positive for melanotic blood; during anoscopy, anoscope filled with melanotic stool and foul odor prompting presentation to ED for further evaluation of GI bleed. Of note, patient was seen by primary care physician 9/2019 and restarted on diclofenac PRN at that time, unsure of frequency she has been taking that recently. Renal function unremarkable.     On initial presentation in the ED, vital signs were stable and no leukocytosis noted. UA showed 3+ occult blood with >100 RBCs and 12 WBCs. CTA abdo/pelvis without evidence of acute bleed, but was concerning for uncomplicated diverticulitis or colitis in the descending and sigmoid colon junction. She was given IVF and started on protonix and zosyn for intra-abdominal coverage. Hgb noted to be 10 from a baseline of 13. She was admitted to hospital medicine for observation. Hgb continued to downtrend to 8 and was transfused for  weakness and active bleeding. GI was consulted and she underwent endoscopy 12/7 with normal esophagus, erythematous mucosa in the pylorus (biopsied), normal duodenum, 10mm lesion at incisura (biopsied). She subsequently underwent colonoscopy 12/9 and several large diverticula in the sigmoid colon was seen with hematin throughout the colon; blood pooling also noted in the sigmoid colon, during which clip was placed for IR localization. She received 1U pRBC during the procedure and IR was contacted by GI for angio and possible intervention that same night. IR angio did not reveal any bleeding from SMA or NATALIO branches. MICU was consulted given patient's active GI bleed and risk of instability. Patient was hemodynamically stable and satting well on room air when seen in PACU. Will be admitted to MICU for close observation overnight after IR evaluation.     Hospital/ICU Course:  Patient hgb continues to trend down despite a total of 10 units PRBCs, 1 unit FFP, 1 unit plts. Patient intermittently tachycardic to 110s, but remains hypertensive. Attempting to keep SBP <160 to help reduce bleeding. CRC, GI, and IR continue to follow without any further interventions at this time. Pt continued to have dark, loose stools with clots, which have decreased on 12/11. One more unit overnight, hgb stable today, BMs appearing less dark.    Interval History/Significant Events: Tachycardic overnight responding to IV lopressor. Remains hemodynamically stable today. Hgb 7.9>7.8. Will continue to trend. No further interventions planned by GI or CRS at this time.    Review of Systems   Constitutional: Positive for fatigue. Negative for activity change, appetite change, diaphoresis and fever.   HENT: Negative for nosebleeds and trouble swallowing.    Eyes: Negative for pain and visual disturbance.   Respiratory: Negative for cough, chest tightness and shortness of breath.    Cardiovascular: Negative for chest pain, palpitations and leg  swelling.   Gastrointestinal: Positive for abdominal pain and blood in stool. Negative for constipation, diarrhea, nausea and vomiting.   Genitourinary: Negative for dysuria and hematuria.   Musculoskeletal: Positive for arthralgias. Negative for back pain and joint swelling.   Skin: Negative for color change and pallor.   Neurological: Negative for tremors, weakness, light-headedness and headaches.   Psychiatric/Behavioral: Negative for confusion.     Objective:     Vital Signs (Most Recent):  Temp: 98.6 °F (37 °C) (12/12/19 0800)  Pulse: 70 (12/12/19 0916)  Resp: 17 (12/12/19 0916)  BP: (!) 122/56 (12/12/19 0800)  SpO2: 96 % (12/12/19 0916) Vital Signs (24h Range):  Temp:  [97.7 °F (36.5 °C)-99.2 °F (37.3 °C)] 98.6 °F (37 °C)  Pulse:  [] 70  Resp:  [15-27] 17  SpO2:  [91 %-99 %] 96 %  BP: (105-171)/(54-71) 122/56   Weight: 84.6 kg (186 lb 8.2 oz)  Body mass index is 33.04 kg/m².      Intake/Output Summary (Last 24 hours) at 12/12/2019 1437  Last data filed at 12/12/2019 0800  Gross per 24 hour   Intake 2095.25 ml   Output --   Net 2095.25 ml       Physical Exam   Constitutional: She appears well-developed and well-nourished. No distress.   HENT:   Mouth/Throat: Oropharynx is clear and moist. No oropharyngeal exudate.   Bruising over L cranium   Eyes: Pupils are equal, round, and reactive to light. EOM are normal. No scleral icterus.   Neck: Normal range of motion. Neck supple.   Cardiovascular: Normal rate, regular rhythm and normal heart sounds.   No murmur heard.  Pulmonary/Chest: Effort normal and breath sounds normal. No respiratory distress. She exhibits no tenderness.   Abdominal: Soft. Bowel sounds are normal. She exhibits no distension. There is no tenderness. There is no guarding.   Musculoskeletal: Normal range of motion. She exhibits no edema, tenderness or deformity.   Skin: Skin is warm and dry. She is not diaphoretic. No erythema.   Small dark purple ecchymoses over forearms   Psychiatric: She  has a normal mood and affect. Her behavior is normal. Judgment and thought content normal.   Nursing note and vitals reviewed.      Vents:  Oxygen Concentration (%): 28 (12/10/19 2129)  Lines/Drains/Airways     Peripheral Intravenous Line                 Peripheral IV - Single Lumen 12/09/19 1153 20 G Right Antecubital 3 days         Midline Catheter Insertion/Assessment  - Single Lumen 12/11/19 0935 Right basilic vein (medial side of arm) 18g x 10cm 1 day         Peripheral IV - Single Lumen 12/10/19 1745 18 G Right Upper Arm 1 day              Significant Labs:    CBC/Anemia Profile:  Recent Labs   Lab 12/12/19  0323 12/12/19  1000 12/12/19  1300   WBC 10.82 9.57 9.18   HGB 6.7* 7.9* 7.8*   HCT 21.7* 25.1* 24.4*   * 97* 94*   MCV 95 94 93   RDW 15.6* 15.5* 15.5*        Chemistries:  Recent Labs   Lab 12/11/19  0431 12/11/19  1700 12/12/19  0323    142 141   K 3.9 3.8 3.5   * 110 111*   CO2 19* 23 25   BUN 29* 32* 29*   CREATININE 0.9 0.9 0.8   CALCIUM 7.9* 7.8* 7.4*   ALBUMIN 2.2*  --  2.0*   PROT 4.1*  --  3.5*   BILITOT 0.4  --  0.4   ALKPHOS 40*  --  33*   ALT 12  --  11   AST 24  --  21   MG 2.3 2.2 2.1   PHOS 2.9 2.5* 2.4*           ABG  No results for input(s): PH, PO2, PCO2, HCO3, BE in the last 168 hours.  Assessment/Plan:     Cardiac/Vascular  Essential hypertension  On losartan 75mg daily at home  -PRN hydralazine to keep SBP<160    - Hold home antihypertensives in setting of active GI bleed  - Monitor BP, restart meds as necessary after bleed is controlled    Renal/  Hematuria  Nonobstructing nephrolithiasis on CT abdo  UA with 3+ occult blood, trace leukocytes, >100 RBCs, 12 WBC  UCx with growth of multiple organisms  Renal function stable    - Continue to monitor daily renal function    Oncology  Acute blood loss anemia  S/p 9U pRBCs, 1 u FFP, 1 u Plts during this admission    8.0>6.5 (2 units)>8.7>7.9>6.5 (2 units, 1L IVF)>7.1>6.7 (1u)> 7.9>7.8    - Monitoring CBC q8h  -  "Transfuse for Hgb <7 unless hemodynamically stable     Endocrine  Hypothyroidism, postsurgical  History of multinodular goiter s/p total thyroidectomy 9/2011    - Continue synthroid 125mcg daily    GI  * Diverticulitis of large intestine without perforation or abscess with bleeding  History of diverticulosis, last episode in chart was 9/2016 with outpatient management  Last colonoscopy 4/2019  - three 3-6mm polyps in transverse colon and ascending colon (removed), diverticulosis in the sigmoid colon and descending colon  Prior to admission, was seen by CRS outpatient, anoscopy with melanotic stool on exam  12/7 EGD - Normal esophagus, erythematous mucosa in pylorus (biopsied), normal duodenal bulb, 2nd and 3rd portions, 10mm submucosal soft lesion at incisura (biopsied)  12/9 colonoscopy - Suspect active bleed from sigmoid diverticula, clip placed for IR localization  -S/p 9U pRBCs since admit (one intra-op during c-scope)  -12/9 IR angio did not reveal any bleeding branches of NATALIO, SMA including area around clip placed by GI  -12/11 Patient continues to have persistently dark loose stools with clots  -12/12 Decreasing dark BMs, hemodynamically stable, hgb stable s/p 1 unit this AM    Total tranfusion: 10u PRBCs, 1u FFP, 1u plts      - CRS and GI following - follow-up recs  - CBC q6h  - Transfuse for Hgb <7 or if patient develops hemodynamic instability in setting of large bleed  - Continue zosyn - started 12/7  - Continue protonix 40mg IV bid    GIB (gastrointestinal bleeding)  See "diverticulitis"    Other  Abnormal CT of the head  Bruising noted on examination  12/9 CT head non con - R frontal extracranial soft tissue swelling without evidence of underlying fracture or acute intracranial hemorrhage. Mild generalized cerebral volume loss and chronic microvascular ischemic change. 3.5cm sclerotic lesion R frontal calvarium with few additional smaller sclerotic foci; additional large well-circumscribed lucent " lesions in the parietal bones bilaterally - may represent findings of Paget's disease (in patient's history).    - Monitor for acute neurologic changes       Critical Care Daily Checklist:    A: Awake: RASS Goal/Actual Goal: RASS Goal: (P) 0-->alert and calm  Actual: Ron Agitation Sedation Scale (RASS): (P) Alert and calm   B: Spontaneous Breathing Trial Performed?     C: SAT & SBT Coordinated?                        D: Delirium: CAM-ICU Overall CAM-ICU: (P) Negative   E: Early Mobility Performed?    F: Feeding Goal:    Status:     Current Diet Order   Procedures    Diet NPO Except for: Medication     Order Specific Question:   Except for     Answer:   Medication      AS: Analgesia/Sedation none   T: Thromboembolic Prophylaxis SCDs   H: HOB > 300 Yes   U: Stress Ulcer Prophylaxis (if needed) PPI IV   G: Glucose Control    B: Bowel Function Stool Occurrence: 1   I: Indwelling Catheter (Lines & Conner) Necessity PIVs   D: De-escalation of Antimicrobials/Pharmacotherapies Zosyn for diverticulitis    Plan for the day/ETD     Code Status:  Family/Goals of Care: Full Code         Critical secondary to Patient has a condition that poses threat to life and bodily function:      Critical care was time spent personally by me on the following activities: development of treatment plan with patient or surrogate and bedside caregivers, discussions with consultants, evaluation of patient's response to treatment, examination of patient, ordering and performing treatments and interventions, ordering and review of laboratory studies, ordering and review of radiographic studies, pulse oximetry, re-evaluation of patient's condition. This critical care time did not overlap with that of any other provider or involve time for any procedures.     Ronaldo Flores MD  Critical Care Medicine  [facility]

## 2019-12-12 NOTE — ASSESSMENT & PLAN NOTE
S/p 9U pRBCs, 1 u FFP, 1 u Plts during this admission    8.0>6.5 (2 units)>8.7>7.9>6.5 (2 units, 1L IVF)>7.1>6.7 (1u)> 7.9>7.8    - Monitoring CBC q8h  - Transfuse for Hgb <7 unless hemodynamically stable

## 2019-12-12 NOTE — CONSULTS
Vascular and Interventional Radiology Consult      Date: 12/12/2019   Primary team: [facility] CRITICAL CARE MEDICINE, Alejandra Polanco MD   Room/bed: 6079/6079 A    Reason for Consult:   LGIB.    History of Present Illness:  Jo-Ann Escobedo is a 79 y.o. female, admitted 12/12/2019 with LGIB. Known to VIR. Briefly, pt with suspected intermittent diverticular LGIB. Attempted treatment by scope. Previous angio negative. Previous CTA with no target. VIR asked to reassess for provocative angiogram. No plans for surgery per CRS. Repeat Cscope unlikely to be helpful.    Pt seen and examined at bedside. Has responded to 3U today, Hb currently at 7.8 from 6.5 this AM. No BRBP. Melena subsiding. Feels better and made jokes. Per daughter, pt looks much better. However, this waxing and waning has been the theme.     Past Medical History:  Past Medical History:   Diagnosis Date    After-cataract of both eyes - Both Eyes 9/26/2012    Allergy     Arthritis     Breast cancer     Diverticulosis     Hyperlipidemia     Hypertension     Hypothyroidism     Paget disease of bone     Squamous Cell Carcinoma     in situ right neck     Thyroid disease        Past Surgical History:  Past Surgical History:   Procedure Laterality Date    CATARACT EXTRACTION W/  INTRAOCULAR LENS IMPLANT Bilateral     COLONOSCOPY N/A 4/15/2019    Procedure: COLONOSCOPY;  Surgeon: Artur Monet MD;  [facility]  Service: Endoscopy;  Laterality: N/A;  within 1 month    COLONOSCOPY N/A 12/9/2019    Procedure: COLONOSCOPY;  Surgeon: Clyde Welch MD;  Location: [facility] (2ND FLR);  Service: Endoscopy;  Laterality: N/A;    ESOPHAGOGASTRODUODENOSCOPY N/A 12/7/2019    Procedure: EGD (ESOPHAGOGASTRODUODENOSCOPY);  Surgeon: Clyde Welch MD;  Location: [facility] (2ND FLR);  Service: Endoscopy;  Laterality: N/A;    EXCISIONAL BIOPSY Right 6/27/2019    Procedure: EXCISIONAL BIOPSY-breast;  Surgeon: Suki Dill MD;  Location:  [facility] OR 2ND FLR;  Service: General;  Laterality: Right;    EYE SURGERY      HYSTERECTOMY      INJECTION OF ANESTHETIC AGENT AROUND MEDIAL BRANCH NERVES INNERVATING LUMBAR FACET JOINT Bilateral 2019    Procedure: BLOCK, NERVE, FACET JOINT, LUMBAR, MEDIAL BRANCH-deo MBB L4/5,L5/S1;  Surgeon: Jarvis Morales III, MD;  Location: [facility] CATH LAB;  Service: Pain Management;  Laterality: Bilateral;    KNEE ARTHROSCOPY N/A     pt unsure of which knee     MASTECTOMY      SPINE SURGERY      TOTAL THYROIDECTOMY      YAG Laser Capsulotomy  Left 2019    Dr. Hanh        Sedation History:    No known adverse reactions.     Social History:  Social History     Tobacco Use    Smoking status: Former Smoker     Packs/day: 2.00     Years: 44.00     Pack years: 88.00     Types: Cigarettes     Last attempt to quit: 1969     Years since quittin.9    Smokeless tobacco: Never Used   Substance Use Topics    Alcohol use: Yes     Alcohol/week: 0.0 standard drinks     Frequency: Monthly or less     Drinks per session: 1 or 2     Binge frequency: Never     Comment: maybe a beer every 3 months    Drug use: No        Home Medications:   Prior to Admission medications    Medication Sig Start Date End Date Taking? Authorizing Provider   cholecalciferol, vitamin D3, (VITAMIN D3) 2,000 unit Tab Take 1 tablet by mouth once daily.    Historical Provider, MD   co-enzyme Q-10 30 mg capsule Take 30 mg by mouth once daily.     Historical Provider, MD   fenofibrate 160 MG Tab Take 1 tablet (160 mg total) by mouth once daily. 19  Dakota Kerr MD   ferrous sulfate (IRON ORAL) Take by mouth once daily.     Historical Provider, MD   LACTOBACILLUS COMBINATION NO.8 (ADULT PROBIOTIC ORAL) Take by mouth once daily.     Historical Provider, MD   levothyroxine (SYNTHROID) 125 MCG tablet TAKE 1 TABLET (125 MCG TOTAL) BY MOUTH AFTER DINNER. 19   Dakota Kerr MD   losartan (COZAAR) 50 MG tablet Take 1.5  tablets (75 mg total) by mouth once daily. 10/31/19 10/30/20  Dakota Kerr MD   meclizine (ANTIVERT) 12.5 mg tablet Take 1 tablet (12.5 mg total) by mouth 3 (three) times daily as needed for Dizziness. 10/24/19   Dakota Kerr MD   omeprazole (PRILOSEC) 40 MG capsule TAKE 1 CAPSULE (40 MG TOTAL) BY MOUTH BEFORE DINNER. 9/1/19   Historical Provider, MD   omeprazole (PRILOSEC) 40 MG capsule TAKE 1 CAPSULE (40 MG TOTAL) BY MOUTH BEFORE DINNER. 11/25/19 2/23/20  Benji Prasad MD   turmeric root extract 500 mg Cap Take by mouth once daily.     Historical Provider, MD   valACYclovir (VALTREX) 1000 MG tablet Take 1 tablet (1,000 mg total) by mouth 3 (three) times daily. for 7 days 9/18/19 9/25/19  Dakota Kerr MD       Inpatient Medications:    Current Facility-Administered Medications:     0.9%  NaCl infusion (for blood administration), , Intravenous, Q24H PRN, Radha Luna Valeriamarciano Quintanilla MD    acetaminophen tablet 650 mg, 650 mg, Oral, Q4H PRN, Sridhar Bai PA-C    albuterol-ipratropium 2.5 mg-0.5 mg/3 mL nebulizer solution 3 mL, 3 mL, Nebulization, Q4H PRN, Sridhar Bai PA-C    cyanocobalamin tablet 500 mcg, 500 mcg, Oral, Daily, Nenita Zavala PA-C, 500 mcg at 12/12/19 0824    dextrose 10% (D10W) Bolus, 12.5 g, Intravenous, PRN, Sridhar Bai PA-C    dextrose 10% (D10W) Bolus, 25 g, Intravenous, PRN, Sridhar Bai PA-C    fenofibrate tablet 160 mg, 160 mg, Oral, Daily, Sridhar Bai PA-C, 160 mg at 12/12/19 0849    ferrous sulfate EC tablet 325 mg, 325 mg, Oral, Daily, Sridhar Bai PA-C, 325 mg at 12/12/19 0823    glucagon (human recombinant) injection 1 mg, 1 mg, Intramuscular, PRN, Sridhar Bai PA-C    glucose chewable tablet 16 g, 16 g, Oral, PRN, Sridhar Bai PA-C    glucose chewable tablet 24 g, 24 g, Oral, PRN, Sridhar Bai PA-C    hydrALAZINE injection 10 mg, 10 mg, Intravenous, Q6H PRN, Radha Luna Valeriamarciano Quintanilla MD, 10 mg at 12/10/19 6298     lactated ringers bolus 1,000 mL, 1,000 mL, Intravenous, Once, Ronaldo Flores MD    levothyroxine tablet 125 mcg, 125 mcg, Oral, Before breakfast, Sridhar Bai PA-C, 125 mcg at 12/12/19 0609    melatonin tablet 9 mg, 9 mg, Oral, Nightly PRN, Sridhar Bai PA-C    ondansetron injection 4 mg, 4 mg, Intravenous, Q8H PRN, Radha Quintanilla MD, 4 mg at 12/10/19 1818    pantoprazole injection 40 mg, 40 mg, Intravenous, BID, Sridhar Bai PA-C, 40 mg at 12/12/19 0823    piperacillin-tazobactam 4.5 g in sodium chloride 0.9% 100 mL IVPB (ready to mix system), 4.5 g, Intravenous, Q8H, Alejandra Polanco MD    potassium, sodium phosphates 280-160-250 mg packet 2 packet, 2 packet, Oral, TID, Radha Quintanilla MD, 2 packet at 12/12/19 0824    promethazine (PHENERGAN) 6.25 mg in dextrose 5 % 50 mL IVPB, 6.25 mg, Intravenous, Q6H PRN, Sridhar Bai PA-C    senna-docusate 8.6-50 mg per tablet 1 tablet, 1 tablet, Oral, BID PRN, Sridhar Bai PA-C    sodium chloride 0.9% flush 10 mL, 10 mL, Intravenous, PRN, Cris García MD     Anticoagulants/Antiplatelets:   None.     Allergies:   Review of patient's allergies indicates:   Allergen Reactions    Voltaren [diclofenac sodium] Other (See Comments)     Hematochezia and hospitalization for hematochezia.    Codeine Diarrhea and Hallucinations    Niacin preparations      Redness, warming       Review of Systems:   As documented in primary provider H&P.    Vital Signs:  Temp: 98.6 °F (37 °C) (12/12/19 0800)  Pulse: 70 (12/12/19 0916)  Resp: 17 (12/12/19 0916)  BP: (!) 122/56 (12/12/19 0800)  SpO2: 96 % (12/12/19 0916)    Temp:  [97.7 °F (36.5 °C)-99.2 °F (37.3 °C)]   Pulse:  []   Resp:  [15-27]   BP: (105-171)/(54-71)   SpO2:  [91 %-99 %]      Physical Exam:  No acute distress, laying comfortably in bed, pleasant and cooperative  Regular rate and rhythm  Breathing unlabored  Abdomen benign  Extremities warm and well perfused    Sedation  Exam:  ASA: II - Patient appears to have mild systemic disease, adequately controlled  Mallampati score: I (soft palate, uvula, fauces, and tonsillar pillars visible)    Laboratory:  Lab Results   Component Value Date    INR 1.3 (H) 12/10/2019       Lab Results   Component Value Date    WBC 9.18 12/12/2019    HGB 7.8 (L) 12/12/2019    HCT 24.4 (L) 12/12/2019    MCV 93 12/12/2019    PLT 94 (L) 12/12/2019      Lab Results   Component Value Date    GLU 90 12/12/2019     12/12/2019    K 3.5 12/12/2019     (H) 12/12/2019    CO2 25 12/12/2019    BUN 29 (H) 12/12/2019    CREATININE 0.8 12/12/2019    CALCIUM 7.4 (L) 12/12/2019    MG 2.1 12/12/2019    ALT 11 12/12/2019    AST 21 12/12/2019    ALBUMIN 2.0 (L) 12/12/2019    BILITOT 0.4 12/12/2019    BILIDIR 0.2 07/31/2014       Imaging:   Reviewed.      ASSESSMENT/PLAN/RECOMMENDATIONS:   Intermittent LGIB, suspected to be diverticular. Frustrating and affecting QOL for patient. Options were discussed, including provocative angiogram. Pt and family are agreeable. Will plan to monitor overnight and if she does not continue to improve or shows any signs of worsening (more melena, non-responsive to PRBC, change in symptoms...), we will perform provocative angiogram in AM, with OR and CRS on standby. This was discussed with VIR staff, Dr. Bynum.    Sedation Plan: moderate  Patient will undergo: provocative angiogram and possible embolization      Emiliano Herndon MD  R4, Diagnostic and Interventional Radiology  Pager: [phone]

## 2019-12-12 NOTE — PLAN OF CARE
CMICU DAILY GOALS       A: Awake    RASS: Goal - RASS Goal: 0-->alert and calm  Actual - RASS (Ron Agitation-Sedation Scale): 0-->alert and calm   Restraint necessity:    B: Breath   SBT: Not intubated   C: Coordinate A & B, analgesics/sedatives   Pain: managed    SAT: Not intubated  D: Delirium   CAM-ICU: Overall CAM-ICU: Negative  E: Early Mobility   MOVE Screen: Pass   Activity: Activity Management: activity adjusted per tolerance  FAS: Feeding/Nutrition   Diet order: Diet/Nutrition Received: NPO, sips of water,   Fluid restriction:    T: Thrombus   DVT prophylaxis: VTE Required Core Measure: (SCDs) Sequential compression device initiated/maintained  H: HOB Elevation   Head of Bed (HOB): HOB at 20-30 degrees  U: Ulcer Prophylaxis   GI: yes  G: Glucose control   managed    S: Skin   Bundle compliance: yes   Bathing/Skin Care: incontinence care Date:   B: Bowel Function   no issues no BM   I: Indwelling Catheters   Conner necessity:     CVC necessity: No   IPAD offered: No  D: De-escalation Antibx   Yes  Plan for the day   trend CBC, monitor vitals r/t Pt went into Afib RVR during pm shift. 5 of lopressor given and pt in NS. I unit of blood given.   Family/Goals of care/Code Status   Code Status: Full Code     No acute events throughout day, VS and assessment per flow sheet, patient progressing towards goals as tolerated, plan of care reviewed with Jo-Ann Escobedo and family, all concerns addressed, will continue to monitor.

## 2019-12-12 NOTE — CONSULTS
"Ostomy care consulted for possible TAC marking  PMH:   diverticulosis (history of diverticulitis and abscess), HTN, history of multinodular goiter s/p total thyroidectomy 9/2011 with secondary hypothyroidism on synthroid, HLD, Paget's disease of bone, remote history of breast cancer (s/p L modified radical mastectomy 17 years ago, taxotere and AC, recent lumpectomy 6/2019 - patient decided against XRT at that time) who presented on 12/6 for BRBPR with melena and diarrhea  Assessment:  The abdominal rectus muscle was identified.  Umbilicus to illiac crest line determined.   The patient has a skin fold along the waistline that may create issues for pouching.   Ileostomy marking done ~ 2" up from waistline and 2" lateral of umbilicus on right side.   Pre-op teaching done with patient and daughter, handbook in room.  JESUS Mancera RN, CWCN  [id number]  "

## 2019-12-12 NOTE — SUBJECTIVE & OBJECTIVE
Interval History/Significant Events: Tachycardic overnight responding to IV lopressor. Remains hemodynamically stable today. Hgb 7.9>7.8. Will continue to trend. No further interventions planned by GI or CRS at this time.    Review of Systems   Constitutional: Positive for fatigue. Negative for activity change, appetite change, diaphoresis and fever.   HENT: Negative for nosebleeds and trouble swallowing.    Eyes: Negative for pain and visual disturbance.   Respiratory: Negative for cough, chest tightness and shortness of breath.    Cardiovascular: Negative for chest pain, palpitations and leg swelling.   Gastrointestinal: Positive for abdominal pain and blood in stool. Negative for constipation, diarrhea, nausea and vomiting.   Genitourinary: Negative for dysuria and hematuria.   Musculoskeletal: Positive for arthralgias. Negative for back pain and joint swelling.   Skin: Negative for color change and pallor.   Neurological: Negative for tremors, weakness, light-headedness and headaches.   Psychiatric/Behavioral: Negative for confusion.     Objective:     Vital Signs (Most Recent):  Temp: 98.6 °F (37 °C) (12/12/19 0800)  Pulse: 70 (12/12/19 0916)  Resp: 17 (12/12/19 0916)  BP: (!) 122/56 (12/12/19 0800)  SpO2: 96 % (12/12/19 0916) Vital Signs (24h Range):  Temp:  [97.7 °F (36.5 °C)-99.2 °F (37.3 °C)] 98.6 °F (37 °C)  Pulse:  [] 70  Resp:  [15-27] 17  SpO2:  [91 %-99 %] 96 %  BP: (105-171)/(54-71) 122/56   Weight: 84.6 kg (186 lb 8.2 oz)  Body mass index is 33.04 kg/m².      Intake/Output Summary (Last 24 hours) at 12/12/2019 1437  Last data filed at 12/12/2019 0800  Gross per 24 hour   Intake 2095.25 ml   Output --   Net 2095.25 ml       Physical Exam   Constitutional: She appears well-developed and well-nourished. No distress.   HENT:   Mouth/Throat: Oropharynx is clear and moist. No oropharyngeal exudate.   Bruising over L cranium   Eyes: Pupils are equal, round, and reactive to light. EOM are normal. No  scleral icterus.   Neck: Normal range of motion. Neck supple.   Cardiovascular: Normal rate, regular rhythm and normal heart sounds.   No murmur heard.  Pulmonary/Chest: Effort normal and breath sounds normal. No respiratory distress. She exhibits no tenderness.   Abdominal: Soft. Bowel sounds are normal. She exhibits no distension. There is no tenderness. There is no guarding.   Musculoskeletal: Normal range of motion. She exhibits no edema, tenderness or deformity.   Skin: Skin is warm and dry. She is not diaphoretic. No erythema.   Small dark purple ecchymoses over forearms   Psychiatric: She has a normal mood and affect. Her behavior is normal. Judgment and thought content normal.   Nursing note and vitals reviewed.      Vents:  Oxygen Concentration (%): 28 (12/10/19 2129)  Lines/Drains/Airways     Peripheral Intravenous Line                 Peripheral IV - Single Lumen 12/09/19 1153 20 G Right Antecubital 3 days         Midline Catheter Insertion/Assessment  - Single Lumen 12/11/19 0935 Right basilic vein (medial side of arm) 18g x 10cm 1 day         Peripheral IV - Single Lumen 12/10/19 1745 18 G Right Upper Arm 1 day              Significant Labs:    CBC/Anemia Profile:  Recent Labs   Lab 12/12/19  0323 12/12/19  1000 12/12/19  1300   WBC 10.82 9.57 9.18   HGB 6.7* 7.9* 7.8*   HCT 21.7* 25.1* 24.4*   * 97* 94*   MCV 95 94 93   RDW 15.6* 15.5* 15.5*        Chemistries:  Recent Labs   Lab 12/11/19  0431 12/11/19  1700 12/12/19  0323    142 141   K 3.9 3.8 3.5   * 110 111*   CO2 19* 23 25   BUN 29* 32* 29*   CREATININE 0.9 0.9 0.8   CALCIUM 7.9* 7.8* 7.4*   ALBUMIN 2.2*  --  2.0*   PROT 4.1*  --  3.5*   BILITOT 0.4  --  0.4   ALKPHOS 40*  --  33*   ALT 12  --  11   AST 24  --  21   MG 2.3 2.2 2.1   PHOS 2.9 2.5* 2.4*

## 2019-12-12 NOTE — PROGRESS NOTES
Patient seen and examined. Received 1 unit of blood this morning for hemoglobin of 6.7, bumped appropriately to 7.9.  This was stable on afternoon labs at 7.8.  Patient and her family member states that she has had 2 bowel movements this morning, looks primarily like old blood.  Tachycardia resolved with labetalol treatment by Intensive Care Unit.    Marked by enterostomal therapist this morning.    Discussed case with Dr. Bynum from Interventional Radiology.  Remain hopeful that bleeding will self resolve as NSAIDs are cleared, clinically appears to be slowing down.  If continues to bleed tonight into tomorrow, would recommend proceeding with repeat angiography with possible provocative angiogram.  Patient and her family member understand that this would confer an increased risk of bleeding that may be difficult to stop.    Radha Elise

## 2019-12-12 NOTE — ASSESSMENT & PLAN NOTE
History of diverticulosis, last episode in chart was 9/2016 with outpatient management  Last colonoscopy 4/2019  - three 3-6mm polyps in transverse colon and ascending colon (removed), diverticulosis in the sigmoid colon and descending colon  Prior to admission, was seen by CRS outpatient, anoscopy with melanotic stool on exam  12/7 EGD - Normal esophagus, erythematous mucosa in pylorus (biopsied), normal duodenal bulb, 2nd and 3rd portions, 10mm submucosal soft lesion at incisura (biopsied)  12/9 colonoscopy - Suspect active bleed from sigmoid diverticula, clip placed for IR localization  -S/p 9U pRBCs since admit (one intra-op during c-scope)  -12/9 IR angio did not reveal any bleeding branches of NATALIO, SMA including area around clip placed by GI  -12/11 Patient continues to have persistently dark loose stools with clots  -12/12 Decreasing dark BMs, hemodynamically stable, hgb stable s/p 1 unit this AM    Total tranfusion: 10u PRBCs, 1u FFP, 1u plts      - CRS and GI following - follow-up recs  - CBC q6h  - Transfuse for Hgb <7 or if patient develops hemodynamic instability in setting of large bleed  - Continue zosyn - started 12/7  - Continue protonix 40mg IV bid

## 2019-12-13 PROBLEM — D69.6 THROMBOCYTOPENIA, UNSPECIFIED: Status: ACTIVE | Noted: 2019-12-13

## 2019-12-13 LAB
ABO + RH BLD: NORMAL
ALBUMIN SERPL BCP-MCNC: 2.1 G/DL (ref 3.5–5.2)
ALP SERPL-CCNC: 39 U/L (ref 55–135)
ALT SERPL W/O P-5'-P-CCNC: 18 U/L (ref 10–44)
ANION GAP SERPL CALC-SCNC: 8 MMOL/L (ref 8–16)
AST SERPL-CCNC: 32 U/L (ref 10–40)
BASOPHILS # BLD AUTO: 0.03 K/UL (ref 0–0.2)
BASOPHILS # BLD AUTO: 0.03 K/UL (ref 0–0.2)
BASOPHILS # BLD AUTO: 0.04 K/UL (ref 0–0.2)
BASOPHILS # BLD AUTO: 0.04 K/UL (ref 0–0.2)
BASOPHILS NFR BLD: 0.4 % (ref 0–1.9)
BASOPHILS NFR BLD: 0.4 % (ref 0–1.9)
BASOPHILS NFR BLD: 0.6 % (ref 0–1.9)
BASOPHILS NFR BLD: 0.6 % (ref 0–1.9)
BILIRUB SERPL-MCNC: 0.6 MG/DL (ref 0.1–1)
BLD GP AB SCN CELLS X3 SERPL QL: NORMAL
BUN SERPL-MCNC: 17 MG/DL (ref 8–23)
CALCIUM SERPL-MCNC: 7.6 MG/DL (ref 8.7–10.5)
CHLORIDE SERPL-SCNC: 109 MMOL/L (ref 95–110)
CO2 SERPL-SCNC: 25 MMOL/L (ref 23–29)
CREAT SERPL-MCNC: 0.8 MG/DL (ref 0.5–1.4)
DIFFERENTIAL METHOD: ABNORMAL
EOSINOPHIL # BLD AUTO: 0.2 K/UL (ref 0–0.5)
EOSINOPHIL NFR BLD: 2.6 % (ref 0–8)
EOSINOPHIL NFR BLD: 2.9 % (ref 0–8)
EOSINOPHIL NFR BLD: 3 % (ref 0–8)
EOSINOPHIL NFR BLD: 3.3 % (ref 0–8)
ERYTHROCYTE [DISTWIDTH] IN BLOOD BY AUTOMATED COUNT: 16.3 % (ref 11.5–14.5)
ERYTHROCYTE [DISTWIDTH] IN BLOOD BY AUTOMATED COUNT: 16.3 % (ref 11.5–14.5)
ERYTHROCYTE [DISTWIDTH] IN BLOOD BY AUTOMATED COUNT: 16.8 % (ref 11.5–14.5)
ERYTHROCYTE [DISTWIDTH] IN BLOOD BY AUTOMATED COUNT: 17.3 % (ref 11.5–14.5)
EST. GFR  (AFRICAN AMERICAN): >60 ML/MIN/1.73 M^2
EST. GFR  (NON AFRICAN AMERICAN): >60 ML/MIN/1.73 M^2
GLUCOSE SERPL-MCNC: 60 MG/DL (ref 70–110)
HCT VFR BLD AUTO: 23.2 % (ref 37–48.5)
HCT VFR BLD AUTO: 24.5 % (ref 37–48.5)
HCT VFR BLD AUTO: 25.5 % (ref 37–48.5)
HCT VFR BLD AUTO: 27 % (ref 37–48.5)
HGB BLD-MCNC: 7.3 G/DL (ref 12–16)
HGB BLD-MCNC: 7.6 G/DL (ref 12–16)
HGB BLD-MCNC: 7.9 G/DL (ref 12–16)
HGB BLD-MCNC: 8.4 G/DL (ref 12–16)
IMM GRANULOCYTES # BLD AUTO: 0.21 K/UL (ref 0–0.04)
IMM GRANULOCYTES # BLD AUTO: 0.23 K/UL (ref 0–0.04)
IMM GRANULOCYTES # BLD AUTO: 0.28 K/UL (ref 0–0.04)
IMM GRANULOCYTES # BLD AUTO: 0.28 K/UL (ref 0–0.04)
IMM GRANULOCYTES NFR BLD AUTO: 3.3 % (ref 0–0.5)
IMM GRANULOCYTES NFR BLD AUTO: 3.6 % (ref 0–0.5)
IMM GRANULOCYTES NFR BLD AUTO: 3.8 % (ref 0–0.5)
IMM GRANULOCYTES NFR BLD AUTO: 4.1 % (ref 0–0.5)
LYMPHOCYTES # BLD AUTO: 0.8 K/UL (ref 1–4.8)
LYMPHOCYTES # BLD AUTO: 1 K/UL (ref 1–4.8)
LYMPHOCYTES # BLD AUTO: 1.1 K/UL (ref 1–4.8)
LYMPHOCYTES # BLD AUTO: 1.1 K/UL (ref 1–4.8)
LYMPHOCYTES NFR BLD: 11.3 % (ref 18–48)
LYMPHOCYTES NFR BLD: 12.9 % (ref 18–48)
LYMPHOCYTES NFR BLD: 17.5 % (ref 18–48)
LYMPHOCYTES NFR BLD: 18 % (ref 18–48)
MAGNESIUM SERPL-MCNC: 2 MG/DL (ref 1.6–2.6)
MCH RBC QN AUTO: 29.7 PG (ref 27–31)
MCH RBC QN AUTO: 29.8 PG (ref 27–31)
MCHC RBC AUTO-ENTMCNC: 31 G/DL (ref 32–36)
MCHC RBC AUTO-ENTMCNC: 31 G/DL (ref 32–36)
MCHC RBC AUTO-ENTMCNC: 31.1 G/DL (ref 32–36)
MCHC RBC AUTO-ENTMCNC: 31.5 G/DL (ref 32–36)
MCV RBC AUTO: 94 FL (ref 82–98)
MCV RBC AUTO: 96 FL (ref 82–98)
MONOCYTES # BLD AUTO: 0.8 K/UL (ref 0.3–1)
MONOCYTES # BLD AUTO: 0.9 K/UL (ref 0.3–1)
MONOCYTES # BLD AUTO: 0.9 K/UL (ref 0.3–1)
MONOCYTES # BLD AUTO: 1 K/UL (ref 0.3–1)
MONOCYTES NFR BLD: 12 % (ref 4–15)
MONOCYTES NFR BLD: 12.1 % (ref 4–15)
MONOCYTES NFR BLD: 14.6 % (ref 4–15)
MONOCYTES NFR BLD: 15.9 % (ref 4–15)
NEUTROPHILS # BLD AUTO: 3.7 K/UL (ref 1.8–7.7)
NEUTROPHILS # BLD AUTO: 3.8 K/UL (ref 1.8–7.7)
NEUTROPHILS # BLD AUTO: 4.8 K/UL (ref 1.8–7.7)
NEUTROPHILS # BLD AUTO: 5 K/UL (ref 1.8–7.7)
NEUTROPHILS NFR BLD: 59.2 % (ref 38–73)
NEUTROPHILS NFR BLD: 60.4 % (ref 38–73)
NEUTROPHILS NFR BLD: 68.3 % (ref 38–73)
NEUTROPHILS NFR BLD: 69.2 % (ref 38–73)
NRBC BLD-RTO: 1 /100 WBC
PHOSPHATE SERPL-MCNC: 3.3 MG/DL (ref 2.7–4.5)
PLATELET # BLD AUTO: 106 K/UL (ref 150–350)
PLATELET # BLD AUTO: 131 K/UL (ref 150–350)
PLATELET # BLD AUTO: 145 K/UL (ref 150–350)
PLATELET # BLD AUTO: 95 K/UL (ref 150–350)
PMV BLD AUTO: 10 FL (ref 9.2–12.9)
PMV BLD AUTO: 10.3 FL (ref 9.2–12.9)
PMV BLD AUTO: 10.4 FL (ref 9.2–12.9)
PMV BLD AUTO: 10.7 FL (ref 9.2–12.9)
POTASSIUM SERPL-SCNC: 3.7 MMOL/L (ref 3.5–5.1)
PROT SERPL-MCNC: 3.9 G/DL (ref 6–8.4)
RBC # BLD AUTO: 2.46 M/UL (ref 4–5.4)
RBC # BLD AUTO: 2.55 M/UL (ref 4–5.4)
RBC # BLD AUTO: 2.65 M/UL (ref 4–5.4)
RBC # BLD AUTO: 2.82 M/UL (ref 4–5.4)
SODIUM SERPL-SCNC: 142 MMOL/L (ref 136–145)
WBC # BLD AUTO: 6.29 K/UL (ref 3.9–12.7)
WBC # BLD AUTO: 6.36 K/UL (ref 3.9–12.7)
WBC # BLD AUTO: 6.88 K/UL (ref 3.9–12.7)
WBC # BLD AUTO: 7.34 K/UL (ref 3.9–12.7)

## 2019-12-13 PROCEDURE — 86850 RBC ANTIBODY SCREEN: CPT

## 2019-12-13 PROCEDURE — 25000003 PHARM REV CODE 250: Performed by: PHYSICIAN ASSISTANT

## 2019-12-13 PROCEDURE — 63600175 PHARM REV CODE 636 W HCPCS: Performed by: PHYSICIAN ASSISTANT

## 2019-12-13 PROCEDURE — 27000221 HC OXYGEN, UP TO 24 HOURS

## 2019-12-13 PROCEDURE — 85025 COMPLETE CBC W/AUTO DIFF WBC: CPT | Mod: 91

## 2019-12-13 PROCEDURE — 80053 COMPREHEN METABOLIC PANEL: CPT

## 2019-12-13 PROCEDURE — 99233 PR SUBSEQUENT HOSPITAL CARE,LEVL III: ICD-10-PCS | Mod: ,,, | Performed by: INTERNAL MEDICINE

## 2019-12-13 PROCEDURE — 97116 GAIT TRAINING THERAPY: CPT

## 2019-12-13 PROCEDURE — 97165 OT EVAL LOW COMPLEX 30 MIN: CPT

## 2019-12-13 PROCEDURE — 63700000 PHARM REV CODE 250 ALT 637 W/O HCPCS: Performed by: PHYSICIAN ASSISTANT

## 2019-12-13 PROCEDURE — 94761 N-INVAS EAR/PLS OXIMETRY MLT: CPT

## 2019-12-13 PROCEDURE — 84100 ASSAY OF PHOSPHORUS: CPT

## 2019-12-13 PROCEDURE — 63600175 PHARM REV CODE 636 W HCPCS: Performed by: INTERNAL MEDICINE

## 2019-12-13 PROCEDURE — 11000001 HC ACUTE MED/SURG PRIVATE ROOM

## 2019-12-13 PROCEDURE — 97162 PT EVAL MOD COMPLEX 30 MIN: CPT

## 2019-12-13 PROCEDURE — 83735 ASSAY OF MAGNESIUM: CPT

## 2019-12-13 PROCEDURE — C9113 INJ PANTOPRAZOLE SODIUM, VIA: HCPCS | Performed by: PHYSICIAN ASSISTANT

## 2019-12-13 PROCEDURE — 99233 SBSQ HOSP IP/OBS HIGH 50: CPT | Mod: ,,, | Performed by: INTERNAL MEDICINE

## 2019-12-13 RX ADMIN — PANTOPRAZOLE SODIUM 40 MG: 40 INJECTION, POWDER, FOR SOLUTION INTRAVENOUS at 08:12

## 2019-12-13 RX ADMIN — FENOFIBRATE 160 MG: 160 TABLET ORAL at 08:12

## 2019-12-13 RX ADMIN — PIPERACILLIN AND TAZOBACTAM 4.5 G: 4; .5 INJECTION, POWDER, FOR SOLUTION INTRAVENOUS at 10:12

## 2019-12-13 RX ADMIN — PIPERACILLIN AND TAZOBACTAM 4.5 G: 4; .5 INJECTION, POWDER, FOR SOLUTION INTRAVENOUS at 05:12

## 2019-12-13 RX ADMIN — CYANOCOBALAMIN TAB 250 MCG 500 MCG: 250 TAB at 08:12

## 2019-12-13 RX ADMIN — LEVOTHYROXINE SODIUM 125 MCG: 25 TABLET ORAL at 06:12

## 2019-12-13 RX ADMIN — FERROUS SULFATE TAB EC 325 MG (65 MG FE EQUIVALENT) 325 MG: 325 (65 FE) TABLET DELAYED RESPONSE at 08:12

## 2019-12-13 RX ADMIN — PIPERACILLIN AND TAZOBACTAM 4.5 G: 4; .5 INJECTION, POWDER, FOR SOLUTION INTRAVENOUS at 02:12

## 2019-12-13 NOTE — PT/OT/SLP EVAL
Physical Therapy Evaluation    Patient Name:  Jo-Ann Escobedo   MRN:  [id number]    Recommendations:     Discharge Recommendations:  home health PT   Discharge Equipment Recommendations: (TBD)    Barriers to discharge: None    Assessment:     Jo-Ann Escobedo is a 79 y.o. female admitted with a medical diagnosis of Diverticulitis of large intestine without perforation or abscess with bleeding.  She presents with the following impairments/functional limitations:  impaired endurance, impaired functional mobilty, gait instability, pain, impaired cardiopulmonary response to activity.     Rehab Prognosis: Good; patient would benefit from acute skilled PT services to address these deficits and reach maximum level of function.    Recent Surgery: Procedure(s) (LRB):  COLONOSCOPY (N/A) 4 Days Post-Op    Plan:     During this hospitalization, patient to be seen 4 x/week to address the identified rehab impairments via gait training, therapeutic activities, therapeutic exercises, neuromuscular re-education and progress toward the following goals:    · Plan of Care Expires:  01/12/20    Subjective     Chief Complaint: R ankle pain with ambulation from old ankle break   Patient/Family Comments/goals: to get better and return home   Pain/Comfort:  · Pain Rating 1: 0/10  · Pain Rating Post-Intervention 1: (R ankle pain with ambulation; unrated)    Patients cultural, spiritual, Oriental orthodox conflicts given the current situation: no    Living Environment:  Pt lives alone with her dog, Rand. Pt resides in 2 story home with 12 Steps to bed/bath with L HR.  Pt was fully independent without AE including driving.  Pt has 2 supportive daughter who live nearby who can assist as needed upon d/c    Objective:     Communicated with RN prior to session and daughter present in room.  Patient found HOB elevated with telemetry, pulse ox (continuous), blood pressure cuff, oxygen, peripheral IV  upon PT entry to room.    General Precautions: Standard, fall    Orthopedic Precautions:N/A   Braces: N/A     Exams:  · Cognitive Exam:   · AAOx4  · Follows multistep commands  · Communication clear/fluent   · RLE ROM: WFL  · RLE Strength: WFL  · LLE ROM: WFL  · LLE Strength: WFL    Functional Mobility:  · Bed Mobility:     · Scooting: contact guard assistance  · Supine to Sit: minimum assistance  · Transfers:     · Sit to Stand:  contact guard assistance with no AD from EOB   · Gait: 15ft with min A and HHA  · No LOB  · Slight SOB  · Pt demo'd decreased courtney, decreased step length, and antalgic gait pattern re: R ankle pain from old break      Therapeutic Activities and Exercises:  Educated pt on PT role/POC  Educated pt on importance of OOB activity and daily ambulation   Pt verbalized understanding     T/f to chair to increase tolerance to OOB activity and to create optimal positioning for lung expansion     AM-PAC 6 CLICK MOBILITY  Total Score:18     Patient left up in chair with all lines intact, call button in reach, RN notified and daughter present.    GOALS:   Multidisciplinary Problems     Physical Therapy Goals        Problem: Physical Therapy Goal    Goal Priority Disciplines Outcome Goal Variances Interventions   Physical Therapy Goal     PT, PT/OT Ongoing, Progressing     Description:  Goals to be met by: 2019    Patient will increase functional independence with mobility by performin. Supine to sit with Supervision - not met  2. Sit to stand transfer with Supervision using LRAD - not met  3. Gait  x 150 feet with Supervision using LRAD - not met  4. Ascend/descend 1 flight of stairs with left Handrails Contact Guard Assistance - not met                      History:     Past Medical History:   Diagnosis Date    After-cataract of both eyes - Both Eyes 2012    Allergy     Arthritis     Breast cancer     Diverticulosis     Hyperlipidemia     Hypertension     Hypothyroidism     Paget disease of bone     Squamous Cell Carcinoma     in  situ right neck     Thyroid disease        Past Surgical History:   Procedure Laterality Date    CATARACT EXTRACTION W/  INTRAOCULAR LENS IMPLANT Bilateral     COLONOSCOPY N/A 4/15/2019    Procedure: COLONOSCOPY;  Surgeon: Artur Monet MD;  Location: [facility] ENDO (4TH FLR);  Service: Endoscopy;  Laterality: N/A;  within 1 month    COLONOSCOPY N/A 12/9/2019    Procedure: COLONOSCOPY;  Surgeon: Clyde Welch MD;  Location: [facility] ENDO (2ND FLR);  Service: Endoscopy;  Laterality: N/A;    ESOPHAGOGASTRODUODENOSCOPY N/A 12/7/2019    Procedure: EGD (ESOPHAGOGASTRODUODENOSCOPY);  Surgeon: Clyde Welch MD;  Location: [facility] ENDO (2ND FLR);  Service: Endoscopy;  Laterality: N/A;    EXCISIONAL BIOPSY Right 6/27/2019    Procedure: EXCISIONAL BIOPSY-breast;  Surgeon: Suki Dill MD;  Location: [facility] OR 2ND FLR;  Service: General;  Laterality: Right;    EYE SURGERY      HYSTERECTOMY      INJECTION OF ANESTHETIC AGENT AROUND MEDIAL BRANCH NERVES INNERVATING LUMBAR FACET JOINT Bilateral 6/7/2019    Procedure: BLOCK, NERVE, FACET JOINT, LUMBAR, MEDIAL BRANCH-deo MBB L4/5,L5/S1;  Surgeon: Jarvis Morales III, MD;  Location: [facility] CATH LAB;  Service: Pain Management;  Laterality: Bilateral;    KNEE ARTHROSCOPY N/A     pt unsure of which knee     MASTECTOMY      SPINE SURGERY      TOTAL THYROIDECTOMY      YAG Laser Capsulotomy  Left 07/29/2019    Dr. Hahn       Time Tracking:     PT Received On: 12/13/19  PT Start Time: 1052     PT Stop Time: 1110  PT Total Time (min): 18 min     Billable Minutes: Evaluation 10 and Gait Training 8      Zohra Oropeza, PT, DPT  12/13/2019  [phone]

## 2019-12-13 NOTE — PLAN OF CARE
Goals and POC established this date.   Problem: Occupational Therapy Goal  Goal: Occupational Therapy Goal  Description  Goals to be met by: 7 days 12/20/19     Patient will increase functional independence with ADLs by performing:    Pt to complete standing g/h skills with supervision.  Pt to complete UE dressing with set-up  Pt to complete LE dressing with JUAN F    Pt to complete toileting with SBA  Pt to complete t/f skills including bed, chair and commode with supervision.      Outcome: Ongoing, Progressing

## 2019-12-13 NOTE — PHYSICIAN QUERY
PT Name: Jo-Ann Escobedo  MR #: [id number]     PHYSICIAN QUERY -  ELECTROLYTE CLARIFICATION      CDS/: Crystal Medina RN, CDS               Contact information: [email or web address]                                                                                                                         [phone]. [phone]  This form is a permanent document in the medical record.     Query Date: December 13, 2019    By submitting this query, we are merely seeking further clarification of documentation to reflect the severity of illness of your patient. Please utilize your independent clinical judgment when addressing the question(s) below.    The Medical record reflects the following:     Indicators   Supporting Clinical Findings Location in Medical Record   x Lab Value(s) Phos: 2.5 --> 2.4 --> 3.3 Labs 12/11- 12/13   x Treatment                                 Medication potassium, sodium phosphates 280-160-250 mg packet 2 packet MAR 12/12 @ 0688, 7565, 8102    Other       Provider, please specify the diagnosis or diagnoses that correspond(s) to the above indicators. Sergei all that apply:    [  x ] Hypophosphatemia   [   ] Other electrolyte disturbance (please specify): _______   [   ] Clinically Undetermined       Please document in your progress notes daily for the duration of treatment until resolved, and include in your discharge summary.

## 2019-12-13 NOTE — PLAN OF CARE
CMICU DAILY GOALS       A: Awake    RASS: Goal - RASS Goal: 0-->alert and calm  Actual - RASS (Ron Agitation-Sedation Scale): 0-->alert and calm   Restraint necessity:    B: Breath   SBT: Not intubated   C: Coordinate A & B, analgesics/sedatives   Pain: managed    SAT: Not intubated  D: Delirium   CAM-ICU: Overall CAM-ICU: Negative  E: Early Mobility   MOVE Screen: Pass   Activity: Activity Management: activity adjusted per tolerance, activity clustered for rest period  FAS: Feeding/Nutrition   Diet order: Diet/Nutrition Received: NPO, sips of water,   Fluid restriction:    T: Thrombus   DVT prophylaxis: VTE Required Core Measure: (SCDs) Sequential compression device initiated/maintained  H: HOB Elevation   Head of Bed (HOB): HOB at 30 degrees  U: Ulcer Prophylaxis   GI: yes  G: Glucose control   managed    S: Skin   Bundle compliance: yes   Bathing/Skin Care: incontinence care, linen changed, bath, chlorhexidine, bath, complete, dressed/undressed Date:   B: Bowel Function   diarrhea   I: Indwelling Catheters   Conner necessity:     CVC necessity: Yes   IPAD offered: Not appropriate  D: De-escalation Antibx   Yes  Plan for the day   Monitor BM, trend CBCs   Family/Goals of care/Code Status   Code Status: Full Code     No acute events throughout day, VS and assessment per flow sheet, patient progressing towards goals as tolerated, plan of care reviewed with Jo-Ann Escobedo and family, all concerns addressed, will continue to monitor.

## 2019-12-13 NOTE — PROGRESS NOTES
[facility]  Colorectal Surgery  Progress Note    Patient Name: Jo-Ann Escobedo  MRN: [id number]  Admission Date: 12/6/2019  Hospital Length of Stay: 4 days  Attending Physician: Alejandra Polanco MD    Subjective:     Interval History: No events overnight.  H/H appears to have stabilized.  Last blood passage was yesterday morning. Last unit of blood was yesterday morning    Post-Op Info:  Procedure(s) (LRB):  COLONOSCOPY (N/A)   4 Days Post-Op      Medications:  Continuous Infusions:  Scheduled Meds:   cyanocobalamin  500 mcg Oral Daily    fenofibrate  160 mg Oral Daily    ferrous sulfate  325 mg Oral Daily    levothyroxine  125 mcg Oral Before breakfast    pantoprazole  40 mg Intravenous BID    piperacillin-tazobactam (ZOSYN) IVPB  4.5 g Intravenous Q8H     PRN Meds:   sodium chloride    acetaminophen    albuterol-ipratropium    Dextrose 10% Bolus    Dextrose 10% Bolus    glucagon (human recombinant)    glucose    glucose    hydrALAZINE    melatonin    ondansetron    promethazine (PHENERGAN) IVPB    senna-docusate 8.6-50 mg    sodium chloride 0.9%        Objective:     Vital Signs (Most Recent):  Temp: 99.8 °F (37.7 °C) (12/12/19 2100)  Pulse: 70 (12/13/19 0500)  Resp: 19 (12/13/19 0500)  BP: (!) 151/67 (12/13/19 0500)  SpO2: 97 % (12/13/19 0500) Vital Signs (24h Range):  Temp:  [98.5 °F (36.9 °C)-99.8 °F (37.7 °C)] 99.8 °F (37.7 °C)  Pulse:  [69-84] 70  Resp:  [17-36] 19  SpO2:  [90 %-99 %] 97 %  BP: (114-154)/(53-73) 151/67     Intake/Output - Last 3 Shifts       12/11 0700 - 12/12 0659 12/12 0700 - 12/13 0659 12/13 0700 - 12/14 0659    P.O. 240      I.V. (mL/kg) 35 (0.4)      Blood 515.3 375     IV Piggyback 1300 300     Total Intake(mL/kg) 2090.3 (24.7) 675 (7.8)     Net +2090.3 +675            Urine Occurrence 5 x 4 x     Stool Occurrence 2 x 3 x           Physical Exam  NAD  Abd soft    Significant Labs:  CBC (Last 3 Results):   Recent Labs   Lab 12/12/19  1750  12/13/19  0003 12/13/19  0552   WBC 8.69 7.34 6.88   RBC 2.60* 2.46* 2.55*   HGB 7.8* 7.3* 7.6*   HCT 24.4* 23.2* 24.5*   PLT 97* 95* 106*   MCV 94 94 96   MCH 30.0 29.7 29.8   MCHC 32.0 31.5* 31.0*           Assessment/Plan:     Acute blood loss anemia  Lower GI bleed seems to have resolved as of right now.  Her H&H is stable, last bloody BM was yesterday morning and was significantly less volume.      Would delay provacative angio currently and continue to observe    Chronic kidney insufficiency  79 year old female with rectal bleeding, melena at first now mixed with new and old blood. She underwent colonoscopy and IR (though no embolization could be performed) on 12/9/19.   With ongoing GI bleeding, recommend repeat colonoscopy or IR embolization attempts for control. Do not recommend pursing surgical therapy unless multiple failed attempts have occurred.   Please call with questions or concerns, we will continue to follow.           Eric Portillo MD  Colorectal Surgery  [facility]

## 2019-12-13 NOTE — ASSESSMENT & PLAN NOTE
Lower GI bleed seems to have resolved as of right now.  Her H&H is stable, last bloody BM was yesterday morning and was significantly less volume.      Would delay provacative angio currently and continue to observe

## 2019-12-13 NOTE — SUBJECTIVE & OBJECTIVE
Interval History/Significant Events: NAEO. Hgb stable x4. No further dark bowel movements in> 24 hours. Overall, stable for stepdown.    Review of Systems   Constitutional: Negative for activity change, appetite change, diaphoresis, fatigue and fever.   HENT: Negative for nosebleeds and trouble swallowing.    Eyes: Negative for pain and visual disturbance.   Respiratory: Negative for cough, chest tightness and shortness of breath.    Cardiovascular: Negative for chest pain, palpitations and leg swelling.   Gastrointestinal: Negative for abdominal distention, abdominal pain, blood in stool, constipation, diarrhea, nausea and vomiting.   Genitourinary: Negative for dysuria and hematuria.   Musculoskeletal: Positive for arthralgias. Negative for back pain and joint swelling.   Skin: Negative for color change and pallor.   Neurological: Positive for headaches. Negative for tremors, weakness and light-headedness.   Psychiatric/Behavioral: Negative for confusion.     Objective:     Vital Signs (Most Recent):  Temp: 99.8 °F (37.7 °C) (12/12/19 2100)  Pulse: 70 (12/13/19 0500)  Resp: 19 (12/13/19 0500)  BP: (!) 151/67 (12/13/19 0500)  SpO2: 97 % (12/13/19 0500) Vital Signs (24h Range):  Temp:  [98.5 °F (36.9 °C)-99.8 °F (37.7 °C)] 99.8 °F (37.7 °C)  Pulse:  [69-84] 70  Resp:  [17-36] 19  SpO2:  [90 %-99 %] 97 %  BP: (114-154)/(53-73) 151/67   Weight: 86.7 kg (191 lb 2.2 oz)  Body mass index is 33.86 kg/m².      Intake/Output Summary (Last 24 hours) at 12/13/2019 0747  Last data filed at 12/13/2019 0207  Gross per 24 hour   Intake 675 ml   Output --   Net 675 ml       Physical Exam   Constitutional: She appears well-developed and well-nourished. No distress.   HENT:   Mouth/Throat: Oropharynx is clear and moist. No oropharyngeal exudate.   Bruising over L cranium   Eyes: Pupils are equal, round, and reactive to light. EOM are normal. No scleral icterus.   Neck: Normal range of motion. Neck supple.   Cardiovascular: Normal  rate, regular rhythm and normal heart sounds.   No murmur heard.  Pulmonary/Chest: Effort normal and breath sounds normal. No respiratory distress. She exhibits no tenderness.   Abdominal: Soft. Bowel sounds are normal. She exhibits no distension. There is no tenderness. There is no guarding.   Musculoskeletal: Normal range of motion. She exhibits no edema, tenderness or deformity.   Skin: Skin is warm and dry. She is not diaphoretic. No erythema.   Small dark purple ecchymoses over forearms   Psychiatric: She has a normal mood and affect. Her behavior is normal. Judgment and thought content normal.   Nursing note and vitals reviewed.      Vents:  Oxygen Concentration (%): 28 (12/10/19 2129)  Lines/Drains/Airways     Peripheral Intravenous Line                 Peripheral IV - Single Lumen 12/09/19 1153 20 G Right Antecubital 3 days         Peripheral IV - Single Lumen 12/10/19 1745 18 G Right Upper Arm 2 days         Midline Catheter Insertion/Assessment  - Single Lumen 12/11/19 0935 Right basilic vein (medial side of arm) 18g x 10cm 1 day              Significant Labs:    CBC/Anemia Profile:  Recent Labs   Lab 12/12/19  1750 12/13/19  0003 12/13/19  0552   WBC 8.69 7.34 6.88   HGB 7.8* 7.3* 7.6*   HCT 24.4* 23.2* 24.5*   PLT 97* 95* 106*   MCV 94 94 96   RDW 15.9* 16.3* 16.3*        Chemistries:  Recent Labs   Lab 12/11/19  1700 12/12/19  0323 12/12/19  1750 12/13/19  0552    141 139 142   K 3.8 3.5 3.7 3.7    111* 109 109   CO2 23 25 24 25   BUN 32* 29* 21 17   CREATININE 0.9 0.8 0.7 0.8   CALCIUM 7.8* 7.4* 7.1* 7.6*   ALBUMIN  --  2.0*  --  2.1*   PROT  --  3.5*  --  3.9*   BILITOT  --  0.4  --  0.6   ALKPHOS  --  33*  --  39*   ALT  --  11  --  18   AST  --  21  --  32   MG 2.2 2.1  --  2.0   PHOS 2.5* 2.4*  --  3.3

## 2019-12-13 NOTE — PROGRESS NOTES
[facility]  Critical Care Medicine  Progress Note    Patient Name: Jo-Ann Escobedo  MRN: [id number]  Admission Date: 12/6/2019  Hospital Length of Stay: 4 days  Code Status: Full Code  Attending Provider: Alejandra Polanco MD  Primary Care Provider: Dakota Kerr MD   Principal Problem: Diverticulitis of large intestine without perforation or abscess with bleeding    Subjective:     HPI:  Ms. 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 History obtained via chart review as patient was still somnolent on examination by critical care team. She was seen by CRS outpatient day of admission during which GYPSY performed revealed no masses, but positive for melanotic blood; during anoscopy, anoscope filled with melanotic stool and foul odor prompting presentation to ED for further evaluation of GI bleed. Of note, patient was seen by primary care physician 9/2019 and restarted on diclofenac PRN at that time, unsure of frequency she has been taking that recently. Renal function unremarkable.     On initial presentation in the ED, vital signs were stable and no leukocytosis noted. UA showed 3+ occult blood with >100 RBCs and 12 WBCs. CTA abdo/pelvis without evidence of acute bleed, but was concerning for uncomplicated diverticulitis or colitis in the descending and sigmoid colon junction. She was given IVF and started on protonix and zosyn for intra-abdominal coverage. Hgb noted to be 10 from a baseline of 13. She was admitted to hospital medicine for observation. Hgb continued to downtrend to 8 and was transfused for  weakness and active bleeding. GI was consulted and she underwent endoscopy 12/7 with normal esophagus, erythematous mucosa in the pylorus (biopsied), normal duodenum, 10mm lesion at incisura (biopsied). She subsequently underwent colonoscopy 12/9 and several large diverticula in the sigmoid colon was seen with hematin throughout the colon; blood pooling also noted in the sigmoid colon, during which clip was placed for IR localization. She received 1U pRBC during the procedure and IR was contacted by GI for angio and possible intervention that same night. IR angio did not reveal any bleeding from SMA or NATALIO branches. MICU was consulted given patient's active GI bleed and risk of instability. Patient was hemodynamically stable and satting well on room air when seen in PACU. Will be admitted to MICU for close observation overnight after IR evaluation.     Hospital/ICU Course:  Patient hgb continues to trend down despite a total of 10 units PRBCs, 1 unit FFP, 1 unit plts. Patient intermittently tachycardic to 110s, but remains hypertensive. Attempting to keep SBP <160 to help reduce bleeding. CRC, GI, and IR continue to follow without any further interventions at this time. Pt continued to have dark, loose stools with clots, which have decreased on 12/11. One more unit overnight on 12/12. Hgb stable x4 over 12/12-12/13. Bleeding likely stopped. Patient stable for stepdown.    Interval History/Significant Events: NAEO. Hgb stable x4. No further dark bowel movements in> 24 hours. Overall, stable for stepdown.    Review of Systems   Constitutional: Negative for activity change, appetite change, diaphoresis, fatigue and fever.   HENT: Negative for nosebleeds and trouble swallowing.    Eyes: Negative for pain and visual disturbance.   Respiratory: Negative for cough, chest tightness and shortness of breath.    Cardiovascular: Negative for chest pain, palpitations and leg swelling.   Gastrointestinal: Negative for abdominal  distention, abdominal pain, blood in stool, constipation, diarrhea, nausea and vomiting.   Genitourinary: Negative for dysuria and hematuria.   Musculoskeletal: Positive for arthralgias. Negative for back pain and joint swelling.   Skin: Negative for color change and pallor.   Neurological: Positive for headaches. Negative for tremors, weakness and light-headedness.   Psychiatric/Behavioral: Negative for confusion.     Objective:     Vital Signs (Most Recent):  Temp: 99.8 °F (37.7 °C) (12/12/19 2100)  Pulse: 70 (12/13/19 0500)  Resp: 19 (12/13/19 0500)  BP: (!) 151/67 (12/13/19 0500)  SpO2: 97 % (12/13/19 0500) Vital Signs (24h Range):  Temp:  [98.5 °F (36.9 °C)-99.8 °F (37.7 °C)] 99.8 °F (37.7 °C)  Pulse:  [69-84] 70  Resp:  [17-36] 19  SpO2:  [90 %-99 %] 97 %  BP: (114-154)/(53-73) 151/67   Weight: 86.7 kg (191 lb 2.2 oz)  Body mass index is 33.86 kg/m².      Intake/Output Summary (Last 24 hours) at 12/13/2019 0747  Last data filed at 12/13/2019 0207  Gross per 24 hour   Intake 675 ml   Output --   Net 675 ml       Physical Exam   Constitutional: She appears well-developed and well-nourished. No distress.   HENT:   Mouth/Throat: Oropharynx is clear and moist. No oropharyngeal exudate.   Bruising over L cranium   Eyes: Pupils are equal, round, and reactive to light. EOM are normal. No scleral icterus.   Neck: Normal range of motion. Neck supple.   Cardiovascular: Normal rate, regular rhythm and normal heart sounds.   No murmur heard.  Pulmonary/Chest: Effort normal and breath sounds normal. No respiratory distress. She exhibits no tenderness.   Abdominal: Soft. Bowel sounds are normal. She exhibits no distension. There is no tenderness. There is no guarding.   Musculoskeletal: Normal range of motion. She exhibits no edema, tenderness or deformity.   Skin: Skin is warm and dry. She is not diaphoretic. No erythema.   Small dark purple ecchymoses over forearms   Psychiatric: She has a normal mood and affect. Her  behavior is normal. Judgment and thought content normal.   Nursing note and vitals reviewed.      Vents:  Oxygen Concentration (%): 28 (12/10/19 2129)  Lines/Drains/Airways     Peripheral Intravenous Line                 Peripheral IV - Single Lumen 12/09/19 1153 20 G Right Antecubital 3 days         Peripheral IV - Single Lumen 12/10/19 1745 18 G Right Upper Arm 2 days         Midline Catheter Insertion/Assessment  - Single Lumen 12/11/19 0935 Right basilic vein (medial side of arm) 18g x 10cm 1 day              Significant Labs:    CBC/Anemia Profile:  Recent Labs   Lab 12/12/19  1750 12/13/19  0003 12/13/19  0552   WBC 8.69 7.34 6.88   HGB 7.8* 7.3* 7.6*   HCT 24.4* 23.2* 24.5*   PLT 97* 95* 106*   MCV 94 94 96   RDW 15.9* 16.3* 16.3*        Chemistries:  Recent Labs   Lab 12/11/19  1700 12/12/19  0323 12/12/19  1750 12/13/19  0552    141 139 142   K 3.8 3.5 3.7 3.7    111* 109 109   CO2 23 25 24 25   BUN 32* 29* 21 17   CREATININE 0.9 0.8 0.7 0.8   CALCIUM 7.8* 7.4* 7.1* 7.6*   ALBUMIN  --  2.0*  --  2.1*   PROT  --  3.5*  --  3.9*   BILITOT  --  0.4  --  0.6   ALKPHOS  --  33*  --  39*   ALT  --  11  --  18   AST  --  21  --  32   MG 2.2 2.1  --  2.0   PHOS 2.5* 2.4*  --  3.3       ABG  No results for input(s): PH, PO2, PCO2, HCO3, BE in the last 168 hours.  Assessment/Plan:     Cardiac/Vascular  Essential hypertension  On losartan 75mg daily at home  -PRN hydralazine to keep SBP<160    - Hold home antihypertensives in setting of active GI bleed  - Monitor BP, restart meds as necessary after bleed is controlled    Renal/  Hematuria  Nonobstructing nephrolithiasis on CT abdo  UA with 3+ occult blood, trace leukocytes, >100 RBCs, 12 WBC  UCx with growth of multiple organisms  Renal function stable    - Continue to monitor daily renal function    Oncology  Acute blood loss anemia  S/p 9U pRBCs, 1 u FFP, 1 u Plts during this admission    8.0>6.5 (2 units)>8.7>7.9>6.5 (2 units, 1L IVF)>7.1>6.7 (1u)>  "7.9>7.8>7.8>7.3>7.6    - Monitoring CBC q6h  - Transfuse for Hgb <7 unless hemodynamically stable     Endocrine  Hypothyroidism, postsurgical  History of multinodular goiter s/p total thyroidectomy 9/2011    - Continue synthroid 125mcg daily    GI  * Diverticulitis of large intestine without perforation or abscess with bleeding  History of diverticulosis, last episode in chart was 9/2016 with outpatient management  Last colonoscopy 4/2019  - three 3-6mm polyps in transverse colon and ascending colon (removed), diverticulosis in the sigmoid colon and descending colon  Prior to admission, was seen by CRS outpatient, anoscopy with melanotic stool on exam  12/7 EGD - Normal esophagus, erythematous mucosa in pylorus (biopsied), normal duodenal bulb, 2nd and 3rd portions, 10mm submucosal soft lesion at incisura (biopsied)  12/9 colonoscopy - Suspect active bleed from sigmoid diverticula, clip placed for IR localization  -S/p 9U pRBCs since admit (one intra-op during c-scope)  -12/9 IR angio did not reveal any bleeding branches of NATALIO, SMA including area around clip placed by GI  -12/11 Patient continues to have persistently dark loose stools with clots  -12/12 Decreasing dark BMs, hemodynamically stable, hgb stable s/p 1 unit this AM    Total tranfusion: 10u PRBCs, 1u FFP, 1u plts      - CRS and GI following - follow-up recs  - CBC q6h  - Transfuse for Hgb <7 or if patient develops hemodynamic instability in setting of large bleed  - Continue zosyn - started 12/7; day 6, rec 1 more day  - Continue protonix 40mg IV bid    GIB (gastrointestinal bleeding)  See "diverticulitis"    Other  Abnormal CT of the head  Bruising noted on examination  12/9 CT head non con - R frontal extracranial soft tissue swelling without evidence of underlying fracture or acute intracranial hemorrhage. Mild generalized cerebral volume loss and chronic microvascular ischemic change. 3.5cm sclerotic lesion R frontal calvarium with few additional " smaller sclerotic foci; additional large well-circumscribed lucent lesions in the parietal bones bilaterally - may represent findings of Paget's disease (in patient's history).    - Monitor for acute neurologic changes        Critical care was time spent personally by me on the following activities: development of treatment plan with patient or surrogate and bedside caregivers, discussions with consultants, evaluation of patient's response to treatment, examination of patient, ordering and performing treatments and interventions, ordering and review of laboratory studies, ordering and review of radiographic studies, pulse oximetry, re-evaluation of patient's condition. This critical care time did not overlap with that of any other provider or involve time for any procedures.     Ronaldo Flores MD  Critical Care Medicine  [facility]

## 2019-12-13 NOTE — ASSESSMENT & PLAN NOTE
S/p 9U pRBCs, 1 u FFP, 1 u Plts during this admission    8.0>6.5 (2 units)>8.7>7.9>6.5 (2 units, 1L IVF)>7.1>6.7 (1u)> 7.9>7.8>7.8>7.3>7.6    - Monitoring CBC q6h  - Transfuse for Hgb <7 unless hemodynamically stable

## 2019-12-13 NOTE — ASSESSMENT & PLAN NOTE
History of diverticulosis, last episode in chart was 9/2016 with outpatient management  Last colonoscopy 4/2019  - three 3-6mm polyps in transverse colon and ascending colon (removed), diverticulosis in the sigmoid colon and descending colon  Prior to admission, was seen by CRS outpatient, anoscopy with melanotic stool on exam  12/7 EGD - Normal esophagus, erythematous mucosa in pylorus (biopsied), normal duodenal bulb, 2nd and 3rd portions, 10mm submucosal soft lesion at incisura (biopsied)  12/9 colonoscopy - Suspect active bleed from sigmoid diverticula, clip placed for IR localization  -S/p 9U pRBCs since admit (one intra-op during c-scope)  -12/9 IR angio did not reveal any bleeding branches of NATALIO, SMA including area around clip placed by GI  -12/11 Patient continues to have persistently dark loose stools with clots  -12/12 Decreasing dark BMs, hemodynamically stable, hgb stable s/p 1 unit this AM    Total tranfusion: 10u PRBCs, 1u FFP, 1u plts      - CRS and GI following - follow-up recs  - CBC q6h  - Transfuse for Hgb <7 or if patient develops hemodynamic instability in setting of large bleed  - Continue zosyn - started 12/7; day 6, rec 1 more day  - Continue protonix 40mg IV bid

## 2019-12-13 NOTE — RESIDENT HANDOFF
Handoff     Primary Team: [facility] CRITICAL CARE MEDICINE Room Number: 6079/6079 A     Patient Name: Jo-Ann Escobedo MRN: [id number]     Date of Birth: [id number] Allergies: Voltaren [diclofenac sodium]; Codeine; and Niacin preparations     Age: 79 y.o. Admit Date: 12/6/2019     Sex: female  BMI: Body mass index is 33.86 kg/m².     Code Status: Full Code        Illness Level (current clinical status): Watcher - No    Reason for Admission: Diverticulitis of large intestine without perforation or abscess with bleeding    Brief HPI (pertinent PMH and diagnosis or differential diagnosis): 79f  w/ admitted HTN, hypothyroidism, CKD, osteoarthritis 12/6 for diverticulitis w/ BRBPR.     Procedure Date: Colonoscopy 12/9 with large diverticula and large amounts of blood, clip placed in suspected area of bleeding for IR CTA done on 12/10 which showed no significant bleeding from NATALIO and SMA branches.    Hospital Course (updated, brief assessment by system or problem, significant events): Pt required total of 11u PRBCs, 1u FFP, 1 u plts. Volume rescusitation. Overall, intermittently tachycardic but never hypotensive during MICU admission. 12/12-13 no additional bloody BMs, hgb stable x4 ~7.5-8, patient stable for stepdown to hospital medicine    Tasks (specific, using if-then statements): If continued bleeding, reconsult IR vs CRS    Contingency Plan (special circumstances anticipated and plan): none    Estimated Discharge Date: 12/16    Discharge Disposition: [place]Health [place] [facility]    Mentored By: MICU team 2

## 2019-12-13 NOTE — ANESTHESIA POSTPROCEDURE EVALUATION
Anesthesia Post Evaluation    Patient: Jo-Ann Escobedo    Procedure(s) Performed: Procedure(s) (LRB):  COLONOSCOPY (N/A)    Final Anesthesia Type: general    Patient location during evaluation: PACU  Patient participation: Yes- Able to Participate  Level of consciousness: awake and alert  Post-procedure vital signs: reviewed and stable  Pain management: adequate  Airway patency: patent    PONV status at discharge: No PONV  Anesthetic complications: no      Cardiovascular status: hemodynamically stable  Respiratory status: spontaneous ventilation  Follow-up not needed.          Vitals Value Taken Time   /63 12/13/2019  8:02 AM   Temp 37.7 °C (99.8 °F) 12/12/2019  9:00 PM   Pulse 71 12/13/2019  8:29 AM   Resp 33 12/13/2019  8:29 AM   SpO2 98 % 12/13/2019  8:29 AM   Vitals shown include unvalidated device data.      No case tracking events are documented in the log.      Pain/Parris Score: No data recorded

## 2019-12-13 NOTE — SUBJECTIVE & OBJECTIVE
Subjective:     Interval History: No events overnight.  H/H appears to have stabilized.  Last blood passage was yesterday morning. Last unit of blood was yesterday morning    Post-Op Info:  Procedure(s) (LRB):  COLONOSCOPY (N/A)   4 Days Post-Op      Medications:  Continuous Infusions:  Scheduled Meds:   cyanocobalamin  500 mcg Oral Daily    fenofibrate  160 mg Oral Daily    ferrous sulfate  325 mg Oral Daily    levothyroxine  125 mcg Oral Before breakfast    pantoprazole  40 mg Intravenous BID    piperacillin-tazobactam (ZOSYN) IVPB  4.5 g Intravenous Q8H     PRN Meds:   sodium chloride    acetaminophen    albuterol-ipratropium    Dextrose 10% Bolus    Dextrose 10% Bolus    glucagon (human recombinant)    glucose    glucose    hydrALAZINE    melatonin    ondansetron    promethazine (PHENERGAN) IVPB    senna-docusate 8.6-50 mg    sodium chloride 0.9%        Objective:     Vital Signs (Most Recent):  Temp: 99.8 °F (37.7 °C) (12/12/19 2100)  Pulse: 70 (12/13/19 0500)  Resp: 19 (12/13/19 0500)  BP: (!) 151/67 (12/13/19 0500)  SpO2: 97 % (12/13/19 0500) Vital Signs (24h Range):  Temp:  [98.5 °F (36.9 °C)-99.8 °F (37.7 °C)] 99.8 °F (37.7 °C)  Pulse:  [69-84] 70  Resp:  [17-36] 19  SpO2:  [90 %-99 %] 97 %  BP: (114-154)/(53-73) 151/67     Intake/Output - Last 3 Shifts       12/11 0700 - 12/12 0659 12/12 0700 - 12/13 0659 12/13 0700 - 12/14 0659    P.O. 240      I.V. (mL/kg) 35 (0.4)      Blood 515.3 375     IV Piggyback 1300 300     Total Intake(mL/kg) 2090.3 (24.7) 675 (7.8)     Net +2090.3 +675            Urine Occurrence 5 x 4 x     Stool Occurrence 2 x 3 x           Physical Exam  NAD  Abd soft    Significant Labs:  CBC (Last 3 Results):   Recent Labs   Lab 12/12/19  1750 12/13/19  0003 12/13/19  0552   WBC 8.69 7.34 6.88   RBC 2.60* 2.46* 2.55*   HGB 7.8* 7.3* 7.6*   HCT 24.4* 23.2* 24.5*   PLT 97* 95* 106*   MCV 94 94 96   MCH 30.0 29.7 29.8   MCHC 32.0 31.5* 31.0*

## 2019-12-13 NOTE — PT/OT/SLP EVAL
"Occupational Therapy   Evaluation    Name: Jo-Ann Escobedo  MRN: [id number]  Admitting Diagnosis:  Diverticulitis of large intestine without perforation or abscess with bleeding 4 Days Post-Op    Recommendations:     Discharge Recommendations: home with home health      Assessment:     Jo-Ann Escobedo is a 79 y.o. female with a medical diagnosis of Diverticulitis of large intestine without perforation or abscess with bleeding.   Performance deficits affecting function: weakness, impaired self care skills, impaired balance, impaired functional mobilty, impaired endurance, gait instability.  Pt tolerated session well with good effort and performance.     Rehab Prognosis: Good; patient would benefit from acute skilled OT services to address these deficits and reach maximum level of function.       Plan:     Patient to be seen 3 x/week to address the above listed problems via self-care/home management, therapeutic activities, therapeutic exercises  · Plan of Care Expires:    · Plan of Care Reviewed with: patient, family    Subjective     "I have been in bed for 7 days" pt reports.     Occupational Profile:  Pt lives alone with her dog, Rand. Pt resides in 2 story home with 12 Steps to bed/bath with L HR.  Pt was fully independent without AE including driving.  Pt has 2 supportive daughter who live nearby who can assist as needed upon d/c.      Pain/Comfort:  · Pain Rating 1: 0/10    Patients cultural, spiritual, Moravian conflicts given the current situation: no    Objective:     Communicated with: nsmorgan prior to session.  Pt found supine in bed.     General Precautions: Standard, fall     Occupational Performance:    Bed Mobility:    Supine>sit with MIN A     Functional Mobility/Transfers:  · Sit>stand with CGA     Activities of Daily Living:  · Feeding: set-up  · G/H: seated with set-up  · LE dressing: MAX A     Cognitive/Visual Perceptual  Pt awake, alert and following commands.      Physical Exam:  Pt is right hand " "dominant and demo WFL UE strength/ROM, coordination and sensation. Pt with decreased right shoulder compared to left since PTA.     AMPAC 6 Click ADL:  AMPAC Total Score: 11    Treatment & Education:  Pt completed sitting EOB with Fair+ sitting balance. Pt needing MIN A for ambulation in room due to discomfort/"stiffness" for old right ankle injury.  Education provided re: OT POC and safety with functional mobility/ADL skills.   Education:    Patient left seated in chair with needs in reach and fly present and nsg notified.     GOALS:   Multidisciplinary Problems     Occupational Therapy Goals        Problem: Occupational Therapy Goal    Goal Priority Disciplines Outcome Interventions   Occupational Therapy Goal     OT, PT/OT Ongoing, Progressing    Description:  Goals to be met by: 7 days 12/20/19     Patient will increase functional independence with ADLs by performing:    Pt to complete standing g/h skills with supervision.  Pt to complete UE dressing with set-up  Pt to complete LE dressing with JUAN F    Pt to complete toileting with SBA  Pt to complete t/f skills including bed, chair and commode with supervision.                       History:     Past Medical History:   Diagnosis Date    After-cataract of both eyes - Both Eyes 9/26/2012    Allergy     Arthritis     Breast cancer     Diverticulosis     Hyperlipidemia     Hypertension     Hypothyroidism     Paget disease of bone     Squamous Cell Carcinoma     in situ right neck     Thyroid disease        Past Surgical History:   Procedure Laterality Date    CATARACT EXTRACTION W/  INTRAOCULAR LENS IMPLANT Bilateral     COLONOSCOPY N/A 4/15/2019    Procedure: COLONOSCOPY;  Surgeon: Artur Monet MD;  [facility]  Service: Endoscopy;  Laterality: N/A;  within 1 month    COLONOSCOPY N/A 12/9/2019    Procedure: COLONOSCOPY;  Surgeon: Clyde Welch MD;  Location: [facility] (2ND FLR);  Service: Endoscopy;  Laterality: N/A;    " ESOPHAGOGASTRODUODENOSCOPY N/A 12/7/2019    Procedure: EGD (ESOPHAGOGASTRODUODENOSCOPY);  Surgeon: Clyde Welch MD;  Location: [address]);  Service: Endoscopy;  Laterality: N/A;    EXCISIONAL BIOPSY Right 6/27/2019    Procedure: EXCISIONAL BIOPSY-breast;  Surgeon: Suki Dill MD;  Location: [facility] OR [address] [place] [address];  Service: General;  Laterality: Right;    EYE SURGERY      HYSTERECTOMY      INJECTION OF ANESTHETIC AGENT AROUND MEDIAL BRANCH NERVES INNERVATING LUMBAR FACET JOINT Bilateral 6/7/2019    Procedure: BLOCK, NERVE, FACET JOINT, LUMBAR, MEDIAL BRANCH-deo MBB L4/5,L5/S1;  Surgeon: Jarvis Morales III, MD;  Location: [facility] CATH LAB;  Service: Pain Management;  Laterality: Bilateral;    KNEE ARTHROSCOPY N/A     pt unsure of which knee     MASTECTOMY      SPINE SURGERY      TOTAL THYROIDECTOMY      YAG Laser Capsulotomy  Left 07/29/2019    Dr. Hahn       Time Tracking:     OT Date of Treatment: 12/13/19  OT Start Time: 1049  OT Stop Time: 1107  OT Total Time (min): 18 min    Billable Minutes:Evaluation 18    YAIR Velazquez  12/13/2019
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
## 2019-12-13 NOTE — PLAN OF CARE
Problem: Physical Therapy Goal  Goal: Physical Therapy Goal  Description  Goals to be met by: 2019    Patient will increase functional independence with mobility by performin. Supine to sit with Supervision - not met  2. Sit to stand transfer with Supervision using LRAD - not met  3. Gait  x 150 feet with Supervision using LRAD - not met  4. Ascend/descend 1 flight of stairs with left Handrails Contact Guard Assistance - not met     Outcome: Ongoing, Progressing     Eval completed and goals appropriate

## 2019-12-14 VITALS
BODY MASS INDEX: 33.87 KG/M2 | SYSTOLIC BLOOD PRESSURE: 138 MMHG | RESPIRATION RATE: 18 BRPM | TEMPERATURE: 98 F | HEART RATE: 84 BPM | WEIGHT: 191.13 LBS | DIASTOLIC BLOOD PRESSURE: 65 MMHG | HEIGHT: 63 IN | OXYGEN SATURATION: 90 %

## 2019-12-14 PROBLEM — R41.3 MEMORY DIFFICULTY: Status: RESOLVED | Noted: 2019-12-09 | Resolved: 2019-12-14

## 2019-12-14 PROBLEM — K92.2 GIB (GASTROINTESTINAL BLEEDING): Status: RESOLVED | Noted: 2019-12-09 | Resolved: 2019-12-14

## 2019-12-14 PROBLEM — R31.9 HEMATURIA: Status: RESOLVED | Noted: 2019-12-07 | Resolved: 2019-12-14

## 2019-12-14 LAB
ALBUMIN SERPL BCP-MCNC: 2.1 G/DL (ref 3.5–5.2)
ALP SERPL-CCNC: 43 U/L (ref 55–135)
ALT SERPL W/O P-5'-P-CCNC: 19 U/L (ref 10–44)
ANION GAP SERPL CALC-SCNC: 6 MMOL/L (ref 8–16)
AST SERPL-CCNC: 33 U/L (ref 10–40)
BASOPHILS # BLD AUTO: 0.05 K/UL (ref 0–0.2)
BASOPHILS NFR BLD: 0.9 % (ref 0–1.9)
BILIRUB SERPL-MCNC: 0.4 MG/DL (ref 0.1–1)
BUN SERPL-MCNC: 11 MG/DL (ref 8–23)
CALCIUM SERPL-MCNC: 7.9 MG/DL (ref 8.7–10.5)
CHLORIDE SERPL-SCNC: 111 MMOL/L (ref 95–110)
CO2 SERPL-SCNC: 27 MMOL/L (ref 23–29)
CREAT SERPL-MCNC: 0.8 MG/DL (ref 0.5–1.4)
DIFFERENTIAL METHOD: ABNORMAL
EOSINOPHIL # BLD AUTO: 0.3 K/UL (ref 0–0.5)
EOSINOPHIL NFR BLD: 4.6 % (ref 0–8)
ERYTHROCYTE [DISTWIDTH] IN BLOOD BY AUTOMATED COUNT: 17.6 % (ref 11.5–14.5)
EST. GFR  (AFRICAN AMERICAN): >60 ML/MIN/1.73 M^2
EST. GFR  (NON AFRICAN AMERICAN): >60 ML/MIN/1.73 M^2
GLUCOSE SERPL-MCNC: 95 MG/DL (ref 70–110)
HCT VFR BLD AUTO: 26.6 % (ref 37–48.5)
HGB BLD-MCNC: 8.4 G/DL (ref 12–16)
IMM GRANULOCYTES # BLD AUTO: 0.21 K/UL (ref 0–0.04)
IMM GRANULOCYTES NFR BLD AUTO: 3.6 % (ref 0–0.5)
LYMPHOCYTES # BLD AUTO: 1 K/UL (ref 1–4.8)
LYMPHOCYTES NFR BLD: 17.7 % (ref 18–48)
MAGNESIUM SERPL-MCNC: 2.2 MG/DL (ref 1.6–2.6)
MCH RBC QN AUTO: 30.4 PG (ref 27–31)
MCHC RBC AUTO-ENTMCNC: 31.6 G/DL (ref 32–36)
MCV RBC AUTO: 96 FL (ref 82–98)
MONOCYTES # BLD AUTO: 0.9 K/UL (ref 0.3–1)
MONOCYTES NFR BLD: 15.4 % (ref 4–15)
NEUTROPHILS # BLD AUTO: 3.4 K/UL (ref 1.8–7.7)
NEUTROPHILS NFR BLD: 57.8 % (ref 38–73)
NRBC BLD-RTO: 0 /100 WBC
PHOSPHATE SERPL-MCNC: 3.4 MG/DL (ref 2.7–4.5)
PLATELET # BLD AUTO: 152 K/UL (ref 150–350)
PMV BLD AUTO: 10.4 FL (ref 9.2–12.9)
POTASSIUM SERPL-SCNC: 3.7 MMOL/L (ref 3.5–5.1)
PROT SERPL-MCNC: 4.3 G/DL (ref 6–8.4)
RBC # BLD AUTO: 2.76 M/UL (ref 4–5.4)
SODIUM SERPL-SCNC: 144 MMOL/L (ref 136–145)
WBC # BLD AUTO: 5.83 K/UL (ref 3.9–12.7)

## 2019-12-14 PROCEDURE — 63600175 PHARM REV CODE 636 W HCPCS: Performed by: PHYSICIAN ASSISTANT

## 2019-12-14 PROCEDURE — 63600175 PHARM REV CODE 636 W HCPCS: Performed by: INTERNAL MEDICINE

## 2019-12-14 PROCEDURE — 25000003 PHARM REV CODE 250: Performed by: HOSPITALIST

## 2019-12-14 PROCEDURE — 99239 HOSP IP/OBS DSCHRG MGMT >30: CPT | Mod: ,,, | Performed by: HOSPITALIST

## 2019-12-14 PROCEDURE — 80053 COMPREHEN METABOLIC PANEL: CPT

## 2019-12-14 PROCEDURE — 85025 COMPLETE CBC W/AUTO DIFF WBC: CPT

## 2019-12-14 PROCEDURE — 99239 PR HOSPITAL DISCHARGE DAY,>30 MIN: ICD-10-PCS | Mod: ,,, | Performed by: HOSPITALIST

## 2019-12-14 PROCEDURE — 83735 ASSAY OF MAGNESIUM: CPT

## 2019-12-14 PROCEDURE — C9113 INJ PANTOPRAZOLE SODIUM, VIA: HCPCS | Performed by: PHYSICIAN ASSISTANT

## 2019-12-14 PROCEDURE — 63700000 PHARM REV CODE 250 ALT 637 W/O HCPCS: Performed by: PHYSICIAN ASSISTANT

## 2019-12-14 PROCEDURE — 84100 ASSAY OF PHOSPHORUS: CPT

## 2019-12-14 PROCEDURE — 25000003 PHARM REV CODE 250: Performed by: PHYSICIAN ASSISTANT

## 2019-12-14 PROCEDURE — 36415 COLL VENOUS BLD VENIPUNCTURE: CPT

## 2019-12-14 RX ORDER — CEFPODOXIME PROXETIL 200 MG/1
200 TABLET, FILM COATED ORAL EVERY 12 HOURS
Status: DISCONTINUED | OUTPATIENT
Start: 2019-12-14 | End: 2019-12-14 | Stop reason: HOSPADM

## 2019-12-14 RX ORDER — METRONIDAZOLE 500 MG/1
500 TABLET ORAL EVERY 8 HOURS
Qty: 9 TABLET | Refills: 0 | Status: SHIPPED | OUTPATIENT
Start: 2019-12-14 | End: 2019-12-17

## 2019-12-14 RX ORDER — PANTOPRAZOLE SODIUM 40 MG/1
40 TABLET, DELAYED RELEASE ORAL 2 TIMES DAILY
Status: DISCONTINUED | OUTPATIENT
Start: 2019-12-14 | End: 2019-12-14 | Stop reason: HOSPADM

## 2019-12-14 RX ORDER — IPRATROPIUM BROMIDE AND ALBUTEROL SULFATE 2.5; .5 MG/3ML; MG/3ML
3 SOLUTION RESPIRATORY (INHALATION) EVERY 4 HOURS PRN
Qty: 90 ML | Refills: 0 | Status: SHIPPED | OUTPATIENT
Start: 2019-12-14 | End: 2019-12-23

## 2019-12-14 RX ORDER — ACETAMINOPHEN 325 MG/1
650 TABLET ORAL EVERY 6 HOURS PRN
Qty: 120 TABLET | Refills: 1 | Status: SHIPPED | OUTPATIENT
Start: 2019-12-14 | End: 2020-06-03

## 2019-12-14 RX ORDER — LEVOTHYROXINE SODIUM 125 UG/1
125 TABLET ORAL
Qty: 30 TABLET | Refills: 11 | Status: SHIPPED | OUTPATIENT
Start: 2019-12-15 | End: 2020-03-06 | Stop reason: SDUPTHER

## 2019-12-14 RX ORDER — PANTOPRAZOLE SODIUM 40 MG/1
40 TABLET, DELAYED RELEASE ORAL 2 TIMES DAILY
Qty: 60 TABLET | Refills: 11 | Status: SHIPPED | OUTPATIENT
Start: 2019-12-14 | End: 2019-12-14 | Stop reason: HOSPADM

## 2019-12-14 RX ORDER — HYDROCORTISONE 1 %
CREAM (GRAM) TOPICAL 3 TIMES DAILY
Qty: 14.2 G | Refills: 0 | Status: SHIPPED | OUTPATIENT
Start: 2019-12-14 | End: 2020-06-25

## 2019-12-14 RX ORDER — UBIDECARENONE 75 MG
500 CAPSULE ORAL DAILY
Qty: 30 TABLET | Refills: 2 | Status: SHIPPED | OUTPATIENT
Start: 2019-12-15 | End: 2019-12-23

## 2019-12-14 RX ORDER — HYDROCORTISONE 1 %
CREAM (GRAM) TOPICAL 3 TIMES DAILY
Status: DISCONTINUED | OUTPATIENT
Start: 2019-12-14 | End: 2019-12-14 | Stop reason: HOSPADM

## 2019-12-14 RX ORDER — CEFPODOXIME PROXETIL 200 MG/1
200 TABLET, FILM COATED ORAL EVERY 12 HOURS
Qty: 6 TABLET | Refills: 0 | Status: SHIPPED | OUTPATIENT
Start: 2019-12-14 | End: 2019-12-17

## 2019-12-14 RX ORDER — METRONIDAZOLE 500 MG/1
500 TABLET ORAL EVERY 8 HOURS
Status: DISCONTINUED | OUTPATIENT
Start: 2019-12-14 | End: 2019-12-14 | Stop reason: HOSPADM

## 2019-12-14 RX ORDER — FERROUS SULFATE 325(65) MG
325 TABLET ORAL DAILY
Qty: 30 TABLET | Refills: 2 | Status: SHIPPED | OUTPATIENT
Start: 2019-12-15 | End: 2020-01-14

## 2019-12-14 RX ADMIN — CYANOCOBALAMIN TAB 250 MCG 500 MCG: 250 TAB at 09:12

## 2019-12-14 RX ADMIN — METRONIDAZOLE 500 MG: 500 TABLET ORAL at 02:12

## 2019-12-14 RX ADMIN — PANTOPRAZOLE SODIUM 40 MG: 40 INJECTION, POWDER, FOR SOLUTION INTRAVENOUS at 09:12

## 2019-12-14 RX ADMIN — HYDROCORTISONE: 10 CREAM TOPICAL at 09:12

## 2019-12-14 RX ADMIN — FERROUS SULFATE TAB EC 325 MG (65 MG FE EQUIVALENT) 325 MG: 325 (65 FE) TABLET DELAYED RESPONSE at 09:12

## 2019-12-14 RX ADMIN — PIPERACILLIN AND TAZOBACTAM 4.5 G: 4; .5 INJECTION, POWDER, FOR SOLUTION INTRAVENOUS at 02:12

## 2019-12-14 RX ADMIN — CEFPODOXIME PROXETIL 200 MG: 200 TABLET, FILM COATED ORAL at 09:12

## 2019-12-14 RX ADMIN — LEVOTHYROXINE SODIUM 125 MCG: 25 TABLET ORAL at 05:12

## 2019-12-14 RX ADMIN — FENOFIBRATE 160 MG: 160 TABLET ORAL at 09:12

## 2019-12-14 RX ADMIN — HYDROCORTISONE: 10 CREAM TOPICAL at 02:12

## 2019-12-14 NOTE — PROGRESS NOTES
[facility]  Colorectal Surgery  Progress Note    Patient Name: Jo-Ann Escobedo  MRN: [id number]  Admission Date: 12/6/2019  Hospital Length of Stay: 5 days  Attending Physician: Randall Martinez MD    Subjective:     Interval History:     H&H stable, no more rectal bleeding.     Post-Op Info:  Procedure(s) (LRB):  COLONOSCOPY (N/A)   5 Days Post-Op      Medications:  Continuous Infusions:  Scheduled Meds:   cefpodoxime  200 mg Oral Q12H    cyanocobalamin  500 mcg Oral Daily    fenofibrate  160 mg Oral Daily    ferrous sulfate  325 mg Oral Daily    hydrocortisone   Topical (Top) TID    levothyroxine  125 mcg Oral Before breakfast    metroNIDAZOLE  500 mg Oral Q8H    pantoprazole  40 mg Intravenous BID     PRN Meds:   sodium chloride    acetaminophen    albuterol-ipratropium    Dextrose 10% Bolus    Dextrose 10% Bolus    glucagon (human recombinant)    glucose    glucose    hydrALAZINE    melatonin    ondansetron    promethazine (PHENERGAN) IVPB    senna-docusate 8.6-50 mg    sodium chloride 0.9%        Objective:     Vital Signs (Most Recent):  Temp: 98.3 °F (36.8 °C) (12/14/19 0808)  Pulse: 76 (12/14/19 0808)  Resp: 18 (12/14/19 0808)  BP: (!) 148/64 (12/14/19 0848)  SpO2: 96 % (12/14/19 0808) Vital Signs (24h Range):  Temp:  [98 °F (36.7 °C)-99.2 °F (37.3 °C)] 98.3 °F (36.8 °C)  Pulse:  [68-90] 76  Resp:  [18-41] 18  SpO2:  [90 %-97 %] 96 %  BP: (148-178)/(64-78) 148/64     Intake/Output - Last 3 Shifts       12/12 0700 - 12/13 0659 12/13 0700 - 12/14 0659 12/14 0700 - 12/15 0659    P.O.  480     I.V. (mL/kg)       Blood 375      IV Piggyback 300 200     Total Intake(mL/kg) 675 (7.8) 680 (7.8)     Net +675 +680            Urine Occurrence 4 x 6 x     Stool Occurrence 3 x 2 x     Emesis Occurrence  0 x           Physical Exam    General: In no acute distress, well appearance.   CV: RRR  Pulm: non labored breathing, b/l clear breath sounds.   Abd: soft, non distended, non  tender.       Assessment/Plan:     Acute blood loss anemia  Lower GI bleed resolved as of right now.    Last bloody BM was two days ago.   Will sign off at this time, please call if any questions.
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
## 2019-12-14 NOTE — PLAN OF CARE
Hospital Medicine ICU Acceptance Note  Brief HPI (pertinent PMH and diagnosis or differential diagnosis): 79f  w/ admitted HTN, hypothyroidism, CKD, osteoarthritis 12/6 for diverticulitis w/ BRBPR.      Procedure Date: Colonoscopy 12/9 with large diverticula and large amounts of blood, clip placed in suspected area of bleeding for IR CTA done on 12/10 which showed no significant bleeding from NATALIO and SMA branches.     Hospital Course (updated, brief assessment by system or problem, significant events): Pt required total of 11u PRBCs, 1u FFP, 1 u plts. Volume rescusitation. Overall, intermittently tachycardic but never hypotensive during MICU admission. 12/12-13 no additional bloody BMs, hgb stable x4 ~7.5-8, patient stable for stepdown to hospital medicine     To Do / Pending Studies / Follow ups: MOnitor Hb    Patient has been accepted by Hospital Medicine Team B, who will assume care of the patient upon arrival to the floor from the ICU. Please contact ICU team with any concerns prior to arrival. Please contact [facility] at [phone] or [phone] (please do NOT leave a voicemail) when patient arrives to the floor.    Randall Martinez MD  Attending Staff Physician  [facility]  pager- [phone] Hvrqgktadkj - [phone]

## 2019-12-14 NOTE — PLAN OF CARE
CMICU DAILY GOALS       A: Awake    RASS: Goal - RASS Goal: 0-->alert and calm  Actual - RASS (Ron Agitation-Sedation Scale): 0-->alert and calm   Restraint necessity:    B: Breath   SBT: Not intubated   C: Coordinate A & B, analgesics/sedatives   Pain: managed    SAT: Not intubated  D: Delirium   CAM-ICU: Overall CAM-ICU: Negative  E: Early Mobility   MOVE Screen: Pass   Activity: Activity Management: activity clustered for rest period, activity adjusted per tolerance  FAS: Feeding/Nutrition   Diet order: Diet/Nutrition Received: NPO,   Fluid restriction:    T: Thrombus   DVT prophylaxis: VTE Required Core Measure: (SCDs) Sequential compression device initiated/maintained  H: HOB Elevation   Head of Bed (HOB): HOB at 30-45 degrees  U: Ulcer Prophylaxis   GI: yes  G: Glucose control   managed    S: Skin   Bundle compliance: yes   Bathing/Skin Care: bath, chlorhexidine, bath, complete, dressed/undressed, linen changed Date: 12/13/19  B: Bowel Function   no issues   I: Indwelling Catheters   Conner necessity:     CVC necessity: No   IPAD offered: No  D: De-escalation Antibx   Yes  Plan for the day   Monitor CBC, possible step down   Family/Goals of care/Code Status   Code Status: Full Code     No acute events throughout day, VS and assessment per flow sheet, patient progressing towards goals as tolerated, plan of care reviewed with Jo-Ann Escobedo and family, all concerns addressed, will continue to monitor.

## 2019-12-14 NOTE — SUBJECTIVE & OBJECTIVE
Subjective:     Interval History:     H&H stable, no more rectal bleeding.     Post-Op Info:  Procedure(s) (LRB):  COLONOSCOPY (N/A)   5 Days Post-Op      Medications:  Continuous Infusions:  Scheduled Meds:   cefpodoxime  200 mg Oral Q12H    cyanocobalamin  500 mcg Oral Daily    fenofibrate  160 mg Oral Daily    ferrous sulfate  325 mg Oral Daily    hydrocortisone   Topical (Top) TID    levothyroxine  125 mcg Oral Before breakfast    metroNIDAZOLE  500 mg Oral Q8H    pantoprazole  40 mg Intravenous BID     PRN Meds:   sodium chloride    acetaminophen    albuterol-ipratropium    Dextrose 10% Bolus    Dextrose 10% Bolus    glucagon (human recombinant)    glucose    glucose    hydrALAZINE    melatonin    ondansetron    promethazine (PHENERGAN) IVPB    senna-docusate 8.6-50 mg    sodium chloride 0.9%        Objective:     Vital Signs (Most Recent):  Temp: 98.3 °F (36.8 °C) (12/14/19 0808)  Pulse: 76 (12/14/19 0808)  Resp: 18 (12/14/19 0808)  BP: (!) 148/64 (12/14/19 0848)  SpO2: 96 % (12/14/19 0808) Vital Signs (24h Range):  Temp:  [98 °F (36.7 °C)-99.2 °F (37.3 °C)] 98.3 °F (36.8 °C)  Pulse:  [68-90] 76  Resp:  [18-41] 18  SpO2:  [90 %-97 %] 96 %  BP: (148-178)/(64-78) 148/64     Intake/Output - Last 3 Shifts       12/12 0700 - 12/13 0659 12/13 0700 - 12/14 0659 12/14 0700 - 12/15 0659    P.O.  480     I.V. (mL/kg)       Blood 375      IV Piggyback 300 200     Total Intake(mL/kg) 675 (7.8) 680 (7.8)     Net +675 +680            Urine Occurrence 4 x 6 x     Stool Occurrence 3 x 2 x     Emesis Occurrence  0 x           Physical Exam    General: In no acute distress, well appearance.   CV: RRR  Pulm: non labored breathing, b/l clear breath sounds.   Abd: soft, non distended, non tender.

## 2019-12-14 NOTE — PLAN OF CARE
[facility]    HOME HEALTH ORDERS  FACE TO FACE ENCOUNTER    Patient Name: Jo-Ann Escobedo  YOB: 1940    PCP: Dakota Kerr MD   PCP Address: [address] [place] [address] / [place] [address]  PCP Phone Number: [phone]  PCP Fax: [phone]    Encounter Date: 12/14/2019    Admit to Home Health    Diagnoses:  Active Hospital Problems    Diagnosis  POA    *Diverticulitis of large intestine without perforation or abscess with bleeding [K57.33]  Yes    Thrombocytopenia, unspecified [D69.6]  Yes    GIB (gastrointestinal bleeding) [K92.2]  Yes    Syncope [R55]  No    Abnormal CT of the head [R93.0]  Yes    Memory difficulty [R41.3]  Yes    Acute blood loss anemia [D62]  Yes    Hematuria [R31.9]  Yes    Essential hypertension [I10]  Yes    Hypothyroidism, postsurgical [E89.0]  Yes      Resolved Hospital Problems   No resolved problems to display.       Future Appointments   Date Time Provider Department Center   2/21/2020  9:00 AM [facility] MAMMO3 DX [facility] MAMMO Sam Rodriguez   2/24/2020  7:20 AM Logan Peace MD [facility] HEM ONC [facility] Jamison   4/21/2020  7:00 AM LAB, APPOINTMENT [facility] INTMED [facility] LAB IM Sam Rodriguez PCW     Follow-up Information     Dakota Kerr MD In 1 week.    Specialty:  Family Medicine  Contact information:  [address]  [facility] [id number] [phone]                     I have seen and examined this patient face to face today. My clinical findings that support the need for the home health skilled services and home bound status are the following:  Weakness/numbness causing balance and gait disturbance due to Weakness/Debility making it taxing to leave home.    Allergies:  Review of patient's allergies indicates:   Allergen Reactions    Voltaren [diclofenac sodium] Other (See Comments)     Hematochezia and hospitalization for hematochezia.    Codeine Diarrhea and Hallucinations    Niacin preparations      Redness, warming       Diet:  Cardiac,  low-fiber diet    Activities: activity as tolerated    Nursing:   SN to complete comprehensive assessment including routine vital signs. Instruct on disease process and s/s of complications to report to MD. Review/verify medication list sent home with the patient at time of discharge  and instruct patient/caregiver as needed. Frequency may be adjusted depending on start of care date.    Notify MD if SBP > 160 or < 90; DBP > 90 or < 50; HR > 120 or < 50; Temp > 101;      CONSULTS:    Physical Therapy to evaluate and treat. Evaluate for home safety and equipment needs; Establish/upgrade home exercise program. Perform / instruct on therapeutic exercises, gait training, transfer training, and Range of Motion.  Occupational Therapy to evaluate and treat. Evaluate home environment for safety and equipment needs. Perform/Instruct on transfers, ADL training, ROM, and therapeutic exercises.  Aide to provide assistance with personal care, ADLs, and vital signs.    MISCELLANEOUS CARE:  Routine Skin for Bedridden Patients: Instruct patient/caregiver to apply moisture barrier cream to all skin folds and wet areas in perineal area daily and after baths and all bowel movements.    WOUND CARE ORDERS  n/a      Medications: Review discharge medications with patient and family and provide education.       Jo-Ann Escobedo   Home Medication Instructions AILIN:[id number]    Printed on:12/14/19 3108   Medication Information                      acetaminophen (TYLENOL) 325 MG tablet  Take 2 tablets (650 mg total) by mouth every 6 (six) hours as needed.             albuterol-ipratropium (DUO-NEB) 2.5 mg-0.5 mg/3 mL nebulizer solution  Take 3 mLs by nebulization every 4 (four) hours as needed for Wheezing. Rescue             cefpodoxime (VANTIN) 200 MG tablet  Take 1 tablet (200 mg total) by mouth every 12 (twelve) hours. for 3 days             cholecalciferol, vitamin D3, (VITAMIN D3) 2,000 unit Tab  Take 1 tablet by mouth once daily.              co-enzyme Q-10 30 mg capsule  Take 30 mg by mouth once daily.              cyanocobalamin 500 MCG tablet  Take 1 tablet (500 mcg total) by mouth once daily.             fenofibrate 160 MG Tab  Take 1 tablet (160 mg total) by mouth once daily.             ferrous sulfate 325 (65 FE) MG EC tablet  Take 1 tablet (325 mg total) by mouth once daily.             hydrocortisone 1 % cream  Apply topically 3 (three) times daily.             LACTOBACILLUS COMBINATION NO.8 (ADULT PROBIOTIC ORAL)  Take by mouth once daily.              levothyroxine (SYNTHROID) 125 MCG tablet  Take 1 tablet (125 mcg total) by mouth before breakfast.             losartan (COZAAR) 50 MG tablet  Take 1.5 tablets (75 mg total) by mouth once daily.             meclizine (ANTIVERT) 12.5 mg tablet  Take 1 tablet (12.5 mg total) by mouth 3 (three) times daily as needed for Dizziness.             metroNIDAZOLE (FLAGYL) 500 MG tablet  Take 1 tablet (500 mg total) by mouth every 8 (eight) hours. for 3 days             turmeric root extract 500 mg Cap  Take by mouth once daily.                  I certify that this patient is confined to her home and needs intermittent skilled nursing care, physical therapy and occupational therapy.
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
## 2019-12-14 NOTE — PLAN OF CARE
Pt received on unit from [facility] assessed and vitals taken, pt oriented to room all questions and concerns addressed, plan of care established for rest of shift.

## 2019-12-14 NOTE — NURSING
Discharge home. Paperwork reviewed with patient and daughter. No apparent distress or discomfort present. Transported to car via transport

## 2019-12-14 NOTE — DISCHARGE INSTRUCTIONS
submucosal, A10mm, soft, lesion at the incisura.     Biopsied. No fat seen after biopsying the same area     twice.  Biopsy results pending. Perform an upper endoscopic ultrasound (UEUS) at   the next available appointment for follow up 10mm     gastric nodule.  Follow up with Gastroenterology as an outpatient

## 2019-12-14 NOTE — NURSING TRANSFER
Nursing Transfer Note      12/13/2019     [facility]    [facility] bed    Transfer with cardiac monitoring    Transported by RN, PCT    Medicines sent: none    Chart send with patient: Yes    Notified: daughter        Upon arrival to floor: cardiac monitor applied, patient oriented to room, call bell in reach and bed in lowest position. Nurse in room

## 2019-12-14 NOTE — ASSESSMENT & PLAN NOTE
Lower GI bleed resolved as of right now.    Last bloody BM was two days ago.   Will sign off at this time, please call if any questions.

## 2019-12-14 NOTE — PLAN OF CARE
SW sent referral for home health and rolling walker to [facility] insurance to be arranged. SW will follow up.    6:15 PM  Julianna with [facility] states that pt will have [facility] and rolling walker provided by [employer] to be delivered to pt's home.     Eunice Franks, JANNA  [facility]

## 2019-12-15 ENCOUNTER — HOSPITAL ENCOUNTER (INPATIENT)
Facility: HOSPITAL | Age: 79
LOS: 2 days | Discharge: HOME | DRG: 378 | End: 2019-12-17
Attending: EMERGENCY MEDICINE | Admitting: EMERGENCY MEDICINE
Payer: MEDICARE

## 2019-12-15 DIAGNOSIS — K92.2 GASTROINTESTINAL HEMORRHAGE, UNSPECIFIED GASTROINTESTINAL HEMORRHAGE TYPE: Primary | ICD-10-CM

## 2019-12-15 DIAGNOSIS — E78.5 HYPERLIPIDEMIA, UNSPECIFIED HYPERLIPIDEMIA TYPE: ICD-10-CM

## 2019-12-15 DIAGNOSIS — I95.1 ORTHOSTASIS: ICD-10-CM

## 2019-12-15 DIAGNOSIS — R00.2 PALPITATIONS: ICD-10-CM

## 2019-12-15 DIAGNOSIS — R00.1 BRADYCARDIA: ICD-10-CM

## 2019-12-15 PROBLEM — K92.1 HEMATOCHEZIA: Status: ACTIVE | Noted: 2019-12-15

## 2019-12-15 LAB
ABO + RH BLD: NORMAL
ALBUMIN SERPL BCP-MCNC: 2.5 G/DL (ref 3.5–5.2)
ALP SERPL-CCNC: 46 U/L (ref 55–135)
ALT SERPL W/O P-5'-P-CCNC: 20 U/L (ref 10–44)
ANION GAP SERPL CALC-SCNC: 6 MMOL/L (ref 8–16)
AST SERPL-CCNC: 29 U/L (ref 10–40)
BASOPHILS # BLD AUTO: 0.04 K/UL (ref 0–0.2)
BASOPHILS # BLD AUTO: 0.04 K/UL (ref 0–0.2)
BASOPHILS # BLD AUTO: 0.05 K/UL (ref 0–0.2)
BASOPHILS NFR BLD: 0.5 % (ref 0–1.9)
BASOPHILS NFR BLD: 0.5 % (ref 0–1.9)
BASOPHILS NFR BLD: 0.7 % (ref 0–1.9)
BILIRUB SERPL-MCNC: 0.5 MG/DL (ref 0.1–1)
BILIRUB UR QL STRIP: NEGATIVE
BLD GP AB SCN CELLS X3 SERPL QL: NORMAL
BUN SERPL-MCNC: 10 MG/DL (ref 8–23)
BUN SERPL-MCNC: 9 MG/DL (ref 6–30)
CALCIUM SERPL-MCNC: 8.3 MG/DL (ref 8.7–10.5)
CHLORIDE SERPL-SCNC: 107 MMOL/L (ref 95–110)
CHLORIDE SERPL-SCNC: 110 MMOL/L (ref 95–110)
CLARITY UR REFRACT.AUTO: CLEAR
CO2 SERPL-SCNC: 26 MMOL/L (ref 23–29)
COLOR UR AUTO: YELLOW
CREAT SERPL-MCNC: 0.7 MG/DL (ref 0.5–1.4)
CREAT SERPL-MCNC: 0.7 MG/DL (ref 0.5–1.4)
DIFFERENTIAL METHOD: ABNORMAL
EOSINOPHIL # BLD AUTO: 0.2 K/UL (ref 0–0.5)
EOSINOPHIL # BLD AUTO: 0.2 K/UL (ref 0–0.5)
EOSINOPHIL # BLD AUTO: 0.3 K/UL (ref 0–0.5)
EOSINOPHIL NFR BLD: 1.9 % (ref 0–8)
EOSINOPHIL NFR BLD: 2.8 % (ref 0–8)
EOSINOPHIL NFR BLD: 3.1 % (ref 0–8)
ERYTHROCYTE [DISTWIDTH] IN BLOOD BY AUTOMATED COUNT: 18.1 % (ref 11.5–14.5)
ERYTHROCYTE [DISTWIDTH] IN BLOOD BY AUTOMATED COUNT: 18.5 % (ref 11.5–14.5)
ERYTHROCYTE [DISTWIDTH] IN BLOOD BY AUTOMATED COUNT: 18.7 % (ref 11.5–14.5)
EST. GFR  (AFRICAN AMERICAN): >60 ML/MIN/1.73 M^2
EST. GFR  (NON AFRICAN AMERICAN): >60 ML/MIN/1.73 M^2
GLUCOSE SERPL-MCNC: 121 MG/DL (ref 70–110)
GLUCOSE SERPL-MCNC: 122 MG/DL (ref 70–110)
GLUCOSE UR QL STRIP: NEGATIVE
HCT VFR BLD AUTO: 26.5 % (ref 37–48.5)
HCT VFR BLD AUTO: 27.5 % (ref 37–48.5)
HCT VFR BLD AUTO: 28.5 % (ref 37–48.5)
HCT VFR BLD CALC: 24 %PCV (ref 36–54)
HGB BLD-MCNC: 8 G/DL (ref 12–16)
HGB BLD-MCNC: 8.4 G/DL (ref 12–16)
HGB BLD-MCNC: 8.6 G/DL (ref 12–16)
HGB UR QL STRIP: NEGATIVE
IMM GRANULOCYTES # BLD AUTO: 0.13 K/UL (ref 0–0.04)
IMM GRANULOCYTES # BLD AUTO: 0.13 K/UL (ref 0–0.04)
IMM GRANULOCYTES # BLD AUTO: 0.17 K/UL (ref 0–0.04)
IMM GRANULOCYTES NFR BLD AUTO: 1.5 % (ref 0–0.5)
IMM GRANULOCYTES NFR BLD AUTO: 1.7 % (ref 0–0.5)
IMM GRANULOCYTES NFR BLD AUTO: 2.2 % (ref 0–0.5)
INR PPP: 1.1 (ref 0.8–1.2)
KETONES UR QL STRIP: NEGATIVE
LEUKOCYTE ESTERASE UR QL STRIP: NEGATIVE
LYMPHOCYTES # BLD AUTO: 1.2 K/UL (ref 1–4.8)
LYMPHOCYTES # BLD AUTO: 1.6 K/UL (ref 1–4.8)
LYMPHOCYTES # BLD AUTO: 2.5 K/UL (ref 1–4.8)
LYMPHOCYTES NFR BLD: 15.6 % (ref 18–48)
LYMPHOCYTES NFR BLD: 21.1 % (ref 18–48)
LYMPHOCYTES NFR BLD: 28.6 % (ref 18–48)
MAGNESIUM SERPL-MCNC: 2 MG/DL (ref 1.6–2.6)
MCH RBC QN AUTO: 29.7 PG (ref 27–31)
MCH RBC QN AUTO: 30.1 PG (ref 27–31)
MCH RBC QN AUTO: 30.1 PG (ref 27–31)
MCHC RBC AUTO-ENTMCNC: 30.2 G/DL (ref 32–36)
MCHC RBC AUTO-ENTMCNC: 30.2 G/DL (ref 32–36)
MCHC RBC AUTO-ENTMCNC: 30.5 G/DL (ref 32–36)
MCV RBC AUTO: 100 FL (ref 82–98)
MCV RBC AUTO: 99 FL (ref 82–98)
MCV RBC AUTO: 99 FL (ref 82–98)
MONOCYTES # BLD AUTO: 0.8 K/UL (ref 0.3–1)
MONOCYTES # BLD AUTO: 0.9 K/UL (ref 0.3–1)
MONOCYTES # BLD AUTO: 0.9 K/UL (ref 0.3–1)
MONOCYTES NFR BLD: 10.4 % (ref 4–15)
MONOCYTES NFR BLD: 10.7 % (ref 4–15)
MONOCYTES NFR BLD: 11.9 % (ref 4–15)
NEUTROPHILS # BLD AUTO: 4.8 K/UL (ref 1.8–7.7)
NEUTROPHILS # BLD AUTO: 5 K/UL (ref 1.8–7.7)
NEUTROPHILS # BLD AUTO: 5.1 K/UL (ref 1.8–7.7)
NEUTROPHILS NFR BLD: 55.9 % (ref 38–73)
NEUTROPHILS NFR BLD: 64.1 % (ref 38–73)
NEUTROPHILS NFR BLD: 66.8 % (ref 38–73)
NITRITE UR QL STRIP: NEGATIVE
NRBC BLD-RTO: 0 /100 WBC
PH UR STRIP: 8 [PH] (ref 5–8)
PHOSPHATE SERPL-MCNC: 2.6 MG/DL (ref 2.7–4.5)
PLATELET # BLD AUTO: 187 K/UL (ref 150–350)
PLATELET # BLD AUTO: 207 K/UL (ref 150–350)
PLATELET # BLD AUTO: 210 K/UL (ref 150–350)
PMV BLD AUTO: 10.3 FL (ref 9.2–12.9)
PMV BLD AUTO: 10.4 FL (ref 9.2–12.9)
PMV BLD AUTO: 10.6 FL (ref 9.2–12.9)
POC IONIZED CALCIUM: 1.15 MMOL/L (ref 1.06–1.42)
POC TCO2 (MEASURED): 24 MMOL/L (ref 23–29)
POTASSIUM BLD-SCNC: 3.6 MMOL/L (ref 3.5–5.1)
POTASSIUM SERPL-SCNC: 3.7 MMOL/L (ref 3.5–5.1)
PROT SERPL-MCNC: 4.9 G/DL (ref 6–8.4)
PROT UR QL STRIP: NEGATIVE
PROTHROMBIN TIME: 11.4 SEC (ref 9–12.5)
RBC # BLD AUTO: 2.69 M/UL (ref 4–5.4)
RBC # BLD AUTO: 2.79 M/UL (ref 4–5.4)
RBC # BLD AUTO: 2.86 M/UL (ref 4–5.4)
SAMPLE: ABNORMAL
SODIUM BLD-SCNC: 141 MMOL/L (ref 136–145)
SODIUM SERPL-SCNC: 142 MMOL/L (ref 136–145)
SP GR UR STRIP: 1.01 (ref 1–1.03)
TROPONIN I SERPL DL<=0.01 NG/ML-MCNC: 0.01 NG/ML (ref 0–0.03)
TROPONIN I SERPL DL<=0.01 NG/ML-MCNC: 0.01 NG/ML (ref 0–0.03)
TROPONIN I SERPL DL<=0.01 NG/ML-MCNC: 0.02 NG/ML (ref 0–0.03)
URN SPEC COLLECT METH UR: NORMAL
WBC # BLD AUTO: 7.57 K/UL (ref 3.9–12.7)
WBC # BLD AUTO: 7.79 K/UL (ref 3.9–12.7)
WBC # BLD AUTO: 8.64 K/UL (ref 3.9–12.7)

## 2019-12-15 PROCEDURE — 93005 ELECTROCARDIOGRAM TRACING: CPT

## 2019-12-15 PROCEDURE — 81003 URINALYSIS AUTO W/O SCOPE: CPT

## 2019-12-15 PROCEDURE — 93010 EKG 12-LEAD: ICD-10-PCS | Mod: 76,,, | Performed by: INTERNAL MEDICINE

## 2019-12-15 PROCEDURE — 85610 PROTHROMBIN TIME: CPT

## 2019-12-15 PROCEDURE — 99223 PR INITIAL HOSPITAL CARE,LEVL III: ICD-10-PCS | Mod: ,,, | Performed by: HOSPITALIST

## 2019-12-15 PROCEDURE — 63600175 PHARM REV CODE 636 W HCPCS: Performed by: EMERGENCY MEDICINE

## 2019-12-15 PROCEDURE — 84484 ASSAY OF TROPONIN QUANT: CPT | Mod: 91

## 2019-12-15 PROCEDURE — 11000001 HC ACUTE MED/SURG PRIVATE ROOM

## 2019-12-15 PROCEDURE — 99285 EMERGENCY DEPT VISIT HI MDM: CPT | Mod: ,,, | Performed by: EMERGENCY MEDICINE

## 2019-12-15 PROCEDURE — 36415 COLL VENOUS BLD VENIPUNCTURE: CPT

## 2019-12-15 PROCEDURE — 99285 PR EMERGENCY DEPT VISIT,LEVEL V: ICD-10-PCS | Mod: ,,, | Performed by: EMERGENCY MEDICINE

## 2019-12-15 PROCEDURE — 85025 COMPLETE CBC W/AUTO DIFF WBC: CPT | Mod: 91

## 2019-12-15 PROCEDURE — C9113 INJ PANTOPRAZOLE SODIUM, VIA: HCPCS | Performed by: EMERGENCY MEDICINE

## 2019-12-15 PROCEDURE — 84100 ASSAY OF PHOSPHORUS: CPT

## 2019-12-15 PROCEDURE — 99223 1ST HOSP IP/OBS HIGH 75: CPT | Mod: ,,, | Performed by: HOSPITALIST

## 2019-12-15 PROCEDURE — 83735 ASSAY OF MAGNESIUM: CPT

## 2019-12-15 PROCEDURE — 25000003 PHARM REV CODE 250: Performed by: HOSPITALIST

## 2019-12-15 PROCEDURE — 86901 BLOOD TYPING SEROLOGIC RH(D): CPT

## 2019-12-15 PROCEDURE — 80053 COMPREHEN METABOLIC PANEL: CPT

## 2019-12-15 PROCEDURE — 84484 ASSAY OF TROPONIN QUANT: CPT

## 2019-12-15 PROCEDURE — 93010 ELECTROCARDIOGRAM REPORT: CPT | Mod: 76,,, | Performed by: INTERNAL MEDICINE

## 2019-12-15 PROCEDURE — 99285 EMERGENCY DEPT VISIT HI MDM: CPT | Mod: 25

## 2019-12-15 PROCEDURE — 96374 THER/PROPH/DIAG INJ IV PUSH: CPT

## 2019-12-15 PROCEDURE — 99284 EMERGENCY DEPT VISIT MOD MDM: CPT | Mod: 25

## 2019-12-15 PROCEDURE — 63700000 PHARM REV CODE 250 ALT 637 W/O HCPCS: Performed by: HOSPITALIST

## 2019-12-15 RX ORDER — GLUCAGON 1 MG
1 KIT INJECTION
Status: DISCONTINUED | OUTPATIENT
Start: 2019-12-15 | End: 2019-12-17 | Stop reason: HOSPADM

## 2019-12-15 RX ORDER — LEVOTHYROXINE SODIUM 125 UG/1
125 TABLET ORAL
Status: DISCONTINUED | OUTPATIENT
Start: 2019-12-15 | End: 2019-12-17 | Stop reason: HOSPADM

## 2019-12-15 RX ORDER — FERROUS SULFATE 325(65) MG
325 TABLET, DELAYED RELEASE (ENTERIC COATED) ORAL DAILY
Status: DISCONTINUED | OUTPATIENT
Start: 2019-12-15 | End: 2019-12-17 | Stop reason: HOSPADM

## 2019-12-15 RX ORDER — FENOFIBRATE 160 MG/1
160 TABLET ORAL DAILY
Status: DISCONTINUED | OUTPATIENT
Start: 2019-12-15 | End: 2019-12-17 | Stop reason: HOSPADM

## 2019-12-15 RX ORDER — LEVOTHYROXINE SODIUM 125 UG/1
125 TABLET ORAL
Status: DISCONTINUED | OUTPATIENT
Start: 2019-12-16 | End: 2019-12-15

## 2019-12-15 RX ORDER — SODIUM CHLORIDE 0.9 % (FLUSH) 0.9 %
10 SYRINGE (ML) INJECTION
Status: CANCELLED | OUTPATIENT
Start: 2019-12-15

## 2019-12-15 RX ORDER — CYANOCOBALAMIN (VITAMIN B-12) 250 MCG
500 TABLET ORAL DAILY
Status: DISCONTINUED | OUTPATIENT
Start: 2019-12-15 | End: 2019-12-17 | Stop reason: HOSPADM

## 2019-12-15 RX ORDER — CHOLECALCIFEROL (VITAMIN D3) 25 MCG
2000 TABLET ORAL DAILY
Status: DISCONTINUED | OUTPATIENT
Start: 2019-12-15 | End: 2019-12-17 | Stop reason: HOSPADM

## 2019-12-15 RX ORDER — SODIUM CHLORIDE 0.9 % (FLUSH) 0.9 %
10 SYRINGE (ML) INJECTION
Status: DISCONTINUED | OUTPATIENT
Start: 2019-12-15 | End: 2019-12-17 | Stop reason: HOSPADM

## 2019-12-15 RX ORDER — PANTOPRAZOLE SODIUM 40 MG/10ML
80 INJECTION, POWDER, LYOPHILIZED, FOR SOLUTION INTRAVENOUS
Status: COMPLETED | OUTPATIENT
Start: 2019-12-15 | End: 2019-12-15

## 2019-12-15 RX ORDER — CEFPODOXIME PROXETIL 100 MG/1
200 TABLET, FILM COATED ORAL EVERY 12 HOURS
Status: DISCONTINUED | OUTPATIENT
Start: 2019-12-15 | End: 2019-12-15 | Stop reason: SDUPTHER

## 2019-12-15 RX ORDER — IBUPROFEN 200 MG
24 TABLET ORAL
Status: DISCONTINUED | OUTPATIENT
Start: 2019-12-15 | End: 2019-12-17 | Stop reason: HOSPADM

## 2019-12-15 RX ORDER — IBUPROFEN 200 MG
16 TABLET ORAL
Status: DISCONTINUED | OUTPATIENT
Start: 2019-12-15 | End: 2019-12-17 | Stop reason: HOSPADM

## 2019-12-15 RX ORDER — CEFPODOXIME PROXETIL 200 MG/1
200 TABLET, FILM COATED ORAL EVERY 12 HOURS
Status: DISCONTINUED | OUTPATIENT
Start: 2019-12-15 | End: 2019-12-17 | Stop reason: HOSPADM

## 2019-12-15 RX ORDER — METRONIDAZOLE 500 MG/1
500 TABLET ORAL EVERY 8 HOURS
Status: DISCONTINUED | OUTPATIENT
Start: 2019-12-15 | End: 2019-12-17 | Stop reason: HOSPADM

## 2019-12-15 RX ORDER — ACETAMINOPHEN 325 MG/1
650 TABLET ORAL EVERY 6 HOURS PRN
Status: DISCONTINUED | OUTPATIENT
Start: 2019-12-15 | End: 2019-12-17 | Stop reason: HOSPADM

## 2019-12-15 RX ORDER — HYDROCORTISONE 1 %
CREAM (GRAM) TOPICAL 3 TIMES DAILY
Status: DISCONTINUED | OUTPATIENT
Start: 2019-12-15 | End: 2019-12-17 | Stop reason: HOSPADM

## 2019-12-15 RX ADMIN — LEVOTHYROXINE SODIUM 125 MCG: 125 TABLET ORAL at 12:12

## 2019-12-15 RX ADMIN — METRONIDAZOLE 500 MG: 500 TABLET ORAL at 09:12

## 2019-12-15 RX ADMIN — PANTOPRAZOLE SODIUM 80 MG: 40 INJECTION, POWDER, FOR SOLUTION INTRAVENOUS at 07:12

## 2019-12-15 RX ADMIN — CYANOCOBALAMIN TAB 250 MCG 500 MCG: 250 TAB at 10:12

## 2019-12-15 RX ADMIN — METRONIDAZOLE 500 MG: 500 TABLET ORAL at 02:12

## 2019-12-15 RX ADMIN — HYDROCORTISONE: 10 CREAM TOPICAL at 03:12

## 2019-12-15 RX ADMIN — MELATONIN 2000 UNITS: at 11:12

## 2019-12-15 RX ADMIN — FENOFIBRATE 160 MG: 160 TABLET ORAL at 11:12

## 2019-12-15 RX ADMIN — SODIUM CHLORIDE 500 ML: 0.9 INJECTION, SOLUTION INTRAVENOUS at 08:12

## 2019-12-15 RX ADMIN — FERROUS SULFATE TAB EC 325 MG (65 MG FE EQUIVALENT) 325 MG: 325 (65 FE) TABLET DELAYED RESPONSE at 10:12

## 2019-12-15 RX ADMIN — HYDROCORTISONE: 10 CREAM TOPICAL at 09:12

## 2019-12-15 NOTE — ED NOTES
Pt HR decreasing to 38 to 42 intermittently on monitor. Asymptomatic. Dr. Peoples notified. New verbal orders for repeat EKG.

## 2019-12-15 NOTE — ASSESSMENT & PLAN NOTE
Jo-Ann Escobedo is a 79 y.o. female with history of hx of diverticulitis + abscess (2014), CKD2, HTN, remote h/o breast cancer s/p L modified radical mastectomy + chemo, arthritis (previously on NSAIDs), recent admission for diverticula bleed who presents with rectal bleeding.  Hemodynamically stable. Hgb stable from recent discharge. Off NSAIDs. Suspect this may be old blood passing vs recurrent diverticula bleed.    - Defer endoscopy for now given stable Hgb and especially difficult colonoscopy with likely low yield.  - Recommend CRS consult if this is a recurrent diverticula bleed

## 2019-12-15 NOTE — ED PROVIDER NOTES
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     Patient reports that at 2:00 a.m. this evening, patient had a grossly bloody bowel movement.  She reports bite red blood mixed with "old blood".  It was watery.  Patient reports that she went back to sleep but at 6:00 a.m. she had a 2nd bloody bowel movement.  This 1 was more watery and larger so she decided to come in.  Patient reports weakness that has been constant since discharge and not necessarily worse today.  She did have some palpitations earlier the morning but not at this time.  No chest pain, shortness of breath, abdominal pain, fevers.  Patient denies any NSAID use despite reported history.  She has not taken any medicine since discharge from the hospital yesterday, including discharge medications.  Prior to this, patient had gone almost 48 hr prior to her last bloody bowel movement.  No blood thinner use.    Of note, patient had a recent extensive hospitalization for GI bleed from December 6 to 14th secondary to diverticulitis.  Patient was discharged yesterday.  During this time she was transfused over 10 units of PRBCs.  She also completed course of antibiotics for diverticulitis. She underwent an EGD on December 7th that did not reveal any etiology of the bleeding. She underwent a colonoscopy on December 9th that revealed " diverticulosis throughout the colon.  A clip was placed but was unable to be intervened on by IR.             Review of patient's allergies indicates:   Allergen Reactions    Voltaren [diclofenac sodium] Other (See Comments)     Hematochezia and hospitalization for hematochezia.    Codeine Diarrhea and Hallucinations    Niacin preparations      Redness, warming     Past Medical History:   Diagnosis Date    After-cataract of both eyes - Both Eyes 9/26/2012    Allergy     Arthritis     Breast cancer     Diverticulosis     Hyperlipidemia     Hypertension     Hypothyroidism     Paget disease of bone     Squamous Cell Carcinoma     in situ right neck     Thyroid disease      Past Surgical History:   Procedure Laterality Date    CATARACT EXTRACTION W/  INTRAOCULAR LENS IMPLANT Bilateral     COLONOSCOPY N/A 4/15/2019    Procedure: COLONOSCOPY;  Surgeon: Artur Monet MD;  Location: [facility] (4TH FLR);  Service: Endoscopy;  Laterality: N/A;  within 1 month    COLONOSCOPY N/A 12/9/2019    Procedure: COLONOSCOPY;  Surgeon: Clyde Welch MD;  Location: [facility] ENDO (2ND FLR);  Service: Endoscopy;  Laterality: N/A;    ESOPHAGOGASTRODUODENOSCOPY N/A 12/7/2019    Procedure: EGD (ESOPHAGOGASTRODUODENOSCOPY);  Surgeon: Clyde Welch MD;  Location: [facility] (2ND FLR);  Service: Endoscopy;  Laterality: N/A;    EXCISIONAL BIOPSY Right 6/27/2019    Procedure: EXCISIONAL BIOPSY-breast;  Surgeon: Suki Dill MD;  Location: [facility] OR 2ND FLR;  Service: General;  Laterality: Right;    EYE SURGERY      HYSTERECTOMY      INJECTION OF ANESTHETIC AGENT AROUND MEDIAL BRANCH NERVES INNERVATING LUMBAR FACET JOINT Bilateral 6/7/2019    Procedure: BLOCK, NERVE, FACET JOINT, LUMBAR, MEDIAL BRANCH-deo MBB L4/5,L5/S1;  Surgeon: Jarvis Morales III, MD;  Location: [facility] CATH LAB;  Service: Pain Management;  Laterality: Bilateral;    KNEE ARTHROSCOPY N/A     pt unsure of which knee     MASTECTOMY      SPINE  SURGERY      TOTAL THYROIDECTOMY      YAG Laser Capsulotomy  Left 2019    Dr. Hahn     Family History   Problem Relation Age of Onset    Cancer Father         lung    Dementia Mother     Glaucoma Brother     Macular degeneration Brother     Diabetes Neg Hx     Amblyopia Neg Hx     Blindness Neg Hx     Cataracts Neg Hx     Retinal detachment Neg Hx     Strabismus Neg Hx     Melanoma Neg Hx      Social History     Tobacco Use    Smoking status: Former Smoker     Packs/day: 2.00     Years: 44.00     Pack years: 88.00     Types: Cigarettes     Last attempt to quit: 1969     Years since quittin.9    Smokeless tobacco: Never Used   Substance Use Topics    Alcohol use: Yes     Alcohol/week: 0.0 standard drinks     Frequency: Monthly or less     Drinks per session: 1 or 2     Binge frequency: Never     Comment: maybe a beer every 3 months    Drug use: No     Review of Systems   Constitutional: Negative for fever.   HENT: Negative for congestion.    Eyes: Negative for discharge.   Respiratory: Negative for shortness of breath.    Cardiovascular: Negative for chest pain.   Gastrointestinal: Positive for blood in stool. Negative for abdominal distention, abdominal pain, nausea and vomiting.   Endocrine: Negative for polyuria.   Genitourinary: Negative for dysuria.   Musculoskeletal: Negative for back pain.   Skin: Negative for wound.   Allergic/Immunologic: Negative for immunocompromised state.   Neurological: Positive for weakness. Negative for headaches.   Hematological: Does not bruise/bleed easily.   Psychiatric/Behavioral: Negative for confusion.       Physical Exam     Initial Vitals [12/15/19 0658]   BP Pulse Resp Temp SpO2   (!) 121/58 84 16 98.8 °F (37.1 °C) 96 %      MAP       --         Physical Exam    Nursing note and vitals reviewed.  Constitutional: She appears well-developed and well-nourished. She is not diaphoretic. No distress.   Appears somewhat weak   HENT:   Head:  Normocephalic.   R forehead with healing ecchymosis   Eyes: EOM are normal. Pupils are equal, round, and reactive to light. Right eye exhibits no discharge. Left eye exhibits no discharge. No scleral icterus.   Neck: Normal range of motion. Neck supple. No JVD present.   Cardiovascular: Normal rate, regular rhythm, normal heart sounds and intact distal pulses. Exam reveals no gallop and no friction rub.    No murmur heard.  Pulmonary/Chest: Breath sounds normal. No respiratory distress. She has no wheezes. She has no rhonchi. She has no rales. She exhibits no tenderness.   Abdominal: Soft. Bowel sounds are normal. She exhibits no distension and no mass. There is no tenderness. There is no rebound and no guarding.   Genitourinary: Rectal exam shows no external hemorrhoid and no internal hemorrhoid.   Genitourinary Comments: Rectal exam negative for fissures or hemorrhoids  Grossly bloody with clots   Musculoskeletal: Normal range of motion. She exhibits no edema or tenderness.   Lymphadenopathy:     She has no cervical adenopathy.   Neurological: She is alert and oriented to person, place, and time. She has normal strength. No sensory deficit.   Skin: Skin is warm and dry. Capillary refill takes less than 2 seconds.   Psychiatric: She has a normal mood and affect.         ED Course   Procedures  Labs Reviewed   CBC W/ AUTO DIFFERENTIAL - Abnormal; Notable for the following components:       Result Value    RBC 2.79 (*)     Hemoglobin 8.4 (*)     Hematocrit 27.5 (*)     Mean Corpuscular Volume 99 (*)     Mean Corpuscular Hemoglobin Conc 30.5 (*)     RDW 18.1 (*)     Immature Granulocytes 2.2 (*)     Immature Grans (Abs) 0.17 (*)     Lymph% 15.6 (*)     All other components within normal limits   COMPREHENSIVE METABOLIC PANEL - Abnormal; Notable for the following components:    Glucose 121 (*)     Calcium 8.3 (*)     Total Protein 4.9 (*)     Albumin 2.5 (*)     Alkaline Phosphatase 46 (*)     Anion Gap 6 (*)     All  other components within normal limits   ISTAT PROCEDURE - Abnormal; Notable for the following components:    POC Glucose 122 (*)     POC Hematocrit 24 (*)     All other components within normal limits   PROTIME-INR   MAGNESIUM   PHOSPHORUS   URINALYSIS, REFLEX TO URINE CULTURE   MAGNESIUM   PHOSPHORUS   TROPONIN I   TROPONIN I   TYPE & SCREEN     EKG Readings: (Independently Interpreted)   Previous EKG: Compared with most recent EKG Rhythm: Normal Sinus Rhythm. Heart Rate: 81. Ectopy: PVCs. Axis: Left Axis Deviation.       Imaging Results    None          Medical Decision Making:   History:   I obtained history from: someone other than patient.  Old Medical Records: I decided to obtain old medical records.  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   Differential Diagnosis:   This patient's differential diagnosis includes, but is not limited to:  Recurrent GI bleed, diverticulosis, hemorrhoids, anemia, ulcer, gastritis    I do not suspect varices given recent endoscopy.    Clinical Tests:   Lab Tests: Ordered  Radiological Study: Reviewed  Medical Tests: Ordered  ED Management:  Will obtain adequate IV access, placed on cardiac monitor, obtain labs, including type and screen.  Will administer Protonix given active GI bleed.    Patient's orthostatics are positive with a drop in blood pressure of 17 mmHg and an increase in heart rate of 19 bpm from lying to standing.    Reassessment:  CBC reveals hemoglobin of 8.4 which is unchanged from hemoglobin yesterday.   However, given the orthostatic vital signs, will administer 500 cc bolus.  Of note, patient is having episodes of bradycardia to the low 40s but remains asymptomatic.    Repeat EKG obtained at 8:03 a.m. reveals a marked sinus bradycardia at a rate of 42.  There is a left axis deviation.  Normal ST segments, normal T-waves.  Normal intervals.    Reassessment:  CMP within normal limits. Throughout observation in the emergency department, patient remained free of any hypotension.  She appears stable for medical floor at this time.  Inpatient per case management.  Will readmit to Dr. Hook.                                     Clinical Impression:       ICD-10-CM ICD-9-CM   1. Gastrointestinal hemorrhage, unspecified gastrointestinal hemorrhage type K92.2 578.9   2. Palpitations R00.2 785.1   3. Bradycardia R00.1 427.89   4. Orthostasis I95.1 458.0                             Chevy Peoples MD  12/15/19 0653

## 2019-12-15 NOTE — CONSULTS
[facility]  Gastroenterology  Consult Note    Patient Name: Jo-Ann Escobedo  MRN: [id number]  Admission Date: 12/15/2019  Hospital Length of Stay: 0 days  Code Status: Prior   Attending Provider: Chevy Peoples MD   Consulting Provider: Inocencio Reynolds MD  Primary Care Physician: Dakota Kerr MD  Principal Problem:Gastrointestinal hemorrhage    Inpatient consult to Gastroenterology  Consult performed by: Inocencio Reynolsd MD  Consult ordered by: Randall Martinez MD        Subjective:     HPI:  Jo-Ann Escobedo is a 79 y.o. female with history of hx of diverticulitis + abscess (2014), CKD2, HTN, remote h/o breast cancer s/p L modified radical mastectomy + chemo, arthritis (previously on NSAIDs), recent admission for diverticula bleed who presents with rectal bleeding.    Patient admitted 12/7 - 12/14 with dark stools, BRBPR and mild diverticulitis, Hgb 10s -> 8s. This was in setting of ongoing diclofenac use. EGD 12/7 negative (apart from submucosal nodule at incisura). CT with mild diverticulitis and CRS cleared patient for colonoscopy. Colonoscopy 12/9 with blood throughout colon, normal TI and large diverticular throughout sigmoid. Attempted IR embolization but no contrast extravasation in suspected area of GI bleed. CTA negative. She continued to have hematochezia with Hgb down to as low as 6.5. CRS consulted but recommended exhausting GI / IR interventions before considering surgery. Eventually, as NSAIDs washed out of her system, hematochezia resolved and Hgb stable 8s. Discharged yesterday and doing well, but last night had one BM with dark red blood mixed with stool and another with dark/fresh blood mixed with stool so she returned to the ED. Not taking NSAIDs anymore. No N/V or abdominal pain. Last BM around 6am. Was also given cefpodoxime and flagyl for mild diverticulitis. She denies fever/chills.    Here, vital signs normal. Hgb 8.4, same as yesterday. BUN 10. Plts 187, INR  1.1.    Past Medical History:   Diagnosis Date    After-cataract of both eyes - Both Eyes 9/26/2012    Allergy     Arthritis     Breast cancer     Diverticulosis     Hyperlipidemia     Hypertension     Hypothyroidism     Paget disease of bone     Squamous Cell Carcinoma     in situ right neck     Thyroid disease        Past Surgical History:   Procedure Laterality Date    CATARACT EXTRACTION W/  INTRAOCULAR LENS IMPLANT Bilateral     COLONOSCOPY N/A 4/15/2019    Procedure: COLONOSCOPY;  Surgeon: Artur Monet MD;  Location: [facility] ENDO (4TH FLR);  Service: Endoscopy;  Laterality: N/A;  within 1 month    COLONOSCOPY N/A 12/9/2019    Procedure: COLONOSCOPY;  Surgeon: Clyde Welch MD;  Location: [facility] ENDO (2ND FLR);  Service: Endoscopy;  Laterality: N/A;    ESOPHAGOGASTRODUODENOSCOPY N/A 12/7/2019    Procedure: EGD (ESOPHAGOGASTRODUODENOSCOPY);  Surgeon: Clyde Welch MD;  Location: [facility] ENDO (2ND FLR);  Service: Endoscopy;  Laterality: N/A;    EXCISIONAL BIOPSY Right 6/27/2019    Procedure: EXCISIONAL BIOPSY-breast;  Surgeon: Suki Dill MD;  Location: [facility] OR 2ND FLR;  Service: General;  Laterality: Right;    EYE SURGERY      HYSTERECTOMY      INJECTION OF ANESTHETIC AGENT AROUND MEDIAL BRANCH NERVES INNERVATING LUMBAR FACET JOINT Bilateral 6/7/2019    Procedure: BLOCK, NERVE, FACET JOINT, LUMBAR, MEDIAL BRANCH-deo MBB L4/5,L5/S1;  Surgeon: Jarvis Morales III, MD;  Location: [facility] CATH LAB;  Service: Pain Management;  Laterality: Bilateral;    KNEE ARTHROSCOPY N/A     pt unsure of which knee     MASTECTOMY      SPINE SURGERY      TOTAL THYROIDECTOMY      YAG Laser Capsulotomy  Left 07/29/2019    Dr. Hahn       Family History   Problem Relation Age of Onset    Cancer Father         lung    Dementia Mother     Glaucoma Brother     Macular degeneration Brother     Diabetes Neg Hx     Amblyopia Neg Hx     Blindness Neg Hx     Cataracts Neg Hx     Retinal  detachment Neg Hx     Strabismus Neg Hx     Melanoma Neg Hx        Social History     Socioeconomic History    Marital status:      Spouse name: Not on file    Number of children: Not on file    Years of education: Not on file    Highest education level: Not on file   Occupational History    Occupation: realtor     Employer: [email or web address]     Employer: [employer]   Social Needs    Financial resource strain: Not hard at all    Food insecurity:     Worry: Never true     Inability: Never true    Transportation needs:     Medical: No     Non-medical: No   Tobacco Use    Smoking status: Former Smoker     Packs/day: 2.00     Years: 44.00     Pack years: 88.00     Types: Cigarettes     Last attempt to quit: 1969     Years since quittin.9    Smokeless tobacco: Never Used   Substance and Sexual Activity    Alcohol use: Yes     Alcohol/week: 0.0 standard drinks     Frequency: Monthly or less     Drinks per session: 1 or 2     Binge frequency: Never     Comment: maybe a beer every 3 months    Drug use: No    Sexual activity: Not Currently   Lifestyle    Physical activity:     Days per week: 5 days     Minutes per session: 20 min    Stress: Not at all   Relationships    Social connections:     Talks on phone: More than three times a week     Gets together: Twice a week     Attends [employer] service: Never     Active member of club or organization: Yes     Attends meetings of clubs or organizations: More than 4 times per year     Relationship status:    Other Topics Concern    Are you pregnant or think you may be? No    Breast-feeding No   Social History Narrative    Not on file       Current Facility-Administered Medications on File Prior to Encounter   Medication Dose Route Frequency Provider Last Rate Last Dose    [DISCONTINUED] 0.9%  NaCl infusion (for blood administration)   Intravenous Q24H SHRUTHIN Radha Quintanilla MD        [DISCONTINUED] acetaminophen tablet 650 mg   650 mg Oral Q4H PRN Sridhar Bai PA-C        [DISCONTINUED] albuterol-ipratropium 2.5 mg-0.5 mg/3 mL nebulizer solution 3 mL  3 mL Nebulization Q4H PRN Sridhar Bai PA-C        [DISCONTINUED] cefpodoxime tablet 200 mg  200 mg Oral Q12H Randall Martinez MD   200 mg at 12/14/19 0927    [DISCONTINUED] cyanocobalamin tablet 500 mcg  500 mcg Oral Daily Nenita Zavala PA-C   500 mcg at 12/14/19 0927    [DISCONTINUED] dextrose 10% (D10W) Bolus  12.5 g Intravenous PRN Sridhar Bai PA-C        [DISCONTINUED] dextrose 10% (D10W) Bolus  25 g Intravenous PRN Sridhar Bai PA-C        [DISCONTINUED] fenofibrate tablet 160 mg  160 mg Oral Daily Sridhar Bai PA-C   160 mg at 12/14/19 0927    [DISCONTINUED] ferrous sulfate EC tablet 325 mg  325 mg Oral Daily Sridhar Bai PA-C   325 mg at 12/14/19 0927    [DISCONTINUED] glucagon (human recombinant) injection 1 mg  1 mg Intramuscular PRN Sridhar Bai PA-C        [DISCONTINUED] glucose chewable tablet 16 g  16 g Oral PRN Sridhar Bai PA-C        [DISCONTINUED] glucose chewable tablet 24 g  24 g Oral PRN Sridhar Bai PA-C        [DISCONTINUED] hydrALAZINE injection 10 mg  10 mg Intravenous Q6H PRN Radha Quintanilla MD   10 mg at 12/10/19 1555    [DISCONTINUED] hydrocortisone 1 % cream   Topical (Top) TID Randall Martinez MD        [DISCONTINUED] levothyroxine tablet 125 mcg  125 mcg Oral Before breakfast Sridhar Bai PA-C   125 mcg at 12/14/19 0540    [DISCONTINUED] losartan tablet 75 mg  75 mg Oral Daily Randall Martinez MD        [DISCONTINUED] losartan tablet 75 mg  75 mg Oral Daily Randall Martinez MD        [DISCONTINUED] melatonin tablet 9 mg  9 mg Oral Nightly PRN Sridhar Bai PA-C        [DISCONTINUED] metroNIDAZOLE tablet 500 mg  500 mg Oral Q8H Randall Martinez MD   500 mg at 12/14/19 1419    [DISCONTINUED] ondansetron injection 4 mg  4 mg Intravenous Q8H PRN Radha Quintanilla MD    4 mg at 12/10/19 1818    [DISCONTINUED] pantoprazole EC tablet 40 mg  40 mg Oral BID Randall Martinez MD        [DISCONTINUED] pantoprazole injection 40 mg  40 mg Intravenous BID Sridhar Bai PA-C   40 mg at 12/14/19 0927    [DISCONTINUED] promethazine (PHENERGAN) 6.25 mg in dextrose 5 % 50 mL IVPB  6.25 mg Intravenous Q6H PRN Sridhar Bai PA-C        [DISCONTINUED] senna-docusate 8.6-50 mg per tablet 1 tablet  1 tablet Oral BID PRN Sridhar Bai PA-C        [DISCONTINUED] sodium chloride 0.9% flush 10 mL  10 mL Intravenous PRN Cris García MD         Current Outpatient Medications on File Prior to Encounter   Medication Sig Dispense Refill    cholecalciferol, vitamin D3, (VITAMIN D3) 2,000 unit Tab Take 1 tablet by mouth once daily.      co-enzyme Q-10 30 mg capsule Take 30 mg by mouth once daily.       fenofibrate 160 MG Tab Take 1 tablet (160 mg total) by mouth once daily. 90 tablet 3    ferrous sulfate (FEOSOL) 325 mg (65 mg iron) Tab tablet Take 1 tablet (325 mg total) by mouth once daily. 30 tablet 2    hydrocortisone 1 % cream Apply topically 3 (three) times daily. 14.2 g 0    LACTOBACILLUS COMBINATION NO.8 (ADULT PROBIOTIC ORAL) Take by mouth once daily.       levothyroxine (SYNTHROID) 125 MCG tablet Take 1 tablet (125 mcg total) by mouth before breakfast. 30 tablet 11    losartan (COZAAR) 50 MG tablet Take 1.5 tablets (75 mg total) by mouth once daily. 135 tablet 3    acetaminophen (TYLENOL) 325 MG tablet Take 2 tablets (650 mg total) by mouth every 6 (six) hours as needed. 120 tablet 1    albuterol-ipratropium (DUO-NEB) 2.5 mg-0.5 mg/3 mL nebulizer solution Take 3 mLs by nebulization every 4 (four) hours as needed for Wheezing. Rescue 90 mL 0    cefpodoxime (VANTIN) 200 MG tablet Take 1 tablet (200 mg total) by mouth every 12 (twelve) hours. for 3 days 6 tablet 0    cyanocobalamin 500 MCG tablet Take 1 tablet (500 mcg total) by mouth once daily. 30 tablet 2    meclizine  (ANTIVERT) 12.5 mg tablet Take 1 tablet (12.5 mg total) by mouth 3 (three) times daily as needed for Dizziness. 30 tablet 2    metroNIDAZOLE (FLAGYL) 500 MG tablet Take 1 tablet (500 mg total) by mouth every 8 (eight) hours. for 3 days 9 tablet 0    turmeric root extract 500 mg Cap Take by mouth once daily.          Review of patient's allergies indicates:   Allergen Reactions    Voltaren [diclofenac sodium] Other (See Comments)     Hematochezia and hospitalization for hematochezia.    Codeine Diarrhea and Hallucinations    Niacin preparations      Redness, warming       Review of Systems:   Constitutional: no fever, chills or change in weight   Eyes: no visual changes   ENT: no sore throat or dysphagia  Respiratory: no cough or shortness of breath   Cardiovascular: no chest pain or palpitations   Gastrointestinal: as per HPI  Hematologic/Lymphatic: no easy bruising or lymphadenopathy   Musculoskeletal: no arthralgias or myalgias   Neurological: no change in mental status  Behavioral/Psych: no change in mood    Objective:     Vitals:    12/15/19 1001   BP: (!) 125/58   Pulse: 74   Resp: (!) 25   Temp:        General: Alert and Oriented, no distress  HEENT: Normocephalic, Atraumatic. No scleral icterus.  Resp: Good air entry bilaterally, no adventitious sounds  Cardiac: S1 and S2 normal  Abdomen: Normoactive bowel sounds. Non-distended. Normal tympany. Soft. Non-tender. No peritoneal signs.  Extremities: No peripheral edema.   Neurologic: No gross neurological Deficits  Psych: Calm, cooperative. Normal mood and affect.    Significant Labs:  Recent Labs   Lab 12/13/19  2115 12/14/19  0728 12/15/19  0745   HGB 8.4* 8.4* 8.4*       Lab Results   Component Value Date    WBC 7.57 12/15/2019    HGB 8.4 (L) 12/15/2019    HCT 24 (L) 12/15/2019    MCV 99 (H) 12/15/2019     12/15/2019       Lab Results   Component Value Date     12/15/2019    K 3.7 12/15/2019     12/15/2019    CO2 26 12/15/2019    BUN  10 12/15/2019    CREATININE 0.7 12/15/2019    CALCIUM 8.3 (L) 12/15/2019    ANIONGAP 6 (L) 12/15/2019    ESTGFRAFRICA >60.0 12/15/2019    EGFRNONAA >60.0 12/15/2019       Lab Results   Component Value Date    ALT 20 12/15/2019    AST 29 12/15/2019    ALKPHOS 46 (L) 12/15/2019    BILITOT 0.5 12/15/2019       Lab Results   Component Value Date    INR 1.1 12/15/2019    INR 1.3 (H) 12/10/2019    INR 1.1 12/06/2019       Significant Imaging:  Reviewed pertinent radiology findings.       Assessment/Plan:     Hematochezia  Jo-Ann Escobedo is a 79 y.o. female with history of hx of diverticulitis + abscess (2014), CKD2, HTN, remote h/o breast cancer s/p L modified radical mastectomy + chemo, arthritis (previously on NSAIDs), recent admission for diverticula bleed who presents with rectal bleeding.  Hemodynamically stable. Hgb stable from recent discharge. Off NSAIDs. Suspect this may be old blood passing.    - Defer endoscopy for now given stable Hgb. Will monitor.  - If continued evidence of bleeding, may consider repeat colonoscopy      Thank you for your consult. I will follow-up with patient. Please contact us if you have any additional questions.    Inocencio Reynolds MD  Gastroenterology  [facility]

## 2019-12-15 NOTE — HPI
Jo-Ann Escobedo is a 79 y.o. female with history of hx of diverticulitis + abscess (2014), CKD2, HTN, remote h/o breast cancer s/p L modified radical mastectomy + chemo, arthritis (previously on NSAIDs), recent admission for diverticula bleed who presents with rectal bleeding.    Patient admitted 12/7 - 12/14 with dark stools, BRBPR and mild diverticulitis, Hgb 10s -> 8s. This was in setting of ongoing diclofenac use. EGD 12/7 negative (apart from submucosal nodule at incisura). CT with mild diverticulitis and CRS cleared patient for colonoscopy. Colonoscopy 12/9 with blood throughout colon, normal TI and large diverticular throughout sigmoid. Attempted IR embolization but no contrast extravasation in suspected area of GI bleed. CTA negative. She continued to have hematochezia with Hgb down to as low as 6.5. CRS consulted but recommended exhausting GI / IR interventions before considering surgery. Eventually, as NSAIDs washed out of her system, hematochezia resolved and Hgb stable 8s. Discharged yesterday and doing well, but last night had one BM with dark red blood mixed with stool and another with dark/fresh blood mixed with stool so she returned to the ED. Not taking NSAIDs anymore. No N/V or abdominal pain. Last BM around 6am. Was also given cefpodoxime and flagyl for mild diverticulitis. She denies fever/chills.    Here, vital signs normal. Hgb 8.4, same as yesterday. BUN 10. Plts 187, INR 1.1.

## 2019-12-15 NOTE — ED NOTES
Pt resting comfortably on stretcher. Unable to provide urine specimen at this time. NS infusing. Ice chips provided. Family and pt updated on plan of care. NAD. Will cont to monitor.
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
## 2019-12-15 NOTE — ED NOTES
Pt d/c'ed from hospital yesterday after being evaluated for GI bleed. States they could not find a source of the bleeding. Pt started with GI bleeding again last night, bright red reported. +weakness. Denies abd pain, chest pain, SOB. Family at bedisde. Denies n/v. Denies black stools. Pt placed on cont cardiac, bp, and spo2 monitors. Will cont to monitor

## 2019-12-16 LAB
ALBUMIN SERPL BCP-MCNC: 2.4 G/DL (ref 3.5–5.2)
ALP SERPL-CCNC: 43 U/L (ref 55–135)
ALT SERPL W/O P-5'-P-CCNC: 15 U/L (ref 10–44)
ANION GAP SERPL CALC-SCNC: 6 MMOL/L (ref 8–16)
AST SERPL-CCNC: 22 U/L (ref 10–40)
BASOPHILS # BLD AUTO: 0.04 K/UL (ref 0–0.2)
BASOPHILS # BLD AUTO: 0.04 K/UL (ref 0–0.2)
BASOPHILS # BLD AUTO: 0.05 K/UL (ref 0–0.2)
BASOPHILS # BLD AUTO: 0.06 K/UL (ref 0–0.2)
BASOPHILS NFR BLD: 0.5 % (ref 0–1.9)
BASOPHILS NFR BLD: 0.6 % (ref 0–1.9)
BASOPHILS NFR BLD: 0.6 % (ref 0–1.9)
BASOPHILS NFR BLD: 0.7 % (ref 0–1.9)
BILIRUB SERPL-MCNC: 0.4 MG/DL (ref 0.1–1)
BUN SERPL-MCNC: 8 MG/DL (ref 8–23)
CALCIUM SERPL-MCNC: 8.1 MG/DL (ref 8.7–10.5)
CHLORIDE SERPL-SCNC: 111 MMOL/L (ref 95–110)
CO2 SERPL-SCNC: 23 MMOL/L (ref 23–29)
CREAT SERPL-MCNC: 0.7 MG/DL (ref 0.5–1.4)
DIFFERENTIAL METHOD: ABNORMAL
EOSINOPHIL # BLD AUTO: 0.1 K/UL (ref 0–0.5)
EOSINOPHIL # BLD AUTO: 0.2 K/UL (ref 0–0.5)
EOSINOPHIL # BLD AUTO: 0.3 K/UL (ref 0–0.5)
EOSINOPHIL # BLD AUTO: 0.3 K/UL (ref 0–0.5)
EOSINOPHIL NFR BLD: 1.3 % (ref 0–8)
EOSINOPHIL NFR BLD: 3 % (ref 0–8)
EOSINOPHIL NFR BLD: 3.4 % (ref 0–8)
EOSINOPHIL NFR BLD: 4.3 % (ref 0–8)
ERYTHROCYTE [DISTWIDTH] IN BLOOD BY AUTOMATED COUNT: 18.4 % (ref 11.5–14.5)
ERYTHROCYTE [DISTWIDTH] IN BLOOD BY AUTOMATED COUNT: 18.6 % (ref 11.5–14.5)
ERYTHROCYTE [DISTWIDTH] IN BLOOD BY AUTOMATED COUNT: 18.8 % (ref 11.5–14.5)
ERYTHROCYTE [DISTWIDTH] IN BLOOD BY AUTOMATED COUNT: 18.9 % (ref 11.5–14.5)
EST. GFR  (AFRICAN AMERICAN): >60 ML/MIN/1.73 M^2
EST. GFR  (NON AFRICAN AMERICAN): >60 ML/MIN/1.73 M^2
GLUCOSE SERPL-MCNC: 85 MG/DL (ref 70–110)
HCT VFR BLD AUTO: 23.7 % (ref 37–48.5)
HCT VFR BLD AUTO: 24.7 % (ref 37–48.5)
HCT VFR BLD AUTO: 25 % (ref 37–48.5)
HCT VFR BLD AUTO: 26.3 % (ref 37–48.5)
HGB BLD-MCNC: 7.2 G/DL (ref 12–16)
HGB BLD-MCNC: 7.5 G/DL (ref 12–16)
HGB BLD-MCNC: 7.6 G/DL (ref 12–16)
HGB BLD-MCNC: 7.9 G/DL (ref 12–16)
IMM GRANULOCYTES # BLD AUTO: 0.11 K/UL (ref 0–0.04)
IMM GRANULOCYTES # BLD AUTO: 0.11 K/UL (ref 0–0.04)
IMM GRANULOCYTES # BLD AUTO: 0.13 K/UL (ref 0–0.04)
IMM GRANULOCYTES # BLD AUTO: 0.2 K/UL (ref 0–0.04)
IMM GRANULOCYTES NFR BLD AUTO: 1.4 % (ref 0–0.5)
IMM GRANULOCYTES NFR BLD AUTO: 1.5 % (ref 0–0.5)
IMM GRANULOCYTES NFR BLD AUTO: 1.9 % (ref 0–0.5)
IMM GRANULOCYTES NFR BLD AUTO: 1.9 % (ref 0–0.5)
LYMPHOCYTES # BLD AUTO: 1.6 K/UL (ref 1–4.8)
LYMPHOCYTES # BLD AUTO: 1.9 K/UL (ref 1–4.8)
LYMPHOCYTES # BLD AUTO: 2 K/UL (ref 1–4.8)
LYMPHOCYTES # BLD AUTO: 2.2 K/UL (ref 1–4.8)
LYMPHOCYTES NFR BLD: 14.8 % (ref 18–48)
LYMPHOCYTES NFR BLD: 24.4 % (ref 18–48)
LYMPHOCYTES NFR BLD: 28.4 % (ref 18–48)
LYMPHOCYTES NFR BLD: 30.1 % (ref 18–48)
MAGNESIUM SERPL-MCNC: 2 MG/DL (ref 1.6–2.6)
MCH RBC QN AUTO: 29.9 PG (ref 27–31)
MCH RBC QN AUTO: 29.9 PG (ref 27–31)
MCH RBC QN AUTO: 30.1 PG (ref 27–31)
MCH RBC QN AUTO: 30.3 PG (ref 27–31)
MCHC RBC AUTO-ENTMCNC: 30 G/DL (ref 32–36)
MCHC RBC AUTO-ENTMCNC: 30.4 G/DL (ref 32–36)
MCV RBC AUTO: 100 FL (ref 82–98)
MCV RBC AUTO: 100 FL (ref 82–98)
MCV RBC AUTO: 98 FL (ref 82–98)
MCV RBC AUTO: 99 FL (ref 82–98)
MONOCYTES # BLD AUTO: 0.9 K/UL (ref 0.3–1)
MONOCYTES # BLD AUTO: 0.9 K/UL (ref 0.3–1)
MONOCYTES # BLD AUTO: 1 K/UL (ref 0.3–1)
MONOCYTES # BLD AUTO: 1.1 K/UL (ref 0.3–1)
MONOCYTES NFR BLD: 11.5 % (ref 4–15)
MONOCYTES NFR BLD: 12.5 % (ref 4–15)
MONOCYTES NFR BLD: 12.9 % (ref 4–15)
MONOCYTES NFR BLD: 9.9 % (ref 4–15)
NEUTROPHILS # BLD AUTO: 3.4 K/UL (ref 1.8–7.7)
NEUTROPHILS # BLD AUTO: 4.1 K/UL (ref 1.8–7.7)
NEUTROPHILS # BLD AUTO: 4.7 K/UL (ref 1.8–7.7)
NEUTROPHILS # BLD AUTO: 7.7 K/UL (ref 1.8–7.7)
NEUTROPHILS NFR BLD: 50.2 % (ref 38–73)
NEUTROPHILS NFR BLD: 53.5 % (ref 38–73)
NEUTROPHILS NFR BLD: 59.2 % (ref 38–73)
NEUTROPHILS NFR BLD: 71.5 % (ref 38–73)
NRBC BLD-RTO: 0 /100 WBC
PHOSPHATE SERPL-MCNC: 2.5 MG/DL (ref 2.7–4.5)
PLATELET # BLD AUTO: 210 K/UL (ref 150–350)
PLATELET # BLD AUTO: 224 K/UL (ref 150–350)
PLATELET # BLD AUTO: 234 K/UL (ref 150–350)
PLATELET # BLD AUTO: 243 K/UL (ref 150–350)
PMV BLD AUTO: 10 FL (ref 9.2–12.9)
PMV BLD AUTO: 10.4 FL (ref 9.2–12.9)
PMV BLD AUTO: 10.6 FL (ref 9.2–12.9)
PMV BLD AUTO: 9.9 FL (ref 9.2–12.9)
POTASSIUM SERPL-SCNC: 3.7 MMOL/L (ref 3.5–5.1)
PROT SERPL-MCNC: 4.7 G/DL (ref 6–8.4)
RBC # BLD AUTO: 2.39 M/UL (ref 4–5.4)
RBC # BLD AUTO: 2.51 M/UL (ref 4–5.4)
RBC # BLD AUTO: 2.51 M/UL (ref 4–5.4)
RBC # BLD AUTO: 2.64 M/UL (ref 4–5.4)
SODIUM SERPL-SCNC: 140 MMOL/L (ref 136–145)
WBC # BLD AUTO: 10.73 K/UL (ref 3.9–12.7)
WBC # BLD AUTO: 6.74 K/UL (ref 3.9–12.7)
WBC # BLD AUTO: 7.58 K/UL (ref 3.9–12.7)
WBC # BLD AUTO: 7.92 K/UL (ref 3.9–12.7)

## 2019-12-16 PROCEDURE — 84100 ASSAY OF PHOSPHORUS: CPT

## 2019-12-16 PROCEDURE — 63700000 PHARM REV CODE 250 ALT 637 W/O HCPCS: Performed by: HOSPITALIST

## 2019-12-16 PROCEDURE — 11000001 HC ACUTE MED/SURG PRIVATE ROOM

## 2019-12-16 PROCEDURE — 80053 COMPREHEN METABOLIC PANEL: CPT

## 2019-12-16 PROCEDURE — 99232 PR SUBSEQUENT HOSPITAL CARE,LEVL II: ICD-10-PCS | Mod: ,,, | Performed by: HOSPITALIST

## 2019-12-16 PROCEDURE — 83735 ASSAY OF MAGNESIUM: CPT

## 2019-12-16 PROCEDURE — 99232 SBSQ HOSP IP/OBS MODERATE 35: CPT | Mod: ,,, | Performed by: HOSPITALIST

## 2019-12-16 PROCEDURE — 36415 COLL VENOUS BLD VENIPUNCTURE: CPT

## 2019-12-16 PROCEDURE — 85025 COMPLETE CBC W/AUTO DIFF WBC: CPT | Mod: 91

## 2019-12-16 PROCEDURE — 25000003 PHARM REV CODE 250: Performed by: HOSPITALIST

## 2019-12-16 RX ORDER — SODIUM,POTASSIUM PHOSPHATES 280-250MG
1 POWDER IN PACKET (EA) ORAL
Status: DISCONTINUED | OUTPATIENT
Start: 2019-12-16 | End: 2019-12-17 | Stop reason: HOSPADM

## 2019-12-16 RX ADMIN — METRONIDAZOLE 500 MG: 500 TABLET ORAL at 09:12

## 2019-12-16 RX ADMIN — HYDROCORTISONE: 10 CREAM TOPICAL at 03:12

## 2019-12-16 RX ADMIN — FENOFIBRATE 160 MG: 160 TABLET ORAL at 10:12

## 2019-12-16 RX ADMIN — HYDROCORTISONE: 10 CREAM TOPICAL at 09:12

## 2019-12-16 RX ADMIN — CEFPODOXIME PROXETIL 200 MG: 200 TABLET, FILM COATED ORAL at 10:12

## 2019-12-16 RX ADMIN — POTASSIUM & SODIUM PHOSPHATES POWDER PACK 280-160-250 MG 1 PACKET: 280-160-250 PACK at 09:12

## 2019-12-16 RX ADMIN — FERROUS SULFATE TAB EC 325 MG (65 MG FE EQUIVALENT) 325 MG: 325 (65 FE) TABLET DELAYED RESPONSE at 09:12

## 2019-12-16 RX ADMIN — CEFPODOXIME PROXETIL 200 MG: 200 TABLET, FILM COATED ORAL at 09:12

## 2019-12-16 RX ADMIN — LEVOTHYROXINE SODIUM 125 MCG: 125 TABLET ORAL at 06:12

## 2019-12-16 RX ADMIN — METRONIDAZOLE 500 MG: 500 TABLET ORAL at 02:12

## 2019-12-16 RX ADMIN — MELATONIN 2000 UNITS: at 09:12

## 2019-12-16 RX ADMIN — METRONIDAZOLE 500 MG: 500 TABLET ORAL at 06:12

## 2019-12-16 RX ADMIN — CYANOCOBALAMIN TAB 250 MCG 500 MCG: 250 TAB at 09:12

## 2019-12-16 NOTE — PLAN OF CARE
12/16/19 1350   Discharge Reassessment   Assessment Type Discharge Planning Reassessment   Provided patient/caregiver education on the expected discharge date and the discharge plan Yes   Do you have any problems affording any of your prescribed medications? No   Discharge Plan A Home with family;Home Health   Discharge Plan B Home with family   DME Needed Upon Discharge    (TBD)   Patient choice form signed by patient/caregiver Yes   Anticipated Discharge Disposition Home-Health   Can the patient answer the patient profile reliably? Yes, cognitively intact   How does the patient rate their overall health at the present time? Good

## 2019-12-16 NOTE — PLAN OF CARE
Problem: Fall Injury Risk  Goal: Absence of Fall and Fall-Related Injury  Outcome: Ongoing, Progressing  Intervention: Identify and Manage Contributors to Fall Injury Risk  Flowsheets (Taken 12/16/2019 1637)  Self-Care Promotion: independence encouraged; BADL personal objects within reach; meal setup provided; BADL personal routines maintained  Medication Review/Management: medications reviewed  Intervention: Promote Injury-Free Environment  Flowsheets (Taken 12/16/2019 1637)  Safety Promotion/Fall Prevention: assistive device/personal item within reach; diversional activities provided; Fall Risk reviewed with patient/family; Fall Risk signage in place; lighting adjusted; medications reviewed; nonskid shoes/socks when out of bed; side rails raised x 3; supervised activity; instructed to call staff for mobility  Environmental Safety Modification: assistive device/personal items within reach; clutter free environment maintained; lighting adjusted     Problem: Adult Inpatient Plan of Care  Goal: Plan of Care Review  Outcome: Ongoing, Progressing  Flowsheets (Taken 12/16/2019 1637)  Plan of Care Reviewed With: patient; family  Goal: Patient-Specific Goal (Individualization)  Outcome: Ongoing, Progressing  Goal: Absence of Hospital-Acquired Illness or Injury  Outcome: Ongoing, Progressing  Intervention: Prevent VTE (venous thromboembolism)  Flowsheets (Taken 12/16/2019 1637)  VTE Prevention/Management: remove, assess skin and reapply sequential compression device; bleeding precautions maintained; fluids promoted  Goal: Optimal Comfort and Wellbeing  Outcome: Ongoing, Progressing  Intervention: Provide Person-Centered Care  Flowsheets (Taken 12/16/2019 1637)  Trust Relationship/Rapport: care explained; questions answered  Goal: Readiness for Transition of Care  Outcome: Ongoing, Progressing  Goal: Rounds/Family Conference  Outcome: Ongoing, Progressing

## 2019-12-16 NOTE — PLAN OF CARE
Patient is lying in bed AAOx4 with no complaint of pain or discomfort.  Bed is low and locked, and call light is within reach. Patient's vitals are within normal limits.  Patient assisted to and from the bathroom by the nurse without incident.  Care plan reviewed.  Will continue to monitor until arrival of day shift.
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
## 2019-12-17 VITALS
OXYGEN SATURATION: 94 % | WEIGHT: 180 LBS | RESPIRATION RATE: 18 BRPM | TEMPERATURE: 98 F | DIASTOLIC BLOOD PRESSURE: 59 MMHG | BODY MASS INDEX: 31.89 KG/M2 | HEART RATE: 78 BPM | SYSTOLIC BLOOD PRESSURE: 121 MMHG | HEIGHT: 63 IN

## 2019-12-17 LAB
ALBUMIN SERPL BCP-MCNC: 2.4 G/DL (ref 3.5–5.2)
ALP SERPL-CCNC: 47 U/L (ref 55–135)
ALT SERPL W/O P-5'-P-CCNC: 12 U/L (ref 10–44)
ANION GAP SERPL CALC-SCNC: 5 MMOL/L (ref 8–16)
AST SERPL-CCNC: 18 U/L (ref 10–40)
BASOPHILS # BLD AUTO: 0.05 K/UL (ref 0–0.2)
BASOPHILS # BLD AUTO: 0.06 K/UL (ref 0–0.2)
BASOPHILS # BLD AUTO: 0.06 K/UL (ref 0–0.2)
BASOPHILS NFR BLD: 0.6 % (ref 0–1.9)
BASOPHILS NFR BLD: 0.8 % (ref 0–1.9)
BASOPHILS NFR BLD: 0.8 % (ref 0–1.9)
BILIRUB SERPL-MCNC: 0.4 MG/DL (ref 0.1–1)
BUN SERPL-MCNC: 8 MG/DL (ref 8–23)
CALCIUM SERPL-MCNC: 8 MG/DL (ref 8.7–10.5)
CHLORIDE SERPL-SCNC: 111 MMOL/L (ref 95–110)
CO2 SERPL-SCNC: 24 MMOL/L (ref 23–29)
CREAT SERPL-MCNC: 0.8 MG/DL (ref 0.5–1.4)
DIFFERENTIAL METHOD: ABNORMAL
EOSINOPHIL # BLD AUTO: 0.2 K/UL (ref 0–0.5)
EOSINOPHIL NFR BLD: 1.8 % (ref 0–8)
EOSINOPHIL NFR BLD: 2.8 % (ref 0–8)
EOSINOPHIL NFR BLD: 3 % (ref 0–8)
ERYTHROCYTE [DISTWIDTH] IN BLOOD BY AUTOMATED COUNT: 19.1 % (ref 11.5–14.5)
ERYTHROCYTE [DISTWIDTH] IN BLOOD BY AUTOMATED COUNT: 19.2 % (ref 11.5–14.5)
ERYTHROCYTE [DISTWIDTH] IN BLOOD BY AUTOMATED COUNT: 19.4 % (ref 11.5–14.5)
EST. GFR  (AFRICAN AMERICAN): >60 ML/MIN/1.73 M^2
EST. GFR  (NON AFRICAN AMERICAN): >60 ML/MIN/1.73 M^2
GLUCOSE SERPL-MCNC: 97 MG/DL (ref 70–110)
HCT VFR BLD AUTO: 23.2 % (ref 37–48.5)
HCT VFR BLD AUTO: 23.6 % (ref 37–48.5)
HCT VFR BLD AUTO: 24.5 % (ref 37–48.5)
HGB BLD-MCNC: 7.2 G/DL (ref 12–16)
HGB BLD-MCNC: 7.4 G/DL (ref 12–16)
HGB BLD-MCNC: 7.5 G/DL (ref 12–16)
IMM GRANULOCYTES # BLD AUTO: 0.13 K/UL (ref 0–0.04)
IMM GRANULOCYTES # BLD AUTO: 0.13 K/UL (ref 0–0.04)
IMM GRANULOCYTES # BLD AUTO: 0.16 K/UL (ref 0–0.04)
IMM GRANULOCYTES NFR BLD AUTO: 1.5 % (ref 0–0.5)
IMM GRANULOCYTES NFR BLD AUTO: 1.7 % (ref 0–0.5)
IMM GRANULOCYTES NFR BLD AUTO: 2 % (ref 0–0.5)
LYMPHOCYTES # BLD AUTO: 1.7 K/UL (ref 1–4.8)
LYMPHOCYTES # BLD AUTO: 1.8 K/UL (ref 1–4.8)
LYMPHOCYTES # BLD AUTO: 2.2 K/UL (ref 1–4.8)
LYMPHOCYTES NFR BLD: 22.2 % (ref 18–48)
LYMPHOCYTES NFR BLD: 22.6 % (ref 18–48)
LYMPHOCYTES NFR BLD: 25.1 % (ref 18–48)
MAGNESIUM SERPL-MCNC: 2 MG/DL (ref 1.6–2.6)
MCH RBC QN AUTO: 30.6 PG (ref 27–31)
MCH RBC QN AUTO: 30.9 PG (ref 27–31)
MCH RBC QN AUTO: 31.2 PG (ref 27–31)
MCHC RBC AUTO-ENTMCNC: 30.6 G/DL (ref 32–36)
MCHC RBC AUTO-ENTMCNC: 31 G/DL (ref 32–36)
MCHC RBC AUTO-ENTMCNC: 31.4 G/DL (ref 32–36)
MCV RBC AUTO: 100 FL (ref 82–98)
MCV RBC AUTO: 101 FL (ref 82–98)
MCV RBC AUTO: 99 FL (ref 82–98)
MONOCYTES # BLD AUTO: 0.9 K/UL (ref 0.3–1)
MONOCYTES # BLD AUTO: 1.1 K/UL (ref 0.3–1)
MONOCYTES # BLD AUTO: 1.1 K/UL (ref 0.3–1)
MONOCYTES NFR BLD: 12.1 % (ref 4–15)
MONOCYTES NFR BLD: 12.3 % (ref 4–15)
MONOCYTES NFR BLD: 14.1 % (ref 4–15)
NEUTROPHILS # BLD AUTO: 4.6 K/UL (ref 1.8–7.7)
NEUTROPHILS # BLD AUTO: 4.6 K/UL (ref 1.8–7.7)
NEUTROPHILS # BLD AUTO: 5.2 K/UL (ref 1.8–7.7)
NEUTROPHILS NFR BLD: 57.9 % (ref 38–73)
NEUTROPHILS NFR BLD: 58.7 % (ref 38–73)
NEUTROPHILS NFR BLD: 60 % (ref 38–73)
NRBC BLD-RTO: 0 /100 WBC
PHOSPHATE SERPL-MCNC: 2.7 MG/DL (ref 2.7–4.5)
PLATELET # BLD AUTO: 241 K/UL (ref 150–350)
PLATELET # BLD AUTO: 250 K/UL (ref 150–350)
PLATELET # BLD AUTO: 271 K/UL (ref 150–350)
PMV BLD AUTO: 10.1 FL (ref 9.2–12.9)
PMV BLD AUTO: 9.8 FL (ref 9.2–12.9)
PMV BLD AUTO: 9.9 FL (ref 9.2–12.9)
POTASSIUM SERPL-SCNC: 3.7 MMOL/L (ref 3.5–5.1)
PROT SERPL-MCNC: 4.7 G/DL (ref 6–8.4)
RBC # BLD AUTO: 2.35 M/UL (ref 4–5.4)
RBC # BLD AUTO: 2.37 M/UL (ref 4–5.4)
RBC # BLD AUTO: 2.43 M/UL (ref 4–5.4)
SODIUM SERPL-SCNC: 140 MMOL/L (ref 136–145)
WBC # BLD AUTO: 7.58 K/UL (ref 3.9–12.7)
WBC # BLD AUTO: 7.9 K/UL (ref 3.9–12.7)
WBC # BLD AUTO: 8.81 K/UL (ref 3.9–12.7)

## 2019-12-17 PROCEDURE — 80053 COMPREHEN METABOLIC PANEL: CPT

## 2019-12-17 PROCEDURE — 36415 COLL VENOUS BLD VENIPUNCTURE: CPT

## 2019-12-17 PROCEDURE — 84100 ASSAY OF PHOSPHORUS: CPT

## 2019-12-17 PROCEDURE — 83735 ASSAY OF MAGNESIUM: CPT

## 2019-12-17 PROCEDURE — 99232 PR SUBSEQUENT HOSPITAL CARE,LEVL II: ICD-10-PCS | Mod: ,,, | Performed by: INTERNAL MEDICINE

## 2019-12-17 PROCEDURE — 99232 SBSQ HOSP IP/OBS MODERATE 35: CPT | Mod: ,,, | Performed by: INTERNAL MEDICINE

## 2019-12-17 PROCEDURE — 25000003 PHARM REV CODE 250: Performed by: HOSPITALIST

## 2019-12-17 PROCEDURE — 85025 COMPLETE CBC W/AUTO DIFF WBC: CPT

## 2019-12-17 PROCEDURE — 63700000 PHARM REV CODE 250 ALT 637 W/O HCPCS: Performed by: HOSPITALIST

## 2019-12-17 RX ADMIN — HYDROCORTISONE: 10 CREAM TOPICAL at 10:12

## 2019-12-17 RX ADMIN — POTASSIUM & SODIUM PHOSPHATES POWDER PACK 280-160-250 MG 1 PACKET: 280-160-250 PACK at 12:12

## 2019-12-17 RX ADMIN — POTASSIUM & SODIUM PHOSPHATES POWDER PACK 280-160-250 MG 1 PACKET: 280-160-250 PACK at 06:12

## 2019-12-17 RX ADMIN — FERROUS SULFATE TAB EC 325 MG (65 MG FE EQUIVALENT) 325 MG: 325 (65 FE) TABLET DELAYED RESPONSE at 09:12

## 2019-12-17 RX ADMIN — MELATONIN 2000 UNITS: at 09:12

## 2019-12-17 RX ADMIN — LEVOTHYROXINE SODIUM 125 MCG: 125 TABLET ORAL at 06:12

## 2019-12-17 RX ADMIN — METRONIDAZOLE 500 MG: 500 TABLET ORAL at 02:12

## 2019-12-17 RX ADMIN — FENOFIBRATE 160 MG: 160 TABLET ORAL at 02:12

## 2019-12-17 RX ADMIN — CEFPODOXIME PROXETIL 200 MG: 200 TABLET, FILM COATED ORAL at 09:12

## 2019-12-17 RX ADMIN — CYANOCOBALAMIN TAB 250 MCG 500 MCG: 250 TAB at 09:12

## 2019-12-17 RX ADMIN — METRONIDAZOLE 500 MG: 500 TABLET ORAL at 06:12

## 2019-12-17 NOTE — PLAN OF CARE
12/17/19 0924   Post-Acute Status   Post-Acute Authorization Other   Other Status No Post-Acute Service Needs

## 2019-12-17 NOTE — PROGRESS NOTES
Discharge papers given and reviewed with pt and her daughter , and they verbalized understanding . Peripheral IV access removed and pressure applied to the site . Tele monitor and leads removed and returned to nursing station . Wheelchair ordered . Pt is leaving to home with her daughter .

## 2019-12-17 NOTE — PLAN OF CARE
[facility]    HOME HEALTH ORDERS  FACE TO FACE ENCOUNTER    Patient Name: Jo-Ann Escobedo  YOB: 1940    PCP: Dakota Kerr MD   PCP Address: [address] / [place] [address]  PCP Phone Number: [phone]  PCP Fax: [phone]    Encounter Date: 12/17/2019    Admit to Home Health    Diagnoses:  Active Hospital Problems    Diagnosis  POA    *Gastrointestinal hemorrhage [K92.2]  Yes    Orthostasis [I95.1]  Yes    Hematochezia [K92.1]  Yes    Acute blood loss anemia [D62]  Yes    Nephrolithiasis [N20.0]  Yes    BPPV (benign paroxysmal positional vertigo) [H81.10]  Yes    History of left breast cancer [Z85.3]  Not Applicable    Essential hypertension [I10]  Yes    Hypothyroidism, 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       Future Appointments   Date Time Provider Department Center   2/21/2020  9:00 AM [facility] [id number] DX [facility] MAMMO Sam nieves   2/24/2020  7:20 AM Logan Peace MD [facility] HEM ONC David Jamison   4/21/2020  7:00 AM LAB, APPOINTMENT [facility] LAB [facility] PC[facility]           I have seen and examined this patient face to face today. My clinical findings that support the need for the home health skilled services and home bound status are the following:  Weakness/numbness causing balance and gait disturbance due to Weakness/Debility making it taxing to leave home.    Allergies:  Review of patient's allergies indicates:   Allergen Reactions    Voltaren [diclofenac sodium] Other (See Comments)     Hematochezia and hospitalization for hematochezia.    Codeine Diarrhea and Hallucinations    Niacin preparations      Redness, warming       Diet: regular diet    Activities: activity as tolerated    Nursing:   SN to complete  comprehensive assessment including routine vital signs. Instruct on disease process and s/s of complications to report to MD. Review/verify medication list sent home with the patient at time of discharge  and instruct patient/caregiver as needed. Frequency may be adjusted depending on start of care date.    Notify MD if SBP > 160 or < 90; DBP > 90 or < 50; HR > 120 or < 50; Temp > 101; Other:             Medications: Review discharge medications with patient and family and provide education.      I certify that this patient is confined to her home and needs intermittent skilled nursing care.

## 2019-12-17 NOTE — PLAN OF CARE
Pt is A,A,O x 4 . Stable V/S . No C/O . Pt ambulated to the bathroom with stand by assist . No falls and injuries . Fall precautions maintained and family remained at the bedside . Pt has 2 bloody stool today , pt reported her stool is less bloody than before .  Pt's H & H as of 1200 is 7.4 & 23.6 .Dr. Scott was at the bedside today and discussed the care plan with the pt and her daughter . MD order to discharge pt to home with home health .

## 2019-12-17 NOTE — PLAN OF CARE
12/17/19 1215   Post-Acute Status   Post-Acute Authorization Home Health/Hospice   Home Health/Hospice Status Referrals Sent     Family requesting hh at d/c, awaiting hh orders. Uploaded to [employer] and also faxed to  for hh provider.

## 2019-12-18 NOTE — DISCHARGE SUMMARY
Discharge Summary  Hospital Medicine    Attending Provider on Discharge: Connor Scott MD  [facility] Medicine Team: [facility]OS[facility] MED B  Date of Admission:  12/15/2019     Date of Discharge:  12/17/2019  Length of Stay:  LOS: 2 days   Code status: Full Code        Active Hospital Problems    Diagnosis  POA    *Gastrointestinal hemorrhage [K92.2]  Yes    Orthostasis [I95.1]  Yes    Hematochezia [K92.1]  Yes    Acute blood loss anemia [D62]  Yes    Nephrolithiasis [N20.0]  Yes    BPPV (benign paroxysmal positional vertigo) [H81.10]  Yes    History of left breast cancer [Z85.3]  Not Applicable    Essential hypertension [I10]  Yes    Hypothyroidism, 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 History obtained via chart review as patient was still somnolent on examination by critical care team. She was seen by CRS outpatient day of admission during which GYPSY performed revealed no masses, but positive for melanotic blood; during anoscopy, anoscope filled with melanotic stool and foul odor prompting presentation to ED for further evaluation of GI bleed. Of note, patient was seen by primary care physician 9/2019 and restarted on diclofenac PRN at that time, unsure of frequency she has been taking that recently. Renal function unremarkable.      On initial  presentation in the ED, vital signs were stable and no leukocytosis noted. UA showed 3+ occult blood with >100 RBCs and 12 WBCs. CTA abdo/pelvis without evidence of acute bleed, but was concerning for uncomplicated diverticulitis or colitis in the descending and sigmoid colon junction. She was given IVF and started on protonix and zosyn for intra-abdominal coverage. Hgb noted to be 10 from a baseline of 13. She was admitted to hospital medicine for observation. Hgb continued to downtrend to 8 and was transfused for weakness and active bleeding. GI was consulted and she underwent endoscopy 12/7 with normal esophagus, erythematous mucosa in the pylorus (biopsied), normal duodenum, 10mm lesion at incisura (biopsied). She subsequently underwent colonoscopy 12/9 and several large diverticula in the sigmoid colon was seen with hematin throughout the colon; blood pooling also noted in the sigmoid colon, during which clip was placed for IR localization. She received 1U pRBC during the procedure and IR was contacted by GI for angio and possible intervention that same night. IR angio did not reveal any bleeding from SMA or NATALIO branches. MICU was consulted given patient's active GI bleed and risk of instability. Patient was hemodynamically stable and satting well on room air when seen in PACU. Will be admitted to MICU for close observation overnight after IR evaluation. I       Hospital Course     After discharge from the intensive care unit hemoglobin remained stable.  Patient was seen by Colorectal surgery and Gastroenterology no further intervention was recommended at this time.  Continue to have mildly mildly melanotic stools but on day of discharge these had stopped.  Patient and daughter were counseled on signs to watch out for and return if there was any increase in blood in stool.  Patient's daughter will take off the next week to supervise patient's care.  Patient hgb continues to trend down despite a total of 10  units PRBCs, 1 unit FFP, 1 unit plts. Patient intermittently tachycardic to 110s, but remains hypertensive. Attempting to keep SBP <160 to help reduce bleeding. CRC, GI, and IR continue to follow without any further interventions at this time. Pt continued to have dark, loose stools with clots, which have decreased on 12/11. One more unit overnight on 12/12. Hgb stable x4 over 12/12-12/13. Bleeding likely stopped. Patient stable for stepdown.     Interval History/Significant Events-12/13/2019 Hgb stable x4 at 7.6. No further dark bowel movements in> 24 hours. Overall, stable for stepdown.  Reports last bowel movement on 12/12/2019 noon.  Status post physical therapy evaluation home health recommended.  Complains of floaters in the left eye worsening for the last 1 month had prior cataract surgery few months back  Discussed with Ophthalmology, recommending follow-up in Ophthalmology Clinic after discharge  12/14/2019 hemoglobin stable at 8.4.  Platelets improving to 145.  Started on low-fiber diet. .  Completed 7 days of Zosyn switched to oral cefpodoxime and metronidazole to complete total of 10 days of antibiotic.  Oxygen 1 L via nasal cannula continue tapered to room air at discharge. discharged home on 12/14/19       Recent Labs   Lab 12/17/19  0008 12/17/19  0528 12/17/19  1145   WBC 8.81 7.90 7.58   HGB 7.2* 7.5* 7.4*   HCT 23.2* 24.5* 23.6*    250 271     Recent Labs   Lab 12/15/19  0745 12/16/19  0541 12/17/19  0528    140 140   K 3.7 3.7 3.7    111* 111*   CO2 26 23 24   BUN 10 8 8   CREATININE 0.7 0.7 0.8   * 85 97   CALCIUM 8.3* 8.1* 8.0*   MG 2.0 2.0 2.0   PHOS 2.6* 2.5* 2.7     Recent Labs   Lab 12/15/19  0745 12/16/19  0541 12/17/19  0528   ALKPHOS 46* 43* 47*   ALT 20 15 12   AST 29 22 18   ALBUMIN 2.5* 2.4* 2.4*   PROT 4.9* 4.7* 4.7*   BILITOT 0.5 0.4 0.4   INR 1.1  --   --       Recent Labs     12/15/19  1233 12/15/19  1524   TROPONINI 0.023  0.023  0.016 0.014  0.014      No results for input(s): LACTATE in the last 72 hours.    No results for input(s): POCTGLUCOSE in the last 168 hours.   Hemoglobin A1C   Date Value Ref Range Status   10/18/2019 5.6 4.0 - 5.6 % Final     Comment:     ADA Screening Guidelines:  5.7-6.4%  Consistent with prediabetes  >or=6.5%  Consistent with diabetes  High levels of fetal hemoglobin interfere with the HbA1C  assay. Heterozygous hemoglobin variants (HbS, HgC, etc)do  not significantly interfere with this assay.   However, presence of multiple variants may affect accuracy.     04/18/2019 5.6 4.0 - 5.6 % Final     Comment:     ADA Screening Guidelines:  5.7-6.4%  Consistent with prediabetes  >or=6.5%  Consistent with diabetes  High levels of fetal hemoglobin interfere with the HbA1C  assay. Heterozygous hemoglobin variants (HbS, HgC, etc)do  not significantly interfere with this assay.   However, presence of multiple variants may affect accuracy.     10/24/2018 5.7 (H) 4.0 - 5.6 % Final     Comment:     ADA Screening Guidelines:  5.7-6.4%  Consistent with prediabetes  >or=6.5%  Consistent with diabetes  High levels of fetal hemoglobin interfere with the HbA1C  assay. Heterozygous hemoglobin variants (HbS, HgC, etc)do  not significantly interfere with this assay.   However, presence of multiple variants may affect accuracy.         Procedures: none    Consultants:     Discharge Medication List as of 12/17/2019  1:55 PM      CONTINUE these medications which have NOT CHANGED    Details   cholecalciferol, vitamin D3, (VITAMIN D3) 2,000 unit Tab Take 1 tablet by mouth once daily., Historical Med      co-enzyme Q-10 30 mg capsule Take 30 mg by mouth once daily. , Until Discontinued, Historical Med      fenofibrate 160 MG Tab Take 1 tablet (160 mg total) by mouth once daily., Starting Fri 4/5/2019, Until Sat 4/4/2020, Normal      ferrous sulfate (FEOSOL) 325 mg (65 mg iron) Tab tablet Take 1 tablet (325 mg total) by mouth once daily., Starting Sun  12/15/2019, Until Tue 1/14/2020, Normal      hydrocortisone 1 % cream Apply topically 3 (three) times daily., Starting Sat 12/14/2019, Normal      LACTOBACILLUS COMBINATION NO.8 (ADULT PROBIOTIC ORAL) Take by mouth once daily. , Historical Med      levothyroxine (SYNTHROID) 125 MCG tablet Take 1 tablet (125 mcg total) by mouth before breakfast., Starting Sun 12/15/2019, Until Mon 12/14/2020, Normal      losartan (COZAAR) 50 MG tablet Take 1.5 tablets (75 mg total) by mouth once daily., Starting Thu 10/31/2019, Until Fri 10/30/2020, Normal      acetaminophen (TYLENOL) 325 MG tablet Take 2 tablets (650 mg total) by mouth every 6 (six) hours as needed., Starting Sat 12/14/2019, Normal      albuterol-ipratropium (DUO-NEB) 2.5 mg-0.5 mg/3 mL nebulizer solution Take 3 mLs by nebulization every 4 (four) hours as needed for Wheezing. Rescue, Starting Sat 12/14/2019, Until Sun 12/13/2020, Normal      cefpodoxime (VANTIN) 200 MG tablet Take 1 tablet (200 mg total) by mouth every 12 (twelve) hours. for 3 days, Starting Sat 12/14/2019, Until Tue 12/17/2019, Normal      cyanocobalamin 500 MCG tablet Take 1 tablet (500 mcg total) by mouth once daily., Starting Sun 12/15/2019, Normal      meclizine (ANTIVERT) 12.5 mg tablet Take 1 tablet (12.5 mg total) by mouth 3 (three) times daily as needed for Dizziness., Starting Thu 10/24/2019, Normal      metroNIDAZOLE (FLAGYL) 500 MG tablet Take 1 tablet (500 mg total) by mouth every 8 (eight) hours. for 3 days, Starting Sat 12/14/2019, Until Tue 12/17/2019, Normal      turmeric root extract 500 mg Cap Take by mouth once daily. , Historical Med             Discharge Diet:regular diet with   Activity: activity as tolerated    Discharge Condition: Good    Disposition: Home or Self Care    Tests pending at the time of discharge: none      Time spent  on the discharge of the patient including review of hospital course with the patient. reviewing discharge medications and arranging follow-up  care 35 minutes    Discharge examination Patient was seen and examined on the date of discharge and determined to be suitable for discharge.    Discharge plan     Future Appointments   Date Time Provider Department Center   2/21/2020  9:00 AM [facility] MAMMO3 DX [facility] MAMMO Sam Rodriguez   2/24/2020  7:20 AM Logan Peace MD [facility] HEM ONC David Jamison   4/21/2020  7:00 AM LAB, APPOINTMENT [facility] INTMED [facility] LAB IM Sam Rodriguez PCW       Connor Scott MD

## 2019-12-19 ENCOUNTER — PATIENT OUTREACH (OUTPATIENT)
Dept: OTHER | Facility: OTHER | Age: 79
End: 2019-12-19

## 2019-12-19 LAB
COMMENT: NORMAL
FINAL PATHOLOGIC DIAGNOSIS: NORMAL
GROSS: NORMAL

## 2019-12-19 PROCEDURE — G0180 MD CERTIFICATION HHA PATIENT: HCPCS | Mod: ,,, | Performed by: FAMILY MEDICINE

## 2019-12-19 PROCEDURE — G0180 PR HOME HEALTH MD CERTIFICATION: ICD-10-PCS | Mod: ,,, | Performed by: FAMILY MEDICINE

## 2019-12-19 NOTE — LETTER
March 6, 2020     Jo-Ann Escobedo  [address] [facility] [address]       Dear Jo-Ann,    Thank you for enrolling in [facility]s Digital Medicine Program. To participate, we ask that you submit information at least once weekly through your [employer] account and maintain regular contact with your Care Team. We have not received any data or heard from you in some time.     The Digital Medicine Care Team has attempted to reach you on multiple occasions to determine if you would like to continue participating in the program. While we encourage you to continue participating fully, we understand that circumstances may change.     To continue participating in the program, please contact me at [phone]. If we do not hear back, you will be un-enrolled, and your physician will be notified of your decision.    If you have submitted data and believe you are receiving this letter in error, please call the Digital Medicine Patient Support Line at [phone] for troubleshooting.      We look forward to hearing from you soon.    Sincerely,     Christopher Vázquez  [facility]

## 2019-12-19 NOTE — LETTER
March 6, 2020     Jo-Ann Escobedo  [address] [facility] [address]       Dear Jo-Ann,    Thank you for enrolling in [facility]s Digital Medicine Program. To participate, we ask that you submit information at least once weekly through your [employer] account and maintain regular contact with your Care Team. We have not received any data or heard from you in some time.     The Digital Medicine Care Team has attempted to reach you on multiple occasions to determine if you would like to continue participating in the program. While we encourage you to continue participating fully, we understand that circumstances may change.     To continue participating in the program, please contact me at [phone]. If we do not hear back, you will be un-enrolled, and your physician will be notified of your decision.    If you have submitted data and believe you are receiving this letter in error, please call the Digital Medicine Patient Support Line at [phone] for troubleshooting.      We look forward to hearing from you soon.    Sincerely,     Christopher Vázquez  [facility]

## 2019-12-21 ENCOUNTER — PATIENT MESSAGE (OUTPATIENT)
Dept: ENDOSCOPY | Facility: HOSPITAL | Age: 79
End: 2019-12-21

## 2019-12-23 ENCOUNTER — OFFICE VISIT (OUTPATIENT)
Dept: INTERNAL MEDICINE | Facility: CLINIC | Age: 79
End: 2019-12-23
Payer: MEDICARE

## 2019-12-23 VITALS
SYSTOLIC BLOOD PRESSURE: 122 MMHG | DIASTOLIC BLOOD PRESSURE: 64 MMHG | WEIGHT: 175.94 LBS | HEIGHT: 63 IN | OXYGEN SATURATION: 97 % | HEART RATE: 100 BPM | BODY MASS INDEX: 31.17 KG/M2

## 2019-12-23 DIAGNOSIS — I10 ESSENTIAL HYPERTENSION: ICD-10-CM

## 2019-12-23 DIAGNOSIS — D62 ACUTE BLOOD LOSS ANEMIA: Primary | ICD-10-CM

## 2019-12-23 DIAGNOSIS — K92.1 HEMATOCHEZIA: ICD-10-CM

## 2019-12-23 DIAGNOSIS — K31.89 GASTRIC NODULE: ICD-10-CM

## 2019-12-23 DIAGNOSIS — K57.33 DIVERTICULITIS OF LARGE INTESTINE WITHOUT PERFORATION OR ABSCESS WITH BLEEDING: ICD-10-CM

## 2019-12-23 DIAGNOSIS — E89.0 HYPOTHYROIDISM, POSTSURGICAL: ICD-10-CM

## 2019-12-23 DIAGNOSIS — E78.2 MIXED HYPERLIPIDEMIA: ICD-10-CM

## 2019-12-23 DIAGNOSIS — E53.8 LOW SERUM VITAMIN B12: ICD-10-CM

## 2019-12-23 PROBLEM — J32.9 CHRONIC SINUSITIS: Status: RESOLVED | Noted: 2018-10-25 | Resolved: 2019-12-23

## 2019-12-23 PROBLEM — D69.6 THROMBOCYTOPENIA, UNSPECIFIED: Status: RESOLVED | Noted: 2019-12-13 | Resolved: 2019-12-23

## 2019-12-23 PROBLEM — I95.1 ORTHOSTASIS: Status: RESOLVED | Noted: 2019-12-15 | Resolved: 2019-12-23

## 2019-12-23 PROBLEM — R55 SYNCOPE: Status: RESOLVED | Noted: 2019-12-09 | Resolved: 2019-12-23

## 2019-12-23 PROCEDURE — 1101F PR PT FALLS ASSESS DOC 0-1 FALLS W/OUT INJ PAST YR: ICD-10-PCS | Mod: CPTII,S$GLB,, | Performed by: INTERNAL MEDICINE

## 2019-12-23 PROCEDURE — 3074F SYST BP LT 130 MM HG: CPT | Mod: CPTII,S$GLB,, | Performed by: INTERNAL MEDICINE

## 2019-12-23 PROCEDURE — 3078F DIAST BP <80 MM HG: CPT | Mod: CPTII,S$GLB,, | Performed by: INTERNAL MEDICINE

## 2019-12-23 PROCEDURE — 1101F PT FALLS ASSESS-DOCD LE1/YR: CPT | Mod: CPTII,S$GLB,, | Performed by: INTERNAL MEDICINE

## 2019-12-23 PROCEDURE — 1126F PR PAIN SEVERITY QUANTIFIED, NO PAIN PRESENT: ICD-10-PCS | Mod: S$GLB,,, | Performed by: INTERNAL MEDICINE

## 2019-12-23 PROCEDURE — 3074F PR MOST RECENT SYSTOLIC BLOOD PRESSURE < 130 MM HG: ICD-10-PCS | Mod: CPTII,S$GLB,, | Performed by: INTERNAL MEDICINE

## 2019-12-23 PROCEDURE — 99215 PR OFFICE/OUTPT VISIT, EST, LEVL V, 40-54 MIN: ICD-10-PCS | Mod: S$GLB,,, | Performed by: INTERNAL MEDICINE

## 2019-12-23 PROCEDURE — 3078F PR MOST RECENT DIASTOLIC BLOOD PRESSURE < 80 MM HG: ICD-10-PCS | Mod: CPTII,S$GLB,, | Performed by: INTERNAL MEDICINE

## 2019-12-23 PROCEDURE — 99999 PR PBB SHADOW E&M-EST. PATIENT-LVL IV: ICD-10-PCS | Mod: PBBFAC,,, | Performed by: INTERNAL MEDICINE

## 2019-12-23 PROCEDURE — 99999 PR PBB SHADOW E&M-EST. PATIENT-LVL IV: CPT | Mod: PBBFAC,,, | Performed by: INTERNAL MEDICINE

## 2019-12-23 PROCEDURE — 1159F PR MEDICATION LIST DOCUMENTED IN MEDICAL RECORD: ICD-10-PCS | Mod: S$GLB,,, | Performed by: INTERNAL MEDICINE

## 2019-12-23 PROCEDURE — 99215 OFFICE O/P EST HI 40 MIN: CPT | Mod: S$GLB,,, | Performed by: INTERNAL MEDICINE

## 2019-12-23 PROCEDURE — 1126F AMNT PAIN NOTED NONE PRSNT: CPT | Mod: S$GLB,,, | Performed by: INTERNAL MEDICINE

## 2019-12-23 PROCEDURE — 1159F MED LIST DOCD IN RCRD: CPT | Mod: S$GLB,,, | Performed by: INTERNAL MEDICINE

## 2019-12-23 RX ORDER — LOSARTAN POTASSIUM 50 MG/1
50 TABLET ORAL DAILY
Qty: 90 TABLET | Refills: 3
Start: 2019-12-23 | End: 2020-04-03

## 2019-12-23 NOTE — PROGRESS NOTES
[facility]  New Visit Progress Note   Recent Hospital Discharge     PRESENTING HISTORY     Chief Complaint/Reason for Visit:  Follow up Hospital Discharge   Chief Complaint   Patient presents with    Follow-up     hosp f/u     PCP: Dakota Kerr MD    History of Present Illness: Ms. Jo-Ann Escobedo is a 79 y.o. female who was recently admitted to the hospital.    Discharge Summary  Hospital Medicine   Attending Provider on Discharge: Connor Scott MD  [facility] Hospital Medicine Team: [facility]OSP MED B  Date of Admission:  12/15/2019     Date of Discharge:  12/17/2019  Length of Stay:  LOS: 2 days   Code status: Full Code          Active Hospital Problems     Diagnosis   POA    *Gastrointestinal hemorrhage [K92.2]   Yes    Orthostasis [I95.1]   Yes    Hematochezia [K92.1]   Yes    Acute blood loss anemia [D62]   Yes    Nephrolithiasis [N20.0]   Yes    BPPV (benign paroxysmal positional vertigo) [H81.10]   Yes    History of left breast cancer [Z85.3]   Not Applicable    Essential hypertension [I10]   Yes    Hypothyroidism, postsurgical [E89.0]   Yes    Obesity (BMI 30.0-34.9) [E66.9]   Yes    Paget's disease of bone [M88.9]   Yes    Sensorineural hearing loss, bilateral [H90.3]   Yes    Hyperlipidemia [E78.5]   Yes       Resolved Hospital Problems   No resolved problems to display.         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 History obtained via chart review as patient was still somnolent on examination by critical care team. She was seen by CRS outpatient day of admission during which GYPSY performed  revealed no masses, but positive for melanotic blood; during anoscopy, anoscope filled with melanotic stool and foul odor prompting presentation to ED for further evaluation of GI bleed. Of note, patient was seen by primary care physician 9/2019 and restarted on diclofenac PRN at that time, unsure of frequency she has been taking that recently. Renal function unremarkable.      On initial presentation in the ED, vital signs were stable and no leukocytosis noted. UA showed 3+ occult blood with >100 RBCs and 12 WBCs. CTA abdo/pelvis without evidence of acute bleed, but was concerning for uncomplicated diverticulitis or colitis in the descending and sigmoid colon junction. She was given IVF and started on protonix and zosyn for intra-abdominal coverage. Hgb noted to be 10 from a baseline of 13. She was admitted to hospital medicine for observation. Hgb continued to downtrend to 8 and was transfused for weakness and active bleeding. GI was consulted and she underwent endoscopy 12/7 with normal esophagus, erythematous mucosa in the pylorus (biopsied), normal duodenum, 10mm lesion at incisura (biopsied). She subsequently underwent colonoscopy 12/9 and several large diverticula in the sigmoid colon was seen with hematin throughout the colon; blood pooling also noted in the sigmoid colon, during which clip was placed for IR localization. She received 1U pRBC during the procedure and IR was contacted by GI for angio and possible intervention that same night. IR angio did not reveal any bleeding from SMA or NATALIO branches. MICU was consulted given patient's active GI bleed and risk of instability. Patient was hemodynamically stable and satting well on room air when seen in PACU. Will be admitted to MICU for close observation overnight after IR evaluation. I      Hospital Course      After discharge from the intensive care unit hemoglobin remained stable.  Patient was seen by Colorectal surgery and Gastroenterology no further  intervention was recommended at this time.  Continue to have mildly mildly melanotic stools but on day of discharge these had stopped.  Patient and daughter were counseled on signs to watch out for and return if there was any increase in blood in stool.  Patient's daughter will take off the next week to supervise patient's care.  Patient hgb continues to trend down despite a total of 10 units PRBCs, 1 unit FFP, 1 unit plts. Patient intermittently tachycardic to 110s, but remains hypertensive. Attempting to keep SBP <160 to help reduce bleeding. CRC, GI, and IR continue to follow without any further interventions at this time. Pt continued to have dark, loose stools with clots, which have decreased on 12/11. One more unit overnight on 12/12. Hgb stable x4 over 12/12-12/13. Bleeding likely stopped. Patient stable for stepdown.     Interval History/Significant Events-12/13/2019 Hgb stable x4 at 7.6. No further dark bowel movements in> 24 hours. Overall, stable for stepdown.  Reports last bowel movement on 12/12/2019 noon.  Status post physical therapy evaluation home health recommended.  Complains of floaters in the left eye worsening for the last 1 month had prior cataract surgery few months back  Discussed with Ophthalmology, recommending follow-up in Ophthalmology Clinic after discharge  12/14/2019 hemoglobin stable at 8.4.  Platelets improving to 145.  Started on low-fiber diet. .  Completed 7 days of Zosyn switched to oral cefpodoxime and metronidazole to complete total of 10 days of antibiotic.  Oxygen 1 L via nasal cannula continue tapered to room air at discharge. discharged home on 12/14/19  ___________________________________________________________________    Today:    No bleeding since discharge.   No diarrhea.  Eating more.  She is taking iron pills.    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 She subsequently underwent colonoscopy 12/9 and several large diverticula in the sigmoid colon was seen with hematin throughout the colon; blood pooling also noted in the sigmoid colon, during which clip was placed f    Hypothyroidism, postsurgical 7/1/2015    Mixed hyperlipidemia 9/27/2012    Multinodular goiter 7/31/2014    Paget's disease of bone 7/10/2013    Squamous Cell Carcinoma     in situ right neck        Past Surgical History:   Procedure Laterality Date    CATARACT EXTRACTION W/  INTRAOCULAR LENS IMPLANT Bilateral     COLONOSCOPY N/A 4/15/2019    Procedure: COLONOSCOPY;  Surgeon: Artur Monet MD;  Location: [facility] (4TH FLR);  Service: Endoscopy;  Laterality: N/A;  within 1 month    COLONOSCOPY N/A 12/9/2019    Procedure: COLONOSCOPY;  Surgeon: Clyde Welch MD;  Location: [facility] (2ND FLR);  Service: Endoscopy;  Laterality: N/A;    ESOPHAGOGASTRODUODENOSCOPY N/A 12/7/2019    Procedure: EGD (ESOPHAGOGASTRODUODENOSCOPY);  Surgeon: Clyde Welch MD;  Location: [facility] (2ND FLR);  Service: Endoscopy;  Laterality: N/A;    EXCISIONAL BIOPSY Right 6/27/2019    Procedure: EXCISIONAL BIOPSY-breast;  Surgeon: Suki Dill MD;  Location: [facility] OR 2ND FLR;  Service: General;  Laterality: Right;    EYE SURGERY      HYSTERECTOMY      INJECTION OF ANESTHETIC AGENT AROUND MEDIAL BRANCH NERVES INNERVATING LUMBAR FACET JOINT Bilateral 6/7/2019    Procedure: BLOCK, NERVE, FACET JOINT, LUMBAR, MEDIAL BRANCH-deo MBB  L4/5,L5/S1;  Surgeon: Jarvis Morales III, MD;  Location: [facility];  Service: Pain Management;  Laterality: Bilateral;    KNEE ARTHROSCOPY N/A     pt unsure of which knee     MASTECTOMY      SPINE SURGERY      TOTAL THYROIDECTOMY      YAG Laser Capsulotomy  Left 2019    Dr. Hahn       Family History   Problem Relation Age of Onset    Cancer Father         lung    Dementia Mother     Glaucoma Brother     Macular degeneration Brother     Diabetes Neg Hx     Amblyopia Neg Hx     Blindness Neg Hx     Cataracts Neg Hx     Retinal detachment Neg Hx     Strabismus Neg Hx     Melanoma Neg Hx        Social History     Socioeconomic History    Marital status:      Spouse name: Not on file    Number of children: Not on file    Years of education: Not on file    Highest education level: Not on file   Occupational History    Occupation: realtor     Employer: [email or web address]     Employer: [employer]   Social Needs    Financial resource strain: Not hard at all    Food insecurity:     Worry: Never true     Inability: Never true    Transportation needs:     Medical: No     Non-medical: No   Tobacco Use    Smoking status: Former Smoker     Packs/day: 2.00     Years: 44.00     Pack years: 88.00     Types: Cigarettes     Last attempt to quit: 1969     Years since quittin.0    Smokeless tobacco: Never Used   Substance and Sexual Activity    Alcohol use: Yes     Alcohol/week: 0.0 standard drinks     Frequency: Monthly or less     Drinks per session: 1 or 2     Binge frequency: Never     Comment: maybe a beer every 3 months    Drug use: No    Sexual activity: Not Currently   Lifestyle    Physical activity:     Days per week: 5 days     Minutes per session: 20 min    Stress: Not at all   Relationships    Social connections:     Talks on phone: More than three times a week     Gets together: Twice a week     Attends Anabaptism service: Never     Active member of  club or organization: Yes     Attends meetings of clubs or organizations: More than 4 times per year     Relationship status:    Other Topics Concern    Are you pregnant or think you may be? No    Breast-feeding No   Social History Narrative    Not on file       MEDICATIONS & ALLERGIES:     Current Outpatient Medications on File Prior to Visit   Medication Sig Dispense Refill    acetaminophen (TYLENOL) 325 MG tablet Take 2 tablets (650 mg total) by mouth every 6 (six) hours as needed. 120 tablet 1    albuterol-ipratropium (DUO-NEB) 2.5 mg-0.5 mg/3 mL nebulizer solution Take 3 mLs by nebulization every 4 (four) hours as needed for Wheezing. Rescue 90 mL 0    cholecalciferol, vitamin D3, (VITAMIN D3) 2,000 unit Tab Take 1 tablet by mouth once daily.      co-enzyme Q-10 30 mg capsule Take 30 mg by mouth once daily.       cyanocobalamin 500 MCG tablet Take 1 tablet (500 mcg total) by mouth once daily. 30 tablet 2    fenofibrate 160 MG Tab Take 1 tablet (160 mg total) by mouth once daily. 90 tablet 3    ferrous sulfate (FEOSOL) 325 mg (65 mg iron) Tab tablet Take 1 tablet (325 mg total) by mouth once daily. 30 tablet 2    hydrocortisone 1 % cream Apply topically 3 (three) times daily. 14.2 g 0    LACTOBACILLUS COMBINATION NO.8 (ADULT PROBIOTIC ORAL) Take by mouth once daily.       levothyroxine (SYNTHROID) 125 MCG tablet Take 1 tablet (125 mcg total) by mouth before breakfast. 30 tablet 11    losartan (COZAAR) 50 MG tablet Take 1.5 tablets (75 mg total) by mouth once daily. 135 tablet 3    meclizine (ANTIVERT) 12.5 mg tablet Take 1 tablet (12.5 mg total) by mouth 3 (three) times daily as needed for Dizziness. 30 tablet 2    turmeric root extract 500 mg Cap Take by mouth once daily.        No current facility-administered medications on file prior to visit.         Review of patient's allergies indicates:   Allergen Reactions    Voltaren [diclofenac sodium] Other (See Comments)     Hematochezia  and hospitalization for hematochezia.    Codeine Diarrhea and Hallucinations    Niacin preparations      Redness, warming       OBJECTIVE:     Vital Signs:  Vitals:    12/23/19 1428   BP: 122/64   Pulse: 100     Wt Readings from Last 1 Encounters:   12/23/19 1428 79.8 kg (175 lb 14.8 oz)     Body mass index is 31.16 kg/m².     Physical Exam:  General: Well developed, well nourished. No distress.  HEENT: Head is normocephalic, atraumatic   Eyes: Clear conjunctiva.  Neck: Supple, symmetrical neck; trachea midline.  Lungs: Clear to auscultation bilaterally and normal respiratory effort.  Cardiovascular: Heart with regular rate and rhythm.    Extremities: Trace bilateral LE edema.   Abdomen: Abdomen is soft, non-tender non-distended with normal bowel sounds.  Skin: Skin color, texture, turgor normal. No rashes.  Musculoskeletal: Normal gait.   Neurologic: Normal strength and tone.   Psychiatric: Normal affect. Alert.      Laboratory  Lab Results   Component Value Date    WBC 7.58 12/17/2019    HGB 7.4 (L) 12/17/2019    HCT 23.6 (L) 12/17/2019     (H) 12/17/2019     12/17/2019     BMP  Lab Results   Component Value Date     12/17/2019    K 3.7 12/17/2019     (H) 12/17/2019    CO2 24 12/17/2019    BUN 8 12/17/2019    CREATININE 0.8 12/17/2019    CALCIUM 8.0 (L) 12/17/2019    ANIONGAP 5 (L) 12/17/2019    ESTGFRAFRICA >60.0 12/17/2019    EGFRNONAA >60.0 12/17/2019     Lab Results   Component Value Date    ALT 12 12/17/2019    AST 18 12/17/2019    ALKPHOS 47 (L) 12/17/2019    BILITOT 0.4 12/17/2019     Lab Results   Component Value Date    INR 1.1 12/15/2019    INR 1.3 (H) 12/10/2019    INR 1.1 12/06/2019     Lab Results   Component Value Date    HGBA1C 5.6 10/18/2019       TRANSITION OF CARE:     Transition of Care Visit:     I have reviewed and updated the history and problem list.  I have reconciled the medication list.  I have discussed the hospitalization and current medical issues, prognosis  and plans with the patient/family.  I  spent more than 50% of time discussing the care with the patient/family.  Total Face-to-Face Encounter in the [facility]: 60 minutes.    Medications Reconciliation:   I have reconciled the patient's home medications and discharge medications with the patient/family. I have updated all changes.  Refer to After-Visit Medication List.    ASSESSMENT & PLAN:     Acute blood loss anemia  Hematochezia  Diverticulitis of large intestine - resolved.  - No further bleeding since discharge.    On iron therapy.    Hypothyroidism, postsurgical  - On synthroid replacement.    Mixed hyperlipidemia  - On fenofibrate.    Essential hypertension  - Controlled on Losartan 50 mg daily. Also monitored by Digital Medicine.    Low serum vitamin B12     Ref. Range 9/27/2013 13:44 12/7/2019 04:17   Vitamin B-12 Latest Ref Range: 210 - 950 pg/mL 476 224   Methlymalonic Acid Latest Ref Range: <0.40 umol/L 0.28      -     Vitamin B12; Future; Expected date: 01/23/2020  -     Methylmalonic acid, serum; Future; Expected date: 01/23/2020    Gastric nodule  - Found on EGD. Will need EUS per GI. Will message GI.  Notes recorded by Clyde Welch MD on 12/21/2019 at 2:29 PM CST  Dr. Gresham please remind Jo-Ann to schedule her endoscopic ultrasound next available for follow-up of her subepithelial nodule seen in her stomach.  Please tell her EGD pathology was benign.    Scheduled Follow-up :  Future Appointments   Date Time Provider Department Center   1/20/2020 11:00 AM LAB, APPOINTMENT [facility] TOMMY [facility] LAB  Sam LOZAW   1/23/2020  3:00 PM Dakota Kerr MD [facility] PCW   2/21/2020  9:00 AM [facility] MAMMO3 DX [facility] MAMMO [facility]   2/24/2020  7:20 AM Logan Peace MD [facility] HEM ONC [facility]   4/21/2020  7:00 AM LAB, APPOINTMENT [facility] TOMMY [facility] LAB  Sam nieves PCW       After Visit Medication List :     Medication List           Accurate as of December 23, 2019  3:30 PM. If you have  any questions, ask your nurse or doctor.               CHANGE how you take these medications    losartan 50 MG tablet  Commonly known as:  COZAAR  Take 1 tablet (50 mg total) by mouth once daily.  What changed:  how much to take  Changed by:  Pan Adams MD        CONTINUE taking these medications    acetaminophen 325 MG tablet  Commonly known as:  TYLENOL  Take 2 tablets (650 mg total) by mouth every 6 (six) hours as needed.     ADULT PROBIOTIC ORAL     co-enzyme Q-10 30 mg capsule     fenofibrate 160 MG Tab  Take 1 tablet (160 mg total) by mouth once daily.     ferrous sulfate 325 mg (65 mg iron) Tab tablet  Commonly known as:  FEOSOL  Take 1 tablet (325 mg total) by mouth once daily.     hydrocortisone 1 % cream  Apply topically 3 (three) times daily.     levothyroxine 125 MCG tablet  Commonly known as:  SYNTHROID  Take 1 tablet (125 mcg total) by mouth before breakfast.     meclizine 12.5 mg tablet  Commonly known as:  ANTIVERT  Take 1 tablet (12.5 mg total) by mouth 3 (three) times daily as needed for Dizziness.     turmeric root extract 500 mg Cap     Vitamin D3 2,000 unit Tab  Generic drug:  cholecalciferol (vitamin D3)        STOP taking these medications    albuterol-ipratropium 2.5 mg-0.5 mg/3 mL nebulizer solution  Commonly known as:  DUO-NEB  Stopped by:  Pan Adams MD     cyanocobalamin 500 MCG tablet  Stopped by:  Pan Adams MD           Where to Get Your Medications      Information about where to get these medications is not yet available    Ask your nurse or doctor about these medications  · losartan 50 MG tablet           Signing Physician:  Pan Adams MD

## 2019-12-23 NOTE — Clinical Note
"[facility] Visit (Post Discharge Follow-up) Today: Gastric nodule- Found on EGD. Will need EUS per GI.  Dr. Clyde Welch and Dr. Gresham "Please remind Jo-Ann to schedule her endoscopic ultrasound next available for follow-up of her subepithelial nodule seen in her stomach.Please tell her EGD pathology was benign."Future Appointments1/20/2020  11:00 AM   LAB, APPOINTMENT [facility] INT* [facility] LAB IM    Sam LOZAW 1/23/2020  3:00 PM    Dakota Kerr MD      [facility]        Sam LOZAW 2/21/2020  9:00 AM    [facility] MAMMO3 DX             [facility] MAMMO     Sam y2/24/2020  7:20 AM    Logan Peace MD      [facility] HEM ONC   David Jamison 4/21/2020  7:00 AM    LAB, APPOINTMENT [facility] INT* [facility] LAB IM    Sam LOZAW"

## 2020-01-06 ENCOUNTER — PATIENT MESSAGE (OUTPATIENT)
Dept: ENDOSCOPY | Facility: HOSPITAL | Age: 80
End: 2020-01-06

## 2020-01-06 ENCOUNTER — TELEPHONE (OUTPATIENT)
Dept: ENDOSCOPY | Facility: HOSPITAL | Age: 80
End: 2020-01-06

## 2020-01-06 NOTE — TELEPHONE ENCOUNTER
Spoke with patient about instructions for UEUS scheduled 1/22/20 at 1100.  [facility] message and Patient Instructions Letter sent to patient's [facility].

## 2020-01-07 ENCOUNTER — TELEPHONE (OUTPATIENT)
Dept: HOME HEALTH SERVICES | Facility: HOSPITAL | Age: 80
End: 2020-01-07

## 2020-01-07 ENCOUNTER — EXTERNAL HOME HEALTH (OUTPATIENT)
Dept: HOME HEALTH SERVICES | Facility: HOSPITAL | Age: 80
End: 2020-01-07
Payer: MEDICARE

## 2020-01-09 ENCOUNTER — PATIENT OUTREACH (OUTPATIENT)
Dept: OTHER | Facility: OTHER | Age: 80
End: 2020-01-09

## 2020-01-09 NOTE — PROGRESS NOTES
Digital Medicine: Clinician Introduction    Jo-Ann Escobedo is a 79 y.o. female who is newly enrolled in the Digital Medicine Clinic.    The following information was reviewed and updated:  Preferred pharmacy   [id number] - [place], [place] - [address] [facility]  [address]  [place] [address]  Phone: [phone] Fax: [phone]      Patient prefers a 90 days supply.     Review of patient's allergies indicates:   Allergen Reactions    Voltaren [diclofenac sodium] Other (See Comments)     Hematochezia and hospitalization for hematochezia.    Codeine Diarrhea and Hallucinations    Niacin preparations      Redness, warming       The history is provided by the patient.     HYPERTENSION  Our goal is to get BP to consistently below 130/80mmHg and make the process convenient so patient can avoid extra trips to the office. Getting your blood pressure below 130/80mmHg (definition of control) will reduce your risk for heart attack, kidney failure, stroke and death (as well as kidney failure, eye disease, & dementia)      Reviewed non-pharmacologic therapies and impact on BP      Explained that we expect patient to obtain several blood pressures per week at random times of day.  Instructed patient not to allow anyone else to use phone and monitoring device.  Confirmed appropriate BP monitoring technique.      Explained to patient that the digital medicine team is not available for emergencies.  [facility] on[facility]call ([phone] or [phone]) or [phone] if needed.    Patient's BP goal is 130/80. Patients BP average is 135/76 mmHg, which is above goal, per 2017 ACC/AHA Hypertension Guidelines.    Ms. Busch was recently discharged from the hospital follow an episode of diverticulitis/abcess. She received 11 units of blood, per patient report. Doing well and able to complete self care at this time.   HH still seeing patient - BP has been slightly lower on their cuff.  Advised patient to charge digital cuff.   Ms. Busch is not sure why her blood pressure is elevated recently, she details some pain/neuropathy in her left hand.     When discussing symptoms of hypertension, patient states she was nauseated and threw up x 1 in the last week, then returned to baseline with no symptoms to report. She denies chest pain, headache, shortness or breath, and changes in her vision.           Last 5 Patient Entered Readings                                      Current 30 Day Average: 135/76     Recent Readings 1/9/2020 1/8/2020 1/7/2020 1/6/2020 1/6/2020    SBP (mmHg) 148 153 147 168 161    DBP (mmHg) 77 78 84 83 88    Pulse 85 95 94 78 84            INTERVENTION(S)  reviewed appropriate dose schedule, reviewed monitoring technique and encouragement/support    PLAN  patient verbalizes understanding, additional monitoring needed, Health  follow up and continue monitoring    Requested patient continue to supply frequent readings for continued trending and assessment. Will monitor closely and follow up with patient in the next few weeks to ensure average is trending back down towards goal.     No changes to regimen at this time.       There are no preventive care reminders to display for this patient.    Current Medication Regimen:  Hypertension Medications             losartan (COZAAR) 50 MG tablet Take 1 tablet (50 mg total) by mouth once daily.          Reviewed the importance of self-monitoring, medication adherence, and that the health  can be used as a resource for lifestyle modifications to help reduce or maintain a healthy lifestyle.    Sent link to [facility] [employer] webpages and my contact information via [employer] for future questions. Follow up scheduled.         Screenings

## 2020-01-20 ENCOUNTER — TELEPHONE (OUTPATIENT)
Dept: INTERNAL MEDICINE | Facility: CLINIC | Age: 80
End: 2020-01-20

## 2020-01-20 ENCOUNTER — LAB VISIT (OUTPATIENT)
Dept: LAB | Facility: HOSPITAL | Age: 80
End: 2020-01-20
Attending: INTERNAL MEDICINE
Payer: MEDICARE

## 2020-01-20 DIAGNOSIS — K92.1 HEMATOCHEZIA: ICD-10-CM

## 2020-01-20 DIAGNOSIS — R73.03 PREDIABETES: Primary | ICD-10-CM

## 2020-01-20 DIAGNOSIS — D62 ACUTE BLOOD LOSS ANEMIA: ICD-10-CM

## 2020-01-20 DIAGNOSIS — E53.8 LOW SERUM VITAMIN B12: ICD-10-CM

## 2020-01-20 LAB
ERYTHROCYTE [DISTWIDTH] IN BLOOD BY AUTOMATED COUNT: 14.1 % (ref 11.5–14.5)
HCT VFR BLD AUTO: 36.5 % (ref 37–48.5)
HGB BLD-MCNC: 11.1 G/DL (ref 12–16)
MCH RBC QN AUTO: 30.7 PG (ref 27–31)
MCHC RBC AUTO-ENTMCNC: 30.4 G/DL (ref 32–36)
MCV RBC AUTO: 101 FL (ref 82–98)
PLATELET # BLD AUTO: 233 K/UL (ref 150–350)
PMV BLD AUTO: 10.2 FL (ref 9.2–12.9)
RBC # BLD AUTO: 3.61 M/UL (ref 4–5.4)
VIT B12 SERPL-MCNC: 301 PG/ML (ref 210–950)
WBC # BLD AUTO: 4.95 K/UL (ref 3.9–12.7)

## 2020-01-20 PROCEDURE — 82607 VITAMIN B-12: CPT

## 2020-01-20 PROCEDURE — 36415 COLL VENOUS BLD VENIPUNCTURE: CPT

## 2020-01-20 PROCEDURE — 83921 ORGANIC ACID SINGLE QUANT: CPT

## 2020-01-20 PROCEDURE — 85027 COMPLETE CBC AUTOMATED: CPT

## 2020-01-20 NOTE — TELEPHONE ENCOUNTER
Already has A1c previously ordered expected around 4/21/20 6 months out from most recent A1c for prediabetes.  A1c not needed at this time but ordered at patient request; please call lab to see if can be done as add on to labs that were already drawn today.

## 2020-01-20 NOTE — TELEPHONE ENCOUNTER
Spoke to pt and she said that she has an upcoming appointment with Dr Kerr and she will discuss this at the appointment.

## 2020-01-20 NOTE — TELEPHONE ENCOUNTER
----- Message from Haylee Ko sent at 1/20/2020 11:23 AM CST -----  Patient is requesting the lab department be contacted today to testing for Hemoglobin, Patient did labs today and lab department told patient she need to contact her doctor with in the next 1 hour to have a order placed.

## 2020-01-22 ENCOUNTER — HOSPITAL ENCOUNTER (OUTPATIENT)
Facility: HOSPITAL | Age: 80
Discharge: HOME | End: 2020-01-22
Attending: INTERNAL MEDICINE | Admitting: INTERNAL MEDICINE
Payer: MEDICARE

## 2020-01-22 ENCOUNTER — ANESTHESIA EVENT (OUTPATIENT)
Dept: ENDOSCOPY | Facility: HOSPITAL | Age: 80
End: 2020-01-22
Payer: MEDICARE

## 2020-01-22 ENCOUNTER — ANESTHESIA (OUTPATIENT)
Dept: ENDOSCOPY | Facility: HOSPITAL | Age: 80
End: 2020-01-22
Payer: MEDICARE

## 2020-01-22 VITALS
HEART RATE: 70 BPM | OXYGEN SATURATION: 99 % | RESPIRATION RATE: 20 BRPM | SYSTOLIC BLOOD PRESSURE: 141 MMHG | DIASTOLIC BLOOD PRESSURE: 90 MMHG | TEMPERATURE: 98 F | BODY MASS INDEX: 31.01 KG/M2 | HEIGHT: 63 IN | WEIGHT: 175 LBS

## 2020-01-22 DIAGNOSIS — K31.89 GASTRIC NODULE: ICD-10-CM

## 2020-01-22 DIAGNOSIS — K92.2 GASTROINTESTINAL HEMORRHAGE, UNSPECIFIED GASTROINTESTINAL HEMORRHAGE TYPE: Primary | ICD-10-CM

## 2020-01-22 PROCEDURE — 25000003 PHARM REV CODE 250: Performed by: NURSE ANESTHETIST, CERTIFIED REGISTERED

## 2020-01-22 PROCEDURE — D9220A PRA ANESTHESIA: ICD-10-PCS | Mod: ANES,,, | Performed by: ANESTHESIOLOGY

## 2020-01-22 PROCEDURE — 63600175 PHARM REV CODE 636 W HCPCS: Performed by: INTERNAL MEDICINE

## 2020-01-22 PROCEDURE — 43259 EGD US EXAM DUODENUM/JEJUNUM: CPT | Performed by: INTERNAL MEDICINE

## 2020-01-22 PROCEDURE — 37000009 HC ANESTHESIA EA ADD 15 MINS: Performed by: INTERNAL MEDICINE

## 2020-01-22 PROCEDURE — D9220A PRA ANESTHESIA: Mod: CRNA,,, | Performed by: NURSE ANESTHETIST, CERTIFIED REGISTERED

## 2020-01-22 PROCEDURE — 43259 PR ENDOSCOPIC ULTRASOUND EXAM: ICD-10-PCS | Mod: ,,, | Performed by: INTERNAL MEDICINE

## 2020-01-22 PROCEDURE — 63600175 PHARM REV CODE 636 W HCPCS: Performed by: NURSE ANESTHETIST, CERTIFIED REGISTERED

## 2020-01-22 PROCEDURE — 37000008 HC ANESTHESIA 1ST 15 MINUTES: Performed by: INTERNAL MEDICINE

## 2020-01-22 PROCEDURE — 43259 EGD US EXAM DUODENUM/JEJUNUM: CPT | Mod: ,,, | Performed by: INTERNAL MEDICINE

## 2020-01-22 PROCEDURE — D9220A PRA ANESTHESIA: Mod: ANES,,, | Performed by: ANESTHESIOLOGY

## 2020-01-22 PROCEDURE — D9220A PRA ANESTHESIA: ICD-10-PCS | Mod: CRNA,,, | Performed by: NURSE ANESTHETIST, CERTIFIED REGISTERED

## 2020-01-22 RX ORDER — PROPOFOL 10 MG/ML
VIAL (ML) INTRAVENOUS
Status: DISCONTINUED | OUTPATIENT
Start: 2020-01-22 | End: 2020-01-22

## 2020-01-22 RX ORDER — LIDOCAINE HCL/PF 100 MG/5ML
SYRINGE (ML) INTRAVENOUS
Status: DISCONTINUED | OUTPATIENT
Start: 2020-01-22 | End: 2020-01-22

## 2020-01-22 RX ORDER — PROPOFOL 10 MG/ML
VIAL (ML) INTRAVENOUS CONTINUOUS PRN
Status: DISCONTINUED | OUTPATIENT
Start: 2020-01-22 | End: 2020-01-22

## 2020-01-22 RX ORDER — SODIUM CHLORIDE 9 MG/ML
INJECTION, SOLUTION INTRAVENOUS CONTINUOUS
Status: DISCONTINUED | OUTPATIENT
Start: 2020-01-22 | End: 2020-01-22 | Stop reason: HOSPADM

## 2020-01-22 RX ORDER — GLYCOPYRROLATE 0.2 MG/ML
INJECTION INTRAMUSCULAR; INTRAVENOUS
Status: DISCONTINUED | OUTPATIENT
Start: 2020-01-22 | End: 2020-01-22

## 2020-01-22 RX ORDER — SODIUM CHLORIDE 0.9 % (FLUSH) 0.9 %
10 SYRINGE (ML) INJECTION
Status: DISCONTINUED | OUTPATIENT
Start: 2020-01-22 | End: 2020-01-22 | Stop reason: HOSPADM

## 2020-01-22 RX ADMIN — PROPOFOL 100 MCG/KG/MIN: 10 INJECTION, EMULSION INTRAVENOUS at 10:01

## 2020-01-22 RX ADMIN — PROPOFOL 60 MG: 10 INJECTION, EMULSION INTRAVENOUS at 10:01

## 2020-01-22 RX ADMIN — PROPOFOL 40 MG: 10 INJECTION, EMULSION INTRAVENOUS at 10:01

## 2020-01-22 RX ADMIN — LIDOCAINE HYDROCHLORIDE 20 MG: 20 INJECTION, SOLUTION INTRAVENOUS at 10:01

## 2020-01-22 RX ADMIN — SODIUM CHLORIDE: 0.9 INJECTION, SOLUTION INTRAVENOUS at 10:01

## 2020-01-22 RX ADMIN — GLYCOPYRROLATE 0.2 MG: 0.2 INJECTION, SOLUTION INTRAMUSCULAR; INTRAVENOUS at 10:01

## 2020-01-22 NOTE — ANESTHESIA RELEASE NOTE
"Anesthesia Release from PACU Note    Patient: Jo-Ann Escobedo    Procedure(s) Performed: Procedure(s) (LRB):  ULTRASOUND, UPPER GI TRACT, ENDOSCOPIC (N/A)    Anesthesia type: GEN    Post pain: Adequate analgesia reported    Post assessment: no apparent anesthetic complications, tolerated procedure well and no evidence of recall    Post vital signs: BP (!) 141/90 (BP Location: Right arm, Patient Position: Lying)   Pulse 70   Temp 36.6 °C (97.9 °F) (Skin)   Resp 20   Ht 5' 3" (1.6 m)   Wt 79.4 kg (175 lb)   LMP  (LMP Unknown)   SpO2 99%   Breastfeeding? No   BMI 31.00 kg/m²     Level of consciousness: awake, alert and oriented    Nausea/Vomiting: no nausea/no vomiting    Complications: none    Airway Patency: patent    Respiratory: unassisted, spontaneous ventilation, room air    Cardiovascular: stable and blood pressure at baseline    Hydration: euvolemic    "

## 2020-01-22 NOTE — H&P
Short Stay Endoscopy History and Physical    PCP - Dakota Kerr MD  Referring Physician - Matthew Gresham MD  [address] [place] [address]    Procedure - eus  ASA - per anesthesia  Mallampati - per anesthesia  History of Anesthesia problems - no  Family history Anesthesia problems -  no   Plan of anesthesia - General    HPI:  This is a 79 y.o. 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    Surgical History:  has a past surgical history that includes Hysterectomy; Eye surgery; Spine surgery; Mastectomy; Total thyroidectomy; Cataract extraction w/  intraocular lens implant (Bilateral); Knee arthroscopy (N/A); Colonoscopy (N/A, 4/15/2019); Injection of anesthetic agent around medial branch nerves innervating lumbar facet joint (Bilateral, 6/7/2019); Excisional biopsy (Right, 6/27/2019); YAG Laser Capsulotomy  (Left, 07/29/2019); Esophagogastroduodenoscopy (N/A, 12/7/2019); and Colonoscopy (N/A, 12/9/2019).    Family History: family history includes Cancer in her father; Dementia in her mother; Glaucoma in her brother; Macular degeneration in her brother..    Social History:  reports  that she quit smoking about 51 years ago. Her smoking use included cigarettes. She has a 88.00 pack-year smoking history. She has never used smokeless tobacco. She reports that she drinks alcohol. She reports that she does not use drugs.    Review of patient's allergies indicates:   Allergen Reactions    Voltaren [diclofenac sodium] Other (See Comments)     Hematochezia and hospitalization for hematochezia.    Codeine Diarrhea and Hallucinations    Niacin preparations      Redness, warming       Medications:   Medications Prior to Admission   Medication Sig Dispense Refill Last Dose    acetaminophen (TYLENOL) 325 MG tablet Take 2 tablets (650 mg total) by mouth every 6 (six) hours as needed. 120 tablet 1 Past Week at Unknown time    cholecalciferol, vitamin D3, (VITAMIN D3) 2,000 unit Tab Take 1 tablet by mouth once daily.   Past Week at Unknown time    co-enzyme Q-10 30 mg capsule Take 30 mg by mouth once daily.    1/21/2020 at Unknown time    fenofibrate 160 MG Tab Take 1 tablet (160 mg total) by mouth once daily. 90 tablet 3 1/21/2020 at Unknown time    hydrocortisone 1 % cream Apply topically 3 (three) times daily. 14.2 g 0 1/21/2020 at Unknown time    LACTOBACILLUS COMBINATION NO.8 (ADULT PROBIOTIC ORAL) Take by mouth once daily.    1/21/2020 at Unknown time    levothyroxine (SYNTHROID) 125 MCG tablet Take 1 tablet (125 mcg total) by mouth before breakfast. 30 tablet 11 1/22/2020 at Unknown time    losartan (COZAAR) 50 MG tablet Take 1 tablet (50 mg total) by mouth once daily. 90 tablet 3 1/21/2020 at Unknown time    meclizine (ANTIVERT) 12.5 mg tablet Take 1 tablet (12.5 mg total) by mouth 3 (three) times daily as needed for Dizziness. 30 tablet 2 Past Month at Unknown time    turmeric root extract 500 mg Cap Take by mouth once daily.    1/21/2020 at Unknown time       Physical Exam:    Vital Signs:   Vitals:    01/22/20 1013   BP: (!) 146/75   Pulse: 75   Resp: 14   Temp: 98.1 °F (36.7 °C)        General Appearance: Well appearing in no acute distress    Labs:  Lab Results   Component Value Date    WBC 4.95 01/20/2020    HGB 11.1 (L) 01/20/2020    HCT 36.5 (L) 01/20/2020     01/20/2020    CHOL 173 05/04/2019    TRIG 92 05/04/2019    HDL 40 05/04/2019    ALT 12 12/17/2019    AST 18 12/17/2019     12/17/2019    K 3.7 12/17/2019     (H) 12/17/2019    CREATININE 0.8 12/17/2019    BUN 8 12/17/2019    CO2 24 12/17/2019    TSH 2.372 12/07/2019    INR 1.1 12/15/2019    GLUF 100 02/12/2004    HGBA1C 5.6 10/18/2019       I have explained the risks and benefits of this endoscopic procedure to the patient including but not limited to bleeding, inflammation, infection, perforation, and death.      Eleazar Shaffer MD

## 2020-01-22 NOTE — ANESTHESIA PREPROCEDURE EVALUATION
01/22/2020  Jo-Ann Escobedo is a 79 y.o., female.      Procedure: ULTRASOUND, UPPER GI TRACT, ENDOSCOPIC (N/A Abdomen) - EUS for 10mm SML w/negative pillow sign and negative naked fat sign which was found at the incisura.  Dr Welch   Anesthesia type: General   Diagnosis: Gastric nodule [K31.89]         Pre-operative evaluation for Procedure(s) (LRB):  ULTRASOUND, UPPER GI TRACT, ENDOSCOPIC (N/A)    Encounter Diagnosis   Name Primary?    Gastrointestinal hemorrhage, unspecified gastrointestinal hemorrhage type Yes       Review of patient's allergies indicates:   Allergen Reactions    Voltaren [diclofenac sodium] Other (See Comments)     Hematochezia and hospitalization for hematochezia.    Codeine Diarrhea and Hallucinations    Niacin preparations      Redness, warming       No current facility-administered medications on file prior to encounter.      Current Outpatient Medications on File Prior to Encounter   Medication Sig Dispense Refill    acetaminophen (TYLENOL) 325 MG tablet Take 2 tablets (650 mg total) by mouth every 6 (six) hours as needed. 120 tablet 1    cholecalciferol, vitamin D3, (VITAMIN D3) 2,000 unit Tab Take 1 tablet by mouth once daily.      co-enzyme Q-10 30 mg capsule Take 30 mg by mouth once daily.       fenofibrate 160 MG Tab Take 1 tablet (160 mg total) by mouth once daily. 90 tablet 3    hydrocortisone 1 % cream Apply topically 3 (three) times daily. 14.2 g 0    LACTOBACILLUS COMBINATION NO.8 (ADULT PROBIOTIC ORAL) Take by mouth once daily.       levothyroxine (SYNTHROID) 125 MCG tablet Take 1 tablet (125 mcg total) by mouth before breakfast. 30 tablet 11    meclizine (ANTIVERT) 12.5 mg tablet Take 1 tablet (12.5 mg total) by mouth 3 (three) times daily as needed for Dizziness. 30 tablet 2    turmeric root extract 500 mg Cap Take by mouth once daily.          Social  History     Tobacco Use   Smoking Status Former Smoker    Packs/day: 2.00    Years: 44.00    Pack years: 88.00    Types: Cigarettes    Last attempt to quit: 1969    Years since quittin.0   Smokeless Tobacco Never Used       Social History     Substance and Sexual Activity   Alcohol Use Yes    Alcohol/week: 0.0 standard drinks    Frequency: Monthly or less    Drinks per session: 1 or 2    Binge frequency: Never    Comment: maybe a beer every 3 months       Patient Active Problem List   Diagnosis    PCO (posterior capsular opacification), right    Mixed hyperlipidemia    Sensorineural hearing loss, bilateral    Paget's disease of bone    Obesity (BMI 30.0-34.9)    Osteoarthritis of lumbar spine    Hypothyroidism, postsurgical    Lipoma of arm    Essential hypertension    Prediabetes    Facet arthritis of lumbar region    Aortic atherosclerosis    Ductal carcinoma in situ (DCIS) of right breast    Pseudophakia    Nephrolithiasis    BPPV (benign paroxysmal positional vertigo)    Diverticulitis of large intestine without perforation or abscess with bleeding    Acute blood loss anemia    Abnormal CT of the head    Hematochezia    Gastric nodule    Low serum vitamin B12       Past Surgical History:   Procedure Laterality Date    CATARACT EXTRACTION W/  INTRAOCULAR LENS IMPLANT Bilateral     COLONOSCOPY N/A 4/15/2019    Procedure: COLONOSCOPY;  Surgeon: Artur Monet MD;  Location: [facility] ([address] [place] [address]);  Service: Endoscopy;  Laterality: N/A;  within 1 month    COLONOSCOPY N/A 2019    Procedure: COLONOSCOPY;  Surgeon: Clyde Welch MD;  Location: [facility] ([address] [place] [address]);  Service: Endoscopy;  Laterality: N/A;    ESOPHAGOGASTRODUODENOSCOPY N/A 2019    Procedure: EGD (ESOPHAGOGASTRODUODENOSCOPY);  Surgeon: Clyde Welch MD;  Location: [facility] ([address] [place] [address]);  Service: Endoscopy;  Laterality: N/A;    EXCISIONAL BIOPSY Right 2019    Procedure: EXCISIONAL  BIOPSY-breast;  Surgeon: Suki Dill MD;  Location: [facility] OR [address] [place] [address];  Service: General;  Laterality: Right;    EYE SURGERY      HYSTERECTOMY      INJECTION OF ANESTHETIC AGENT AROUND MEDIAL BRANCH NERVES INNERVATING LUMBAR FACET JOINT Bilateral 6/7/2019    Procedure: BLOCK, NERVE, FACET JOINT, LUMBAR, MEDIAL BRANCH-deo MBB L4/5,L5/S1;  Surgeon: Jarvis Morales III, MD;  Location: [facility] CATH LAB;  Service: Pain Management;  Laterality: Bilateral;    KNEE ARTHROSCOPY N/A     pt unsure of which knee     MASTECTOMY      SPINE SURGERY      TOTAL THYROIDECTOMY      YAG Laser Capsulotomy  Left 07/29/2019    Dr. Hahn           Recent Labs     01/20/20  1115   HCT 36.5*     Recent Labs     01/20/20  1115        No results for input(s): K in the last 72 hours.  No results for input(s): CREATININE in the last 72 hours.  No results for input(s): GLU in the last 72 hours.  No results for input(s): PT in the last 72 hours.                    Anesthesia Evaluation         Review of Systems  Anesthesia Hx:  No problems with previous Anesthesia Sore lip without history of difficult intubation   Hematology/Oncology:        Hematology Comments: Recent transfusions for GIB -- Cancer in past history:  Breast left and right axillary node dissection no lymphedema chemotherapy and surgery  Oncology Comments: Left lnd also     Cardiovascular:   Hypertension, well controlled Denies MI.    Denies Angina.    Pulmonary:  Pulmonary Normal  Denies COPD.  Denies Asthma.  Denies Shortness of breath.    Hepatic/GI:   Denies Liver Disease.    Neurological:   Denies TIA. Denies CVA. Denies Seizures.    Endocrine:   Denies Diabetes.        Physical Exam  General:  Well nourished    Airway/Jaw/Neck:  Airway Findings: Mouth Opening: Normal Tongue: Normal  General Airway Assessment: Adult, Average  Mallampati: II  TM Distance: Normal, at least 6 cm  Jaw/Neck Findings:  Neck ROM: Normal ROM            Mental Status:  Mental  Status Findings:  Cooperative, Alert and Oriented         Anesthesia Plan  Type of Anesthesia, risks & benefits discussed:  Anesthesia Type:  general  Patient's Preference:   Intra-op Monitoring Plan:   Intra-op Monitoring Plan Comments:   Post Op Pain Control Plan:   Post Op Pain Control Plan Comments: As per surgeon's plan  Induction:   IV  Beta Blocker:  Patient is not currently on a Beta-Blocker (No further documentation required).       Informed Consent: Patient understands risks and agrees with Anesthesia plan.  Questions answered. Anesthesia consent signed with patient.  ASA Score: 2     Day of Surgery Review of History & Physical:    H&P update referred to the surgeon.         Ready For Surgery From Anesthesia Perspective.

## 2020-01-22 NOTE — ANESTHESIA POSTPROCEDURE EVALUATION
Anesthesia Post Evaluation    Patient: Jo-Ann Escobedo    Procedure(s) Performed: Procedure(s) (LRB):  ULTRASOUND, UPPER GI TRACT, ENDOSCOPIC (N/A)    Final Anesthesia Type: general    Patient location during evaluation: PACU  Patient participation: Yes- Able to Participate  Level of consciousness: awake and alert and oriented  Post-procedure vital signs: reviewed and stable  Pain management: adequate  Airway patency: patent    PONV status at discharge: No PONV  Anesthetic complications: no      Cardiovascular status: stable  Respiratory status: unassisted, spontaneous ventilation and room air  Hydration status: euvolemic  Follow-up not needed.          Vitals Value Taken Time   /90 1/22/2020 11:17 AM   Temp 36.6 °C (97.9 °F) 1/22/2020 10:57 AM   Pulse 72 1/22/2020 11:19 AM   Resp 29 1/22/2020 11:19 AM   SpO2 97 % 1/22/2020 11:19 AM   Vitals shown include unvalidated device data.      No case tracking events are documented in the log.      Pain/Parris Score: No data recorded

## 2020-01-22 NOTE — [employer] PATIENT INSTRUCTIONS
Discharge Summary/Instructions after an Endoscopic Procedure  Patient Name: Jo-Ann Orr  Patient MRN: [id number]  Patient YOB: 1940 Wednesday, January 22, 2020  Eleazar Shaffer MD  RESTRICTIONS:  During your procedure today, you received medications for sedation.  These   medications may affect your judgment, balance and coordination.  Therefore,   for 24 hours, you have the following restrictions:   - DO NOT drive a car, operate machinery, make legal/financial decisions,   sign important papers or drink alcohol.    ACTIVITY:  Today: no heavy lifting, straining or running due to procedural   sedation/anesthesia.  The following day: return to full activity including work.  DIET:  Eat and drink normally unless instructed otherwise.     TREATMENT FOR COMMON SIDE EFFECTS:  - Mild abdominal pain, nausea, belching, bloating or excessive gas:  rest,   eat lightly and use a heating pad.  - Sore Throat: treat with throat lozenges and/or gargle with warm salt   water.  - Because air was used during the procedure, expelling large amounts of air   from your rectum or belching is normal.  - If a bowel prep was taken, you may not have a bowel movement for 1-3 days.    This is normal.  SYMPTOMS TO WATCH FOR AND REPORT TO YOUR PHYSICIAN:  1. Abdominal pain or bloating, other than gas cramps.  2. Chest pain.  3. Back pain.  4. Signs of infection such as: chills or fever occurring within 24 hours   after the procedure.  5. Rectal bleeding, which would show as bright red, maroon, or black stools.   (A tablespoon of blood from the rectum is not serious, especially if   hemorrhoids are present.)  6. Vomiting.  7. Weakness or dizziness.  GO DIRECTLY TO THE NEAREST EMERGENCY ROOM IF YOU HAVE ANY OF THE FOLLOWING:      Difficulty breathing              Chills and/or fever over 101 F   Persistent vomiting and/or vomiting blood   Severe abdominal pain   Severe chest pain   Black, tarry stools   Bleeding- more than one  tablespoon   Any other symptom or condition that you feel may need urgent attention  Your doctor recommends these additional instructions:  If any biopsies were taken, your doctors clinic will contact you in 1 to 2   weeks with any results.  - Discharge patient to home.   - Resume previous diet.   - Continue present medications.   - Return to primary care physician at appointment to be scheduled.   - No need for further evaluation  For questions, problems or results please call your physician - Eleazar Shaffer MD at Work:  [phone].  [facility], EMERGENCY ROOM PHONE NUMBER: [phone]  IF A COMPLICATION OR EMERGENCY SITUATION ARISES AND YOU ARE UNABLE TO REACH   YOUR PHYSICIAN - GO DIRECTLY TO THE EMERGENCY ROOM.  Eleazar Shaffer MD  1/22/2020 10:58:49 AM  This report has been verified and signed electronically.  [employer]

## 2020-01-22 NOTE — TRANSFER OF CARE
"Anesthesia Transfer of Care Note    Patient: Jo-Ann Escobedo    Procedure(s) Performed: Procedure(s) (LRB):  ULTRASOUND, UPPER GI TRACT, ENDOSCOPIC (N/A)    Patient location: [facility]    Anesthesia Type: general    Transport from OR: Transported from OR on 2-3 L/min O2 by NC with adequate spontaneous ventilation    Post pain: adequate analgesia    Post assessment: no apparent anesthetic complications    Post vital signs: stable    Level of consciousness: awake, alert and oriented    Nausea/Vomiting: no nausea/vomiting    Complications: none    Transfer of care protocol was followed      Last vitals:   Visit Vitals  /69 (BP Location: Right arm)   Pulse 80   Temp 36.6 °C (97.9 °F) (Skin)   Resp 18   Ht 5' 3" (1.6 m)   Wt 79.4 kg (175 lb)   LMP  (LMP Unknown)   SpO2 98%   Breastfeeding? No   BMI 31.00 kg/m²     "

## 2020-01-23 ENCOUNTER — TELEPHONE (OUTPATIENT)
Dept: INTERNAL MEDICINE | Facility: CLINIC | Age: 80
End: 2020-01-23

## 2020-01-23 ENCOUNTER — OFFICE VISIT (OUTPATIENT)
Dept: INTERNAL MEDICINE | Facility: CLINIC | Age: 80
End: 2020-01-23
Payer: MEDICARE

## 2020-01-23 ENCOUNTER — IMMUNIZATION (OUTPATIENT)
Dept: PHARMACY | Facility: CLINIC | Age: 80
End: 2020-01-23
Payer: MEDICARE

## 2020-01-23 ENCOUNTER — LAB VISIT (OUTPATIENT)
Dept: LAB | Facility: HOSPITAL | Age: 80
End: 2020-01-23
Payer: MEDICARE

## 2020-01-23 VITALS
OXYGEN SATURATION: 95 % | BODY MASS INDEX: 31.92 KG/M2 | DIASTOLIC BLOOD PRESSURE: 60 MMHG | TEMPERATURE: 99 F | WEIGHT: 180.13 LBS | HEART RATE: 75 BPM | SYSTOLIC BLOOD PRESSURE: 120 MMHG | HEIGHT: 63 IN

## 2020-01-23 DIAGNOSIS — D51.3 OTHER DIETARY VITAMIN B12 DEFICIENCY ANEMIA: ICD-10-CM

## 2020-01-23 DIAGNOSIS — D53.9 MACROCYTIC ANEMIA: ICD-10-CM

## 2020-01-23 DIAGNOSIS — R20.0 NUMBNESS OF FINGERS: Primary | ICD-10-CM

## 2020-01-23 LAB — METHYLMALONATE SERPL-SCNC: 0.59 UMOL/L

## 2020-01-23 PROCEDURE — 82746 ASSAY OF FOLIC ACID SERUM: CPT

## 2020-01-23 PROCEDURE — 3288F PR FALLS RISK ASSESSMENT DOCUMENTED: ICD-10-PCS | Mod: CPTII,S$GLB,, | Performed by: FAMILY MEDICINE

## 2020-01-23 PROCEDURE — 1125F PR PAIN SEVERITY QUANTIFIED, PAIN PRESENT: ICD-10-PCS | Mod: S$GLB,,, | Performed by: FAMILY MEDICINE

## 2020-01-23 PROCEDURE — 99999 PR PBB SHADOW E&M-EST. PATIENT-LVL III: ICD-10-PCS | Mod: PBBFAC,,, | Performed by: FAMILY MEDICINE

## 2020-01-23 PROCEDURE — 1100F PTFALLS ASSESS-DOCD GE2>/YR: CPT | Mod: CPTII,S$GLB,, | Performed by: FAMILY MEDICINE

## 2020-01-23 PROCEDURE — 36415 COLL VENOUS BLD VENIPUNCTURE: CPT

## 2020-01-23 PROCEDURE — 3074F PR MOST RECENT SYSTOLIC BLOOD PRESSURE < 130 MM HG: ICD-10-PCS | Mod: CPTII,S$GLB,, | Performed by: FAMILY MEDICINE

## 2020-01-23 PROCEDURE — 3078F DIAST BP <80 MM HG: CPT | Mod: CPTII,S$GLB,, | Performed by: FAMILY MEDICINE

## 2020-01-23 PROCEDURE — 1159F MED LIST DOCD IN RCRD: CPT | Mod: S$GLB,,, | Performed by: FAMILY MEDICINE

## 2020-01-23 PROCEDURE — 1125F AMNT PAIN NOTED PAIN PRSNT: CPT | Mod: S$GLB,,, | Performed by: FAMILY MEDICINE

## 2020-01-23 PROCEDURE — 99213 PR OFFICE/OUTPT VISIT, EST, LEVL III, 20-29 MIN: ICD-10-PCS | Mod: S$GLB,,, | Performed by: FAMILY MEDICINE

## 2020-01-23 PROCEDURE — 99999 PR PBB SHADOW E&M-EST. PATIENT-LVL III: CPT | Mod: PBBFAC,,, | Performed by: FAMILY MEDICINE

## 2020-01-23 PROCEDURE — 99213 OFFICE O/P EST LOW 20 MIN: CPT | Mod: S$GLB,,, | Performed by: FAMILY MEDICINE

## 2020-01-23 PROCEDURE — 1159F PR MEDICATION LIST DOCUMENTED IN MEDICAL RECORD: ICD-10-PCS | Mod: S$GLB,,, | Performed by: FAMILY MEDICINE

## 2020-01-23 PROCEDURE — 1100F PR PT FALLS ASSESS DOC 2+ FALLS/FALL W/INJURY/YR: ICD-10-PCS | Mod: CPTII,S$GLB,, | Performed by: FAMILY MEDICINE

## 2020-01-23 PROCEDURE — 3078F PR MOST RECENT DIASTOLIC BLOOD PRESSURE < 80 MM HG: ICD-10-PCS | Mod: CPTII,S$GLB,, | Performed by: FAMILY MEDICINE

## 2020-01-23 PROCEDURE — 3074F SYST BP LT 130 MM HG: CPT | Mod: CPTII,S$GLB,, | Performed by: FAMILY MEDICINE

## 2020-01-23 PROCEDURE — 3288F FALL RISK ASSESSMENT DOCD: CPT | Mod: CPTII,S$GLB,, | Performed by: FAMILY MEDICINE

## 2020-01-23 RX ORDER — CYANOCOBALAMIN 1000 UG/ML
1000 INJECTION, SOLUTION INTRAMUSCULAR; SUBCUTANEOUS
Qty: 10 ML | Refills: 1 | Status: SHIPPED | OUTPATIENT
Start: 2020-01-23 | End: 2021-04-12

## 2020-01-23 RX ORDER — GABAPENTIN 100 MG/1
100 CAPSULE ORAL 3 TIMES DAILY
Qty: 90 CAPSULE | Refills: 11 | Status: SHIPPED | OUTPATIENT
Start: 2020-01-23 | End: 2020-06-25

## 2020-01-23 NOTE — PROGRESS NOTES
Subjective:      Patient ID: Jo-Ann Escobedo is a 79 y.o. female.    Chief Complaint: Abnormal Lab (cbc-hemoglobin/b12 lab) and Numbness (L hand-2fingers x2weeks)      HPI:  Jo-Ann Escobedo is a 79 year old female with arthritis, history of CKD, DJD lumbar spine, history of breast cancer s/p left mastectomy, hyperlipidemia, hypertension, hypothyroidism, lipoma, multinodular goiter, obesity, Paget's disease of bone, and prediabetes who presents to clinic today to discuss lab work and complaining of numbness to her left hand.    Received a message from nursing 1/20/20 stating patient requested hemoglobin testing and wanted to add A1c to labs drawn that day per Dr. Adams.  Appears nursing made a mistake when asking for A1c to be added instead of a CBC but the labs that were ordered per Dr. Adams already included a CBC, MMA, and B12 level.  CBC 1/20/20 showed macrocytic anemia H/H 11.1/36.5 with , B12 301 within normal limits, MMA still in process.    Complains of numbness to 1st, 2nd, and 3rd digits of the left hand.  Present for about 6 weeks since hospitalized.  States these fingers do not feel normal, would like relief.  Constant. States she has used a heating pad with mild improvement.  States she had similar issues on the right hand initially but it has improved.  Symptoms stable.  Feels generalized weakness.  Numbness from tips of these fingers to knuckles.      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    Squamous Cell Carcinoma     in situ right neck        Past Surgical History:   Procedure Laterality Date    CATARACT EXTRACTION W/  INTRAOCULAR LENS IMPLANT Bilateral     COLONOSCOPY N/A 4/15/2019    Procedure: COLONOSCOPY;  Surgeon: Artur Monet MD;  Location: [facility] (4TH FLR);  Service: Endoscopy;  Laterality: N/A;  within 1 month    COLONOSCOPY N/A 12/9/2019    Procedure: COLONOSCOPY;  Surgeon: Clyde Welch MD;  Location: [facility] (2ND FLR);  Service: Endoscopy;  Laterality: N/A;    ENDOSCOPIC ULTRASOUND OF UPPER GASTROINTESTINAL TRACT N/A 1/22/2020    Procedure: ULTRASOUND, UPPER GI TRACT, ENDOSCOPIC;  Surgeon: Eleazar Shaffer MD;  Location: [facility] ENDO (2ND FLR);  Service: Endoscopy;  Laterality: N/A;  EUS for 10mm SML w/negative pillow sign and negative naked fat sign which was found at the incisura.  Dr Welch    ESOPHAGOGASTRODUODENOSCOPY N/A 12/7/2019    Procedure: EGD (ESOPHAGOGASTRODUODENOSCOPY);  Surgeon: Clyde Welch MD;  Location: [facility] (2ND FLR);  Service: Endoscopy;  Laterality: N/A;    EXCISIONAL BIOPSY Right 6/27/2019    Procedure: EXCISIONAL BIOPSY-breast;  Surgeon: Suki Dill MD;  Location: [facility] OR 2ND FLR;  Service: General;  Laterality: Right;    EYE SURGERY      HYSTERECTOMY      INJECTION OF ANESTHETIC AGENT AROUND MEDIAL BRANCH NERVES INNERVATING LUMBAR FACET JOINT Bilateral 6/7/2019    Procedure: BLOCK, NERVE, FACET JOINT, LUMBAR, MEDIAL BRANCH-deo MBB L4/5,L5/S1;  Surgeon:  Jarvis Morales III, MD;  Location: [facility];  Service: Pain Management;  Laterality: Bilateral;    KNEE ARTHROSCOPY N/A     pt unsure of which knee     MASTECTOMY      SPINE SURGERY      TOTAL THYROIDECTOMY      YAG Laser Capsulotomy  Left 2019    Dr. Hahn       Family History   Problem Relation Age of Onset    Cancer Father         lung    Dementia Mother     Glaucoma Brother     Macular degeneration Brother     Diabetes Neg Hx     Amblyopia Neg Hx     Blindness Neg Hx     Cataracts Neg Hx     Retinal detachment Neg Hx     Strabismus Neg Hx     Melanoma Neg Hx        Social History     Socioeconomic History    Marital status:      Spouse name: Not on file    Number of children: Not on file    Years of education: Not on file    Highest education level: Not on file   Occupational History    Occupation: realtor     Employer: [employer]     Employer: [employer]   Social Needs    Financial resource strain: Not hard at all    Food insecurity:     Worry: Never true     Inability: Never true    Transportation needs:     Medical: No     Non-medical: No   Tobacco Use    Smoking status: Former Smoker     Packs/day: 2.00     Years: 44.00     Pack years: 88.00     Types: Cigarettes     Last attempt to quit: 1969     Years since quittin.0    Smokeless tobacco: Never Used   Substance and Sexual Activity    Alcohol use: Yes     Alcohol/week: 0.0 standard drinks     Frequency: Monthly or less     Drinks per session: 1 or 2     Binge frequency: Never     Comment: maybe a beer every 3 months    Drug use: No    Sexual activity: Not Currently   Lifestyle    Physical activity:     Days per week: 5 days     Minutes per session: 20 min    Stress: Not at all   Relationships    Social connections:     Talks on phone: More than three times a week     Gets together: Twice a week     Attends Anglican service: Never     Active member of club or organization:  "Yes     Attends meetings of clubs or organizations: More than 4 times per year     Relationship status:    Other Topics Concern    Are you pregnant or think you may be? No    Breast-feeding No   Social History Narrative    Not on file       Review of Systems   Constitutional: Negative for chills, fatigue and fever.   HENT: Negative for congestion, nosebleeds, rhinorrhea, sore throat and trouble swallowing.    Eyes: Negative for pain and visual disturbance.   Respiratory: Negative for cough, shortness of breath and wheezing.    Cardiovascular: Negative for chest pain and palpitations.   Gastrointestinal: Negative for abdominal distention, abdominal pain, constipation, diarrhea, nausea and vomiting.   Genitourinary: Negative for decreased urine volume, difficulty urinating, dysuria, hematuria and urgency.   Musculoskeletal: Positive for back pain. Negative for arthralgias and myalgias.   Skin: Negative for color change and rash.   Neurological: Positive for numbness. Negative for dizziness, tremors, weakness, light-headedness and headaches.   Psychiatric/Behavioral: Negative for agitation, behavioral problems and confusion. The patient is not nervous/anxious.      Objective:     Vitals:    01/23/20 1533   BP: 120/60   BP Location: Right arm   Patient Position: Sitting   BP Method: Large (Manual)   Pulse: 75   Temp: 98.7 °F (37.1 °C)   SpO2: 95%   Weight: 81.7 kg (180 lb 1.9 oz)   Height: 5' 3" (1.6 m)       Physical Exam   Constitutional: She appears well-developed and well-nourished.   HENT:   Head: Normocephalic and atraumatic.   Right Ear: External ear normal.   Left Ear: External ear normal.   Nose: Nose normal.   Mouth/Throat: Oropharynx is clear and moist.   Eyes: Pupils are equal, round, and reactive to light. Conjunctivae are normal.   Neck: Neck supple. No tracheal deviation present.   Cardiovascular: Normal rate, regular rhythm and normal heart sounds. Exam reveals no gallop and no friction rub. "   No murmur heard.  Pulmonary/Chest: Effort normal and breath sounds normal. No respiratory distress. She has no wheezes. She has no rales.   Abdominal: Soft. Bowel sounds are normal. She exhibits no distension. There is no tenderness. There is no rebound and no guarding.   Musculoskeletal: She exhibits no deformity.   Neurological: She is alert.   Normal sensation to fingers of left hand on monofilament testing.  Normal  strength bilaterally.  Phalen's maneuver negative bilaterally.   Skin: Skin is warm and dry.   Psychiatric: She has a normal mood and affect. Her behavior is normal.   Nursing note and vitals reviewed.     Assessment:      1. Numbness of fingers    2. Macrocytic anemia      Plan:   Jo-Ann was seen today for abnormal lab and numbness.    Diagnoses and all orders for this visit:    Numbness of fingers  -     Suspect peripheral radial nerve entrapment.  Start gabapentin (NEURONTIN) 100 MG capsule; Take 1 capsule (100 mg total) by mouth 3 (three) times daily.  To notify me if no improvement or for any worsening, consider EMG at that time.  Counseled patient on potential adverse effects of gabapentin; to notify me if any develop.    Macrocytic anemia  -     Folate; Future  -     CBC auto differential; Future 2/20/20 to monitor

## 2020-01-24 LAB — FOLATE SERPL-MCNC: 6 NG/ML (ref 4–24)

## 2020-01-31 ENCOUNTER — TELEPHONE (OUTPATIENT)
Dept: HOME HEALTH SERVICES | Facility: HOSPITAL | Age: 80
End: 2020-01-31

## 2020-02-07 ENCOUNTER — TELEPHONE (OUTPATIENT)
Dept: INTERNAL MEDICINE | Facility: CLINIC | Age: 80
End: 2020-02-07

## 2020-02-07 NOTE — TELEPHONE ENCOUNTER
----- Message from Christopher Vázquez sent at 2/7/2020  9:17 AM CST -----  Regarding: Digital Medicine Program  Dr. Dakota Kerr /     Your patient Mrs. Jo-Ann Escobedo is enrolled in the [facility] Digital Medicine program. Unfortunately, we have been unable to maintain contact with her to continue monitoring, despite our best efforts.     If you still believe this patient is a good candidate for digital medicine, please reach out to her to encourage participation. If going forward we are unable to communicate with her, we will unfortunately have to discharge her from the program.    Please let me know if you have any questions.    Sincerely,  Christopher Vázquez  [phone]

## 2020-02-18 ENCOUNTER — OFFICE VISIT (OUTPATIENT)
Dept: OPTOMETRY | Facility: CLINIC | Age: 80
End: 2020-02-18
Payer: MEDICARE

## 2020-02-18 DIAGNOSIS — Z13.5 SCREENING FOR GLAUCOMA: ICD-10-CM

## 2020-02-18 DIAGNOSIS — H43.392 VITREOUS FLOATERS, LEFT: Primary | ICD-10-CM

## 2020-02-18 DIAGNOSIS — H52.4 PRESBYOPIA: ICD-10-CM

## 2020-02-18 PROCEDURE — 99999 PR PBB SHADOW E&M-EST. PATIENT-LVL II: CPT | Mod: PBBFAC,,, | Performed by: OPTOMETRIST

## 2020-02-18 PROCEDURE — 99999 PR PBB SHADOW E&M-EST. PATIENT-LVL II: ICD-10-PCS | Mod: PBBFAC,,, | Performed by: OPTOMETRIST

## 2020-02-18 PROCEDURE — 92015 DETERMINE REFRACTIVE STATE: CPT | Mod: S$GLB,,, | Performed by: OPTOMETRIST

## 2020-02-18 PROCEDURE — 92014 COMPRE OPH EXAM EST PT 1/>: CPT | Mod: S$GLB,,, | Performed by: OPTOMETRIST

## 2020-02-18 PROCEDURE — 92014 PR EYE EXAM, EST PATIENT,COMPREHESV: ICD-10-PCS | Mod: S$GLB,,, | Performed by: OPTOMETRIST

## 2020-02-18 PROCEDURE — 92015 PR REFRACTION: ICD-10-PCS | Mod: S$GLB,,, | Performed by: OPTOMETRIST

## 2020-02-18 NOTE — PROGRESS NOTES
HPI     [facility]   Pt states she has been having floaters in her left eye since she had the   yag  In her left eye. Pt states she became ill in DEC and she had a whole   bunch of floaters in her [facility]. Also having trouble with small print and   threading a needle.  No flashes   systane gtts    Last edited by Ann Encarnacion MA on 2/18/2020  1:15 PM. (History)        ROS     Positive for: Eyes (cat surgery OU)    Negative for: Constitutional, Gastrointestinal, Neurological, Skin,   Genitourinary, Musculoskeletal, HENT, Endocrine, Cardiovascular,   Respiratory, Psychiatric, Allergic/Imm, Heme/Lymph    Last edited by Ariel Hinojosa, OD on 2/18/2020  1:24 PM. (History)        Assessment /Plan     For exam results, see Encounter Report.    Vitreous floaters, left    Screening for glaucoma    Presbyopia      1. Mild pco OD sp pciol OU/YAG OS--pt wishes new Rx  2. Vitreous floaters OS--discussed Signs/Symptoms of Retinal Detachment.  Pt to rtc immediately if increased Floaters/Flashes occur    PLAN:    rtc 1 yr, or immediately if inc F/F

## 2020-02-20 ENCOUNTER — LAB VISIT (OUTPATIENT)
Dept: LAB | Facility: HOSPITAL | Age: 80
End: 2020-02-20
Payer: MEDICARE

## 2020-02-20 DIAGNOSIS — D53.9 MACROCYTIC ANEMIA: ICD-10-CM

## 2020-02-20 LAB
BASOPHILS # BLD AUTO: 0.05 K/UL (ref 0–0.2)
BASOPHILS NFR BLD: 1.1 % (ref 0–1.9)
DIFFERENTIAL METHOD: ABNORMAL
EOSINOPHIL # BLD AUTO: 0.1 K/UL (ref 0–0.5)
EOSINOPHIL NFR BLD: 2.7 % (ref 0–8)
ERYTHROCYTE [DISTWIDTH] IN BLOOD BY AUTOMATED COUNT: 12.6 % (ref 11.5–14.5)
HCT VFR BLD AUTO: 40.2 % (ref 37–48.5)
HGB BLD-MCNC: 12.4 G/DL (ref 12–16)
IMM GRANULOCYTES # BLD AUTO: 0.02 K/UL (ref 0–0.04)
IMM GRANULOCYTES NFR BLD AUTO: 0.4 % (ref 0–0.5)
LYMPHOCYTES # BLD AUTO: 1.5 K/UL (ref 1–4.8)
LYMPHOCYTES NFR BLD: 31.1 % (ref 18–48)
MCH RBC QN AUTO: 30.5 PG (ref 27–31)
MCHC RBC AUTO-ENTMCNC: 30.8 G/DL (ref 32–36)
MCV RBC AUTO: 99 FL (ref 82–98)
MONOCYTES # BLD AUTO: 0.7 K/UL (ref 0.3–1)
MONOCYTES NFR BLD: 13.7 % (ref 4–15)
NEUTROPHILS # BLD AUTO: 2.4 K/UL (ref 1.8–7.7)
NEUTROPHILS NFR BLD: 51 % (ref 38–73)
NRBC BLD-RTO: 0 /100 WBC
PLATELET # BLD AUTO: 224 K/UL (ref 150–350)
PMV BLD AUTO: 10.8 FL (ref 9.2–12.9)
RBC # BLD AUTO: 4.06 M/UL (ref 4–5.4)
WBC # BLD AUTO: 4.73 K/UL (ref 3.9–12.7)

## 2020-02-20 PROCEDURE — 85025 COMPLETE CBC W/AUTO DIFF WBC: CPT

## 2020-02-20 PROCEDURE — 36415 COLL VENOUS BLD VENIPUNCTURE: CPT

## 2020-02-21 ENCOUNTER — HOSPITAL ENCOUNTER (OUTPATIENT)
Dept: RADIOLOGY | Facility: HOSPITAL | Age: 80
Discharge: HOME | End: 2020-02-21
Attending: INTERNAL MEDICINE
Payer: MEDICARE

## 2020-02-21 DIAGNOSIS — C50.411 MALIGNANT NEOPLASM OF UPPER-OUTER QUADRANT OF RIGHT BREAST IN FEMALE, ESTROGEN RECEPTOR POSITIVE: ICD-10-CM

## 2020-02-21 DIAGNOSIS — Z17.0 MALIGNANT NEOPLASM OF UPPER-OUTER QUADRANT OF RIGHT BREAST IN FEMALE, ESTROGEN RECEPTOR POSITIVE: ICD-10-CM

## 2020-02-21 PROCEDURE — 77061 BREAST TOMOSYNTHESIS UNI: CPT | Mod: TC,PO

## 2020-02-21 PROCEDURE — 77065 MAMMO DIGITAL DIAGNOSTIC RIGHT WITH TOMOSYNTHESIS_CAD: ICD-10-PCS | Mod: 26,,, | Performed by: RADIOLOGY

## 2020-02-21 PROCEDURE — 77061 MAMMO DIGITAL DIAGNOSTIC RIGHT WITH TOMOSYNTHESIS_CAD: ICD-10-PCS | Mod: 26,,, | Performed by: RADIOLOGY

## 2020-02-21 PROCEDURE — 77065 DX MAMMO INCL CAD UNI: CPT | Mod: TC,PO

## 2020-02-21 PROCEDURE — 77065 DX MAMMO INCL CAD UNI: CPT | Mod: 26,,, | Performed by: RADIOLOGY

## 2020-02-21 PROCEDURE — 77061 BREAST TOMOSYNTHESIS UNI: CPT | Mod: 26,,, | Performed by: RADIOLOGY

## 2020-03-06 ENCOUNTER — PATIENT MESSAGE (OUTPATIENT)
Dept: INTERNAL MEDICINE | Facility: CLINIC | Age: 80
End: 2020-03-06

## 2020-03-06 RX ORDER — LEVOTHYROXINE SODIUM 125 UG/1
125 TABLET ORAL
Qty: 30 TABLET | Refills: 11 | Status: SHIPPED | OUTPATIENT
Start: 2020-03-06 | End: 2021-02-02

## 2020-03-06 NOTE — PROGRESS NOTES
Digital Medicine: Health  Follow-Up    The history is provided by the patient. No  was used.     Follow Up  Patient reported that she had a gastro issue back in December that caused her to be admitted into the hospital. She stated that she feels about 99% recovered.    Patient reported that she thinks her blood pressure is higher in the morning than in the afternoon. Asked the patient when she has taking her blood pressure medication and she reported that she was taking the medication in the afternoon. She stated that she takes her blood pressure in the morning or in the afternoon time.     Reviewed proper blood pressure technique with the patient and the patient reported that she was not waiting the one hour after taking medication to take her readings and then 30 minutes after eating, drinking caffeine, or exercising. Educated the patient on proper blood pressure technique.    Will follow up with the patient in a few weeks.      Intervention/Plan    There are no preventive care reminders to display for this patient.    Last 5 Patient Entered Readings                                      Current 30 Day Average: 144/75     Recent Readings 3/4/2020 3/3/2020 3/2/2020 3/1/2020 2/28/2020    SBP (mmHg) 144 131 139 139 144    DBP (mmHg) 74 67 79 80 73    Pulse 82 86 81 78 76             Screenings    SDOH

## 2020-03-10 ENCOUNTER — OFFICE VISIT (OUTPATIENT)
Dept: HEMATOLOGY/ONCOLOGY | Facility: CLINIC | Age: 80
End: 2020-03-10
Payer: MEDICARE

## 2020-03-10 VITALS
HEIGHT: 63 IN | DIASTOLIC BLOOD PRESSURE: 72 MMHG | TEMPERATURE: 98 F | BODY MASS INDEX: 32.73 KG/M2 | RESPIRATION RATE: 18 BRPM | SYSTOLIC BLOOD PRESSURE: 152 MMHG | WEIGHT: 184.75 LBS | OXYGEN SATURATION: 93 % | HEART RATE: 81 BPM

## 2020-03-10 DIAGNOSIS — D05.11 DUCTAL CARCINOMA IN SITU (DCIS) OF RIGHT BREAST: Primary | ICD-10-CM

## 2020-03-10 PROCEDURE — 99213 PR OFFICE/OUTPT VISIT, EST, LEVL III, 20-29 MIN: ICD-10-PCS | Mod: S$GLB,,, | Performed by: INTERNAL MEDICINE

## 2020-03-10 PROCEDURE — 3078F PR MOST RECENT DIASTOLIC BLOOD PRESSURE < 80 MM HG: ICD-10-PCS | Mod: CPTII,S$GLB,, | Performed by: INTERNAL MEDICINE

## 2020-03-10 PROCEDURE — 3078F DIAST BP <80 MM HG: CPT | Mod: CPTII,S$GLB,, | Performed by: INTERNAL MEDICINE

## 2020-03-10 PROCEDURE — 99999 PR PBB SHADOW E&M-EST. PATIENT-LVL III: ICD-10-PCS | Mod: PBBFAC,,, | Performed by: INTERNAL MEDICINE

## 2020-03-10 PROCEDURE — 1100F PTFALLS ASSESS-DOCD GE2>/YR: CPT | Mod: CPTII,S$GLB,, | Performed by: INTERNAL MEDICINE

## 2020-03-10 PROCEDURE — 3288F PR FALLS RISK ASSESSMENT DOCUMENTED: ICD-10-PCS | Mod: CPTII,S$GLB,, | Performed by: INTERNAL MEDICINE

## 2020-03-10 PROCEDURE — 1100F PR PT FALLS ASSESS DOC 2+ FALLS/FALL W/INJURY/YR: ICD-10-PCS | Mod: CPTII,S$GLB,, | Performed by: INTERNAL MEDICINE

## 2020-03-10 PROCEDURE — 1126F AMNT PAIN NOTED NONE PRSNT: CPT | Mod: S$GLB,,, | Performed by: INTERNAL MEDICINE

## 2020-03-10 PROCEDURE — 1159F PR MEDICATION LIST DOCUMENTED IN MEDICAL RECORD: ICD-10-PCS | Mod: S$GLB,,, | Performed by: INTERNAL MEDICINE

## 2020-03-10 PROCEDURE — 99999 PR PBB SHADOW E&M-EST. PATIENT-LVL III: CPT | Mod: PBBFAC,,, | Performed by: INTERNAL MEDICINE

## 2020-03-10 PROCEDURE — 1159F MED LIST DOCD IN RCRD: CPT | Mod: S$GLB,,, | Performed by: INTERNAL MEDICINE

## 2020-03-10 PROCEDURE — 1126F PR PAIN SEVERITY QUANTIFIED, NO PAIN PRESENT: ICD-10-PCS | Mod: S$GLB,,, | Performed by: INTERNAL MEDICINE

## 2020-03-10 PROCEDURE — 3077F PR MOST RECENT SYSTOLIC BLOOD PRESSURE >= 140 MM HG: ICD-10-PCS | Mod: CPTII,S$GLB,, | Performed by: INTERNAL MEDICINE

## 2020-03-10 PROCEDURE — 3288F FALL RISK ASSESSMENT DOCD: CPT | Mod: CPTII,S$GLB,, | Performed by: INTERNAL MEDICINE

## 2020-03-10 PROCEDURE — 3077F SYST BP >= 140 MM HG: CPT | Mod: CPTII,S$GLB,, | Performed by: INTERNAL MEDICINE

## 2020-03-10 PROCEDURE — 99213 OFFICE O/P EST LOW 20 MIN: CPT | Mod: S$GLB,,, | Performed by: INTERNAL MEDICINE

## 2020-03-10 NOTE — PROGRESS NOTES
Subjective:       Patient ID: Jo-Ann Escobedo is a 79 y.o. female.    Chief Complaint: No chief complaint on file.    HPI   Ms. Escobedo presents today for follow up.  Briefly, she is a 79 year-old female with a remote history of breast cancer treated 17 years ago with a left modified radical   mastectomy.  Her tumor was a T2 N1 M0 carcinoma.  She was treated with four cycles of AC and 4 cycles of Taxotere in the adjuvant setting and has remained cancer free since then.   A recent mammogram led to a contralateral biopsy and eventually to a lumpectomy on June 26th, 2019, for an area of DCIS, measuring 12mm.  Resection margins were clear, with the closest being 14 mm.  Her tumor was ER positive and VA negative.   She met with the radiation oncologist and decided against XRT.    A recent mammogram on 02/20/2020 was read as BI-RADS 2 and a follow-up in June was recommended.    Review of Systems  Overall she feels well and she has no complaints.   Her ECOG PS remains 0.   She denies any depression, easy bruising, fevers, chills, night sweats, weight loss, nausea, vomiting, diarrhea, constipation, diplopia, blurred vision headache, chest pain, abdominal pain, or difficulty ambulating. All other systems are negative.     Objective:      Physical Exam  GENERAL: She is alert, oriented to time, place, person; well nourished;   pleasant; in no acute physical distress.    VITAL SIGNS: Reviewed.   HEENT: Normal. There are no nasal, oral, lip, gingival, auricular, lid,   or conjunctival lesions. Mucosae are moist and pink, and there is no   thrush. Pupils are equal, reactive to light and accommodation.   Extraocular muscle movements are intact.   NECK: Supple without JVD, thyromegaly, or adenopathy.   LUNGS: Clear to auscultation without wheezing, rales, or rhonchi.   CARDIOVASCULAR: Reveals an S1, S2, no murmurs, no rubs, no gallops.   BREASTS: She is status post left mastectomy with no evidence of chest wall   recurrence. There are  no masses in the right breast.   There is a scar form her recent lumpectomy in the upper outer quadrant of the right breast.    ABDOMEN: Soft, nontender without organomegaly. Bowel sounds are identified.   EXTREMITIES: Have no cyanosis, clubbing, or edema.   SKIN: Does not have petechiae, rashes, induration, or ecchymoses.   NEUROLOGIC: Motor function is 5/5, DTRs are 0-1+ bilaterally, symmetrical,   and cranial nerves within normal limits.   LYMPHATIC: There is no cervical, axillary, or supraclavicular adenopathy.    Assessment:       1. Remote history of breast cancer.    2.     Recent diagnosis of contralateral DCIS, now s/p lumpectomy, doing well.   Plan:        I had along discussion with Mrs Escobedo.   She is doing well and remains HUGO.  I have asked her to return in June with her annual right sided mammogram.  Her multiple questions were answered to her satisfaction.

## 2020-03-11 NOTE — PROGRESS NOTES
SUBJECTIVE:  Ms. Escobedo returns today for followup of her right posttraumatic   subtalar arthritis.  I gave her an injection in her last visit three months ago   and it was very difficult getting the fluid into the joint.  I made a comment   that we might consider not doing any further injections, but she reports she had   good relief with the last shot.  She still does not want to schedule surgery   and she would like to get another injection today.  Her examination reveals   continued tenderness and decreased motion of the sinus tarsi area of her hind   foot.  There is no significant swelling today.  After verbal consent and sterile   prep, I injected her right subtalar joint with 2 mL of lidocaine and 80 mg of   Depo-Medrol.  I was able to inject the medication without any difficulty today.    I am going to have her make a return appointment in three months.      ARIELLE  dd: 02/09/2018 14:29:37 (CST)  td: 02/10/2018 17:14:40 (CST)  Doc ID   #[id number]  Job ID #[id number]    CC:    no

## 2020-03-24 ENCOUNTER — PATIENT OUTREACH (OUTPATIENT)
Dept: OTHER | Facility: OTHER | Age: 80
End: 2020-03-24

## 2020-04-03 ENCOUNTER — PATIENT MESSAGE (OUTPATIENT)
Dept: INTERNAL MEDICINE | Facility: CLINIC | Age: 80
End: 2020-04-03

## 2020-04-03 DIAGNOSIS — I10 ESSENTIAL HYPERTENSION: ICD-10-CM

## 2020-04-03 RX ORDER — LOSARTAN POTASSIUM 50 MG/1
TABLET ORAL
Qty: 90 TABLET | Refills: 3 | Status: SHIPPED | OUTPATIENT
Start: 2020-04-03 | End: 2021-03-02

## 2020-04-14 DIAGNOSIS — E78.5 HYPERLIPIDEMIA, UNSPECIFIED HYPERLIPIDEMIA TYPE: ICD-10-CM

## 2020-04-14 RX ORDER — FENOFIBRATE 160 MG/1
160 TABLET ORAL DAILY
Qty: 90 TABLET | Refills: 3 | Status: SHIPPED | OUTPATIENT
Start: 2020-04-14 | End: 2021-04-08

## 2020-04-30 ENCOUNTER — LAB VISIT (OUTPATIENT)
Dept: LAB | Facility: HOSPITAL | Age: 80
End: 2020-04-30
Attending: FAMILY MEDICINE
Payer: MEDICARE

## 2020-04-30 DIAGNOSIS — R73.03 PREDIABETES: ICD-10-CM

## 2020-04-30 LAB
ESTIMATED AVG GLUCOSE: 128 MG/DL (ref 68–131)
HBA1C MFR BLD HPLC: 6.1 % (ref 4–5.6)

## 2020-04-30 PROCEDURE — 36415 COLL VENOUS BLD VENIPUNCTURE: CPT

## 2020-04-30 PROCEDURE — 83036 HEMOGLOBIN GLYCOSYLATED A1C: CPT

## 2020-05-01 ENCOUNTER — OFFICE VISIT (OUTPATIENT)
Dept: INTERNAL MEDICINE | Facility: CLINIC | Age: 80
End: 2020-05-01
Payer: MEDICARE

## 2020-05-01 DIAGNOSIS — Z86.2 HISTORY OF ANEMIA: ICD-10-CM

## 2020-05-01 DIAGNOSIS — M79.642 BILATERAL HAND PAIN: ICD-10-CM

## 2020-05-01 DIAGNOSIS — R73.03 PREDIABETES: ICD-10-CM

## 2020-05-01 DIAGNOSIS — R20.0 NUMBNESS IN BOTH HANDS: ICD-10-CM

## 2020-05-01 DIAGNOSIS — Z82.61 FAMILY HISTORY OF RHEUMATOID ARTHRITIS: ICD-10-CM

## 2020-05-01 DIAGNOSIS — M79.641 BILATERAL HAND PAIN: ICD-10-CM

## 2020-05-01 DIAGNOSIS — M25.649 STIFFNESS OF HAND JOINT, UNSPECIFIED LATERALITY: ICD-10-CM

## 2020-05-01 PROCEDURE — 3288F FALL RISK ASSESSMENT DOCD: CPT | Mod: CPTII,95,, | Performed by: FAMILY MEDICINE

## 2020-05-01 PROCEDURE — 3288F PR FALLS RISK ASSESSMENT DOCUMENTED: ICD-10-PCS | Mod: CPTII,95,, | Performed by: FAMILY MEDICINE

## 2020-05-01 PROCEDURE — 99441 PR PHYSICIAN TELEPHONE EVALUATION 5-10 MIN: CPT | Mod: 95,,, | Performed by: FAMILY MEDICINE

## 2020-05-01 PROCEDURE — 1159F PR MEDICATION LIST DOCUMENTED IN MEDICAL RECORD: ICD-10-PCS | Mod: 95,,, | Performed by: FAMILY MEDICINE

## 2020-05-01 PROCEDURE — 1100F PR PT FALLS ASSESS DOC 2+ FALLS/FALL W/INJURY/YR: ICD-10-PCS | Mod: CPTII,95,, | Performed by: FAMILY MEDICINE

## 2020-05-01 PROCEDURE — 99441 PR PHYSICIAN TELEPHONE EVALUATION 5-10 MIN: ICD-10-PCS | Mod: 95,,, | Performed by: FAMILY MEDICINE

## 2020-05-01 PROCEDURE — 1159F MED LIST DOCD IN RCRD: CPT | Mod: 95,,, | Performed by: FAMILY MEDICINE

## 2020-05-01 PROCEDURE — 1100F PTFALLS ASSESS-DOCD GE2>/YR: CPT | Mod: CPTII,95,, | Performed by: FAMILY MEDICINE

## 2020-05-01 RX ORDER — MELOXICAM 7.5 MG/1
7.5 TABLET ORAL DAILY
Status: CANCELLED | OUTPATIENT
Start: 2020-05-01

## 2020-05-01 NOTE — PROGRESS NOTES
Established Patient - Audio Only Telehealth Visit     The patient location is: home  The chief complaint leading to consultation is: follow up on hand pain and anemia  Visit type: Virtual visit with audio only (telephone)     The reason for the audio only service rather than synchronous audio and video virtual visit was related to technical difficulties or patient preference/necessity.     Each patient to whom I provide medical services by telemedicine is:  (1) informed of the relationship between the physician and patient and the respective role of any other health care provider with respect to management of the patient; and (2) notified that they may decline to receive medical services by telemedicine and may withdraw from such care at any time. Patient verbally consented to receive this service via voice-only telephone call.       HPI: Jo-Ann Escobedo is a 79 year old female with arthritis, history of CKD, DJD lumbar spine, history of breast cancer s/p left mastectomy, hyperlipidemia, hypertension, hypothyroidism, lipoma, multinodular goiter, obesity, Paget's disease of bone, and prediabetes who presents for an audio only visit for follow up.    States she wanted to follow up on her anemia and her hand stiffness/soreness.    States gabapentin makes her dizzy so she discontinued this.  States her symptoms now are a combination of numbness, soreness, stiffness, and difficulty making a fist bilaterally.  When trying to make fist on her right finger she states her right 4th digit locked up.  States the stiffness and pain limits her ability to make a fist.  Pain described as a consistent pain, very painful in the joints.  Endorses history of chronic arthritis.  Denies any noticeable deformity of her hands.  Brother has RA.  Has difficulty threading a needle and fine dexterous movements.  States she has noticed stiffness in her toes as well but not nearly severe as her hands.  States she has some arthirits in her lower  spine/rear end as well.  States she will take a couple of Tylenol when the pain is severe--with some relief for about 24 hours.  Numbness now affected bilateral hands, can alternate.  Numbness localized to hands, stops at the rest.  States this all happened during her hospital stay during December.  Previously had significant bleeding and anemia when prescribed diclofenac in the past.    A1c up to 6.1% yesterday for prediabetes.    ROS:    Const:  Negative for fevers/chills.  Cardio:  Negative for chest pain, palpitations.  Resp:  Negative for SOB, cough, wheezing.  ABD:  Negative for abdominal pain, nausea, vomiting.  MSK:  Positive for arthralgias, joint stiffness.  Neuro:  Positive for numbness.    Physical exam:  Unable to assess as visit was audio only     Assessment and plan:     1.  Bilateral hand pain; stiffness of hand joint; numbness in both hands; family history of Rheumatoid arthritis         -     Check ESR, CRP, STEPHEN, anti-CCP, RF, CBC.  Recommended she continue Tylenol PRN, would consider meloxicam but patient has significant GI bleed and resultant anemia with diclofenac in the past so will avoid NSAIDs.  Recommended ice packs/cold compresses, avoidance of exacerbating activities.  EMG ordered as well.  Differential including OA vs. CTS vs. Cervical radiculopathy    2.  History of anemia        -     CBC ordered; H/H normal on most recent CBC    3.  Prediabetes        -     Hemoglobin A1c in 3 months; counseled patient on the importance of diabetic style diet and lifestyle modifications                     This service was not originating from a related E/M service provided within the previous 7 days nor will  to an E/M service or procedure within the next 24 hours or my soonest available appointment.  Prevailing standard of care was able to be met in this audio-only visit.

## 2020-05-12 ENCOUNTER — PROCEDURE VISIT (OUTPATIENT)
Dept: PHYSICAL MEDICINE AND REHAB | Facility: CLINIC | Age: 80
End: 2020-05-12
Payer: MEDICARE

## 2020-05-12 DIAGNOSIS — R20.0 NUMBNESS IN BOTH HANDS: ICD-10-CM

## 2020-05-12 PROCEDURE — 95910 PR NERVE CONDUCTION STUDY; 7-8 STUDIES: ICD-10-PCS | Mod: S$GLB,,, | Performed by: PHYSICAL MEDICINE & REHABILITATION

## 2020-05-12 PROCEDURE — 95910 NRV CNDJ TEST 7-8 STUDIES: CPT | Mod: S$GLB,,, | Performed by: PHYSICAL MEDICINE & REHABILITATION

## 2020-05-12 PROCEDURE — 95886 PR EMG COMPLETE, W/ NERVE CONDUCTION STUDIES, 5+ MUSCLES: ICD-10-PCS | Mod: S$GLB,,, | Performed by: PHYSICAL MEDICINE & REHABILITATION

## 2020-05-12 PROCEDURE — 95886 MUSC TEST DONE W/N TEST COMP: CPT | Mod: S$GLB,,, | Performed by: PHYSICAL MEDICINE & REHABILITATION

## 2020-05-12 NOTE — PROCEDURES
Test Date:  2020    Patient: Jo-Ann Escobedo : 1940 Physician: Gregorio Mittal D.O.   ID#:  SEX: Female Ref. Phys: Dakota Kerr MD     HPI: Jo-Ann Escobedo is a 79 y.o.female who presents for NCS/EMG to evaluate bilateral hand numbness    NCV & EMG Findings:   Evaluation of the left median motor nerve showed prolonged distal onset latency and reduced amplitude.   The right median motor nerve showed prolonged distal onset latency.   The left median sensory and the right median sensory nerves showed no response.   All remaining nerves (as indicated in the following tables) were within normal limits.   Needle evaluation of the left Triceps Brachii muscle showed increased insertional activity, slightly increased spontaneous activity, increased motor unit amplitude, increased motor unit duration, and diminished recruitment.   The left Pronator Teres muscle showed increased insertional activity, slightly increased spontaneous activity, increased motor unit amplitude, and diminished recruitment.   The left Abductor Pollicis Brevis muscle showed increased insertional activity, moderately increased spontaneous activity, increased motor unit amplitude, and diminished recruitment.   The left Extensor Indicis Proprius muscle showed increased motor unit amplitude and diminished recruitment.   All remaining muscles (as indicated in the following table) showed no evidence of electrical instability.    Impression:  1. There is electrophysiologic evidence of a bilateral sensorimotor median mononeuropathy across the wrist (I.e. Carpal tunnel syndrome).  There is no motor axonal loss.  There is a conduction block across the left wrist of about 50%.  There is also active denervation on the left.  This is graded as Severe in severity on the left and moderate on the right  2. There is also evidence of a chronic left C7 radiculopathy with active/ongoing denervation     ___________________________  Gregorio  ARANZA Mittal        NCS+  Motor Nerve Results      Latency Amplitude F-Lat Segment Distance CV Comment   Site (ms) Norm (mV) Norm (ms)  (cm) (m/s) Norm    Left Median (APB)   Palm 1.93 - 6.1 -         Wrist *5.3  < 4.4 *3.0  > 3.8  Wrist-Palm 8 24 -    Elbow 8.2 - 2.7 -  Elbow-Wrist 21 72  > 51    Right Median (APB)   Wrist *5.3  < 4.4 4.4  > 3.8  Wrist-Palm - - -    Elbow 9.6 - 4.2 -  Elbow-Wrist 22 51  > 51    Left Ulnar (ADM)   Wrist 2.8  < 3.7 5.5  > 3.0         Bel Elbow 6.1 - 4.3 -  Bel Elbow-Wrist 20 61  > 52    Abv Elbow 8.0 - 5.6 -  Abv Elbow-Bel Elbow 14 74  > 43    Right Ulnar (ADM)   Wrist 2.2  < 3.7 3.8  > 3.0         Bel Elbow 5.7 - 3.1 -  Bel Elbow-Wrist 23 66  > 52    Abv Elbow 7.6 - 3.5 -  Abv Elbow-Bel Elbow 14 74  > 43      Sensory Nerve Results      Latency (Peak) Amplitude (P-P) Segment Distance CV Comment   Site (ms) Norm (µV) Norm  (cm) (m/s) Norm    Left Median (Stim:Wrist)   Dig II *NR  < 4.0 *NR  > 8 Wrist-Dig II 14 *NR  > 39    Right Median (Stim:Wrist)   Dig II *NR  < 4.0 *NR  > 8 Wrist-Dig II 14 *NR  > 39    Left Ulnar (Stim:Wrist)   Dig V 3.2  < 4.0 45  > 4 Wrist-Dig V 14 44  > 38    Right Ulnar (Stim:Wrist)   Dig V 3.0  < 4.0 19  > 4 Wrist-Dig V 14 47  > 38      EMG+     Side Muscle Nerve Root Ins Act Fibs Psw Amp Dur Poly Recrt Int Pat Comment   Right Biceps Musculocut C5-C6 Nml Nml Nml Nml Nml 0 Nml Nml    Left Biceps Musculocut C5-C6 Nml Nml Nml Nml Nml 0 Nml Nml    Left Triceps Radial C6-C8 *Incr *1+ *1+ *Incr *>12ms 0 *Reduced Nml    Left Pronator Teres Median C6-C7 *Incr *1+ *1+ *Incr Nml 0 *Reduced Nml    Left Brachiorad Radial C5-C6 Nml Nml Nml Nml Nml 0 Nml Nml    Left FDI Ulnar C8-T1 Nml Nml Nml Nml Nml 0 Nml Nml    Left APB Median C8-T1 *Incr *2+ *2+ *Incr Nml 0 *Reduced Nml    Left Cervical Parasp (Mid) Rami C4-C6 Nml Nml Nml         Left Cervical Parasp (Lower) Rami C7-C8 Nml Nml Nml         Left EIP Post Interosseous,  R... C7-C8 Nml Nml Nml *Incr Nml 0 *Reduced Nml     Right Triceps Radial C6-C8 Nml Nml Nml Nml Nml 0 Nml Nml    Right Brachiorad Radial C5-C6 Nml Nml Nml Nml Nml 0 Nml Nml    Right FDI Ulnar C8-T1 Nml Nml Nml Nml Nml 0 Nml Nml    Right APB Median C8-T1 Nml Nml Nml Nml Nml 0 Nml Nml            Waveforms:    Motor              Sensory

## 2020-05-13 ENCOUNTER — TELEPHONE (OUTPATIENT)
Dept: ORTHOPEDICS | Facility: CLINIC | Age: 80
End: 2020-05-13

## 2020-05-13 ENCOUNTER — TELEPHONE (OUTPATIENT)
Dept: INTERNAL MEDICINE | Facility: CLINIC | Age: 80
End: 2020-05-13

## 2020-05-13 DIAGNOSIS — G56.03 BILATERAL CARPAL TUNNEL SYNDROME: Primary | ICD-10-CM

## 2020-05-13 DIAGNOSIS — G56.03 BILATERAL CARPAL TUNNEL SYNDROME: ICD-10-CM

## 2020-05-13 DIAGNOSIS — M54.12 CERVICAL RADICULOPATHY AT C7: ICD-10-CM

## 2020-05-15 ENCOUNTER — TELEPHONE (OUTPATIENT)
Dept: ORTHOPEDICS | Facility: CLINIC | Age: 80
End: 2020-05-15

## 2020-05-15 NOTE — TELEPHONE ENCOUNTER
Left a voicemail for patient to return my phone call in regards to upcoming appointment/x-ray at their convenience. Provided the clinic's phone number.

## 2020-05-18 ENCOUNTER — OFFICE VISIT (OUTPATIENT)
Dept: ORTHOPEDICS | Facility: CLINIC | Age: 80
End: 2020-05-18
Payer: MEDICARE

## 2020-05-18 ENCOUNTER — HOSPITAL ENCOUNTER (OUTPATIENT)
Dept: RADIOLOGY | Facility: OTHER | Age: 80
Discharge: HOME | End: 2020-05-18
Attending: PHYSICIAN ASSISTANT
Payer: MEDICARE

## 2020-05-18 VITALS
SYSTOLIC BLOOD PRESSURE: 123 MMHG | HEIGHT: 63 IN | BODY MASS INDEX: 32.6 KG/M2 | HEART RATE: 73 BPM | WEIGHT: 184 LBS | DIASTOLIC BLOOD PRESSURE: 77 MMHG

## 2020-05-18 DIAGNOSIS — G56.03 BILATERAL CARPAL TUNNEL SYNDROME: ICD-10-CM

## 2020-05-18 DIAGNOSIS — G56.03 BILATERAL CARPAL TUNNEL SYNDROME: Primary | ICD-10-CM

## 2020-05-18 PROCEDURE — 3288F PR FALLS RISK ASSESSMENT DOCUMENTED: ICD-10-PCS | Mod: CPTII,S$GLB,, | Performed by: PHYSICIAN ASSISTANT

## 2020-05-18 PROCEDURE — 3074F SYST BP LT 130 MM HG: CPT | Mod: CPTII,S$GLB,, | Performed by: PHYSICIAN ASSISTANT

## 2020-05-18 PROCEDURE — 99999 PR PBB SHADOW E&M-EST. PATIENT-LVL III: ICD-10-PCS | Mod: PBBFAC,,, | Performed by: PHYSICIAN ASSISTANT

## 2020-05-18 PROCEDURE — 73130 X-RAY EXAM OF HAND: CPT | Mod: TC,FY,RT

## 2020-05-18 PROCEDURE — 3078F PR MOST RECENT DIASTOLIC BLOOD PRESSURE < 80 MM HG: ICD-10-PCS | Mod: CPTII,S$GLB,, | Performed by: PHYSICIAN ASSISTANT

## 2020-05-18 PROCEDURE — 73130 X-RAY EXAM OF HAND: CPT | Mod: TC,FY,LT

## 2020-05-18 PROCEDURE — 1159F PR MEDICATION LIST DOCUMENTED IN MEDICAL RECORD: ICD-10-PCS | Mod: S$GLB,,, | Performed by: PHYSICIAN ASSISTANT

## 2020-05-18 PROCEDURE — 3078F DIAST BP <80 MM HG: CPT | Mod: CPTII,S$GLB,, | Performed by: PHYSICIAN ASSISTANT

## 2020-05-18 PROCEDURE — 1159F MED LIST DOCD IN RCRD: CPT | Mod: S$GLB,,, | Performed by: PHYSICIAN ASSISTANT

## 2020-05-18 PROCEDURE — 73130 X-RAY EXAM OF HAND: CPT | Mod: 26,RT,, | Performed by: INTERNAL MEDICINE

## 2020-05-18 PROCEDURE — 73130 XR HAND COMPLETE 3 VIEW LEFT: ICD-10-PCS | Mod: 26,LT,, | Performed by: INTERNAL MEDICINE

## 2020-05-18 PROCEDURE — 1100F PR PT FALLS ASSESS DOC 2+ FALLS/FALL W/INJURY/YR: ICD-10-PCS | Mod: CPTII,S$GLB,, | Performed by: PHYSICIAN ASSISTANT

## 2020-05-18 PROCEDURE — 3288F FALL RISK ASSESSMENT DOCD: CPT | Mod: CPTII,S$GLB,, | Performed by: PHYSICIAN ASSISTANT

## 2020-05-18 PROCEDURE — 1100F PTFALLS ASSESS-DOCD GE2>/YR: CPT | Mod: CPTII,S$GLB,, | Performed by: PHYSICIAN ASSISTANT

## 2020-05-18 PROCEDURE — 73130 X-RAY EXAM OF HAND: CPT | Mod: 26,LT,, | Performed by: INTERNAL MEDICINE

## 2020-05-18 PROCEDURE — 99999 PR PBB SHADOW E&M-EST. PATIENT-LVL III: CPT | Mod: PBBFAC,,, | Performed by: PHYSICIAN ASSISTANT

## 2020-05-18 PROCEDURE — 1125F AMNT PAIN NOTED PAIN PRSNT: CPT | Mod: S$GLB,,, | Performed by: PHYSICIAN ASSISTANT

## 2020-05-18 PROCEDURE — 1125F PR PAIN SEVERITY QUANTIFIED, PAIN PRESENT: ICD-10-PCS | Mod: S$GLB,,, | Performed by: PHYSICIAN ASSISTANT

## 2020-05-18 PROCEDURE — 3074F PR MOST RECENT SYSTOLIC BLOOD PRESSURE < 130 MM HG: ICD-10-PCS | Mod: CPTII,S$GLB,, | Performed by: PHYSICIAN ASSISTANT

## 2020-05-18 PROCEDURE — 99204 PR OFFICE/OUTPT VISIT, NEW, LEVL IV, 45-59 MIN: ICD-10-PCS | Mod: S$GLB,,, | Performed by: PHYSICIAN ASSISTANT

## 2020-05-18 PROCEDURE — 99204 OFFICE O/P NEW MOD 45 MIN: CPT | Mod: S$GLB,,, | Performed by: PHYSICIAN ASSISTANT

## 2020-05-18 NOTE — PROGRESS NOTES
Subjective:      Patient ID: Jo-Ann Escobedo is a 79 y.o. female.    Chief Complaint: Pain, Numbness, and Swelling of the Right Hand and Pain, Numbness, and Swelling of the Left Hand      HPI  Jo-Ann Escobedo is a 79 y.o. female presenting today for bilateral carpal tunnel. She reports onset about 5-6 mos ago, reports onset during a hospitalization. She reports she has constant numbness in bl hands, all fingers but less significant in the small fingers. She has associated pain. Symptoms do not awaken her. She has not tried bracing or injections. She reports occasional difficulty using the hands, such as for sewing. In addition, pt reports pain and locking of the right ring finger. Symptoms are about the same severity in both hands. She has recent EMG with severe left and moderate right carpal tunnel.     Review of patient's allergies indicates:   Allergen Reactions    Voltaren [diclofenac sodium] Other (See Comments)     Hematochezia and hospitalization for hematochezia.    Codeine Diarrhea and Hallucinations    Niacin preparations      Redness, warming         Current Outpatient Medications   Medication Sig Dispense Refill    acetaminophen (TYLENOL) 325 MG tablet Take 2 tablets (650 mg total) by mouth every 6 (six) hours as needed. 120 tablet 1    cholecalciferol, vitamin D3, (VITAMIN D3) 2,000 unit Tab Take 1 tablet by mouth once daily.      co-enzyme Q-10 30 mg capsule Take 30 mg by mouth once daily.       cyanocobalamin 1,000 mcg/mL injection Inject 1 mL (1,000 mcg total) into the muscle every 30 days. 10 mL 1    fenofibrate 160 MG Tab TAKE 1 TABLET (160 MG TOTAL) BY MOUTH ONCE DAILY. 90 tablet 3    gabapentin (NEURONTIN) 100 MG capsule Take 1 capsule (100 mg total) by mouth 3 (three) times daily. 90 capsule 11    hydrocortisone 1 % cream Apply topically 3 (three) times daily. 14.2 g 0    LACTOBACILLUS COMBINATION NO.8 (ADULT PROBIOTIC ORAL) Take by mouth once daily.       levothyroxine (SYNTHROID) 125  MCG tablet Take 1 tablet (125 mcg total) by mouth before breakfast. 30 tablet 11    losartan (COZAAR) 50 MG tablet TAKE 1 TABLET BY MOUTH EVERY DAY 90 tablet 3    meclizine (ANTIVERT) 12.5 mg tablet Take 1 tablet (12.5 mg total) by mouth 3 (three) times daily as needed for Dizziness. 30 tablet 2    turmeric root extract 500 mg Cap Take by mouth once daily.        No current facility-administered medications for this visit.        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    Squamous Cell Carcinoma     in situ right neck        Past Surgical History:   Procedure Laterality Date    BREAST BIOPSY Right 2019    BREAST LUMPECTOMY Right 2019    CATARACT EXTRACTION W/  INTRAOCULAR LENS IMPLANT Bilateral     COLONOSCOPY N/A 4/15/2019    Procedure: COLONOSCOPY;  Surgeon: Artur Monet MD;  Location: [facility] ([address] [place] [address]);  Service: Endoscopy;  Laterality: N/A;  within 1 month    COLONOSCOPY N/A 12/9/2019    Procedure:  "COLONOSCOPY;  Surgeon: Clyde Welch MD;  [facility]  Service: Endoscopy;  Laterality: N/A;    ENDOSCOPIC ULTRASOUND OF UPPER GASTROINTESTINAL TRACT N/A 1/22/2020    Procedure: ULTRASOUND, UPPER GI TRACT, ENDOSCOPIC;  Surgeon: Eleazar Shaffer MD;  Location: [facility] ENDO (2ND FLR);  Service: Endoscopy;  Laterality: N/A;  EUS for 10mm SML w/negative pillow sign and negative naked fat sign which was found at the incisura.  Dr Welch    ESOPHAGOGASTRODUODENOSCOPY N/A 12/7/2019    Procedure: EGD (ESOPHAGOGASTRODUODENOSCOPY);  Surgeon: Clyde Welch MD;  [facility]  Service: Endoscopy;  Laterality: N/A;    EXCISIONAL BIOPSY Right 6/27/2019    Procedure: EXCISIONAL BIOPSY-breast;  Surgeon: Suki Dill MD;  Location: [facility] OR 2ND FLR;  Service: General;  Laterality: Right;    EYE SURGERY      HYSTERECTOMY      INJECTION OF ANESTHETIC AGENT AROUND MEDIAL BRANCH NERVES INNERVATING LUMBAR FACET JOINT Bilateral 6/7/2019    Procedure: BLOCK, NERVE, FACET JOINT, LUMBAR, MEDIAL BRANCH-deo MBB L4/5,L5/S1;  Surgeon: Jarvis Morales III, MD;  Location: [facility] CATH LAB;  Service: Pain Management;  Laterality: Bilateral;    KNEE ARTHROSCOPY N/A     pt unsure of which knee     MASTECTOMY      OOPHORECTOMY      SPINE SURGERY      TOTAL THYROIDECTOMY      YAG Laser Capsulotomy  Left 07/29/2019    Dr. Hahn       Review of Systems:  Constitutional: Negative for chills and fever.   Respiratory: Negative for cough and shortness of breath.    Gastrointestinal: Negative for nausea and vomiting.   Skin: Negative for rash.   Neurological: Negative for dizziness and headaches.   Psychiatric/Behavioral: Negative for depression.   MSK as in HPI       OBJECTIVE:     PHYSICAL EXAM:  /77   Pulse 73   Ht 5' 2.99" (1.6 m)   Wt 83.5 kg (184 lb)   LMP  (LMP Unknown)   BMI 32.60 kg/m²     GEN:  NAD, well-developed, well-groomed.  NEURO: Awake, alert, and oriented. Normal " attention and concentration.    PSYCH: Normal mood and affect. Behavior is normal.  HEENT: No cervical lymphadenopathy noted.  CARDIOVASCULAR: Radial pulses 2+ bilaterally. No LE edema noted.  PULMONARY: Breath sounds normal. No respiratory distress.  SKIN: Intact, no rashes.      MSK:   BUE:  Good active ROM of the wrist and fingers. Positive tinels at bl carpal tunnel, negative durkans. Negative tinels at the cubital tunnels. There is triggering of the right ring with associated ttp over the A1 pulley. AIN/PIN/Radial/Median/Ulnar Nerves assessed in isolation without deficit. Radial & Ulnar arteries palpated 2+. Capillary Refill <3s.      RADIOGRAPHS:  Xray left hand 5/18/20  FINDINGS:  There is calcification in the region of the triangular fibrocartilage.    There is mild osteoarthritic degenerative change at the interphalangeal joints particularly distally and at the 1st metacarpal phalangeal joint.  There are several  corticated cystic lesions including distal radius, carpal bones and a few of the distal metacarpals.  These are favored to be osteoarthritic in nature.  These findings have progressed compared to prior.    There is no evidence of fracture.    Xray right hand 5/18/20  FINDINGS:  There are mild osteoarthritic degenerative changes within interphalangeal joints and at the 1st metacarpal phalangeal joint.  There is no fracture or osseous destructive process.    Comments: I have personally reviewed the imaging and I agree with the above radiologist's report.    EMG 5/12/20  Impression:  1. There is electrophysiologic evidence of a bilateral   sensorimotor median mononeuropathy across the wrist (I.e. Carpal   tunnel syndrome).  There is no motor axonal loss.  There is a   conduction block across the left wrist of about 50%.  There is   also active denervation on the left.  This is graded as Severe in   severity on the left and moderate on the right  2. There is also evidence of a chronic left C7  radiculopathy with   active/ongoing denervation     ASSESSMENT/PLAN:       ICD-10-CM ICD-9-CM   1. Bilateral carpal tunnel syndrome G56.03 354.0      Plan:   -EMG reviewed, treatment options discussed. Pt wishes to proceed with a left carpal tunnel release. We will also do a right carpal tunnel injection and a right ring trigger finger injection at the time of surgery. Consents reviewed and signed in clinic today, all questions answered.   -RTC post op     The patient indicates understanding of these issues and agrees to the plan.    Mary Ellen Worley PA-C  [facility]   [facility]  [place], [place]

## 2020-05-18 NOTE — LETTER
May 18, 2020      Dakota Kerr MD  [address]  [facility] [address]           [place] [id number]  [address], [address]  [facility] [address]  Phone: [phone]          Patient: Jo-Ann Escobedo   MR Number: [id number]   YOB: 1940   Date of Visit: 5/18/2020       Dear Dr. Dakota Kerr:    Thank you for referring Jo-Ann Escobedo to me for evaluation. Attached you will find relevant portions of my assessment and plan of care.    If you have questions, please do not hesitate to call me. I look forward to following Jo-Ann Escobedo along with you.    Sincerely,    Mary Ellen Worley PA-C    Enclosure  CC:  No Recipients    If you would like to receive this communication electronically, please contact [email or web address] or [phone] to request more information on [employer] Link access.    For providers and/or their staff who would like to refer a patient to [facility], please contact us through our one-stop-shop provider referral line, [facility], at [phone].    If you feel you have received this communication in error or would no longer like to receive these types of communications, please e-mail [email or web address]

## 2020-05-25 ENCOUNTER — ANESTHESIA EVENT (OUTPATIENT)
Dept: SURGERY | Facility: HOSPITAL | Age: 80
End: 2020-05-25
Payer: MEDICARE

## 2020-05-25 NOTE — ANESTHESIA PREPROCEDURE EVALUATION
Chart review complete. Patient's medical history reviewed.  OK to proceed at [facility].   5/25/2020 05/25/2020  Pre-operative evaluation for Procedure(s) (LRB):  RELEASE, CARPAL TUNNEL Left (Left)  INJECTION, STEROID right carpal tunel injection/ Right ring trigger finger injection (Right)    Jo-Ann Escobedo is a 79 y.o. female     Patient Active Problem List   Diagnosis    PCO (posterior capsular opacification), right    Mixed hyperlipidemia    Sensorineural hearing loss, bilateral    Paget's disease of bone    Obesity (BMI 30.0-34.9)    Osteoarthritis of lumbar spine    Hypothyroidism, postsurgical    Lipoma of arm    Essential hypertension    Prediabetes    Facet arthritis of lumbar region    Aortic atherosclerosis    Ductal carcinoma in situ (DCIS) of right breast    Pseudophakia    Nephrolithiasis    BPPV (benign paroxysmal positional vertigo)    Diverticulitis of large intestine without perforation or abscess with bleeding    Acute blood loss anemia    Abnormal CT of the head    Hematochezia    Gastric nodule    Low serum vitamin B12    GIB (gastrointestinal bleeding)    Other dietary vitamin B12 deficiency anemia    Bilateral carpal tunnel syndrome    Cervical radiculopathy at C7       Review of patient's allergies indicates:   Allergen Reactions    Voltaren [diclofenac sodium] Other (See Comments)     Hematochezia and hospitalization for hematochezia.    Codeine Diarrhea and Hallucinations    Niacin preparations      Redness, warming       No current facility-administered medications on file prior to encounter.      Current Outpatient Medications on File Prior to Encounter   Medication Sig Dispense Refill    cholecalciferol, vitamin D3, (VITAMIN D3) 2,000 unit Tab Take 1 tablet by mouth once daily.      co-enzyme Q-10 30 mg capsule Take 30 mg by mouth once daily.        cyanocobalamin 1,000 mcg/mL injection Inject 1 mL (1,000 mcg total) into the muscle every 30 days. 10 mL 1    fenofibrate 160 MG Tab TAKE 1 TABLET (160 MG TOTAL) BY MOUTH ONCE DAILY. 90 tablet 3    gabapentin (NEURONTIN) 100 MG capsule Take 1 capsule (100 mg total) by mouth 3 (three) times daily. 90 capsule 11    hydrocortisone 1 % cream Apply topically 3 (three) times daily. 14.2 g 0    LACTOBACILLUS COMBINATION NO.8 (ADULT PROBIOTIC ORAL) Take by mouth once daily.       levothyroxine (SYNTHROID) 125 MCG tablet Take 1 tablet (125 mcg total) by mouth before breakfast. 30 tablet 11    losartan (COZAAR) 50 MG tablet TAKE 1 TABLET BY MOUTH EVERY DAY 90 tablet 3    meclizine (ANTIVERT) 12.5 mg tablet Take 1 tablet (12.5 mg total) by mouth 3 (three) times daily as needed for Dizziness. 30 tablet 2    turmeric root extract 500 mg Cap Take by mouth once daily.          Past Surgical History:   Procedure Laterality Date    BREAST BIOPSY Right 2019    BREAST LUMPECTOMY Right 2019    CATARACT EXTRACTION W/  INTRAOCULAR LENS IMPLANT Bilateral     COLONOSCOPY N/A 4/15/2019    Procedure: COLONOSCOPY;  Surgeon: Artur Monet MD;  Location: [facility] (4TH FLR);  Service: Endoscopy;  Laterality: N/A;  within 1 month    COLONOSCOPY N/A 12/9/2019    Procedure: COLONOSCOPY;  Surgeon: Clyde Welch MD;  Location: [facility] (2ND FLR);  Service: Endoscopy;  Laterality: N/A;    ENDOSCOPIC ULTRASOUND OF UPPER GASTROINTESTINAL TRACT N/A 1/22/2020    Procedure: ULTRASOUND, UPPER GI TRACT, ENDOSCOPIC;  Surgeon: Eleazar Shaffer MD;  Location: [facility] (2ND FLR);  Service: Endoscopy;  Laterality: N/A;  EUS for 10mm SML w/negative pillow sign and negative naked fat sign which was found at the incisura.  Dr Welch    ESOPHAGOGASTRODUODENOSCOPY N/A 12/7/2019    Procedure: EGD (ESOPHAGOGASTRODUODENOSCOPY);  Surgeon: Clyde Welch MD;  Location: [facility] (2ND FLR);  Service: Endoscopy;  Laterality: N/A;    EXCISIONAL  BIOPSY Right 2019    Procedure: EXCISIONAL BIOPSY-breast;  Surgeon: Suki Dill MD;  Location: [facility] OR [address] [place] [address];  Service: General;  Laterality: Right;    EYE SURGERY      HYSTERECTOMY      INJECTION OF ANESTHETIC AGENT AROUND MEDIAL BRANCH NERVES INNERVATING LUMBAR FACET JOINT Bilateral 2019    Procedure: BLOCK, NERVE, FACET JOINT, LUMBAR, MEDIAL BRANCH-deo MBB L4/5,L5/S1;  Surgeon: Jarvis Morales III, MD;  Location: [facility] CATH LAB;  Service: Pain Management;  Laterality: Bilateral;    KNEE ARTHROSCOPY N/A     pt unsure of which knee     MASTECTOMY      OOPHORECTOMY      SPINE SURGERY      TOTAL THYROIDECTOMY      YAG Laser Capsulotomy  Left 2019    Dr. Hahn       Social History     Socioeconomic History    Marital status:      Spouse name: Not on file    Number of children: Not on file    Years of education: Not on file    Highest education level: Not on file   Occupational History    Occupation: realtor     Employer: [employer]     Employer: [employer]   Social Needs    Financial resource strain: Not hard at all    Food insecurity:     Worry: Never true     Inability: Never true    Transportation needs:     Medical: No     Non-medical: No   Tobacco Use    Smoking status: Former Smoker     Packs/day: 2.00     Years: 44.00     Pack years: 88.00     Types: Cigarettes     Last attempt to quit: 1969     Years since quittin.4    Smokeless tobacco: Never Used   Substance and Sexual Activity    Alcohol use: Yes     Alcohol/week: 0.0 standard drinks     Frequency: Monthly or less     Drinks per session: 1 or 2     Binge frequency: Never     Comment: maybe a beer every 3 months    Drug use: No    Sexual activity: Not Currently   Lifestyle    Physical activity:     Days per week: 5 days     Minutes per session: 20 min    Stress: Not at all   Relationships    Social connections:     Talks on phone: More than three times a week     Gets  together: Twice a week     Attends [employer]ology service: Never     Active member of club or organization: Yes     Attends meetings of clubs or organizations: More than 4 times per year     Relationship status:    Other Topics Concern    Are you pregnant or think you may be? No    Breast-feeding No   Social History Narrative    Not on file       EKD Echo:  No results found for this or any previous visit.      Anesthesia Evaluation    I have reviewed the Patient Summary Reports.    I have reviewed the Nursing Notes.    I have reviewed the Medications.     Review of Systems  Anesthesia Hx:  No problems with previous Anesthesia Sore lip without history of difficult intubation Denies Family Hx of Anesthesia complications.   Denies Personal Hx of Anesthesia complications.   Hematology/Oncology:        Hematology Comments: Recent transfusions for GIB --  Cancer in past history:  Breast left and right axillary node dissection no lymphedema chemotherapy and surgery  Oncology Comments: Left lnd also     Cardiovascular:   Hypertension, well controlled Denies MI.    Denies Angina. hyperlipidemia    Pulmonary:  Pulmonary Normal  Denies COPD.  Denies Asthma.  Denies Shortness of breath.    Renal/:   renal calculi    Hepatic/GI:   Denies GERD. Denies Liver Disease.    Musculoskeletal:   Arthritis   Spine Disorders: cervical and lumbar    Neurological:   Denies TIA. Denies CVA. Denies Seizures.    Endocrine:   Denies Diabetes.        Physical Exam  General:  Well nourished    Airway/Jaw/Neck:  Airway Findings: Mouth Opening: Normal Tongue: Normal  General Airway Assessment: Adult, Average  Mallampati: II  TM Distance: Normal, at least 6 cm  Jaw/Neck Findings:  Neck ROM: Normal ROM            Mental Status:  Mental Status Findings:  Cooperative, Alert and Oriented         Anesthesia Plan  Type of Anesthesia, risks & benefits discussed:  Anesthesia Type:  general  Patient's Preference:   Intra-op Monitoring Plan:  standard ASA monitors  Intra-op Monitoring Plan Comments:   Post Op Pain Control Plan: multimodal analgesia and per primary service following discharge from PACU  Post Op Pain Control Plan Comments:   Induction:   IV  Beta Blocker:  Patient is not currently on a Beta-Blocker (No further documentation required).       Informed Consent: Patient understands risks and agrees with Anesthesia plan.  Questions answered. Anesthesia consent signed with patient.  ASA Score: 3     Day of Surgery Review of History & Physical:    H&P update referred to the surgeon.         Ready For Surgery From Anesthesia Perspective.

## 2020-05-27 ENCOUNTER — PATIENT MESSAGE (OUTPATIENT)
Dept: ADMINISTRATIVE | Facility: OTHER | Age: 80
End: 2020-05-27

## 2020-05-27 ENCOUNTER — TELEPHONE (OUTPATIENT)
Dept: ORTHOPEDICS | Facility: CLINIC | Age: 80
End: 2020-05-27

## 2020-05-27 DIAGNOSIS — G56.03 BILATERAL CARPAL TUNNEL SYNDROME: Primary | ICD-10-CM

## 2020-06-03 ENCOUNTER — LAB VISIT (OUTPATIENT)
Dept: INTERNAL MEDICINE | Facility: CLINIC | Age: 80
End: 2020-06-03
Payer: MEDICARE

## 2020-06-03 DIAGNOSIS — G56.03 BILATERAL CARPAL TUNNEL SYNDROME: ICD-10-CM

## 2020-06-03 PROCEDURE — U0003 INFECTIOUS AGENT DETECTION BY NUCLEIC ACID (DNA OR RNA); SEVERE ACUTE RESPIRATORY SYNDROME CORONAVIRUS 2 (SARS-COV-2) (CORONAVIRUS DISEASE [COVID-19]), AMPLIFIED PROBE TECHNIQUE, MAKING USE OF HIGH THROUGHPUT TECHNOLOGIES AS DESCRIBED BY CMS-2020-01-R: HCPCS

## 2020-06-03 RX ORDER — DEXTROMETHORPHAN HYDROBROMIDE, GUAIFENESIN 5; 100 MG/5ML; MG/5ML
650 LIQUID ORAL 3 TIMES DAILY PRN
Qty: 42 TABLET | Refills: 0 | Status: ON HOLD | OUTPATIENT
Start: 2020-06-03 | End: 2022-12-05 | Stop reason: HOSPADM

## 2020-06-03 NOTE — H&P
The patient has been examined and the H&P has been reviewed:    I concur with the findings and no changes have occurred since H&P was written. Surgical site marked with patient participation.    Anesthesia/Surgery risks, benefits and alternative options discussed and understood by patient/family.    5/18/2020    Subjective:       Patient ID: Jo-Ann Escobedo is a 79 y.o. female.     Chief Complaint: Pain, Numbness, and Swelling of the Right Hand and Pain, Numbness, and Swelling of the Left Hand        HPI  Jo-Ann Escobedo is a 79 y.o. female presenting today for bilateral carpal tunnel. She reports onset about 5-6 mos ago, reports onset during a hospitalization. She reports she has constant numbness in bl hands, all fingers but less significant in the small fingers. She has associated pain. Symptoms do not awaken her. She has not tried bracing or injections. She reports occasional difficulty using the hands, such as for sewing. In addition, pt reports pain and locking of the right ring finger. Symptoms are about the same severity in both hands. She has recent EMG with severe left and moderate right carpal tunnel.            Review of patient's allergies indicates:   Allergen Reactions    Voltaren [diclofenac sodium] Other (See Comments)       Hematochezia and hospitalization for hematochezia.    Codeine Diarrhea and Hallucinations    Niacin preparations         Redness, warming          Current Medications          Current Outpatient Medications   Medication Sig Dispense Refill    acetaminophen (TYLENOL) 325 MG tablet Take 2 tablets (650 mg total) by mouth every 6 (six) hours as needed. 120 tablet 1    cholecalciferol, vitamin D3, (VITAMIN D3) 2,000 unit Tab Take 1 tablet by mouth once daily.        co-enzyme Q-10 30 mg capsule Take 30 mg by mouth once daily.         cyanocobalamin 1,000 mcg/mL injection Inject 1 mL (1,000 mcg total) into the muscle every 30 days. 10 mL 1    fenofibrate 160 MG Tab TAKE 1 TABLET  (160 MG TOTAL) BY MOUTH ONCE DAILY. 90 tablet 3    gabapentin (NEURONTIN) 100 MG capsule Take 1 capsule (100 mg total) by mouth 3 (three) times daily. 90 capsule 11    hydrocortisone 1 % cream Apply topically 3 (three) times daily. 14.2 g 0    LACTOBACILLUS COMBINATION NO.8 (ADULT PROBIOTIC ORAL) Take by mouth once daily.         levothyroxine (SYNTHROID) 125 MCG tablet Take 1 tablet (125 mcg total) by mouth before breakfast. 30 tablet 11    losartan (COZAAR) 50 MG tablet TAKE 1 TABLET BY MOUTH EVERY DAY 90 tablet 3    meclizine (ANTIVERT) 12.5 mg tablet Take 1 tablet (12.5 mg total) by mouth 3 (three) times daily as needed for Dizziness. 30 tablet 2    turmeric root extract 500 mg Cap Take by mouth once daily.           No current facility-administered medications for this visit.                  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    Squamous Cell Carcinoma       in situ right neck                Past  Surgical History:   Procedure Laterality Date    BREAST BIOPSY Right 2019    BREAST LUMPECTOMY Right 2019    CATARACT EXTRACTION W/  INTRAOCULAR LENS IMPLANT Bilateral      COLONOSCOPY N/A 4/15/2019     Procedure: COLONOSCOPY;  Surgeon: Artur Monet MD;  Location: [facility] (4TH FLR);  Service: Endoscopy;  Laterality: N/A;  within 1 month    COLONOSCOPY N/A 12/9/2019     Procedure: COLONOSCOPY;  Surgeon: Clyde Welch MD;  Location: [facility] (2ND FLR);  Service: Endoscopy;  Laterality: N/A;    ENDOSCOPIC ULTRASOUND OF UPPER GASTROINTESTINAL TRACT N/A 1/22/2020     Procedure: ULTRASOUND, UPPER GI TRACT, ENDOSCOPIC;  Surgeon: Eleazar Shaffer MD;  Location: [facility] ENDO (2ND FLR);  Service: Endoscopy;  Laterality: N/A;  EUS for 10mm SML w/negative pillow sign and negative naked fat sign which was found at the incisura.  Dr Welch    ESOPHAGOGASTRODUODENOSCOPY N/A 12/7/2019     Procedure: EGD (ESOPHAGOGASTRODUODENOSCOPY);  Surgeon: Clyde Welch MD;  Location: [facility] (2ND FLR);  Service: Endoscopy;  Laterality: N/A;    EXCISIONAL BIOPSY Right 6/27/2019     Procedure: EXCISIONAL BIOPSY-breast;  Surgeon: Suki Dill MD;  Location: [facility] OR 2ND FLR;  Service: General;  Laterality: Right;    EYE SURGERY        HYSTERECTOMY        INJECTION OF ANESTHETIC AGENT AROUND MEDIAL BRANCH NERVES INNERVATING LUMBAR FACET JOINT Bilateral 6/7/2019     Procedure: BLOCK, NERVE, FACET JOINT, LUMBAR, MEDIAL BRANCH-deo MBB L4/5,L5/S1;  Surgeon: Jarvis Morales III, MD;  Location: [facility] CATH LAB;  Service: Pain Management;  Laterality: Bilateral;    KNEE ARTHROSCOPY N/A       pt unsure of which knee     MASTECTOMY        OOPHORECTOMY        SPINE SURGERY        TOTAL THYROIDECTOMY        YAG Laser Capsulotomy  Left 07/29/2019     Dr. Hahn         Review of Systems:  Constitutional: Negative for chills and fever.   Respiratory: Negative for cough and shortness of breath.    Gastrointestinal:  "Negative for nausea and vomiting.   Skin: Negative for rash.   Neurological: Negative for dizziness and headaches.   Psychiatric/Behavioral: Negative for depression.   MSK as in HPI         OBJECTIVE:      PHYSICAL EXAM:  /77   Pulse 73   Ht 5' 2.99" (1.6 m)   Wt 83.5 kg (184 lb)   LMP  (LMP Unknown)   BMI 32.60 kg/m²      GEN:  NAD, well-developed, well-groomed.  NEURO: Awake, alert, and oriented. Normal attention and concentration.    PSYCH: Normal mood and affect. Behavior is normal.  HEENT: No cervical lymphadenopathy noted.  CARDIOVASCULAR: Radial pulses 2+ bilaterally. No LE edema noted.  PULMONARY: Breath sounds normal. No respiratory distress.  SKIN: Intact, no rashes.       MSK:   BUE:  Good active ROM of the wrist and fingers. Positive tinels at bl carpal tunnel, negative durkans. Negative tinels at the cubital tunnels. There is triggering of the right ring with associated ttp over the A1 pulley. AIN/PIN/Radial/Median/Ulnar Nerves assessed in isolation without deficit. Radial & Ulnar arteries palpated 2+. Capillary Refill <3s.        RADIOGRAPHS:  Xray left hand 5/18/20  FINDINGS:  There is calcification in the region of the triangular fibrocartilage.    There is mild osteoarthritic degenerative change at the interphalangeal joints particularly distally and at the 1st metacarpal phalangeal joint.  There are several  corticated cystic lesions including distal radius, carpal bones and a few of the distal metacarpals.  These are favored to be osteoarthritic in nature.  These findings have progressed compared to prior.    There is no evidence of fracture.     Xray right hand 5/18/20  FINDINGS:  There are mild osteoarthritic degenerative changes within interphalangeal joints and at the 1st metacarpal phalangeal joint.  There is no fracture or osseous destructive process.     Comments: I have personally reviewed the imaging and I agree with the above radiologist's report.     EMG " 5/12/20  Impression:  1. There is electrophysiologic evidence of a bilateral   sensorimotor median mononeuropathy across the wrist (I.e. Carpal   tunnel syndrome).  There is no motor axonal loss.  There is a   conduction block across the left wrist of about 50%.  There is   also active denervation on the left.  This is graded as Severe in   severity on the left and moderate on the right  2. There is also evidence of a chronic left C7 radiculopathy with   active/ongoing denervation      ASSESSMENT/PLAN:          ICD-10-CM ICD-9-CM   1. Left carpal tunnel syndrome G56.03 354.0       Plan:   -EMG reviewed, treatment options discussed. Pt wishes to proceed with a left carpal tunnel release. We will also do a right carpal tunnel injection and a right ring trigger finger injection at the time of surgery. Consents reviewed and signed in clinic today, all questions answered.   -RTC post op      The patient indicates understanding of these issues and agrees to the plan.

## 2020-06-04 ENCOUNTER — TELEPHONE (OUTPATIENT)
Dept: ORTHOPEDICS | Facility: CLINIC | Age: 80
End: 2020-06-04

## 2020-06-04 LAB — SARS-COV-2 RNA RESP QL NAA+PROBE: NOT DETECTED

## 2020-06-04 NOTE — TELEPHONE ENCOUNTER
Informed patient to be at the surgery center 06/05/2020 for 6:45 a.m. Also reminded patient of post op appointment on 6/19/2020.Patient verbalized understanding.

## 2020-06-05 ENCOUNTER — HOSPITAL ENCOUNTER (OUTPATIENT)
Facility: HOSPITAL | Age: 80
Discharge: HOME | End: 2020-06-05
Attending: ORTHOPAEDIC SURGERY | Admitting: ORTHOPAEDIC SURGERY
Payer: MEDICARE

## 2020-06-05 ENCOUNTER — ANESTHESIA (OUTPATIENT)
Dept: SURGERY | Facility: HOSPITAL | Age: 80
End: 2020-06-05
Payer: MEDICARE

## 2020-06-05 VITALS
TEMPERATURE: 98 F | OXYGEN SATURATION: 98 % | BODY MASS INDEX: 32.43 KG/M2 | HEART RATE: 57 BPM | RESPIRATION RATE: 28 BRPM | WEIGHT: 183 LBS | DIASTOLIC BLOOD PRESSURE: 54 MMHG | SYSTOLIC BLOOD PRESSURE: 101 MMHG

## 2020-06-05 DIAGNOSIS — G56.02 LEFT CARPAL TUNNEL SYNDROME: Primary | ICD-10-CM

## 2020-06-05 LAB — POCT GLUCOSE: 84 MG/DL (ref 70–110)

## 2020-06-05 PROCEDURE — D9220A PRA ANESTHESIA: ICD-10-PCS | Mod: ANES,,, | Performed by: ANESTHESIOLOGY

## 2020-06-05 PROCEDURE — 64721 PR REVISE MEDIAN N/CARPAL TUNNEL SURG: ICD-10-PCS | Mod: LT,,, | Performed by: ORTHOPAEDIC SURGERY

## 2020-06-05 PROCEDURE — 94761 N-INVAS EAR/PLS OXIMETRY MLT: CPT

## 2020-06-05 PROCEDURE — D9220A PRA ANESTHESIA: ICD-10-PCS | Mod: CRNA,,, | Performed by: NURSE ANESTHETIST, CERTIFIED REGISTERED

## 2020-06-05 PROCEDURE — 64721 CARPAL TUNNEL SURGERY: CPT | Mod: LT,,, | Performed by: ORTHOPAEDIC SURGERY

## 2020-06-05 PROCEDURE — D9220A PRA ANESTHESIA: Mod: ANES,,, | Performed by: ANESTHESIOLOGY

## 2020-06-05 PROCEDURE — 71000033 HC RECOVERY, INTIAL HOUR: Performed by: ORTHOPAEDIC SURGERY

## 2020-06-05 PROCEDURE — 99900035 HC TECH TIME PER 15 MIN (STAT)

## 2020-06-05 PROCEDURE — 63600175 PHARM REV CODE 636 W HCPCS: Performed by: ORTHOPAEDIC SURGERY

## 2020-06-05 PROCEDURE — 36000707: Performed by: ORTHOPAEDIC SURGERY

## 2020-06-05 PROCEDURE — 20526 PR INJECT CARPAL TUNNEL: ICD-10-PCS | Mod: 59,51,RT, | Performed by: ORTHOPAEDIC SURGERY

## 2020-06-05 PROCEDURE — 71000015 HC POSTOP RECOV 1ST HR: Performed by: ORTHOPAEDIC SURGERY

## 2020-06-05 PROCEDURE — 20526 THER INJECTION CARP TUNNEL: CPT | Mod: 59,51,RT, | Performed by: ORTHOPAEDIC SURGERY

## 2020-06-05 PROCEDURE — 25000003 PHARM REV CODE 250: Performed by: STUDENT IN AN ORGANIZED HEALTH CARE EDUCATION/TRAINING PROGRAM

## 2020-06-05 PROCEDURE — 63600175 PHARM REV CODE 636 W HCPCS: Performed by: STUDENT IN AN ORGANIZED HEALTH CARE EDUCATION/TRAINING PROGRAM

## 2020-06-05 PROCEDURE — 25000003 PHARM REV CODE 250: Performed by: ANESTHESIOLOGY

## 2020-06-05 PROCEDURE — 20550 PR INJECT TENDON SHEATH/LIGAMENT: ICD-10-PCS | Mod: 59,51,F8, | Performed by: ORTHOPAEDIC SURGERY

## 2020-06-05 PROCEDURE — 94760 N-INVAS EAR/PLS OXIMETRY 1: CPT

## 2020-06-05 PROCEDURE — 63600175 PHARM REV CODE 636 W HCPCS: Performed by: NURSE ANESTHETIST, CERTIFIED REGISTERED

## 2020-06-05 PROCEDURE — 36000706: Performed by: ORTHOPAEDIC SURGERY

## 2020-06-05 PROCEDURE — D9220A PRA ANESTHESIA: Mod: CRNA,,, | Performed by: NURSE ANESTHETIST, CERTIFIED REGISTERED

## 2020-06-05 PROCEDURE — 25000003 PHARM REV CODE 250: Performed by: ORTHOPAEDIC SURGERY

## 2020-06-05 PROCEDURE — 20550 NJX 1 TENDON SHEATH/LIGAMENT: CPT | Mod: 59,51,F8, | Performed by: ORTHOPAEDIC SURGERY

## 2020-06-05 PROCEDURE — 37000008 HC ANESTHESIA 1ST 15 MINUTES: Performed by: ORTHOPAEDIC SURGERY

## 2020-06-05 PROCEDURE — 37000009 HC ANESTHESIA EA ADD 15 MINS: Performed by: ORTHOPAEDIC SURGERY

## 2020-06-05 PROCEDURE — 27201423 OPTIME MED/SURG SUP & DEVICES STERILE SUPPLY: Performed by: ORTHOPAEDIC SURGERY

## 2020-06-05 RX ORDER — FENTANYL CITRATE 50 UG/ML
INJECTION, SOLUTION INTRAMUSCULAR; INTRAVENOUS
Status: DISCONTINUED | OUTPATIENT
Start: 2020-06-05 | End: 2020-06-05

## 2020-06-05 RX ORDER — PROPOFOL 10 MG/ML
VIAL (ML) INTRAVENOUS CONTINUOUS PRN
Status: DISCONTINUED | OUTPATIENT
Start: 2020-06-05 | End: 2020-06-05

## 2020-06-05 RX ORDER — SODIUM CHLORIDE 0.9 % (FLUSH) 0.9 %
3 SYRINGE (ML) INJECTION
Status: DISCONTINUED | OUTPATIENT
Start: 2020-06-05 | End: 2020-06-05 | Stop reason: HOSPADM

## 2020-06-05 RX ORDER — LIDOCAINE HYDROCHLORIDE 20 MG/ML
INJECTION INTRAVENOUS
Status: DISCONTINUED | OUTPATIENT
Start: 2020-06-05 | End: 2020-06-05

## 2020-06-05 RX ORDER — BACITRACIN ZINC 500 UNIT/G
OINTMENT (GRAM) TOPICAL
Status: DISCONTINUED | OUTPATIENT
Start: 2020-06-05 | End: 2020-06-05 | Stop reason: HOSPADM

## 2020-06-05 RX ORDER — BUPIVACAINE HYDROCHLORIDE 2.5 MG/ML
INJECTION, SOLUTION EPIDURAL; INFILTRATION; INTRACAUDAL
Status: DISCONTINUED | OUTPATIENT
Start: 2020-06-05 | End: 2020-06-05 | Stop reason: HOSPADM

## 2020-06-05 RX ORDER — PROPOFOL 10 MG/ML
VIAL (ML) INTRAVENOUS
Status: DISCONTINUED | OUTPATIENT
Start: 2020-06-05 | End: 2020-06-05

## 2020-06-05 RX ORDER — MUPIROCIN 20 MG/G
OINTMENT TOPICAL
Status: DISCONTINUED | OUTPATIENT
Start: 2020-06-05 | End: 2022-07-14

## 2020-06-05 RX ORDER — CEFAZOLIN SODIUM 1 G/3ML
2 INJECTION, POWDER, FOR SOLUTION INTRAMUSCULAR; INTRAVENOUS
Status: COMPLETED | OUTPATIENT
Start: 2020-06-05 | End: 2020-06-05

## 2020-06-05 RX ORDER — ONDANSETRON 2 MG/ML
4 INJECTION INTRAMUSCULAR; INTRAVENOUS ONCE AS NEEDED
Status: DISCONTINUED | OUTPATIENT
Start: 2020-06-05 | End: 2020-06-05 | Stop reason: HOSPADM

## 2020-06-05 RX ORDER — ACETAMINOPHEN 500 MG
1000 TABLET ORAL
Status: COMPLETED | OUTPATIENT
Start: 2020-06-05 | End: 2020-06-05

## 2020-06-05 RX ORDER — DEXAMETHASONE SODIUM PHOSPHATE 4 MG/ML
INJECTION, SOLUTION INTRA-ARTICULAR; INTRALESIONAL; INTRAMUSCULAR; INTRAVENOUS; SOFT TISSUE
Status: DISCONTINUED | OUTPATIENT
Start: 2020-06-05 | End: 2020-06-05 | Stop reason: HOSPADM

## 2020-06-05 RX ORDER — SODIUM CHLORIDE 9 MG/ML
INJECTION, SOLUTION INTRAVENOUS CONTINUOUS
Status: DISCONTINUED | OUTPATIENT
Start: 2020-06-05 | End: 2020-07-28

## 2020-06-05 RX ORDER — LIDOCAINE HYDROCHLORIDE 10 MG/ML
INJECTION, SOLUTION EPIDURAL; INFILTRATION; INTRACAUDAL; PERINEURAL
Status: DISCONTINUED | OUTPATIENT
Start: 2020-06-05 | End: 2020-06-05 | Stop reason: HOSPADM

## 2020-06-05 RX ADMIN — FENTANYL CITRATE 50 MCG: 50 INJECTION, SOLUTION INTRAMUSCULAR; INTRAVENOUS at 09:06

## 2020-06-05 RX ADMIN — SODIUM CHLORIDE 1000 ML: 0.9 INJECTION, SOLUTION INTRAVENOUS at 08:06

## 2020-06-05 RX ADMIN — PROPOFOL 30 MG: 10 INJECTION, EMULSION INTRAVENOUS at 09:06

## 2020-06-05 RX ADMIN — ACETAMINOPHEN 1000 MG: 500 TABLET ORAL at 08:06

## 2020-06-05 RX ADMIN — PROPOFOL 100 MCG/KG/MIN: 10 INJECTION, EMULSION INTRAVENOUS at 09:06

## 2020-06-05 RX ADMIN — MUPIROCIN: 20 OINTMENT TOPICAL at 08:06

## 2020-06-05 RX ADMIN — LIDOCAINE HYDROCHLORIDE 100 MG: 20 INJECTION, SOLUTION INTRAVENOUS at 09:06

## 2020-06-05 RX ADMIN — CEFAZOLIN 2 G: 330 INJECTION, POWDER, FOR SOLUTION INTRAMUSCULAR; INTRAVENOUS at 09:06

## 2020-06-05 NOTE — ANESTHESIA POSTPROCEDURE EVALUATION
Anesthesia Post Evaluation    Patient: Jo-Ann Escobedo    Procedure(s) Performed: Procedure(s) (LRB):  RELEASE, CARPAL TUNNEL Left (Left)  INJECTION, STEROID right carpal tunel injection/ Right ring trigger finger injection (Right)    Final Anesthesia Type: general    Patient location during evaluation: PACU  Patient participation: Yes- Able to Participate  Level of consciousness: awake and alert and oriented  Post-procedure vital signs: reviewed and stable  Pain management: adequate  Airway patency: patent    PONV status at discharge: No PONV  Anesthetic complications: no      Cardiovascular status: blood pressure returned to baseline and hemodynamically stable  Respiratory status: unassisted, room air and spontaneous ventilation  Hydration status: euvolemic  Follow-up not needed.          Vitals Value Taken Time   /69 6/5/2020 10:46 AM   Temp 36.7 °C (98.1 °F) 6/5/2020 10:17 AM   Pulse 54 6/5/2020 11:01 AM   Resp 23 6/5/2020 11:01 AM   SpO2 97 % 6/5/2020 10:58 AM   Vitals shown include unvalidated device data.      Event Time     Out of Recovery 10:50:00          Pain/Parris Score: Pain Rating Prior to Med Admin: 5 (6/5/2020  8:37 AM)  Parris Score: 9 (6/5/2020 10:30 AM)

## 2020-06-05 NOTE — DISCHARGE INSTRUCTIONS
Recovery After Procedural Sedation (Adult)  You have been given medicine by vein to make you sleep during your surgery. This may have included both a pain medicine and sleeping medicine. Most of the effects have worn off. But you may still have some drowsiness for the next 6 to 8 hours.    Home care  Follow these guidelines when you get home:  · For the next 8 hours, you should be watched by a responsible adult. This person should make sure your condition is not getting worse.  · Don't drink any alcohol for the next 24 hours.  · Don't drive, operate dangerous machinery, or make important business or personal decisions during the next 24 hours.  Note: Your healthcare provider may tell you not to take any medicine by mouth for pain or sleep in the next 4 hours. These medicines may react with the medicines you were given in the hospital. This could cause a much stronger response than usual.    Follow-up care  Follow up with your healthcare provider if you are not alert and back to your usual level of activity within 12 hours.    When to seek medical advice  Call your healthcare provider right away if any of these occur:  · Drowsiness gets worse  · Weakness or dizziness gets worse  · Repeated vomiting  · You can't be awakened     Date Last Reviewed: 10/18/2016  © 4366-6592 [employer]. [address] [place] [address], [place] [address]. All rights reserved. This information is not intended as a substitute for professional medical care. Always follow your healthcare professional's instructions.    Discharge Instructions: After Your Surgery  You've just had surgery. During surgery, you were given medicine called anesthesia to keep you relaxed and free of pain. After surgery, you may have some pain or nausea. This is common. Here are some tips for feeling better and getting well after surgery.    Stay on schedule with your medicine.   Going home  Your healthcare provider will show you how to take care of yourself when  you go home. He or she will also answer your questions. Have an adult family member or friend drive you home. For the first 24 hours after your surgery:  · Have someone stay with you, if needed. He or she can watch for problems and help keep you safe.  Be sure to go to all follow-up visits with your healthcare provider. And rest after your surgery for as long as your healthcare provider tells you to.    Coping with pain  If you have pain after surgery, pain medicine will help you feel better. Take it as told, before pain becomes severe. Also, ask your healthcare provider or pharmacist about other ways to control pain. This might be with heat, ice, or relaxation. And follow any other instructions your surgeon or nurse gives you.    Tips for taking pain medicine  To get the best relief possible, remember these points:  · Pain medicines can upset your stomach. Taking them with a little food may help.  · Most pain relievers taken by mouth need at least 20 to 30 minutes to start to work.  · Taking medicine on a schedule can help you remember to take it. Try to time your medicine so that you can take it before starting an activity. This might be before you get dressed, go for a walk, or sit down for dinner.  · Constipation is a common side effect of pain medicines. Call your healthcare provider before taking any medicines such as laxatives or stool softeners to help ease constipation. Also ask if you should skip any foods. Drinking lots of fluids and eating foods such as fruits and vegetables that are high in fiber can also help. Remember, do not take laxatives unless your surgeon has prescribed them.    Your healthcare provider may tell you to take acetaminophen to help ease your pain. Ask him or her how much you are supposed to take each day. Acetaminophen or other pain relievers may interact with your prescription medicines or other over-the-counter (OTC) medicines. Some prescription medicines have acetaminophen and  other ingredients. Using both prescription and OTC acetaminophen for pain can cause you to overdose. Read the labels on your OTC medicines with care. This will help you to clearly know the list of ingredients, how much to take, and any warnings. It may also help you not take too much acetaminophen. If you have questions or do not understand the information, ask your pharmacist or healthcare provider to explain it to you before you take the OTC medicine.    Managing nausea  Some people have an upset stomach after surgery. This is often because of anesthesia, pain, or pain medicine, or the stress of surgery. These tips will help you handle nausea and eat healthy foods as you get better. If you were on a special food plan before surgery, ask your healthcare provider if you should follow it while you get better. These tips may help:  · Do not push yourself to eat. Your body will tell you when to eat and how much.  · Start off with clear liquids and soup. They are easier to digest.  · Next try semi-solid foods, such as mashed potatoes, applesauce, and gelatin, as you feel ready.  · Slowly move to solid foods. Don't eat fatty, rich, or spicy foods at first.  · Do not force yourself to have 3 large meals a day. Instead eat smaller amounts more often.  · Take pain medicines with a small amount of solid food, such as crackers or toast, to avoid nausea.     Call your surgeon if  · You still have pain an hour after taking medicine. The medicine may not be strong enough.  · You feel too sleepy, dizzy, or groggy. The medicine may be too strong.  · You have side effects like nausea, vomiting, or skin changes, such as rash, itching, or hives.       If you have obstructive sleep apnea  You were given anesthesia medicine during surgery to keep you comfortable and free of pain. After surgery, you may have more apnea spells because of this medicine and other medicines you were given. The spells may last longer than usual.   At  home:  · Keep using the continuous positive airway pressure (CPAP) device when you sleep. Unless your healthcare provider tells you not to, use it when you sleep, day or night. CPAP is a common device used to treat obstructive sleep apnea.  · Talk with your provider before taking any pain medicine, muscle relaxants, or sedatives. Your provider will tell you about the possible dangers of taking these medicines.  Date Last Reviewed: 12/1/2016  © 0345-5788 [employer]. [address] [place] [address]. All rights reserved. This information is not intended as a substitute for professional medical care. Always follow your healthcare professional's instructions.          PATIENT INSTRUCTIONS  POST-ANESTHESIA    IMMEDIATELY FOLLOWING SURGERY:  Do not drive or operate machinery for the first twenty four hours after surgery.  Do not make any important decisions for twenty four hours after surgery or while taking narcotic pain medications or sedatives.  If you develop intractable nausea and vomiting or a severe headache please notify your doctor immediately.    FOLLOW-UP:  Please make an appointment with your surgeon as instructed. You do not need to follow up with anesthesia unless specifically instructed to do so.    WOUND CARE INSTRUCTIONS (if applicable):  Keep a dry clean dressing on the anesthesia/puncture wound site if there is drainage.  Once the wound has quit draining you may leave it open to air.  Generally you should leave the bandage intact for twenty four hours unless there is drainage.  If the epidural site drains for more than 36-48 hours please call the anesthesia department.    QUESTIONS?:  Please feel free to call your physician or the hospital  if you have any questions, and they will be happy to assist you.       [facility] Anesthesia Department  [address] [facility]  [phone]      Wound Check After Surgery: Bleeding  Surgery involves cutting through  layers of skin, fatty tissue, muscle, and sometimes bone and cartilage. Sutures (stitches) or staples are used to close all layers of the wound. The sutures on the inside will dissolve in about 2 to 3 weeks. Any sutures or staples used on the outside need to be removed in about 7 to 14 days, depending on the location.  It is normal to have some clear or bloody discharge on the wound covering or bandage (dressing) for the first few days after surgery. If your wound was sutured (sewn) closed, you should not have to change the dressing more than three times a day in the first few days. Bleeding or discharge requiring more frequent dressing changes can be a sign of a problem.    Home care  Different types of surgery require different types of care and dressing changes. It is important to follow all instructions and advice from your surgeon, as well as other members of your healthcare team.    Wound care  · Keep the wound clean, as directed by your healthcare provider.  · Change the dressing as directed. Change the dressing sooner if it becomes wet or stained with blood or fluid from the wound.  · Bathe with a sponge (no shower or tub baths) for the first few days after surgery, or until there is no more drainage from the wound. Unless you received different instructions from your surgeon, you can then shower. Do not soak the area in water (no baths or swimming) until the tape, sutures, or staples are removed and any wound opening has dried out and healed.    Changing the dressing  · Wash your hands before changing the dressings.  · Carefully remove the dressing and tape; don't just yank it off. If it sticks to the wound, you may need to wet it a little to remove it, unless your healthcare provider told you not to wet it.  · Wash your hands again before putting on a new, clean dressing.  · Gently clean the wound with clean water (or saline) using gauze or a clean washcloth. Do not rub it or pick at it.  · Do not use  soap, alcohol, hydrogen peroxide, or any other cleanser.  · If you were told to dry the wound before putting on a new dressing, gently pat it dry. Do not rub.  · Throw out the old dressing. Do not reuse it!  · Wash your hands again when you are done.    Types of dressings  Your healthcare team will tell you what type of dressing to put on your wound. Follow your healthcare team's instructions carefully, and contact them if you have any questions. Two common types of dressings are described below. You may have one of these or another type.  · Dry dressing. Use dry gauze. If the wound is still draining, use a nonadherent dressing, which shouldn't stick to the wound.  · Wet-to-dry dressing. Wet the gauze, and squeeze out the excess water (or saline), before putting it on. Then, cover this with a dry pad.    Medicines  · If you were given antibiotics, take them until they are used up or your healthcare provider tells you to stop. It is important to finish the antibiotics even though you feel better, to make sure the infection has cleared.  · You can take acetaminophen or ibuprofen for pain, unless you were given a different pain medicine to use. (Note: If you have chronic liver or kidney disease, or have ever had a stomach ulcer or gastrointestinal bleeding, or are taking blood thinner medicines, talk with your healthcare provider before using these medicines.)  · Aspirin should never be used in anyone under 18 years of age who is ill with a fever. It may cause severe liver damage.    Follow-up care  Follow up with your healthcare provider, or as advised, for your next wound check or removal of sutures, staples, or tape.  · If a culture was done, you will be notified if the results will affect your treatment. You can call as directed for the results.  · If imaging tests, such as X-rays, an ultrasound, or CT scan were done, they will be reviewed by a specialist. You will be notified of the results, especially if they  affect treatment.    Call 911  Call emergency services right away if any of these occur:  · Trouble breathing or swallowing, wheezing  · Hoarse voice or trouble speaking  · Extreme confusion  · Extreme drowsiness or trouble awakening  · Fainting or loss of consciousness  · Rapid heart rate or very slow heart rate  · Vomiting blood, or large amounts of blood in stool  · Discomfort in the center of the chest that feels like pressure, squeezing, a sense of fullness, or pain  · Discomfort or pain in other upper body areas, such as the back, one or both arms, neck, jaw, or stomach  · Stroke symptoms (spot a stroke FAST)  ¨ F: Face drooping. One side of the face is numb or droops.  ¨ A: Arm weakness. One arm feels weak or numb.  ¨ S: Speech difficulty: Speech is slurred, or the person is unable to speak.  ¨ T: Time to call 911. Even if symptoms go away, call 911.    When to seek medical advice  Call your healthcare provider right away if any of the following occur:  · Fluid or blood soaking 5 or more bandages a day during the first 3 days after surgery  · Fluid or blood still draining from the wound more than 3 days after surgery  · Increasing pain at the site of surgery  · Fever over 100.4º F (38.0º C)  · Redness around the wound  · Pus coming from the wound  · Vomiting, constipation, or diarrhea    Date Last Reviewed: 9/27/2015  © 6397-9462 [employer]. [address] [place] [address]. All rights reserved. This information is not intended as a substitute for professional medical care. Always follow your healthcare professional's instructions.      Wound Check After Surgery, No Complication    It is normal to feel pain at the incision site. The pain decreases as the wound heals. Most of the pain and soreness from the skin incision should go away by the time the sutures or staples are removed. Soreness and pain from deeper tissues may last another week or two.  Pain that continues more than a few  weeks after surgery or pain that worsens anytime after surgery can be a sign of a problem, such as:  · Infection  · Separation of wound edges  · Collection of blood or other below the skin        Date Last Reviewed: 9/27/2015  © 4530-8619 [employer]. [address] [place] [address]. All rights reserved. This information is not intended as a substitute for professional medical care. Always follow your healthcare professional's instructions.

## 2020-06-05 NOTE — OP NOTE
[facility]  Surgery Department  Operative Note    SUMMARY     Date of Procedure: 6/5/2020     Procedure: Procedure(s) (LRB):  RELEASE, CARPAL TUNNEL Left (Left)  INJECTION, STEROID right carpal tunel injection/ Right ring trigger finger injection (Right)     Surgeon(s) and Role:     * Pricila Presley MD - Primary    Assisting Surgeon: None    Pre-Operative Diagnosis: Bilateral carpal tunnel syndrome [G56.03]    Post-Operative Diagnosis: Post-Op Diagnosis Codes:     * Bilateral carpal tunnel syndrome [G56.03]    Anesthesia: Local MAC    Technical Procedures Used:   RELEASE, CARPAL TUNNEL Left (Left)  INJECTION, STEROID right carpal tunel injection/ Right ring trigger finger injection (Right)         Description of the Findings of the Procedure:   Indications for Procedure: Jo-Ann Escobedo  is a 79 y.o. female with a longstanding history of symptoms consistant with carpal tunnel syndrome and elected to have this released on the left. Additionally, she had right carpal tunnel symptoms and right ring trigger finger which she elected for injection in OR. Risks and complications were discussed including, but not limited to risks of anesthetic complications, infections, wound healing complications and in particular, the most considerable risk of continued numbness.    Procedure in Detail:  The patient was taken to the Operating Room where IV sedation was administered by the Anesthesia Department. Time out was performed confirming site, side, patient, and surgery. The left carpal tunnel skin and subcutaneous tissues were infiltrated with 1% lidocaine without epinephrine over the proposed incision site. The right carpal tunnel and right ring finger were injected with 4 mg of dexamethaxone and 1% lidocaine without epinephrine to each site. The left upper extremity was then sterilely prepped and draped in the normal fashion.     Under tourniquet control, a 3 cm longitudinal incision was made in the  palm of the operative hand extending from the distal wrist crease into the palm of the hand along the ulnar border of the fourth ray.  Subcutaneous tissue was sharply dissected down to the palmar aponeurosis, which was incised. The transverse carpal ligament was identified and sharply incised. The transverse carpal ligament was found to be thick. The antebrachial fascia was also released.  Contents of carpal canal were unremarkable and no further stenotic areas on the nerve were noted after palpation with hemostat.    The wound was irrigated. Skin was then closed with interrupted horizontal mattress sutures 4-0 nylon. Incision site was infiltrated with 0.25% bupivacaine. Sterile dressing was applied with adaptic, bacitracin, 4x4, cast padding, and ACE bandage. Tourniquet was deflated. Patient was returned to the Postanesthesia Care Unit in stable condition.      Complications: No    Estimated Blood Loss (EBL): 0 mL         Implants: None    Specimens:   Specimen (12h ago, onward)    None                  Condition: Good    Disposition: PACU - hemodynamically stable.    Attestation: Staff physician Pricila Presley MD was present for all critical portions of the surgery.

## 2020-06-05 NOTE — TRANSFER OF CARE
Anesthesia Transfer of Care Note    Patient: Jo-Ann Escobedo    Procedure(s) Performed: Procedure(s) (LRB):  RELEASE, CARPAL TUNNEL Left (Left)  INJECTION, STEROID right carpal tunel injection/ Right ring trigger finger injection (Right)    Patient location: PACU    Anesthesia Type: general    Transport from OR: Transported from OR on 6-10 L/min O2 by face mask with adequate spontaneous ventilation    Post pain: adequate analgesia    Post assessment: no apparent anesthetic complications    Post vital signs: stable    Level of consciousness: sedated and responds to stimulation    Nausea/Vomiting: no nausea/vomiting    Complications: none    Transfer of care protocol was followed      Last vitals:   Visit Vitals  BP (!) 140/64 (BP Location: Right arm, Patient Position: Lying)   Pulse 62   Temp 36.7 °C (98 °F) (Oral)   Resp 18   Wt 83 kg (183 lb)   LMP  (LMP Unknown)   SpO2 95%   Breastfeeding? No   BMI 32.43 kg/m²

## 2020-06-05 NOTE — BRIEF OP NOTE
[facility]  Brief Operative Note    Surgery Date: 6/5/2020     Surgeon(s) and Role:     * Pricila Presley MD - Primary    Assisting Surgeon: None    Pre-op Diagnosis:  Bilateral carpal tunnel syndrome [G56.03]    Post-op Diagnosis:  Post-Op Diagnosis Codes:     * Bilateral carpal tunnel syndrome [G56.03]    Procedure(s) (LRB):  RELEASE, CARPAL TUNNEL Left (Left)  INJECTION, STEROID right carpal tunel injection/ Right ring trigger finger injection (Right)    Anesthesia: Local MAC    Description of the findings of the procedure(s): left carpal tunnel release, right carpal tunnel injection, right ring trigger finger injection    Estimated Blood Loss: 0 mL       Specimens:   Specimen (12h ago, onward)    None            Discharge Note    OUTCOME: Patient tolerated treatment/procedure well without complication and is now ready for discharge.    DISPOSITION: Home or Self Care    FINAL DIAGNOSIS:  Left carpal tunnel syndrome    FOLLOWUP: In clinic    DISCHARGE INSTRUCTIONS:    Discharge Procedure Orders   Call MD for:  temperature >100.4     Call MD for:  persistent nausea and vomiting or diarrhea     Call MD for:  severe uncontrolled pain     Call MD for:  redness, tenderness, or signs of infection (pain, swelling, redness, odor or green/yellow discharge around incision site)     Call MD for:  difficulty breathing or increased cough     Call MD for:  severe persistent headache     Call MD for:  worsening rash     Call MD for:  persistent dizziness, light-headedness, or visual disturbances     Call MD for:  increased confusion or weakness     Leave dressing on - Keep it clean, dry, and intact until clinic visit     Weight bearing restrictions (specify):   Order Comments: No lifting greater than 5 pounds with left hand. Okay to move fingers/type on computer to prevent stiffness.

## 2020-06-19 ENCOUNTER — OFFICE VISIT (OUTPATIENT)
Dept: ORTHOPEDICS | Facility: CLINIC | Age: 80
End: 2020-06-19
Payer: MEDICARE

## 2020-06-19 VITALS
BODY MASS INDEX: 33.68 KG/M2 | HEART RATE: 78 BPM | HEIGHT: 62 IN | WEIGHT: 183 LBS | DIASTOLIC BLOOD PRESSURE: 75 MMHG | SYSTOLIC BLOOD PRESSURE: 132 MMHG

## 2020-06-19 DIAGNOSIS — Z98.890 S/P CARPAL TUNNEL RELEASE: Primary | ICD-10-CM

## 2020-06-19 PROCEDURE — 99024 POSTOP FOLLOW-UP VISIT: CPT | Mod: S$GLB,,, | Performed by: PHYSICIAN ASSISTANT

## 2020-06-19 PROCEDURE — 99024 PR POST-OP FOLLOW-UP VISIT: ICD-10-PCS | Mod: S$GLB,,, | Performed by: PHYSICIAN ASSISTANT

## 2020-06-19 PROCEDURE — 99999 PR PBB SHADOW E&M-EST. PATIENT-LVL III: ICD-10-PCS | Mod: PBBFAC,,, | Performed by: PHYSICIAN ASSISTANT

## 2020-06-19 PROCEDURE — 99999 PR PBB SHADOW E&M-EST. PATIENT-LVL III: CPT | Mod: PBBFAC,,, | Performed by: PHYSICIAN ASSISTANT

## 2020-06-19 NOTE — PROGRESS NOTES
"Ms. Escobedo is here today for a post-operative visit.  She is 14 days status post left carpal tunnel release by Dr. Presley on 6/5/20. She reports that she is doing well.  Pain is minimal.  She is taking Tylenol prn.  She denies fever, chills, and sweats since the time of the surgery.     Pt also had a right carpal tunnel injection and a right ring trigger finger injection performed at time of surgery. She reports decreased frequency of triggering otherwise did not note significant improvement.     Physical exam:    Vitals:    06/19/20 1408   BP: 132/75   Pulse: 78   Weight: 83 kg (183 lb)   Height: 5' 2" (1.575 m)   PainSc:   4     Vital signs are stable, patient is afebrile.  Patient is well dressed and well groomed, no acute distress.  Alert and oriented to person, place, and time.  Post op dressing taken down.  Incision is clean, dry and intact.  There is no erythema or exudate.  There is no sign of any infection. She is NVI. Sutures removed without difficulty.  Good ROM.    Assessment: status post left carpal tunnel release by Dr. Presley on 6/5/20    Plan:  Jo-Ann was seen today for post-op evaluation.    Diagnoses and all orders for this visit:    S/P carpal tunnel release    - PO instruction reviewed and provided to patient  -discussed numbness may or may not resolve, it was constant prior to surgery  -discussed left carpal tunnel release, left ring trigger finger release. She is not interested in proceeding at this time- she will call when ready.   -RTC prn       "

## 2020-06-25 ENCOUNTER — HOSPITAL ENCOUNTER (OUTPATIENT)
Dept: RADIOLOGY | Facility: HOSPITAL | Age: 80
Discharge: HOME | End: 2020-06-25
Attending: INTERNAL MEDICINE
Payer: MEDICARE

## 2020-06-25 ENCOUNTER — OFFICE VISIT (OUTPATIENT)
Dept: HEMATOLOGY/ONCOLOGY | Facility: CLINIC | Age: 80
End: 2020-06-25
Payer: MEDICARE

## 2020-06-25 ENCOUNTER — TELEPHONE (OUTPATIENT)
Dept: RADIOLOGY | Facility: HOSPITAL | Age: 80
End: 2020-06-25

## 2020-06-25 VITALS
OXYGEN SATURATION: 91 % | TEMPERATURE: 99 F | BODY MASS INDEX: 33.04 KG/M2 | DIASTOLIC BLOOD PRESSURE: 85 MMHG | SYSTOLIC BLOOD PRESSURE: 150 MMHG | RESPIRATION RATE: 16 BRPM | WEIGHT: 186.5 LBS | HEIGHT: 63 IN | HEART RATE: 70 BPM

## 2020-06-25 DIAGNOSIS — D05.11 DUCTAL CARCINOMA IN SITU (DCIS) OF RIGHT BREAST: Primary | ICD-10-CM

## 2020-06-25 DIAGNOSIS — D05.11 DUCTAL CARCINOMA IN SITU (DCIS) OF RIGHT BREAST: ICD-10-CM

## 2020-06-25 PROCEDURE — 3077F PR MOST RECENT SYSTOLIC BLOOD PRESSURE >= 140 MM HG: ICD-10-PCS | Mod: CPTII,S$GLB,, | Performed by: INTERNAL MEDICINE

## 2020-06-25 PROCEDURE — 99214 OFFICE O/P EST MOD 30 MIN: CPT | Mod: S$GLB,,, | Performed by: INTERNAL MEDICINE

## 2020-06-25 PROCEDURE — 1159F PR MEDICATION LIST DOCUMENTED IN MEDICAL RECORD: ICD-10-PCS | Mod: S$GLB,,, | Performed by: INTERNAL MEDICINE

## 2020-06-25 PROCEDURE — 1126F PR PAIN SEVERITY QUANTIFIED, NO PAIN PRESENT: ICD-10-PCS | Mod: S$GLB,,, | Performed by: INTERNAL MEDICINE

## 2020-06-25 PROCEDURE — 3079F PR MOST RECENT DIASTOLIC BLOOD PRESSURE 80-89 MM HG: ICD-10-PCS | Mod: CPTII,S$GLB,, | Performed by: INTERNAL MEDICINE

## 2020-06-25 PROCEDURE — 77061 MAMMO DIGITAL DIAGNOSTIC RIGHT WITH TOMOSYNTHESIS_CAD: ICD-10-PCS | Mod: 26,,, | Performed by: RADIOLOGY

## 2020-06-25 PROCEDURE — 77065 MAMMO DIGITAL DIAGNOSTIC RIGHT WITH TOMOSYNTHESIS_CAD: ICD-10-PCS | Mod: 26,,, | Performed by: RADIOLOGY

## 2020-06-25 PROCEDURE — 1101F PR PT FALLS ASSESS DOC 0-1 FALLS W/OUT INJ PAST YR: ICD-10-PCS | Mod: CPTII,S$GLB,, | Performed by: INTERNAL MEDICINE

## 2020-06-25 PROCEDURE — 3079F DIAST BP 80-89 MM HG: CPT | Mod: CPTII,S$GLB,, | Performed by: INTERNAL MEDICINE

## 2020-06-25 PROCEDURE — 99999 PR PBB SHADOW E&M-EST. PATIENT-LVL IV: ICD-10-PCS | Mod: PBBFAC,,, | Performed by: INTERNAL MEDICINE

## 2020-06-25 PROCEDURE — 99999 PR PBB SHADOW E&M-EST. PATIENT-LVL IV: CPT | Mod: PBBFAC,,, | Performed by: INTERNAL MEDICINE

## 2020-06-25 PROCEDURE — 77065 DX MAMMO INCL CAD UNI: CPT | Mod: TC,PO

## 2020-06-25 PROCEDURE — 3077F SYST BP >= 140 MM HG: CPT | Mod: CPTII,S$GLB,, | Performed by: INTERNAL MEDICINE

## 2020-06-25 PROCEDURE — 77061 BREAST TOMOSYNTHESIS UNI: CPT | Mod: TC,PO

## 2020-06-25 PROCEDURE — 1101F PT FALLS ASSESS-DOCD LE1/YR: CPT | Mod: CPTII,S$GLB,, | Performed by: INTERNAL MEDICINE

## 2020-06-25 PROCEDURE — 1159F MED LIST DOCD IN RCRD: CPT | Mod: S$GLB,,, | Performed by: INTERNAL MEDICINE

## 2020-06-25 PROCEDURE — 77065 DX MAMMO INCL CAD UNI: CPT | Mod: 26,,, | Performed by: RADIOLOGY

## 2020-06-25 PROCEDURE — 77061 BREAST TOMOSYNTHESIS UNI: CPT | Mod: 26,,, | Performed by: RADIOLOGY

## 2020-06-25 PROCEDURE — 99214 PR OFFICE/OUTPT VISIT, EST, LEVL IV, 30-39 MIN: ICD-10-PCS | Mod: S$GLB,,, | Performed by: INTERNAL MEDICINE

## 2020-06-25 PROCEDURE — 1126F AMNT PAIN NOTED NONE PRSNT: CPT | Mod: S$GLB,,, | Performed by: INTERNAL MEDICINE

## 2020-06-25 NOTE — PROGRESS NOTES
Subjective:       Patient ID: Jo-Ann Escobedo is a 79 y.o. female.    Chief Complaint: No chief complaint on file.    HPI   Ms. Escobedo presents today for follow up.  Briefly, she is a 79 year-old female with a remote history of breast cancer treated 17 years ago with a left modified radical   mastectomy.  Her tumor was a T2 N1 M0 carcinoma.  She was treated with four cycles of AC and 4 cycles of Taxotere in the adjuvant setting and has remained cancer free since then.   A mammogram in the spring of 2019 led to a contralateral biopsy and eventually to a lumpectomy on June 26th, 2019, for an area of DCIS, measuring 12mm.  Resection margins were clear, with the closest being 14 mm.  Her tumor was ER positive and IN negative.   She met with the radiation oncologist and decided against XRT.  She subsequently decided against adjuvant hormonal therapy and has been followed expectantly since.    Unfortunately today's mammogram showed a 6 mm area of coarse heterogeneous calcifications in a grouped distribution in the upper outer quadrant of the right breast.  A biopsy was recommended and will be performed next week.    Review of Systems  Overall she feels well and she has no complaints.   Her ECOG PS remains 0.   She is anxious with today's development but she denies any depression, easy bruising, fevers, chills, night sweats, weight loss, nausea, vomiting, diarrhea, constipation, diplopia, blurred vision headache, chest pain, abdominal pain, or difficulty ambulating. All other systems are negative.     Objective:      Physical Exam  GENERAL: She is alert, oriented to time, place, person; well nourished;   pleasant; in no acute physical distress.    VITAL SIGNS: Reviewed.   HEENT: Normal. There are no nasal, oral, lip, gingival, auricular, lid,   or conjunctival lesions. Mucosae are moist and pink, and there is no   thrush. Pupils are equal, reactive to light and accommodation.   Extraocular muscle movements are intact.    NECK: Supple without JVD, thyromegaly, or adenopathy.   LUNGS: Clear to auscultation without wheezing, rales, or rhonchi.   CARDIOVASCULAR: Reveals an S1, S2, no murmurs, no rubs, no gallops.   BREASTS: She is status post left mastectomy with no evidence of chest wall   recurrence. There are no masses in the right breast.   There is a scar form her recent lumpectomy in the upper outer quadrant of the right breast.    ABDOMEN: Soft, nontender without organomegaly. Bowel sounds are identified.   EXTREMITIES: Have no cyanosis, clubbing, or edema.  There is a healing scar from a recent carpal tunnel release surgery on the left wrist.  SKIN: Does not have petechiae, rashes, induration, or ecchymoses.   NEUROLOGIC: Motor function is 5/5, DTRs are 0-1+ bilaterally, symmetrical,   and cranial nerves within normal limits.   LYMPHATIC: There is no cervical, axillary, or supraclavicular adenopathy.    Assessment:       1. Remote history of breast cancer.    2.     Recent diagnosis of contralateral DCIS, now s/p lumpectomy, doing well.   3.       Recent BIRADS IV right mammogram.  Plan:        I had along discussion with Mrs Escobedo.   I have asked her to proceed with a biopsy and I will see her on July 6th for follow-up.  If she has recurrent DCIS I would advise her to proceed with a mastectomy and pointed out that this would give her 99% chance of a cure.  We will have a detailed discussion once her biopsy results are available.  Her multiple questions were answered to her satisfaction.

## 2020-06-25 NOTE — TELEPHONE ENCOUNTER
Spoke with patient. Reviewed breast biopsy procedure and reviewed instructions for breast biopsy. Patient expressed understanding and all questions were answered. Provided patient with my phone number to call for any further concerns or questions.   Patient scheduled breast biopsy at the [facility] on 6/29/2020.

## 2020-06-29 ENCOUNTER — HOSPITAL ENCOUNTER (OUTPATIENT)
Dept: RADIOLOGY | Facility: HOSPITAL | Age: 80
Discharge: HOME | End: 2020-06-29
Attending: INTERNAL MEDICINE
Payer: MEDICARE

## 2020-06-29 DIAGNOSIS — R92.8 ABNORMAL MAMMOGRAM: ICD-10-CM

## 2020-06-29 PROCEDURE — 88305 TISSUE EXAM BY PATHOLOGIST: CPT | Performed by: PATHOLOGY

## 2020-06-29 PROCEDURE — 88360 TUMOR IMMUNOHISTOCHEM/MANUAL: CPT | Mod: 59 | Performed by: PATHOLOGY

## 2020-06-29 PROCEDURE — 88305 TISSUE EXAM BY PATHOLOGIST: CPT | Mod: 26,,, | Performed by: PATHOLOGY

## 2020-06-29 PROCEDURE — 88341 IMHCHEM/IMCYTCHM EA ADD ANTB: CPT | Performed by: PATHOLOGY

## 2020-06-29 PROCEDURE — 88360 TUMOR IMMUNOHISTOCHEM/MANUAL: CPT | Mod: 26,,, | Performed by: PATHOLOGY

## 2020-06-29 PROCEDURE — 88341 PR IHC OR ICC EACH ADD'L SINGLE ANTIBODY  STAINPR: ICD-10-PCS | Mod: 26,59,, | Performed by: PATHOLOGY

## 2020-06-29 PROCEDURE — 25000003 PHARM REV CODE 250: Mod: PO | Performed by: INTERNAL MEDICINE

## 2020-06-29 PROCEDURE — 88360 PR  TUMOR IMMUNOHISTOCHEM/MANUAL: ICD-10-PCS | Mod: 26,,, | Performed by: PATHOLOGY

## 2020-06-29 PROCEDURE — 88342 IMHCHEM/IMCYTCHM 1ST ANTB: CPT | Performed by: PATHOLOGY

## 2020-06-29 PROCEDURE — 19081 MAMMO BREAST STEREOTACTIC BREAST BIOPSY RIGHT: ICD-10-PCS | Mod: RT,,, | Performed by: RADIOLOGY

## 2020-06-29 PROCEDURE — 88342 CHG IMMUNOCYTOCHEMISTRY: ICD-10-PCS | Mod: 26,59,, | Performed by: PATHOLOGY

## 2020-06-29 PROCEDURE — 88341 IMHCHEM/IMCYTCHM EA ADD ANTB: CPT | Mod: 26,59,, | Performed by: PATHOLOGY

## 2020-06-29 PROCEDURE — 19081 BX BREAST 1ST LESION STRTCTC: CPT | Mod: RT,,, | Performed by: RADIOLOGY

## 2020-06-29 PROCEDURE — 88305 TISSUE EXAM BY PATHOLOGIST: ICD-10-PCS | Mod: 26,,, | Performed by: PATHOLOGY

## 2020-06-29 PROCEDURE — 88342 IMHCHEM/IMCYTCHM 1ST ANTB: CPT | Mod: 26,59,, | Performed by: PATHOLOGY

## 2020-06-29 PROCEDURE — 27200939 MAMMO BREAST STEREOTACTIC BREAST BIOPSY RIGHT: Mod: PO

## 2020-06-29 RX ORDER — LIDOCAINE HYDROCHLORIDE AND EPINEPHRINE 20; 10 MG/ML; UG/ML
20 INJECTION, SOLUTION INFILTRATION; PERINEURAL ONCE
Status: COMPLETED | OUTPATIENT
Start: 2020-06-29 | End: 2020-06-29

## 2020-06-29 RX ORDER — LIDOCAINE HYDROCHLORIDE 10 MG/ML
3 INJECTION, SOLUTION EPIDURAL; INFILTRATION; INTRACAUDAL; PERINEURAL ONCE
Status: COMPLETED | OUTPATIENT
Start: 2020-06-29 | End: 2020-06-29

## 2020-06-29 RX ADMIN — LIDOCAINE HYDROCHLORIDE 3 ML: 10 INJECTION, SOLUTION EPIDURAL; INFILTRATION; INTRACAUDAL; PERINEURAL at 03:06

## 2020-06-29 RX ADMIN — LIDOCAINE HYDROCHLORIDE,EPINEPHRINE BITARTRATE 20 ML: 20; .01 INJECTION, SOLUTION INFILTRATION; PERINEURAL at 03:06

## 2020-07-01 ENCOUNTER — TELEPHONE (OUTPATIENT)
Dept: HEMATOLOGY/ONCOLOGY | Facility: CLINIC | Age: 80
End: 2020-07-01

## 2020-07-01 ENCOUNTER — DOCUMENTATION ONLY (OUTPATIENT)
Dept: SURGERY | Facility: CLINIC | Age: 80
End: 2020-07-01

## 2020-07-01 NOTE — NURSING
Called Patient with results of breast biopsy from 6/29/2020.  Explained that the biopsy showed DCIS . Discussed what this means and that the next step is to meet with a breast surgeon. An appt was made for 7/8/2020 with Dr. Dill. Patient has follow up with Dr. Mueller on 7/6/2020.  Reviewed location of breast center. Patient verbalized understanding.  Oncology Navigation   Intake  Date of Diagnosis: 06/29/20  Cancer Type: Breast  MD Assigned: Dr. Dill  Internal / External Referral: Internal  Initial Nurse Navigator Contact: 07/01/20  Diagnosis to Initial Contact Timeline (days): 2 days  Contact Method: Phone  Date Worked: 07/01/20  First Appointment Available: 07/08/20  Appointment Date: 07/08/20  Schedule to Appointment Timeline (days): 7  First Available Date vs. Scheduled Date (days): 0     Treatment  Current Status: Staging work-up       Procedures: Biopsy  Biopsy Schedule Date: 06/29/20           Acuity      Follow Up  No follow-ups on file.

## 2020-07-01 NOTE — TELEPHONE ENCOUNTER
----- Message from June De Guzman RN sent at 7/1/2020 12:02 PM CDT -----  Regarding: FW: Pt asked if she can get a later time for appt too early    ----- Message -----  From: Carol Messina  Sent: 7/1/2020  11:56 AM CDT  To: [facility] Staff  Subject: Pt asked if she can get a later time for matteo#    Type:  Needs Medical Advice    Who Called: Jo-Ann Escobedo  Would the patient rather a call back or a response via [facility]? Callback   Best Call Back Number: [phone]  Additional Information: Pt need to see if she can get a later time

## 2020-07-02 LAB
FINAL PATHOLOGIC DIAGNOSIS: NORMAL
GROSS: NORMAL
Lab: NORMAL
MICROSCOPIC EXAM: NORMAL
SUPPLEMENTAL DIAGNOSIS: NORMAL

## 2020-07-07 ENCOUNTER — OFFICE VISIT (OUTPATIENT)
Dept: HEMATOLOGY/ONCOLOGY | Facility: CLINIC | Age: 80
End: 2020-07-07
Payer: MEDICARE

## 2020-07-07 VITALS
HEART RATE: 75 BPM | DIASTOLIC BLOOD PRESSURE: 78 MMHG | RESPIRATION RATE: 16 BRPM | BODY MASS INDEX: 33.36 KG/M2 | OXYGEN SATURATION: 95 % | HEIGHT: 63 IN | SYSTOLIC BLOOD PRESSURE: 159 MMHG | TEMPERATURE: 98 F | WEIGHT: 188.25 LBS

## 2020-07-07 DIAGNOSIS — D05.11 DUCTAL CARCINOMA IN SITU (DCIS) OF RIGHT BREAST: Primary | ICD-10-CM

## 2020-07-07 PROCEDURE — 99999 PR PBB SHADOW E&M-EST. PATIENT-LVL IV: ICD-10-PCS | Mod: PBBFAC,,, | Performed by: INTERNAL MEDICINE

## 2020-07-07 PROCEDURE — 1126F AMNT PAIN NOTED NONE PRSNT: CPT | Mod: S$GLB,,, | Performed by: INTERNAL MEDICINE

## 2020-07-07 PROCEDURE — 1126F PR PAIN SEVERITY QUANTIFIED, NO PAIN PRESENT: ICD-10-PCS | Mod: S$GLB,,, | Performed by: INTERNAL MEDICINE

## 2020-07-07 PROCEDURE — 3078F PR MOST RECENT DIASTOLIC BLOOD PRESSURE < 80 MM HG: ICD-10-PCS | Mod: CPTII,S$GLB,, | Performed by: INTERNAL MEDICINE

## 2020-07-07 PROCEDURE — 99214 PR OFFICE/OUTPT VISIT, EST, LEVL IV, 30-39 MIN: ICD-10-PCS | Mod: S$GLB,,, | Performed by: INTERNAL MEDICINE

## 2020-07-07 PROCEDURE — 1101F PR PT FALLS ASSESS DOC 0-1 FALLS W/OUT INJ PAST YR: ICD-10-PCS | Mod: CPTII,S$GLB,, | Performed by: INTERNAL MEDICINE

## 2020-07-07 PROCEDURE — 99214 OFFICE O/P EST MOD 30 MIN: CPT | Mod: S$GLB,,, | Performed by: INTERNAL MEDICINE

## 2020-07-07 PROCEDURE — 3077F PR MOST RECENT SYSTOLIC BLOOD PRESSURE >= 140 MM HG: ICD-10-PCS | Mod: CPTII,S$GLB,, | Performed by: INTERNAL MEDICINE

## 2020-07-07 PROCEDURE — 99999 PR PBB SHADOW E&M-EST. PATIENT-LVL IV: CPT | Mod: PBBFAC,,, | Performed by: INTERNAL MEDICINE

## 2020-07-07 PROCEDURE — 1159F MED LIST DOCD IN RCRD: CPT | Mod: S$GLB,,, | Performed by: INTERNAL MEDICINE

## 2020-07-07 PROCEDURE — 1159F PR MEDICATION LIST DOCUMENTED IN MEDICAL RECORD: ICD-10-PCS | Mod: S$GLB,,, | Performed by: INTERNAL MEDICINE

## 2020-07-07 PROCEDURE — 3077F SYST BP >= 140 MM HG: CPT | Mod: CPTII,S$GLB,, | Performed by: INTERNAL MEDICINE

## 2020-07-07 PROCEDURE — 1101F PT FALLS ASSESS-DOCD LE1/YR: CPT | Mod: CPTII,S$GLB,, | Performed by: INTERNAL MEDICINE

## 2020-07-07 PROCEDURE — 3078F DIAST BP <80 MM HG: CPT | Mod: CPTII,S$GLB,, | Performed by: INTERNAL MEDICINE

## 2020-07-07 NOTE — H&P (VIEW-ONLY)
Breast Surgery  [facility]  Department of Surgery      REFERRING PROVIDER: No referring provider defined for this encounter.    Chief Complaint: Local recurrence of right breast DCIS    Subjective:      Patient ID: Jo-Ann Escobdeo is a 79 y.o. female who presents with a local recurrence of right breast DCIS. She has a remote history of breast cancer treated 17 years ago with a left modified radical mastectomy.  Her tumor was a T2 N1 M0 carcinoma. Then, a mammogram in the spring of 2019 led to a contralateral biopsy and eventually to a lumpectomy on 2019, for an area of DCIS, measuring 12mm.  Resection margins were clear, with the closest being 14 mm.  Her tumor was ER positive and ND negative.   She met with the radiation oncologist and decided against XRT.  She subsequently decided against adjuvant hormonal therapy. Her recent mammogram showed a 0.6 cm area of calcifications which was biopsied as DCIS on 2020.    Findings at that time were the following:   Tumor size: 0.5 cm   Tumor stgstrstastdstest:st st1st Estrogen Receptor: + 100%  Progesterone Receptor: + 80%  Lymph node status:  Clinically negative     GYN History:  Menarche age 12.  .  Hysterectomy at the age of 38.  She has several year history of hormone replacement therapy which was discontinued after .    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    Squamous Cell Carcinoma     in situ right neck      Past Surgical History:   Procedure Laterality Date    BREAST BIOPSY Right 2019    BREAST LUMPECTOMY Right 2019    CARPAL TUNNEL RELEASE Left 6/5/2020    Procedure: RELEASE, CARPAL TUNNEL Left;  Surgeon: Pricila Presley MD;  Location: [facility];  Service: Orthopedics;  Laterality: Left;    CATARACT EXTRACTION W/  INTRAOCULAR LENS IMPLANT Bilateral     COLONOSCOPY N/A 4/15/2019    Procedure: COLONOSCOPY;  Surgeon: Artur Monet MD;  Location: [facility] (4TH FLR);  Service: Endoscopy;  Laterality: N/A;  within 1 month    COLONOSCOPY N/A 12/9/2019    Procedure: COLONOSCOPY;  Surgeon: Clyde Welch MD;  Location: [facility] (2ND FLR);  Service: Endoscopy;  Laterality: N/A;    ENDOSCOPIC ULTRASOUND OF UPPER GASTROINTESTINAL TRACT N/A 1/22/2020    Procedure: ULTRASOUND, UPPER GI TRACT, ENDOSCOPIC;  Surgeon: Eleazar Shaffer MD;  Location: [facility] (2ND FLR);  Service: Endoscopy;  Laterality: N/A;  EUS for 10mm SML w/negative pillow sign and negative naked fat sign which was found at the incisura.  Dr Welch    ESOPHAGOGASTRODUODENOSCOPY N/A 12/7/2019    Procedure: EGD (ESOPHAGOGASTRODUODENOSCOPY);  Surgeon: Clyde Welch MD;  Location: [facility] (2ND FLR);  Service: Endoscopy;  Laterality: N/A;    EXCISIONAL BIOPSY Right 6/27/2019    Procedure: EXCISIONAL BIOPSY-breast;  Surgeon: Suki Dill MD;  Location: [facility] OR 2ND FLR;  Service: General;  Laterality: Right;    EYE SURGERY      HYSTERECTOMY       INJECTION OF ANESTHETIC AGENT AROUND MEDIAL BRANCH NERVES INNERVATING LUMBAR FACET JOINT Bilateral 6/7/2019    Procedure: BLOCK, NERVE, FACET JOINT, LUMBAR, MEDIAL BRANCH-deo MBB L4/5,L5/S1;  Surgeon: Jarvis Morales III, MD;  Location: [facility] CATH LAB;  Service: Pain Management;  Laterality: Bilateral;    INJECTION OF STEROID Right 6/5/2020    Procedure: INJECTION, STEROID right carpal tunel injection/ Right ring trigger finger injection;  Surgeon: Pricila Presley MD;  Location: [facility] OR;  Service: Orthopedics;  Laterality: Right;  INJECTION, STEROID right carpal tunel injection/ Right ring trigger finger injection    KNEE ARTHROSCOPY N/A     pt unsure of which knee     MASTECTOMY      OOPHORECTOMY      SPINE SURGERY      TOTAL THYROIDECTOMY      YAG Laser Capsulotomy  Left 07/29/2019    Dr. Hahn     Current Outpatient Medications on File Prior to Visit   Medication Sig Dispense Refill    acetaminophen (TYLENOL) 650 MG TbSR Take 1 tablet (650 mg total) by mouth 3 (three) times daily as needed. 42 tablet 0    cholecalciferol, vitamin D3, (VITAMIN D3) 2,000 unit Tab Take 1 tablet by mouth once daily.      co-enzyme Q-10 30 mg capsule Take 30 mg by mouth once daily.       cyanocobalamin 1,000 mcg/mL injection Inject 1 mL (1,000 mcg total) into the muscle every 30 days. 10 mL 1    fenofibrate 160 MG Tab TAKE 1 TABLET (160 MG TOTAL) BY MOUTH ONCE DAILY. 90 tablet 3    LACTOBACILLUS COMBINATION NO.8 (ADULT PROBIOTIC ORAL) Take by mouth once daily.       levothyroxine (SYNTHROID) 125 MCG tablet Take 1 tablet (125 mcg total) by mouth before breakfast. 30 tablet 11    losartan (COZAAR) 50 MG tablet TAKE 1 TABLET BY MOUTH EVERY DAY 90 tablet 3    meclizine (ANTIVERT) 12.5 mg tablet Take 1 tablet (12.5 mg total) by mouth 3 (three) times daily as needed for Dizziness. 30 tablet 2    turmeric root extract 500 mg Cap Take by mouth once daily.        Current Facility-Administered Medications  on File Prior to Visit   Medication Dose Route Frequency Provider Last Rate Last Dose    0.9%  NaCl infusion   Intravenous Continuous Leandro Avila MD   1,000 mL at 20 0837    mupirocin 2 % ointment   Nasal On Call Procedure Leandro Avila MD         Social History     Socioeconomic History    Marital status:      Spouse name: Not on file    Number of children: Not on file    Years of education: Not on file    Highest education level: Not on file   Occupational History    Occupation: realtor     Employer: [employer]     Employer: [employer]   Social Needs    Financial resource strain: Not hard at all    Food insecurity     Worry: Never true     Inability: Never true    Transportation needs     Medical: No     Non-medical: No   Tobacco Use    Smoking status: Former Smoker     Packs/day: 2.00     Years: 44.00     Pack years: 88.00     Types: Cigarettes     Quit date: 1969     Years since quittin.5    Smokeless tobacco: Never Used   Substance and Sexual Activity    Alcohol use: Yes     Alcohol/week: 0.0 standard drinks     Frequency: Monthly or less     Drinks per session: 1 or 2     Binge frequency: Never     Comment: maybe a beer every 3 months    Drug use: No    Sexual activity: Not Currently   Lifestyle    Physical activity     Days per week: 5 days     Minutes per session: 20 min    Stress: Not at all   Relationships    Social connections     Talks on phone: More than three times a week     Gets together: Twice a week     Attends Spiritism service: Never     Active member of club or organization: Yes     Attends meetings of clubs or organizations: More than 4 times per year     Relationship status:    Other Topics Concern    Are you pregnant or think you may be? No    Breast-feeding No   Social History Narrative    Not on file     Family History   Problem Relation Age of Onset    Cancer Father         lung    Dementia Mother      "Glaucoma Brother     Macular degeneration Brother     Diabetes Neg Hx     Amblyopia Neg Hx     Blindness Neg Hx     Cataracts Neg Hx     Retinal detachment Neg Hx     Strabismus Neg Hx     Melanoma Neg Hx         Review of Systems   Constitutional: Negative for appetite change, chills, fever and unexpected weight change.   HENT: Negative for facial swelling, postnasal drip and sore throat.    Eyes: Negative for redness and itching.   Respiratory: Negative for chest tightness and shortness of breath.    Cardiovascular: Negative for chest pain and palpitations.   Gastrointestinal: Negative for blood in stool, diarrhea, nausea and vomiting.   Genitourinary: Negative for difficulty urinating and dysuria.   Musculoskeletal: Positive for arthralgias. Negative for joint swelling.   Skin: Negative for rash and wound.   Neurological: Negative for dizziness and syncope.   Hematological: Negative for adenopathy.   Psychiatric/Behavioral: Negative for agitation. The patient is not nervous/anxious.      Objective:   BP (!) 153/67 (BP Location: Right arm, Patient Position: Sitting, BP Method: Large (Automatic))   Pulse 64   Ht 5' 3" (1.6 m)   Wt 84.8 kg (187 lb)   LMP  (LMP Unknown)   BMI 33.13 kg/m²       Physical Exam   Constitutional: She appears well-developed and well-nourished.   HENT:   Head: Normocephalic.   Eyes: No scleral icterus.   Neck: Neck supple. No tracheal deviation present.   Cardiovascular: Normal rate and regular rhythm.    Pulmonary/Chest: Breath sounds normal. No respiratory distress. Right breast exhibits no inverted nipple, no mass, no nipple discharge and no skin change. Left breast exhibits no inverted nipple, no mass, no nipple discharge and no skin change.   Abdominal: Soft. She exhibits no mass. There is no abdominal tenderness.   Musculoskeletal: No edema.   Lymphadenopathy:     She has no cervical adenopathy.   Neurological: She is alert.   Skin: No rash noted. No erythema. "     Psychiatric: She has a normal mood and affect.       Radiology review: Images personally reviewed by me in the clinic.   Mammogram: 6/25/2020  Impression:  Right  Calcifications: Right breast 6 mm calcifications at the upper outer position. Assessment: 4 - Suspicious finding. Biopsy is recommended.   BI-RADS Category:   Overall: 4 - Suspicious    Pathology:  Right breast, biopsy:   -Ductal carcinoma in situ (DCIS), solid pattern, nuclear grade 2   -DCIS measures 5 mm (0.5 cm) in greatest linear dimension within the core   biopsy   -Microcalcifications associated with areas of DCIS  Estrogen receptor (ER):  Positive (100%, strong)   Progesterone receptor (KS):  Positive (80%, moderate to strong)   Assessment:       1. Preop testing    2. Ductal carcinoma in situ (DCIS) of right breast        Plan:     Options for management were discussed with the patient and her family. We reviewed the existing data noting the equivalency of breast conserving surgery with radiation therapy and mastectomy. We also reviewed the guidelines of the National Comprehensive Cancer Network for Stage 0 breast carcinoma. We discussed the need for lumpectomy margins to be negative for carcinoma, the necessity for postoperative radiation therapy after breast conservation in most cases, the possibility of a failed or false negative sentinel lymph node biopsy and the potential need for complete lymphadenectomy for a failed or positive sentinel lymph node biopsy were fully discussed. In the setting of mastectomy, delayed or immediate reconstruction options are available and were discussed.     In the setting of lumpectomy, radiation therapy would be recommended majority of the time.  The duration and treatment side effects were discussed with the patient.  This will coordinated with the radiation oncologist pending final pathology.    We also discussed the role of systemic therapy in the treatment of early stage breast cancer.  We discussed  that this is based on tumor biology and shira status and will be determined based on final pathology.  We discussed that if the cancer is hormone positive, endocrine therapy would be recommended in most cases and its use can reduce the risk of recurrence as well as improve survival. Side effects of treatment were briefly discussed. We also discussed the potential role for chemotherapy based on a number of factors such as tumor phenotype (ER+ vs. triple negative vs. Iyo3mnb+) and this would be determined in coordination with the medical oncologist.    The patient, in consultation with her family, has elected to proceed with right total mastectomy and sentinel lymph node biopsy.  Did discuss that she had the option of lumpectomy since she denied radiation on that side.  She does not want have further concern about this and wishes to proceed with a mastectomy.  She is not interested in reconstruction.  The operative risks of bleeding, infection, recurrence, scarring, and anesthetic complications and the possibility of requiring further surgery were all noted and informed consent obtained.    Surgery scheduled. Follow-up in clinic roughly 14 days after surgery.     Patient was educated on breast cancer, receptors, wire localization lumpectomy, mastectomy, sentinel lymph node mapping and biopsy, axillary lymph node dissection, reconstruction, breast prosthesis with post-mastectomy bra and radiation therapy. Patient was given patient information binder including [facility] breast cancer treatment brochure.  All her questions were answered.    Total time spent with the patient: 60 minutes.  45 minutes of face to face consultation and 15 minutes of chart review and coordination of care.

## 2020-07-07 NOTE — PROGRESS NOTES
Subjective:       Patient ID: Jo-Ann Escobedo is a 79 y.o. female.    Chief Complaint: No chief complaint on file.    HPI   Ms. Escobedo presents today for follow up.  On July 2, 2020 she underwent a biopsy of the area in the right breast that was read as BI-RADS 4 on the recent mammogram.  The biopsy shows evidence of DCIS which was ER positive.    Briefly, she is a 79 year-old female with a remote history of breast cancer treated 17 years ago with a left modified radical mastectomy.  Her tumor was a T2 N1 M0 carcinoma.  She was treated with four cycles of AC and 4 cycles of Taxotere in the adjuvant setting and has remained cancer free since then.   A mammogram in the spring of 2019 led to a contralateral biopsy and eventually to a lumpectomy on June 26th, 2019, for an area of DCIS, measuring 12mm.  Resection margins were clear, with the closest being 14 mm.  Her tumor was ER positive and KS negative.   She met with the radiation oncologist and decided against XRT.  She subsequently decided against adjuvant hormonal therapy and has been followed expectantly since.    Unfortunately her recent mammogram showed a 6 mm area of coarse heterogeneous calcifications in a grouped distribution in the upper outer quadrant of the right breast.  A biopsy was performed earlier this month with results as above.    Review of Systems  Overall she feels well and she has no complaints.   Her ECOG PS remains 0.   She is anxious with the a recurrence but she denies any depression, easy bruising, fevers, chills, night sweats, weight loss, nausea, vomiting, diarrhea, constipation, diplopia, blurred vision headache, chest pain, abdominal pain, or difficulty ambulating. All other systems are negative.     Objective:      Physical Exam  GENERAL: She is alert, oriented to time, place, person; well nourished;   pleasant; in no acute physical distress.  She is accompanied by her daughter.  VITAL SIGNS: Reviewed.   HEENT: Normal. There are no  nasal, oral, lip, gingival, auricular, lid,   or conjunctival lesions. Mucosae are moist and pink, and there is no   thrush. Pupils are equal, reactive to light and accommodation.   Extraocular muscle movements are intact.   NECK: Supple without JVD, thyromegaly, or adenopathy.   LUNGS: Clear to auscultation without wheezing, rales, or rhonchi.   CARDIOVASCULAR: Reveals an S1, S2, no murmurs, no rubs, no gallops.   BREASTS: She is status post left mastectomy with no evidence of chest wall   recurrence. There are no masses in the right breast.   There is a scar form her recent lumpectomy in the upper outer quadrant of the right breast.  No masses are palpated in the right breast.  A large ecchymosis from the recent biopsy is identified.  ABDOMEN: Soft, nontender without organomegaly. Bowel sounds are identified.   EXTREMITIES: Have no cyanosis, clubbing, or edema.    SKIN: Does not have petechiae, rashes, ot induration.  There is a large ecchymosis from the recent biopsy laterally on the right breast.  NEUROLOGIC: Motor function is 5/5, DTRs are 0-1+ bilaterally, symmetrical,   and cranial nerves within normal limits.   LYMPHATIC: There is no cervical, axillary, or supraclavicular adenopathy.    Assessment:       1. Remote history of breast cancer status post left mastectomy and chemotherapy 17 years ago..    2.     Contralateral DCIS, s/p lumpectomy, now with a local recurrence.    Plan:        I had a long discussion with Mrs Escobedo and her daughter.  Mrs. Escobedo has an appointment to meet with Dr. Dill tomorrow.  We discussed the option of a mastectomy versus another lumpectomy.  I have given her the disease-free survival rates with lumpectomy, lumpectomy plus radiation, and mastectomy.  She will discuss with her surgeon tomorrow and make a decision.  Assuming that she has surgery by the end of the month, I will see her in mid August for follow-up.  Her multiple questions were answered to her  satisfaction.  Duration of visit was 25 minutes.

## 2020-07-07 NOTE — PROGRESS NOTES
Breast Surgery  [facility]  Department of Surgery      REFERRING PROVIDER: No referring provider defined for this encounter.    Chief Complaint: Local recurrence of right breast DCIS    Subjective:      Patient ID: Jo-Ann Escobedo is a 79 y.o. female who presents with a local recurrence of right breast DCIS. She has a remote history of breast cancer treated 17 years ago with a left modified radical mastectomy.  Her tumor was a T2 N1 M0 carcinoma. Then, a mammogram in the spring of 2019 led to a contralateral biopsy and eventually to a lumpectomy on 2019, for an area of DCIS, measuring 12mm.  Resection margins were clear, with the closest being 14 mm.  Her tumor was ER positive and NV negative.   She met with the radiation oncologist and decided against XRT.  She subsequently decided against adjuvant hormonal therapy. Her recent mammogram showed a 0.6 cm area of calcifications which was biopsied as DCIS on 2020.    Findings at that time were the following:   Tumor size: 0.5 cm   Tumor rdgrdrrdarddrderd:rd rd3rd Estrogen Receptor: + 100%  Progesterone Receptor: + 80%  Lymph node status:  Clinically negative     GYN History:  Menarche age 12.  .  Hysterectomy at the age of 38.  She has several year history of hormone replacement therapy which was discontinued after .    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    Squamous Cell Carcinoma     in situ right neck      Past Surgical History:   Procedure Laterality Date    BREAST BIOPSY Right 2019    BREAST LUMPECTOMY Right 2019    CARPAL TUNNEL RELEASE Left 6/5/2020    Procedure: RELEASE, CARPAL TUNNEL Left;  Surgeon: Pricila Presley MD;  Location: [facility];  Service: Orthopedics;  Laterality: Left;    CATARACT EXTRACTION W/  INTRAOCULAR LENS IMPLANT Bilateral     COLONOSCOPY N/A 4/15/2019    Procedure: COLONOSCOPY;  Surgeon: Artur Monet MD;  Location: [facility] (4TH FLR);  Service: Endoscopy;  Laterality: N/A;  within 1 month    COLONOSCOPY N/A 12/9/2019    Procedure: COLONOSCOPY;  Surgeon: Clyde Welch MD;  Location: [facility] (2ND FLR);  Service: Endoscopy;  Laterality: N/A;    ENDOSCOPIC ULTRASOUND OF UPPER GASTROINTESTINAL TRACT N/A 1/22/2020    Procedure: ULTRASOUND, UPPER GI TRACT, ENDOSCOPIC;  Surgeon: Eleazar Shaffer MD;  Location: [facility] (2ND FLR);  Service: Endoscopy;  Laterality: N/A;  EUS for 10mm SML w/negative pillow sign and negative naked fat sign which was found at the incisura.  Dr Welch    ESOPHAGOGASTRODUODENOSCOPY N/A 12/7/2019    Procedure: EGD (ESOPHAGOGASTRODUODENOSCOPY);  Surgeon: Clyde Welch MD;  Location: [facility] (2ND FLR);  Service: Endoscopy;  Laterality: N/A;    EXCISIONAL BIOPSY Right 6/27/2019    Procedure: EXCISIONAL BIOPSY-breast;  Surgeon: Suki Dill MD;  Location: [facility] OR 2ND FLR;  Service: General;  Laterality: Right;    EYE SURGERY      HYSTERECTOMY       INJECTION OF ANESTHETIC AGENT AROUND MEDIAL BRANCH NERVES INNERVATING LUMBAR FACET JOINT Bilateral 6/7/2019    Procedure: BLOCK, NERVE, FACET JOINT, LUMBAR, MEDIAL BRANCH-deo MBB L4/5,L5/S1;  Surgeon: Jarvis Morales III, MD;  Location: [facility] CATH LAB;  Service: Pain Management;  Laterality: Bilateral;    INJECTION OF STEROID Right 6/5/2020    Procedure: INJECTION, STEROID right carpal tunel injection/ Right ring trigger finger injection;  Surgeon: Pricila Presley MD;  Location: [facility] OR;  Service: Orthopedics;  Laterality: Right;  INJECTION, STEROID right carpal tunel injection/ Right ring trigger finger injection    KNEE ARTHROSCOPY N/A     pt unsure of which knee     MASTECTOMY      OOPHORECTOMY      SPINE SURGERY      TOTAL THYROIDECTOMY      YAG Laser Capsulotomy  Left 07/29/2019    Dr. Hahn     Current Outpatient Medications on File Prior to Visit   Medication Sig Dispense Refill    acetaminophen (TYLENOL) 650 MG TbSR Take 1 tablet (650 mg total) by mouth 3 (three) times daily as needed. 42 tablet 0    cholecalciferol, vitamin D3, (VITAMIN D3) 2,000 unit Tab Take 1 tablet by mouth once daily.      co-enzyme Q-10 30 mg capsule Take 30 mg by mouth once daily.       cyanocobalamin 1,000 mcg/mL injection Inject 1 mL (1,000 mcg total) into the muscle every 30 days. 10 mL 1    fenofibrate 160 MG Tab TAKE 1 TABLET (160 MG TOTAL) BY MOUTH ONCE DAILY. 90 tablet 3    LACTOBACILLUS COMBINATION NO.8 (ADULT PROBIOTIC ORAL) Take by mouth once daily.       levothyroxine (SYNTHROID) 125 MCG tablet Take 1 tablet (125 mcg total) by mouth before breakfast. 30 tablet 11    losartan (COZAAR) 50 MG tablet TAKE 1 TABLET BY MOUTH EVERY DAY 90 tablet 3    meclizine (ANTIVERT) 12.5 mg tablet Take 1 tablet (12.5 mg total) by mouth 3 (three) times daily as needed for Dizziness. 30 tablet 2    turmeric root extract 500 mg Cap Take by mouth once daily.        Current Facility-Administered Medications  on File Prior to Visit   Medication Dose Route Frequency Provider Last Rate Last Dose    0.9%  NaCl infusion   Intravenous Continuous Leandro Avila MD   1,000 mL at 20 0837    mupirocin 2 % ointment   Nasal On Call Procedure Leandro Avila MD         Social History     Socioeconomic History    Marital status:      Spouse name: Not on file    Number of children: Not on file    Years of education: Not on file    Highest education level: Not on file   Occupational History    Occupation: realtor     Employer: [employer]     Employer: [employer]   Social Needs    Financial resource strain: Not hard at all    Food insecurity     Worry: Never true     Inability: Never true    Transportation needs     Medical: No     Non-medical: No   Tobacco Use    Smoking status: Former Smoker     Packs/day: 2.00     Years: 44.00     Pack years: 88.00     Types: Cigarettes     Quit date: 1969     Years since quittin.5    Smokeless tobacco: Never Used   Substance and Sexual Activity    Alcohol use: Yes     Alcohol/week: 0.0 standard drinks     Frequency: Monthly or less     Drinks per session: 1 or 2     Binge frequency: Never     Comment: maybe a beer every 3 months    Drug use: No    Sexual activity: Not Currently   Lifestyle    Physical activity     Days per week: 5 days     Minutes per session: 20 min    Stress: Not at all   Relationships    Social connections     Talks on phone: More than three times a week     Gets together: Twice a week     Attends Islam service: Never     Active member of club or organization: Yes     Attends meetings of clubs or organizations: More than 4 times per year     Relationship status:    Other Topics Concern    Are you pregnant or think you may be? No    Breast-feeding No   Social History Narrative    Not on file     Family History   Problem Relation Age of Onset    Cancer Father         lung    Dementia Mother      "Glaucoma Brother     Macular degeneration Brother     Diabetes Neg Hx     Amblyopia Neg Hx     Blindness Neg Hx     Cataracts Neg Hx     Retinal detachment Neg Hx     Strabismus Neg Hx     Melanoma Neg Hx         Review of Systems   Constitutional: Negative for appetite change, chills, fever and unexpected weight change.   HENT: Negative for facial swelling, postnasal drip and sore throat.    Eyes: Negative for redness and itching.   Respiratory: Negative for chest tightness and shortness of breath.    Cardiovascular: Negative for chest pain and palpitations.   Gastrointestinal: Negative for blood in stool, diarrhea, nausea and vomiting.   Genitourinary: Negative for difficulty urinating and dysuria.   Musculoskeletal: Positive for arthralgias. Negative for joint swelling.   Skin: Negative for rash and wound.   Neurological: Negative for dizziness and syncope.   Hematological: Negative for adenopathy.   Psychiatric/Behavioral: Negative for agitation. The patient is not nervous/anxious.      Objective:   BP (!) 153/67 (BP Location: Right arm, Patient Position: Sitting, BP Method: Large (Automatic))   Pulse 64   Ht 5' 3" (1.6 m)   Wt 84.8 kg (187 lb)   LMP  (LMP Unknown)   BMI 33.13 kg/m²       Physical Exam   Constitutional: She appears well-developed and well-nourished.   HENT:   Head: Normocephalic.   Eyes: No scleral icterus.   Neck: Neck supple. No tracheal deviation present.   Cardiovascular: Normal rate and regular rhythm.    Pulmonary/Chest: Breath sounds normal. No respiratory distress. Right breast exhibits no inverted nipple, no mass, no nipple discharge and no skin change. Left breast exhibits no inverted nipple, no mass, no nipple discharge and no skin change.   Abdominal: Soft. She exhibits no mass. There is no abdominal tenderness.   Musculoskeletal: No edema.   Lymphadenopathy:     She has no cervical adenopathy.   Neurological: She is alert.   Skin: No rash noted. No erythema. "     Psychiatric: She has a normal mood and affect.       Radiology review: Images personally reviewed by me in the clinic.   Mammogram: 6/25/2020  Impression:  Right  Calcifications: Right breast 6 mm calcifications at the upper outer position. Assessment: 4 - Suspicious finding. Biopsy is recommended.   BI-RADS Category:   Overall: 4 - Suspicious    Pathology:  Right breast, biopsy:   -Ductal carcinoma in situ (DCIS), solid pattern, nuclear grade 2   -DCIS measures 5 mm (0.5 cm) in greatest linear dimension within the core   biopsy   -Microcalcifications associated with areas of DCIS  Estrogen receptor (ER):  Positive (100%, strong)   Progesterone receptor ([place]):  Positive (80%, moderate to strong)   Assessment:       1. Preop testing    2. Ductal carcinoma in situ (DCIS) of right breast        Plan:     Options for management were discussed with the patient and her family. We reviewed the existing data noting the equivalency of breast conserving surgery with radiation therapy and mastectomy. We also reviewed the guidelines of the National Comprehensive Cancer Network for Stage 0 breast carcinoma. We discussed the need for lumpectomy margins to be negative for carcinoma, the necessity for postoperative radiation therapy after breast conservation in most cases, the possibility of a failed or false negative sentinel lymph node biopsy and the potential need for complete lymphadenectomy for a failed or positive sentinel lymph node biopsy were fully discussed. In the setting of mastectomy, delayed or immediate reconstruction options are available and were discussed.     In the setting of lumpectomy, radiation therapy would be recommended majority of the time.  The duration and treatment side effects were discussed with the patient.  This will coordinated with the radiation oncologist pending final pathology.    We also discussed the role of systemic therapy in the treatment of early stage breast cancer.  We discussed  that this is based on tumor biology and shira status and will be determined based on final pathology.  We discussed that if the cancer is hormone positive, endocrine therapy would be recommended in most cases and its use can reduce the risk of recurrence as well as improve survival. Side effects of treatment were briefly discussed. We also discussed the potential role for chemotherapy based on a number of factors such as tumor phenotype (ER+ vs. triple negative vs. [id number]+) and this would be determined in coordination with the medical oncologist.    The patient, in consultation with her family, has elected to proceed with right total mastectomy and sentinel lymph node biopsy.  Did discuss that she had the option of lumpectomy since she denied radiation on that side.  She does not want have further concern about this and wishes to proceed with a mastectomy.  She is not interested in reconstruction.  The operative risks of bleeding, infection, recurrence, scarring, and anesthetic complications and the possibility of requiring further surgery were all noted and informed consent obtained.    Surgery scheduled. Follow-up in clinic roughly 14 days after surgery.     Patient was educated on breast cancer, receptors, wire localization lumpectomy, mastectomy, sentinel lymph node mapping and biopsy, axillary lymph node dissection, reconstruction, breast prosthesis with post-mastectomy bra and radiation therapy. Patient was given patient information binder including [facility] breast cancer treatment brochure.  All her questions were answered.    Total time spent with the patient: 60 minutes.  45 minutes of face to face consultation and 15 minutes of chart review and coordination of care.

## 2020-07-08 ENCOUNTER — HOSPITAL ENCOUNTER (OUTPATIENT)
Dept: RADIOLOGY | Facility: HOSPITAL | Age: 80
Discharge: HOME | End: 2020-07-08
Attending: SURGERY
Payer: MEDICARE

## 2020-07-08 ENCOUNTER — OFFICE VISIT (OUTPATIENT)
Dept: SURGERY | Facility: CLINIC | Age: 80
End: 2020-07-08
Payer: MEDICARE

## 2020-07-08 VITALS
DIASTOLIC BLOOD PRESSURE: 67 MMHG | WEIGHT: 187 LBS | HEART RATE: 64 BPM | HEIGHT: 63 IN | BODY MASS INDEX: 33.13 KG/M2 | SYSTOLIC BLOOD PRESSURE: 153 MMHG

## 2020-07-08 DIAGNOSIS — D05.11 DUCTAL CARCINOMA IN SITU (DCIS) OF RIGHT BREAST: Primary | ICD-10-CM

## 2020-07-08 DIAGNOSIS — Z01.818 PREOP TESTING: Primary | ICD-10-CM

## 2020-07-08 DIAGNOSIS — D05.11 DUCTAL CARCINOMA IN SITU (DCIS) OF RIGHT BREAST: ICD-10-CM

## 2020-07-08 DIAGNOSIS — Z01.818 PREOP TESTING: ICD-10-CM

## 2020-07-08 PROCEDURE — 1101F PR PT FALLS ASSESS DOC 0-1 FALLS W/OUT INJ PAST YR: ICD-10-PCS | Mod: CPTII,S$GLB,, | Performed by: SURGERY

## 2020-07-08 PROCEDURE — 71046 X-RAY EXAM CHEST 2 VIEWS: CPT | Mod: 26,,, | Performed by: RADIOLOGY

## 2020-07-08 PROCEDURE — 99215 OFFICE O/P EST HI 40 MIN: CPT | Mod: S$GLB,,, | Performed by: SURGERY

## 2020-07-08 PROCEDURE — 99999 PR PBB SHADOW E&M-EST. PATIENT-LVL IV: CPT | Mod: PBBFAC,,, | Performed by: SURGERY

## 2020-07-08 PROCEDURE — 3078F DIAST BP <80 MM HG: CPT | Mod: CPTII,S$GLB,, | Performed by: SURGERY

## 2020-07-08 PROCEDURE — 99215 PR OFFICE/OUTPT VISIT, EST, LEVL V, 40-54 MIN: ICD-10-PCS | Mod: S$GLB,,, | Performed by: SURGERY

## 2020-07-08 PROCEDURE — 1101F PT FALLS ASSESS-DOCD LE1/YR: CPT | Mod: CPTII,S$GLB,, | Performed by: SURGERY

## 2020-07-08 PROCEDURE — 1126F AMNT PAIN NOTED NONE PRSNT: CPT | Mod: S$GLB,,, | Performed by: SURGERY

## 2020-07-08 PROCEDURE — 3078F PR MOST RECENT DIASTOLIC BLOOD PRESSURE < 80 MM HG: ICD-10-PCS | Mod: CPTII,S$GLB,, | Performed by: SURGERY

## 2020-07-08 PROCEDURE — 1126F PR PAIN SEVERITY QUANTIFIED, NO PAIN PRESENT: ICD-10-PCS | Mod: S$GLB,,, | Performed by: SURGERY

## 2020-07-08 PROCEDURE — 99999 PR PBB SHADOW E&M-EST. PATIENT-LVL IV: ICD-10-PCS | Mod: PBBFAC,,, | Performed by: SURGERY

## 2020-07-08 PROCEDURE — 1159F PR MEDICATION LIST DOCUMENTED IN MEDICAL RECORD: ICD-10-PCS | Mod: S$GLB,,, | Performed by: SURGERY

## 2020-07-08 PROCEDURE — 71046 X-RAY EXAM CHEST 2 VIEWS: CPT | Mod: TC,FY

## 2020-07-08 PROCEDURE — 1159F MED LIST DOCD IN RCRD: CPT | Mod: S$GLB,,, | Performed by: SURGERY

## 2020-07-08 PROCEDURE — 71046 XR CHEST PA AND LATERAL: ICD-10-PCS | Mod: 26,,, | Performed by: RADIOLOGY

## 2020-07-08 PROCEDURE — 3077F PR MOST RECENT SYSTOLIC BLOOD PRESSURE >= 140 MM HG: ICD-10-PCS | Mod: CPTII,S$GLB,, | Performed by: SURGERY

## 2020-07-08 PROCEDURE — 3077F SYST BP >= 140 MM HG: CPT | Mod: CPTII,S$GLB,, | Performed by: SURGERY

## 2020-07-09 DIAGNOSIS — D05.11 DUCTAL CARCINOMA IN SITU (DCIS) OF RIGHT BREAST: Primary | ICD-10-CM

## 2020-07-10 ENCOUNTER — HOSPITAL ENCOUNTER (OUTPATIENT)
Dept: CARDIOLOGY | Facility: CLINIC | Age: 80
Discharge: HOME | End: 2020-07-10
Payer: MEDICARE

## 2020-07-10 DIAGNOSIS — Z01.818 PREOP TESTING: ICD-10-CM

## 2020-07-10 PROCEDURE — 93005 ELECTROCARDIOGRAM TRACING: CPT | Mod: S$GLB,,, | Performed by: SURGERY

## 2020-07-10 PROCEDURE — 93010 EKG 12-LEAD: ICD-10-PCS | Mod: S$GLB,,, | Performed by: INTERNAL MEDICINE

## 2020-07-10 PROCEDURE — 93005 EKG 12-LEAD: ICD-10-PCS | Mod: S$GLB,,, | Performed by: SURGERY

## 2020-07-10 PROCEDURE — 93010 ELECTROCARDIOGRAM REPORT: CPT | Mod: S$GLB,,, | Performed by: INTERNAL MEDICINE

## 2020-07-14 ENCOUNTER — TELEPHONE (OUTPATIENT)
Dept: SURGERY | Facility: CLINIC | Age: 80
End: 2020-07-14

## 2020-07-14 NOTE — TELEPHONE ENCOUNTER
----- Message from Rhonda G Leopold, MD sent at 7/14/2020  8:50 AM CDT -----  EKG reviewed - Ok to proceed.   ----- Message -----  From: Octaviano Jennings RN  Sent: 7/14/2020   8:08 AM CDT  To: Rhonda G Leopold, MD Dr Leopold,         Can you please look at this EKG for pt going to surgery 7/30 with Dr Dill?     Thank You,   Octaviano galindo/Dr Dill

## 2020-07-27 ENCOUNTER — LAB VISIT (OUTPATIENT)
Dept: SURGERY | Facility: CLINIC | Age: 80
End: 2020-07-27
Payer: MEDICARE

## 2020-07-27 DIAGNOSIS — Z01.818 PREOP TESTING: ICD-10-CM

## 2020-07-27 LAB — SARS-COV-2 RNA RESP QL NAA+PROBE: NOT DETECTED

## 2020-07-27 PROCEDURE — U0003 INFECTIOUS AGENT DETECTION BY NUCLEIC ACID (DNA OR RNA); SEVERE ACUTE RESPIRATORY SYNDROME CORONAVIRUS 2 (SARS-COV-2) (CORONAVIRUS DISEASE [COVID-19]), AMPLIFIED PROBE TECHNIQUE, MAKING USE OF HIGH THROUGHPUT TECHNOLOGIES AS DESCRIBED BY CMS-2020-01-R: HCPCS

## 2020-07-28 RX ORDER — HYDROCODONE BITARTRATE AND ACETAMINOPHEN 5; 325 MG/1; MG/1
1 TABLET ORAL EVERY 6 HOURS PRN
Qty: 15 TABLET | Refills: 0 | Status: SHIPPED | OUTPATIENT
Start: 2020-07-28 | End: 2020-07-28

## 2020-07-28 RX ORDER — LOSARTAN POTASSIUM 50 MG/1
TABLET, FILM COATED ORAL
COMMUNITY
Start: 2020-06-28 | End: 2021-04-12

## 2020-07-28 RX ORDER — HYDROCODONE BITARTRATE AND ACETAMINOPHEN 5; 325 MG/1; MG/1
1 TABLET ORAL EVERY 6 HOURS PRN
Qty: 15 TABLET | Refills: 0 | Status: SHIPPED | OUTPATIENT
Start: 2020-07-28 | End: 2020-08-12

## 2020-07-29 ENCOUNTER — ANESTHESIA EVENT (OUTPATIENT)
Dept: SURGERY | Facility: HOSPITAL | Age: 80
End: 2020-07-29
Payer: MEDICARE

## 2020-07-29 ENCOUNTER — TELEPHONE (OUTPATIENT)
Dept: SURGERY | Facility: CLINIC | Age: 80
End: 2020-07-29

## 2020-07-29 ENCOUNTER — OFFICE VISIT (OUTPATIENT)
Dept: ORTHOPEDICS | Facility: CLINIC | Age: 80
End: 2020-07-29
Payer: MEDICARE

## 2020-07-29 VITALS
SYSTOLIC BLOOD PRESSURE: 149 MMHG | DIASTOLIC BLOOD PRESSURE: 84 MMHG | BODY MASS INDEX: 33.13 KG/M2 | HEART RATE: 116 BPM | WEIGHT: 187 LBS | HEIGHT: 63 IN

## 2020-07-29 DIAGNOSIS — Z98.890 S/P CARPAL TUNNEL RELEASE: Primary | ICD-10-CM

## 2020-07-29 PROCEDURE — 99999 PR PBB SHADOW E&M-EST. PATIENT-LVL IV: CPT | Mod: PBBFAC,,, | Performed by: PHYSICIAN ASSISTANT

## 2020-07-29 PROCEDURE — 99024 POSTOP FOLLOW-UP VISIT: CPT | Mod: S$GLB,,, | Performed by: PHYSICIAN ASSISTANT

## 2020-07-29 PROCEDURE — 99999 PR PBB SHADOW E&M-EST. PATIENT-LVL IV: ICD-10-PCS | Mod: PBBFAC,,, | Performed by: PHYSICIAN ASSISTANT

## 2020-07-29 PROCEDURE — 99024 PR POST-OP FOLLOW-UP VISIT: ICD-10-PCS | Mod: S$GLB,,, | Performed by: PHYSICIAN ASSISTANT

## 2020-07-29 RX ORDER — LIDOCAINE AND PRILOCAINE 25; 25 MG/G; MG/G
CREAM TOPICAL 2 TIMES DAILY PRN
Qty: 30 G | Refills: 2 | Status: SHIPPED | OUTPATIENT
Start: 2020-07-29 | End: 2022-07-14

## 2020-07-29 NOTE — PROGRESS NOTES
"Ms. Escobedo is here today for a post-operative visit.  She is 6 weeks status post left carpal tunnel release by Dr. Presley on 6/5/20. She reports that she is doing okay. She still has numbness, which was constant prior to surgery. She reports some pain over the area of the incision.  She denies fever, chills, and sweats since the time of the surgery.     Pt also had a right carpal tunnel injection and a right ring trigger finger injection performed at time of surgery. She reports decreased frequency of triggering otherwise did not note significant improvement.     She is scheduled for a mastectomy tomorrow with Dr. Dill.    Physical exam:    Vitals:    07/29/20 0935   BP: (!) 149/84   Pulse: (!) 116   Weight: 84.8 kg (187 lb)   Height: 5' 3" (1.6 m)   PainSc: 0-No pain     Vital signs are stable, patient is afebrile.  Patient is well dressed and well groomed, no acute distress.  Alert and oriented to person, place, and time.  Incision is well healed, ttp over the incision.  There is no erythema or exudate.  There is no sign of any infection. She is NVI.  Full wrist/ finger ROM.     Assessment: status post left carpal tunnel release by Dr. Presley on 6/5/20    Plan:  Jo-Ann was seen today for post-op evaluation.    Diagnoses and all orders for this visit:    S/P carpal tunnel release    - PO instruction reviewed and provided to patient  -discussed pillar pain, OT ordered   -RTC as needed, she will call when she is ready for surgery on the left hand. She will complete OT for the right and recovery from mastectomy first.         "

## 2020-07-29 NOTE — TELEPHONE ENCOUNTER
----- Message from Israel Cleveland sent at 7/29/2020  1:05 PM CDT -----  Patient called to speak w/ someone regarding having her Rx HYDROcodone-acetaminophen (NORCO) 5-325 mg per tablet sent to [facility] instead of [facility]     [facility]/pharmacy #[id number] - [place], [place] - [address] [id number]  [facility] [address]  Phone: [phone] Fax: [phone]

## 2020-07-29 NOTE — PLAN OF CARE
Arrival time 0500 am given.  [place] address  and [address] provided    Covid pending    Medications reviewed. Hold all vitamins and losartan in am.    Non smoker    NPO, bathing instructions, visitor policy reviewedd.    Voiced understanding.    Opportunity for questions.

## 2020-07-30 ENCOUNTER — HOSPITAL ENCOUNTER (OUTPATIENT)
Dept: RADIOLOGY | Facility: HOSPITAL | Age: 80
Discharge: HOME | End: 2020-07-30
Attending: SURGERY
Payer: MEDICARE

## 2020-07-30 ENCOUNTER — ANESTHESIA (OUTPATIENT)
Dept: SURGERY | Facility: HOSPITAL | Age: 80
End: 2020-07-30
Payer: MEDICARE

## 2020-07-30 ENCOUNTER — HOSPITAL ENCOUNTER (OUTPATIENT)
Facility: HOSPITAL | Age: 80
Discharge: HOME | End: 2020-07-30
Attending: SURGERY | Admitting: SURGERY
Payer: MEDICARE

## 2020-07-30 VITALS
TEMPERATURE: 97 F | DIASTOLIC BLOOD PRESSURE: 76 MMHG | OXYGEN SATURATION: 97 % | HEART RATE: 80 BPM | SYSTOLIC BLOOD PRESSURE: 163 MMHG | BODY MASS INDEX: 32.25 KG/M2 | RESPIRATION RATE: 18 BRPM | HEIGHT: 63 IN | WEIGHT: 182 LBS

## 2020-07-30 DIAGNOSIS — D05.11 DUCTAL CARCINOMA IN SITU (DCIS) OF RIGHT BREAST: Primary | ICD-10-CM

## 2020-07-30 DIAGNOSIS — D05.11 DUCTAL CARCINOMA IN SITU (DCIS) OF RIGHT BREAST: ICD-10-CM

## 2020-07-30 PROCEDURE — 71000015 HC POSTOP RECOV 1ST HR: Performed by: SURGERY

## 2020-07-30 PROCEDURE — 36000706: Performed by: SURGERY

## 2020-07-30 PROCEDURE — 27100025 HC TUBING, SET FLUID WARMER: Performed by: ANESTHESIOLOGY

## 2020-07-30 PROCEDURE — 37000008 HC ANESTHESIA 1ST 15 MINUTES: Performed by: SURGERY

## 2020-07-30 PROCEDURE — 25000003 PHARM REV CODE 250: Performed by: NURSE ANESTHETIST, CERTIFIED REGISTERED

## 2020-07-30 PROCEDURE — 25000003 PHARM REV CODE 250: Performed by: STUDENT IN AN ORGANIZED HEALTH CARE EDUCATION/TRAINING PROGRAM

## 2020-07-30 PROCEDURE — D9220A PRA ANESTHESIA: Mod: CRNA,,, | Performed by: NURSE ANESTHETIST, CERTIFIED REGISTERED

## 2020-07-30 PROCEDURE — 71000039 HC RECOVERY, EACH ADD'L HOUR: Performed by: SURGERY

## 2020-07-30 PROCEDURE — 38525 PR BIOPSY/REM LYMPH NODES, AXILLARY: ICD-10-PCS | Mod: 51,RT,, | Performed by: SURGERY

## 2020-07-30 PROCEDURE — D9220A PRA ANESTHESIA: Mod: ANES,,, | Performed by: ANESTHESIOLOGY

## 2020-07-30 PROCEDURE — 88341 IMHCHEM/IMCYTCHM EA ADD ANTB: CPT | Performed by: PATHOLOGY

## 2020-07-30 PROCEDURE — 88307 TISSUE EXAM BY PATHOLOGIST: CPT | Mod: 26,,, | Performed by: PATHOLOGY

## 2020-07-30 PROCEDURE — A9520 TC99 TILMANOCEPT DIAG 0.5MCI: HCPCS

## 2020-07-30 PROCEDURE — 25000003 PHARM REV CODE 250: Performed by: ANESTHESIOLOGY

## 2020-07-30 PROCEDURE — 64462 PVB THORACIC 2ND+ INJ SITE: CPT | Mod: RT,,, | Performed by: ANESTHESIOLOGY

## 2020-07-30 PROCEDURE — 63600175 PHARM REV CODE 636 W HCPCS: Performed by: ANESTHESIOLOGY

## 2020-07-30 PROCEDURE — S0028 INJECTION, FAMOTIDINE, 20 MG: HCPCS | Performed by: NURSE ANESTHETIST, CERTIFIED REGISTERED

## 2020-07-30 PROCEDURE — 36000707: Performed by: SURGERY

## 2020-07-30 PROCEDURE — 88342 CHG IMMUNOCYTOCHEMISTRY: ICD-10-PCS | Mod: 26,,, | Performed by: PATHOLOGY

## 2020-07-30 PROCEDURE — 71000033 HC RECOVERY, INTIAL HOUR: Performed by: SURGERY

## 2020-07-30 PROCEDURE — 64461 PVB THORACIC SINGLE INJ SITE: CPT | Performed by: ANESTHESIOLOGY

## 2020-07-30 PROCEDURE — 94761 N-INVAS EAR/PLS OXIMETRY MLT: CPT

## 2020-07-30 PROCEDURE — C1729 CATH, DRAINAGE: HCPCS | Performed by: SURGERY

## 2020-07-30 PROCEDURE — 88307 PR  SURG PATH,LEVEL V: ICD-10-PCS | Mod: 26,,, | Performed by: PATHOLOGY

## 2020-07-30 PROCEDURE — 64461 PR PVB THORACIC SINGLE INJ SITE, INCL IMAGING: ICD-10-PCS | Mod: 59,RT,, | Performed by: ANESTHESIOLOGY

## 2020-07-30 PROCEDURE — 37000009 HC ANESTHESIA EA ADD 15 MINS: Performed by: SURGERY

## 2020-07-30 PROCEDURE — 88341 PR IHC OR ICC EACH ADD'L SINGLE ANTIBODY  STAINPR: ICD-10-PCS | Mod: 26,,, | Performed by: PATHOLOGY

## 2020-07-30 PROCEDURE — 99900035 HC TECH TIME PER 15 MIN (STAT)

## 2020-07-30 PROCEDURE — 88342 IMHCHEM/IMCYTCHM 1ST ANTB: CPT | Mod: 26,,, | Performed by: PATHOLOGY

## 2020-07-30 PROCEDURE — 64461 PVB THORACIC SINGLE INJ SITE: CPT | Mod: 59,RT,, | Performed by: ANESTHESIOLOGY

## 2020-07-30 PROCEDURE — 88342 IMHCHEM/IMCYTCHM 1ST ANTB: CPT | Performed by: PATHOLOGY

## 2020-07-30 PROCEDURE — 19303 MAST SIMPLE COMPLETE: CPT | Mod: RT,,, | Performed by: SURGERY

## 2020-07-30 PROCEDURE — 63600175 PHARM REV CODE 636 W HCPCS: Performed by: STUDENT IN AN ORGANIZED HEALTH CARE EDUCATION/TRAINING PROGRAM

## 2020-07-30 PROCEDURE — 27000221 HC OXYGEN, UP TO 24 HOURS

## 2020-07-30 PROCEDURE — 71000016 HC POSTOP RECOV ADDL HR: Performed by: SURGERY

## 2020-07-30 PROCEDURE — 63600175 PHARM REV CODE 636 W HCPCS: Performed by: NURSE ANESTHETIST, CERTIFIED REGISTERED

## 2020-07-30 PROCEDURE — 64462 PR PVB THORACIC 2ND AND ANY ADDL INJ SITE, INCL IMG GUIDE: ICD-10-PCS | Mod: RT,,, | Performed by: ANESTHESIOLOGY

## 2020-07-30 PROCEDURE — 38525 BIOPSY/REMOVAL LYMPH NODES: CPT | Mod: 51,RT,, | Performed by: SURGERY

## 2020-07-30 PROCEDURE — 19303 PR MASTECTOMY, SIMPLE, COMPLETE: ICD-10-PCS | Mod: RT,,, | Performed by: SURGERY

## 2020-07-30 PROCEDURE — 88307 TISSUE EXAM BY PATHOLOGIST: CPT | Mod: 59 | Performed by: PATHOLOGY

## 2020-07-30 PROCEDURE — 88341 IMHCHEM/IMCYTCHM EA ADD ANTB: CPT | Mod: 26,,, | Performed by: PATHOLOGY

## 2020-07-30 PROCEDURE — D9220A PRA ANESTHESIA: ICD-10-PCS | Mod: ANES,,, | Performed by: ANESTHESIOLOGY

## 2020-07-30 PROCEDURE — 38792 RA TRACER ID OF SENTINL NODE: CPT | Mod: 51,,, | Performed by: SURGERY

## 2020-07-30 PROCEDURE — D9220A PRA ANESTHESIA: ICD-10-PCS | Mod: CRNA,,, | Performed by: NURSE ANESTHETIST, CERTIFIED REGISTERED

## 2020-07-30 PROCEDURE — 38792 PR IDENTIFY SENTINEL 2DE: ICD-10-PCS | Mod: 51,,, | Performed by: SURGERY

## 2020-07-30 RX ORDER — FENTANYL CITRATE 50 UG/ML
INJECTION, SOLUTION INTRAMUSCULAR; INTRAVENOUS
Status: DISCONTINUED | OUTPATIENT
Start: 2020-07-30 | End: 2020-07-30

## 2020-07-30 RX ORDER — CEFAZOLIN SODIUM 1 G/3ML
2 INJECTION, POWDER, FOR SOLUTION INTRAMUSCULAR; INTRAVENOUS
Status: COMPLETED | OUTPATIENT
Start: 2020-07-30 | End: 2020-07-30

## 2020-07-30 RX ORDER — FAMOTIDINE 10 MG/ML
INJECTION INTRAVENOUS
Status: DISCONTINUED | OUTPATIENT
Start: 2020-07-30 | End: 2020-07-30

## 2020-07-30 RX ORDER — MIDAZOLAM HYDROCHLORIDE 1 MG/ML
0.5 INJECTION INTRAMUSCULAR; INTRAVENOUS
Status: DISCONTINUED | OUTPATIENT
Start: 2020-07-30 | End: 2020-07-30 | Stop reason: HOSPADM

## 2020-07-30 RX ORDER — ONDANSETRON 2 MG/ML
INJECTION INTRAMUSCULAR; INTRAVENOUS
Status: DISCONTINUED | OUTPATIENT
Start: 2020-07-30 | End: 2020-07-30

## 2020-07-30 RX ORDER — EPHEDRINE SULFATE 50 MG/ML
INJECTION, SOLUTION INTRAVENOUS
Status: DISCONTINUED | OUTPATIENT
Start: 2020-07-30 | End: 2020-07-30

## 2020-07-30 RX ORDER — KETAMINE HCL IN 0.9 % NACL 50 MG/5 ML
SYRINGE (ML) INTRAVENOUS
Status: DISCONTINUED | OUTPATIENT
Start: 2020-07-30 | End: 2020-07-30

## 2020-07-30 RX ORDER — SUCCINYLCHOLINE CHLORIDE 20 MG/ML
INJECTION INTRAMUSCULAR; INTRAVENOUS
Status: DISCONTINUED | OUTPATIENT
Start: 2020-07-30 | End: 2020-07-30

## 2020-07-30 RX ORDER — DIPHENHYDRAMINE HYDROCHLORIDE 50 MG/ML
25 INJECTION INTRAMUSCULAR; INTRAVENOUS EVERY 6 HOURS PRN
Status: DISCONTINUED | OUTPATIENT
Start: 2020-07-30 | End: 2020-07-30 | Stop reason: HOSPADM

## 2020-07-30 RX ORDER — ROCURONIUM BROMIDE 10 MG/ML
INJECTION, SOLUTION INTRAVENOUS
Status: DISCONTINUED | OUTPATIENT
Start: 2020-07-30 | End: 2020-07-30

## 2020-07-30 RX ORDER — ACETAMINOPHEN 500 MG
1000 TABLET ORAL
Status: COMPLETED | OUTPATIENT
Start: 2020-07-30 | End: 2020-07-30

## 2020-07-30 RX ORDER — FENTANYL CITRATE 50 UG/ML
25 INJECTION, SOLUTION INTRAMUSCULAR; INTRAVENOUS EVERY 5 MIN PRN
Status: DISCONTINUED | OUTPATIENT
Start: 2020-07-30 | End: 2020-07-30 | Stop reason: HOSPADM

## 2020-07-30 RX ORDER — LORAZEPAM 2 MG/ML
0.25 INJECTION INTRAMUSCULAR ONCE AS NEEDED
Status: DISCONTINUED | OUTPATIENT
Start: 2020-07-30 | End: 2020-07-30 | Stop reason: HOSPADM

## 2020-07-30 RX ORDER — FENTANYL CITRATE 50 UG/ML
25 INJECTION, SOLUTION INTRAMUSCULAR; INTRAVENOUS EVERY 5 MIN PRN
Status: COMPLETED | OUTPATIENT
Start: 2020-07-30 | End: 2020-07-30

## 2020-07-30 RX ORDER — GLYCOPYRROLATE 0.2 MG/ML
INJECTION INTRAMUSCULAR; INTRAVENOUS
Status: DISCONTINUED | OUTPATIENT
Start: 2020-07-30 | End: 2020-07-30

## 2020-07-30 RX ORDER — LABETALOL HCL 20 MG/4 ML
5 SYRINGE (ML) INTRAVENOUS ONCE
Status: COMPLETED | OUTPATIENT
Start: 2020-07-30 | End: 2020-07-30

## 2020-07-30 RX ORDER — LIDOCAINE HYDROCHLORIDE 20 MG/ML
INJECTION INTRAVENOUS
Status: DISCONTINUED | OUTPATIENT
Start: 2020-07-30 | End: 2020-07-30

## 2020-07-30 RX ORDER — PROPOFOL 10 MG/ML
VIAL (ML) INTRAVENOUS
Status: DISCONTINUED | OUTPATIENT
Start: 2020-07-30 | End: 2020-07-30

## 2020-07-30 RX ORDER — SODIUM CHLORIDE 9 MG/ML
INJECTION, SOLUTION INTRAVENOUS CONTINUOUS
Status: DISCONTINUED | OUTPATIENT
Start: 2020-07-30 | End: 2020-07-30 | Stop reason: HOSPADM

## 2020-07-30 RX ORDER — CARBOXYMETHYLCELLULOSE SODIUM 10 MG/ML
GEL OPHTHALMIC
Status: DISCONTINUED | OUTPATIENT
Start: 2020-07-30 | End: 2020-07-30

## 2020-07-30 RX ORDER — DEXAMETHASONE SODIUM PHOSPHATE 4 MG/ML
INJECTION, SOLUTION INTRA-ARTICULAR; INTRALESIONAL; INTRAMUSCULAR; INTRAVENOUS; SOFT TISSUE
Status: DISCONTINUED | OUTPATIENT
Start: 2020-07-30 | End: 2020-07-30

## 2020-07-30 RX ORDER — MIDAZOLAM HYDROCHLORIDE 1 MG/ML
INJECTION, SOLUTION INTRAMUSCULAR; INTRAVENOUS
Status: DISCONTINUED | OUTPATIENT
Start: 2020-07-30 | End: 2020-07-30

## 2020-07-30 RX ADMIN — GLYCOPYRROLATE 0.2 MG: 0.2 INJECTION, SOLUTION INTRAMUSCULAR; INTRAVENOUS at 07:07

## 2020-07-30 RX ADMIN — FENTANYL CITRATE 25 MCG: 50 INJECTION INTRAMUSCULAR; INTRAVENOUS at 10:07

## 2020-07-30 RX ADMIN — FAMOTIDINE 20 MG: 10 INJECTION, SOLUTION INTRAVENOUS at 07:07

## 2020-07-30 RX ADMIN — SODIUM CHLORIDE, SODIUM GLUCONATE, SODIUM ACETATE, POTASSIUM CHLORIDE, MAGNESIUM CHLORIDE, SODIUM PHOSPHATE, DIBASIC, AND POTASSIUM PHOSPHATE: .53; .5; .37; .037; .03; .012; .00082 INJECTION, SOLUTION INTRAVENOUS at 09:07

## 2020-07-30 RX ADMIN — FENTANYL CITRATE 25 MCG: 50 INJECTION INTRAMUSCULAR; INTRAVENOUS at 11:07

## 2020-07-30 RX ADMIN — Medication 15 MG: at 07:07

## 2020-07-30 RX ADMIN — SODIUM CHLORIDE, SODIUM GLUCONATE, SODIUM ACETATE, POTASSIUM CHLORIDE, MAGNESIUM CHLORIDE, SODIUM PHOSPHATE, DIBASIC, AND POTASSIUM PHOSPHATE: .53; .5; .37; .037; .03; .012; .00082 INJECTION, SOLUTION INTRAVENOUS at 07:07

## 2020-07-30 RX ADMIN — SODIUM CHLORIDE: 0.9 INJECTION, SOLUTION INTRAVENOUS at 06:07

## 2020-07-30 RX ADMIN — PROPOFOL 30 MG: 10 INJECTION, EMULSION INTRAVENOUS at 09:07

## 2020-07-30 RX ADMIN — SUCCINYLCHOLINE CHLORIDE 120 MG: 20 INJECTION, SOLUTION INTRAMUSCULAR; INTRAVENOUS at 07:07

## 2020-07-30 RX ADMIN — DEXAMETHASONE SODIUM PHOSPHATE 8 MG: 4 INJECTION, SOLUTION INTRAMUSCULAR; INTRAVENOUS at 07:07

## 2020-07-30 RX ADMIN — FENTANYL CITRATE 100 MCG: 50 INJECTION INTRAMUSCULAR; INTRAVENOUS at 06:07

## 2020-07-30 RX ADMIN — PROPOFOL 150 MG: 10 INJECTION, EMULSION INTRAVENOUS at 07:07

## 2020-07-30 RX ADMIN — LIDOCAINE HYDROCHLORIDE 100 MG: 20 INJECTION, SOLUTION INTRAVENOUS at 09:07

## 2020-07-30 RX ADMIN — ACETAMINOPHEN 1000 MG: 500 TABLET ORAL at 06:07

## 2020-07-30 RX ADMIN — CARBOXYMETHYLCELLULOSE SODIUM 2 DROP: 10 GEL OPHTHALMIC at 07:07

## 2020-07-30 RX ADMIN — Medication 5 MG: at 10:07

## 2020-07-30 RX ADMIN — PROPOFOL 50 MG: 10 INJECTION, EMULSION INTRAVENOUS at 07:07

## 2020-07-30 RX ADMIN — ROCURONIUM BROMIDE 10 MG: 10 INJECTION, SOLUTION INTRAVENOUS at 07:07

## 2020-07-30 RX ADMIN — Medication 10 MG: at 09:07

## 2020-07-30 RX ADMIN — CEFAZOLIN 2 G: 330 INJECTION, POWDER, FOR SOLUTION INTRAMUSCULAR; INTRAVENOUS at 07:07

## 2020-07-30 RX ADMIN — EPHEDRINE SULFATE 12.5 MG: 50 INJECTION INTRAVENOUS at 07:07

## 2020-07-30 RX ADMIN — FENTANYL CITRATE 50 MCG: 50 INJECTION, SOLUTION INTRAMUSCULAR; INTRAVENOUS at 07:07

## 2020-07-30 RX ADMIN — ONDANSETRON 4 MG: 2 INJECTION INTRAMUSCULAR; INTRAVENOUS at 09:07

## 2020-07-30 RX ADMIN — MIDAZOLAM HYDROCHLORIDE 1 MG: 1 INJECTION, SOLUTION INTRAMUSCULAR; INTRAVENOUS at 07:07

## 2020-07-30 RX ADMIN — MIDAZOLAM HYDROCHLORIDE 1 MG: 1 INJECTION, SOLUTION INTRAMUSCULAR; INTRAVENOUS at 06:07

## 2020-07-30 NOTE — ANESTHESIA PROCEDURE NOTES
Intubation  Performed by: Juany Taylor CRNA  Authorized by: Jeff Briceno MD     Intubation:     Induction:  Intravenous    Intubated:  Postinduction    Mask Ventilation:  Easy mask    Attempts:  1    Attempted By:  CRNA    Method of Intubation:  Video laryngoscopy    Blade:  Neely 3    Laryngeal View Grade: Grade I - full view of chords      Difficult Airway Encountered?: No      Complications:  None    Airway Device:  Oral endotracheal tube    Airway Device Size:  7.5    Style/Cuff Inflation:  Cuffed    Inflation Amount (mL):  6    Tube secured:  21    Secured at:  The lips    Placement Verified By:  Capnometry    Complicating Factors:  Small mouth and none    Findings Post-Intubation:  BS equal bilateral

## 2020-07-30 NOTE — DISCHARGE INSTRUCTIONS
MASTECTOMY AND/OR AXILLARY LYMPH NODE DISSECTION     The following are post-operative instructions that will help you to recover from your surgery.  Please read over these instructions carefully and contact us if we can answer any of your questions or concerns.     Post-op care/Dressing/breast binder (surgi-bra)  A surgical bra may be placed around your chest after your surgery.  If you are given the bra, please wear it for the first 48 hours after surgery. After 48 hours you can remove your surgical bra and dressing to shower/cleanse the chest wall with antibacterial soap and warm water. Do not take a tub bath and do not soak the surgical site for at least 2 weeks.      The final pathology report will be available approximately 7-10 days after your surgery.  Our office will call you with your pathology report when it becomes available.     Activity   · You will be able to do much of your own personal care, such as bathing, dressing, preparing simple meals, etc.  · A short walk each day will help with your recovery  · You may find that you need to take rest breaks between activities, but you should not need to stay in bed for prolonged periods of time during the day. A good rule during this time is to listen to your body, do what is comfortable, and stop and rest when your feel tired.  If it hurts, dont do it.  · Return to taking your daily medications as prescribed  · Please avoid activities that require moderate to heavy lifting (grocery shopping) or pushing/pulling (vacuuming) and repetitive motions (such as washing windows). Do not lift anything heavier than a gallon of milk.  · Following a lymph node dissection, dont avoid using your arm, but dont exercise your arm until after your first post-operative visit.  At your first post-op visit, you will be given arm exercises to regain movement and flexibility.  You may be referred to physical therapy if needed.  · You may restart driving when you are no longer on  narcotics and you feel safe turning the wheel and stopping quickly.  · You will need to be out of work approximately 2-6 weeks depending on your particular surgery and how well you are recovering.  We will evaluate how you are doing at the first post-op appointment.  This is a good time to ask when you may return to work and what activities you may do.     Medication for pain  · You will be given a prescription for pain medication. You should not drive or operate machinery while taking these.  Please take prescription pain medication (narcotics) with food.  Narcotics can cause, or worsen, constipation.  You will need to increase your fluid intake, eat high fiber foods (such as fruits and bran) and make sure that you are up and walking. You may need to take an over the counter stool softener for constipation.  · Short term use of an icepack may be helpful to decrease discomfort and swelling, particularly to the armpit after lymph node surgery.  · A small pillow positioned in the armpit may also decrease discomfort after lymph node surgery.  · If you are given a prescription for antibiotics, take them as prescribed.     How to care for your Drain(s)  1. Wash hands--STRIP or milk the drainage tube as it comes out of your body toward the bulb.   a. Beginning where the drain comes out of your body, hold drainage tubing with one hand and with the other, stretch and release tubing an inch at time while moving downward with both hands toward the bulb.  b. Do this 2-3 times before emptying the bulb.  2. Remove the stopper from the bulbs port  (drainage port)  3. Pour the drainage in the measuring cup provided by the nurse  4. Flatten/squeeze the bulb to create a vacuum and replace the stopper before letting go of the bulb.  5. Record the date, time and amount of drainage in ccs (not ounces) each time bulb is emptied. If you have more than one drain, record each separately.  6. Discard the drainage into the toilet after  measuring and then wash hands.  7. Empty bulbs 2-3 times/day or as needed if it fills up before 8 hours.  8. Remember to bring the output record with you to your doctors appointment.     Please report the following:  · Temperature greater than 101 degrees  · Discharge or bad odor from the wound  · Excessive bleeding, such as saturated bloody dressing or extreme bruising  · Redness at incision and/or drain sites  · Swelling or buildup of fluid around incision  · Persistent fevers, chills, nausea, vomiting, or diarrhea     Additional information  Your surgeon will see you approximately 2 weeks following your surgery.  If this follow-up appointment has not been made, please call the office.     If you have any questions or problems, please call my office or my nurse.     Dr. Suki Jennings, RN             [phone]                          Roxana Keen PA-C     [phone]  Dianelys Goss RN             [phone]     After hours and on weekends, you may call the main [facility] line at [phone] and ask to have the general surgery resident paged or have me paged

## 2020-07-30 NOTE — PLAN OF CARE
AAOX3, on stretcher in semi oscar position. No obvious signs of distress noted. Locker 20. Will continue to monitor.

## 2020-07-30 NOTE — TRANSFER OF CARE
"Anesthesia Transfer of Care Note    Patient: Jo-Ann Escobedo    Procedure(s) Performed: Procedure(s) (LRB):  MASTECTOMY, UNILATERAL (Right)  INJECTION, FOR SENTINEL NODE IDENTIFICATION (Right)  BIOPSY, LYMPH NODE, SENTINEL (Right)    Patient location: PACU    Anesthesia Type: general    Transport from OR: Transported from OR on 6-10 L/min O2 by face mask with adequate spontaneous ventilation    Post pain: adequate analgesia    Post assessment: no apparent anesthetic complications and tolerated procedure well    Post vital signs: stable    Level of consciousness: responds to stimulation    Nausea/Vomiting: no nausea/vomiting    Complications: none          Last vitals:   Visit Vitals  BP (!) 140/63 (BP Location: Right arm)   Pulse 68   Temp 36.7 °C (98.1 °F) (Oral)   Resp (!) 23   Ht 5' 3" (1.6 m)   Wt 82.6 kg (182 lb)   LMP  (LMP Unknown)   SpO2 96%   Breastfeeding No   BMI 32.24 kg/m²     "

## 2020-07-30 NOTE — OP NOTE
Operative Note     7/30/2020    PRE-OP DIAGNOSIS: Ductal carcinoma in situ (DCIS) of right breast [D05.11]      POST-OP DIAGNOSIS: Post-Op Diagnosis Codes:     * Ductal carcinoma in situ (DCIS) of right breast [D05.11]    Procedure(s):  MASTECTOMY, UNILATERAL  INJECTION, FOR SENTINEL NODE IDENTIFICATION  BIOPSY, LYMPH NODE, SENTINEL     SURGEON: Surgeon(s) and Role:     * Suki Dill MD - Primary  Resident:  Andre Rivera    ANESTHESIA: General     OPERATIVE FINDINGS: Healthy appearing skin flaps at the completion of the mastectomy.  2 sentinel nodes taken.    INDICATION FOR PROCEDURE: This patient presents with a history of recurrent DCIS of the right breast    PROCEDURE IN DETAIL:  Jo-Ann Escobedo is a 79 y.o. female brought to the operating room for definitive surgery of DCIS of the right breast.  The patient has elected to undergo right simple mastectomy with sentinel lymph node biopsy for shira assessment. The patient was informed of the possible risks and complications of the procedure, including but not limited to anesthetic risks, bleeding, infection, and need for additional surgery.  The patient concurred with the proposed plan, and has given informed consent.  The site of surgery was properly noted/marked in the preoperative holding area.    The patient was then brought to the operating room and placed in the supine position with both upper extremities extended.  regional and general anesthesia was administered. Perioperative antibiotics were administered consisting of Ancef and a time out was performed confirming the patient, site, and procedure.   The patient's right breast was injected with technetium to facilitate sentinel lymph node identification.The right chest and axilla was then prepped and draped in the usual sterile fashion.    We then turned our attention to the right breast where an elliptical incision was fashioned to incorporate the nipple areolar complex.  The incision was made with a  10-blade and extended through the subcutaneous tissues with Bovie electrocautery.  Skin flaps were raised to the clavicle superiorly.  We then  turned our attention to the right axilla.  The gamma probe was used to identify an area of increased radioactivity within the lower axilla. The clavipectoral sheath was sharply incised to reveal the level I axillary lymph nodes. The probe was used to identify a single node with increased radioactivity.  This node was brought into the operative field and carefully dissected free of the surrounding lymphovascular structures.  The highest ex vivo count of the node was 1274.  The node was then sent to pathology for permanent, labeled as sentinel node #1.  A total of 2 axillary sentinel nodes and 0 axillary non-sentinel nodes were identified, excised and submitted to pathology.  Bed counts were obtained to confirm that the 10% rule had not been violated.   The wound was irrigated with normal saline, and all bleeding points were secured with Bovie electrocautery.     We then proceeded to raise the remainder of the flaps to the lateral border of the sternum medially, to the inframammary fold inferiorly, and to the anterior border of the latissimus dorsi muscle laterally. The breast tissue was sharply excised off the chest wall taking care to incorporate the pectoralis fascia while leaving the serratus fascia behind.  The resulting mastectomy specimen was marked using a short stitch superiorly and long stitch laterally.  The breast was sent to pathology for permanent evaluation.      The operative field was irrigated with normal saline and all bleeding points were secured with Bovie electrocautery.  A 19 Fr kaylynn drain was placed under the mastectomy flap. The incision was closed using an interrupted 3-0 vicryl deep dermal stitch followed by a running 4-0 vicryl subcuticular.      Dermabond was applied. A post surgical bra was placed on the patient. At the end of the operation, all  sponge, instrument, and needle counts x 2 were correct.    ESTIMATED BLOOD LOSS: less than 50 mL    COMPLICATIONS: none    DISPOSITION: PACU - hemodynamically stable.    ATTESTATION:   I was present and scrubbed for the entire procedure.

## 2020-07-30 NOTE — BRIEF OP NOTE
[facility]  Brief Operative Note    Surgery Date: 7/30/2020     Surgeon(s) and Role:     * Suki Dill MD - Primary    Assisting Surgeon: Andre Rivera MD    Pre-op Diagnosis:  Ductal carcinoma in situ (DCIS) of right breast [D05.11]    Post-op Diagnosis:  Post-Op Diagnosis Codes:     * Ductal carcinoma in situ (DCIS) of right breast [D05.11]    Procedure(s) (LRB):  MASTECTOMY, UNILATERAL (Right)  INJECTION, FOR SENTINEL NODE IDENTIFICATION (Right)  BIOPSY, LYMPH NODE, SENTINEL (Right)    Anesthesia: General    Description of the findings of the procedure(s): Healthy skin flaps at conclusion of mastectomy    Estimated Blood Loss: 50mL         Specimens:   Specimen (12h ago, onward)    None      1. Right mastectomy  2. Right axillary sentinel lymph node hot, 411  3. Right axillary sentinel lymph node hot 1276      Discharge Note    OUTCOME: Patient tolerated treatment/procedure well without complication and is now ready for discharge.    DISPOSITION: Home or Self Care    FINAL DIAGNOSIS:  Ductal carcinoma in situ (DCIS) of right breast [D05.11]    FOLLOWUP: In clinic    DISCHARGE INSTRUCTIONS:       POSTOPERATIVE INSTRUCTIONS FOLLOWING   MASTECTOMY AND/OR AXILLARY LYMPH NODE DISSECTION    The following are post-operative instructions that will help you to recover from your surgery.  Please read over these instructions carefully and contact us if we can answer any of your questions or concerns.    Post-op care/Dressing/breast binder (surgi-bra)  A surgical bra may be placed around your chest after your surgery.  If you are given the bra, please wear it for the first 48 hours after surgery. After 48 hours you can remove your surgical bra and dressing to shower/cleanse the chest wall with antibacterial soap and warm water. Do not take a tub bath and do not soak the surgical site for at least 2 weeks.     The final pathology report will be available approximately 7-10 days after your surgery.   Our office will call you with your pathology report when it becomes available.    Activity    You will be able to do much of your own personal care, such as bathing, dressing, preparing simple meals, etc.   A short walk each day will help with your recovery   You may find that you need to take rest breaks between activities, but you should not need to stay in bed for prolonged periods of time during the day. A good rule during this time is to listen to your body, do what is comfortable, and stop and rest when your feel tired.  If it hurts, dont do it.   Return to taking your daily medications as prescribed   Please avoid activities that require moderate to heavy lifting (grocery shopping) or pushing/pulling (vacuuming) and repetitive motions (such as washing windows). Do not lift anything heavier than a gallon of milk.   Following a lymph node dissection, dont avoid using your arm, but dont exercise your arm until after your first post-operative visit.  At your first post-op visit, you will be given arm exercises to regain movement and flexibility.  You may be referred to physical therapy if needed.   You may restart driving when you are no longer on narcotics and you feel safe turning the wheel and stopping quickly.   You will need to be out of work approximately 2-6 weeks depending on your particular surgery and how well you are recovering.  We will evaluate how you are doing at the first post-op appointment.  This is a good time to ask when you may return to work and what activities you may do.    Medication for pain   You will be given a prescription for pain medication. You should not drive or operate machinery while taking these.  Please take prescription pain medication (narcotics) with food.  Narcotics can cause, or worsen, constipation.  You will need to increase your fluid intake, eat high fiber foods (such as fruits and bran) and make sure that you are up and walking. You may need to take an  over the counter stool softener for constipation.   Short term use of an icepack may be helpful to decrease discomfort and swelling, particularly to the armpit after lymph node surgery.   A small pillow positioned in the armpit may also decrease discomfort after lymph node surgery.   If you are given a prescription for antibiotics, take them as prescribed.    How to care for your Drain(s)  1. Wash hands--STRIP or milk the drainage tube as it comes out of your body toward the bulb.   a. Beginning where the drain comes out of your body, hold drainage tubing with one hand and with the other, stretch and release tubing an inch at time while moving downward with both hands toward the bulb.  b. Do this 2-3 times before emptying the bulb.  2. Remove the stopper from the bulbs port  (drainage port)  3. Pour the drainage in the measuring cup provided by the nurse  4. Flatten/squeeze the bulb to create a vacuum and replace the stopper before letting go of the bulb.  5. Record the date, time and amount of drainage in ccs (not ounces) each time bulb is emptied. If you have more than one drain, record each separately.  6. Discard the drainage into the toilet after measuring and then wash hands.  7. Empty bulbs 2-3 times/day or as needed if it fills up before 8 hours.  8. Remember to bring the output record with you to your doctors appointment.    Please report the following:   Temperature greater than 101 degrees   Discharge or bad odor from the wound   Excessive bleeding, such as saturated bloody dressing or extreme bruising   Redness at incision and/or drain sites   Swelling or buildup of fluid around incision   Persistent fevers, chills, nausea, vomiting, or diarrhea    Additional information  Your surgeon will see you approximately 2 weeks following your surgery.  If this follow-up appointment has not been made, please call the office.    If you have any questions or problems, please call my office or my  nurse.    Dr. Suki Jennings, RN   [phone]      Roxana Keen PA-C  [phone]  Dianelys Goss, RN  [phone]    After hours and on weekends, you may call the main [facility] line at [phone] and ask to have the general surgery resident paged or have me paged      Lymphedema Risk Reduction    Lymphedema is a swelling of a part of the body, caused by an insufficient lymphatic system and an accumulation of fluid in the bodys tissues.  Lymphedema may occur when normal drainage of fluid is disrupted, such as an infection, injury, cancer, scar tissue, or removal of lymph nodes.    If you had a full axillary lymph node dissection procedure, you may be at greater risk for lymphedema.     For those patients having a sentinel lymph node biopsy, these risks may be smaller and the recommendations are provided for your review and consideration.    The following list contains recommendations for reducing your risk of developing lymphedema.    I. Skin Care--avoid trauma/injury to reduce infection risk   Keep the hand and arm on the side of surgery clean and dry   Pay attention to nail care and do not cut cuticles   Avoid punctures, such as injections and blood draws from you on the side of your surgery   Wear gloves while doing activities that may cause skin injury (washing dishes, gardening, etc.)   If scratches or punctures occur, wash area with soap and water, and observe for signs of infections (redness, drainage, swelling)   If a rash, itching, redness, pain, increased skin temperatures, fever, or flu-like symptoms occur, contact your physician immediately for early treatment of a possible infection  II. Activity/Lifestyle   Gradually build up the duration and intensity of any activity or exercise   Take frequent rest periods during activity to allow for arm recovery   Monitor your arm and upper body during and after activity for any change in size, shape, tissue, texture, soreness,  heaviness, or firmness  III. Avoid constriction of your arm on the side of your surgery   Avoid having blood pressure taken on the arm on the side of your surgery   Wear loose fitting jewelry and clothing   Be careful not to rest a heavy purse, luggage, or grocery bags on that arm   When you return to wearing a bra, make sure that it is well fitted and not too tight      Discharge Procedure Orders   Notify your health care provider if you experience any of the following:  temperature >100.4     Notify your health care provider if you experience any of the following:  persistent nausea and vomiting or diarrhea     Notify your health care provider if you experience any of the following:  severe uncontrolled pain     Notify your health care provider if you experience any of the following:  redness, tenderness, or signs of infection (pain, swelling, redness, odor or green/yellow discharge around incision site)     No dressing needed     Activity as tolerated

## 2020-07-30 NOTE — PLAN OF CARE
VSS. Pt able to tolerate oral liquids. Pt reports tolerable pain level for D/C. Dressing intact. Daughter received home meds per bedside delivery. No distress noted. Pt states she is ready for D/C. D/C instructions reviewed with pt and daughter, verbalized understanding. GREGORIA drain instructions given as well as forms to measure outpt, pt and daughter verbalized understanding. All questions addressed and answered.

## 2020-07-30 NOTE — PROGRESS NOTES
Dr. Dill at bedside. Per Dr. Dill pt will be d/c to home today. Empty GREGORIA drain and check output again in 1 hour. Will continue to monitor pt.

## 2020-07-30 NOTE — ANESTHESIA PREPROCEDURE EVALUATION
07/30/2020  Jo-Ann Escobedo is a 79 y.o., female.    Anesthesia Evaluation    I have reviewed the Patient Summary Reports.     I have reviewed the Nursing Notes. I have reviewed the NPO Status.   I have reviewed the Medications.     Review of Systems  Anesthesia Hx:  No problems with previous Anesthesia  History of prior surgery of interest to airway management or planning: Previous anesthesia: General Denies Family Hx of Anesthesia complications.   Denies Personal Hx of Anesthesia complications.   Social:  Non-Smoker    Hematology/Oncology:  Hematology Normal      Current/Recent Cancer. --  Cancer in past history:  Breast bilateral axillary node dissection surgery    EENT/Dental:EENT/Dental Normal   Cardiovascular:   Exercise tolerance: good Hypertension hyperlipidemia    Pulmonary:  Pulmonary Normal    Renal/:   renal calculi    Hepatic/GI:   Diverticulosis   Musculoskeletal:   Arthritis   Spine Disorders: lumbar    Neurological:   Neuromuscular Disease,    Endocrine:   Hypothyroidism    Dermatological:   SCC   Psych:  Psychiatric Normal           Physical Exam  General:  Well nourished, Obesity    Airway/Jaw/Neck:  Airway Findings: Mouth Opening: Small, but > 3cm Tongue: Normal  General Airway Assessment: Adult, Average  Mallampati: III  Improves to II with phonation.  TM Distance: 4 - 6 cm        Eyes/Ears/Nose:  EYES/EARS/NOSE FINDINGS: Normal   Dental:  Dental Findings: Upper front caps, Lower front caps, Molar caps   Chest/Lungs:  Chest/Lungs Findings: Clear to auscultation, Normal Respiratory Rate     Heart/Vascular:  Heart Findings: Rate: Normal  Rhythm: Regular Rhythm  Sounds: Normal  Heart murmur: negative Vascular Findings: Normal    Abdomen:  Abdomen Findings: Normal    Musculoskeletal:  Musculoskeletal Findings: Normal   Skin:  Skin Findings: Normal    Mental Status:  Mental Status Findings:   Cooperative, Alert and Oriented         Anesthesia Plan  Type of Anesthesia, risks & benefits discussed:  Anesthesia Type:  general  Patient's Preference:   Intra-op Monitoring Plan: standard ASA monitors  Intra-op Monitoring Plan Comments:   Post Op Pain Control Plan: peripheral nerve block  Post Op Pain Control Plan Comments:   Induction:   IV  Beta Blocker:  Patient is not currently on a Beta-Blocker (No further documentation required).       Informed Consent: Patient understands risks and agrees with Anesthesia plan.  Questions answered. Anesthesia consent signed with patient.  ASA Score: 3     Day of Surgery Review of History & Physical:            Ready For Surgery From Anesthesia Perspective.

## 2020-07-30 NOTE — INTERVAL H&P NOTE
The patient has been examined and the H&P has been reviewed:    I concur with the findings and no changes have occurred since H&P was written.    Surgery risks, benefits and alternative options discussed and understood by patient/family.          Active Hospital Problems    Diagnosis  POA    Ductal carcinoma in situ (DCIS) of right breast [D05.11]  Yes      Resolved Hospital Problems   No resolved problems to display.

## 2020-07-30 NOTE — ANESTHESIA PROCEDURE NOTES
Right paravertebral single shot    Patient location during procedure: pre-op   Block not for primary anesthetic.  Reason for block: at surgeon's request and post-op pain management   Post-op Pain Location: Right chest pain  Start time: 7/30/2020 6:45 AM  Timeout: 7/30/2020 6:45 AM   End time: 7/30/2020 6:50 AM    Staffing  Authorizing Provider: Jeff Briceno MD  Performing Provider: Jeff Briceno MD    Preanesthetic Checklist  Completed: patient identified, site marked, surgical consent, pre-op evaluation, timeout performed, IV checked, risks and benefits discussed and monitors and equipment checked  Peripheral Block  Patient position: sitting  Prep: ChloraPrep  Patient monitoring: heart rate, cardiac monitor, continuous pulse ox, continuous capnometry and frequent blood pressure checks  Block type: paravertebral - thoracic  Laterality: right  Injection technique: single shot  Location: T2-3 and T4-5  Needle  Needle type: Tuohy   Needle gauge: 17 G  Needle length: 3.5 in  Needle localization: anatomical landmarks     Assessment  Injection assessment: negative aspiration and negative parasthesia  Paresthesia pain: none  Heart rate change: no  Slow fractionated injection: yes  Additional Notes  T2 os at 4 cm  T4 os at 4 cm  VSS.  [facility] RN monitoring vitals throughout procedure.  Patient tolerated procedure well.     15ml 0.5% ropivacaine with epi at each level

## 2020-07-31 NOTE — ANESTHESIA POSTPROCEDURE EVALUATION
Anesthesia Post Evaluation    Patient: Jo-Ann Escobedo    Procedure(s) Performed: Procedure(s) (LRB):  MASTECTOMY, UNILATERAL (Right)  INJECTION, FOR SENTINEL NODE IDENTIFICATION (Right)  BIOPSY, LYMPH NODE, SENTINEL (Right)    Final Anesthesia Type: general    Patient location during evaluation: PACU  Patient participation: Yes- Able to Participate  Level of consciousness: awake and alert and oriented  Post-procedure vital signs: reviewed and stable  Pain management: adequate  Airway patency: patent    PONV status at discharge: No PONV  Anesthetic complications: no      Cardiovascular status: hemodynamically stable  Respiratory status: unassisted, spontaneous ventilation and room air  Hydration status: euvolemic  Follow-up not needed.          Vitals Value Taken Time   /76 07/30/20 1348   Temp 36.2 °C (97.2 °F) 07/30/20 1348   Pulse 75 07/30/20 1354   Resp 18 07/30/20 1348   SpO2 95 % 07/30/20 1354   Vitals shown include unvalidated device data.      Event Time   Out of Recovery 12:34:00         Pain/Parris Score: Pain Rating Prior to Med Admin: 7 (7/30/2020 11:30 AM)  Pain Rating Post Med Admin: 5 (7/30/2020 11:41 AM)  Parris Score: 9 (7/30/2020 10:44 AM)

## 2020-08-03 LAB
FINAL PATHOLOGIC DIAGNOSIS: NORMAL
GROSS: NORMAL

## 2020-08-10 ENCOUNTER — CLINICAL SUPPORT (OUTPATIENT)
Dept: REHABILITATION | Facility: HOSPITAL | Age: 80
End: 2020-08-10
Attending: PHYSICIAN ASSISTANT
Payer: MEDICARE

## 2020-08-10 DIAGNOSIS — R29.898 DECREASED GRIP STRENGTH OF LEFT HAND: ICD-10-CM

## 2020-08-10 DIAGNOSIS — Z98.890 S/P CARPAL TUNNEL RELEASE: ICD-10-CM

## 2020-08-10 DIAGNOSIS — M79.642 LEFT HAND PAIN: ICD-10-CM

## 2020-08-10 PROCEDURE — 97166 OT EVAL MOD COMPLEX 45 MIN: CPT

## 2020-08-10 PROCEDURE — 97110 THERAPEUTIC EXERCISES: CPT

## 2020-08-10 PROCEDURE — 97022 WHIRLPOOL THERAPY: CPT

## 2020-08-10 NOTE — PATIENT INSTRUCTIONS
[facility]  OCCUPATIONAL THERAPY             Complete massage 2-3 minutes 4 times a day:        Complete 10 repetitions of each exercise 3 times a day:                   [facility]  OCCUPATIONAL THERAPY  HOME EXERCISE PROGRAM         Sitting with elbows on table and palms together, slowly lower wrists toward table until stretch is felt. Be sure to keep palms together throughout stretch. Hold 15-30 seconds. Relax.  Repeat 3 times. Do 2 sessions per day.  Copyright © [facility]. All rights reserved.     Therapist: YAIR Pearson             _

## 2020-08-10 NOTE — PLAN OF CARE
[facility] Occupational Therapy  Initial Evaluation     Date: 8/10/2020  Name: Jo-Ann Escobedo  Clinic Number: [id number]    Therapy Diagnosis:   Encounter Diagnoses   Name Primary?    S/P carpal tunnel release     Left hand pain     Decreased  strength of left hand      Physician: Mary Ellen Worley PA-C; Dr. Pricila Presley    Physician Orders: eval and treat  Medical Diagnosis: S/P carpal tunnel release [Z98.890]    Surgical Procedure and Date: 6/5/2020, s/p L CTR / Date of Injury/Onset: December 2019  Evaluation Date: 8/10/2020  Insurance Authorization Period Expiration: 8/24/2020  Plan of Care Certification Period: 9/25/2020  Date of Return to MD: not scheduled    Visit # / Visits authorized: 1 / 6    FOTO: initial eval    Time In: 9:45 am  Time Out: 10:30 am  Total treatment time: 45 minutes  Total Timed [id number] minutes    Precautions:  Standard, Breat CA on R side    Subjective     Involved Side: left  Dominant Side: Right  Mechanism of Injury: unsure of cause,   History of Current Condition: Pt reports began having issues after a longer stay in the hospital in December. She reports she still has numbness in all L fingers and has decreased use of L hand with daily tasks. Pt also recently had surgery to R breast for breast CA.   Imaging: x-ray on 5/18/2020 indicates: There is calcification in the region of the triangular fibrocartilage.   There is mild osteoarthritic degenerative change at the interphalangeal joints particularly distally and at the 1st metacarpal phalangeal joint.  There are several  corticated cystic lesions including distal radius, carpal bones and a few of the distal metacarpals.  These are favored to be osteoarthritic in nature.  These findings have progressed compared to prior  Previous Therapy: no prior therapy    Past Medical History/Physical Systems Review:   Jo-Ann Escobedo  has a past medical history of Acute blood loss anemia, After-cataract of both eyes - Both  Eyes, Arthritis of foot, Cholelithiasis, Deaf, left, Diverticulitis of large intestine without perforation or abscess with bleeding, Diverticulosis, Ductal carcinoma in situ (DCIS) of right breast, Encounter for blood transfusion, Essential hypertension, Gastric nodule, GI bleed, Hearing loss in right ear, Hematochezia, Hematochezia, Hypothyroidism, postsurgical, Mixed hyperlipidemia, Multinodular goiter, Paget's disease of bone, and Squamous Cell Carcinoma.    Jo-Ann Escobedo  has a past surgical history that includes Hysterectomy; Eye surgery; Spine surgery; Mastectomy; Total thyroidectomy; Cataract extraction w/  intraocular lens implant (Bilateral); Knee arthroscopy (N/A); Colonoscopy (N/A, 4/15/2019); Injection of anesthetic agent around medial branch nerves innervating lumbar facet joint (Bilateral, 6/7/2019); Excisional biopsy (Right, 6/27/2019); YAG Laser Capsulotomy  (Left, 07/29/2019); Esophagogastroduodenoscopy (N/A, 12/7/2019); Colonoscopy (N/A, 12/9/2019); Endoscopic ultrasound of upper gastrointestinal tract (N/A, 1/22/2020); Breast biopsy (Right, 2019); Oophorectomy; Breast lumpectomy (Right, 2019); Carpal tunnel release (Left, 6/5/2020); Injection of steroid (Right, 6/5/2020); Unilateral mastectomy (Right, 7/30/2020); Injection for sentinel node identification (Right, 7/30/2020); and [place] lymph node biopsy (Right, 7/30/2020).    Jo-Ann has a current medication list which includes the following prescription(s): acetaminophen, cholecalciferol (vitamin d3), co-enzyme q-10, cozaar, cyanocobalamin, fenofibrate, hydrocodone-acetaminophen, lactobacillus combination no.8, levothyroxine, lidocaine-prilocaine, losartan, meclizine, and turmeric root extract, and the following Facility-Administered Medications: mupirocin.    Review of patient's allergies indicates:   Allergen Reactions    Voltaren [diclofenac sodium] Other (See Comments)     Hematochezia and hospitalization for hematochezia.    Codeine  Diarrhea and Hallucinations    Niacin preparations      Redness, warming        Patient's Goals for Therapy: to have better use of hand    Pain:  Functional Pain Scale Rating 0-10:   3/10 on average  3/10 at best  3/10 at worst  Location: L palm and fingers  Description: Throbbing and Numb  Aggravating Factors: grasping  Easing Factors: rest    Occupation:  Pt is retired  Working presently: retired  Duties: house hold tasks, cooking    Functional Limitations/Social History:    Previous functional status includes: Independent with all ADLs.      Current FunctionalStatus   Home/Living environment : lives alone, but daughter come to help check on her and buys her groceries      Limitation of Functional Status as follows:   ADLs/IADLs:     - Feeding: I    - Bathing: mod I    - Dressing/Grooming: mod I, difficulty with buttoning and dressing    - Driving: mod I, short distances     Leisure: has not been able to sew        Objective     Observation/Appearance: healed scar at L palm. Mild swelling noted at scar area on palm, moderate scar tissue.     Edema. Measured in centimeters.   8/10/2020 8/10/2020    Left Right   Wrist Crease 16.9 16.8   DPC 20 19.9   MCPs 19.8 20.1       Elbow and Wrist ROM. Measured in degrees.   8/10/2020 8/10/2020    Left Right   Wrist Ext/Flex 60/65 50/30   Wrist RD/UD 10/26 10/25     Hand ROM. Measured in degrees.  Digits WFL  Opposition WFL, ~1 cm from DPC       Strength (Dynamometer) and Pinch Strength (Pinch Gauge)  Measured in pounds.   8/10/2020 8/10/2020    Left Right   Rung II 33 24   Key Pinch 8 10   3pt Pinch 8 9   2pt Pinch 4 7     Sensation: decreased sensation noted at L fingertips        CMS Impairment/Limitation/Restriction for FOTO Hand Survey    Therapist reviewed FOTO scores for Jo-Ann LUCIA Escobedo on 8/10/2020.   FOTO documents entered into [employer] - see Media section.    Limitation Score: 39%         Treatment     Treatment Time In: 10:10  Treatment Time Out: 10:30   Total  Treatment time separate from Evaluation time:20 min    Jo-Ann received the following supervised modalities after being cleared for contradictions for 10 minutes:   -Fluidotherapy: To L hand for 10 min, continuous air, 110 deg, air speed 100 to decrease pain, edema & scar tissue and increased tissue extensibility.      Jo-Ann received the following manual therapy techniques for 2 minutes:   -Performed scar massage to L palm area to decrease adhesions and improve tensile glide.       Jo-Ann received therapeutic exercises for 8 minutes including:  -  AROM Wrist  Ext/flx  RD/UD   X 10 reps each    Wave, hook, straight fist, composite fist, finger spreads, finger lifts, pinky slides   X 10 reps each        Home Exercise Program/Education:  Issued HEP (see patient instructions in EMR) and educated on modality use for pain management . Exercises were reviewed and Jo-Ann was able to demonstrate them prior to the end of the session.   Pt received a written copy of exercises to perform at home. Jo-Ann demonstrated good  understanding of the education provided.  Pt was advised to perform these exercises free of pain, and to stop performing them if pain occurs.    Patient/Family Education: role of OT, goals for OT, scheduling/cancellations - pt verbalized understanding. Discussed insurance limitations with patient.    Additional Education provided: educated on scar massage. Pt given pink sponge (putty not available) for hand squeezes for hand strengthening.     Assessment     Jo-Ann Escobedo is a 79 y.o. female referred to outpatient occupational therapy and presents with a medical diagnosis of s/p L CTR, resulting in pain, numbness, stiffness, and weakness, and demonstrates limitations as described in the chart below. Following medical record review it is determined that pt will benefit from occupational therapy services in order to maximize pain free and/or functional use of left hand. The following goals were discussed with  the patient and patient is in agreement with them as to be addressed in the treatment plan. The patient's rehab potential is Good.     Anticipated barriers to occupational therapy: none  Pt has no cultural, educational or language barriers to learning provided.    Profile and History Assessment of Occupational Performance Level of Clinical Decision Making Complexity Score   Occupational Profile:   Jo-Ann Escobedo is a 79 y.o. female who lives alone and is currently retired. Jo-Ann Escobedo has difficulty with  grooming and dressing  phone/computer use and housework/household chores  affecting his/her daily functional abilities. His/her main goal for therapy is to get back full use of hand.     Comorbidities:   advanced age, history of cancer and cervical radiculopathy    Medical and Therapy History Review:   Expanded               Performance Deficits    Physical:  Joint Mobility  Joint Stability  Muscle Power/Strength  Muscle Endurance  Skin Integrity/Scar Formation  Edema   Strength  Pinch Strength  Fine Motor Coordination  Pain    Cognitive:  No Deficits    Psychosocial:    Social Interaction  Habits     Clinical Decision Making:  moderate    Assessment Process:  Comprehensive Assessments    Modification/Need for Assistance:  Minimal-Moderate Modifications/Assistance    Intervention Selection:  Several Treatment Options       moderate  Based on PMHX, co morbidities , data from assessments and functional level of assistance required with task and clinical presentation directly impacting function.         Goals:   The following goals were discussed with the patient and patient is in agreement with them as to be addressed in the treatment plan.   Long Term Goals (LTGs); to be met by discharge.  LTG #1: Pt will report a pain level of 0 out of 10 with ADLs and house hold tasks   LTG #2: Pt will demo improved FOTO score by at least 15 points.   LTG #3: Pt will return to prior level of function for ADLs and household  management.   LTG #4: pt will demonstrate improved L  strength by at least 3-5# for opening and carrying things  LTG #5: pt will demonstrate improved L pinch strength by at least 2-4 psi for opening bottles and turning keys    Short Term Goals (STGs); to be met within 4 weeks (9/10/2020).  STG #1a: Pt will report 2 out of 10 pain level with ADLs.  STG #2: Pt will demonstrate independence with issued HEP.   STG #3b: Pt will demo improved L wrist AROM by at least 15 degrees needed to aid with carrying and holding objects.        Plan   Certification Period/Plan of care expiration: 8/10/2020 to 9/25/2020.    Outpatient Occupational Therapy 1 times weekly for 6 weeks to include the following interventions: Paraffin, Fluidotherapy, Manual therapy/joint mobilizations, Modalities for pain management, US 3 mhz, Therapeutic exercises/activities., Strengthening, Edema Control, Scar Management, Electrical Modalities, Joint Protection and Energy Conservation.      YAIR Pearson  Date: 8/10/2020      I certify the need for these services furnished under the plan of treatment and while under my care.     ____________________________________________________________________  Physician/Referring Practitioner   Date of Signature

## 2020-08-11 NOTE — PROGRESS NOTES
[facility] [place]       Post-Op        REFERRING PHYSICIAN:   Dakota Kerr MD    MEDICAL ONCOLOGIST:   Dr. Mueller   RADIATION ONCOLOGIST:  None     DIAGNOSIS:    This is a 79 y.o. female with a history of ER+MA+ DCIS of the right breast.    TREATMENT SUMMARY:  The patient is status post right  mastectomy and sentinel node biopsy on 7/30/2020.  Final pathology showed no residual disease and negative nodes.    INTERVAL HISTORY:   Jo-Ann Escobedo comes in for a post-op check.  She denies fever, chills, chest pain or shortness of breath.  Her pain is well controlled.      MEDICATIONS:  Current Outpatient Medications   Medication Sig Dispense Refill    acetaminophen (TYLENOL) 650 MG TbSR Take 1 tablet (650 mg total) by mouth 3 (three) times daily as needed. 42 tablet 0    cholecalciferol, vitamin D3, (VITAMIN D3) 2,000 unit Tab Take 1 tablet by mouth once daily.      co-enzyme Q-10 30 mg capsule Take 30 mg by mouth once daily.       COZAAR 50 mg tablet       cyanocobalamin 1,000 mcg/mL injection Inject 1 mL (1,000 mcg total) into the muscle every 30 days. 10 mL 1    fenofibrate 160 MG Tab TAKE 1 TABLET (160 MG TOTAL) BY MOUTH ONCE DAILY. 90 tablet 3    HYDROcodone-acetaminophen (NORCO) 5-325 mg per tablet Take 1 tablet by mouth every 6 (six) hours as needed for Pain. 15 tablet 0    LACTOBACILLUS COMBINATION NO.8 (ADULT PROBIOTIC ORAL) Take by mouth once daily.       levothyroxine (SYNTHROID) 125 MCG tablet Take 1 tablet (125 mcg total) by mouth before breakfast. 30 tablet 11    lidocaine-prilocaine (EMLA) cream Apply topically 2 (two) times daily as needed. To hand. 30 g 2    losartan (COZAAR) 50 MG tablet TAKE 1 TABLET BY MOUTH EVERY DAY 90 tablet 3    meclizine (ANTIVERT) 12.5 mg tablet Take 1 tablet (12.5 mg total) by mouth 3 (three) times daily as needed for Dizziness. 30 tablet 2    turmeric root extract 500 mg Cap Take by mouth once daily.        No current facility-administered medications  for this visit.      Facility-Administered Medications Ordered in Other Visits   Medication Dose Route Frequency Provider Last Rate Last Dose    mupirocin 2 % ointment   Nasal On Call Procedure Leandro Avila MD           ALLERGIES:   Review of patient's allergies indicates:   Allergen Reactions    Voltaren [diclofenac sodium] Other (See Comments)     Hematochezia and hospitalization for hematochezia.    Codeine Diarrhea and Hallucinations    Niacin preparations      Redness, warming       PHYSICAL EXAMINATION:   General:  This is a well appearing female with appropriate speech, affect and gait.     Breast:  Incision clean, dry, and intact.  Her drain output is serous but is too high to remove the drain at this time.    Pathology:  1.  RIGHT BREAST, TOTAL MASTECTOMY:   - No evidence of residual ductal carcinoma in situ (DCIS)   - Reactive changes consistent with prior surgical and biopsy site   - Proliferative fibrocystic change with fibrosis, microcysts, apocrine   metaplasia and moderate duct epithelial hyperplasia without atypia   2.  RIGHT AXILLARY SENTINEL LYMPH NODE, HOT, 411, BIOPSY:   - One lymph node, no tumor present using cytokeratin AE1/AE3 and CAM5.2   immunohistochemistry, with appropriate controls (0/1)   3.  RIGHT AXILLARY SENTINEL LYMPH NODE, HOT, 1276, BIOPSY:   - Two lymph nodes, no tumor present using cytokeratin AE1/AE3 and CAM5.2     IMPRESSION:   The patient has had an uneventful postoperative course.    PLAN:   1. return in 1 week for a follow up office visit and breast exam  2. No screening mmg in setting of bilateral mastectomy  3. The patient is advised in continued exam of the breast chest wall and to report to this office sooner should she note any areas of abnormality or concern.   4.  She has been instructed to meet with med onc for discussion of adjuvant treatment recommendations.  She is already established with Dr. Mueller

## 2020-08-12 ENCOUNTER — OFFICE VISIT (OUTPATIENT)
Dept: SURGERY | Facility: CLINIC | Age: 80
End: 2020-08-12
Payer: MEDICARE

## 2020-08-12 VITALS
WEIGHT: 182.13 LBS | HEIGHT: 63 IN | DIASTOLIC BLOOD PRESSURE: 65 MMHG | SYSTOLIC BLOOD PRESSURE: 141 MMHG | BODY MASS INDEX: 32.27 KG/M2 | HEART RATE: 81 BPM

## 2020-08-12 DIAGNOSIS — D05.11 DUCTAL CARCINOMA IN SITU (DCIS) OF RIGHT BREAST: Primary | ICD-10-CM

## 2020-08-12 PROCEDURE — 99999 PR PBB SHADOW E&M-EST. PATIENT-LVL III: ICD-10-PCS | Mod: PBBFAC,,, | Performed by: SURGERY

## 2020-08-12 PROCEDURE — 99024 POSTOP FOLLOW-UP VISIT: CPT | Mod: S$GLB,,, | Performed by: SURGERY

## 2020-08-12 PROCEDURE — 99999 PR PBB SHADOW E&M-EST. PATIENT-LVL III: CPT | Mod: PBBFAC,,, | Performed by: SURGERY

## 2020-08-12 PROCEDURE — 99024 PR POST-OP FOLLOW-UP VISIT: ICD-10-PCS | Mod: S$GLB,,, | Performed by: SURGERY

## 2020-08-18 NOTE — PROGRESS NOTES
[facility] [place]       Post-Op        REFERRING PHYSICIAN:  Dakota Kerr MD    MEDICAL ONCOLOGIST:   Dr. Mueller   RADIATION ONCOLOGIST:   None    DIAGNOSIS:    This is a 79 y.o. female with a history of ER+NM+ DCIS of the right breast.    TREATMENT SUMMARY:  The patient is status post right mastectomy and sentinel node biopsy on 7/30/2020.  Final pathology showed no residual disease and negative nodes.    INTERVAL HISTORY:   Jo-Ann Escobedo comes in for a post-op check and breast exam, last seen 8/12/2020.  She denies fever, chills, chest pain or shortness of breath.  Her pain is well controlled. Drain output remains above about 40 per day, all serous.    MEDICATIONS:  Current Outpatient Medications   Medication Sig Dispense Refill    acetaminophen (TYLENOL) 650 MG TbSR Take 1 tablet (650 mg total) by mouth 3 (three) times daily as needed. 42 tablet 0    cholecalciferol, vitamin D3, (VITAMIN D3) 2,000 unit Tab Take 1 tablet by mouth once daily.      co-enzyme Q-10 30 mg capsule Take 30 mg by mouth once daily.       COZAAR 50 mg tablet       cyanocobalamin 1,000 mcg/mL injection Inject 1 mL (1,000 mcg total) into the muscle every 30 days. 10 mL 1    fenofibrate 160 MG Tab TAKE 1 TABLET (160 MG TOTAL) BY MOUTH ONCE DAILY. 90 tablet 3    LACTOBACILLUS COMBINATION NO.8 (ADULT PROBIOTIC ORAL) Take by mouth once daily.       levothyroxine (SYNTHROID) 125 MCG tablet Take 1 tablet (125 mcg total) by mouth before breakfast. 30 tablet 11    lidocaine-prilocaine (EMLA) cream Apply topically 2 (two) times daily as needed. To hand. 30 g 2    losartan (COZAAR) 50 MG tablet TAKE 1 TABLET BY MOUTH EVERY DAY 90 tablet 3    meclizine (ANTIVERT) 12.5 mg tablet Take 1 tablet (12.5 mg total) by mouth 3 (three) times daily as needed for Dizziness. 30 tablet 2    turmeric root extract 500 mg Cap Take by mouth once daily.        No current facility-administered medications for this visit.      Facility-Administered  Medications Ordered in Other Visits   Medication Dose Route Frequency Provider Last Rate Last Dose    mupirocin 2 % ointment   Nasal On Call Procedure Leandro Avila MD           ALLERGIES:   Review of patient's allergies indicates:   Allergen Reactions    Voltaren [diclofenac sodium] Other (See Comments)     Hematochezia and hospitalization for hematochezia.    Codeine Diarrhea and Hallucinations    Niacin preparations      Redness, warming       PHYSICAL EXAMINATION:   General:  This is a well appearing female with appropriate speech, affect and gait.     Breast:  Incision clean, dry, and intact. Drains in place with 40 mL serous output per day.    Pathology:  1.  RIGHT BREAST, TOTAL MASTECTOMY:   - No evidence of residual ductal carcinoma in situ (DCIS)   - Reactive changes consistent with prior surgical and biopsy site   - Proliferative fibrocystic change with fibrosis, microcysts, apocrine   metaplasia and moderate duct epithelial hyperplasia without atypia   2.  RIGHT AXILLARY SENTINEL LYMPH NODE, HOT, 411, BIOPSY:   - One lymph node, no tumor present using cytokeratin AE1/AE3 and CAM5.2   immunohistochemistry, with appropriate controls (0/1)   3.  RIGHT AXILLARY SENTINEL LYMPH NODE, HOT, 1276, BIOPSY:   - Two lymph nodes, no tumor present using cytokeratin AE1/AE3 and CAM5.2     IMPRESSION:   The patient has had an uneventful postoperative course.    PLAN:   1. return in 1 week for a drain removal  2. No screening mmg in setting of bilateral mastectomy  3. The patient is advised in continued exam of the breast chest wall and to report to this office sooner should she note any areas of abnormality or concern.   4.  She has been instructed to meet with med onc for discussion of adjuvant treatment recommendations.  She has an appointment with Dr. Mueller next week

## 2020-08-19 ENCOUNTER — DOCUMENTATION ONLY (OUTPATIENT)
Dept: SURGERY | Facility: CLINIC | Age: 80
End: 2020-08-19

## 2020-08-19 ENCOUNTER — OFFICE VISIT (OUTPATIENT)
Dept: SURGERY | Facility: CLINIC | Age: 80
End: 2020-08-19
Payer: MEDICARE

## 2020-08-19 VITALS — DIASTOLIC BLOOD PRESSURE: 67 MMHG | HEART RATE: 67 BPM | SYSTOLIC BLOOD PRESSURE: 150 MMHG

## 2020-08-19 DIAGNOSIS — D05.11 DUCTAL CARCINOMA IN SITU (DCIS) OF RIGHT BREAST: Primary | ICD-10-CM

## 2020-08-19 PROCEDURE — 99024 PR POST-OP FOLLOW-UP VISIT: ICD-10-PCS | Mod: S$GLB,,, | Performed by: SURGERY

## 2020-08-19 PROCEDURE — 99999 PR PBB SHADOW E&M-EST. PATIENT-LVL II: CPT | Mod: PBBFAC,,, | Performed by: SURGERY

## 2020-08-19 PROCEDURE — 99999 PR PBB SHADOW E&M-EST. PATIENT-LVL II: ICD-10-PCS | Mod: PBBFAC,,, | Performed by: SURGERY

## 2020-08-19 PROCEDURE — 99024 POSTOP FOLLOW-UP VISIT: CPT | Mod: S$GLB,,, | Performed by: SURGERY

## 2020-08-19 NOTE — PROGRESS NOTES
Genetic test results hand delivered to pt at post op appt with Dr Dill 8/19/2020. Discussed the VUS noted on report and that there is nothing to do at this time regarding the VUS as there is currently insufficient data to know if these VUS's will cause . Pt voiced understanding. Numbers to [employer] provided if pt wants further info on the VUS's.

## 2020-08-19 NOTE — Clinical Note
Doing well post mastectomy.  Only a small area of DCIS from the core biopsy was found.  No remaining DCIS in the mastectomy specimen.

## 2020-08-25 ENCOUNTER — OFFICE VISIT (OUTPATIENT)
Dept: HEMATOLOGY/ONCOLOGY | Facility: CLINIC | Age: 80
End: 2020-08-25
Payer: MEDICARE

## 2020-08-25 VITALS
HEIGHT: 63 IN | OXYGEN SATURATION: 94 % | BODY MASS INDEX: 32.73 KG/M2 | RESPIRATION RATE: 16 BRPM | WEIGHT: 184.75 LBS | HEART RATE: 78 BPM | DIASTOLIC BLOOD PRESSURE: 86 MMHG | SYSTOLIC BLOOD PRESSURE: 135 MMHG | TEMPERATURE: 99 F

## 2020-08-25 DIAGNOSIS — D05.11 DUCTAL CARCINOMA IN SITU (DCIS) OF RIGHT BREAST: Primary | ICD-10-CM

## 2020-08-25 PROCEDURE — 99214 OFFICE O/P EST MOD 30 MIN: CPT | Mod: S$GLB,,, | Performed by: INTERNAL MEDICINE

## 2020-08-25 PROCEDURE — 3075F PR MOST RECENT SYSTOLIC BLOOD PRESS GE 130-139MM HG: ICD-10-PCS | Mod: CPTII,S$GLB,, | Performed by: INTERNAL MEDICINE

## 2020-08-25 PROCEDURE — 99999 PR PBB SHADOW E&M-EST. PATIENT-LVL IV: CPT | Mod: PBBFAC,,, | Performed by: INTERNAL MEDICINE

## 2020-08-25 PROCEDURE — 1100F PTFALLS ASSESS-DOCD GE2>/YR: CPT | Mod: CPTII,S$GLB,, | Performed by: INTERNAL MEDICINE

## 2020-08-25 PROCEDURE — 99214 PR OFFICE/OUTPT VISIT, EST, LEVL IV, 30-39 MIN: ICD-10-PCS | Mod: S$GLB,,, | Performed by: INTERNAL MEDICINE

## 2020-08-25 PROCEDURE — 1159F PR MEDICATION LIST DOCUMENTED IN MEDICAL RECORD: ICD-10-PCS | Mod: S$GLB,,, | Performed by: INTERNAL MEDICINE

## 2020-08-25 PROCEDURE — 3288F PR FALLS RISK ASSESSMENT DOCUMENTED: ICD-10-PCS | Mod: CPTII,S$GLB,, | Performed by: INTERNAL MEDICINE

## 2020-08-25 PROCEDURE — 3288F FALL RISK ASSESSMENT DOCD: CPT | Mod: CPTII,S$GLB,, | Performed by: INTERNAL MEDICINE

## 2020-08-25 PROCEDURE — 99999 PR PBB SHADOW E&M-EST. PATIENT-LVL IV: ICD-10-PCS | Mod: PBBFAC,,, | Performed by: INTERNAL MEDICINE

## 2020-08-25 PROCEDURE — 3079F DIAST BP 80-89 MM HG: CPT | Mod: CPTII,S$GLB,, | Performed by: INTERNAL MEDICINE

## 2020-08-25 PROCEDURE — 1126F PR PAIN SEVERITY QUANTIFIED, NO PAIN PRESENT: ICD-10-PCS | Mod: S$GLB,,, | Performed by: INTERNAL MEDICINE

## 2020-08-25 PROCEDURE — 1126F AMNT PAIN NOTED NONE PRSNT: CPT | Mod: S$GLB,,, | Performed by: INTERNAL MEDICINE

## 2020-08-25 PROCEDURE — 1100F PR PT FALLS ASSESS DOC 2+ FALLS/FALL W/INJURY/YR: ICD-10-PCS | Mod: CPTII,S$GLB,, | Performed by: INTERNAL MEDICINE

## 2020-08-25 PROCEDURE — 1159F MED LIST DOCD IN RCRD: CPT | Mod: S$GLB,,, | Performed by: INTERNAL MEDICINE

## 2020-08-25 PROCEDURE — 3075F SYST BP GE 130 - 139MM HG: CPT | Mod: CPTII,S$GLB,, | Performed by: INTERNAL MEDICINE

## 2020-08-25 PROCEDURE — 3079F PR MOST RECENT DIASTOLIC BLOOD PRESSURE 80-89 MM HG: ICD-10-PCS | Mod: CPTII,S$GLB,, | Performed by: INTERNAL MEDICINE

## 2020-08-25 NOTE — PROGRESS NOTES
Subjective:       Patient ID: Jo-Ann Escobedo is a 79 y.o. female.    Chief Complaint: No chief complaint on file.    HPI   Ms. Escobedo presents today for follow up.  On July 30, 2020 she underwent a right mastectomy and sentinel lymph node biopsy.  There was no residual DCIS identified in the mastectomy specimen, while 3 sentinel lymph nodes were negative.  Of note, on July 2, 2020 she had undergone a biopsy of the area in the right breast that was read as BI-RADS 4 on the recent mammogram.  The biopsy had shown evidence of DCIS which was ER positive.    Briefly, she is a 79 year-old female with a remote history of breast cancer treated 17 years ago with a left modified radical mastectomy.  Her tumor was a T2 N1 M0 carcinoma.  She was treated with four cycles of AC and 4 cycles of Taxotere in the adjuvant setting and has remained cancer free since then.   A mammogram in the spring of 2019 led to a contralateral biopsy and eventually to a lumpectomy on June 26th, 2019, for an area of DCIS, measuring 12mm.  Resection margins were clear, with the closest being 14 mm.  Her tumor was ER positive and CO negative.   She met with the radiation oncologist and decided against XRT.  She subsequently decided against adjuvant hormonal therapy and has been followed expectantly since.    Her recent mammogram showed a 6 mm area of coarse heterogeneous calcifications in a grouped distribution in the upper outer quadrant of the right breast.  A biopsy was performed on June 29, 2020 with results as above.  This was followed by the right mastectomy.    Review of Systems  Overall she feels OK, however, she still has 1 drainage tube in place and she is experiencing discomfort on the right chest wall pain.    She denies any anxiety come depression, easy bruising, fevers, chills, night sweats, weight loss, nausea, vomiting, diarrhea, constipation, diplopia, blurred vision headache, chest pain, abdominal pain, or difficulty ambulating. All  other systems are negative.     Objective:      Physical Exam  GENERAL: She is alert, oriented to time, place, person; well nourished;   pleasant; in no acute physical distress.    VITAL SIGNS: Reviewed.   HEENT: Normal. There are no nasal, oral, lip, gingival, auricular, lid,   or conjunctival lesions. Mucosae are moist and pink, and there is no   thrush. Pupils are equal, reactive to light and accommodation.   Extraocular muscle movements are intact.   NECK: Supple without JVD, thyromegaly, or adenopathy.   LUNGS: Clear to auscultation without wheezing, rales, or rhonchi.   CARDIOVASCULAR: Reveals an S1, S2, no murmurs, no rubs, no gallops.   BREASTS:  She is status post bilateral mastectomies.  The right-sided incision is healing nicely.  A drainage tube is still in place.  There was no evidence of infection.  She is also status post left mastectomy with a well-healed incision and no evidence of a chest wall recurrence.  ABDOMEN: Soft, nontender without organomegaly. Bowel sounds are identified.   EXTREMITIES: Have no cyanosis, clubbing, or edema.    SKIN: Does not have petechiae, rashes, ot induration.    NEUROLOGIC: Motor function is 5/5, DTRs are 0-1+ bilaterally, symmetrical,   and cranial nerves within normal limits.   LYMPHATIC: There is no cervical, axillary, or supraclavicular adenopathy.    Assessment:       1. Remote history of breast cancer status post left mastectomy and chemotherapy 17 years ago..    2.     Contralateral DCIS, s/p lumpectomy, now with a local recurrence, treated with a completion mass that    Plan:     I had a long discussion with her.  I explained to her that the chance of a cure her with the mastectomy is in the range of 99%.  I did not recommend any additional treatment at this point.   Her multiple questions were answered to her satisfaction.  Duration of visit was 15 minutes with an additional 10 minutes spent reviewing her chart.  I will see her again in mid December

## 2020-08-31 ENCOUNTER — HOSPITAL ENCOUNTER (OUTPATIENT)
Dept: RADIOLOGY | Facility: HOSPITAL | Age: 80
Discharge: HOME | End: 2020-08-31
Attending: FAMILY MEDICINE
Payer: MEDICARE

## 2020-08-31 ENCOUNTER — OFFICE VISIT (OUTPATIENT)
Dept: SURGERY | Facility: CLINIC | Age: 80
End: 2020-08-31
Payer: MEDICARE

## 2020-08-31 ENCOUNTER — OFFICE VISIT (OUTPATIENT)
Dept: INTERNAL MEDICINE | Facility: CLINIC | Age: 80
End: 2020-08-31
Payer: MEDICARE

## 2020-08-31 ENCOUNTER — TELEPHONE (OUTPATIENT)
Dept: SPINE | Facility: CLINIC | Age: 80
End: 2020-08-31

## 2020-08-31 ENCOUNTER — CLINICAL SUPPORT (OUTPATIENT)
Dept: REHABILITATION | Facility: HOSPITAL | Age: 80
End: 2020-08-31
Attending: PHYSICIAN ASSISTANT
Payer: MEDICARE

## 2020-08-31 VITALS
WEIGHT: 185.44 LBS | HEART RATE: 89 BPM | DIASTOLIC BLOOD PRESSURE: 72 MMHG | HEIGHT: 63 IN | BODY MASS INDEX: 32.86 KG/M2 | SYSTOLIC BLOOD PRESSURE: 145 MMHG

## 2020-08-31 VITALS
TEMPERATURE: 99 F | BODY MASS INDEX: 32.77 KG/M2 | OXYGEN SATURATION: 95 % | WEIGHT: 184.94 LBS | HEIGHT: 63 IN | DIASTOLIC BLOOD PRESSURE: 80 MMHG | SYSTOLIC BLOOD PRESSURE: 144 MMHG | HEART RATE: 79 BPM

## 2020-08-31 DIAGNOSIS — I10 ESSENTIAL HYPERTENSION: ICD-10-CM

## 2020-08-31 DIAGNOSIS — G89.29 CHRONIC LOW BACK PAIN WITHOUT SCIATICA, UNSPECIFIED BACK PAIN LATERALITY: ICD-10-CM

## 2020-08-31 DIAGNOSIS — C50.411 MALIGNANT NEOPLASM OF UPPER-OUTER QUADRANT OF RIGHT BREAST IN FEMALE, ESTROGEN RECEPTOR POSITIVE: Primary | ICD-10-CM

## 2020-08-31 DIAGNOSIS — M54.50 CHRONIC LOW BACK PAIN WITHOUT SCIATICA, UNSPECIFIED BACK PAIN LATERALITY: ICD-10-CM

## 2020-08-31 DIAGNOSIS — R29.898 WEAKNESS OF RIGHT UPPER EXTREMITY: ICD-10-CM

## 2020-08-31 DIAGNOSIS — G89.29 CHRONIC RIGHT SHOULDER PAIN: ICD-10-CM

## 2020-08-31 DIAGNOSIS — E53.8 LOW SERUM VITAMIN B12: ICD-10-CM

## 2020-08-31 DIAGNOSIS — M79.642 LEFT HAND PAIN: ICD-10-CM

## 2020-08-31 DIAGNOSIS — Z17.0 MALIGNANT NEOPLASM OF UPPER-OUTER QUADRANT OF RIGHT BREAST IN FEMALE, ESTROGEN RECEPTOR POSITIVE: Primary | ICD-10-CM

## 2020-08-31 DIAGNOSIS — M25.511 CHRONIC RIGHT SHOULDER PAIN: ICD-10-CM

## 2020-08-31 PROCEDURE — 1126F PR PAIN SEVERITY QUANTIFIED, NO PAIN PRESENT: ICD-10-PCS | Mod: S$GLB,,, | Performed by: PHYSICIAN ASSISTANT

## 2020-08-31 PROCEDURE — 99214 PR OFFICE/OUTPT VISIT, EST, LEVL IV, 30-39 MIN: ICD-10-PCS | Mod: S$GLB,,, | Performed by: FAMILY MEDICINE

## 2020-08-31 PROCEDURE — 3288F FALL RISK ASSESSMENT DOCD: CPT | Mod: CPTII,S$GLB,, | Performed by: PHYSICIAN ASSISTANT

## 2020-08-31 PROCEDURE — 97022 WHIRLPOOL THERAPY: CPT

## 2020-08-31 PROCEDURE — 1159F MED LIST DOCD IN RCRD: CPT | Mod: S$GLB,,, | Performed by: PHYSICIAN ASSISTANT

## 2020-08-31 PROCEDURE — 99024 POSTOP FOLLOW-UP VISIT: CPT | Mod: S$GLB,,, | Performed by: PHYSICIAN ASSISTANT

## 2020-08-31 PROCEDURE — 1101F PR PT FALLS ASSESS DOC 0-1 FALLS W/OUT INJ PAST YR: ICD-10-PCS | Mod: CPTII,S$GLB,, | Performed by: FAMILY MEDICINE

## 2020-08-31 PROCEDURE — 3078F DIAST BP <80 MM HG: CPT | Mod: CPTII,S$GLB,, | Performed by: PHYSICIAN ASSISTANT

## 2020-08-31 PROCEDURE — 1101F PT FALLS ASSESS-DOCD LE1/YR: CPT | Mod: CPTII,S$GLB,, | Performed by: FAMILY MEDICINE

## 2020-08-31 PROCEDURE — 99999 PR PBB SHADOW E&M-EST. PATIENT-LVL V: CPT | Mod: PBBFAC,,, | Performed by: FAMILY MEDICINE

## 2020-08-31 PROCEDURE — 3077F PR MOST RECENT SYSTOLIC BLOOD PRESSURE >= 140 MM HG: ICD-10-PCS | Mod: CPTII,S$GLB,, | Performed by: FAMILY MEDICINE

## 2020-08-31 PROCEDURE — 72110 X-RAY EXAM L-2 SPINE 4/>VWS: CPT | Mod: TC

## 2020-08-31 PROCEDURE — 99999 PR PBB SHADOW E&M-EST. PATIENT-LVL V: ICD-10-PCS | Mod: PBBFAC,,, | Performed by: FAMILY MEDICINE

## 2020-08-31 PROCEDURE — 99999 PR PBB SHADOW E&M-EST. PATIENT-LVL III: CPT | Mod: PBBFAC,,, | Performed by: PHYSICIAN ASSISTANT

## 2020-08-31 PROCEDURE — 1159F PR MEDICATION LIST DOCUMENTED IN MEDICAL RECORD: ICD-10-PCS | Mod: S$GLB,,, | Performed by: PHYSICIAN ASSISTANT

## 2020-08-31 PROCEDURE — 3077F PR MOST RECENT SYSTOLIC BLOOD PRESSURE >= 140 MM HG: ICD-10-PCS | Mod: CPTII,S$GLB,, | Performed by: PHYSICIAN ASSISTANT

## 2020-08-31 PROCEDURE — 3079F DIAST BP 80-89 MM HG: CPT | Mod: CPTII,S$GLB,, | Performed by: FAMILY MEDICINE

## 2020-08-31 PROCEDURE — 72110 X-RAY EXAM L-2 SPINE 4/>VWS: CPT | Mod: 26,,, | Performed by: RADIOLOGY

## 2020-08-31 PROCEDURE — 3077F SYST BP >= 140 MM HG: CPT | Mod: CPTII,S$GLB,, | Performed by: PHYSICIAN ASSISTANT

## 2020-08-31 PROCEDURE — 3288F PR FALLS RISK ASSESSMENT DOCUMENTED: ICD-10-PCS | Mod: CPTII,S$GLB,, | Performed by: PHYSICIAN ASSISTANT

## 2020-08-31 PROCEDURE — 99999 PR PBB SHADOW E&M-EST. PATIENT-LVL III: ICD-10-PCS | Mod: PBBFAC,,, | Performed by: PHYSICIAN ASSISTANT

## 2020-08-31 PROCEDURE — 1159F MED LIST DOCD IN RCRD: CPT | Mod: S$GLB,,, | Performed by: FAMILY MEDICINE

## 2020-08-31 PROCEDURE — 1159F PR MEDICATION LIST DOCUMENTED IN MEDICAL RECORD: ICD-10-PCS | Mod: S$GLB,,, | Performed by: FAMILY MEDICINE

## 2020-08-31 PROCEDURE — 3079F PR MOST RECENT DIASTOLIC BLOOD PRESSURE 80-89 MM HG: ICD-10-PCS | Mod: CPTII,S$GLB,, | Performed by: FAMILY MEDICINE

## 2020-08-31 PROCEDURE — 72110 XR LUMBAR SPINE COMPLETE 5 VIEW: ICD-10-PCS | Mod: 26,,, | Performed by: RADIOLOGY

## 2020-08-31 PROCEDURE — 97110 THERAPEUTIC EXERCISES: CPT

## 2020-08-31 PROCEDURE — 1126F AMNT PAIN NOTED NONE PRSNT: CPT | Mod: S$GLB,,, | Performed by: PHYSICIAN ASSISTANT

## 2020-08-31 PROCEDURE — 3077F SYST BP >= 140 MM HG: CPT | Mod: CPTII,S$GLB,, | Performed by: FAMILY MEDICINE

## 2020-08-31 PROCEDURE — 99024 PR POST-OP FOLLOW-UP VISIT: ICD-10-PCS | Mod: S$GLB,,, | Performed by: PHYSICIAN ASSISTANT

## 2020-08-31 PROCEDURE — 1125F AMNT PAIN NOTED PAIN PRSNT: CPT | Mod: S$GLB,,, | Performed by: FAMILY MEDICINE

## 2020-08-31 PROCEDURE — 1100F PTFALLS ASSESS-DOCD GE2>/YR: CPT | Mod: CPTII,S$GLB,, | Performed by: PHYSICIAN ASSISTANT

## 2020-08-31 PROCEDURE — 3078F PR MOST RECENT DIASTOLIC BLOOD PRESSURE < 80 MM HG: ICD-10-PCS | Mod: CPTII,S$GLB,, | Performed by: PHYSICIAN ASSISTANT

## 2020-08-31 PROCEDURE — 99214 OFFICE O/P EST MOD 30 MIN: CPT | Mod: S$GLB,,, | Performed by: FAMILY MEDICINE

## 2020-08-31 PROCEDURE — 1125F PR PAIN SEVERITY QUANTIFIED, PAIN PRESENT: ICD-10-PCS | Mod: S$GLB,,, | Performed by: FAMILY MEDICINE

## 2020-08-31 PROCEDURE — 1100F PR PT FALLS ASSESS DOC 2+ FALLS/FALL W/INJURY/YR: ICD-10-PCS | Mod: CPTII,S$GLB,, | Performed by: PHYSICIAN ASSISTANT

## 2020-08-31 RX ORDER — TIZANIDINE 2 MG/1
2 TABLET ORAL NIGHTLY PRN
Qty: 30 TABLET | Refills: 2 | Status: SHIPPED | OUTPATIENT
Start: 2020-08-31 | End: 2020-12-21

## 2020-08-31 NOTE — TELEPHONE ENCOUNTER
----- Message from Natacha Mcbride sent at 8/31/2020 11:54 AM CDT -----  Regarding: Office call back  Who called: Anabel from Dr. Kerr's office     What is the request in detail: Bharati is requesting a call back. She states the patient needs to be seen before 12/31. She would like to know if there is anyway that is possible. She states she She would like to further discuss.   Please advise.    Can the clinic reply by [facility]? No    Best call back number: ext [phone]     Additional Information: N/A

## 2020-08-31 NOTE — PROGRESS NOTES
"  Occupational Therapy Daily Treatment Note      Date: 8/31/2020  Name: Jo-Ann Escobedo  Clinic Number: [id number]    Therapy Diagnosis:   Encounter Diagnosis   Name Primary?    Left hand pain      Physician: Mary Ellen Worley PA-C; Dr. Pricila Presley     Physician Orders: eval and treat  Medical Diagnosis: S/P carpal tunnel release [Z98.890]     Surgical Procedure and Date: 6/5/2020, s/p L CTR / Date of Injury/Onset: December 2019  Evaluation Date: 8/10/2020  Insurance Authorization Period Expiration: 9/21/2020  Plan of Care Certification Period: 9/25/2020  Date of Return to MD: not scheduled     Visit # / Visits authorized: 2 / 6     FOTO: initial eval     Time In: 1:25 pm  Time Out: 2:10 pm  Total treatment time: 45 minutes  Total Timed minutes 35 minutes     Precautions:  Standard, Breat CA on R side, pt is hard of hearing      Subjective     Pt reports: "I think there's been some improvement but the hand is still uncomfortable"  she was compliant with home exercise program given last session.   Response to previous treatment: no adverse effects  Functional change: none    Pain: 2/10  Location: left hand    Objective     Edema. Measured in centimeters.    8/10/2020 8/10/2020     Left Right   Wrist Crease 16.9 16.8   DPC 20 19.9   MCPs 19.8 20.1         Elbow and Wrist ROM. Measured in degrees.    8/10/2020 8/10/2020     Left Right   Wrist Ext/Flex 60/65 50/30   Wrist RD/UD 10/26 10/25      Hand ROM. Measured in degrees.  Digits WFL  Opposition WFL, ~1 cm from DPC         Strength (Dynamometer) and Pinch Strength (Pinch Gauge)  Measured in pounds.    8/10/2020 8/10/2020     Left Right   Rung II 33 24   Key Pinch 8 10   3pt Pinch 8 9   2pt Pinch 4 7      Sensation: decreased sensation noted at L fingertips      Treatment consisted of the following:    Jo-Ann received the following supervised modalities after being cleared for contradictions for 10 minutes:   -Fluidotherapy: To L hand for 10 min, continuous air, " 110 deg, air speed 100 to decrease pain, edema & scar tissue and increased tissue extensibility.        Jo-Ann received the following manual therapy techniques for 5 minutes:   -Performed scar massage to L palm area to decrease adhesions and improve tensile glide.         Jo-Ann received therapeutic exercises for 30 minutes including:  -  AROM Wrist  Ext/flx  RD/UD    X 10 reps each    Wave, hook, straight fist, composite fist, finger spreads, finger lifts, pinky slides    X 10 reps each    Wrist wheel, flex/ext X 2'   isospheres  X 2'   Wrist dexerciser X 2'   Theraputty - yellow  -molding  -gripping  -Finger spread/flower  -log rolls with 3 pt and lateral key pinch   -2'  -20 reps  -5 reps    -3 sets                Home Exercises and Education Provided     Education provided: cont HEP, added yellow theraputty  - Progress towards goals     Written Home Exercises Provided: Patient instructed to cont prior HEP. Yes, added theraputty  Exercises were reviewed and Jo-Ann was able to demonstrate them prior to the end of the session.  Jo-Ann demonstrated good  understanding of the education provided.   .   See EMR under Patient Instructions for exercises provided 8/31/2020.     Assessment     Pt tolerated tx well this date. She denied increased pain this date. She tolerated progression of light strengthening today. She cont to c/o discomfort during functional tasks, but reports is using her hand for light activities.     Jo-Ann is progressing well towards her goals and there are no updates to goals at this time. Pt prognosis continues as Excellent. Pt will continue to benefit from skilled outpatient occupational therapy to address the deficits listed in the problem list on initial evaluation, provide pt/family education and to maximize pt's level of independence in the home and community environment.     Anticipated barriers to continued occupational therapy: none    Pt's spiritual, cultural and educational needs  considered and pt agreeable to plan of care and goals.    Goals     Goals:   The following goals were discussed with the patient and patient is in agreement with them as to be addressed in the treatment plan.   Long Term Goals (LTGs); to be met by discharge.  LTG #1: Pt will report a pain level of 0 out of 10 with ADLs and house hold tasks  --progressing   LTG #2: Pt will demo improved FOTO score by at least 15 points.  --progressing   LTG #3: Pt will return to prior level of function for ADLs and household management.  --progressing   LTG #4: pt will demonstrate improved L  strength by at least 3-5# for opening and carrying things --progressing   LTG #5: pt will demonstrate improved L pinch strength by at least 2-4 psi for opening bottles and turning keys --progressing      Short Term Goals (STGs); to be met within 4 weeks (9/10/2020).  STG #1a: Pt will report 2 out of 10 pain level with ADLs. --progressing   STG #2: Pt will demonstrate independence with issued HEP.  --progressing   STG #3b: Pt will demo improved L wrist AROM by at least 15 degrees needed to aid with carrying and holding objects. --progressing       Plan     Continue skilled occupational therapy with individualized plan of care focusing on maximizing functional use of her L hand      Updates/Grading for next session: cont to progress as able with ROM, scar management, and light strengthening    Sharmila Nguyen OT

## 2020-08-31 NOTE — PROGRESS NOTES
Subjective:      Patient ID: Jo-Ann Escobedo is a 79 y.o. female.    Chief Complaint: Back Pain      HPI:  Jo-Ann Escboedo is a 79 year old female with arthritis, history of CKD, DJD lumbar spine, history of breast cancer s/p left mastectomy, hyperlipidemia, hypertension, hypothyroidism, lipoma, multinodular goiter, obesity, Paget's disease of bone, and prediabetes who presents with a chief complaint of back pain and shoulder pain.    Lower back pain:  States it is a chronic issue for several years but becoming intolerable.  Last seen by pain management, Dr. Morales, 5/20/19 for lumbar spondylosis.  Was to have bilateral L4/5 and L5/S1 facet medial branch blocks and if diagnostic then to plan for RFA.  States injections in her lower back helped a little bit.  States after having a block for her mastectomy it resolved all her back but symptoms have now returned and is now more painful than before.  Pain is constant, has difficulty getting to the bathroom in the morning because of her pain.  Improves with moving around, worse with staying still.  Denies radiation to lower extremities; endorses history of sciatica.  Currently takes Tylenol which does not help.    Right shoulder:  Gradually worsening.  Waxes and wanes.  Has bilateral CTS as well endorses difficulty forming a fist on the right hand.  Numbness in R hand stops at level of the wrist.  Denies any recent injury.    X-ray lumbar spine 2/28/18 showed DJD--the disc spaces are narrowed between L3 and S1 vertebral segments.  No fracture, spondylolisthesis or bone destruction identified.    X-ray bilateral shoulders 5/2/19 showed Mild DJD.  No fracture or dislocation.  No bone destruction identified on either side.    States she underwent 3 B12 injections but subsequently stopped; enquires if she should restart this.      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    Squamous Cell Carcinoma     in situ right neck        Past Surgical History:   Procedure Laterality Date    BREAST BIOPSY Right 2019    BREAST LUMPECTOMY Right 2019    CARPAL TUNNEL RELEASE Left 6/5/2020    Procedure: RELEASE, CARPAL TUNNEL Left;  Surgeon: Pricila Presley MD;  Location: [facility];  Service: Orthopedics;  Laterality: Left;    CATARACT EXTRACTION W/  INTRAOCULAR LENS IMPLANT Bilateral     COLONOSCOPY N/A 4/15/2019    Procedure: COLONOSCOPY;  Surgeon: Artur Monet MD;  Location: [facility] (4TH FLR);  Service: Endoscopy;  Laterality: N/A;  within 1 month    COLONOSCOPY N/A 12/9/2019    Procedure: COLONOSCOPY;  Surgeon: Clyde Welch MD;  Location: [facility] (2ND FLR);  Service: Endoscopy;  Laterality: N/A;    ENDOSCOPIC ULTRASOUND OF UPPER GASTROINTESTINAL TRACT N/A 1/22/2020    Procedure: ULTRASOUND, UPPER GI TRACT, ENDOSCOPIC;  Surgeon: Eleazar Shaffer MD;  Location: [facility] (2ND FLR);   Service: Endoscopy;  Laterality: N/A;  EUS for 10mm SML w/negative pillow sign and negative naked fat sign which was found at the incisura.  Dr Welch    ESOPHAGOGASTRODUODENOSCOPY N/A 12/7/2019    Procedure: EGD (ESOPHAGOGASTRODUODENOSCOPY);  Surgeon: Clyde Welch MD;  Location: [facility] F[facility]);  Service: Endoscopy;  Laterality: N/A;    EXCISIONAL BIOPSY Right 6/27/2019    Procedure: EXCISIONAL BIOPSY-breast;  Surgeon: Suki Dill MD;  Location: [facility] 2ND FLR;  Service: General;  Laterality: Right;    EYE SURGERY      HYSTERECTOMY      INJECTION FOR SENTINEL NODE IDENTIFICATION Right 7/30/2020    Procedure: INJECTION, FOR SENTINEL NODE IDENTIFICATION;  Surgeon: Suki Dill MD;  Location: [facility];  Service: General;  Laterality: Right;    INJECTION OF ANESTHETIC AGENT AROUND MEDIAL BRANCH NERVES INNERVATING LUMBAR FACET JOINT Bilateral 6/7/2019    Procedure: BLOCK, NERVE, FACET JOINT, LUMBAR, MEDIAL BRANCH-deo MBB L4/5,L5/S1;  Surgeon: Jarvis Morales III, MD;  Location: [facility] CATH LAB;  Service: Pain Management;  Laterality: Bilateral;    INJECTION OF STEROID Right 6/5/2020    Procedure: INJECTION, STEROID right carpal tunel injection/ Right ring trigger finger injection;  Surgeon: Pricila Presley MD;  Location: [facility];  Service: Orthopedics;  Laterality: Right;  INJECTION, STEROID right carpal tunel injection/ Right ring trigger finger injection    KNEE ARTHROSCOPY N/A     pt unsure of which knee     MASTECTOMY      OOPHORECTOMY      SENTINEL LYMPH NODE BIOPSY Right 7/30/2020    Procedure: BIOPSY, LYMPH NODE, SENTINEL;  Surgeon: Suki Dill MD;  Location: [facility];  Service: General;  Laterality: Right;    SPINE SURGERY      TOTAL THYROIDECTOMY      UNILATERAL MASTECTOMY Right 7/30/2020    Procedure: MASTECTOMY, UNILATERAL;  Surgeon: Suki Dill MD;  Location: [facility];  Service: General;  Laterality: Right;    YAG Laser Capsulotomy  Left 07/29/2019      [place]       Family History   Problem Relation Age of Onset    Cancer Father         lung    Dementia Mother     Glaucoma Brother     Macular degeneration Brother     Diabetes Neg Hx     Amblyopia Neg Hx     Blindness Neg Hx     Cataracts Neg Hx     Retinal detachment Neg Hx     Strabismus Neg Hx     Melanoma Neg Hx        Social History     Socioeconomic History    Marital status:      Spouse name: Not on file    Number of children: Not on file    Years of education: Not on file    Highest education level: Not on file   Occupational History    Occupation: realtor     Employer: [email or web address]     Employer: [employer]   Social Needs    Financial resource strain: Not hard at all    Food insecurity     Worry: Never true     Inability: Never true    Transportation needs     Medical: No     Non-medical: No   Tobacco Use    Smoking status: Former Smoker     Packs/day: 2.00     Years: 44.00     Pack years: 88.00     Types: Cigarettes     Quit date: 1969     Years since quittin.6    Smokeless tobacco: Never Used   Substance and Sexual Activity    Alcohol use: Yes     Alcohol/week: 0.0 standard drinks     Frequency: Never     Drinks per session: 1 or 2     Binge frequency: Never     Comment: maybe a beer every 3 months    Drug use: No    Sexual activity: Not Currently   Lifestyle    Physical activity     Days per week: 7 days     Minutes per session: 50 min    Stress: Only a little   Relationships    Social connections     Talks on phone: More than three times a week     Gets together: Twice a week     Attends Christian service: Never     Active member of club or organization: Yes     Attends meetings of clubs or organizations: 1 to 4 times per year     Relationship status:    Other Topics Concern    Are you pregnant or think you may be? No    Breast-feeding No   Social History Narrative    Not on file       Review of Systems   Constitutional: Negative for  "chills, fatigue and fever.   HENT: Negative for congestion, hearing loss, nosebleeds, rhinorrhea, sore throat and trouble swallowing.    Eyes: Negative for pain and visual disturbance.   Respiratory: Negative for cough, shortness of breath and wheezing.    Cardiovascular: Negative for chest pain and palpitations.   Gastrointestinal: Negative for abdominal distention, abdominal pain, constipation, diarrhea, nausea and vomiting.   Genitourinary: Negative for decreased urine volume, difficulty urinating, dysuria, hematuria, pelvic pain and urgency.   Musculoskeletal: Positive for arthralgias and back pain. Negative for myalgias.   Skin: Negative for color change and rash.   Neurological: Positive for weakness and numbness. Negative for dizziness, tremors, light-headedness and headaches.   Psychiatric/Behavioral: Negative for agitation, behavioral problems and confusion. The patient is not nervous/anxious.      Objective:     Vitals:    08/31/20 1102 08/31/20 1310   BP: (!) 140/80 (!) 144/80   BP Location: Left arm Left arm   Patient Position: Sitting Sitting   BP Method: Medium (Manual) Medium (Manual)   Pulse: 79    Temp: 99.2 °F (37.3 °C)    TempSrc: Oral    SpO2: 95%    Weight: 83.9 kg (184 lb 15.5 oz)    Height: 5' 3" (1.6 m)        Physical Exam  Vitals signs and nursing note reviewed.   Constitutional:       Appearance: She is well-developed.   HENT:      Head: Normocephalic and atraumatic.      Right Ear: External ear normal.      Left Ear: External ear normal.      Nose: Nose normal.   Eyes:      Conjunctiva/sclera: Conjunctivae normal.   Neck:      Musculoskeletal: Neck supple.      Trachea: No tracheal deviation.   Cardiovascular:      Rate and Rhythm: Normal rate and regular rhythm.      Heart sounds: Normal heart sounds. No murmur. No friction rub. No gallop.    Pulmonary:      Effort: Pulmonary effort is normal. No respiratory distress.      Breath sounds: Normal breath sounds. No wheezing or rales. "   Abdominal:      General: Bowel sounds are normal. There is no distension.      Palpations: Abdomen is soft.      Tenderness: There is no abdominal tenderness. There is no guarding or rebound.   Musculoskeletal:         General: No deformity.      Right shoulder: She exhibits decreased range of motion, tenderness and decreased strength. She exhibits no swelling, no effusion and no crepitus.      Lumbar back: She exhibits no tenderness and no bony tenderness.      Comments: Job's testing positive to right upper extremity, negative on left.   Skin:     General: Skin is warm and dry.   Neurological:      Mental Status: She is alert.   Psychiatric:         Behavior: Behavior normal.        Assessment:      1. Chronic low back pain without sciatica, unspecified back pain laterality    2. Chronic right shoulder pain    3. Weakness of right upper extremity    4. Low serum vitamin B12    5. Essential hypertension      Plan:   Jo-Ann was seen today for back pain.    Diagnoses and all orders for this visit:    Chronic low back pain without sciatica, unspecified back pain laterality  -     X-Ray Lumbar Spine Complete 5 View; Future  -     Ambulatory referral/consult to Back & Spine Clinic; Future  -     tiZANidine (ZANAFLEX) 2 MG tablet; Take 1 tablet (2 mg total) by mouth nightly as needed (muscle pain).  -      Continue Tylenol PRN (avoid NSAIDs 2/2 GI bleed from NSAIDs in the past).  Attempt trial of Zanaflex; counseled patient on potential adverse effects.  Consider PT pending imaging results.  F/u with back/spine clinic.    Chronic right shoulder pain; Weakness of right upper extremity  -     MRI Shoulder Without Contrast Right; Future  -     Ambulatory referral/consult to Orthopedics; Future  -     Check MRI to rule out rotator cuff pathology; Continue Tylenol.  Attempt trial of Zanaflex as above.  Recommended ice packs/cold compresses alternating with heating pad.  F/u with ortho s/p MRI.    Low serum vitamin B12  -      Vitamin B12; Future    Essential hypertension         -     Persistently and mildly above goal on recheck.  Participates with digital HTN program.  Continue current regimen at present.  Continue digital HTN program.  Counseled patient on the importance of a low sodium diet and daily aerobic exercise.    Answers for HPI/ROS submitted by the patient on 8/26/2020   Back pain  Chronicity: chronic  Onset: more than 1 year ago  Frequency: constantly  Progression since onset: waxing and waning  Pain location: sacro-iliac  Pain quality: aching  Radiates to: does not radiate  Pain - numeric: 8/10  Pain is: worse during the day  Aggravated by: bending, standing  Stiffness is present: all day  bladder incontinence: Yes  bowel incontinence: No  leg pain: No  paresis: No  paresthesias: Yes  perianal numbness: No  tingling: Yes  weight loss: No  genital pain: No  Risk factors: history of cancer, history of osteoporosis, obesity  Pain severity: severe  Treatments tried: analgesics, bed rest, heat  Improvement on treatment: no relief

## 2020-08-31 NOTE — PROGRESS NOTES
[facility]  [facility]  8/31/2020      SUBJECTIVE:   Ms. Jo-Ann Escobedo is a 79 y.o. female status post right mastectomy with SLNB on 7/30/20 who presents today for GREGORIA drain check.      History of Present Illness:  Patient is a 78 yo female with a history of ER+NE+ DCIS of the right breast status post right mastectomy with SLNB on 7/30/20. Last seen 8/19/20 by Dr. Dill. Drain output remained around 40 at that time thus drain was left in place. She reports today that drain output on 8/29/20 was around 40, 8/30/20 around 30, and this morning when she woke up, output was 29. Output remains serous. She denies any redness or pain associated with the drain or mastectomy site.    Review of Systems: Denies any chest pain or shortness of breath.  Denies any fever or chills.  See HPI/ Interval History for other systems reviewed.    OBJECTIVE:   Vitals:    08/31/20 1006   BP: (!) 145/72   Pulse: 89        Physical Exam:  HEENT: Normocephalic, atraumatic.    General: alert and oriented; no acute distress.  Breast/Chest: Incision site clean dry and intact. Drain in place with 29-30 mL output per day.    Neurologic Exam: A&O x 3.  There are no focal neurologic deficits.    ASSESSMENT:  Ms. Jo-Ann Escobedo is a 79 y.o. year old female status post right mastectomy with SLNB on 7/30/20 who presents today for GREGORIA drain check.    PLAN:   Plan to keep drain in place 1 more day as output is still hovering around 29-30 mL per day. Advised her to call me tomorrow morning and notify me of output over the next 24 hours and we can determine when to follow up for drain removal. Notify sooner of any issues.      Lindsey Estes PA-C      Surgical Oncology            8/31/2020

## 2020-08-31 NOTE — PATIENT INSTRUCTIONS
[facility]  OCCUPATIONAL THERAPY  HOME EXERCISE PROGRAM     Complete the following strengthening program 1x/day.                       _                        YAIR Pearson  Occupational Therapist

## 2020-09-01 ENCOUNTER — TELEPHONE (OUTPATIENT)
Dept: INTERNAL MEDICINE | Facility: CLINIC | Age: 80
End: 2020-09-01

## 2020-09-01 DIAGNOSIS — M47.816 SPONDYLOSIS OF LUMBAR REGION WITHOUT MYELOPATHY OR RADICULOPATHY: Primary | ICD-10-CM

## 2020-09-02 ENCOUNTER — HOSPITAL ENCOUNTER (OUTPATIENT)
Dept: RADIOLOGY | Facility: HOSPITAL | Age: 80
Discharge: HOME | End: 2020-09-02
Attending: FAMILY MEDICINE
Payer: MEDICARE

## 2020-09-02 DIAGNOSIS — G89.29 CHRONIC RIGHT SHOULDER PAIN: ICD-10-CM

## 2020-09-02 DIAGNOSIS — R29.898 WEAKNESS OF RIGHT UPPER EXTREMITY: ICD-10-CM

## 2020-09-02 DIAGNOSIS — M25.511 CHRONIC RIGHT SHOULDER PAIN: ICD-10-CM

## 2020-09-02 PROCEDURE — 73221 MRI JOINT UPR EXTREM W/O DYE: CPT | Mod: TC,RT

## 2020-09-02 PROCEDURE — 73221 MRI SHOULDER WITHOUT CONTRAST RIGHT: ICD-10-PCS | Mod: 26,RT,, | Performed by: RADIOLOGY

## 2020-09-02 PROCEDURE — 73221 MRI JOINT UPR EXTREM W/O DYE: CPT | Mod: 26,RT,, | Performed by: RADIOLOGY

## 2020-09-03 ENCOUNTER — PATIENT OUTREACH (OUTPATIENT)
Dept: ADMINISTRATIVE | Facility: OTHER | Age: 80
End: 2020-09-03

## 2020-09-04 ENCOUNTER — OFFICE VISIT (OUTPATIENT)
Dept: ORTHOPEDICS | Facility: CLINIC | Age: 80
End: 2020-09-04
Payer: MEDICARE

## 2020-09-04 VITALS
HEIGHT: 63 IN | DIASTOLIC BLOOD PRESSURE: 68 MMHG | SYSTOLIC BLOOD PRESSURE: 119 MMHG | WEIGHT: 185.19 LBS | BODY MASS INDEX: 32.81 KG/M2 | HEART RATE: 71 BPM

## 2020-09-04 DIAGNOSIS — M75.121 NONTRAUMATIC COMPLETE TEAR OF RIGHT ROTATOR CUFF: Primary | ICD-10-CM

## 2020-09-04 DIAGNOSIS — G89.29 CHRONIC RIGHT SHOULDER PAIN: ICD-10-CM

## 2020-09-04 DIAGNOSIS — M25.511 CHRONIC RIGHT SHOULDER PAIN: ICD-10-CM

## 2020-09-04 DIAGNOSIS — R29.898 WEAKNESS OF RIGHT UPPER EXTREMITY: ICD-10-CM

## 2020-09-04 PROCEDURE — 1159F PR MEDICATION LIST DOCUMENTED IN MEDICAL RECORD: ICD-10-PCS | Mod: S$GLB,,, | Performed by: PHYSICIAN ASSISTANT

## 2020-09-04 PROCEDURE — 3078F PR MOST RECENT DIASTOLIC BLOOD PRESSURE < 80 MM HG: ICD-10-PCS | Mod: CPTII,S$GLB,, | Performed by: PHYSICIAN ASSISTANT

## 2020-09-04 PROCEDURE — 99999 PR PBB SHADOW E&M-EST. PATIENT-LVL V: ICD-10-PCS | Mod: PBBFAC,,, | Performed by: PHYSICIAN ASSISTANT

## 2020-09-04 PROCEDURE — 3074F SYST BP LT 130 MM HG: CPT | Mod: CPTII,S$GLB,, | Performed by: PHYSICIAN ASSISTANT

## 2020-09-04 PROCEDURE — 1159F MED LIST DOCD IN RCRD: CPT | Mod: S$GLB,,, | Performed by: PHYSICIAN ASSISTANT

## 2020-09-04 PROCEDURE — 99213 PR OFFICE/OUTPT VISIT, EST, LEVL III, 20-29 MIN: ICD-10-PCS | Mod: S$GLB,,, | Performed by: PHYSICIAN ASSISTANT

## 2020-09-04 PROCEDURE — 3074F PR MOST RECENT SYSTOLIC BLOOD PRESSURE < 130 MM HG: ICD-10-PCS | Mod: CPTII,S$GLB,, | Performed by: PHYSICIAN ASSISTANT

## 2020-09-04 PROCEDURE — 99213 OFFICE O/P EST LOW 20 MIN: CPT | Mod: S$GLB,,, | Performed by: PHYSICIAN ASSISTANT

## 2020-09-04 PROCEDURE — 99999 PR PBB SHADOW E&M-EST. PATIENT-LVL V: CPT | Mod: PBBFAC,,, | Performed by: PHYSICIAN ASSISTANT

## 2020-09-04 PROCEDURE — 1101F PT FALLS ASSESS-DOCD LE1/YR: CPT | Mod: CPTII,S$GLB,, | Performed by: PHYSICIAN ASSISTANT

## 2020-09-04 PROCEDURE — 3078F DIAST BP <80 MM HG: CPT | Mod: CPTII,S$GLB,, | Performed by: PHYSICIAN ASSISTANT

## 2020-09-04 PROCEDURE — 1126F PR PAIN SEVERITY QUANTIFIED, NO PAIN PRESENT: ICD-10-PCS | Mod: S$GLB,,, | Performed by: PHYSICIAN ASSISTANT

## 2020-09-04 PROCEDURE — 1126F AMNT PAIN NOTED NONE PRSNT: CPT | Mod: S$GLB,,, | Performed by: PHYSICIAN ASSISTANT

## 2020-09-04 PROCEDURE — 1101F PR PT FALLS ASSESS DOC 0-1 FALLS W/OUT INJ PAST YR: ICD-10-PCS | Mod: CPTII,S$GLB,, | Performed by: PHYSICIAN ASSISTANT

## 2020-09-04 NOTE — LETTER
September 4, 2020      Dakota Kerr MD  [address]  [facility] [address]           [facility] 5th Fl  [address], [address]  [facility] [address]  Phone: [phone]          Patient: Jo-Ann Escobedo   MR Number: [id number]   YOB: 1940   Date of Visit: 9/4/2020       Dear Dr. Dakota Kerr:    Thank you for referring Jo-Ann Escobedo to me for evaluation. Attached you will find relevant portions of my assessment and plan of care.    If you have questions, please do not hesitate to call me. I look forward to following Jo-Ann Escobedo along with you.    Sincerely,    Tylor Rasmussen PA-C    Enclosure  CC:  No Recipients    If you would like to receive this communication electronically, please contact [email or web address] or [phone] to request more information on goBalto Link access.    For providers and/or their staff who would like to refer a patient to [facility], please contact us through our one-stop-shop provider referral line, [facility] , at [phone].    If you feel you have received this communication in error or would no longer like to receive these types of communications, please e-mail [email or web address]

## 2020-09-04 NOTE — PROGRESS NOTES
SUBJECTIVE:     Chief Complaint & History of Present Illness:  Jo-Ann Escobedo is a  Established  patient 79 y.o. female who is seen here today with a complaint of    Chief Complaint   Patient presents with    Right Shoulder - Pain    .  She is here today for evaluation treatment of chronic right shoulder pain.  States she has has intermittent problems with pain and significant decreases in range of motion for the past several months.  She does not remember a specific trauma or injury to the area but states that when she has a flare up she has decreased range of motion and significant pain in the right shoulder region.  She is status post mastectomy less than 30 days ago and approximately 60 days from a carpal tunnel repair. .  She has a long history multiple spinal issues and is currently under treatment from Spine service  On a scale of 1-10, with 10 being worst pain imaginable, he rates this pain as 3 on good days and 9 on bad days.  she describes the pain as sore and achy.    Review of patient's allergies indicates:   Allergen Reactions    Voltaren [diclofenac sodium] Other (See Comments)     Hematochezia and hospitalization for hematochezia.    Codeine Diarrhea and Hallucinations    Niacin preparations      Redness, warming         Current Outpatient Medications   Medication Sig Dispense Refill    acetaminophen (TYLENOL) 650 MG TbSR Take 1 tablet (650 mg total) by mouth 3 (three) times daily as needed. 42 tablet 0    ascorbic acid, vitamin C, (VITAMIN C) 100 MG tablet Take 100 mg by mouth once daily.      cholecalciferol, vitamin D3, (VITAMIN D3) 2,000 unit Tab Take 1 tablet by mouth once daily.      co-enzyme Q-10 30 mg capsule Take 30 mg by mouth once daily.       COZAAR 50 mg tablet       cyanocobalamin 1,000 mcg/mL injection Inject 1 mL (1,000 mcg total) into the muscle every 30 days. 10 mL 1    fenofibrate 160 MG Tab TAKE 1 TABLET (160 MG TOTAL) BY MOUTH ONCE DAILY. 90 tablet 3     LACTOBACILLUS COMBINATION NO.8 (ADULT PROBIOTIC ORAL) Take by mouth once daily.       levothyroxine (SYNTHROID) 125 MCG tablet Take 1 tablet (125 mcg total) by mouth before breakfast. 30 tablet 11    lidocaine-prilocaine (EMLA) cream Apply topically 2 (two) times daily as needed. To hand. 30 g 2    losartan (COZAAR) 50 MG tablet TAKE 1 TABLET BY MOUTH EVERY DAY 90 tablet 3    meclizine (ANTIVERT) 12.5 mg tablet Take 1 tablet (12.5 mg total) by mouth 3 (three) times daily as needed for Dizziness. 30 tablet 2    tiZANidine (ZANAFLEX) 2 MG tablet Take 1 tablet (2 mg total) by mouth nightly as needed (muscle pain). 30 tablet 2    turmeric root extract 500 mg Cap Take by mouth once daily.        No current facility-administered medications for this visit.      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    Squamous Cell Carcinoma     in situ right neck        Past Surgical History:   Procedure Laterality Date    BREAST BIOPSY Right 2019    BREAST LUMPECTOMY Right 2019    CARPAL TUNNEL RELEASE Left 6/5/2020    Procedure: RELEASE, CARPAL TUNNEL Left;  Surgeon: Pricila Presley MD;  Location: [facility] OR;  Service: Orthopedics;  Laterality: Left;    CATARACT EXTRACTION W/  INTRAOCULAR LENS IMPLANT Bilateral     COLONOSCOPY N/A 4/15/2019    Procedure: COLONOSCOPY;  Surgeon: Artur Monet MD;  Location: [facility] ENDO (4TH FLR);  Service: Endoscopy;  Laterality: N/A;  within 1 month    COLONOSCOPY N/A 12/9/2019    Procedure: COLONOSCOPY;  Surgeon: Clyde Welch MD;  Location: [facility] ENDO (2ND FLR);  Service: Endoscopy;  Laterality: N/A;    ENDOSCOPIC ULTRASOUND OF UPPER GASTROINTESTINAL TRACT N/A 1/22/2020    Procedure: ULTRASOUND, UPPER GI TRACT, ENDOSCOPIC;  Surgeon: Eleazar Shaffer MD;  Location: [facility] ENDO (2ND FLR);  Service: Endoscopy;  Laterality: N/A;  EUS for 10mm SML w/negative pillow sign and negative naked fat sign which was found at the incisura.  Dr Welch    ESOPHAGOGASTRODUODENOSCOPY N/A 12/7/2019    Procedure: EGD (ESOPHAGOGASTRODUODENOSCOPY);  Surgeon: Clyde Welch MD;  Location: [facility] (2ND FLR);  Service: Endoscopy;  Laterality: N/A;    EXCISIONAL BIOPSY Right 6/27/2019    Procedure: EXCISIONAL BIOPSY-breast;  Surgeon: Suki Dill MD;  Location: [facility] 2ND FLR;  Service: General;  Laterality: Right;    EYE SURGERY      HYSTERECTOMY      INJECTION FOR SENTINEL NODE IDENTIFICATION Right 7/30/2020    Procedure: INJECTION, FOR SENTINEL NODE IDENTIFICATION;  Surgeon: Suki Dill MD;  Location: [facility];  Service: General;  Laterality: Right;    INJECTION OF ANESTHETIC AGENT AROUND MEDIAL  BRANCH NERVES INNERVATING LUMBAR FACET JOINT Bilateral 6/7/2019    Procedure: BLOCK, NERVE, FACET JOINT, LUMBAR, MEDIAL BRANCH-deo MBB L4/5,L5/S1;  Surgeon: Jarvis Morales III, MD;  Location: [facility] CATH LAB;  Service: Pain Management;  Laterality: Bilateral;    INJECTION OF STEROID Right 6/5/2020    Procedure: INJECTION, STEROID right carpal tunel injection/ Right ring trigger finger injection;  Surgeon: Pricila Presley MD;  Location: [facility];  Service: Orthopedics;  Laterality: Right;  INJECTION, STEROID right carpal tunel injection/ Right ring trigger finger injection    KNEE ARTHROSCOPY N/A     pt unsure of which knee     MASTECTOMY      OOPHORECTOMY      SENTINEL LYMPH NODE BIOPSY Right 7/30/2020    Procedure: BIOPSY, LYMPH NODE, SENTINEL;  Surgeon: Suki Dill MD;  Location: [facility];  Service: General;  Laterality: Right;    SPINE SURGERY      TOTAL THYROIDECTOMY      UNILATERAL MASTECTOMY Right 7/30/2020    Procedure: MASTECTOMY, UNILATERAL;  Surgeon: Suki Dill MD;  Location: [facility];  Service: General;  Laterality: Right;    YAG Laser Capsulotomy  Left 07/29/2019    Dr. Hahn       Vital Signs (Most Recent)  Vitals:    09/04/20 1007   BP: 119/68   Pulse: 71       Review of Systems:  ROS:  Constitutional: no fever or chills  Eyes: no visual changes  ENT: no nasal congestion or sore throat, Positive BPPV, deaf in left ear, neurosensory hearing loss in the right  Respiratory: no cough or shortness of breath  Cardiovascular: no chest pain or palpitations, Aortic atherosclerosis  Gastrointestinal: no nausea or vomiting, tolerating diet, Cholelithiasis, diverticulitis, diverticulosis, GI bleed given hematochezia  Genitourinary: no hematuria or dysuria, Nephrolithiasis thrombocytopenia  Integument/Breast: no rash or pruritis  Hematologic/Lymphatic: no easy bruising or lymphadenopathy, Positive ductal carcinoma of the right breast acute blood loss anemia,  Musculoskeletal: no  "arthralgias or myalgias, Positive for Paget's disease of the bone lumbar facet arthritis decreased  strength in left hand carpal tunnel syndrome  Neurological: no seizures or tremors, Positive cervical radiculopathy, left carpal tunnel syndrome, memory difficulty,  Behavioral/Psych: no auditory or visual hallucinations  Endocrine: no heat or cold intolerance, Positive hypothyroidism,      OBJECTIVE:     PHYSICAL EXAM:  Height: 5' 3" (160 cm) Weight: 84 kg (185 lb 3 oz), General Appearance: Well nourished, well developed, in no acute distress.  Neurological: Mood & affect are normal.  Shoulder exam: left  Tenderness: globally  ROM: forward flexion 180/180, extension 45/45, full abduction 180/180, abduction-glenohumeral 90/90, external rotation 50/50, pain at the extremes of mobility  Shoulder Strength: biceps 5/5, triceps 5/5, abduction 5/5, adduction 5/5, external rotation 5/5 with shoulder at side, flexion 5/5, and extension 5/5  positive for tenderness about the glenohumeral joint, positive for tenderness over the acromioclavicular joint, positive for impingement sign, sensory exam normal and radial pulse intact                   RADIOGRAPHS:  Review of MRI from previous visit demonstrates massive rotator cuff tear with retraction to the glenoid evidence of loose bodies and marked degenerative damage to the labrum    ASSESSMENT/PLAN:       ICD-10-CM ICD-9-CM   1. Chronic right shoulder pain  M25.511 719.41    G89.29 338.29   2. Weakness of right upper extremity  R29.898 729.89       Plan: We discussed with the patient at length all the different treatment options available for her rightshoulder including anti-inflammatories, acetaminophen, rest, ice, Physical therapy to include strengthening exercise, occasional cortisone injections for temporary relief, arthroscopic surgical repair, and finally shoulder arthroplasty.   Patient like to forego any treatment at this point she has good function and activity at " this time if she has return or flare-up of her symptoms she will contact the clinic to discuss possible injection therapy

## 2020-09-09 ENCOUNTER — TELEPHONE (OUTPATIENT)
Dept: SURGERY | Facility: CLINIC | Age: 80
End: 2020-09-09

## 2020-09-09 ENCOUNTER — CLINICAL SUPPORT (OUTPATIENT)
Dept: REHABILITATION | Facility: HOSPITAL | Age: 80
End: 2020-09-09
Attending: PHYSICIAN ASSISTANT
Payer: MEDICARE

## 2020-09-09 DIAGNOSIS — M79.642 LEFT HAND PAIN: ICD-10-CM

## 2020-09-09 PROCEDURE — 97022 WHIRLPOOL THERAPY: CPT

## 2020-09-09 PROCEDURE — 97110 THERAPEUTIC EXERCISES: CPT

## 2020-09-09 NOTE — PROGRESS NOTES
"  Occupational Therapy Daily Treatment Note      Date: 9/9/2020  Name: Jo-Ann Escobedo  Clinic Number: [id number]    Therapy Diagnosis:   Encounter Diagnosis   Name Primary?    Left hand pain      Physician: Mary Ellen Worley PA-C; Dr. Pricila Presley     Physician Orders: eval and treat  Medical Diagnosis: S/P carpal tunnel release [Z98.890]     Surgical Procedure and Date: 6/5/2020, s/p L CTR / Date of Injury/Onset: December 2019  Evaluation Date: 8/10/2020  Insurance Authorization Period Expiration: 9/21/2020  Plan of Care Certification Period: 9/25/2020  Date of Return to MD: not scheduled     Visit # / Visits authorized: 3 / 6     FOTO: initial eval     Time In: 1:55 pm  Time Out: 2:40 pm  Total treatment time: 45 minutes  Total Timed minutes 35 minutes     Precautions:  Standard, Breat CA on R side, pt is hard of hearing      Subjective     Pt reports: "my hand was feeling good but then I still get numbness in it"  she was compliant with home exercise program given last session.   Response to previous treatment: no adverse effects  Functional change: none    Pain: 2/10  Location: left hand    Objective     Edema. Measured in centimeters.    8/10/2020 8/10/2020     Left Right   Wrist Crease 16.9 16.8   DPC 20 19.9   MCPs 19.8 20.1         Elbow and Wrist ROM. Measured in degrees.    8/10/2020 8/10/2020     Left Right   Wrist Ext/Flex 60/65 50/30   Wrist RD/UD 10/26 10/25      Hand ROM. Measured in degrees.  Digits WFL  Opposition WFL, ~1 cm from DPC         Strength (Dynamometer) and Pinch Strength (Pinch Gauge)  Measured in pounds.    8/10/2020 8/10/2020     Left Right   Rung II 33 24   Key Pinch 8 10   3pt Pinch 8 9   2pt Pinch 4 7      Sensation: decreased sensation noted at L fingertips      Treatment consisted of the following:    Jo-Ann received the following supervised modalities after being cleared for contradictions for 10 minutes:   -Fluidotherapy: To L hand for 10 min, continuous air, 110 deg, air " speed 100 to decrease pain, edema & scar tissue and increased tissue extensibility.        Jo-Ann received the following manual therapy techniques for 5 minutes:   -Performed scar massage to L palm area to decrease adhesions and improve tensile glide.         Jo-Ann received therapeutic exercises for 30 minutes including:  -  AROM Wrist  Ext/flx  RD/UD    X 10 reps each    Wave, hook, straight fist, composite fist, finger spreads, finger lifts, pinky slides    X 10 reps each    Wrist wheel, flex/ext X 2'   isospheres  X 2'   Wrist dexerciser X 2'   Theraputty - yellow (@home)  -molding  -gripping  -Finger spread/flower  -log rolls with 3 pt and lateral key pinch   -2'  -20 reps  -5 reps    -3 sets   Black gripper, 2 yellow RB X 20 reps   Red CP pinches, 3 pt and lateral key pinch X 20 reps each   Digit ext with yellow RB X 20 reps   Light wrist PRE, 3 ways 1#,   2 x 10 reps            Home Exercises and Education Provided     Education provided: cont HEP  - Progress towards goals     Written Home Exercises Provided: Patient instructed to cont prior HEP. Yes, added theraputty  Exercises were reviewed and Jo-Ann was able to demonstrate them prior to the end of the session.  Jo-Ann demonstrated good  understanding of the education provided.   .   See EMR under Patient Instructions for exercises provided 8/31/2020.     Assessment     Pt tolerated tx well this date. She denied increased pain this date. She tolerated progression of light strengthening today. Pt cont to c/o numbness in fingers.  She cont to c/o mild discomfort during functional tasks, but reports is using her hand for light activities.     Jo-Ann is progressing well towards her goals and there are no updates to goals at this time. Pt prognosis continues as Excellent. Pt will continue to benefit from skilled outpatient occupational therapy to address the deficits listed in the problem list on initial evaluation, provide pt/family education and to maximize  pt's level of independence in the home and community environment.     Anticipated barriers to continued occupational therapy: none    Pt's spiritual, cultural and educational needs considered and pt agreeable to plan of care and goals.    Goals     Goals:   The following goals were discussed with the patient and patient is in agreement with them as to be addressed in the treatment plan.   Long Term Goals (LTGs); to be met by discharge.  LTG #1: Pt will report a pain level of 0 out of 10 with ADLs and house hold tasks  --progressing   LTG #2: Pt will demo improved FOTO score by at least 15 points.  --progressing   LTG #3: Pt will return to prior level of function for ADLs and household management.  --progressing   LTG #4: pt will demonstrate improved L  strength by at least 3-5# for opening and carrying things --progressing   LTG #5: pt will demonstrate improved L pinch strength by at least 2-4 psi for opening bottles and turning keys --progressing      Short Term Goals (STGs); to be met within 4 weeks (9/10/2020).  STG #1a: Pt will report 2 out of 10 pain level with ADLs. --progressing   STG #2: Pt will demonstrate independence with issued HEP.  --progressing   STG #3b: Pt will demo improved L wrist AROM by at least 15 degrees needed to aid with carrying and holding objects. --progressing       Plan     Continue skilled occupational therapy with individualized plan of care focusing on maximizing functional use of her L hand      Updates/Grading for next session: cont to progress as able with ROM, scar management, and light strengthening    Sharmila Nguyen, OT

## 2020-09-09 NOTE — TELEPHONE ENCOUNTER
----- Message from Tamara Connor sent at 9/9/2020 10:50 AM CDT -----  Regarding: Draining  Contact: Patient Called [phone]  Still draining from Mastectomy done on 7/30/20.  Wanting to know if this is normal.

## 2020-09-09 NOTE — TELEPHONE ENCOUNTER
Return call to pt. States she is still draining more than 30 cc per 24 hours and wanted to know it this was normal. Pt had mastectomy 7/30/2020. Informed pt per Dr Dill that it can take up to 8 weeks for drain to decreased output enough to be removed. Pt drain output as follows:  Mon 9/7 29 cc, /Tues 9/8 34 cc,/and today, 9/9 42 cc. Pt empties the drain once daily. Asked pt if she is using that arm more than usual with any repetitive motions. Per pt, it's life as usual with activity since surgery. Advised pt per Dr Dill to decreased use of that arm on drain side and to see what drain out put for the next week will be. Pt instructed to call Tuesday or Wednesday morning with record of out put. Pt voiced understanding.

## 2020-09-14 ENCOUNTER — PATIENT OUTREACH (OUTPATIENT)
Dept: OTHER | Facility: OTHER | Age: 80
End: 2020-09-14

## 2020-09-15 ENCOUNTER — CLINICAL SUPPORT (OUTPATIENT)
Dept: REHABILITATION | Facility: HOSPITAL | Age: 80
End: 2020-09-15
Payer: MEDICARE

## 2020-09-15 DIAGNOSIS — M47.816 SPONDYLOSIS OF LUMBAR REGION WITHOUT MYELOPATHY OR RADICULOPATHY: ICD-10-CM

## 2020-09-15 PROCEDURE — 97161 PT EVAL LOW COMPLEX 20 MIN: CPT

## 2020-09-15 PROCEDURE — 97110 THERAPEUTIC EXERCISES: CPT

## 2020-09-15 NOTE — PLAN OF CARE
[facility]  Physical Therapy Initial Evaluation    Name: Jo-Ann MYERS [facility] Number: [id number]    Therapy Diagnosis:   Encounter Diagnosis   Name Primary?    Spondylosis of lumbar region without myelopathy or radiculopathy      Physician: Dakota Kerr MD    Physician Orders: PT Eval and Treat  Medical Diagnosis: Spondylosis of lumbar region   Evaluation Date: 9/15/2020  Authorization Period Expiration: 9/1/2021  Plan of Care Certification Period: 12/8/20  Visit # / Visits authorized: 1/ 1  Date of Surgery: NA  Precautions: Standard and HTN, hearing loss    Time In: 110p  Time Out: 150p  Total Billable Time: 40 minutes    Subjective     Date of onset: Many years  History of current condition - Jo-Ann reports: that she has suffered from back pain on and off for many years, was feeling relief from Diclofenac.  She discontinued Diclofenac in January 2020.  She states that she has terrible pain and stiffness in the mornings - has difficulty getting to the bathroom.  She states that she feels better after her morning walk.  She states that pain comes on with sitting too long or inactivity, bending forward, sit to stand at times.  She states that she takes Tylenol intermittently with some relief.  She states that she is able to function, but does so with a lot of pain.  She states that she has right shoulder pain , suspected cuff tear, and is 7 weeks post-op masectomy.       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    Squamous Cell Carcinoma     in situ right neck      Jo-Ann Escobedo  has a past surgical history that includes Hysterectomy; Eye surgery; Spine surgery; Mastectomy; Total thyroidectomy; Cataract extraction w/  intraocular lens implant (Bilateral); Knee arthroscopy (N/A); Colonoscopy (N/A, 4/15/2019); Injection of anesthetic agent around medial branch nerves innervating lumbar facet joint (Bilateral, 6/7/2019); Excisional biopsy (Right, 6/27/2019); YAG Laser Capsulotomy  (Left, 07/29/2019); Esophagogastroduodenoscopy (N/A, 12/7/2019); Colonoscopy (N/A, 12/9/2019); Endoscopic ultrasound of upper gastrointestinal tract (N/A, 1/22/2020); Breast biopsy (Right, 2019); Oophorectomy; Breast lumpectomy (Right, 2019); Carpal tunnel release (Left, 6/5/2020); Injection of steroid (Right, 6/5/2020); Unilateral mastectomy (Right, 7/30/2020); Injection for sentinel node identification (Right, 7/30/2020); and [place] lymph node biopsy (Right, 7/30/2020).    Jo-Ann has a current medication list which includes the following prescription(s): acetaminophen, ascorbic acid (vitamin c), cholecalciferol (vitamin d3), co-enzyme q-10, cozaar, cyanocobalamin, fenofibrate, lactobacillus combination no.8, levothyroxine, lidocaine-prilocaine, losartan, meclizine, and turmeric root extract, and the following  Facility-Administered Medications: mupirocin.    Review of patient's allergies indicates:   Allergen Reactions    Voltaren [diclofenac sodium] Other (See Comments)     Hematochezia and hospitalization for hematochezia.    Codeine Diarrhea and Hallucinations    Niacin preparations      Redness, warming        Prior Therapy: Yes  Social History: Lives at home lives alone  Occupation: Retired  Prior Level of Function: Intermittent exacerbations of LBP, but not limited in function  Current Level of Function: Limited in bending forward, sit to stand, sitting    Pain:  Current 0/10, worst 7/10, best 0/10   Location: bilateral back   Description: Aching, Dull, Tight, Deep and Sharp    Pts goals: To be more mobile and functional with less pain    Objective     Lumbar Range of Motion:    ROM Observations   Flexion 50 degrees   Poor curve        Extension 4 degrees   No reversal of lumbar curve        Left Side Bending 16 degrees         Right Side Bending 17 degrees             Lower Extremity Strength  Right LE  Left LE    Quadriceps: 5/5 Quadriceps: 5/5   Hamstrings: 5/5 Hamstrings: 5/5   Iliospoas: 5/5 Iliospoas: 5/5   Hip extension:  4/5 Hip extension: 4/5   Glut Med 3+/5 Glut Med: 3/5     SI Special Tests:   Distraction: -  Compression: -  Joint Mobility: Poor intersegmental mobility of lumbar spine    CMS Impairment/Limitation/Restriction for FOTO lumbar Survey    Therapist reviewed FOTO scores for Jo-Ann Escobedo on 9/15/2020.   FOTO documents entered into [employer] - see Media section.    Limitation Score: 50%  Category: Mobility    Current : CK = at least 40% but < 60% impaired, limited or restricted  Goal: CJ = at least 20% but < 40% impaired, limited or restricted           TREATMENT     Treatment Time In: 120p  Treatment Time Out: 150p  Total Treatment time separate from Evaluation time:30    Jo-Ann received therapeutic exercises to develop strength, endurance, ROM, flexibility, posture and core stabilization for  30 minutes including:  Review of diagnosis and plan of care  Education in bridge, LTR, seated trunk flexion with sidebend, sidelying hip abduction, sit to stand    Jo-Ann received the following manual therapy techniques: for 0 minutes, including:      Home Exercises and Patient Education Provided    Education provided re: therapeutic diagnosis and plan of care    Written Home Exercises Provided: Yes.  Exercises were reviewed and Jo-Ann was able to demonstrate them prior to the end of the session.   Pt received a written copy of exercises to perform at home. Jo-Ann demonstrated good  understanding of the education provided.     See EMR under patient instructions for exercises given.     Assessment     Jo-Ann is a 79 y.o. female referred to outpatient Physical Therapy with a medical diagnosis of lumbar spondylosis that began with no specific mechanism and previous diagnosis of Paget's disease of bone. Pt presents with objective deficits in lumbar AROM, hip P/AROM, hip strength that limits functional ability to sit comfortably, sit to stand, bend forward and back.    Pt prognosis is Fair.   Pt will benefit from skilled outpatient Physical Therapy to address the deficits stated above and in the chart below, provide pt/family education, and to maximize pt's level of independence.     Plan of care discussed with patient: Yes  Pt's spiritual, cultural and educational needs considered and patient is agreeable to the plan of care and goals as stated below:     Anticipated Barriers for therapy: None    Medical Necessity is demonstrated by the following  History  Co-morbidities and personal factors that may impact the plan of care Co-morbidities:   See chart    Personal Factors:   no deficits     low   Examination  Body Structures and Functions, activity limitations and participation restrictions that may impact the plan of care Body Regions:   back    Body Systems:    ROM  strength  transitions    Participation Restrictions:    None    Activity limitations:   Learning and applying knowledge  no deficits    General Tasks and Commands  no deficits    Communication  no deficits    Mobility  lifting and carrying objects  driving (bike, car, motorcycle)    Self care  no deficits    Domestic Life  no deficits    Interactions/Relationships  no deficits    Life Areas  no deficits    Community and Social Life  no deficits         low   Clinical Presentation stable and uncomplicated low   Decision Making/ Complexity Score: low       GOALS:  Short Term Goals:  4 weeks  1. Pt to increase lumbar flexion to 55 degrees in order to bend forward.   3. Pt to demonstrate 4/5 strength of hip abduction to increase ease with sit to stand.    Long Term Goals: 12 weeks  1. Pt will demonstrate 60 degrees lumbar flexion AROM and 10 degrees of lumbar extension to increase ease with forward bending and walking.  2. Pt will demonstrate 5/5 strength of hip extension to increase ease with sit to stand.  3. Patient goal: To walk better when getting out of bed in the morning    Plan   Certification Period/Plan of care expiration: 9/15/2020 to 12/8/20.    Outpatient Physical Therapy 1 times weekly for 12 weeks to include the following interventions: manual therapy as needed, therapeutic exercises to address functional deficits, modalities prn, wound care prn, dry needling prn, treatment by a skilled PTA at times.    Noe Jackman, PT

## 2020-09-15 NOTE — PROGRESS NOTES
[facility]  Physical Therapy Initial Evaluation    Name: Jo-Ann MYERS [facility] Number: [id number]    Therapy Diagnosis:   Encounter Diagnosis   Name Primary?    Spondylosis of lumbar region without myelopathy or radiculopathy      Physician: Daktoa Kerr MD    Physician Orders: PT Eval and Treat  Medical Diagnosis: Spondylosis of lumbar region   Evaluation Date: 9/15/2020  Authorization Period Expiration: 9/1/2021  Plan of Care Certification Period: 12/8/20  Visit # / Visits authorized: 1/ 1  Date of Surgery: NA  Precautions: Standard and HTN, hearing loss    Time In: 110p  Time Out: 150p  Total Billable Time: 40 minutes    Subjective     Date of onset: Many years  History of current condition - Jo-Ann reports: that she has suffered from back pain on and off for many years, was feeling relief from Diclofenac.  She discontinued Diclofenac in January 2020.  She states that she has terrible pain and stiffness in the mornings - has difficulty getting to the bathroom.  She states that she feels better after her morning walk.  She states that pain comes on with sitting too long or inactivity, bending forward, sit to stand at times.  She states that she takes Tylenol intermittently with some relief.  She states that she is able to function, but does so with a lot of pain.  She states that she has right shoulder pain , suspected cuff tear, and is 7 weeks post-op masectomy.       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    Squamous Cell Carcinoma     in situ right neck      Jo-Ann Escobedo  has a past surgical history that includes Hysterectomy; Eye surgery; Spine surgery; Mastectomy; Total thyroidectomy; Cataract extraction w/  intraocular lens implant (Bilateral); Knee arthroscopy (N/A); Colonoscopy (N/A, 4/15/2019); Injection of anesthetic agent around medial branch nerves innervating lumbar facet joint (Bilateral, 6/7/2019); Excisional biopsy (Right, 6/27/2019); YAG Laser Capsulotomy  (Left, 07/29/2019); Esophagogastroduodenoscopy (N/A, 12/7/2019); Colonoscopy (N/A, 12/9/2019); Endoscopic ultrasound of upper gastrointestinal tract (N/A, 1/22/2020); Breast biopsy (Right, 2019); Oophorectomy; Breast lumpectomy (Right, 2019); Carpal tunnel release (Left, 6/5/2020); Injection of steroid (Right, 6/5/2020); Unilateral mastectomy (Right, 7/30/2020); Injection for sentinel node identification (Right, 7/30/2020); and [place] lymph node biopsy (Right, 7/30/2020).    Jo-Ann has a current medication list which includes the following prescription(s): acetaminophen, ascorbic acid (vitamin c), cholecalciferol (vitamin d3), co-enzyme q-10, cozaar, cyanocobalamin, fenofibrate, lactobacillus combination no.8, levothyroxine, lidocaine-prilocaine, losartan, meclizine, and turmeric root extract, and the following  Facility-Administered Medications: mupirocin.    Review of patient's allergies indicates:   Allergen Reactions    Voltaren [diclofenac sodium] Other (See Comments)     Hematochezia and hospitalization for hematochezia.    Codeine Diarrhea and Hallucinations    Niacin preparations      Redness, warming        Prior Therapy: Yes  Social History: Lives at home lives alone  Occupation: Retired  Prior Level of Function: Intermittent exacerbations of LBP, but not limited in function  Current Level of Function: Limited in bending forward, sit to stand, sitting    Pain:  Current 0/10, worst 7/10, best 0/10   Location: bilateral back   Description: Aching, Dull, Tight, Deep and Sharp    Pts goals: To be more mobile and functional with less pain    Objective     Lumbar Range of Motion:    ROM Observations   Flexion 50 degrees   Poor curve        Extension 4 degrees   No reversal of lumbar curve        Left Side Bending 16 degrees         Right Side Bending 17 degrees             Lower Extremity Strength  Right LE  Left LE    Quadriceps: 5/5 Quadriceps: 5/5   Hamstrings: 5/5 Hamstrings: 5/5   Iliospoas: 5/5 Iliospoas: 5/5   Hip extension:  4/5 Hip extension: 4/5   Glut Med 3+/5 Glut Med: 3/5     SI Special Tests:   Distraction: -  Compression: -  Joint Mobility: Poor intersegmental mobility of lumbar spine    CMS Impairment/Limitation/Restriction for FOTO lumbar Survey    Therapist reviewed FOTO scores for Jo-Ann Escobedo on 9/15/2020.   FOTO documents entered into [employer] - see Media section.    Limitation Score: 50%  Category: Mobility    Current : CK = at least 40% but < 60% impaired, limited or restricted  Goal: CJ = at least 20% but < 40% impaired, limited or restricted           TREATMENT     Treatment Time In: 120p  Treatment Time Out: 150p  Total Treatment time separate from Evaluation time:30    Jo-Ann received therapeutic exercises to develop strength, endurance, ROM, flexibility, posture and core stabilization for  30 minutes including:  Review of diagnosis and plan of care  Education in bridge, LTR, seated trunk flexion with sidebend, sidelying hip abduction, sit to stand    Jo-Ann received the following manual therapy techniques: for 0 minutes, including:      Home Exercises and Patient Education Provided    Education provided re: therapeutic diagnosis and plan of care    Written Home Exercises Provided: Yes.  Exercises were reviewed and Jo-Ann was able to demonstrate them prior to the end of the session.   Pt received a written copy of exercises to perform at home. Jo-Ann demonstrated good  understanding of the education provided.     See EMR under patient instructions for exercises given.     Assessment     Jo-Ann is a 79 y.o. female referred to outpatient Physical Therapy with a medical diagnosis of lumbar spondylosis that began with no specific mechanism and previous diagnosis of Paget's disease of bone. Pt presents with objective deficits in lumbar AROM, hip P/AROM, hip strength that limits functional ability to sit comfortably, sit to stand, bend forward and back.    Pt prognosis is Fair.   Pt will benefit from skilled outpatient Physical Therapy to address the deficits stated above and in the chart below, provide pt/family education, and to maximize pt's level of independence.     Plan of care discussed with patient: Yes  Pt's spiritual, cultural and educational needs considered and patient is agreeable to the plan of care and goals as stated below:     Anticipated Barriers for therapy: None    Medical Necessity is demonstrated by the following  History  Co-morbidities and personal factors that may impact the plan of care Co-morbidities:   See chart    Personal Factors:   no deficits     low   Examination  Body Structures and Functions, activity limitations and participation restrictions that may impact the plan of care Body Regions:   back    Body Systems:    ROM  strength  transitions    Participation Restrictions:    None    Activity limitations:   Learning and applying knowledge  no deficits    General Tasks and Commands  no deficits    Communication  no deficits    Mobility  lifting and carrying objects  driving (bike, car, motorcycle)    Self care  no deficits    Domestic Life  no deficits    Interactions/Relationships  no deficits    Life Areas  no deficits    Community and Social Life  no deficits         low   Clinical Presentation stable and uncomplicated low   Decision Making/ Complexity Score: low       GOALS:  Short Term Goals:  4 weeks  1. Pt to increase lumbar flexion to 55 degrees in order to bend forward.   3. Pt to demonstrate 4/5 strength of hip abduction to increase ease with sit to stand.    Long Term Goals: 12 weeks  1. Pt will demonstrate 60 degrees lumbar flexion AROM and 10 degrees of lumbar extension to increase ease with forward bending and walking.  2. Pt will demonstrate 5/5 strength of hip extension to increase ease with sit to stand.  3. Patient goal: To walk better when getting out of bed in the morning    Plan   Certification Period/Plan of care expiration: 9/15/2020 to 12/8/20.    Outpatient Physical Therapy 1 times weekly for 12 weeks to include the following interventions: manual therapy as needed, therapeutic exercises to address functional deficits, modalities prn, wound care prn, dry needling prn, treatment by a skilled PTA at times.    Noe Jackman, PT

## 2020-09-16 ENCOUNTER — CLINICAL SUPPORT (OUTPATIENT)
Dept: REHABILITATION | Facility: HOSPITAL | Age: 80
End: 2020-09-16
Attending: PHYSICIAN ASSISTANT
Payer: MEDICARE

## 2020-09-16 DIAGNOSIS — M79.642 LEFT HAND PAIN: ICD-10-CM

## 2020-09-16 PROCEDURE — 97110 THERAPEUTIC EXERCISES: CPT

## 2020-09-16 PROCEDURE — 97022 WHIRLPOOL THERAPY: CPT

## 2020-09-16 NOTE — PROGRESS NOTES
"  Occupational Therapy Daily Treatment Note      Date: 9/16/2020  Name: Jo-Ann Escobedo  Clinic Number: [id number]    Therapy Diagnosis:   Encounter Diagnosis   Name Primary?    Left hand pain      Physician: Mary Ellen Worley PA-C; Dr. Pricila Presley     Physician Orders: eval and treat  Medical Diagnosis: S/P carpal tunnel release [Z98.890]     Surgical Procedure and Date: 6/5/2020, s/p L CTR / Date of Injury/Onset: December 2019  Evaluation Date: 8/10/2020  Insurance Authorization Period Expiration: 9/21/2020  Plan of Care Certification Period: 9/25/2020  Date of Return to MD: not scheduled     Visit # / Visits authorized: 4 / 6     FOTO: initial eval     Time In: 2:00 pm  Time Out: 2:45 pm  Total treatment time: 45 minutes  Total Timed minutes 35 minutes     Precautions:  Standard, Breat CA on R side, pt is hard of hearing      Subjective     Pt reports: "I feel improvement but I think there's still a ways to go"  she was compliant with home exercise program given last session.   Response to previous treatment: no adverse effects, some improvement with pain  Functional change: none    Pain: 2/10  Location: left hand    Objective     Edema. Measured in centimeters.    8/10/2020 8/10/2020     Left Right   Wrist Crease 16.9 16.8   DPC 20 19.9   MCPs 19.8 20.1         Elbow and Wrist ROM. Measured in degrees.    8/10/2020 8/10/2020     Left Right   Wrist Ext/Flex 60/65 50/30   Wrist RD/UD 10/26 10/25      Hand ROM. Measured in degrees.  Digits WFL  Opposition WFL, ~1 cm from DPC         Strength (Dynamometer) and Pinch Strength (Pinch Gauge)  Measured in pounds.    8/10/2020 8/10/2020     Left Right   Rung II 33 24   Key Pinch 8 10   3pt Pinch 8 9   2pt Pinch 4 7      Sensation: decreased sensation noted at L fingertips      Treatment consisted of the following:    Jo-Ann received the following supervised modalities after being cleared for contradictions for 10 minutes:   -Fluidotherapy: To L hand for 10 min, " continuous air, 110 deg, air speed 100 to decrease pain, edema & scar tissue and increased tissue extensibility.        Jo-Ann received the following manual therapy techniques for 5 minutes:   -Performed scar massage to L palm area to decrease adhesions and improve tensile glide.         Jo-Ann received therapeutic exercises for 30 minutes including:  -  AROM Wrist  Ext/flx  RD/UD    X 10 reps each    Wave, hook, straight fist, composite fist, finger spreads, finger lifts, pinky slides    X 10 reps each    isospheres  X 2'   Wrist dexerciser X 2'   Theraputty - yellow (@home)  -molding  -gripping  -Finger spread/flower  -log rolls with 3 pt and lateral key pinch   -2'  -20 reps  -5 reps    -3 sets   Black gripper, 2 yellow RB X 20 reps   Red CP pinches, 3 pt and lateral key pinch X 20 reps each   Digit ext with red RB X 20 reps   Light wrist PRE, 3 ways 2#,   2 x 10 reps            Home Exercises and Education Provided     Education provided: cont HEP  - Progress towards goals     Written Home Exercises Provided: Patient instructed to cont prior HEP. Yes, added theraputty  Exercises were reviewed and Jo-Ann was able to demonstrate them prior to the end of the session.  Jo-Ann demonstrated good  understanding of the education provided.   .   See EMR under Patient Instructions for exercises provided 8/31/2020.     Assessment     Pt tolerated tx well this date. She denied increased pain this date. She reports she feels some improvement with pain. She tolerated progression of light strengthening again today. Pt cont to c/o numbness in fingers.  She cont to c/o mild discomfort during functional tasks, but reports is using her hand for light activities.     Jo-Ann is progressing well towards her goals and there are no updates to goals at this time. Pt prognosis continues as Excellent. Pt will continue to benefit from skilled outpatient occupational therapy to address the deficits listed in the problem list on initial  evaluation, provide pt/family education and to maximize pt's level of independence in the home and community environment.     Anticipated barriers to continued occupational therapy: none    Pt's spiritual, cultural and educational needs considered and pt agreeable to plan of care and goals.    Goals     Goals:   The following goals were discussed with the patient and patient is in agreement with them as to be addressed in the treatment plan.   Long Term Goals (LTGs); to be met by discharge.  LTG #1: Pt will report a pain level of 0 out of 10 with ADLs and house hold tasks  --progressing   LTG #2: Pt will demo improved FOTO score by at least 15 points.  --progressing   LTG #3: Pt will return to prior level of function for ADLs and household management.  --progressing   LTG #4: pt will demonstrate improved L  strength by at least 3-5# for opening and carrying things --progressing   LTG #5: pt will demonstrate improved L pinch strength by at least 2-4 psi for opening bottles and turning keys --progressing      Short Term Goals (STGs); to be met within 4 weeks (9/10/2020).  STG #1a: Pt will report 2 out of 10 pain level with ADLs. --progressing   STG #2: Pt will demonstrate independence with issued HEP.  --progressing   STG #3b: Pt will demo improved L wrist AROM by at least 15 degrees needed to aid with carrying and holding objects. --progressing       Plan     Continue skilled occupational therapy with individualized plan of care focusing on maximizing functional use of her L hand      Updates/Grading for next session: cont to progress as able with ROM, scar management, and light strengthening. Take measurements next session    Sharmila Nguyen OT

## 2020-09-22 ENCOUNTER — CLINICAL SUPPORT (OUTPATIENT)
Dept: REHABILITATION | Facility: HOSPITAL | Age: 80
End: 2020-09-22
Payer: MEDICARE

## 2020-09-22 DIAGNOSIS — M47.816 SPONDYLOSIS OF LUMBAR REGION WITHOUT MYELOPATHY OR RADICULOPATHY: ICD-10-CM

## 2020-09-22 PROCEDURE — 97110 THERAPEUTIC EXERCISES: CPT

## 2020-09-22 NOTE — PROGRESS NOTES
"                            Physical Therapy Daily Treatment Note     Name: Jo-Ann MYERS [facility] Number: [id number]    Therapy Diagnosis:   Encounter Diagnosis   Name Primary?    Spondylosis of lumbar region without myelopathy or radiculopathy      Physician: Dakota Kerr MD    Visit Date: 9/22/2020  Physician Orders: PT Eval and Treat  Medical Diagnosis: Spondylosis of lumbar region     Evaluation Date: 9/15/2020  Authorization Period Expiration: 9/1/2021  Plan of Care Certification Period: 12/8/20  Visit # / Visits authorized: 2/ 1  Date of Surgery: NA  Precautions: Standard and HTN, hearing loss    Time In: 115p  Time Out: 200p  Total Billable Time: 45 minutes    Subjective      Pt reports: she was compliant with home exercise program given last session.   Response to previous treatment: Patient states that she is feeling about the same, has had more shoulder pain than anything and has not been dilligent with HEP    Pain: 3/10  Location: bilateral back      Objective     Jo-Ann received therapeutic exercises to develop strength, ROM, flexibility, posture and core stabilization for 40 minutes including:  Nustep - 7', L2  Tiltboard - 20x  Bar squat - 6x5"  Marching walk, HHA - 2 laps  Standing hip ext - 2 x 10  DL HR - 2 x 10  Bridge - 2 x 6, 5"  LTR - 10x  IR stretch supine - 5 x 5"    Jo-Ann received the following manual therapy techniques for 0 minutes, including:      Home Exercises Provided and Patient Education Provided     Education provided:   Continue current HEP    Written Home Exercises Provided: Continue with current HEP  Exercises were reviewed and Jo-Ann was able to demonstrate them prior to the end of the session.      Pt received a written copy of exercises to perform at home.   See EMR under patient instructions for exercises given.     Jo-Ann demonstrated good  understanding of the education provided.     Assessment     Patient demonstrates improved ability to perform sit to stand, right " hip IR significantly more limited in MARISELA Busch is progressing well towards her goals.   Pt prognosis is Fair.     Pt will continue to benefit from skilled outpatient physical therapy to address the deficits listed in the problem list box on initial evaluation, provide pt/family education and to maximize pt's level of independence in the home and community environment.     Pt's spiritual, cultural and educational needs considered and pt agreeable to plan of care and goals.    Anticipated barriers to physical therapy: None      Goals:   Short Term Goals:  4 weeks  1. Pt to increase lumbar flexion to 55 degrees in order to bend forward.   3. Pt to demonstrate 4/5 strength of hip abduction to increase ease with sit to stand.     Long Term Goals: 12 weeks  1. Pt will demonstrate 60 degrees lumbar flexion AROM and 10 degrees of lumbar extension to increase ease with forward bending and walking.  2. Pt will demonstrate 5/5 strength of hip extension to increase ease with sit to stand.  3. Patient goal: To walk better when getting out of bed in the morning    Plan     Progress mobility and strength exercises    Noe Jackman, PT

## 2020-09-23 ENCOUNTER — CLINICAL SUPPORT (OUTPATIENT)
Dept: REHABILITATION | Facility: HOSPITAL | Age: 80
End: 2020-09-23
Payer: MEDICARE

## 2020-09-23 DIAGNOSIS — M79.642 LEFT HAND PAIN: ICD-10-CM

## 2020-09-23 PROCEDURE — 97022 WHIRLPOOL THERAPY: CPT

## 2020-09-23 PROCEDURE — 97110 THERAPEUTIC EXERCISES: CPT

## 2020-09-23 NOTE — PROGRESS NOTES
"  Occupational Therapy Daily Treatment Note      Date: 9/23/2020  Name: Jo-Ann Escobedo  Clinic Number: [id number]    Therapy Diagnosis:   Encounter Diagnosis   Name Primary?    Left hand pain      Physician: Mary Ellen Worley PA-C; Dr. 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   Response to previous treatment: no adverse effects, some improvement with pain  Functional change: none    Pain: 2/10  Location: left hand    Objective     Edema. Measured in centimeters.    8/10/2020 8/10/2020     Left Right   Wrist Crease 16.9 16.8   DPC 20 19.9   MCPs 19.8 20.1         Elbow and Wrist ROM. Measured in degrees.    8/10/2020 8/10/2020 9/23/2020     Left Right Left   Wrist Ext/Flex 60/65 50/30 55/60   Wrist RD/UD 10/26 10/25 15/28      Hand ROM. Measured in degrees.  Digits WFL  Opposition WFL, ~1 cm from DPC         Strength (Dynamometer) and Pinch Strength (Pinch Gauge)  Measured in pounds.    8/10/2020 8/10/2020 9/23/2020     Left Right Left   Rung II 33 24 37   Diaz Pinch 8 10 12   3pt Pinch 8 9 11   2pt Pinch 4 7 8      Sensation: decreased sensation noted at L fingertips      Treatment consisted of the following:    Jo-Ann received the following supervised modalities after being cleared for contradictions for 10 minutes:   -Fluidotherapy: " To L hand for 10 min, continuous air, 110 deg, air speed 100 to decrease pain, edema & scar tissue and increased tissue extensibility.        Jo-Ann received the following manual therapy techniques for 5 minutes:   -Performed scar massage to L palm area to decrease adhesions and improve tensile glide.         Jo-Ann received therapeutic exercises for 30 minutes including:  -  AROM Wrist  Ext/flx  RD/UD    X 10 reps each    Wave, hook, straight fist, composite fist, finger spreads, finger lifts, pinky slides    X 10 reps each    isospheres  X 2'   Wrist dexerciser X 2'   Theraputty - yellow (@home)  -molding  -gripping  -Finger spread/flower  -log rolls with 3 pt and lateral key pinch   -2'  -20 reps  -5 reps    -3 sets   Black gripper, 2 yellow RB 3 x 10 reps   Red CP pinches, 3 pt and lateral key pinch X 20 reps each   Digit ext with red RB 3 x 10 reps   Light wrist PRE, 3 ways 2#,   2 x 10 reps   Red flexbar,smiles and frowns  X 10 reps each            Home Exercises and Education Provided     Education provided: cont HEP  - Progress towards goals     Written Home Exercises Provided: Patient instructed to cont prior HEP. Yes, added theraputty  Exercises were reviewed and Jo-Ann was able to demonstrate them prior to the end of the session.  Jo-Ann demonstrated good  understanding of the education provided.   .   See EMR under Patient Instructions for exercises provided 8/31/2020.     Assessment     Pt tolerated tx well this date. She denied increased pain this date. She reports she feels some improvement with pain overall. She tolerated progression of light strengthening again today. Pt cont to c/o numbness in fingers.  She cont to c/o mild discomfort during functional tasks, but reports is using her hand for light activities.     Jo-Ann is progressing well towards her goals and there are no updates to goals at this time. Pt prognosis continues as Excellent. Pt will continue to benefit from skilled outpatient  occupational therapy to address the deficits listed in the problem list on initial evaluation, provide pt/family education and to maximize pt's level of independence in the home and community environment.     Anticipated barriers to continued occupational therapy: none    Pt's spiritual, cultural and educational needs considered and pt agreeable to plan of care and goals.    Goals     Goals:   The following goals were discussed with the patient and patient is in agreement with them as to be addressed in the treatment plan.   Long Term Goals (LTGs); to be met by discharge.  LTG #1: Pt will report a pain level of 0 out of 10 with ADLs and house hold tasks  --progressing   LTG #2: Pt will demo improved FOTO score by at least 15 points.  --progressing   LTG #3: Pt will return to prior level of function for ADLs and household management.  --progressing   LTG #4: pt will demonstrate improved L  strength by at least 3-5# for opening and carrying things --progressing   LTG #5: pt will demonstrate improved L pinch strength by at least 2-4 psi for opening bottles and turning keys --progressing      Short Term Goals (STGs); to be met within 4 weeks (9/10/2020).  STG #1a: Pt will report 2 out of 10 pain level with ADLs. --progressing   STG #2: Pt will demonstrate independence with issued HEP.  --progressing   STG #3b: Pt will demo improved L wrist AROM by at least 15 degrees needed to aid with carrying and holding objects. --progressing       Plan     Continue skilled occupational therapy with individualized plan of care focusing on maximizing functional use of her L hand      Updates/Grading for next session: cont to progress as able with ROM, scar management, and light strengthening. Take measurements next session    Sharmila Nguyen, OT

## 2020-09-29 ENCOUNTER — OFFICE VISIT (OUTPATIENT)
Dept: SURGERY | Facility: CLINIC | Age: 80
End: 2020-09-29
Payer: MEDICARE

## 2020-09-29 ENCOUNTER — CLINICAL SUPPORT (OUTPATIENT)
Dept: REHABILITATION | Facility: HOSPITAL | Age: 80
End: 2020-09-29
Payer: MEDICARE

## 2020-09-29 VITALS
HEART RATE: 79 BPM | WEIGHT: 187.63 LBS | SYSTOLIC BLOOD PRESSURE: 136 MMHG | BODY MASS INDEX: 33.25 KG/M2 | DIASTOLIC BLOOD PRESSURE: 70 MMHG | HEIGHT: 63 IN

## 2020-09-29 DIAGNOSIS — Z48.03 ENCOUNTER FOR CHANGE OR REMOVAL OF DRAINS: ICD-10-CM

## 2020-09-29 DIAGNOSIS — Z17.0 MALIGNANT NEOPLASM OF UPPER-OUTER QUADRANT OF RIGHT BREAST IN FEMALE, ESTROGEN RECEPTOR POSITIVE: Primary | ICD-10-CM

## 2020-09-29 DIAGNOSIS — M47.816 SPONDYLOSIS OF LUMBAR REGION WITHOUT MYELOPATHY OR RADICULOPATHY: ICD-10-CM

## 2020-09-29 DIAGNOSIS — C50.411 MALIGNANT NEOPLASM OF UPPER-OUTER QUADRANT OF RIGHT BREAST IN FEMALE, ESTROGEN RECEPTOR POSITIVE: Primary | ICD-10-CM

## 2020-09-29 PROCEDURE — 99024 POSTOP FOLLOW-UP VISIT: CPT | Mod: S$GLB,,, | Performed by: PHYSICIAN ASSISTANT

## 2020-09-29 PROCEDURE — 3078F DIAST BP <80 MM HG: CPT | Mod: CPTII,S$GLB,, | Performed by: PHYSICIAN ASSISTANT

## 2020-09-29 PROCEDURE — 99024 PR POST-OP FOLLOW-UP VISIT: ICD-10-PCS | Mod: S$GLB,,, | Performed by: PHYSICIAN ASSISTANT

## 2020-09-29 PROCEDURE — 3075F PR MOST RECENT SYSTOLIC BLOOD PRESS GE 130-139MM HG: ICD-10-PCS | Mod: CPTII,S$GLB,, | Performed by: PHYSICIAN ASSISTANT

## 2020-09-29 PROCEDURE — 3078F PR MOST RECENT DIASTOLIC BLOOD PRESSURE < 80 MM HG: ICD-10-PCS | Mod: CPTII,S$GLB,, | Performed by: PHYSICIAN ASSISTANT

## 2020-09-29 PROCEDURE — 99999 PR PBB SHADOW E&M-EST. PATIENT-LVL III: CPT | Mod: PBBFAC,,, | Performed by: PHYSICIAN ASSISTANT

## 2020-09-29 PROCEDURE — 3075F SYST BP GE 130 - 139MM HG: CPT | Mod: CPTII,S$GLB,, | Performed by: PHYSICIAN ASSISTANT

## 2020-09-29 PROCEDURE — 1126F PR PAIN SEVERITY QUANTIFIED, NO PAIN PRESENT: ICD-10-PCS | Mod: S$GLB,,, | Performed by: PHYSICIAN ASSISTANT

## 2020-09-29 PROCEDURE — 1159F PR MEDICATION LIST DOCUMENTED IN MEDICAL RECORD: ICD-10-PCS | Mod: S$GLB,,, | Performed by: PHYSICIAN ASSISTANT

## 2020-09-29 PROCEDURE — 1126F AMNT PAIN NOTED NONE PRSNT: CPT | Mod: S$GLB,,, | Performed by: PHYSICIAN ASSISTANT

## 2020-09-29 PROCEDURE — 1101F PR PT FALLS ASSESS DOC 0-1 FALLS W/OUT INJ PAST YR: ICD-10-PCS | Mod: CPTII,S$GLB,, | Performed by: PHYSICIAN ASSISTANT

## 2020-09-29 PROCEDURE — 1101F PT FALLS ASSESS-DOCD LE1/YR: CPT | Mod: CPTII,S$GLB,, | Performed by: PHYSICIAN ASSISTANT

## 2020-09-29 PROCEDURE — 1159F MED LIST DOCD IN RCRD: CPT | Mod: S$GLB,,, | Performed by: PHYSICIAN ASSISTANT

## 2020-09-29 PROCEDURE — 99999 PR PBB SHADOW E&M-EST. PATIENT-LVL III: ICD-10-PCS | Mod: PBBFAC,,, | Performed by: PHYSICIAN ASSISTANT

## 2020-09-29 PROCEDURE — 97110 THERAPEUTIC EXERCISES: CPT

## 2020-09-29 NOTE — PROGRESS NOTES
"                            Physical Therapy Daily Treatment Note     Name: Jo-Ann MYERS [facility] Number: [id number]    Therapy Diagnosis:   Encounter Diagnosis   Name Primary?    Spondylosis of lumbar region without myelopathy or radiculopathy      Physician: Dakota Kerr MD    Visit Date: 9/29/2020  Physician Orders: PT Eval and Treat  Medical Diagnosis: Spondylosis of lumbar region     Evaluation Date: 9/15/2020  Authorization Period Expiration: 9/1/2021  Plan of Care Certification Period: 12/8/20  Visit # / Visits authorized: 2/ 1  Date of Surgery: NA  Precautions: Standard and HTN, hearing loss    Time In: 100p  Time Out: 145p  Total Billable Time: 45 minutes    Subjective      Pt reports: she was compliant with home exercise program given last session.   Response to previous treatment: Patient states that she has had decreased intensity of pain recently, feels better    Pain: 3/10  Location: bilateral back      Objective     Jo-Ann received therapeutic exercises to develop strength, ROM, flexibility, posture and core stabilization for 40 minutes including:  Nustep - 7', L2  SB DKTC - 15x  Sb bridge with ball under knees - 2 x 8  Hip IR stretch supine - 5 x 5"  Bar squat - 2 x 8  Marching walk, HHA - 2 laps  Standing hip ext - 2 x 10  DL HR - 2 x 10    Jo-Ann received the following manual therapy techniques for 0 minutes, including:      Home Exercises Provided and Patient Education Provided     Education provided:   Continue current HEP    Written Home Exercises Provided: Continue with current HEP  Exercises were reviewed and Jo-Ann was able to demonstrate them prior to the end of the session.      Pt received a written copy of exercises to perform at home.   See EMR under patient instructions for exercises given.     Jo-Ann demonstrated good  understanding of the education provided.     Assessment     Patient demonstrates significant improvement in mobility and fluidity of movement  Jo-Ann is " progressing well towards her goals.   Pt prognosis is Fair.     Pt will continue to benefit from skilled outpatient physical therapy to address the deficits listed in the problem list box on initial evaluation, provide pt/family education and to maximize pt's level of independence in the home and community environment.     Pt's spiritual, cultural and educational needs considered and pt agreeable to plan of care and goals.    Anticipated barriers to physical therapy: None      Goals:   Short Term Goals:  4 weeks  1. Pt to increase lumbar flexion to 55 degrees in order to bend forward.   3. Pt to demonstrate 4/5 strength of hip abduction to increase ease with sit to stand.     Long Term Goals: 12 weeks  1. Pt will demonstrate 60 degrees lumbar flexion AROM and 10 degrees of lumbar extension to increase ease with forward bending and walking.  2. Pt will demonstrate 5/5 strength of hip extension to increase ease with sit to stand.  3. Patient goal: To walk better when getting out of bed in the morning    Plan     Progress mobility and strength exercises    oNe Jackman, PT

## 2020-09-29 NOTE — PROGRESS NOTES
[facility]       Post-Op        REFERRING PHYSICIAN:  No referring provider defined for this encounter.       Dakota Kerr MD    MEDICAL ONCOLOGIST:    Dr. Mueller   RADIATION ONCOLOGIST:   none    DIAGNOSIS:    This is a 79 y.o. female with a history of ER+VT+ DCIS of the right breast.    TREATMENT SUMMARY:  The patient is status post right mastectomy and sentinel node biopsy on 7/30/2020.  Final pathology showed no residual disease and negative nodes.    INTERVAL HISTORY:   Jo-Ann Escobedo comes in for a post-op check/GREGORIA drain removal. She was last seen on 8/31/20, however drain was left in place at that time as output remained in the 30s per day at that time. She reports over the last 3 days, output has been 29ml, 25ml, and 25ml as of this morning.   She denies fever, chills, chest pain or shortness of breath.       MEDICATIONS:  Current Outpatient Medications   Medication Sig Dispense Refill    acetaminophen (TYLENOL) 650 MG TbSR Take 1 tablet (650 mg total) by mouth 3 (three) times daily as needed. 42 tablet 0    ascorbic acid, vitamin C, (VITAMIN C) 100 MG tablet Take 100 mg by mouth once daily.      cholecalciferol, vitamin D3, (VITAMIN D3) 2,000 unit Tab Take 1 tablet by mouth once daily.      co-enzyme Q-10 30 mg capsule Take 30 mg by mouth once daily.       COZAAR 50 mg tablet       cyanocobalamin 1,000 mcg/mL injection Inject 1 mL (1,000 mcg total) into the muscle every 30 days. 10 mL 1    fenofibrate 160 MG Tab TAKE 1 TABLET (160 MG TOTAL) BY MOUTH ONCE DAILY. 90 tablet 3    LACTOBACILLUS COMBINATION NO.8 (ADULT PROBIOTIC ORAL) Take by mouth once daily.       levothyroxine (SYNTHROID) 125 MCG tablet Take 1 tablet (125 mcg total) by mouth before breakfast. 30 tablet 11    lidocaine-prilocaine (EMLA) cream Apply topically 2 (two) times daily as needed. To hand. 30 g 2    losartan (COZAAR) 50 MG tablet TAKE 1 TABLET BY MOUTH EVERY DAY 90 tablet 3    meclizine (ANTIVERT) 12.5 mg  tablet Take 1 tablet (12.5 mg total) by mouth 3 (three) times daily as needed for Dizziness. 30 tablet 2    turmeric root extract 500 mg Cap Take by mouth once daily.        No current facility-administered medications for this visit.      Facility-Administered Medications Ordered in Other Visits   Medication Dose Route Frequency Provider Last Rate Last Dose    mupirocin 2 % ointment   Nasal On Call Procedure Leandro Avila MD           ALLERGIES:   Review of patient's allergies indicates:   Allergen Reactions    Voltaren [diclofenac sodium] Other (See Comments)     Hematochezia and hospitalization for hematochezia.    Codeine Diarrhea and Hallucinations    Niacin preparations      Redness, warming       PHYSICAL EXAMINATION:   General:  This is a well appearing female with appropriate speech, affect and gait.     Breast:  Incision sites clean, dry, and intact bilaterally. GREGORIA drain in place on right with small amount of serous fluid in drain; drain removed and bandage placed over drain site. Drain site without erythema, pain or tenderness or drainage after removal.    IMPRESSION:   Ms. Jo-Ann Escobedo is a 79 y.o. year old female status post right mastectomy with SLNB on 7/30/20 who presents today for GREGORIA drain removal.    PLAN:   1. return in 6 months for a follow up office visit and breast exam  2. Screening mammogram no longer indicated in setting of bilateral mastectomies  3. The patient is advised in continued exam of the breast chest wall and to report to this office sooner should she note any areas of abnormality or concern.   4. Continue follow up with Dr. Mueller; she reports she is planning to see him in December.

## 2020-09-30 ENCOUNTER — OFFICE VISIT (OUTPATIENT)
Dept: SPINE | Facility: CLINIC | Age: 80
End: 2020-09-30
Payer: MEDICARE

## 2020-09-30 ENCOUNTER — DOCUMENTATION ONLY (OUTPATIENT)
Dept: REHABILITATION | Facility: HOSPITAL | Age: 80
End: 2020-09-30

## 2020-09-30 VITALS
HEART RATE: 71 BPM | TEMPERATURE: 98 F | HEIGHT: 63 IN | DIASTOLIC BLOOD PRESSURE: 65 MMHG | BODY MASS INDEX: 33.25 KG/M2 | SYSTOLIC BLOOD PRESSURE: 138 MMHG | WEIGHT: 187.63 LBS

## 2020-09-30 DIAGNOSIS — M51.36 DEGENERATIVE DISC DISEASE, LUMBAR: ICD-10-CM

## 2020-09-30 DIAGNOSIS — Z98.890 S/P CARPAL TUNNEL RELEASE: Primary | ICD-10-CM

## 2020-09-30 DIAGNOSIS — M47.817 LUMBOSACRAL SPONDYLOSIS WITHOUT MYELOPATHY: Primary | ICD-10-CM

## 2020-09-30 DIAGNOSIS — M54.50 CHRONIC LOW BACK PAIN WITHOUT SCIATICA, UNSPECIFIED BACK PAIN LATERALITY: ICD-10-CM

## 2020-09-30 DIAGNOSIS — G89.29 CHRONIC LOW BACK PAIN WITHOUT SCIATICA, UNSPECIFIED BACK PAIN LATERALITY: ICD-10-CM

## 2020-09-30 PROCEDURE — 99204 PR OFFICE/OUTPT VISIT, NEW, LEVL IV, 45-59 MIN: ICD-10-PCS | Mod: S$GLB,,, | Performed by: NURSE PRACTITIONER

## 2020-09-30 PROCEDURE — 1125F AMNT PAIN NOTED PAIN PRSNT: CPT | Mod: S$GLB,,, | Performed by: NURSE PRACTITIONER

## 2020-09-30 PROCEDURE — 1101F PR PT FALLS ASSESS DOC 0-1 FALLS W/OUT INJ PAST YR: ICD-10-PCS | Mod: CPTII,S$GLB,, | Performed by: NURSE PRACTITIONER

## 2020-09-30 PROCEDURE — 1159F MED LIST DOCD IN RCRD: CPT | Mod: S$GLB,,, | Performed by: NURSE PRACTITIONER

## 2020-09-30 PROCEDURE — 99204 OFFICE O/P NEW MOD 45 MIN: CPT | Mod: S$GLB,,, | Performed by: NURSE PRACTITIONER

## 2020-09-30 PROCEDURE — 3078F DIAST BP <80 MM HG: CPT | Mod: CPTII,S$GLB,, | Performed by: NURSE PRACTITIONER

## 2020-09-30 PROCEDURE — 1101F PT FALLS ASSESS-DOCD LE1/YR: CPT | Mod: CPTII,S$GLB,, | Performed by: NURSE PRACTITIONER

## 2020-09-30 PROCEDURE — 99499 UNLISTED E&M SERVICE: CPT | Mod: S$GLB,,, | Performed by: NURSE PRACTITIONER

## 2020-09-30 PROCEDURE — 3078F PR MOST RECENT DIASTOLIC BLOOD PRESSURE < 80 MM HG: ICD-10-PCS | Mod: CPTII,S$GLB,, | Performed by: NURSE PRACTITIONER

## 2020-09-30 PROCEDURE — 99999 PR PBB SHADOW E&M-EST. PATIENT-LVL IV: ICD-10-PCS | Mod: PBBFAC,,, | Performed by: NURSE PRACTITIONER

## 2020-09-30 PROCEDURE — 1125F PR PAIN SEVERITY QUANTIFIED, PAIN PRESENT: ICD-10-PCS | Mod: S$GLB,,, | Performed by: NURSE PRACTITIONER

## 2020-09-30 PROCEDURE — 3075F PR MOST RECENT SYSTOLIC BLOOD PRESS GE 130-139MM HG: ICD-10-PCS | Mod: CPTII,S$GLB,, | Performed by: NURSE PRACTITIONER

## 2020-09-30 PROCEDURE — 99499 RISK ADDL DX/OHS AUDIT: ICD-10-PCS | Mod: S$GLB,,, | Performed by: NURSE PRACTITIONER

## 2020-09-30 PROCEDURE — 3075F SYST BP GE 130 - 139MM HG: CPT | Mod: CPTII,S$GLB,, | Performed by: NURSE PRACTITIONER

## 2020-09-30 PROCEDURE — 99999 PR PBB SHADOW E&M-EST. PATIENT-LVL IV: CPT | Mod: PBBFAC,,, | Performed by: NURSE PRACTITIONER

## 2020-09-30 PROCEDURE — 1159F PR MEDICATION LIST DOCUMENTED IN MEDICAL RECORD: ICD-10-PCS | Mod: S$GLB,,, | Performed by: NURSE PRACTITIONER

## 2020-09-30 NOTE — PROGRESS NOTES
OT Not Seen Note    Date: 9/30/2020    Pt decided to cancel visit this date due to still awaiting further insurance authorization and date extension. She stated she would like to wait to schedule another appointment until insurance authorization approved for date extension. No charges dropped today.    YAIR Pearson

## 2020-09-30 NOTE — LETTER
September 30, 2020      Dakota Kerr MD  [address]  [facility] [address]           [facility] [address]  [address], [address]  [facility] [address]  Phone: [phone]  Fax: [phone]          Patient: Jo-Ann Escobedo   MR Number: [id number]   YOB: 1940   Date of Visit: 9/30/2020       Dear Dr. Dakota Kerr:    Thank you for referring Jo-Ann Escobedo to me for evaluation. Attached you will find relevant portions of my assessment and plan of care.    If you have questions, please do not hesitate to call me. I look forward to following Jo-Ann Escobedo along with you.    Sincerely,    Jeff Encarnacion, NP    Enclosure  CC:  No Recipients    If you would like to receive this communication electronically, please contact [email or web address] or [phone] to request more information on [employer] Link access.    For providers and/or their staff who would like to refer a patient to [facility], please contact us through our one-stop-shop provider referral line, [facility] [place], at [phone].    If you feel you have received this communication in error or would no longer like to receive these types of communications, please e-mail [email or web address]

## 2020-09-30 NOTE — PROGRESS NOTES
Subjective:      Patient ID: Jo-Ann Escobedo is a 79 y.o. female.    Chief Complaint: Low-back Pain    Pt is a 78y/o female presenting with complaint of several years of lower back pain. She states remote what sounds like laminectomy/discectomy for radicular complaint 20+yrs ago. She denies any radicular symptoms. Denies perineal paresthesias, bowel/bladder dysfunction. States significant a.m. stiffness. Denies pain with valsalva. She at one time was taking diclofenac but was stopped then restarted for renal issues and once restarted had an anemic episode she was hospitalized for but no etiology of anemia so again stopped. She has had a Bilateral L3,4,5 MBB approx 1.5yrs ago but did not have follow up. Pt states she did have benefit from this but unable to quantify and states it did not last long. She is going to PT and finds this beneficial at this time.       Review of Systems   Constitution: Negative for fever.   Cardiovascular: Negative for chest pain.   Musculoskeletal: Negative for back pain.   Gastrointestinal: Negative for bowel incontinence.   Genitourinary: Negative for bladder incontinence.   Neurological: Negative for numbness, paresthesias and weakness.         Objective:      Imaging:  XR LUMBAR SPINE COMPLETE 5 VIEW     CLINICAL HISTORY:  Back pain or radiculopathy, > 6 wks;Low back pain     TECHNIQUE:  AP, lateral, spot and bilateral oblique views of the lumbar spine were performed.     COMPARISON:  02/28/2018.     FINDINGS:  Posterior vertebral alignment is satisfactory.  Vertebral body heights are well maintained.  There is no evidence for acute fracture or bone destruction.  There is no evidence for spondylolysis.  There is mild disc space narrowing at the L2-3 level with moderate disc space narrowing at the L3-4 level and marked disc space narrowing at the L4-L5 level.  There is facet arthropathy within the lower lumbar spine and lumbosacral junction.  Small anterior osteophytes are noted  throughout the lumbar spine with small posterior osteophytes within the lower lumbar spine.  No abnormal paraspinal masses are identified.  Sacroiliac joints are unremarkable.  Atherosclerotic calcification is present within the abdominal aorta.     Impression:     No evidence for acute fracture, bone destruction, spondylolysis, or spondylolisthesis.     Moderate to marked lumbar spondylosis.     Atherosclerosis.    General: Jo-Ann is well-developed, well-nourished, appears stated age, in no acute distress, alert and oriented to time, place and person.     General    Nursing note and vitals reviewed.  Constitutional: She is oriented to person, place, and time.   Cardiovascular: Normal rate.    Pulmonary/Chest: Effort normal.   Neurological: She is alert and oriented to person, place, and time.   Psychiatric: She has a normal mood and affect. Her behavior is normal.         Right Ankle/Foot Exam     Tests   Tiptoe Walk: able to perform    Left Ankle/Foot Exam     Tests   Tiptoe Walk: able to perform  Back (L-Spine & T-Spine) / Neck (C-Spine) Exam     Comments:  Limited Flexion but posture is forward leaning and significant facet loading to bilateral lower lumbar    No GTB or PSIS TTP      Muscle Strength   Right Lower Extremity   Hip Flexion: 5/5   Quadriceps:  5/5   Hamstrin/5   Anterior tibial:  5/5   Gastrocsoleus:  5/5   EHL:  5/5  Left Lower Extremity   Hip Flexion: 5/5   Quadriceps:  5/5   Hamstrin/5   Anterior tibial:  5/5   Gastrocsoleus:  5/5   EHL:  5/5    Reflexes     Left Side  Ankle Clonus:  absent    Right Side   Ankle Clonus:  absent              Assessment:       1. Lumbosacral spondylosis without myelopathy    2. Degenerative disc disease, lumbar           Plan:          - Prior records reviewed and Xray reviewed with patient  - She is not good candidate for NSAID  - She is currently going to PT and finds this beneficial, advised to continue  - She has had MBB of L3,4,5 prior and states  beneficial but it was discussed that this was not ment to last long   - Discussed repeating MBB of L3,4,5 bilaterally  - If Beneficial proceed with RFA, Consider MRI if not beneficial   - She would like to continue with PT at this time  - RTC 4 weeks for re-evaluation.       Follow-up: No follow-ups on file. If there are any questions prior to this, the patient was instructed to contact the office.

## 2020-10-01 ENCOUNTER — CLINICAL SUPPORT (OUTPATIENT)
Dept: URGENT CARE | Facility: CLINIC | Age: 80
End: 2020-10-01
Payer: MEDICARE

## 2020-10-01 VITALS — TEMPERATURE: 99 F

## 2020-10-01 DIAGNOSIS — Z23 FLU VACCINE NEED: Primary | ICD-10-CM

## 2020-10-01 PROCEDURE — G0008 ADMIN INFLUENZA VIRUS VAC: HCPCS | Mod: S$GLB,,, | Performed by: PHYSICIAN ASSISTANT

## 2020-10-01 PROCEDURE — 90694 VACC AIIV4 NO PRSRV 0.5ML IM: CPT | Mod: S$GLB,,, | Performed by: PHYSICIAN ASSISTANT

## 2020-10-01 PROCEDURE — 90694 FLU VACCINE - QUADRIVALENT - ADJUVANTED: ICD-10-PCS | Mod: S$GLB,,, | Performed by: PHYSICIAN ASSISTANT

## 2020-10-01 PROCEDURE — G0008 FLU VACCINE - QUADRIVALENT - ADJUVANTED: ICD-10-PCS | Mod: S$GLB,,, | Performed by: PHYSICIAN ASSISTANT

## 2020-10-08 ENCOUNTER — CLINICAL SUPPORT (OUTPATIENT)
Dept: REHABILITATION | Facility: HOSPITAL | Age: 80
End: 2020-10-08
Payer: MEDICARE

## 2020-10-08 DIAGNOSIS — M47.816 SPONDYLOSIS OF LUMBAR REGION WITHOUT MYELOPATHY OR RADICULOPATHY: ICD-10-CM

## 2020-10-08 PROCEDURE — 97110 THERAPEUTIC EXERCISES: CPT

## 2020-10-08 NOTE — PROGRESS NOTES
"                            Physical Therapy Daily Treatment Note     Name: Jo-Ann MYERS [facility] Number: [id number]    Therapy Diagnosis:   Encounter Diagnosis   Name Primary?    Spondylosis of lumbar region without myelopathy or radiculopathy      Physician: Dakota Kerr MD    Visit Date: 10/8/2020  Physician Orders: PT Eval and Treat  Medical Diagnosis: Spondylosis of lumbar region     Evaluation Date: 9/15/2020  Authorization Period Expiration: 9/1/2021  Plan of Care Certification Period: 12/8/20  Visit # / Visits authorized: 3/ 1    Precautions: Standard and HTN, hearing loss    Time In: 1217  Time Out: 1259  Total Billable Time: 42 minutes    Subjective      Pt reports: she was  compliant with home exercise program given last session. Patient reports that she is getting relief from her back pain with therapy and her home program.  Response to previous treatment: Patient states that she has had decreased intensity of pain recently, feels better    Pain: 3/10  Location: bilateral back      Objective     Jo-Ann received therapeutic exercises to develop strength, ROM, flexibility, posture and core stabilization for 42 minutes including:  Nustep - 7', L2  SB DKTC - 15x  Sb bridge with ball under knees - 2 x 8   Clams 20x   LTR 15x  Hip IR stretch supine - 5 x 5"  Bar squat - 2 x 8  Marching walk, HHA - 2 laps  Standing hip ext - 2 x 10  DL HR - 2 x 10  Step ups Lvl 3 20x      Jo-Ann received the following manual therapy techniques for 0 minutes, including:      Home Exercises Provided and Patient Education Provided     Education provided:   Continue current HEP    Written Home Exercises Provided: Continue with current HEP  Exercises were reviewed and Jo-Ann was able to demonstrate them prior to the end of the session.      Pt received a written copy of exercises to perform at home.   See EMR under patient instructions for exercises given.     Jo-Ann demonstrated good  understanding of the education provided. "     Assessment     Patient tolerated all therex without difficulty or adverse effects. Patient continues to state that therapy is very effective for decreasing her pain. Cont to progress as tolerated. Progress to shuttle if able next session.   Jo-Ann is progressing well towards her goals.   Pt prognosis is Fair.     Pt will continue to benefit from skilled outpatient physical therapy to address the deficits listed in the problem list box on initial evaluation, provide pt/family education and to maximize pt's level of independence in the home and community environment.     Pt's spiritual, cultural and educational needs considered and pt agreeable to plan of care and goals.    Anticipated barriers to physical therapy: None      Goals:   Short Term Goals:  4 weeks  1. Pt to increase lumbar flexion to 55 degrees in order to bend forward.   3. Pt to demonstrate 4/5 strength of hip abduction to increase ease with sit to stand.     Long Term Goals: 12 weeks  1. Pt will demonstrate 60 degrees lumbar flexion AROM and 10 degrees of lumbar extension to increase ease with forward bending and walking.  2. Pt will demonstrate 5/5 strength of hip extension to increase ease with sit to stand.  3. Patient goal: To walk better when getting out of bed in the morning    Plan     Progress mobility and strength exercises    Demetri Csatle, PT

## 2020-10-14 ENCOUNTER — CLINICAL SUPPORT (OUTPATIENT)
Dept: REHABILITATION | Facility: HOSPITAL | Age: 80
End: 2020-10-14
Payer: MEDICARE

## 2020-10-14 DIAGNOSIS — M47.816 SPONDYLOSIS OF LUMBAR REGION WITHOUT MYELOPATHY OR RADICULOPATHY: ICD-10-CM

## 2020-10-14 PROCEDURE — 97110 THERAPEUTIC EXERCISES: CPT

## 2020-10-14 NOTE — PROGRESS NOTES
"                            Physical Therapy Daily Treatment Note     Name: Jo-Ann MYERS [facility] Number: [id number]    Therapy Diagnosis:   Encounter Diagnosis   Name Primary?    Spondylosis of lumbar region without myelopathy or radiculopathy      Physician: Dakota Kerr MD    Visit Date: 10/14/2020  Physician Orders: PT Eval and Treat  Medical Diagnosis: Spondylosis of lumbar region     Evaluation Date: 9/15/2020  Authorization Period Expiration: 11/25/2020  Plan of Care Certification Period: 12/8/20  Visit # / Visits authorized: 3/18    Precautions: Standard and HTN, hearing loss    Time In: 100   Time Out: 143   Total Billable Time: 43 minutes    Subjective      Pt reports: she continues to be compliant with home exercise program given. Pt reports that her pain has been a lot more manageable than it has been in the past.   Response to previous treatment: Patient states that she has had decreased intensity of pain recently, feels better    Pain: 3/10  Location: bilateral back      Objective     Jo-Ann received therapeutic exercises to develop strength, ROM, flexibility, posture and core stabilization for 43 minutes including:  Nustep - 7', L2  SB DKTC - 15x  Sb bridge with ball under knees - 2 x 10  TA presses into swiss ball 5 sec x 20  Piriformis Stretching 30 sec x 3  Hamstring Stretch 30 sec x 3  Bridging 2x10  Clams 30x     NOT PERFORMED   LTR 15x  Hip IR stretch supine - 5 x 5"  Bar squat - 2 x 8  Marching walk, HHA - 2 laps  Standing hip ext - 2 x 10  DL HR - 2 x 10  Step ups Lvl 3 20x      Jo-Ann received the following manual therapy techniques for 0 minutes, including:      Home Exercises Provided and Patient Education Provided     Education provided:   Continue current HEP    Written Home Exercises Provided: Continue with current HEP  Exercises were reviewed and Jo-Ann was able to demonstrate them prior to the end of the session.      Pt received a written copy of exercises to perform at home. "   See EMR under patient instructions for exercises given.     Jo-Ann demonstrated good  understanding of the education provided.     Assessment   Patient was progressed with stretching and core activation exercises today. Patient reports the piriformis stretch was able to effectively stretch the area she feels most pain at. Progress as tolerated.   Jo-Ann is progressing well towards her goals.   Pt prognosis is Fair.     Pt will continue to benefit from skilled outpatient physical therapy to address the deficits listed in the problem list box on initial evaluation, provide pt/family education and to maximize pt's level of independence in the home and community environment.     Pt's spiritual, cultural and educational needs considered and pt agreeable to plan of care and goals.    Anticipated barriers to physical therapy: None      Goals:   Short Term Goals:  4 weeks  1. Pt to increase lumbar flexion to 55 degrees in order to bend forward.   3. Pt to demonstrate 4/5 strength of hip abduction to increase ease with sit to stand.     Long Term Goals: 12 weeks  1. Pt will demonstrate 60 degrees lumbar flexion AROM and 10 degrees of lumbar extension to increase ease with forward bending and walking.  2. Pt will demonstrate 5/5 strength of hip extension to increase ease with sit to stand.  3. Patient goal: To walk better when getting out of bed in the morning    Plan     Progress mobility and strength exercises    Demetri Castle, PT

## 2020-10-21 ENCOUNTER — PATIENT MESSAGE (OUTPATIENT)
Dept: SURGERY | Facility: CLINIC | Age: 80
End: 2020-10-21

## 2020-10-21 ENCOUNTER — CLINICAL SUPPORT (OUTPATIENT)
Dept: REHABILITATION | Facility: HOSPITAL | Age: 80
End: 2020-10-21
Payer: MEDICARE

## 2020-10-21 DIAGNOSIS — M47.816 SPONDYLOSIS OF LUMBAR REGION WITHOUT MYELOPATHY OR RADICULOPATHY: ICD-10-CM

## 2020-10-21 PROCEDURE — 97110 THERAPEUTIC EXERCISES: CPT

## 2020-10-21 NOTE — PROGRESS NOTES
"                            Physical Therapy Daily Treatment Note     Name: Jo-Ann MYERS [facility] Number: [id number]    Therapy Diagnosis:   Encounter Diagnosis   Name Primary?    Spondylosis of lumbar region without myelopathy or radiculopathy      Physician: Dakota Kerr MD    Visit Date: 10/21/2020  Physician Orders: PT Eval and Treat  Medical Diagnosis: Spondylosis of lumbar region     Evaluation Date: 9/15/2020  Authorization Period Expiration: 11/25/2020  Plan of Care Certification Period: 12/8/20  Visit # / Visits authorized: 4/18    Precautions: Standard and HTN, hearing loss    Time In: 1217  Time Out: 100   Total Billable Time: 43 minutes    Subjective      Pt reports: she is having a really good day today. Patient reports her main complaint is when she is sedentary for a little while and then she will have stiffness.     Response to previous treatment: Patient states that she has had decreased intensity of pain recently, feels better    Pain: 1/10  Location: bilateral back      Objective     Jo-Ann received therapeutic exercises to develop strength, ROM, flexibility, posture and core stabilization for 43 minutes including:  Nustep - 7', L2  SB DKTC - 15x  Sb bridge with ball under knees - 3 x 10  TA presses into swiss ball 5 sec x 20  Piriformis Stretching 30 sec x 3  Hamstring Stretch 30 sec x 3  Bridging 2x10  Clams 30x   Shuttle DL Press 2.5 cords 2x10    NOT PERFORMED   LTR 15x  Hip IR stretch supine - 5 x 5"  Bar squat - 2 x 8  Marching walk, HHA - 2 laps  Standing hip ext - 2 x 10  DL HR - 2 x 10  Step ups Lvl 3 20x      Jo-Ann received the following manual therapy techniques for 0 minutes, including:      Home Exercises Provided and Patient Education Provided     Education provided:   Continue current HEP    Written Home Exercises Provided: Continue with current HEP  Exercises were reviewed and Jo-Ann was able to demonstrate them prior to the end of the session.      Pt received a written " copy of exercises to perform at home.   See EMR under patient instructions for exercises given.     Jo-Ann demonstrated good  understanding of the education provided.     Assessment   Patient continues to tolerate all therex without any adverse effects. Patient is getting close to discharge.   Jo-Ann is progressing well towards her goals.   Pt prognosis is Fair.     Pt will continue to benefit from skilled outpatient physical therapy to address the deficits listed in the problem list box on initial evaluation, provide pt/family education and to maximize pt's level of independence in the home and community environment.     Pt's spiritual, cultural and educational needs considered and pt agreeable to plan of care and goals.    Anticipated barriers to physical therapy: None      Goals:   Short Term Goals:  4 weeks  1. Pt to increase lumbar flexion to 55 degrees in order to bend forward.   3. Pt to demonstrate 4/5 strength of hip abduction to increase ease with sit to stand.     Long Term Goals: 12 weeks  1. Pt will demonstrate 60 degrees lumbar flexion AROM and 10 degrees of lumbar extension to increase ease with forward bending and walking.  2. Pt will demonstrate 5/5 strength of hip extension to increase ease with sit to stand.  3. Patient goal: To walk better when getting out of bed in the morning    Plan     Progress mobility and strength exercises    Demetri Castle, PT

## 2020-10-22 DIAGNOSIS — Z85.3 PERSONAL HISTORY OF BREAST CANCER: ICD-10-CM

## 2020-10-22 DIAGNOSIS — Z90.13 ACQUIRED ABSENCE OF BREAST AND NIPPLE, BILATERAL: Primary | ICD-10-CM

## 2020-10-24 ENCOUNTER — PATIENT MESSAGE (OUTPATIENT)
Dept: ADMINISTRATIVE | Facility: OTHER | Age: 80
End: 2020-10-24

## 2020-10-27 ENCOUNTER — OFFICE VISIT (OUTPATIENT)
Dept: OPTOMETRY | Facility: CLINIC | Age: 80
End: 2020-10-27
Payer: MEDICARE

## 2020-10-27 DIAGNOSIS — H53.15 VISUAL DISTORTIONS OF SHAPE AND SIZE: Primary | ICD-10-CM

## 2020-10-27 DIAGNOSIS — Z13.5 SCREENING FOR GLAUCOMA: ICD-10-CM

## 2020-10-27 PROCEDURE — 92014 PR EYE EXAM, EST PATIENT,COMPREHESV: ICD-10-PCS | Mod: S$GLB,,, | Performed by: OPTOMETRIST

## 2020-10-27 PROCEDURE — 99999 PR PBB SHADOW E&M-EST. PATIENT-LVL III: ICD-10-PCS | Mod: PBBFAC,,, | Performed by: OPTOMETRIST

## 2020-10-27 PROCEDURE — 92014 COMPRE OPH EXAM EST PT 1/>: CPT | Mod: S$GLB,,, | Performed by: OPTOMETRIST

## 2020-10-27 PROCEDURE — 92134 CPTRZ OPH DX IMG PST SGM RTA: CPT | Mod: S$GLB,,, | Performed by: OPTOMETRIST

## 2020-10-27 PROCEDURE — 99999 PR PBB SHADOW E&M-EST. PATIENT-LVL III: CPT | Mod: PBBFAC,,, | Performed by: OPTOMETRIST

## 2020-10-27 PROCEDURE — 92134 OCT, RETINA - OU - BOTH EYES: ICD-10-PCS | Mod: S$GLB,,, | Performed by: OPTOMETRIST

## 2020-11-03 ENCOUNTER — OFFICE VISIT (OUTPATIENT)
Dept: SPINE | Facility: CLINIC | Age: 80
End: 2020-11-03
Payer: MEDICARE

## 2020-11-03 VITALS
DIASTOLIC BLOOD PRESSURE: 76 MMHG | HEART RATE: 74 BPM | SYSTOLIC BLOOD PRESSURE: 134 MMHG | WEIGHT: 187.63 LBS | BODY MASS INDEX: 33.25 KG/M2 | HEIGHT: 63 IN

## 2020-11-03 DIAGNOSIS — M47.817 LUMBOSACRAL SPONDYLOSIS WITHOUT MYELOPATHY: Primary | ICD-10-CM

## 2020-11-03 DIAGNOSIS — M51.36 DEGENERATIVE DISC DISEASE, LUMBAR: ICD-10-CM

## 2020-11-03 PROCEDURE — 3075F PR MOST RECENT SYSTOLIC BLOOD PRESS GE 130-139MM HG: ICD-10-PCS | Mod: CPTII,S$GLB,, | Performed by: NURSE PRACTITIONER

## 2020-11-03 PROCEDURE — 3078F PR MOST RECENT DIASTOLIC BLOOD PRESSURE < 80 MM HG: ICD-10-PCS | Mod: CPTII,S$GLB,, | Performed by: NURSE PRACTITIONER

## 2020-11-03 PROCEDURE — 3078F DIAST BP <80 MM HG: CPT | Mod: CPTII,S$GLB,, | Performed by: NURSE PRACTITIONER

## 2020-11-03 PROCEDURE — 1159F PR MEDICATION LIST DOCUMENTED IN MEDICAL RECORD: ICD-10-PCS | Mod: S$GLB,,, | Performed by: NURSE PRACTITIONER

## 2020-11-03 PROCEDURE — 99999 PR PBB SHADOW E&M-EST. PATIENT-LVL IV: ICD-10-PCS | Mod: PBBFAC,,, | Performed by: NURSE PRACTITIONER

## 2020-11-03 PROCEDURE — 99213 PR OFFICE/OUTPT VISIT, EST, LEVL III, 20-29 MIN: ICD-10-PCS | Mod: S$GLB,,, | Performed by: NURSE PRACTITIONER

## 2020-11-03 PROCEDURE — 1159F MED LIST DOCD IN RCRD: CPT | Mod: S$GLB,,, | Performed by: NURSE PRACTITIONER

## 2020-11-03 PROCEDURE — 1125F AMNT PAIN NOTED PAIN PRSNT: CPT | Mod: S$GLB,,, | Performed by: NURSE PRACTITIONER

## 2020-11-03 PROCEDURE — 3075F SYST BP GE 130 - 139MM HG: CPT | Mod: CPTII,S$GLB,, | Performed by: NURSE PRACTITIONER

## 2020-11-03 PROCEDURE — 1125F PR PAIN SEVERITY QUANTIFIED, PAIN PRESENT: ICD-10-PCS | Mod: S$GLB,,, | Performed by: NURSE PRACTITIONER

## 2020-11-03 PROCEDURE — 1101F PR PT FALLS ASSESS DOC 0-1 FALLS W/OUT INJ PAST YR: ICD-10-PCS | Mod: CPTII,S$GLB,, | Performed by: NURSE PRACTITIONER

## 2020-11-03 PROCEDURE — 1101F PT FALLS ASSESS-DOCD LE1/YR: CPT | Mod: CPTII,S$GLB,, | Performed by: NURSE PRACTITIONER

## 2020-11-03 PROCEDURE — 99999 PR PBB SHADOW E&M-EST. PATIENT-LVL IV: CPT | Mod: PBBFAC,,, | Performed by: NURSE PRACTITIONER

## 2020-11-03 PROCEDURE — 99213 OFFICE O/P EST LOW 20 MIN: CPT | Mod: S$GLB,,, | Performed by: NURSE PRACTITIONER

## 2020-11-03 NOTE — PROGRESS NOTES
[facility] calling to verify directions for the patient's insulin.     Please call [phone]  Reference # [id number]   Subjective:      Patient ID: Jo-Ann Escobedo is a 79 y.o. female.    Chief Complaint: Follow-up    Interval History 11/3/2020:  Patient presents for follow-up of lower back pain.  She states morning stiffness eased when she starts to perform daily activities.  She has had benefit from physical therapy, she denies any new areas of pain ricci denies any neurological changes.  She states overall doing fair not ready to proceed with any type of interventional procedures. The patient denies myelopathic symptoms such as handwriting changes or difficulty with buttons/coins/keys. Denies perineal paresthesias, bowel/bladder dysfunction.      HPI:  Pt is a 78y/o female presenting with complaint of several years of lower back pain. She states remote what sounds like laminectomy/discectomy for radicular complaint 20+yrs ago. She denies any radicular symptoms. Denies perineal paresthesias, bowel/bladder dysfunction. States significant a.m. stiffness. Denies pain with valsalva. She at one time was taking diclofenac but was stopped then restarted for renal issues and once restarted had an anemic episode she was hospitalized for but no etiology of anemia so again stopped. She has had a Bilateral L3,4,5 MBB approx 1.5yrs ago but did not have follow up. Pt states she did have benefit from this but unable to quantify and states it did not last long. She is going to PT and finds this beneficial at this time.       Review of Systems   Constitution: Negative for fever.   Cardiovascular: Negative for chest pain.   Musculoskeletal: Negative for back pain.   Gastrointestinal: Negative for bowel incontinence.   Genitourinary: Negative for bladder incontinence.   Neurological: Negative for numbness, paresthesias and weakness.         Objective:      Imaging:  XR LUMBAR SPINE COMPLETE 5 VIEW     CLINICAL HISTORY:  Back pain or radiculopathy, > 6 wks;Low back pain     TECHNIQUE:  AP, lateral, spot and bilateral oblique views of the lumbar spine  were performed.     COMPARISON:  02/28/2018.     FINDINGS:  Posterior vertebral alignment is satisfactory.  Vertebral body heights are well maintained.  There is no evidence for acute fracture or bone destruction.  There is no evidence for spondylolysis.  There is mild disc space narrowing at the L2-3 level with moderate disc space narrowing at the L3-4 level and marked disc space narrowing at the L4-L5 level.  There is facet arthropathy within the lower lumbar spine and lumbosacral junction.  Small anterior osteophytes are noted throughout the lumbar spine with small posterior osteophytes within the lower lumbar spine.  No abnormal paraspinal masses are identified.  Sacroiliac joints are unremarkable.  Atherosclerotic calcification is present within the abdominal aorta.     Impression:     No evidence for acute fracture, bone destruction, spondylolysis, or spondylolisthesis.     Moderate to marked lumbar spondylosis.     Atherosclerosis.    General: Jo-Ann is well-developed, well-nourished, appears stated age, in no acute distress, alert and oriented to time, place and person.     PHYSICAL EXAMINATION:  Voice normal.  Normal affect without evidence of distress.  Gait: sitting in chair in no acute distress    Prior Physical Exam  General    Nursing note and vitals reviewed.  Constitutional: She is oriented to person, place, and time.   Cardiovascular: Normal rate.    Pulmonary/Chest: Effort normal.   Neurological: She is alert and oriented to person, place, and time.   Psychiatric: She has a normal mood and affect. Her behavior is normal.         Right Ankle/Foot Exam     Tests   Tiptoe Walk: able to perform    Left Ankle/Foot Exam     Tests   Tiptoe Walk: able to perform  Back (L-Spine & T-Spine) / Neck (C-Spine) Exam     Comments:  Limited Flexion but posture is forward leaning and significant facet loading to bilateral lower lumbar    No GTB or PSIS TTP      Muscle Strength   Right Lower Extremity   Hip Flexion:  5/5   Quadriceps:  5/5   Hamstrin/5   Anterior tibial:  5/5   Gastrocsoleus:  5/5   EHL:  5/5  Left Lower Extremity   Hip Flexion: 5/5   Quadriceps:  5/5   Hamstrin/5   Anterior tibial:  5/5   Gastrocsoleus:  5/5   EHL:  5/5    Reflexes     Left Side  Ankle Clonus:  absent    Right Side   Ankle Clonus:  absent              Assessment:       No diagnosis found.       Plan:          - Prior records reviewed and Xray reviewed with patient  - She is not good candidate for NSAID  - She has had significant benefit from PT  - She is pleased with her daily functioning at this time  - Can consider repeating MBB of L3,4,5 bilaterally  - If Beneficial proceed with RFA, Consider MRI if not beneficial   - RTC PRN.        Follow-up: No follow-ups on file. If there are any questions prior to this, the patient was instructed to contact the office.

## 2020-11-04 ENCOUNTER — CLINICAL SUPPORT (OUTPATIENT)
Dept: REHABILITATION | Facility: HOSPITAL | Age: 80
End: 2020-11-04
Payer: MEDICARE

## 2020-11-04 DIAGNOSIS — M47.816 SPONDYLOSIS OF LUMBAR REGION WITHOUT MYELOPATHY OR RADICULOPATHY: ICD-10-CM

## 2020-11-04 PROCEDURE — 97110 THERAPEUTIC EXERCISES: CPT

## 2020-11-04 NOTE — PROGRESS NOTES
"                            Physical Therapy Daily Treatment Note     Name: Jo-Ann MYERS [facility] Number: [id number]    Therapy Diagnosis:   Encounter Diagnosis   Name Primary?    Spondylosis of lumbar region without myelopathy or radiculopathy      Physician: Dakota Kerr MD    Visit Date: 11/4/2020  Physician Orders: PT Eval and Treat  Medical Diagnosis: Spondylosis of lumbar region     Evaluation Date: 9/15/2020  Authorization Period Expiration: 11/25/2020  Plan of Care Certification Period: 12/8/20  Visit # / Visits authorized: 5/18    Precautions: Standard and HTN, hearing loss    Time In: 115  Time Out: 200   Total Billable Time: 45 minutes    Subjective      Pt reports: she is having a really good day today. Patient reports her main complaint is when she is sedentary for a little while and then she will have stiffness.     Response to previous treatment: Patient states that she has had decreased intensity of pain recently, feels better    Pain: 1/10  Location: bilateral back      Objective     Jo-Ann received therapeutic exercises to develop strength, ROM, flexibility, posture and core stabilization for 45 minutes including:  Nustep - 7', L2 and 5' L2 at end of session  SB DKTC - 20x  Sb bridge with ball under knees - 3 x 10  TA presses into swiss ball across body x 10  Piriformis Stretching 30 sec x 3 - NP  Hamstring Stretch 30 sec x 3 - NP  Seated hip abd/add - 2 x 10, red band at thighs  Seated marching - 2 x 10, red band at thighs  Lateral tap at bar - 15x, red  Forward step at bar - 15x, red  Waiters bow at bar - 8x, single leg  Squat at bar - 10x  Seated ball roll - 10x fwd, 5x ea side  Bridging 2x10 - NP  Clams 30x - NP  Shuttle DL Press 2.5 cords 2x10    NOT PERFORMED   LTR 15x  Hip IR stretch supine - 5 x 5"  Bar squat - 2 x 8  Marching walk, HHA - 2 laps  Standing hip ext - 2 x 10  DL HR - 2 x 10  Step ups Lvl 3 20x      Jo-Ann received the following manual therapy techniques for 0 minutes, " including:      Home Exercises Provided and Patient Education Provided     Education provided:   Continue current HEP    Written Home Exercises Provided: Continue with current HEP  Exercises were reviewed and Jo-Ann was able to demonstrate them prior to the end of the session.      Pt received a written copy of exercises to perform at home.   See EMR under patient instructions for exercises given.     Jo-Ann demonstrated good  understanding of the education provided.     Assessment   Patient demonstrates independence and safety with a finalized HEP, improved lumbar AROM and hip strength  Jo-Ann is progressing well towards her goals.   Pt prognosis is Fair.     Pt will continue to benefit from skilled outpatient physical therapy to address the deficits listed in the problem list box on initial evaluation, provide pt/family education and to maximize pt's level of independence in the home and community environment.     Pt's spiritual, cultural and educational needs considered and pt agreeable to plan of care and goals.    Anticipated barriers to physical therapy: None      Goals:   Short Term Goals:  4 weeks  1. Pt to increase lumbar flexion to 55 degrees in order to bend forward.   3. Pt to demonstrate 4/5 strength of hip abduction to increase ease with sit to stand.     Long Term Goals: 12 weeks  1. Pt will demonstrate 60 degrees lumbar flexion AROM and 10 degrees of lumbar extension to increase ease with forward bending and walking.  2. Pt will demonstrate 5/5 strength of hip extension to increase ease with sit to stand.  3. Patient goal: To walk better when getting out of bed in the morning    Plan     F/U PRN    Noe Jackman, PT

## 2020-12-15 ENCOUNTER — OFFICE VISIT (OUTPATIENT)
Dept: HEMATOLOGY/ONCOLOGY | Facility: CLINIC | Age: 80
End: 2020-12-15
Payer: MEDICARE

## 2020-12-15 VITALS
OXYGEN SATURATION: 95 % | SYSTOLIC BLOOD PRESSURE: 180 MMHG | TEMPERATURE: 98 F | DIASTOLIC BLOOD PRESSURE: 76 MMHG | RESPIRATION RATE: 16 BRPM | BODY MASS INDEX: 33.17 KG/M2 | HEART RATE: 67 BPM | HEIGHT: 63 IN | WEIGHT: 187.19 LBS

## 2020-12-15 DIAGNOSIS — D05.11 DUCTAL CARCINOMA IN SITU (DCIS) OF RIGHT BREAST: Primary | ICD-10-CM

## 2020-12-15 PROCEDURE — 3288F FALL RISK ASSESSMENT DOCD: CPT | Mod: CPTII,S$GLB,, | Performed by: INTERNAL MEDICINE

## 2020-12-15 PROCEDURE — 1159F MED LIST DOCD IN RCRD: CPT | Mod: S$GLB,,, | Performed by: INTERNAL MEDICINE

## 2020-12-15 PROCEDURE — 1101F PR PT FALLS ASSESS DOC 0-1 FALLS W/OUT INJ PAST YR: ICD-10-PCS | Mod: CPTII,S$GLB,, | Performed by: INTERNAL MEDICINE

## 2020-12-15 PROCEDURE — 3288F PR FALLS RISK ASSESSMENT DOCUMENTED: ICD-10-PCS | Mod: CPTII,S$GLB,, | Performed by: INTERNAL MEDICINE

## 2020-12-15 PROCEDURE — 1157F PR ADVANCE CARE PLAN OR EQUIV PRESENT IN MEDICAL RECORD: ICD-10-PCS | Mod: S$GLB,,, | Performed by: INTERNAL MEDICINE

## 2020-12-15 PROCEDURE — 99999 PR PBB SHADOW E&M-EST. PATIENT-LVL IV: CPT | Mod: PBBFAC,,, | Performed by: INTERNAL MEDICINE

## 2020-12-15 PROCEDURE — 3077F PR MOST RECENT SYSTOLIC BLOOD PRESSURE >= 140 MM HG: ICD-10-PCS | Mod: CPTII,S$GLB,, | Performed by: INTERNAL MEDICINE

## 2020-12-15 PROCEDURE — 3078F DIAST BP <80 MM HG: CPT | Mod: CPTII,S$GLB,, | Performed by: INTERNAL MEDICINE

## 2020-12-15 PROCEDURE — 1126F PR PAIN SEVERITY QUANTIFIED, NO PAIN PRESENT: ICD-10-PCS | Mod: S$GLB,,, | Performed by: INTERNAL MEDICINE

## 2020-12-15 PROCEDURE — 1101F PT FALLS ASSESS-DOCD LE1/YR: CPT | Mod: CPTII,S$GLB,, | Performed by: INTERNAL MEDICINE

## 2020-12-15 PROCEDURE — 99213 OFFICE O/P EST LOW 20 MIN: CPT | Mod: S$GLB,,, | Performed by: INTERNAL MEDICINE

## 2020-12-15 PROCEDURE — 3078F PR MOST RECENT DIASTOLIC BLOOD PRESSURE < 80 MM HG: ICD-10-PCS | Mod: CPTII,S$GLB,, | Performed by: INTERNAL MEDICINE

## 2020-12-15 PROCEDURE — 1126F AMNT PAIN NOTED NONE PRSNT: CPT | Mod: S$GLB,,, | Performed by: INTERNAL MEDICINE

## 2020-12-15 PROCEDURE — 1159F PR MEDICATION LIST DOCUMENTED IN MEDICAL RECORD: ICD-10-PCS | Mod: S$GLB,,, | Performed by: INTERNAL MEDICINE

## 2020-12-15 PROCEDURE — 99999 PR PBB SHADOW E&M-EST. PATIENT-LVL IV: ICD-10-PCS | Mod: PBBFAC,,, | Performed by: INTERNAL MEDICINE

## 2020-12-15 PROCEDURE — 1157F ADVNC CARE PLAN IN RCRD: CPT | Mod: S$GLB,,, | Performed by: INTERNAL MEDICINE

## 2020-12-15 PROCEDURE — 3077F SYST BP >= 140 MM HG: CPT | Mod: CPTII,S$GLB,, | Performed by: INTERNAL MEDICINE

## 2020-12-15 PROCEDURE — 99213 PR OFFICE/OUTPT VISIT, EST, LEVL III, 20-29 MIN: ICD-10-PCS | Mod: S$GLB,,, | Performed by: INTERNAL MEDICINE

## 2020-12-15 NOTE — PROGRESS NOTES
Subjective:       Patient ID: Jo-Ann Escobedo is a 80 y.o. female.    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 Escobedo presents today for follow up.  On July 30, 2020 she underwent a right mastectomy and sentinel lymph node biopsy.  There was no residual DCIS identified in the mastectomy specimen, while 3 sentinel lymph nodes were negative.  Of note, on July 2, 2020 she had undergone a biopsy of the area in the right breast that was read as BI-RADS 4 on the recent mammogram.  The biopsy had shown evidence of DCIS which was ER positive.    Briefly, she is a 79 year-old female with a remote history of breast cancer treated 17 years ago with a left modified radical mastectomy.  Her tumor was a T2 N1 M0 carcinoma.  She was treated with four cycles of AC and 4 cycles of Taxotere in the adjuvant setting and has remained cancer free since then.   A mammogram in the spring of 2019 led to a contralateral biopsy and eventually to a lumpectomy on June 26th, 2019, for an area of DCIS, measuring 12mm.  Resection margins were clear, with the closest being 14 mm.  Her tumor was ER positive and ID negative.   She met with the radiation oncologist and decided against XRT.  She subsequently decided against adjuvant hormonal therapy and has been followed expectantly since.    Her recent mammogram showed a 6 mm area of coarse heterogeneous calcifications in a grouped distribution in the upper outer quadrant of the right breast.  A biopsy was performed on June 29, 2020 with results as above.  This was followed by the right mastectomy.    Review of Systems  Overall she feels OK, and she has no complaints today.    She denies any anxiety come depression, easy bruising, fevers, chills, night sweats, weight loss, nausea, vomiting, diarrhea, constipation, diplopia, blurred vision headache, chest pain, abdominal pain, or difficulty ambulating. All other systems are negative.     Objective:      Physical Exam   GENERAL: She is alert, oriented to time, place, person; well nourished;   pleasant; in no acute physical distress.    VITAL SIGNS: Reviewed.   HEENT: Normal. There are no nasal, oral, lip, gingival, auricular, lid,   or conjunctival lesions. Mucosae are moist and pink, and there is no   thrush. Pupils are equal, reactive to light and accommodation.   Extraocular muscle movements are intact.   NECK: Supple without JVD, thyromegaly, or adenopathy.   LUNGS: Clear to auscultation without wheezing, rales, or rhonchi.   CARDIOVASCULAR: Reveals an S1, S2, no murmurs, no rubs, no gallops.   BREASTS:  She is status post bilateral mastectomies.    ABDOMEN: Soft, nontender without organomegaly. Bowel sounds are identified.   EXTREMITIES: Have no cyanosis, clubbing, or edema.    SKIN: Does not have petechiae, rashes, ot induration.    NEUROLOGIC: Motor function is 5/5, DTRs are 0-1+ bilaterally, symmetrical,   and cranial nerves within normal limits.   LYMPHATIC: There is no cervical, axillary, or supraclavicular adenopathy.    Assessment:       1. Remote history of breast cancer status post left mastectomy and chemotherapy 17 years ago..    2.     Contralateral DCIS, s/p lumpectomy, now with a local recurrence, treated with a completion mastectomy    Plan:     I had a long discussion with her.  I explained to her that the chance of a cure her with the mastectomy is in the range of 99%.  I did not recommend any additional treatment at this point.   Her multiple questions were answered to her satisfaction.  I will see her again in 6 months.

## 2020-12-28 ENCOUNTER — OFFICE VISIT (OUTPATIENT)
Dept: DERMATOLOGY | Facility: CLINIC | Age: 80
End: 2020-12-28
Payer: MEDICARE

## 2020-12-28 DIAGNOSIS — D22.9 NEVUS: ICD-10-CM

## 2020-12-28 DIAGNOSIS — L82.1 SK (SEBORRHEIC KERATOSIS): ICD-10-CM

## 2020-12-28 DIAGNOSIS — L21.9 SEBORRHEIC DERMATITIS, UNSPECIFIED: ICD-10-CM

## 2020-12-28 DIAGNOSIS — D18.01 CHERRY ANGIOMA: ICD-10-CM

## 2020-12-28 DIAGNOSIS — L81.4 LENTIGO: ICD-10-CM

## 2020-12-28 DIAGNOSIS — L90.5 SCAR: ICD-10-CM

## 2020-12-28 DIAGNOSIS — Z85.828 PERSONAL HISTORY OF SKIN CANCER: ICD-10-CM

## 2020-12-28 DIAGNOSIS — L72.0 MILIA: ICD-10-CM

## 2020-12-28 DIAGNOSIS — D48.5 NEOPLASM OF UNCERTAIN BEHAVIOR OF SKIN: Primary | ICD-10-CM

## 2020-12-28 PROCEDURE — 11102 PR TANGENTIAL BIOPSY, SKIN, SINGLE LESION: ICD-10-PCS | Mod: S$GLB,,, | Performed by: DERMATOLOGY

## 2020-12-28 PROCEDURE — 1159F PR MEDICATION LIST DOCUMENTED IN MEDICAL RECORD: ICD-10-PCS | Mod: S$GLB,,, | Performed by: DERMATOLOGY

## 2020-12-28 PROCEDURE — 1159F MED LIST DOCD IN RCRD: CPT | Mod: S$GLB,,, | Performed by: DERMATOLOGY

## 2020-12-28 PROCEDURE — 3288F FALL RISK ASSESSMENT DOCD: CPT | Mod: CPTII,S$GLB,, | Performed by: DERMATOLOGY

## 2020-12-28 PROCEDURE — 88305 TISSUE EXAM BY PATHOLOGIST: CPT | Performed by: PATHOLOGY

## 2020-12-28 PROCEDURE — 1101F PT FALLS ASSESS-DOCD LE1/YR: CPT | Mod: CPTII,S$GLB,, | Performed by: DERMATOLOGY

## 2020-12-28 PROCEDURE — 99999 PR PBB SHADOW E&M-EST. PATIENT-LVL III: CPT | Mod: PBBFAC,,, | Performed by: DERMATOLOGY

## 2020-12-28 PROCEDURE — 1157F PR ADVANCE CARE PLAN OR EQUIV PRESENT IN MEDICAL RECORD: ICD-10-PCS | Mod: S$GLB,,, | Performed by: DERMATOLOGY

## 2020-12-28 PROCEDURE — 99214 OFFICE O/P EST MOD 30 MIN: CPT | Mod: 25,S$GLB,, | Performed by: DERMATOLOGY

## 2020-12-28 PROCEDURE — 1157F ADVNC CARE PLAN IN RCRD: CPT | Mod: S$GLB,,, | Performed by: DERMATOLOGY

## 2020-12-28 PROCEDURE — 3288F PR FALLS RISK ASSESSMENT DOCUMENTED: ICD-10-PCS | Mod: CPTII,S$GLB,, | Performed by: DERMATOLOGY

## 2020-12-28 PROCEDURE — 99214 PR OFFICE/OUTPT VISIT, EST, LEVL IV, 30-39 MIN: ICD-10-PCS | Mod: 25,S$GLB,, | Performed by: DERMATOLOGY

## 2020-12-28 PROCEDURE — 1126F PR PAIN SEVERITY QUANTIFIED, NO PAIN PRESENT: ICD-10-PCS | Mod: S$GLB,,, | Performed by: DERMATOLOGY

## 2020-12-28 PROCEDURE — 88305 TISSUE EXAM BY PATHOLOGIST: CPT | Mod: 26,,, | Performed by: PATHOLOGY

## 2020-12-28 PROCEDURE — 1101F PR PT FALLS ASSESS DOC 0-1 FALLS W/OUT INJ PAST YR: ICD-10-PCS | Mod: CPTII,S$GLB,, | Performed by: DERMATOLOGY

## 2020-12-28 PROCEDURE — 99999 PR PBB SHADOW E&M-EST. PATIENT-LVL III: ICD-10-PCS | Mod: PBBFAC,,, | Performed by: DERMATOLOGY

## 2020-12-28 PROCEDURE — 1126F AMNT PAIN NOTED NONE PRSNT: CPT | Mod: S$GLB,,, | Performed by: DERMATOLOGY

## 2020-12-28 PROCEDURE — 88305 TISSUE EXAM BY PATHOLOGIST: ICD-10-PCS | Mod: 26,,, | Performed by: PATHOLOGY

## 2020-12-28 PROCEDURE — 11102 TANGNTL BX SKIN SINGLE LES: CPT | Mod: S$GLB,,, | Performed by: DERMATOLOGY

## 2020-12-28 RX ORDER — FLUOCINONIDE TOPICAL SOLUTION USP, 0.05% 0.5 MG/ML
SOLUTION TOPICAL
Qty: 60 ML | Refills: 3 | Status: SHIPPED | OUTPATIENT
Start: 2020-12-28 | End: 2022-07-14

## 2020-12-28 NOTE — PROGRESS NOTES
"  Subjective:       Patient ID:  Jo-Ann Escobedo is a 80 y.o. female who presents for   Chief Complaint   Patient presents with    Skin Check    Lesion        Patient is here today for a "mole" check.   Pt has a history of extensive sun exposure in the past.   Pt recalls several blistering sunburns in the past- yes  Pt has history of tanning bed use- no  Pt has had moles removed in the past- yes, benign per pt  Pt has history of melanoma in first degree relatives- no    Pt c/o tender lesion on left hand x seversal months. No bleeding or prev tx.  This is a high risk patient here to check for the development of new lesions.        Review of Systems   Skin: Positive for activity-related sunscreen use. Negative for daily sunscreen use and recent sunburn.   Hematologic/Lymphatic: Does not bruise/bleed easily.        Objective:    Physical Exam   Constitutional: She appears well-developed and well-nourished. No distress.   Neurological: She is alert and oriented to person, place, and time. She is not disoriented.   Psychiatric: She has a normal mood and affect.   Skin:   Areas Examined (abnormalities noted in diagram):   Scalp / Hair Palpated and Inspected  Head / Face Inspection Performed  Neck Inspection Performed  Chest / Axilla Inspection Performed  Abdomen Inspection Performed  Back Inspection Performed  RUE Inspected  LUE Inspection Performed  RLE Inspected  LLE Inspection Performed  Nails and Digits Inspection Performed                               Diagram Legend     Erythematous scaling macule/papule c/w actinic keratosis       Vascular papule c/w angioma      Pigmented verrucoid papule/plaque c/w seborrheic keratosis      Yellow umbilicated papule c/w sebaceous hyperplasia      Irregularly shaped tan macule c/w lentigo     1-2 mm smooth white papules consistent with Milia      Movable subcutaneous cyst with punctum c/w epidermal inclusion cyst      Subcutaneous movable cyst c/w pilar cyst      Firm pink to " brown papule c/w dermatofibroma      Pedunculated fleshy papule(s) c/w skin tag(s)      Evenly pigmented macule c/w junctional nevus     Mildly variegated pigmented, slightly irregular-bordered macule c/w mildly atypical nevus      Flesh colored to evenly pigmented papule c/w intradermal nevus       Pink pearly papule/plaque c/w basal cell carcinoma      Erythematous hyperkeratotic cursted plaque c/w SCC      Surgical scar with no sign of skin cancer recurrence      Open and closed comedones      Inflammatory papules and pustules      Verrucoid papule consistent consistent with wart     Erythematous eczematous patches and plaques     Dystrophic onycholytic nail with subungual debris c/w onychomycosis     Umbilicated papule    Erythematous-base heme-crusted tan verrucoid plaque consistent with inflamed seborrheic keratosis     Erythematous Silvery Scaling Plaque c/w Psoriasis     See annotation      Assessment / Plan:      Pathology Orders:     Normal Orders This Visit    Specimen to Pathology, Dermatology     Comments:    Number of Specimens:->1  ------------------------->-------------------------  Spec 1 Procedure:->Biopsy  Spec 1 Clinical Impression:->r/o scc v foreign body v other  Spec 1 Source:->left 5th knuckle    Questions:    Procedure Type: Dermatology and skin neoplasms    Number of Specimens: 1    ------------------------: -------------------------    Spec 1 Procedure: Biopsy    Spec 1 Clinical Impression: r/o scc v foreign body v other    Spec 1 Source: left 5th knuckle        Neoplasm of uncertain behavior of skin  Shave biopsy procedure note:    Shave biopsy performed after verbal consent including risk of infection, scar, recurrence, need for additional treatment of site. Area prepped with alcohol, anesthetized with approximately 1.0cc of 1% lidocaine with epinephrine. Lesional tissue shaved with razor blade. Hemostasis achieved with application of aluminum chloride followed by hyfrecation. No  complications. Dressing applied. Wound care explained.    If biopsy positive for malignancy, refer to Dr. Jorge for Mohs surgery consultation.  -     Specimen to Pathology, Dermatology    Seborrheic dermatitis, unspecified  Lidex solution prn     SK (seborrheic keratosis)  These are benign inherited growths without a malignant potential. Reassurance given to patient. No treatment is necessary.       Virk angioma  These are benign vascular lesions that are inherited.  Treatment is not necessary.    Nevus  Discussed ABCDE's of nevi.  Monitor for new mole or moles that are becoming bigger, darker, irritated, or developing irregular borders. Brochure provided.    Lentigo  This is a benign hyperpigmented sun induced lesion. Daily sun protection will reduce the number of new lesions. Treatment of these benign lesions are considered cosmetic.  The nature of sun-induced photo-aging and skin cancers is discussed.  Sun avoidance, protective clothing, and the use of 30-SPF sunscreens is advised. Observe closely for skin damage/changes, and call if such occurs.    Kristofer maldonado retinol     Personal history of skin cancer  Scar  Patient with a history of non melanoma skin cancer.   Total body skin examination performed today including at least 12 points as noted in physical examination. Suspicious lesions noted.             Follow up for prn bx report.

## 2020-12-31 LAB
FINAL PATHOLOGIC DIAGNOSIS: NORMAL
GROSS: NORMAL

## 2021-01-06 ENCOUNTER — OFFICE VISIT (OUTPATIENT)
Dept: OPHTHALMOLOGY | Facility: CLINIC | Age: 81
End: 2021-01-06
Payer: MEDICARE

## 2021-01-06 ENCOUNTER — PATIENT MESSAGE (OUTPATIENT)
Dept: ADMINISTRATIVE | Facility: OTHER | Age: 81
End: 2021-01-06

## 2021-01-06 DIAGNOSIS — H26.491 PCO (POSTERIOR CAPSULAR OPACIFICATION), RIGHT: Primary | ICD-10-CM

## 2021-01-06 DIAGNOSIS — Z96.1 PSEUDOPHAKIA: ICD-10-CM

## 2021-01-06 DIAGNOSIS — H43.811 VITREOUS DETACHMENT OF RIGHT EYE: ICD-10-CM

## 2021-01-06 DIAGNOSIS — H52.7 REFRACTIVE ERROR: ICD-10-CM

## 2021-01-06 DIAGNOSIS — I10 ESSENTIAL HYPERTENSION: ICD-10-CM

## 2021-01-06 PROCEDURE — 92014 PR EYE EXAM, EST PATIENT,COMPREHESV: ICD-10-PCS | Mod: 57,S$GLB,, | Performed by: OPHTHALMOLOGY

## 2021-01-06 PROCEDURE — 66821 PR DISCISSION,2ND CATARACT,LASER: ICD-10-PCS | Mod: RT,S$GLB,, | Performed by: OPHTHALMOLOGY

## 2021-01-06 PROCEDURE — 66821 AFTER CATARACT LASER SURGERY: CPT | Mod: RT,S$GLB,, | Performed by: OPHTHALMOLOGY

## 2021-01-06 PROCEDURE — 1101F PT FALLS ASSESS-DOCD LE1/YR: CPT | Mod: CPTII,S$GLB,, | Performed by: OPHTHALMOLOGY

## 2021-01-06 PROCEDURE — 3288F PR FALLS RISK ASSESSMENT DOCUMENTED: ICD-10-PCS | Mod: CPTII,S$GLB,, | Performed by: OPHTHALMOLOGY

## 2021-01-06 PROCEDURE — 1126F PR PAIN SEVERITY QUANTIFIED, NO PAIN PRESENT: ICD-10-PCS | Mod: S$GLB,,, | Performed by: OPHTHALMOLOGY

## 2021-01-06 PROCEDURE — 99999 PR PBB SHADOW E&M-EST. PATIENT-LVL III: ICD-10-PCS | Mod: PBBFAC,,, | Performed by: OPHTHALMOLOGY

## 2021-01-06 PROCEDURE — 3288F FALL RISK ASSESSMENT DOCD: CPT | Mod: CPTII,S$GLB,, | Performed by: OPHTHALMOLOGY

## 2021-01-06 PROCEDURE — 1126F AMNT PAIN NOTED NONE PRSNT: CPT | Mod: S$GLB,,, | Performed by: OPHTHALMOLOGY

## 2021-01-06 PROCEDURE — 99999 PR PBB SHADOW E&M-EST. PATIENT-LVL III: CPT | Mod: PBBFAC,,, | Performed by: OPHTHALMOLOGY

## 2021-01-06 PROCEDURE — 92014 COMPRE OPH EXAM EST PT 1/>: CPT | Mod: 57,S$GLB,, | Performed by: OPHTHALMOLOGY

## 2021-01-06 PROCEDURE — 1157F ADVNC CARE PLAN IN RCRD: CPT | Mod: S$GLB,,, | Performed by: OPHTHALMOLOGY

## 2021-01-06 PROCEDURE — 1101F PR PT FALLS ASSESS DOC 0-1 FALLS W/OUT INJ PAST YR: ICD-10-PCS | Mod: CPTII,S$GLB,, | Performed by: OPHTHALMOLOGY

## 2021-01-06 PROCEDURE — 1157F PR ADVANCE CARE PLAN OR EQUIV PRESENT IN MEDICAL RECORD: ICD-10-PCS | Mod: S$GLB,,, | Performed by: OPHTHALMOLOGY

## 2021-01-07 ENCOUNTER — PATIENT MESSAGE (OUTPATIENT)
Dept: DERMATOLOGY | Facility: CLINIC | Age: 81
End: 2021-01-07

## 2021-01-09 ENCOUNTER — IMMUNIZATION (OUTPATIENT)
Dept: INTERNAL MEDICINE | Facility: CLINIC | Age: 81
End: 2021-01-09
Payer: MEDICARE

## 2021-01-09 DIAGNOSIS — Z23 NEED FOR VACCINATION: ICD-10-CM

## 2021-01-09 PROCEDURE — 91300 COVID-19, MRNA, LNP-S, PF, 30 MCG/0.3 ML DOSE VACCINE: CPT | Mod: PBBFAC | Performed by: FAMILY MEDICINE

## 2021-01-19 ENCOUNTER — PROCEDURE VISIT (OUTPATIENT)
Dept: DERMATOLOGY | Facility: CLINIC | Age: 81
End: 2021-01-19
Payer: MEDICARE

## 2021-01-19 VITALS
DIASTOLIC BLOOD PRESSURE: 73 MMHG | BODY MASS INDEX: 34.41 KG/M2 | HEART RATE: 66 BPM | WEIGHT: 187 LBS | SYSTOLIC BLOOD PRESSURE: 143 MMHG | HEIGHT: 62 IN

## 2021-01-19 DIAGNOSIS — C44.629 SQUAMOUS CELL CARCINOMA OF LEFT HAND: Primary | ICD-10-CM

## 2021-01-19 PROCEDURE — 17311: ICD-10-PCS | Mod: 51,S$GLB,, | Performed by: DERMATOLOGY

## 2021-01-19 PROCEDURE — 13131 PR RECMPL WND HEAD,FAC,HAND 1.1-2.5 CM: ICD-10-PCS | Mod: S$GLB,,, | Performed by: DERMATOLOGY

## 2021-01-19 PROCEDURE — 99499 UNLISTED E&M SERVICE: CPT | Mod: S$GLB,,, | Performed by: DERMATOLOGY

## 2021-01-19 PROCEDURE — 99499 NO LOS: ICD-10-PCS | Mod: S$GLB,,, | Performed by: DERMATOLOGY

## 2021-01-19 PROCEDURE — 17311 MOHS 1 STAGE H/N/HF/G: CPT | Mod: 51,S$GLB,, | Performed by: DERMATOLOGY

## 2021-01-19 PROCEDURE — 13131 CMPLX RPR F/C/C/M/N/AX/G/H/F: CPT | Mod: S$GLB,,, | Performed by: DERMATOLOGY

## 2021-01-25 ENCOUNTER — OFFICE VISIT (OUTPATIENT)
Dept: OPHTHALMOLOGY | Facility: CLINIC | Age: 81
End: 2021-01-25
Payer: MEDICARE

## 2021-01-25 DIAGNOSIS — H52.7 REFRACTIVE ERROR: ICD-10-CM

## 2021-01-25 DIAGNOSIS — Z98.890 POST-OPERATIVE STATE: Primary | ICD-10-CM

## 2021-01-25 PROCEDURE — 1126F PR PAIN SEVERITY QUANTIFIED, NO PAIN PRESENT: ICD-10-PCS | Mod: S$GLB,,, | Performed by: OPHTHALMOLOGY

## 2021-01-25 PROCEDURE — 1101F PT FALLS ASSESS-DOCD LE1/YR: CPT | Mod: CPTII,S$GLB,, | Performed by: OPHTHALMOLOGY

## 2021-01-25 PROCEDURE — 99999 PR PBB SHADOW E&M-EST. PATIENT-LVL III: CPT | Mod: PBBFAC,,, | Performed by: OPHTHALMOLOGY

## 2021-01-25 PROCEDURE — 3288F FALL RISK ASSESSMENT DOCD: CPT | Mod: CPTII,S$GLB,, | Performed by: OPHTHALMOLOGY

## 2021-01-25 PROCEDURE — 1157F PR ADVANCE CARE PLAN OR EQUIV PRESENT IN MEDICAL RECORD: ICD-10-PCS | Mod: S$GLB,,, | Performed by: OPHTHALMOLOGY

## 2021-01-25 PROCEDURE — 99999 PR PBB SHADOW E&M-EST. PATIENT-LVL III: ICD-10-PCS | Mod: PBBFAC,,, | Performed by: OPHTHALMOLOGY

## 2021-01-25 PROCEDURE — 99024 POSTOP FOLLOW-UP VISIT: CPT | Mod: S$GLB,,, | Performed by: OPHTHALMOLOGY

## 2021-01-25 PROCEDURE — 3288F PR FALLS RISK ASSESSMENT DOCUMENTED: ICD-10-PCS | Mod: CPTII,S$GLB,, | Performed by: OPHTHALMOLOGY

## 2021-01-25 PROCEDURE — 1101F PR PT FALLS ASSESS DOC 0-1 FALLS W/OUT INJ PAST YR: ICD-10-PCS | Mod: CPTII,S$GLB,, | Performed by: OPHTHALMOLOGY

## 2021-01-25 PROCEDURE — 1157F ADVNC CARE PLAN IN RCRD: CPT | Mod: S$GLB,,, | Performed by: OPHTHALMOLOGY

## 2021-01-25 PROCEDURE — 1126F AMNT PAIN NOTED NONE PRSNT: CPT | Mod: S$GLB,,, | Performed by: OPHTHALMOLOGY

## 2021-01-25 PROCEDURE — 99024 PR POST-OP FOLLOW-UP VISIT: ICD-10-PCS | Mod: S$GLB,,, | Performed by: OPHTHALMOLOGY

## 2021-01-30 ENCOUNTER — IMMUNIZATION (OUTPATIENT)
Dept: INTERNAL MEDICINE | Facility: CLINIC | Age: 81
End: 2021-01-30
Payer: MEDICARE

## 2021-01-30 DIAGNOSIS — Z23 NEED FOR VACCINATION: Primary | ICD-10-CM

## 2021-01-30 PROCEDURE — 91300 COVID-19, MRNA, LNP-S, PF, 30 MCG/0.3 ML DOSE VACCINE: CPT | Mod: PBBFAC | Performed by: FAMILY MEDICINE

## 2021-01-30 PROCEDURE — 0002A COVID-19, MRNA, LNP-S, PF, 30 MCG/0.3 ML DOSE VACCINE: CPT | Mod: PBBFAC | Performed by: FAMILY MEDICINE

## 2021-02-02 ENCOUNTER — OFFICE VISIT (OUTPATIENT)
Dept: DERMATOLOGY | Facility: CLINIC | Age: 81
End: 2021-02-02
Payer: MEDICARE

## 2021-02-02 ENCOUNTER — LAB VISIT (OUTPATIENT)
Dept: LAB | Facility: HOSPITAL | Age: 81
End: 2021-02-02
Attending: FAMILY MEDICINE
Payer: MEDICARE

## 2021-02-02 DIAGNOSIS — Z09 POSTOP CHECK: Primary | ICD-10-CM

## 2021-02-02 DIAGNOSIS — E89.0 HYPOTHYROIDISM, POSTSURGICAL: Primary | ICD-10-CM

## 2021-02-02 DIAGNOSIS — E89.0 HYPOTHYROIDISM, POSTSURGICAL: ICD-10-CM

## 2021-02-02 LAB
T4 FREE SERPL-MCNC: 1.31 NG/DL (ref 0.71–1.51)
TSH SERPL DL<=0.005 MIU/L-ACNC: 0.92 UIU/ML (ref 0.4–4)

## 2021-02-02 PROCEDURE — 99999 PR PBB SHADOW E&M-EST. PATIENT-LVL II: CPT | Mod: PBBFAC,,, | Performed by: DERMATOLOGY

## 2021-02-02 PROCEDURE — 1101F PR PT FALLS ASSESS DOC 0-1 FALLS W/OUT INJ PAST YR: ICD-10-PCS | Mod: CPTII,S$GLB,, | Performed by: DERMATOLOGY

## 2021-02-02 PROCEDURE — 99999 PR PBB SHADOW E&M-EST. PATIENT-LVL II: ICD-10-PCS | Mod: PBBFAC,,, | Performed by: DERMATOLOGY

## 2021-02-02 PROCEDURE — 99024 PR POST-OP FOLLOW-UP VISIT: ICD-10-PCS | Mod: S$GLB,,, | Performed by: DERMATOLOGY

## 2021-02-02 PROCEDURE — 99024 POSTOP FOLLOW-UP VISIT: CPT | Mod: S$GLB,,, | Performed by: DERMATOLOGY

## 2021-02-02 PROCEDURE — 1101F PT FALLS ASSESS-DOCD LE1/YR: CPT | Mod: CPTII,S$GLB,, | Performed by: DERMATOLOGY

## 2021-02-02 PROCEDURE — 1126F AMNT PAIN NOTED NONE PRSNT: CPT | Mod: S$GLB,,, | Performed by: DERMATOLOGY

## 2021-02-02 PROCEDURE — 1157F PR ADVANCE CARE PLAN OR EQUIV PRESENT IN MEDICAL RECORD: ICD-10-PCS | Mod: S$GLB,,, | Performed by: DERMATOLOGY

## 2021-02-02 PROCEDURE — 1126F PR PAIN SEVERITY QUANTIFIED, NO PAIN PRESENT: ICD-10-PCS | Mod: S$GLB,,, | Performed by: DERMATOLOGY

## 2021-02-02 PROCEDURE — 84439 ASSAY OF FREE THYROXINE: CPT

## 2021-02-02 PROCEDURE — 84443 ASSAY THYROID STIM HORMONE: CPT

## 2021-02-02 PROCEDURE — 3288F FALL RISK ASSESSMENT DOCD: CPT | Mod: CPTII,S$GLB,, | Performed by: DERMATOLOGY

## 2021-02-02 PROCEDURE — 36415 COLL VENOUS BLD VENIPUNCTURE: CPT

## 2021-02-02 PROCEDURE — 3288F PR FALLS RISK ASSESSMENT DOCUMENTED: ICD-10-PCS | Mod: CPTII,S$GLB,, | Performed by: DERMATOLOGY

## 2021-02-02 PROCEDURE — 1157F ADVNC CARE PLAN IN RCRD: CPT | Mod: S$GLB,,, | Performed by: DERMATOLOGY

## 2021-02-02 RX ORDER — LEVOTHYROXINE SODIUM 125 UG/1
125 TABLET ORAL
Qty: 90 TABLET | Refills: 3 | Status: SHIPPED | OUTPATIENT
Start: 2021-02-02 | End: 2022-01-22 | Stop reason: SDUPTHER

## 2021-02-10 ENCOUNTER — OFFICE VISIT (OUTPATIENT)
Dept: ORTHOPEDICS | Facility: CLINIC | Age: 81
End: 2021-02-10
Payer: MEDICARE

## 2021-02-10 VITALS
SYSTOLIC BLOOD PRESSURE: 134 MMHG | WEIGHT: 186.38 LBS | DIASTOLIC BLOOD PRESSURE: 73 MMHG | BODY MASS INDEX: 34.09 KG/M2 | HEART RATE: 79 BPM

## 2021-02-10 DIAGNOSIS — M75.121 NONTRAUMATIC COMPLETE TEAR OF RIGHT ROTATOR CUFF: Primary | ICD-10-CM

## 2021-02-10 PROCEDURE — 3288F PR FALLS RISK ASSESSMENT DOCUMENTED: ICD-10-PCS | Mod: CPTII,S$GLB,, | Performed by: PHYSICIAN ASSISTANT

## 2021-02-10 PROCEDURE — 99999 PR PBB SHADOW E&M-EST. PATIENT-LVL IV: ICD-10-PCS | Mod: PBBFAC,,, | Performed by: PHYSICIAN ASSISTANT

## 2021-02-10 PROCEDURE — 99499 UNLISTED E&M SERVICE: CPT | Mod: S$GLB,,, | Performed by: PHYSICIAN ASSISTANT

## 2021-02-10 PROCEDURE — 1101F PR PT FALLS ASSESS DOC 0-1 FALLS W/OUT INJ PAST YR: ICD-10-PCS | Mod: CPTII,S$GLB,, | Performed by: PHYSICIAN ASSISTANT

## 2021-02-10 PROCEDURE — 1125F PR PAIN SEVERITY QUANTIFIED, PAIN PRESENT: ICD-10-PCS | Mod: S$GLB,,, | Performed by: PHYSICIAN ASSISTANT

## 2021-02-10 PROCEDURE — 1157F ADVNC CARE PLAN IN RCRD: CPT | Mod: S$GLB,,, | Performed by: PHYSICIAN ASSISTANT

## 2021-02-10 PROCEDURE — 1101F PT FALLS ASSESS-DOCD LE1/YR: CPT | Mod: CPTII,S$GLB,, | Performed by: PHYSICIAN ASSISTANT

## 2021-02-10 PROCEDURE — 3288F FALL RISK ASSESSMENT DOCD: CPT | Mod: CPTII,S$GLB,, | Performed by: PHYSICIAN ASSISTANT

## 2021-02-10 PROCEDURE — 1125F AMNT PAIN NOTED PAIN PRSNT: CPT | Mod: S$GLB,,, | Performed by: PHYSICIAN ASSISTANT

## 2021-02-10 PROCEDURE — 99499 NO LOS: ICD-10-PCS | Mod: S$GLB,,, | Performed by: PHYSICIAN ASSISTANT

## 2021-02-10 PROCEDURE — 99999 PR PBB SHADOW E&M-EST. PATIENT-LVL IV: CPT | Mod: PBBFAC,,, | Performed by: PHYSICIAN ASSISTANT

## 2021-02-10 PROCEDURE — 1157F PR ADVANCE CARE PLAN OR EQUIV PRESENT IN MEDICAL RECORD: ICD-10-PCS | Mod: S$GLB,,, | Performed by: PHYSICIAN ASSISTANT

## 2021-02-22 ENCOUNTER — PATIENT MESSAGE (OUTPATIENT)
Dept: INTERNAL MEDICINE | Facility: CLINIC | Age: 81
End: 2021-02-22

## 2021-02-23 ENCOUNTER — HOSPITAL ENCOUNTER (OUTPATIENT)
Dept: RADIOLOGY | Facility: HOSPITAL | Age: 81
Discharge: HOME | End: 2021-02-23
Attending: ORTHOPAEDIC SURGERY
Payer: MEDICARE

## 2021-02-23 ENCOUNTER — OFFICE VISIT (OUTPATIENT)
Dept: SPORTS MEDICINE | Facility: CLINIC | Age: 81
End: 2021-02-23
Payer: MEDICARE

## 2021-02-23 VITALS
BODY MASS INDEX: 34.23 KG/M2 | DIASTOLIC BLOOD PRESSURE: 79 MMHG | HEIGHT: 62 IN | SYSTOLIC BLOOD PRESSURE: 136 MMHG | WEIGHT: 186 LBS | HEART RATE: 85 BPM

## 2021-02-23 DIAGNOSIS — G89.29 CHRONIC RIGHT SHOULDER PAIN: ICD-10-CM

## 2021-02-23 DIAGNOSIS — M25.511 RIGHT SHOULDER PAIN, UNSPECIFIED CHRONICITY: ICD-10-CM

## 2021-02-23 DIAGNOSIS — M12.811 ROTATOR CUFF ARTHROPATHY OF RIGHT SHOULDER: Primary | ICD-10-CM

## 2021-02-23 DIAGNOSIS — M25.511 CHRONIC RIGHT SHOULDER PAIN: ICD-10-CM

## 2021-02-23 PROBLEM — M75.121 NONTRAUMATIC COMPLETE TEAR OF RIGHT ROTATOR CUFF: Status: ACTIVE | Noted: 2021-02-23

## 2021-02-23 PROCEDURE — 99999 PR PBB SHADOW E&M-EST. PATIENT-LVL IV: CPT | Mod: PBBFAC,,, | Performed by: ORTHOPAEDIC SURGERY

## 2021-02-23 PROCEDURE — 99204 OFFICE O/P NEW MOD 45 MIN: CPT | Mod: S$GLB,,, | Performed by: ORTHOPAEDIC SURGERY

## 2021-02-23 PROCEDURE — 73030 XR SHOULDER COMPLETE 2 OR MORE VIEWS RIGHT: ICD-10-PCS | Mod: 26,RT,, | Performed by: RADIOLOGY

## 2021-02-23 PROCEDURE — 3075F PR MOST RECENT SYSTOLIC BLOOD PRESS GE 130-139MM HG: ICD-10-PCS | Mod: CPTII,S$GLB,, | Performed by: ORTHOPAEDIC SURGERY

## 2021-02-23 PROCEDURE — 1157F PR ADVANCE CARE PLAN OR EQUIV PRESENT IN MEDICAL RECORD: ICD-10-PCS | Mod: S$GLB,,, | Performed by: ORTHOPAEDIC SURGERY

## 2021-02-23 PROCEDURE — 3288F PR FALLS RISK ASSESSMENT DOCUMENTED: ICD-10-PCS | Mod: CPTII,S$GLB,, | Performed by: ORTHOPAEDIC SURGERY

## 2021-02-23 PROCEDURE — 1126F PR PAIN SEVERITY QUANTIFIED, NO PAIN PRESENT: ICD-10-PCS | Mod: S$GLB,,, | Performed by: ORTHOPAEDIC SURGERY

## 2021-02-23 PROCEDURE — 3078F DIAST BP <80 MM HG: CPT | Mod: CPTII,S$GLB,, | Performed by: ORTHOPAEDIC SURGERY

## 2021-02-23 PROCEDURE — 1157F ADVNC CARE PLAN IN RCRD: CPT | Mod: S$GLB,,, | Performed by: ORTHOPAEDIC SURGERY

## 2021-02-23 PROCEDURE — 1159F MED LIST DOCD IN RCRD: CPT | Mod: S$GLB,,, | Performed by: ORTHOPAEDIC SURGERY

## 2021-02-23 PROCEDURE — 1126F AMNT PAIN NOTED NONE PRSNT: CPT | Mod: S$GLB,,, | Performed by: ORTHOPAEDIC SURGERY

## 2021-02-23 PROCEDURE — 3288F FALL RISK ASSESSMENT DOCD: CPT | Mod: CPTII,S$GLB,, | Performed by: ORTHOPAEDIC SURGERY

## 2021-02-23 PROCEDURE — 1159F PR MEDICATION LIST DOCUMENTED IN MEDICAL RECORD: ICD-10-PCS | Mod: S$GLB,,, | Performed by: ORTHOPAEDIC SURGERY

## 2021-02-23 PROCEDURE — 1101F PT FALLS ASSESS-DOCD LE1/YR: CPT | Mod: CPTII,S$GLB,, | Performed by: ORTHOPAEDIC SURGERY

## 2021-02-23 PROCEDURE — 73030 X-RAY EXAM OF SHOULDER: CPT | Mod: 26,RT,, | Performed by: RADIOLOGY

## 2021-02-23 PROCEDURE — 99204 PR OFFICE/OUTPT VISIT, NEW, LEVL IV, 45-59 MIN: ICD-10-PCS | Mod: S$GLB,,, | Performed by: ORTHOPAEDIC SURGERY

## 2021-02-23 PROCEDURE — 3075F SYST BP GE 130 - 139MM HG: CPT | Mod: CPTII,S$GLB,, | Performed by: ORTHOPAEDIC SURGERY

## 2021-02-23 PROCEDURE — 1101F PR PT FALLS ASSESS DOC 0-1 FALLS W/OUT INJ PAST YR: ICD-10-PCS | Mod: CPTII,S$GLB,, | Performed by: ORTHOPAEDIC SURGERY

## 2021-02-23 PROCEDURE — 3078F PR MOST RECENT DIASTOLIC BLOOD PRESSURE < 80 MM HG: ICD-10-PCS | Mod: CPTII,S$GLB,, | Performed by: ORTHOPAEDIC SURGERY

## 2021-02-23 PROCEDURE — 73030 X-RAY EXAM OF SHOULDER: CPT | Mod: TC,RT

## 2021-02-23 PROCEDURE — 99999 PR PBB SHADOW E&M-EST. PATIENT-LVL IV: ICD-10-PCS | Mod: PBBFAC,,, | Performed by: ORTHOPAEDIC SURGERY

## 2021-03-05 ENCOUNTER — OFFICE VISIT (OUTPATIENT)
Dept: INTERNAL MEDICINE | Facility: CLINIC | Age: 81
End: 2021-03-05
Payer: MEDICARE

## 2021-03-05 ENCOUNTER — TELEPHONE (OUTPATIENT)
Dept: INTERNAL MEDICINE | Facility: CLINIC | Age: 81
End: 2021-03-05

## 2021-03-05 ENCOUNTER — OFFICE VISIT (OUTPATIENT)
Dept: SURGERY | Facility: CLINIC | Age: 81
End: 2021-03-05
Payer: MEDICARE

## 2021-03-05 ENCOUNTER — HOSPITAL ENCOUNTER (OUTPATIENT)
Dept: RADIOLOGY | Facility: HOSPITAL | Age: 81
Discharge: HOME | End: 2021-03-05
Attending: FAMILY MEDICINE
Payer: MEDICARE

## 2021-03-05 VITALS
BODY MASS INDEX: 34.02 KG/M2 | HEART RATE: 93 BPM | WEIGHT: 186 LBS | DIASTOLIC BLOOD PRESSURE: 80 MMHG | SYSTOLIC BLOOD PRESSURE: 136 MMHG

## 2021-03-05 VITALS
DIASTOLIC BLOOD PRESSURE: 80 MMHG | SYSTOLIC BLOOD PRESSURE: 136 MMHG | HEIGHT: 62 IN | TEMPERATURE: 99 F | BODY MASS INDEX: 34.24 KG/M2 | OXYGEN SATURATION: 97 % | HEART RATE: 91 BPM | WEIGHT: 186.06 LBS

## 2021-03-05 DIAGNOSIS — Z85.3 HISTORY OF BILATERAL BREAST CANCER: Primary | ICD-10-CM

## 2021-03-05 DIAGNOSIS — N20.0 NEPHROLITHIASIS: ICD-10-CM

## 2021-03-05 DIAGNOSIS — M47.816 SPONDYLOSIS OF LUMBAR REGION WITHOUT MYELOPATHY OR RADICULOPATHY: Primary | ICD-10-CM

## 2021-03-05 PROCEDURE — 3079F DIAST BP 80-89 MM HG: CPT | Mod: CPTII,S$GLB,, | Performed by: PHYSICIAN ASSISTANT

## 2021-03-05 PROCEDURE — 1125F PR PAIN SEVERITY QUANTIFIED, PAIN PRESENT: ICD-10-PCS | Mod: S$GLB,,, | Performed by: FAMILY MEDICINE

## 2021-03-05 PROCEDURE — 1126F AMNT PAIN NOTED NONE PRSNT: CPT | Mod: S$GLB,,, | Performed by: PHYSICIAN ASSISTANT

## 2021-03-05 PROCEDURE — 3075F PR MOST RECENT SYSTOLIC BLOOD PRESS GE 130-139MM HG: ICD-10-PCS | Mod: CPTII,S$GLB,, | Performed by: PHYSICIAN ASSISTANT

## 2021-03-05 PROCEDURE — 99214 PR OFFICE/OUTPT VISIT, EST, LEVL IV, 30-39 MIN: ICD-10-PCS | Mod: S$GLB,,, | Performed by: FAMILY MEDICINE

## 2021-03-05 PROCEDURE — 1125F AMNT PAIN NOTED PAIN PRSNT: CPT | Mod: S$GLB,,, | Performed by: FAMILY MEDICINE

## 2021-03-05 PROCEDURE — 99999 PR PBB SHADOW E&M-EST. PATIENT-LVL III: ICD-10-PCS | Mod: PBBFAC,,, | Performed by: PHYSICIAN ASSISTANT

## 2021-03-05 PROCEDURE — 1101F PT FALLS ASSESS-DOCD LE1/YR: CPT | Mod: CPTII,S$GLB,, | Performed by: FAMILY MEDICINE

## 2021-03-05 PROCEDURE — 3288F FALL RISK ASSESSMENT DOCD: CPT | Mod: CPTII,S$GLB,, | Performed by: FAMILY MEDICINE

## 2021-03-05 PROCEDURE — 3288F PR FALLS RISK ASSESSMENT DOCUMENTED: ICD-10-PCS | Mod: CPTII,S$GLB,, | Performed by: FAMILY MEDICINE

## 2021-03-05 PROCEDURE — 76770 US RETROPERITONEAL COMPLETE: ICD-10-PCS | Mod: 26,,, | Performed by: INTERNAL MEDICINE

## 2021-03-05 PROCEDURE — 76770 US EXAM ABDO BACK WALL COMP: CPT | Mod: 26,,, | Performed by: INTERNAL MEDICINE

## 2021-03-05 PROCEDURE — 1159F PR MEDICATION LIST DOCUMENTED IN MEDICAL RECORD: ICD-10-PCS | Mod: S$GLB,,, | Performed by: FAMILY MEDICINE

## 2021-03-05 PROCEDURE — 1126F PR PAIN SEVERITY QUANTIFIED, NO PAIN PRESENT: ICD-10-PCS | Mod: S$GLB,,, | Performed by: PHYSICIAN ASSISTANT

## 2021-03-05 PROCEDURE — 1159F PR MEDICATION LIST DOCUMENTED IN MEDICAL RECORD: ICD-10-PCS | Mod: S$GLB,,, | Performed by: PHYSICIAN ASSISTANT

## 2021-03-05 PROCEDURE — 1157F ADVNC CARE PLAN IN RCRD: CPT | Mod: S$GLB,,, | Performed by: FAMILY MEDICINE

## 2021-03-05 PROCEDURE — 1157F ADVNC CARE PLAN IN RCRD: CPT | Mod: S$GLB,,, | Performed by: PHYSICIAN ASSISTANT

## 2021-03-05 PROCEDURE — 1101F PR PT FALLS ASSESS DOC 0-1 FALLS W/OUT INJ PAST YR: ICD-10-PCS | Mod: CPTII,S$GLB,, | Performed by: FAMILY MEDICINE

## 2021-03-05 PROCEDURE — 1101F PR PT FALLS ASSESS DOC 0-1 FALLS W/OUT INJ PAST YR: ICD-10-PCS | Mod: CPTII,S$GLB,, | Performed by: PHYSICIAN ASSISTANT

## 2021-03-05 PROCEDURE — 99999 PR PBB SHADOW E&M-EST. PATIENT-LVL V: ICD-10-PCS | Mod: PBBFAC,,, | Performed by: FAMILY MEDICINE

## 2021-03-05 PROCEDURE — 99999 PR PBB SHADOW E&M-EST. PATIENT-LVL III: CPT | Mod: PBBFAC,,, | Performed by: PHYSICIAN ASSISTANT

## 2021-03-05 PROCEDURE — 1157F PR ADVANCE CARE PLAN OR EQUIV PRESENT IN MEDICAL RECORD: ICD-10-PCS | Mod: S$GLB,,, | Performed by: FAMILY MEDICINE

## 2021-03-05 PROCEDURE — 3288F PR FALLS RISK ASSESSMENT DOCUMENTED: ICD-10-PCS | Mod: CPTII,S$GLB,, | Performed by: PHYSICIAN ASSISTANT

## 2021-03-05 PROCEDURE — 3075F SYST BP GE 130 - 139MM HG: CPT | Mod: CPTII,S$GLB,, | Performed by: FAMILY MEDICINE

## 2021-03-05 PROCEDURE — 1101F PT FALLS ASSESS-DOCD LE1/YR: CPT | Mod: CPTII,S$GLB,, | Performed by: PHYSICIAN ASSISTANT

## 2021-03-05 PROCEDURE — 76770 US EXAM ABDO BACK WALL COMP: CPT | Mod: TC

## 2021-03-05 PROCEDURE — 3288F FALL RISK ASSESSMENT DOCD: CPT | Mod: CPTII,S$GLB,, | Performed by: PHYSICIAN ASSISTANT

## 2021-03-05 PROCEDURE — 3079F DIAST BP 80-89 MM HG: CPT | Mod: CPTII,S$GLB,, | Performed by: FAMILY MEDICINE

## 2021-03-05 PROCEDURE — 3075F SYST BP GE 130 - 139MM HG: CPT | Mod: CPTII,S$GLB,, | Performed by: PHYSICIAN ASSISTANT

## 2021-03-05 PROCEDURE — 99999 PR PBB SHADOW E&M-EST. PATIENT-LVL V: CPT | Mod: PBBFAC,,, | Performed by: FAMILY MEDICINE

## 2021-03-05 PROCEDURE — 1157F PR ADVANCE CARE PLAN OR EQUIV PRESENT IN MEDICAL RECORD: ICD-10-PCS | Mod: S$GLB,,, | Performed by: PHYSICIAN ASSISTANT

## 2021-03-05 PROCEDURE — 99214 OFFICE O/P EST MOD 30 MIN: CPT | Mod: S$GLB,,, | Performed by: FAMILY MEDICINE

## 2021-03-05 PROCEDURE — 3075F PR MOST RECENT SYSTOLIC BLOOD PRESS GE 130-139MM HG: ICD-10-PCS | Mod: CPTII,S$GLB,, | Performed by: FAMILY MEDICINE

## 2021-03-05 PROCEDURE — 99213 OFFICE O/P EST LOW 20 MIN: CPT | Mod: S$GLB,,, | Performed by: PHYSICIAN ASSISTANT

## 2021-03-05 PROCEDURE — 1159F MED LIST DOCD IN RCRD: CPT | Mod: S$GLB,,, | Performed by: FAMILY MEDICINE

## 2021-03-05 PROCEDURE — 1159F MED LIST DOCD IN RCRD: CPT | Mod: S$GLB,,, | Performed by: PHYSICIAN ASSISTANT

## 2021-03-05 PROCEDURE — 99213 PR OFFICE/OUTPT VISIT, EST, LEVL III, 20-29 MIN: ICD-10-PCS | Mod: S$GLB,,, | Performed by: PHYSICIAN ASSISTANT

## 2021-03-05 PROCEDURE — 3079F PR MOST RECENT DIASTOLIC BLOOD PRESSURE 80-89 MM HG: ICD-10-PCS | Mod: CPTII,S$GLB,, | Performed by: PHYSICIAN ASSISTANT

## 2021-03-05 PROCEDURE — 3079F PR MOST RECENT DIASTOLIC BLOOD PRESSURE 80-89 MM HG: ICD-10-PCS | Mod: CPTII,S$GLB,, | Performed by: FAMILY MEDICINE

## 2021-03-05 RX ORDER — TIZANIDINE 2 MG/1
2 TABLET ORAL EVERY 8 HOURS PRN
Qty: 30 TABLET | Refills: 2 | Status: SHIPPED | OUTPATIENT
Start: 2021-03-05 | End: 2021-03-15

## 2021-03-05 RX ORDER — TRAMADOL HYDROCHLORIDE 50 MG/1
50 TABLET ORAL DAILY PRN
Qty: 15 TABLET | Refills: 0 | Status: SHIPPED | OUTPATIENT
Start: 2021-03-05 | End: 2021-04-12 | Stop reason: SDUPTHER

## 2021-03-19 ENCOUNTER — OFFICE VISIT (OUTPATIENT)
Dept: SPINE | Facility: CLINIC | Age: 81
End: 2021-03-19
Payer: MEDICARE

## 2021-03-19 VITALS
SYSTOLIC BLOOD PRESSURE: 165 MMHG | HEIGHT: 62 IN | HEART RATE: 86 BPM | BODY MASS INDEX: 34.03 KG/M2 | WEIGHT: 184.94 LBS | DIASTOLIC BLOOD PRESSURE: 70 MMHG

## 2021-03-19 DIAGNOSIS — M54.16 LUMBAR RADICULOPATHY: ICD-10-CM

## 2021-03-19 DIAGNOSIS — M79.18 MYOFASCIAL PAIN: ICD-10-CM

## 2021-03-19 DIAGNOSIS — G57.02 PIRIFORMIS SYNDROME OF LEFT SIDE: Primary | ICD-10-CM

## 2021-03-19 PROCEDURE — 99999 PR PBB SHADOW E&M-EST. PATIENT-LVL IV: CPT | Mod: PBBFAC,,, | Performed by: NURSE PRACTITIONER

## 2021-03-19 PROCEDURE — 1157F ADVNC CARE PLAN IN RCRD: CPT | Mod: S$GLB,,, | Performed by: NURSE PRACTITIONER

## 2021-03-19 PROCEDURE — 3288F FALL RISK ASSESSMENT DOCD: CPT | Mod: CPTII,S$GLB,, | Performed by: NURSE PRACTITIONER

## 2021-03-19 PROCEDURE — 20553 PR INJECT TRIGGER POINTS, > 3: ICD-10-PCS | Mod: S$GLB,,, | Performed by: NURSE PRACTITIONER

## 2021-03-19 PROCEDURE — 3077F PR MOST RECENT SYSTOLIC BLOOD PRESSURE >= 140 MM HG: ICD-10-PCS | Mod: CPTII,S$GLB,, | Performed by: NURSE PRACTITIONER

## 2021-03-19 PROCEDURE — 1101F PR PT FALLS ASSESS DOC 0-1 FALLS W/OUT INJ PAST YR: ICD-10-PCS | Mod: CPTII,S$GLB,, | Performed by: NURSE PRACTITIONER

## 2021-03-19 PROCEDURE — 3077F SYST BP >= 140 MM HG: CPT | Mod: CPTII,S$GLB,, | Performed by: NURSE PRACTITIONER

## 2021-03-19 PROCEDURE — 99499 UNLISTED E&M SERVICE: CPT | Mod: S$GLB,,, | Performed by: NURSE PRACTITIONER

## 2021-03-19 PROCEDURE — 1125F AMNT PAIN NOTED PAIN PRSNT: CPT | Mod: S$GLB,,, | Performed by: NURSE PRACTITIONER

## 2021-03-19 PROCEDURE — 99999 PR PBB SHADOW E&M-EST. PATIENT-LVL IV: ICD-10-PCS | Mod: PBBFAC,,, | Performed by: NURSE PRACTITIONER

## 2021-03-19 PROCEDURE — 1159F MED LIST DOCD IN RCRD: CPT | Mod: S$GLB,,, | Performed by: NURSE PRACTITIONER

## 2021-03-19 PROCEDURE — 99499 RISK ADDL DX/OHS AUDIT: ICD-10-PCS | Mod: S$GLB,,, | Performed by: NURSE PRACTITIONER

## 2021-03-19 PROCEDURE — 1159F PR MEDICATION LIST DOCUMENTED IN MEDICAL RECORD: ICD-10-PCS | Mod: S$GLB,,, | Performed by: NURSE PRACTITIONER

## 2021-03-19 PROCEDURE — 3078F DIAST BP <80 MM HG: CPT | Mod: CPTII,S$GLB,, | Performed by: NURSE PRACTITIONER

## 2021-03-19 PROCEDURE — 3288F PR FALLS RISK ASSESSMENT DOCUMENTED: ICD-10-PCS | Mod: CPTII,S$GLB,, | Performed by: NURSE PRACTITIONER

## 2021-03-19 PROCEDURE — 99213 OFFICE O/P EST LOW 20 MIN: CPT | Mod: 25,S$GLB,, | Performed by: NURSE PRACTITIONER

## 2021-03-19 PROCEDURE — 1157F PR ADVANCE CARE PLAN OR EQUIV PRESENT IN MEDICAL RECORD: ICD-10-PCS | Mod: S$GLB,,, | Performed by: NURSE PRACTITIONER

## 2021-03-19 PROCEDURE — 1101F PT FALLS ASSESS-DOCD LE1/YR: CPT | Mod: CPTII,S$GLB,, | Performed by: NURSE PRACTITIONER

## 2021-03-19 PROCEDURE — 20553 NJX 1/MLT TRIGGER POINTS 3/>: CPT | Mod: S$GLB,,, | Performed by: NURSE PRACTITIONER

## 2021-03-19 PROCEDURE — 1125F PR PAIN SEVERITY QUANTIFIED, PAIN PRESENT: ICD-10-PCS | Mod: S$GLB,,, | Performed by: NURSE PRACTITIONER

## 2021-03-19 PROCEDURE — 3078F PR MOST RECENT DIASTOLIC BLOOD PRESSURE < 80 MM HG: ICD-10-PCS | Mod: CPTII,S$GLB,, | Performed by: NURSE PRACTITIONER

## 2021-03-19 PROCEDURE — 99213 PR OFFICE/OUTPT VISIT, EST, LEVL III, 20-29 MIN: ICD-10-PCS | Mod: 25,S$GLB,, | Performed by: NURSE PRACTITIONER

## 2021-03-19 RX ORDER — DEXAMETHASONE SODIUM PHOSPHATE 4 MG/ML
4 INJECTION, SOLUTION INTRA-ARTICULAR; INTRALESIONAL; INTRAMUSCULAR; INTRAVENOUS; SOFT TISSUE
Status: COMPLETED | OUTPATIENT
Start: 2021-03-19 | End: 2021-03-19

## 2021-03-19 RX ORDER — LIDOCAINE HYDROCHLORIDE 10 MG/ML
9 INJECTION, SOLUTION EPIDURAL; INFILTRATION; INTRACAUDAL; PERINEURAL ONCE
Status: COMPLETED | OUTPATIENT
Start: 2021-03-19 | End: 2021-03-19

## 2021-03-19 RX ADMIN — LIDOCAINE HYDROCHLORIDE 90 MG: 10 INJECTION, SOLUTION EPIDURAL; INFILTRATION; INTRACAUDAL; PERINEURAL at 10:03

## 2021-03-19 RX ADMIN — DEXAMETHASONE SODIUM PHOSPHATE 4 MG: 4 INJECTION, SOLUTION INTRA-ARTICULAR; INTRALESIONAL; INTRAMUSCULAR; INTRAVENOUS; SOFT TISSUE at 10:03

## 2021-03-22 ENCOUNTER — PATIENT MESSAGE (OUTPATIENT)
Dept: SPINE | Facility: CLINIC | Age: 81
End: 2021-03-22

## 2021-03-25 ENCOUNTER — PATIENT MESSAGE (OUTPATIENT)
Dept: SPINE | Facility: CLINIC | Age: 81
End: 2021-03-25

## 2021-03-25 ENCOUNTER — PATIENT MESSAGE (OUTPATIENT)
Dept: INTERNAL MEDICINE | Facility: CLINIC | Age: 81
End: 2021-03-25

## 2021-03-25 ENCOUNTER — PATIENT MESSAGE (OUTPATIENT)
Dept: PAIN MEDICINE | Facility: CLINIC | Age: 81
End: 2021-03-25

## 2021-03-25 DIAGNOSIS — M54.9 DORSALGIA, UNSPECIFIED: ICD-10-CM

## 2021-03-25 RX ORDER — METHYLPREDNISOLONE 4 MG/1
TABLET ORAL
Qty: 1 PACKAGE | Refills: 0 | Status: SHIPPED | OUTPATIENT
Start: 2021-03-25 | End: 2021-04-12

## 2021-03-25 RX ORDER — TIZANIDINE 2 MG/1
2 TABLET ORAL EVERY 12 HOURS PRN
Qty: 30 TABLET | Refills: 0 | Status: SHIPPED | OUTPATIENT
Start: 2021-03-25 | End: 2021-04-01

## 2021-03-30 ENCOUNTER — CLINICAL SUPPORT (OUTPATIENT)
Dept: REHABILITATION | Facility: HOSPITAL | Age: 81
End: 2021-03-30
Payer: MEDICARE

## 2021-03-30 DIAGNOSIS — R52 PAIN: ICD-10-CM

## 2021-03-30 DIAGNOSIS — G57.02 PIRIFORMIS SYNDROME OF LEFT SIDE: ICD-10-CM

## 2021-03-30 PROCEDURE — 97161 PT EVAL LOW COMPLEX 20 MIN: CPT | Performed by: PHYSICAL THERAPIST

## 2021-03-30 PROCEDURE — 97140 MANUAL THERAPY 1/> REGIONS: CPT | Performed by: PHYSICAL THERAPIST

## 2021-03-31 ENCOUNTER — PATIENT MESSAGE (OUTPATIENT)
Dept: INTERNAL MEDICINE | Facility: CLINIC | Age: 81
End: 2021-03-31

## 2021-04-06 ENCOUNTER — HOSPITAL ENCOUNTER (OUTPATIENT)
Dept: RADIOLOGY | Facility: HOSPITAL | Age: 81
Discharge: HOME | End: 2021-04-06
Attending: NURSE PRACTITIONER
Payer: MEDICARE

## 2021-04-06 DIAGNOSIS — M54.9 DORSALGIA, UNSPECIFIED: ICD-10-CM

## 2021-04-06 PROCEDURE — 72148 MRI LUMBAR SPINE WITHOUT CONTRAST: ICD-10-PCS | Mod: 26,,, | Performed by: RADIOLOGY

## 2021-04-06 PROCEDURE — 72148 MRI LUMBAR SPINE W/O DYE: CPT | Mod: 26,,, | Performed by: RADIOLOGY

## 2021-04-06 PROCEDURE — 72148 MRI LUMBAR SPINE W/O DYE: CPT | Mod: TC

## 2021-04-09 ENCOUNTER — CLINICAL SUPPORT (OUTPATIENT)
Dept: REHABILITATION | Facility: HOSPITAL | Age: 81
End: 2021-04-09
Payer: MEDICARE

## 2021-04-09 DIAGNOSIS — R52 PAIN: ICD-10-CM

## 2021-04-09 PROCEDURE — 97110 THERAPEUTIC EXERCISES: CPT | Performed by: PHYSICAL THERAPIST

## 2021-04-09 PROCEDURE — 97140 MANUAL THERAPY 1/> REGIONS: CPT | Performed by: PHYSICAL THERAPIST

## 2021-04-12 ENCOUNTER — OFFICE VISIT (OUTPATIENT)
Dept: INTERNAL MEDICINE | Facility: CLINIC | Age: 81
End: 2021-04-12
Payer: MEDICARE

## 2021-04-12 VITALS
WEIGHT: 183.19 LBS | TEMPERATURE: 99 F | HEIGHT: 62 IN | HEART RATE: 80 BPM | BODY MASS INDEX: 33.71 KG/M2 | DIASTOLIC BLOOD PRESSURE: 76 MMHG | SYSTOLIC BLOOD PRESSURE: 128 MMHG | OXYGEN SATURATION: 97 %

## 2021-04-12 DIAGNOSIS — R63.4 WEIGHT LOSS: ICD-10-CM

## 2021-04-12 DIAGNOSIS — M48.062 SPINAL STENOSIS OF LUMBAR REGION WITH NEUROGENIC CLAUDICATION: ICD-10-CM

## 2021-04-12 PROBLEM — M48.061 SPINAL STENOSIS OF LUMBAR REGION WITHOUT NEUROGENIC CLAUDICATION: Status: ACTIVE | Noted: 2021-04-12

## 2021-04-12 PROCEDURE — 99214 PR OFFICE/OUTPT VISIT, EST, LEVL IV, 30-39 MIN: ICD-10-PCS | Mod: S$GLB,,, | Performed by: FAMILY MEDICINE

## 2021-04-12 PROCEDURE — 99999 PR PBB SHADOW E&M-EST. PATIENT-LVL V: CPT | Mod: PBBFAC,,, | Performed by: FAMILY MEDICINE

## 2021-04-12 PROCEDURE — 1125F PR PAIN SEVERITY QUANTIFIED, PAIN PRESENT: ICD-10-PCS | Mod: S$GLB,,, | Performed by: FAMILY MEDICINE

## 2021-04-12 PROCEDURE — 3288F PR FALLS RISK ASSESSMENT DOCUMENTED: ICD-10-PCS | Mod: CPTII,S$GLB,, | Performed by: FAMILY MEDICINE

## 2021-04-12 PROCEDURE — 1159F MED LIST DOCD IN RCRD: CPT | Mod: S$GLB,,, | Performed by: FAMILY MEDICINE

## 2021-04-12 PROCEDURE — 99214 OFFICE O/P EST MOD 30 MIN: CPT | Mod: S$GLB,,, | Performed by: FAMILY MEDICINE

## 2021-04-12 PROCEDURE — 99999 PR PBB SHADOW E&M-EST. PATIENT-LVL V: ICD-10-PCS | Mod: PBBFAC,,, | Performed by: FAMILY MEDICINE

## 2021-04-12 PROCEDURE — 1157F ADVNC CARE PLAN IN RCRD: CPT | Mod: S$GLB,,, | Performed by: FAMILY MEDICINE

## 2021-04-12 PROCEDURE — 1157F PR ADVANCE CARE PLAN OR EQUIV PRESENT IN MEDICAL RECORD: ICD-10-PCS | Mod: S$GLB,,, | Performed by: FAMILY MEDICINE

## 2021-04-12 PROCEDURE — 1125F AMNT PAIN NOTED PAIN PRSNT: CPT | Mod: S$GLB,,, | Performed by: FAMILY MEDICINE

## 2021-04-12 PROCEDURE — 1159F PR MEDICATION LIST DOCUMENTED IN MEDICAL RECORD: ICD-10-PCS | Mod: S$GLB,,, | Performed by: FAMILY MEDICINE

## 2021-04-12 PROCEDURE — 1101F PT FALLS ASSESS-DOCD LE1/YR: CPT | Mod: CPTII,S$GLB,, | Performed by: FAMILY MEDICINE

## 2021-04-12 PROCEDURE — 1101F PR PT FALLS ASSESS DOC 0-1 FALLS W/OUT INJ PAST YR: ICD-10-PCS | Mod: CPTII,S$GLB,, | Performed by: FAMILY MEDICINE

## 2021-04-12 PROCEDURE — 3288F FALL RISK ASSESSMENT DOCD: CPT | Mod: CPTII,S$GLB,, | Performed by: FAMILY MEDICINE

## 2021-04-12 RX ORDER — TRAMADOL HYDROCHLORIDE 50 MG/1
50 TABLET ORAL DAILY PRN
Qty: 30 TABLET | Refills: 0 | Status: SHIPPED | OUTPATIENT
Start: 2021-04-12 | End: 2021-08-06 | Stop reason: DRUGHIGH

## 2021-04-13 ENCOUNTER — PATIENT MESSAGE (OUTPATIENT)
Dept: INTERNAL MEDICINE | Facility: CLINIC | Age: 81
End: 2021-04-13

## 2021-04-13 DIAGNOSIS — K92.1 HEMATOCHEZIA: Primary | ICD-10-CM

## 2021-04-13 DIAGNOSIS — K92.1 MELENA: ICD-10-CM

## 2021-04-14 ENCOUNTER — OFFICE VISIT (OUTPATIENT)
Dept: PAIN MEDICINE | Facility: CLINIC | Age: 81
End: 2021-04-14
Payer: MEDICARE

## 2021-04-14 VITALS
DIASTOLIC BLOOD PRESSURE: 84 MMHG | TEMPERATURE: 98 F | HEART RATE: 63 BPM | RESPIRATION RATE: 18 BRPM | OXYGEN SATURATION: 98 % | WEIGHT: 183 LBS | SYSTOLIC BLOOD PRESSURE: 149 MMHG | BODY MASS INDEX: 33.68 KG/M2 | HEIGHT: 62 IN

## 2021-04-14 DIAGNOSIS — M48.062 SPINAL STENOSIS OF LUMBAR REGION WITH NEUROGENIC CLAUDICATION: ICD-10-CM

## 2021-04-14 DIAGNOSIS — M51.37 DDD (DEGENERATIVE DISC DISEASE), LUMBOSACRAL: Primary | ICD-10-CM

## 2021-04-14 DIAGNOSIS — M54.17 LUMBOSACRAL RADICULOPATHY: ICD-10-CM

## 2021-04-14 PROBLEM — M51.379 DDD (DEGENERATIVE DISC DISEASE), LUMBOSACRAL: Status: ACTIVE | Noted: 2021-04-14

## 2021-04-14 PROCEDURE — 3288F PR FALLS RISK ASSESSMENT DOCUMENTED: ICD-10-PCS | Mod: CPTII,S$GLB,, | Performed by: ANESTHESIOLOGY

## 2021-04-14 PROCEDURE — 1125F PR PAIN SEVERITY QUANTIFIED, PAIN PRESENT: ICD-10-PCS | Mod: S$GLB,,, | Performed by: ANESTHESIOLOGY

## 2021-04-14 PROCEDURE — 1157F ADVNC CARE PLAN IN RCRD: CPT | Mod: S$GLB,,, | Performed by: ANESTHESIOLOGY

## 2021-04-14 PROCEDURE — 99999 PR PBB SHADOW E&M-EST. PATIENT-LVL V: ICD-10-PCS | Mod: PBBFAC,,, | Performed by: ANESTHESIOLOGY

## 2021-04-14 PROCEDURE — 99214 OFFICE O/P EST MOD 30 MIN: CPT | Mod: S$GLB,,, | Performed by: ANESTHESIOLOGY

## 2021-04-14 PROCEDURE — 1101F PT FALLS ASSESS-DOCD LE1/YR: CPT | Mod: CPTII,S$GLB,, | Performed by: ANESTHESIOLOGY

## 2021-04-14 PROCEDURE — 99999 PR PBB SHADOW E&M-EST. PATIENT-LVL V: CPT | Mod: PBBFAC,,, | Performed by: ANESTHESIOLOGY

## 2021-04-14 PROCEDURE — 99214 PR OFFICE/OUTPT VISIT, EST, LEVL IV, 30-39 MIN: ICD-10-PCS | Mod: S$GLB,,, | Performed by: ANESTHESIOLOGY

## 2021-04-14 PROCEDURE — 1101F PR PT FALLS ASSESS DOC 0-1 FALLS W/OUT INJ PAST YR: ICD-10-PCS | Mod: CPTII,S$GLB,, | Performed by: ANESTHESIOLOGY

## 2021-04-14 PROCEDURE — 3288F FALL RISK ASSESSMENT DOCD: CPT | Mod: CPTII,S$GLB,, | Performed by: ANESTHESIOLOGY

## 2021-04-14 PROCEDURE — 1159F PR MEDICATION LIST DOCUMENTED IN MEDICAL RECORD: ICD-10-PCS | Mod: S$GLB,,, | Performed by: ANESTHESIOLOGY

## 2021-04-14 PROCEDURE — 1125F AMNT PAIN NOTED PAIN PRSNT: CPT | Mod: S$GLB,,, | Performed by: ANESTHESIOLOGY

## 2021-04-14 PROCEDURE — 1159F MED LIST DOCD IN RCRD: CPT | Mod: S$GLB,,, | Performed by: ANESTHESIOLOGY

## 2021-04-14 PROCEDURE — 1157F PR ADVANCE CARE PLAN OR EQUIV PRESENT IN MEDICAL RECORD: ICD-10-PCS | Mod: S$GLB,,, | Performed by: ANESTHESIOLOGY

## 2021-04-14 RX ORDER — GABAPENTIN 100 MG/1
CAPSULE ORAL
Qty: 63 CAPSULE | Refills: 0 | Status: SHIPPED | OUTPATIENT
Start: 2021-04-14 | End: 2021-09-30

## 2021-04-21 ENCOUNTER — CLINICAL SUPPORT (OUTPATIENT)
Dept: REHABILITATION | Facility: HOSPITAL | Age: 81
End: 2021-04-21
Payer: MEDICARE

## 2021-04-21 DIAGNOSIS — R52 PAIN: ICD-10-CM

## 2021-04-21 PROCEDURE — 97140 MANUAL THERAPY 1/> REGIONS: CPT | Performed by: PHYSICAL THERAPIST

## 2021-04-22 ENCOUNTER — OFFICE VISIT (OUTPATIENT)
Dept: SURGERY | Facility: CLINIC | Age: 81
End: 2021-04-22
Payer: MEDICARE

## 2021-04-22 VITALS
HEIGHT: 62 IN | BODY MASS INDEX: 33.43 KG/M2 | DIASTOLIC BLOOD PRESSURE: 84 MMHG | WEIGHT: 181.63 LBS | SYSTOLIC BLOOD PRESSURE: 109 MMHG | HEART RATE: 83 BPM

## 2021-04-22 DIAGNOSIS — K92.1 HEMATOCHEZIA: ICD-10-CM

## 2021-04-22 DIAGNOSIS — K92.1 MELENA: ICD-10-CM

## 2021-04-22 PROCEDURE — 1101F PR PT FALLS ASSESS DOC 0-1 FALLS W/OUT INJ PAST YR: ICD-10-PCS | Mod: CPTII,S$GLB,, | Performed by: COLON & RECTAL SURGERY

## 2021-04-22 PROCEDURE — 99999 PR PBB SHADOW E&M-EST. PATIENT-LVL IV: ICD-10-PCS | Mod: PBBFAC,,, | Performed by: COLON & RECTAL SURGERY

## 2021-04-22 PROCEDURE — 1126F PR PAIN SEVERITY QUANTIFIED, NO PAIN PRESENT: ICD-10-PCS | Mod: S$GLB,,, | Performed by: COLON & RECTAL SURGERY

## 2021-04-22 PROCEDURE — 3288F FALL RISK ASSESSMENT DOCD: CPT | Mod: CPTII,S$GLB,, | Performed by: COLON & RECTAL SURGERY

## 2021-04-22 PROCEDURE — 99213 PR OFFICE/OUTPT VISIT, EST, LEVL III, 20-29 MIN: ICD-10-PCS | Mod: 25,S$GLB,, | Performed by: COLON & RECTAL SURGERY

## 2021-04-22 PROCEDURE — 1101F PT FALLS ASSESS-DOCD LE1/YR: CPT | Mod: CPTII,S$GLB,, | Performed by: COLON & RECTAL SURGERY

## 2021-04-22 PROCEDURE — 1159F MED LIST DOCD IN RCRD: CPT | Mod: S$GLB,,, | Performed by: COLON & RECTAL SURGERY

## 2021-04-22 PROCEDURE — 99213 OFFICE O/P EST LOW 20 MIN: CPT | Mod: 25,S$GLB,, | Performed by: COLON & RECTAL SURGERY

## 2021-04-22 PROCEDURE — 1157F ADVNC CARE PLAN IN RCRD: CPT | Mod: S$GLB,,, | Performed by: COLON & RECTAL SURGERY

## 2021-04-22 PROCEDURE — 1157F PR ADVANCE CARE PLAN OR EQUIV PRESENT IN MEDICAL RECORD: ICD-10-PCS | Mod: S$GLB,,, | Performed by: COLON & RECTAL SURGERY

## 2021-04-22 PROCEDURE — 1159F PR MEDICATION LIST DOCUMENTED IN MEDICAL RECORD: ICD-10-PCS | Mod: S$GLB,,, | Performed by: COLON & RECTAL SURGERY

## 2021-04-22 PROCEDURE — 99999 PR PBB SHADOW E&M-EST. PATIENT-LVL IV: CPT | Mod: PBBFAC,,, | Performed by: COLON & RECTAL SURGERY

## 2021-04-22 PROCEDURE — 3288F PR FALLS RISK ASSESSMENT DOCUMENTED: ICD-10-PCS | Mod: CPTII,S$GLB,, | Performed by: COLON & RECTAL SURGERY

## 2021-04-22 PROCEDURE — 1126F AMNT PAIN NOTED NONE PRSNT: CPT | Mod: S$GLB,,, | Performed by: COLON & RECTAL SURGERY

## 2021-04-27 ENCOUNTER — PATIENT MESSAGE (OUTPATIENT)
Dept: SURGERY | Facility: CLINIC | Age: 81
End: 2021-04-27

## 2021-04-28 ENCOUNTER — CLINICAL SUPPORT (OUTPATIENT)
Dept: REHABILITATION | Facility: HOSPITAL | Age: 81
End: 2021-04-28
Payer: MEDICARE

## 2021-04-28 DIAGNOSIS — R52 PAIN: ICD-10-CM

## 2021-04-28 PROCEDURE — 97140 MANUAL THERAPY 1/> REGIONS: CPT | Performed by: PHYSICAL THERAPIST

## 2021-04-29 ENCOUNTER — TELEPHONE (OUTPATIENT)
Dept: ADMINISTRATIVE | Facility: OTHER | Age: 81
End: 2021-04-29

## 2021-04-29 ENCOUNTER — OFFICE VISIT (OUTPATIENT)
Dept: NEUROSURGERY | Facility: CLINIC | Age: 81
End: 2021-04-29
Payer: MEDICARE

## 2021-04-29 ENCOUNTER — PATIENT MESSAGE (OUTPATIENT)
Dept: PAIN MEDICINE | Facility: OTHER | Age: 81
End: 2021-04-29

## 2021-04-29 VITALS
TEMPERATURE: 98 F | HEIGHT: 63 IN | WEIGHT: 184.06 LBS | DIASTOLIC BLOOD PRESSURE: 74 MMHG | HEART RATE: 74 BPM | SYSTOLIC BLOOD PRESSURE: 131 MMHG | BODY MASS INDEX: 32.61 KG/M2

## 2021-04-29 DIAGNOSIS — M47.816 SPONDYLOSIS OF LUMBAR REGION WITHOUT MYELOPATHY OR RADICULOPATHY: Primary | ICD-10-CM

## 2021-04-29 DIAGNOSIS — M48.062 SPINAL STENOSIS OF LUMBAR REGION WITH NEUROGENIC CLAUDICATION: ICD-10-CM

## 2021-04-29 DIAGNOSIS — M25.552 LEFT HIP PAIN: ICD-10-CM

## 2021-04-29 DIAGNOSIS — M25.559 PAIN IN UNSPECIFIED HIP: ICD-10-CM

## 2021-04-29 PROCEDURE — 1101F PR PT FALLS ASSESS DOC 0-1 FALLS W/OUT INJ PAST YR: ICD-10-PCS | Mod: CPTII,S$GLB,, | Performed by: NEUROLOGICAL SURGERY

## 2021-04-29 PROCEDURE — 3288F PR FALLS RISK ASSESSMENT DOCUMENTED: ICD-10-PCS | Mod: CPTII,S$GLB,, | Performed by: NEUROLOGICAL SURGERY

## 2021-04-29 PROCEDURE — 1157F PR ADVANCE CARE PLAN OR EQUIV PRESENT IN MEDICAL RECORD: ICD-10-PCS | Mod: S$GLB,,, | Performed by: NEUROLOGICAL SURGERY

## 2021-04-29 PROCEDURE — 1125F PR PAIN SEVERITY QUANTIFIED, PAIN PRESENT: ICD-10-PCS | Mod: S$GLB,,, | Performed by: NEUROLOGICAL SURGERY

## 2021-04-29 PROCEDURE — 99999 PR PBB SHADOW E&M-EST. PATIENT-LVL IV: CPT | Mod: PBBFAC,,, | Performed by: NEUROLOGICAL SURGERY

## 2021-04-29 PROCEDURE — 99204 OFFICE O/P NEW MOD 45 MIN: CPT | Mod: S$GLB,,, | Performed by: NEUROLOGICAL SURGERY

## 2021-04-29 PROCEDURE — 1157F ADVNC CARE PLAN IN RCRD: CPT | Mod: S$GLB,,, | Performed by: NEUROLOGICAL SURGERY

## 2021-04-29 PROCEDURE — 1125F AMNT PAIN NOTED PAIN PRSNT: CPT | Mod: S$GLB,,, | Performed by: NEUROLOGICAL SURGERY

## 2021-04-29 PROCEDURE — 1159F PR MEDICATION LIST DOCUMENTED IN MEDICAL RECORD: ICD-10-PCS | Mod: S$GLB,,, | Performed by: NEUROLOGICAL SURGERY

## 2021-04-29 PROCEDURE — 1159F MED LIST DOCD IN RCRD: CPT | Mod: S$GLB,,, | Performed by: NEUROLOGICAL SURGERY

## 2021-04-29 PROCEDURE — 3288F FALL RISK ASSESSMENT DOCD: CPT | Mod: CPTII,S$GLB,, | Performed by: NEUROLOGICAL SURGERY

## 2021-04-29 PROCEDURE — 99204 PR OFFICE/OUTPT VISIT, NEW, LEVL IV, 45-59 MIN: ICD-10-PCS | Mod: S$GLB,,, | Performed by: NEUROLOGICAL SURGERY

## 2021-04-29 PROCEDURE — 1101F PT FALLS ASSESS-DOCD LE1/YR: CPT | Mod: CPTII,S$GLB,, | Performed by: NEUROLOGICAL SURGERY

## 2021-04-29 PROCEDURE — 99999 PR PBB SHADOW E&M-EST. PATIENT-LVL IV: ICD-10-PCS | Mod: PBBFAC,,, | Performed by: NEUROLOGICAL SURGERY

## 2021-04-30 ENCOUNTER — PATIENT MESSAGE (OUTPATIENT)
Dept: NEUROSURGERY | Facility: CLINIC | Age: 81
End: 2021-04-30

## 2021-05-05 ENCOUNTER — HOSPITAL ENCOUNTER (OUTPATIENT)
Dept: RADIOLOGY | Facility: HOSPITAL | Age: 81
Discharge: HOME | End: 2021-05-05
Attending: NEUROLOGICAL SURGERY
Payer: MEDICARE

## 2021-05-05 DIAGNOSIS — M25.559 PAIN IN UNSPECIFIED HIP: ICD-10-CM

## 2021-05-05 PROCEDURE — 73721 MRI HIP WITHOUT CONTRAST LEFT: ICD-10-PCS | Mod: 26,LT,, | Performed by: RADIOLOGY

## 2021-05-05 PROCEDURE — 73721 MRI JNT OF LWR EXTRE W/O DYE: CPT | Mod: TC,LT

## 2021-05-05 PROCEDURE — 73721 MRI JNT OF LWR EXTRE W/O DYE: CPT | Mod: 26,LT,, | Performed by: RADIOLOGY

## 2021-05-06 ENCOUNTER — PATIENT MESSAGE (OUTPATIENT)
Dept: NEUROSURGERY | Facility: CLINIC | Age: 81
End: 2021-05-06

## 2021-05-06 ENCOUNTER — PATIENT MESSAGE (OUTPATIENT)
Dept: INTERNAL MEDICINE | Facility: CLINIC | Age: 81
End: 2021-05-06

## 2021-05-06 DIAGNOSIS — M25.552 LEFT HIP PAIN: Primary | ICD-10-CM

## 2021-05-10 ENCOUNTER — OFFICE VISIT (OUTPATIENT)
Dept: SPORTS MEDICINE | Facility: CLINIC | Age: 81
End: 2021-05-10
Payer: MEDICARE

## 2021-05-10 ENCOUNTER — HOSPITAL ENCOUNTER (OUTPATIENT)
Dept: RADIOLOGY | Facility: HOSPITAL | Age: 81
Discharge: HOME | End: 2021-05-10
Attending: PHYSICIAN ASSISTANT
Payer: MEDICARE

## 2021-05-10 VITALS
HEART RATE: 71 BPM | WEIGHT: 184 LBS | BODY MASS INDEX: 32.6 KG/M2 | SYSTOLIC BLOOD PRESSURE: 129 MMHG | DIASTOLIC BLOOD PRESSURE: 71 MMHG | HEIGHT: 63 IN

## 2021-05-10 DIAGNOSIS — M25.552 LEFT HIP PAIN: ICD-10-CM

## 2021-05-10 DIAGNOSIS — M16.11 PRIMARY OSTEOARTHRITIS OF RIGHT HIP: ICD-10-CM

## 2021-05-10 DIAGNOSIS — M16.12 PRIMARY OSTEOARTHRITIS OF LEFT HIP: Primary | ICD-10-CM

## 2021-05-10 PROCEDURE — 3288F PR FALLS RISK ASSESSMENT DOCUMENTED: ICD-10-PCS | Mod: CPTII,S$GLB,, | Performed by: PHYSICIAN ASSISTANT

## 2021-05-10 PROCEDURE — 1159F PR MEDICATION LIST DOCUMENTED IN MEDICAL RECORD: ICD-10-PCS | Mod: S$GLB,,, | Performed by: PHYSICIAN ASSISTANT

## 2021-05-10 PROCEDURE — 73521 XR HIPS BILATERAL 2 VIEW INCL AP PELVIS: ICD-10-PCS | Mod: 26,,, | Performed by: RADIOLOGY

## 2021-05-10 PROCEDURE — 99999 PR PBB SHADOW E&M-EST. PATIENT-LVL IV: ICD-10-PCS | Mod: PBBFAC,,, | Performed by: PHYSICIAN ASSISTANT

## 2021-05-10 PROCEDURE — 73521 X-RAY EXAM HIPS BI 2 VIEWS: CPT | Mod: 26,,, | Performed by: RADIOLOGY

## 2021-05-10 PROCEDURE — 1157F PR ADVANCE CARE PLAN OR EQUIV PRESENT IN MEDICAL RECORD: ICD-10-PCS | Mod: S$GLB,,, | Performed by: PHYSICIAN ASSISTANT

## 2021-05-10 PROCEDURE — 1125F PR PAIN SEVERITY QUANTIFIED, PAIN PRESENT: ICD-10-PCS | Mod: S$GLB,,, | Performed by: PHYSICIAN ASSISTANT

## 2021-05-10 PROCEDURE — 99215 PR OFFICE/OUTPT VISIT, EST, LEVL V, 40-54 MIN: ICD-10-PCS | Mod: S$GLB,,, | Performed by: PHYSICIAN ASSISTANT

## 2021-05-10 PROCEDURE — 1125F AMNT PAIN NOTED PAIN PRSNT: CPT | Mod: S$GLB,,, | Performed by: PHYSICIAN ASSISTANT

## 2021-05-10 PROCEDURE — 73521 X-RAY EXAM HIPS BI 2 VIEWS: CPT | Mod: TC

## 2021-05-10 PROCEDURE — 1101F PT FALLS ASSESS-DOCD LE1/YR: CPT | Mod: CPTII,S$GLB,, | Performed by: PHYSICIAN ASSISTANT

## 2021-05-10 PROCEDURE — 99999 PR PBB SHADOW E&M-EST. PATIENT-LVL IV: CPT | Mod: PBBFAC,,, | Performed by: PHYSICIAN ASSISTANT

## 2021-05-10 PROCEDURE — 1159F MED LIST DOCD IN RCRD: CPT | Mod: S$GLB,,, | Performed by: PHYSICIAN ASSISTANT

## 2021-05-10 PROCEDURE — 1101F PR PT FALLS ASSESS DOC 0-1 FALLS W/OUT INJ PAST YR: ICD-10-PCS | Mod: CPTII,S$GLB,, | Performed by: PHYSICIAN ASSISTANT

## 2021-05-10 PROCEDURE — 1157F ADVNC CARE PLAN IN RCRD: CPT | Mod: S$GLB,,, | Performed by: PHYSICIAN ASSISTANT

## 2021-05-10 PROCEDURE — 3288F FALL RISK ASSESSMENT DOCD: CPT | Mod: CPTII,S$GLB,, | Performed by: PHYSICIAN ASSISTANT

## 2021-05-10 PROCEDURE — 99215 OFFICE O/P EST HI 40 MIN: CPT | Mod: S$GLB,,, | Performed by: PHYSICIAN ASSISTANT

## 2021-05-13 ENCOUNTER — TELEPHONE (OUTPATIENT)
Dept: SPORTS MEDICINE | Facility: CLINIC | Age: 81
End: 2021-05-13

## 2021-05-13 ENCOUNTER — PATIENT MESSAGE (OUTPATIENT)
Dept: SPORTS MEDICINE | Facility: CLINIC | Age: 81
End: 2021-05-13

## 2021-05-14 ENCOUNTER — TELEPHONE (OUTPATIENT)
Dept: SPORTS MEDICINE | Facility: CLINIC | Age: 81
End: 2021-05-14

## 2021-05-14 ENCOUNTER — PATIENT MESSAGE (OUTPATIENT)
Dept: ORTHOPEDICS | Facility: CLINIC | Age: 81
End: 2021-05-14

## 2021-05-14 ENCOUNTER — TELEPHONE (OUTPATIENT)
Dept: ORTHOPEDICS | Facility: CLINIC | Age: 81
End: 2021-05-14
Payer: MEDICARE

## 2021-05-14 DIAGNOSIS — M51.36 DDD (DEGENERATIVE DISC DISEASE), LUMBAR: Primary | ICD-10-CM

## 2021-06-03 ENCOUNTER — PATIENT MESSAGE (OUTPATIENT)
Dept: AUDIOLOGY | Facility: CLINIC | Age: 81
End: 2021-06-03

## 2021-06-04 ENCOUNTER — TELEPHONE (OUTPATIENT)
Dept: AUDIOLOGY | Facility: CLINIC | Age: 81
End: 2021-06-04

## 2021-06-04 DIAGNOSIS — H90.3 SENSORINEURAL HEARING LOSS, BILATERAL: Primary | ICD-10-CM

## 2021-06-05 NOTE — PLAN OF CARE
[facility] DAILY GOALS       A: Awake    RASS: Goal - RASS Goal: 0-->alert and calm  Actual - RASS (Ron Agitation-Sedation Scale): 0-->alert and calm   Restraint necessity:    B: Breath   SBT: Not intubated   C: Coordinate A & B, analgesics/sedatives   Pain: managed    SAT: Not intubated  D: Delirium   CAM-ICU: Overall CAM-ICU: Negative  E: Early Mobility   MOVE Screen: Pass   Activity: Activity Management: activity adjusted per tolerance  FAS: Feeding/Nutrition   Diet order: Diet/Nutrition Received: NPO, sips of water,   Fluid restriction:    T: Thrombus   DVT prophylaxis: VTE Required Core Measure: (SCDs) Sequential compression device initiated/maintained  H: HOB Elevation   Head of Bed (HOB): HOB at 20-30 degrees  U: Ulcer Prophylaxis   GI: yes  G: Glucose control   N/a     S: Skin   Bundle compliance: yes   Bathing/Skin Care: incontinence care, linen changed, bath, chlorhexidine, bath, complete, dressed/undressed Date: 12/13  B: Bowel Function   No BMs over night     I: Indwelling Catheters   Conner necessity:     CVC necessity: No   IPAD offered: No  D: De-escalation Antibx   Yes  Plan for the day   No BMs overnight, per colorectal, will monitor patient and not bring patient to I.R.   Family/Goals of care/Code Status   Code Status: Full Code     No acute events throughout day, VS and assessment per flow sheet, patient progressing towards goals as tolerated, plan of care reviewed with Jo-Ann Escobedo and family, all concerns addressed, will continue to monitor.    detailed exam

## 2021-06-09 ENCOUNTER — OFFICE VISIT (OUTPATIENT)
Dept: ORTHOPEDICS | Facility: CLINIC | Age: 81
End: 2021-06-09
Payer: MEDICARE

## 2021-06-09 ENCOUNTER — HOSPITAL ENCOUNTER (OUTPATIENT)
Dept: RADIOLOGY | Facility: HOSPITAL | Age: 81
Discharge: HOME | End: 2021-06-09
Attending: ORTHOPAEDIC SURGERY
Payer: MEDICARE

## 2021-06-09 VITALS — BODY MASS INDEX: 32.52 KG/M2 | HEIGHT: 63 IN | WEIGHT: 183.56 LBS

## 2021-06-09 DIAGNOSIS — M51.36 DDD (DEGENERATIVE DISC DISEASE), LUMBAR: ICD-10-CM

## 2021-06-09 DIAGNOSIS — M54.16 LUMBAR RADICULOPATHY: Primary | ICD-10-CM

## 2021-06-09 PROCEDURE — 99214 PR OFFICE/OUTPT VISIT, EST, LEVL IV, 30-39 MIN: ICD-10-PCS | Mod: S$GLB,,, | Performed by: ORTHOPAEDIC SURGERY

## 2021-06-09 PROCEDURE — 72120 X-RAY BEND ONLY L-S SPINE: CPT | Mod: TC

## 2021-06-09 PROCEDURE — 1157F PR ADVANCE CARE PLAN OR EQUIV PRESENT IN MEDICAL RECORD: ICD-10-PCS | Mod: S$GLB,,, | Performed by: ORTHOPAEDIC SURGERY

## 2021-06-09 PROCEDURE — 1159F PR MEDICATION LIST DOCUMENTED IN MEDICAL RECORD: ICD-10-PCS | Mod: S$GLB,,, | Performed by: ORTHOPAEDIC SURGERY

## 2021-06-09 PROCEDURE — 72120 XR LUMBAR SPINE FLEXION AND EXTENSION ONLY: ICD-10-PCS | Mod: 26,,, | Performed by: RADIOLOGY

## 2021-06-09 PROCEDURE — 1125F PR PAIN SEVERITY QUANTIFIED, PAIN PRESENT: ICD-10-PCS | Mod: S$GLB,,, | Performed by: ORTHOPAEDIC SURGERY

## 2021-06-09 PROCEDURE — 3288F FALL RISK ASSESSMENT DOCD: CPT | Mod: CPTII,S$GLB,, | Performed by: ORTHOPAEDIC SURGERY

## 2021-06-09 PROCEDURE — 99999 PR PBB SHADOW E&M-EST. PATIENT-LVL III: CPT | Mod: PBBFAC,,, | Performed by: ORTHOPAEDIC SURGERY

## 2021-06-09 PROCEDURE — 1101F PR PT FALLS ASSESS DOC 0-1 FALLS W/OUT INJ PAST YR: ICD-10-PCS | Mod: CPTII,S$GLB,, | Performed by: ORTHOPAEDIC SURGERY

## 2021-06-09 PROCEDURE — 3288F PR FALLS RISK ASSESSMENT DOCUMENTED: ICD-10-PCS | Mod: CPTII,S$GLB,, | Performed by: ORTHOPAEDIC SURGERY

## 2021-06-09 PROCEDURE — 1157F ADVNC CARE PLAN IN RCRD: CPT | Mod: S$GLB,,, | Performed by: ORTHOPAEDIC SURGERY

## 2021-06-09 PROCEDURE — 99999 PR PBB SHADOW E&M-EST. PATIENT-LVL III: ICD-10-PCS | Mod: PBBFAC,,, | Performed by: ORTHOPAEDIC SURGERY

## 2021-06-09 PROCEDURE — 1159F MED LIST DOCD IN RCRD: CPT | Mod: S$GLB,,, | Performed by: ORTHOPAEDIC SURGERY

## 2021-06-09 PROCEDURE — 99214 OFFICE O/P EST MOD 30 MIN: CPT | Mod: S$GLB,,, | Performed by: ORTHOPAEDIC SURGERY

## 2021-06-09 PROCEDURE — 72120 X-RAY BEND ONLY L-S SPINE: CPT | Mod: 26,,, | Performed by: RADIOLOGY

## 2021-06-09 PROCEDURE — 1101F PT FALLS ASSESS-DOCD LE1/YR: CPT | Mod: CPTII,S$GLB,, | Performed by: ORTHOPAEDIC SURGERY

## 2021-06-09 PROCEDURE — 1125F AMNT PAIN NOTED PAIN PRSNT: CPT | Mod: S$GLB,,, | Performed by: ORTHOPAEDIC SURGERY

## 2021-06-16 ENCOUNTER — CLINICAL SUPPORT (OUTPATIENT)
Dept: AUDIOLOGY | Facility: CLINIC | Age: 81
End: 2021-06-16
Payer: MEDICARE

## 2021-06-16 DIAGNOSIS — H90.3 SENSORINEURAL HEARING LOSS, BILATERAL: Primary | ICD-10-CM

## 2021-06-16 DIAGNOSIS — H90.3 SENSORINEURAL HEARING LOSS, BILATERAL: ICD-10-CM

## 2021-06-16 PROCEDURE — 99999 PR PBB SHADOW E&M-EST. PATIENT-LVL I: CPT | Mod: PBBFAC,,, | Performed by: AUDIOLOGIST

## 2021-06-16 PROCEDURE — 99999 PR PBB SHADOW E&M-EST. PATIENT-LVL I: ICD-10-PCS | Mod: PBBFAC,,, | Performed by: AUDIOLOGIST

## 2021-06-16 PROCEDURE — 92557 PR COMPREHENSIVE HEARING TEST: ICD-10-PCS | Mod: 52,S$GLB,, | Performed by: AUDIOLOGIST

## 2021-06-16 PROCEDURE — 92557 COMPREHENSIVE HEARING TEST: CPT | Mod: 52,S$GLB,, | Performed by: AUDIOLOGIST

## 2021-07-26 ENCOUNTER — PATIENT OUTREACH (OUTPATIENT)
Dept: ADMINISTRATIVE | Facility: OTHER | Age: 81
End: 2021-07-26

## 2021-07-27 ENCOUNTER — OFFICE VISIT (OUTPATIENT)
Dept: OPTOMETRY | Facility: CLINIC | Age: 81
End: 2021-07-27
Payer: MEDICARE

## 2021-07-27 DIAGNOSIS — H52.4 PRESBYOPIA: ICD-10-CM

## 2021-07-27 DIAGNOSIS — H43.393 VITREOUS FLOATERS OF BOTH EYES: Primary | ICD-10-CM

## 2021-07-27 DIAGNOSIS — Z13.5 GLAUCOMA SCREENING: ICD-10-CM

## 2021-07-27 PROCEDURE — 92015 PR REFRACTION: ICD-10-PCS | Mod: S$GLB,,, | Performed by: OPTOMETRIST

## 2021-07-27 PROCEDURE — 1160F RVW MEDS BY RX/DR IN RCRD: CPT | Mod: CPTII,S$GLB,, | Performed by: OPTOMETRIST

## 2021-07-27 PROCEDURE — 1157F ADVNC CARE PLAN IN RCRD: CPT | Mod: CPTII,S$GLB,, | Performed by: OPTOMETRIST

## 2021-07-27 PROCEDURE — 92014 COMPRE OPH EXAM EST PT 1/>: CPT | Mod: S$GLB,,, | Performed by: OPTOMETRIST

## 2021-07-27 PROCEDURE — 92014 PR EYE EXAM, EST PATIENT,COMPREHESV: ICD-10-PCS | Mod: S$GLB,,, | Performed by: OPTOMETRIST

## 2021-07-27 PROCEDURE — 1160F PR REVIEW ALL MEDS BY PRESCRIBER/CLIN PHARMACIST DOCUMENTED: ICD-10-PCS | Mod: CPTII,S$GLB,, | Performed by: OPTOMETRIST

## 2021-07-27 PROCEDURE — 1159F PR MEDICATION LIST DOCUMENTED IN MEDICAL RECORD: ICD-10-PCS | Mod: CPTII,S$GLB,, | Performed by: OPTOMETRIST

## 2021-07-27 PROCEDURE — 1157F PR ADVANCE CARE PLAN OR EQUIV PRESENT IN MEDICAL RECORD: ICD-10-PCS | Mod: CPTII,S$GLB,, | Performed by: OPTOMETRIST

## 2021-07-27 PROCEDURE — 99999 PR PBB SHADOW E&M-EST. PATIENT-LVL II: ICD-10-PCS | Mod: PBBFAC,,, | Performed by: OPTOMETRIST

## 2021-07-27 PROCEDURE — 1159F MED LIST DOCD IN RCRD: CPT | Mod: CPTII,S$GLB,, | Performed by: OPTOMETRIST

## 2021-07-27 PROCEDURE — 99999 PR PBB SHADOW E&M-EST. PATIENT-LVL II: CPT | Mod: PBBFAC,,, | Performed by: OPTOMETRIST

## 2021-07-27 PROCEDURE — 92015 DETERMINE REFRACTIVE STATE: CPT | Mod: S$GLB,,, | Performed by: OPTOMETRIST

## 2021-08-06 ENCOUNTER — OFFICE VISIT (OUTPATIENT)
Dept: INTERNAL MEDICINE | Facility: CLINIC | Age: 81
End: 2021-08-06
Payer: MEDICARE

## 2021-08-06 ENCOUNTER — HOSPITAL ENCOUNTER (OUTPATIENT)
Dept: RADIOLOGY | Facility: HOSPITAL | Age: 81
Discharge: HOME | End: 2021-08-06
Attending: FAMILY MEDICINE
Payer: MEDICARE

## 2021-08-06 VITALS
WEIGHT: 184.5 LBS | HEART RATE: 85 BPM | TEMPERATURE: 99 F | HEIGHT: 63 IN | SYSTOLIC BLOOD PRESSURE: 130 MMHG | BODY MASS INDEX: 32.69 KG/M2 | DIASTOLIC BLOOD PRESSURE: 80 MMHG | OXYGEN SATURATION: 97 %

## 2021-08-06 DIAGNOSIS — M25.551 RIGHT HIP PAIN: ICD-10-CM

## 2021-08-06 DIAGNOSIS — R07.81 RIB PAIN ON RIGHT SIDE: Primary | ICD-10-CM

## 2021-08-06 DIAGNOSIS — R07.81 RIB PAIN ON RIGHT SIDE: ICD-10-CM

## 2021-08-06 PROCEDURE — 1159F MED LIST DOCD IN RCRD: CPT | Mod: CPTII,S$GLB,, | Performed by: FAMILY MEDICINE

## 2021-08-06 PROCEDURE — 1160F RVW MEDS BY RX/DR IN RCRD: CPT | Mod: CPTII,S$GLB,, | Performed by: FAMILY MEDICINE

## 2021-08-06 PROCEDURE — 71100 X-RAY EXAM RIBS UNI 2 VIEWS: CPT | Mod: TC,RT

## 2021-08-06 PROCEDURE — 1160F PR REVIEW ALL MEDS BY PRESCRIBER/CLIN PHARMACIST DOCUMENTED: ICD-10-PCS | Mod: CPTII,S$GLB,, | Performed by: FAMILY MEDICINE

## 2021-08-06 PROCEDURE — 3288F FALL RISK ASSESSMENT DOCD: CPT | Mod: CPTII,S$GLB,, | Performed by: FAMILY MEDICINE

## 2021-08-06 PROCEDURE — 71100 X-RAY EXAM RIBS UNI 2 VIEWS: CPT | Mod: 26,RT,, | Performed by: RADIOLOGY

## 2021-08-06 PROCEDURE — 3075F SYST BP GE 130 - 139MM HG: CPT | Mod: CPTII,S$GLB,, | Performed by: FAMILY MEDICINE

## 2021-08-06 PROCEDURE — 99214 OFFICE O/P EST MOD 30 MIN: CPT | Mod: S$GLB,,, | Performed by: FAMILY MEDICINE

## 2021-08-06 PROCEDURE — 99999 PR PBB SHADOW E&M-EST. PATIENT-LVL V: ICD-10-PCS | Mod: PBBFAC,,, | Performed by: FAMILY MEDICINE

## 2021-08-06 PROCEDURE — 99214 PR OFFICE/OUTPT VISIT, EST, LEVL IV, 30-39 MIN: ICD-10-PCS | Mod: S$GLB,,, | Performed by: FAMILY MEDICINE

## 2021-08-06 PROCEDURE — 1101F PT FALLS ASSESS-DOCD LE1/YR: CPT | Mod: CPTII,S$GLB,, | Performed by: FAMILY MEDICINE

## 2021-08-06 PROCEDURE — 1101F PR PT FALLS ASSESS DOC 0-1 FALLS W/OUT INJ PAST YR: ICD-10-PCS | Mod: CPTII,S$GLB,, | Performed by: FAMILY MEDICINE

## 2021-08-06 PROCEDURE — 3288F PR FALLS RISK ASSESSMENT DOCUMENTED: ICD-10-PCS | Mod: CPTII,S$GLB,, | Performed by: FAMILY MEDICINE

## 2021-08-06 PROCEDURE — 3079F PR MOST RECENT DIASTOLIC BLOOD PRESSURE 80-89 MM HG: ICD-10-PCS | Mod: CPTII,S$GLB,, | Performed by: FAMILY MEDICINE

## 2021-08-06 PROCEDURE — 1157F PR ADVANCE CARE PLAN OR EQUIV PRESENT IN MEDICAL RECORD: ICD-10-PCS | Mod: CPTII,S$GLB,, | Performed by: FAMILY MEDICINE

## 2021-08-06 PROCEDURE — 1125F AMNT PAIN NOTED PAIN PRSNT: CPT | Mod: CPTII,S$GLB,, | Performed by: FAMILY MEDICINE

## 2021-08-06 PROCEDURE — 71100 XR RIBS 2 VIEW RIGHT: ICD-10-PCS | Mod: 26,RT,, | Performed by: RADIOLOGY

## 2021-08-06 PROCEDURE — 3079F DIAST BP 80-89 MM HG: CPT | Mod: CPTII,S$GLB,, | Performed by: FAMILY MEDICINE

## 2021-08-06 PROCEDURE — 1157F ADVNC CARE PLAN IN RCRD: CPT | Mod: CPTII,S$GLB,, | Performed by: FAMILY MEDICINE

## 2021-08-06 PROCEDURE — 3075F PR MOST RECENT SYSTOLIC BLOOD PRESS GE 130-139MM HG: ICD-10-PCS | Mod: CPTII,S$GLB,, | Performed by: FAMILY MEDICINE

## 2021-08-06 PROCEDURE — 1125F PR PAIN SEVERITY QUANTIFIED, PAIN PRESENT: ICD-10-PCS | Mod: CPTII,S$GLB,, | Performed by: FAMILY MEDICINE

## 2021-08-06 PROCEDURE — 1159F PR MEDICATION LIST DOCUMENTED IN MEDICAL RECORD: ICD-10-PCS | Mod: CPTII,S$GLB,, | Performed by: FAMILY MEDICINE

## 2021-08-06 PROCEDURE — 99999 PR PBB SHADOW E&M-EST. PATIENT-LVL V: CPT | Mod: PBBFAC,,, | Performed by: FAMILY MEDICINE

## 2021-08-06 RX ORDER — LIDOCAINE 50 MG/G
1 PATCH TOPICAL DAILY PRN
Qty: 30 PATCH | Refills: 2 | Status: SHIPPED | OUTPATIENT
Start: 2021-08-06 | End: 2022-07-14

## 2021-08-06 RX ORDER — TRAMADOL HYDROCHLORIDE 100 MG/1
1 TABLET, COATED ORAL DAILY PRN
Qty: 30 TABLET | Refills: 0 | Status: SHIPPED | OUTPATIENT
Start: 2021-08-06 | End: 2021-11-22

## 2021-08-08 ENCOUNTER — TELEPHONE (OUTPATIENT)
Dept: INTERNAL MEDICINE | Facility: CLINIC | Age: 81
End: 2021-08-08

## 2021-08-08 DIAGNOSIS — R93.89 ABNORMAL X-RAY: ICD-10-CM

## 2021-08-08 DIAGNOSIS — Q76.6 ABNORMALITY OF RIB DETERMINED BY X-RAY: Primary | ICD-10-CM

## 2021-08-09 ENCOUNTER — OFFICE VISIT (OUTPATIENT)
Dept: ORTHOPEDICS | Facility: CLINIC | Age: 81
End: 2021-08-09
Payer: MEDICARE

## 2021-08-09 VITALS — WEIGHT: 184 LBS | HEIGHT: 63 IN | BODY MASS INDEX: 32.6 KG/M2

## 2021-08-09 DIAGNOSIS — M17.11 PRIMARY OSTEOARTHRITIS OF RIGHT KNEE: ICD-10-CM

## 2021-08-09 PROCEDURE — 99214 OFFICE O/P EST MOD 30 MIN: CPT | Mod: S$GLB,,, | Performed by: PHYSICIAN ASSISTANT

## 2021-08-09 PROCEDURE — 1159F PR MEDICATION LIST DOCUMENTED IN MEDICAL RECORD: ICD-10-PCS | Mod: CPTII,S$GLB,, | Performed by: PHYSICIAN ASSISTANT

## 2021-08-09 PROCEDURE — 1157F ADVNC CARE PLAN IN RCRD: CPT | Mod: CPTII,S$GLB,, | Performed by: PHYSICIAN ASSISTANT

## 2021-08-09 PROCEDURE — 1157F PR ADVANCE CARE PLAN OR EQUIV PRESENT IN MEDICAL RECORD: ICD-10-PCS | Mod: CPTII,S$GLB,, | Performed by: PHYSICIAN ASSISTANT

## 2021-08-09 PROCEDURE — 99999 PR PBB SHADOW E&M-EST. PATIENT-LVL IV: CPT | Mod: PBBFAC,,, | Performed by: PHYSICIAN ASSISTANT

## 2021-08-09 PROCEDURE — 99999 PR PBB SHADOW E&M-EST. PATIENT-LVL IV: ICD-10-PCS | Mod: PBBFAC,,, | Performed by: PHYSICIAN ASSISTANT

## 2021-08-09 PROCEDURE — 99214 PR OFFICE/OUTPT VISIT, EST, LEVL IV, 30-39 MIN: ICD-10-PCS | Mod: S$GLB,,, | Performed by: PHYSICIAN ASSISTANT

## 2021-08-09 PROCEDURE — 1125F PR PAIN SEVERITY QUANTIFIED, PAIN PRESENT: ICD-10-PCS | Mod: CPTII,S$GLB,, | Performed by: PHYSICIAN ASSISTANT

## 2021-08-09 PROCEDURE — 3288F PR FALLS RISK ASSESSMENT DOCUMENTED: ICD-10-PCS | Mod: CPTII,S$GLB,, | Performed by: PHYSICIAN ASSISTANT

## 2021-08-09 PROCEDURE — 1159F MED LIST DOCD IN RCRD: CPT | Mod: CPTII,S$GLB,, | Performed by: PHYSICIAN ASSISTANT

## 2021-08-09 PROCEDURE — 1101F PT FALLS ASSESS-DOCD LE1/YR: CPT | Mod: CPTII,S$GLB,, | Performed by: PHYSICIAN ASSISTANT

## 2021-08-09 PROCEDURE — 1160F PR REVIEW ALL MEDS BY PRESCRIBER/CLIN PHARMACIST DOCUMENTED: ICD-10-PCS | Mod: CPTII,S$GLB,, | Performed by: PHYSICIAN ASSISTANT

## 2021-08-09 PROCEDURE — 1160F RVW MEDS BY RX/DR IN RCRD: CPT | Mod: CPTII,S$GLB,, | Performed by: PHYSICIAN ASSISTANT

## 2021-08-09 PROCEDURE — 3288F FALL RISK ASSESSMENT DOCD: CPT | Mod: CPTII,S$GLB,, | Performed by: PHYSICIAN ASSISTANT

## 2021-08-09 PROCEDURE — 1125F AMNT PAIN NOTED PAIN PRSNT: CPT | Mod: CPTII,S$GLB,, | Performed by: PHYSICIAN ASSISTANT

## 2021-08-09 PROCEDURE — 1101F PR PT FALLS ASSESS DOC 0-1 FALLS W/OUT INJ PAST YR: ICD-10-PCS | Mod: CPTII,S$GLB,, | Performed by: PHYSICIAN ASSISTANT

## 2021-08-10 ENCOUNTER — TELEPHONE (OUTPATIENT)
Dept: INTERNAL MEDICINE | Facility: CLINIC | Age: 81
End: 2021-08-10

## 2021-08-10 ENCOUNTER — HOSPITAL ENCOUNTER (OUTPATIENT)
Dept: RADIOLOGY | Facility: HOSPITAL | Age: 81
Discharge: HOME | End: 2021-08-10
Attending: FAMILY MEDICINE
Payer: MEDICARE

## 2021-08-10 DIAGNOSIS — R93.89 ABNORMAL X-RAY: ICD-10-CM

## 2021-08-10 PROCEDURE — 71046 X-RAY EXAM CHEST 2 VIEWS: CPT | Mod: TC

## 2021-08-10 PROCEDURE — 71046 XR CHEST PA AND LATERAL: ICD-10-PCS | Mod: 26,,, | Performed by: RADIOLOGY

## 2021-08-10 PROCEDURE — 71046 X-RAY EXAM CHEST 2 VIEWS: CPT | Mod: 26,,, | Performed by: RADIOLOGY

## 2021-08-11 ENCOUNTER — OFFICE VISIT (OUTPATIENT)
Dept: DERMATOLOGY | Facility: CLINIC | Age: 81
End: 2021-08-11
Payer: MEDICARE

## 2021-08-11 DIAGNOSIS — D22.9 NEVUS: ICD-10-CM

## 2021-08-11 DIAGNOSIS — Z85.828 PERSONAL HISTORY OF SKIN CANCER: ICD-10-CM

## 2021-08-11 DIAGNOSIS — L81.4 LENTIGO: ICD-10-CM

## 2021-08-11 DIAGNOSIS — L82.1 SK (SEBORRHEIC KERATOSIS): ICD-10-CM

## 2021-08-11 DIAGNOSIS — R20.2 NOTALGIA PARESTHETICA: ICD-10-CM

## 2021-08-11 DIAGNOSIS — L73.8 SEBACEOUS GLAND HYPERPLASIA: ICD-10-CM

## 2021-08-11 DIAGNOSIS — L72.0 MILIA: ICD-10-CM

## 2021-08-11 DIAGNOSIS — L57.0 AK (ACTINIC KERATOSIS): Primary | ICD-10-CM

## 2021-08-11 PROCEDURE — 1159F PR MEDICATION LIST DOCUMENTED IN MEDICAL RECORD: ICD-10-PCS | Mod: CPTII,S$GLB,, | Performed by: DERMATOLOGY

## 2021-08-11 PROCEDURE — 1160F RVW MEDS BY RX/DR IN RCRD: CPT | Mod: CPTII,S$GLB,, | Performed by: DERMATOLOGY

## 2021-08-11 PROCEDURE — 1101F PR PT FALLS ASSESS DOC 0-1 FALLS W/OUT INJ PAST YR: ICD-10-PCS | Mod: CPTII,S$GLB,, | Performed by: DERMATOLOGY

## 2021-08-11 PROCEDURE — 3288F FALL RISK ASSESSMENT DOCD: CPT | Mod: CPTII,S$GLB,, | Performed by: DERMATOLOGY

## 2021-08-11 PROCEDURE — 1160F PR REVIEW ALL MEDS BY PRESCRIBER/CLIN PHARMACIST DOCUMENTED: ICD-10-PCS | Mod: CPTII,S$GLB,, | Performed by: DERMATOLOGY

## 2021-08-11 PROCEDURE — 1126F PR PAIN SEVERITY QUANTIFIED, NO PAIN PRESENT: ICD-10-PCS | Mod: CPTII,S$GLB,, | Performed by: DERMATOLOGY

## 2021-08-11 PROCEDURE — 1101F PT FALLS ASSESS-DOCD LE1/YR: CPT | Mod: CPTII,S$GLB,, | Performed by: DERMATOLOGY

## 2021-08-11 PROCEDURE — 1159F MED LIST DOCD IN RCRD: CPT | Mod: CPTII,S$GLB,, | Performed by: DERMATOLOGY

## 2021-08-11 PROCEDURE — 99999 PR PBB SHADOW E&M-EST. PATIENT-LVL III: ICD-10-PCS | Mod: PBBFAC,,, | Performed by: DERMATOLOGY

## 2021-08-11 PROCEDURE — 99999 PR PBB SHADOW E&M-EST. PATIENT-LVL III: CPT | Mod: PBBFAC,,, | Performed by: DERMATOLOGY

## 2021-08-11 PROCEDURE — 1157F PR ADVANCE CARE PLAN OR EQUIV PRESENT IN MEDICAL RECORD: ICD-10-PCS | Mod: CPTII,S$GLB,, | Performed by: DERMATOLOGY

## 2021-08-11 PROCEDURE — 1126F AMNT PAIN NOTED NONE PRSNT: CPT | Mod: CPTII,S$GLB,, | Performed by: DERMATOLOGY

## 2021-08-11 PROCEDURE — 17003 DESTRUCTION, PREMALIGNANT LESIONS; SECOND THROUGH 14 LESIONS: ICD-10-PCS | Mod: S$GLB,,, | Performed by: DERMATOLOGY

## 2021-08-11 PROCEDURE — 3288F PR FALLS RISK ASSESSMENT DOCUMENTED: ICD-10-PCS | Mod: CPTII,S$GLB,, | Performed by: DERMATOLOGY

## 2021-08-11 PROCEDURE — 1157F ADVNC CARE PLAN IN RCRD: CPT | Mod: CPTII,S$GLB,, | Performed by: DERMATOLOGY

## 2021-08-11 PROCEDURE — 17000 DESTRUCT PREMALG LESION: CPT | Mod: S$GLB,,, | Performed by: DERMATOLOGY

## 2021-08-11 PROCEDURE — 17003 DESTRUCT PREMALG LES 2-14: CPT | Mod: S$GLB,,, | Performed by: DERMATOLOGY

## 2021-08-11 PROCEDURE — 99213 OFFICE O/P EST LOW 20 MIN: CPT | Mod: 25,S$GLB,, | Performed by: DERMATOLOGY

## 2021-08-11 PROCEDURE — 17000 PR DESTRUCTION(LASER SURGERY,CRYOSURGERY,CHEMOSURGERY),PREMALIGNANT LESIONS,FIRST LESION: ICD-10-PCS | Mod: S$GLB,,, | Performed by: DERMATOLOGY

## 2021-08-11 PROCEDURE — 99213 PR OFFICE/OUTPT VISIT, EST, LEVL III, 20-29 MIN: ICD-10-PCS | Mod: 25,S$GLB,, | Performed by: DERMATOLOGY

## 2021-08-27 ENCOUNTER — TELEPHONE (OUTPATIENT)
Dept: HEMATOLOGY/ONCOLOGY | Facility: CLINIC | Age: 81
End: 2021-08-27

## 2021-09-03 ENCOUNTER — PATIENT MESSAGE (OUTPATIENT)
Dept: NEUROSURGERY | Facility: CLINIC | Age: 81
End: 2021-09-03

## 2021-09-30 ENCOUNTER — OFFICE VISIT (OUTPATIENT)
Dept: HEMATOLOGY/ONCOLOGY | Facility: CLINIC | Age: 81
End: 2021-09-30
Payer: MEDICARE

## 2021-09-30 VITALS
DIASTOLIC BLOOD PRESSURE: 74 MMHG | OXYGEN SATURATION: 95 % | TEMPERATURE: 99 F | RESPIRATION RATE: 16 BRPM | BODY MASS INDEX: 32.66 KG/M2 | HEART RATE: 84 BPM | HEIGHT: 63 IN | SYSTOLIC BLOOD PRESSURE: 158 MMHG | WEIGHT: 184.31 LBS

## 2021-09-30 DIAGNOSIS — D05.11 DUCTAL CARCINOMA IN SITU (DCIS) OF RIGHT BREAST: Primary | ICD-10-CM

## 2021-09-30 PROCEDURE — 1101F PT FALLS ASSESS-DOCD LE1/YR: CPT | Mod: CPTII,S$GLB,, | Performed by: INTERNAL MEDICINE

## 2021-09-30 PROCEDURE — 1160F PR REVIEW ALL MEDS BY PRESCRIBER/CLIN PHARMACIST DOCUMENTED: ICD-10-PCS | Mod: CPTII,S$GLB,, | Performed by: INTERNAL MEDICINE

## 2021-09-30 PROCEDURE — 3288F FALL RISK ASSESSMENT DOCD: CPT | Mod: CPTII,S$GLB,, | Performed by: INTERNAL MEDICINE

## 2021-09-30 PROCEDURE — 1126F PR PAIN SEVERITY QUANTIFIED, NO PAIN PRESENT: ICD-10-PCS | Mod: CPTII,S$GLB,, | Performed by: INTERNAL MEDICINE

## 2021-09-30 PROCEDURE — 99213 PR OFFICE/OUTPT VISIT, EST, LEVL III, 20-29 MIN: ICD-10-PCS | Mod: S$GLB,,, | Performed by: INTERNAL MEDICINE

## 2021-09-30 PROCEDURE — 3078F PR MOST RECENT DIASTOLIC BLOOD PRESSURE < 80 MM HG: ICD-10-PCS | Mod: CPTII,S$GLB,, | Performed by: INTERNAL MEDICINE

## 2021-09-30 PROCEDURE — 99213 OFFICE O/P EST LOW 20 MIN: CPT | Mod: S$GLB,,, | Performed by: INTERNAL MEDICINE

## 2021-09-30 PROCEDURE — 3078F DIAST BP <80 MM HG: CPT | Mod: CPTII,S$GLB,, | Performed by: INTERNAL MEDICINE

## 2021-09-30 PROCEDURE — 1160F RVW MEDS BY RX/DR IN RCRD: CPT | Mod: CPTII,S$GLB,, | Performed by: INTERNAL MEDICINE

## 2021-09-30 PROCEDURE — 1159F MED LIST DOCD IN RCRD: CPT | Mod: CPTII,S$GLB,, | Performed by: INTERNAL MEDICINE

## 2021-09-30 PROCEDURE — 3077F SYST BP >= 140 MM HG: CPT | Mod: CPTII,S$GLB,, | Performed by: INTERNAL MEDICINE

## 2021-09-30 PROCEDURE — 1126F AMNT PAIN NOTED NONE PRSNT: CPT | Mod: CPTII,S$GLB,, | Performed by: INTERNAL MEDICINE

## 2021-09-30 PROCEDURE — 99999 PR PBB SHADOW E&M-EST. PATIENT-LVL IV: CPT | Mod: PBBFAC,,, | Performed by: INTERNAL MEDICINE

## 2021-09-30 PROCEDURE — 99999 PR PBB SHADOW E&M-EST. PATIENT-LVL IV: ICD-10-PCS | Mod: PBBFAC,,, | Performed by: INTERNAL MEDICINE

## 2021-09-30 PROCEDURE — 1159F PR MEDICATION LIST DOCUMENTED IN MEDICAL RECORD: ICD-10-PCS | Mod: CPTII,S$GLB,, | Performed by: INTERNAL MEDICINE

## 2021-09-30 PROCEDURE — 3077F PR MOST RECENT SYSTOLIC BLOOD PRESSURE >= 140 MM HG: ICD-10-PCS | Mod: CPTII,S$GLB,, | Performed by: INTERNAL MEDICINE

## 2021-09-30 PROCEDURE — 1157F ADVNC CARE PLAN IN RCRD: CPT | Mod: CPTII,S$GLB,, | Performed by: INTERNAL MEDICINE

## 2021-09-30 PROCEDURE — 1101F PR PT FALLS ASSESS DOC 0-1 FALLS W/OUT INJ PAST YR: ICD-10-PCS | Mod: CPTII,S$GLB,, | Performed by: INTERNAL MEDICINE

## 2021-09-30 PROCEDURE — 1157F PR ADVANCE CARE PLAN OR EQUIV PRESENT IN MEDICAL RECORD: ICD-10-PCS | Mod: CPTII,S$GLB,, | Performed by: INTERNAL MEDICINE

## 2021-09-30 PROCEDURE — 3288F PR FALLS RISK ASSESSMENT DOCUMENTED: ICD-10-PCS | Mod: CPTII,S$GLB,, | Performed by: INTERNAL MEDICINE

## 2021-09-30 RX ORDER — TRAMADOL HYDROCHLORIDE 50 MG/1
50 TABLET ORAL EVERY 12 HOURS PRN
COMMUNITY
Start: 2021-08-09 | End: 2021-11-22

## 2021-09-30 RX ORDER — LOSARTAN POTASSIUM 50 MG/1
100 TABLET ORAL DAILY
COMMUNITY
Start: 2021-08-13 | End: 2021-12-29 | Stop reason: DRUGHIGH

## 2021-10-02 ENCOUNTER — IMMUNIZATION (OUTPATIENT)
Dept: PRIMARY CARE CLINIC | Facility: CLINIC | Age: 81
End: 2021-10-02
Payer: MEDICARE

## 2021-10-02 DIAGNOSIS — Z23 NEED FOR VACCINATION: Primary | ICD-10-CM

## 2021-10-02 PROCEDURE — 0001A COVID-19, MRNA, LNP-S, PF, 30 MCG/0.3 ML DOSE VACCINE: CPT | Mod: CV19,PBBFAC | Performed by: INTERNAL MEDICINE

## 2021-10-09 ENCOUNTER — CLINICAL SUPPORT (OUTPATIENT)
Dept: URGENT CARE | Facility: CLINIC | Age: 81
End: 2021-10-09
Payer: MEDICARE

## 2021-10-09 DIAGNOSIS — Z23 FLU VACCINE NEED: Primary | ICD-10-CM

## 2021-10-09 PROCEDURE — G0008 ADMIN INFLUENZA VIRUS VAC: HCPCS | Mod: S$GLB,,, | Performed by: PHYSICIAN ASSISTANT

## 2021-10-09 PROCEDURE — G0008 FLU VACCINE - QUADRIVALENT - ADJUVANTED: ICD-10-PCS | Mod: S$GLB,,, | Performed by: PHYSICIAN ASSISTANT

## 2021-10-09 PROCEDURE — 90694 FLU VACCINE - QUADRIVALENT - ADJUVANTED: ICD-10-PCS | Mod: S$GLB,,, | Performed by: PHYSICIAN ASSISTANT

## 2021-10-09 PROCEDURE — 90694 VACC AIIV4 NO PRSRV 0.5ML IM: CPT | Mod: S$GLB,,, | Performed by: PHYSICIAN ASSISTANT

## 2021-10-15 ENCOUNTER — PES CALL (OUTPATIENT)
Dept: ADMINISTRATIVE | Facility: CLINIC | Age: 81
End: 2021-10-15

## 2021-10-28 ENCOUNTER — OFFICE VISIT (OUTPATIENT)
Dept: INTERNAL MEDICINE | Facility: CLINIC | Age: 81
End: 2021-10-28
Payer: MEDICARE

## 2021-10-28 VITALS
SYSTOLIC BLOOD PRESSURE: 124 MMHG | DIASTOLIC BLOOD PRESSURE: 80 MMHG | WEIGHT: 180.31 LBS | HEART RATE: 77 BPM | HEIGHT: 63 IN | BODY MASS INDEX: 31.95 KG/M2 | OXYGEN SATURATION: 97 %

## 2021-10-28 DIAGNOSIS — Z87.19 HISTORY OF GI BLEED: ICD-10-CM

## 2021-10-28 DIAGNOSIS — I70.0 AORTIC ATHEROSCLEROSIS: ICD-10-CM

## 2021-10-28 DIAGNOSIS — M47.16 OSTEOARTHRITIS OF LUMBAR SPINE WITH MYELOPATHY: ICD-10-CM

## 2021-10-28 DIAGNOSIS — Z00.00 ENCOUNTER FOR PREVENTIVE HEALTH EXAMINATION: Primary | ICD-10-CM

## 2021-10-28 DIAGNOSIS — M51.37 DDD (DEGENERATIVE DISC DISEASE), LUMBOSACRAL: ICD-10-CM

## 2021-10-28 DIAGNOSIS — E89.0 HYPOTHYROIDISM, POSTSURGICAL: ICD-10-CM

## 2021-10-28 DIAGNOSIS — E78.2 MIXED HYPERLIPIDEMIA: ICD-10-CM

## 2021-10-28 DIAGNOSIS — M48.062 SPINAL STENOSIS OF LUMBAR REGION WITH NEUROGENIC CLAUDICATION: ICD-10-CM

## 2021-10-28 DIAGNOSIS — M88.9 PAGET'S DISEASE OF BONE: ICD-10-CM

## 2021-10-28 DIAGNOSIS — M47.816 FACET ARTHRITIS OF LUMBAR REGION: ICD-10-CM

## 2021-10-28 DIAGNOSIS — R73.03 PREDIABETES: ICD-10-CM

## 2021-10-28 DIAGNOSIS — M47.816 SPONDYLOSIS OF LUMBAR REGION WITHOUT MYELOPATHY OR RADICULOPATHY: ICD-10-CM

## 2021-10-28 DIAGNOSIS — E66.9 OBESITY (BMI 30.0-34.9): ICD-10-CM

## 2021-10-28 DIAGNOSIS — D05.11 DUCTAL CARCINOMA IN SITU (DCIS) OF RIGHT BREAST: ICD-10-CM

## 2021-10-28 DIAGNOSIS — I10 ESSENTIAL HYPERTENSION: ICD-10-CM

## 2021-10-28 DIAGNOSIS — D51.3 OTHER DIETARY VITAMIN B12 DEFICIENCY ANEMIA: ICD-10-CM

## 2021-10-28 DIAGNOSIS — H81.10 BENIGN PAROXYSMAL POSITIONAL VERTIGO, UNSPECIFIED LATERALITY: ICD-10-CM

## 2021-10-28 PROBLEM — E53.8 LOW SERUM VITAMIN B12: Status: RESOLVED | Noted: 2019-12-23 | Resolved: 2021-10-28

## 2021-10-28 PROBLEM — R29.898 DECREASED GRIP STRENGTH OF LEFT HAND: Status: RESOLVED | Noted: 2020-08-10 | Resolved: 2021-10-28

## 2021-10-28 PROBLEM — K92.1 HEMATOCHEZIA: Status: RESOLVED | Noted: 2019-12-15 | Resolved: 2021-10-28

## 2021-10-28 PROBLEM — M25.552 LEFT HIP PAIN: Status: RESOLVED | Noted: 2021-04-29 | Resolved: 2021-10-28

## 2021-10-28 PROBLEM — M79.642 LEFT HAND PAIN: Status: RESOLVED | Noted: 2020-08-10 | Resolved: 2021-10-28

## 2021-10-28 PROBLEM — D62 ACUTE BLOOD LOSS ANEMIA: Status: RESOLVED | Noted: 2019-12-07 | Resolved: 2021-10-28

## 2021-10-28 PROBLEM — R52 PAIN: Status: RESOLVED | Noted: 2021-03-30 | Resolved: 2021-10-28

## 2021-10-28 PROCEDURE — 1170F PR FUNCTIONAL STATUS ASSESSED: ICD-10-PCS | Mod: CPTII,S$GLB,, | Performed by: NURSE PRACTITIONER

## 2021-10-28 PROCEDURE — 3079F PR MOST RECENT DIASTOLIC BLOOD PRESSURE 80-89 MM HG: ICD-10-PCS | Mod: CPTII,S$GLB,, | Performed by: NURSE PRACTITIONER

## 2021-10-28 PROCEDURE — 1125F AMNT PAIN NOTED PAIN PRSNT: CPT | Mod: CPTII,S$GLB,, | Performed by: NURSE PRACTITIONER

## 2021-10-28 PROCEDURE — G0439 PPPS, SUBSEQ VISIT: HCPCS | Mod: S$GLB,,, | Performed by: NURSE PRACTITIONER

## 2021-10-28 PROCEDURE — 1160F RVW MEDS BY RX/DR IN RCRD: CPT | Mod: CPTII,S$GLB,, | Performed by: NURSE PRACTITIONER

## 2021-10-28 PROCEDURE — 3074F PR MOST RECENT SYSTOLIC BLOOD PRESSURE < 130 MM HG: ICD-10-PCS | Mod: CPTII,S$GLB,, | Performed by: NURSE PRACTITIONER

## 2021-10-28 PROCEDURE — 1101F PR PT FALLS ASSESS DOC 0-1 FALLS W/OUT INJ PAST YR: ICD-10-PCS | Mod: CPTII,S$GLB,, | Performed by: NURSE PRACTITIONER

## 2021-10-28 PROCEDURE — 1159F PR MEDICATION LIST DOCUMENTED IN MEDICAL RECORD: ICD-10-PCS | Mod: CPTII,S$GLB,, | Performed by: NURSE PRACTITIONER

## 2021-10-28 PROCEDURE — G0439 PR MEDICARE ANNUAL WELLNESS SUBSEQUENT VISIT: ICD-10-PCS | Mod: S$GLB,,, | Performed by: NURSE PRACTITIONER

## 2021-10-28 PROCEDURE — 1101F PT FALLS ASSESS-DOCD LE1/YR: CPT | Mod: CPTII,S$GLB,, | Performed by: NURSE PRACTITIONER

## 2021-10-28 PROCEDURE — 99999 PR PBB SHADOW E&M-EST. PATIENT-LVL V: CPT | Mod: PBBFAC,,, | Performed by: NURSE PRACTITIONER

## 2021-10-28 PROCEDURE — 3288F FALL RISK ASSESSMENT DOCD: CPT | Mod: CPTII,S$GLB,, | Performed by: NURSE PRACTITIONER

## 2021-10-28 PROCEDURE — 1125F PR PAIN SEVERITY QUANTIFIED, PAIN PRESENT: ICD-10-PCS | Mod: CPTII,S$GLB,, | Performed by: NURSE PRACTITIONER

## 2021-10-28 PROCEDURE — 3074F SYST BP LT 130 MM HG: CPT | Mod: CPTII,S$GLB,, | Performed by: NURSE PRACTITIONER

## 2021-10-28 PROCEDURE — 99499 UNLISTED E&M SERVICE: CPT | Mod: S$GLB,,, | Performed by: NURSE PRACTITIONER

## 2021-10-28 PROCEDURE — 1157F PR ADVANCE CARE PLAN OR EQUIV PRESENT IN MEDICAL RECORD: ICD-10-PCS | Mod: CPTII,S$GLB,, | Performed by: NURSE PRACTITIONER

## 2021-10-28 PROCEDURE — 3288F PR FALLS RISK ASSESSMENT DOCUMENTED: ICD-10-PCS | Mod: CPTII,S$GLB,, | Performed by: NURSE PRACTITIONER

## 2021-10-28 PROCEDURE — 99999 PR PBB SHADOW E&M-EST. PATIENT-LVL V: ICD-10-PCS | Mod: PBBFAC,,, | Performed by: NURSE PRACTITIONER

## 2021-10-28 PROCEDURE — 1160F PR REVIEW ALL MEDS BY PRESCRIBER/CLIN PHARMACIST DOCUMENTED: ICD-10-PCS | Mod: CPTII,S$GLB,, | Performed by: NURSE PRACTITIONER

## 2021-10-28 PROCEDURE — 1159F MED LIST DOCD IN RCRD: CPT | Mod: CPTII,S$GLB,, | Performed by: NURSE PRACTITIONER

## 2021-10-28 PROCEDURE — 99499 RISK ADDL DX/OHS AUDIT: ICD-10-PCS | Mod: S$GLB,,, | Performed by: NURSE PRACTITIONER

## 2021-10-28 PROCEDURE — 1170F FXNL STATUS ASSESSED: CPT | Mod: CPTII,S$GLB,, | Performed by: NURSE PRACTITIONER

## 2021-10-28 PROCEDURE — 1157F ADVNC CARE PLAN IN RCRD: CPT | Mod: CPTII,S$GLB,, | Performed by: NURSE PRACTITIONER

## 2021-10-28 PROCEDURE — 3079F DIAST BP 80-89 MM HG: CPT | Mod: CPTII,S$GLB,, | Performed by: NURSE PRACTITIONER

## 2021-11-03 ENCOUNTER — PATIENT MESSAGE (OUTPATIENT)
Dept: PHARMACY | Facility: CLINIC | Age: 81
End: 2021-11-03
Payer: MEDICARE

## 2021-11-03 ENCOUNTER — OFFICE VISIT (OUTPATIENT)
Dept: INTERNAL MEDICINE | Facility: CLINIC | Age: 81
End: 2021-11-03
Payer: MEDICARE

## 2021-11-03 VITALS
HEIGHT: 63 IN | SYSTOLIC BLOOD PRESSURE: 130 MMHG | DIASTOLIC BLOOD PRESSURE: 76 MMHG | OXYGEN SATURATION: 96 % | HEART RATE: 84 BPM | WEIGHT: 184.06 LBS | BODY MASS INDEX: 32.61 KG/M2 | TEMPERATURE: 99 F

## 2021-11-03 DIAGNOSIS — M16.0 PRIMARY OSTEOARTHRITIS OF BOTH HIPS: ICD-10-CM

## 2021-11-03 DIAGNOSIS — Z23 NEED FOR SHINGLES VACCINE: ICD-10-CM

## 2021-11-03 DIAGNOSIS — M54.50 CHRONIC MIDLINE LOW BACK PAIN WITHOUT SCIATICA: ICD-10-CM

## 2021-11-03 DIAGNOSIS — M25.552 BILATERAL HIP PAIN: Primary | ICD-10-CM

## 2021-11-03 DIAGNOSIS — G89.29 CHRONIC MIDLINE LOW BACK PAIN WITHOUT SCIATICA: ICD-10-CM

## 2021-11-03 DIAGNOSIS — M25.551 BILATERAL HIP PAIN: Primary | ICD-10-CM

## 2021-11-03 PROCEDURE — 1101F PT FALLS ASSESS-DOCD LE1/YR: CPT | Mod: CPTII,S$GLB,, | Performed by: FAMILY MEDICINE

## 2021-11-03 PROCEDURE — 1125F PR PAIN SEVERITY QUANTIFIED, PAIN PRESENT: ICD-10-PCS | Mod: CPTII,S$GLB,, | Performed by: FAMILY MEDICINE

## 2021-11-03 PROCEDURE — 1160F RVW MEDS BY RX/DR IN RCRD: CPT | Mod: CPTII,S$GLB,, | Performed by: FAMILY MEDICINE

## 2021-11-03 PROCEDURE — 99999 PR PBB SHADOW E&M-EST. PATIENT-LVL V: CPT | Mod: PBBFAC,,, | Performed by: FAMILY MEDICINE

## 2021-11-03 PROCEDURE — 1159F PR MEDICATION LIST DOCUMENTED IN MEDICAL RECORD: ICD-10-PCS | Mod: CPTII,S$GLB,, | Performed by: FAMILY MEDICINE

## 2021-11-03 PROCEDURE — 3075F PR MOST RECENT SYSTOLIC BLOOD PRESS GE 130-139MM HG: ICD-10-PCS | Mod: CPTII,S$GLB,, | Performed by: FAMILY MEDICINE

## 2021-11-03 PROCEDURE — 1157F PR ADVANCE CARE PLAN OR EQUIV PRESENT IN MEDICAL RECORD: ICD-10-PCS | Mod: CPTII,S$GLB,, | Performed by: FAMILY MEDICINE

## 2021-11-03 PROCEDURE — 99214 PR OFFICE/OUTPT VISIT, EST, LEVL IV, 30-39 MIN: ICD-10-PCS | Mod: S$GLB,,, | Performed by: FAMILY MEDICINE

## 2021-11-03 PROCEDURE — 99999 PR PBB SHADOW E&M-EST. PATIENT-LVL V: ICD-10-PCS | Mod: PBBFAC,,, | Performed by: FAMILY MEDICINE

## 2021-11-03 PROCEDURE — 1101F PR PT FALLS ASSESS DOC 0-1 FALLS W/OUT INJ PAST YR: ICD-10-PCS | Mod: CPTII,S$GLB,, | Performed by: FAMILY MEDICINE

## 2021-11-03 PROCEDURE — 99214 OFFICE O/P EST MOD 30 MIN: CPT | Mod: S$GLB,,, | Performed by: FAMILY MEDICINE

## 2021-11-03 PROCEDURE — 1125F AMNT PAIN NOTED PAIN PRSNT: CPT | Mod: CPTII,S$GLB,, | Performed by: FAMILY MEDICINE

## 2021-11-03 PROCEDURE — 1157F ADVNC CARE PLAN IN RCRD: CPT | Mod: CPTII,S$GLB,, | Performed by: FAMILY MEDICINE

## 2021-11-03 PROCEDURE — 1160F PR REVIEW ALL MEDS BY PRESCRIBER/CLIN PHARMACIST DOCUMENTED: ICD-10-PCS | Mod: CPTII,S$GLB,, | Performed by: FAMILY MEDICINE

## 2021-11-03 PROCEDURE — 1159F MED LIST DOCD IN RCRD: CPT | Mod: CPTII,S$GLB,, | Performed by: FAMILY MEDICINE

## 2021-11-03 PROCEDURE — 3078F PR MOST RECENT DIASTOLIC BLOOD PRESSURE < 80 MM HG: ICD-10-PCS | Mod: CPTII,S$GLB,, | Performed by: FAMILY MEDICINE

## 2021-11-03 PROCEDURE — 3075F SYST BP GE 130 - 139MM HG: CPT | Mod: CPTII,S$GLB,, | Performed by: FAMILY MEDICINE

## 2021-11-03 PROCEDURE — 3288F PR FALLS RISK ASSESSMENT DOCUMENTED: ICD-10-PCS | Mod: CPTII,S$GLB,, | Performed by: FAMILY MEDICINE

## 2021-11-03 PROCEDURE — 3078F DIAST BP <80 MM HG: CPT | Mod: CPTII,S$GLB,, | Performed by: FAMILY MEDICINE

## 2021-11-03 PROCEDURE — 3288F FALL RISK ASSESSMENT DOCD: CPT | Mod: CPTII,S$GLB,, | Performed by: FAMILY MEDICINE

## 2021-11-18 ENCOUNTER — CLINICAL SUPPORT (OUTPATIENT)
Dept: REHABILITATION | Facility: HOSPITAL | Age: 81
End: 2021-11-18
Payer: MEDICARE

## 2021-11-18 DIAGNOSIS — M16.0 PRIMARY OSTEOARTHRITIS OF BOTH HIPS: ICD-10-CM

## 2021-11-18 DIAGNOSIS — M25.551 BILATERAL HIP PAIN: ICD-10-CM

## 2021-11-18 DIAGNOSIS — M25.552 BILATERAL HIP PAIN: ICD-10-CM

## 2021-11-18 DIAGNOSIS — M25.559 HIP PAIN: ICD-10-CM

## 2021-11-18 PROCEDURE — 97161 PT EVAL LOW COMPLEX 20 MIN: CPT

## 2021-11-22 ENCOUNTER — OFFICE VISIT (OUTPATIENT)
Dept: PAIN MEDICINE | Facility: CLINIC | Age: 81
End: 2021-11-22
Payer: MEDICARE

## 2021-11-22 VITALS
HEIGHT: 62 IN | DIASTOLIC BLOOD PRESSURE: 64 MMHG | WEIGHT: 183 LBS | BODY MASS INDEX: 33.68 KG/M2 | RESPIRATION RATE: 20 BRPM | SYSTOLIC BLOOD PRESSURE: 114 MMHG | HEART RATE: 71 BPM

## 2021-11-22 DIAGNOSIS — M25.551 BILATERAL HIP PAIN: ICD-10-CM

## 2021-11-22 DIAGNOSIS — M16.0 PRIMARY OSTEOARTHRITIS OF BOTH HIPS: ICD-10-CM

## 2021-11-22 DIAGNOSIS — M25.552 BILATERAL HIP PAIN: ICD-10-CM

## 2021-11-22 DIAGNOSIS — M54.50 CHRONIC MIDLINE LOW BACK PAIN WITHOUT SCIATICA: ICD-10-CM

## 2021-11-22 DIAGNOSIS — M25.552 CHRONIC LEFT HIP PAIN: Primary | ICD-10-CM

## 2021-11-22 DIAGNOSIS — G89.29 CHRONIC LEFT HIP PAIN: Primary | ICD-10-CM

## 2021-11-22 DIAGNOSIS — G89.29 CHRONIC MIDLINE LOW BACK PAIN WITHOUT SCIATICA: ICD-10-CM

## 2021-11-22 PROCEDURE — 99999 PR PBB SHADOW E&M-EST. PATIENT-LVL V: ICD-10-PCS | Mod: PBBFAC,,, | Performed by: STUDENT IN AN ORGANIZED HEALTH CARE EDUCATION/TRAINING PROGRAM

## 2021-11-22 PROCEDURE — 99999 PR PBB SHADOW E&M-EST. PATIENT-LVL V: CPT | Mod: PBBFAC,,, | Performed by: STUDENT IN AN ORGANIZED HEALTH CARE EDUCATION/TRAINING PROGRAM

## 2021-11-22 RX ORDER — HYDROCODONE BITARTRATE AND ACETAMINOPHEN 5; 325 MG/1; MG/1
1 TABLET ORAL EVERY 12 HOURS PRN
Qty: 30 TABLET | Refills: 0 | Status: SHIPPED | OUTPATIENT
Start: 2021-11-22 | End: 2022-07-14

## 2021-11-30 ENCOUNTER — CLINICAL SUPPORT (OUTPATIENT)
Dept: REHABILITATION | Facility: HOSPITAL | Age: 81
End: 2021-11-30
Payer: MEDICARE

## 2021-11-30 DIAGNOSIS — M25.559 HIP PAIN: ICD-10-CM

## 2021-11-30 PROCEDURE — 97110 THERAPEUTIC EXERCISES: CPT

## 2021-12-03 ENCOUNTER — CLINICAL SUPPORT (OUTPATIENT)
Dept: REHABILITATION | Facility: HOSPITAL | Age: 81
End: 2021-12-03
Payer: MEDICARE

## 2021-12-03 DIAGNOSIS — M25.559 HIP PAIN: ICD-10-CM

## 2021-12-03 PROCEDURE — 97110 THERAPEUTIC EXERCISES: CPT | Mod: KX,CQ

## 2021-12-06 ENCOUNTER — OFFICE VISIT (OUTPATIENT)
Dept: ORTHOPEDICS | Facility: CLINIC | Age: 81
End: 2021-12-06
Payer: MEDICARE

## 2021-12-06 VITALS — BODY MASS INDEX: 33.39 KG/M2 | HEIGHT: 62 IN | WEIGHT: 181.44 LBS

## 2021-12-06 DIAGNOSIS — M54.16 LUMBAR RADICULOPATHY: ICD-10-CM

## 2021-12-06 DIAGNOSIS — M54.17 LUMBOSACRAL RADICULOPATHY: Primary | ICD-10-CM

## 2021-12-06 PROCEDURE — 99214 PR OFFICE/OUTPT VISIT, EST, LEVL IV, 30-39 MIN: ICD-10-PCS | Mod: S$GLB,,, | Performed by: ORTHOPAEDIC SURGERY

## 2021-12-06 PROCEDURE — 1157F ADVNC CARE PLAN IN RCRD: CPT | Mod: CPTII,S$GLB,, | Performed by: ORTHOPAEDIC SURGERY

## 2021-12-06 PROCEDURE — 99999 PR PBB SHADOW E&M-EST. PATIENT-LVL III: CPT | Mod: PBBFAC,,, | Performed by: ORTHOPAEDIC SURGERY

## 2021-12-06 PROCEDURE — 99999 PR PBB SHADOW E&M-EST. PATIENT-LVL III: ICD-10-PCS | Mod: PBBFAC,,, | Performed by: ORTHOPAEDIC SURGERY

## 2021-12-06 PROCEDURE — 1157F PR ADVANCE CARE PLAN OR EQUIV PRESENT IN MEDICAL RECORD: ICD-10-PCS | Mod: CPTII,S$GLB,, | Performed by: ORTHOPAEDIC SURGERY

## 2021-12-06 PROCEDURE — 99214 OFFICE O/P EST MOD 30 MIN: CPT | Mod: S$GLB,,, | Performed by: ORTHOPAEDIC SURGERY

## 2021-12-07 ENCOUNTER — CLINICAL SUPPORT (OUTPATIENT)
Dept: REHABILITATION | Facility: HOSPITAL | Age: 81
End: 2021-12-07
Payer: MEDICARE

## 2021-12-07 ENCOUNTER — PATIENT MESSAGE (OUTPATIENT)
Dept: PAIN MEDICINE | Facility: OTHER | Age: 81
End: 2021-12-07
Payer: MEDICARE

## 2021-12-07 DIAGNOSIS — M25.559 HIP PAIN: ICD-10-CM

## 2021-12-07 PROCEDURE — 97110 THERAPEUTIC EXERCISES: CPT | Mod: KX

## 2021-12-15 ENCOUNTER — DOCUMENTATION ONLY (OUTPATIENT)
Dept: REHABILITATION | Facility: HOSPITAL | Age: 81
End: 2021-12-15
Payer: MEDICARE

## 2021-12-17 ENCOUNTER — HOSPITAL ENCOUNTER (OUTPATIENT)
Facility: OTHER | Age: 81
Discharge: HOME | End: 2021-12-17
Attending: ANESTHESIOLOGY | Admitting: ANESTHESIOLOGY
Payer: MEDICARE

## 2021-12-17 VITALS
WEIGHT: 179.19 LBS | DIASTOLIC BLOOD PRESSURE: 89 MMHG | BODY MASS INDEX: 32.97 KG/M2 | TEMPERATURE: 98 F | OXYGEN SATURATION: 97 % | SYSTOLIC BLOOD PRESSURE: 185 MMHG | RESPIRATION RATE: 16 BRPM | HEIGHT: 62 IN | HEART RATE: 67 BPM

## 2021-12-17 DIAGNOSIS — G89.29 CHRONIC PAIN: ICD-10-CM

## 2021-12-17 DIAGNOSIS — M54.17 LUMBOSACRAL RADICULOPATHY: Primary | ICD-10-CM

## 2021-12-17 PROCEDURE — 64483 PR EPIDURAL INJ, ANES/STEROID, TRANSFORAMINAL, LUMB/SACR, SNGL LEVL: ICD-10-PCS | Mod: 50,,, | Performed by: ANESTHESIOLOGY

## 2021-12-17 PROCEDURE — 25000003 PHARM REV CODE 250: Performed by: ANESTHESIOLOGY

## 2021-12-17 PROCEDURE — 25500020 PHARM REV CODE 255: Performed by: ANESTHESIOLOGY

## 2021-12-17 PROCEDURE — 64483 NJX AA&/STRD TFRM EPI L/S 1: CPT | Mod: 50 | Performed by: ANESTHESIOLOGY

## 2021-12-17 PROCEDURE — 63600175 PHARM REV CODE 636 W HCPCS: Performed by: ANESTHESIOLOGY

## 2021-12-17 PROCEDURE — 64483 NJX AA&/STRD TFRM EPI L/S 1: CPT | Mod: 50,,, | Performed by: ANESTHESIOLOGY

## 2021-12-17 RX ORDER — DEXAMETHASONE SODIUM PHOSPHATE 10 MG/ML
INJECTION INTRAMUSCULAR; INTRAVENOUS
Status: DISCONTINUED | OUTPATIENT
Start: 2021-12-17 | End: 2021-12-17 | Stop reason: HOSPADM

## 2021-12-17 RX ORDER — MIDAZOLAM HYDROCHLORIDE 1 MG/ML
INJECTION INTRAMUSCULAR; INTRAVENOUS
Status: DISCONTINUED | OUTPATIENT
Start: 2021-12-17 | End: 2021-12-17 | Stop reason: HOSPADM

## 2021-12-17 RX ORDER — FENTANYL CITRATE 50 UG/ML
INJECTION, SOLUTION INTRAMUSCULAR; INTRAVENOUS
Status: DISCONTINUED | OUTPATIENT
Start: 2021-12-17 | End: 2021-12-17 | Stop reason: HOSPADM

## 2021-12-17 RX ORDER — LIDOCAINE HYDROCHLORIDE 10 MG/ML
INJECTION, SOLUTION EPIDURAL; INFILTRATION; INTRACAUDAL; PERINEURAL
Status: DISCONTINUED | OUTPATIENT
Start: 2021-12-17 | End: 2021-12-17 | Stop reason: HOSPADM

## 2021-12-17 RX ORDER — LIDOCAINE HYDROCHLORIDE 20 MG/ML
INJECTION, SOLUTION INFILTRATION; PERINEURAL
Status: DISCONTINUED | OUTPATIENT
Start: 2021-12-17 | End: 2021-12-17 | Stop reason: HOSPADM

## 2021-12-17 RX ORDER — SODIUM CHLORIDE 9 MG/ML
INJECTION, SOLUTION INTRAVENOUS CONTINUOUS
Status: DISCONTINUED | OUTPATIENT
Start: 2021-12-17 | End: 2021-12-17 | Stop reason: HOSPADM

## 2021-12-29 ENCOUNTER — PATIENT MESSAGE (OUTPATIENT)
Dept: INTERNAL MEDICINE | Facility: CLINIC | Age: 81
End: 2021-12-29
Payer: MEDICARE

## 2021-12-29 RX ORDER — LOSARTAN POTASSIUM 100 MG/1
100 TABLET ORAL DAILY
Qty: 90 TABLET | Refills: 3 | Status: SHIPPED | OUTPATIENT
Start: 2021-12-29 | End: 2023-01-05 | Stop reason: SDUPTHER

## 2022-01-21 DIAGNOSIS — E89.0 HYPOTHYROIDISM, POSTSURGICAL: ICD-10-CM

## 2022-01-21 NOTE — TELEPHONE ENCOUNTER
Care Due:                  Date            Visit Type   Department     Provider  --------------------------------------------------------------------------------                                MYCHART                              FOLLOWUP/OF  [facility] INTERNAL  Last Visit: 11-      [facility]ICE VISIT   MEDICINE       Dakota Kerr  Next Visit: None Scheduled  None         None Found                                                            Last  Test          Frequency    Reason                     Performed    Due Date  --------------------------------------------------------------------------------    CBC.........  12 months..  fenofibrate..............  07- 07-    CMP.........  12 months..  fenofibrate, losartan....  Not Found    Overdue    Lipid Panel.  12 months..  fenofibrate..............  Not Found    Overdue    TSH.........  12 months..  levothyroxine............  02- 01-    Powered by [employer] by [employer]. Reference number: [id number].   1/21/2022 11:35:42 AM CST

## 2022-01-22 ENCOUNTER — PATIENT MESSAGE (OUTPATIENT)
Dept: INTERNAL MEDICINE | Facility: CLINIC | Age: 82
End: 2022-01-22
Payer: MEDICARE

## 2022-01-22 DIAGNOSIS — E89.0 HYPOTHYROIDISM, POSTSURGICAL: ICD-10-CM

## 2022-01-22 NOTE — TELEPHONE ENCOUNTER
No new care gaps identified.  Powered by [employer] by [employer]. Reference number: [id number].   1/22/2022 10:23:22 AM CST

## 2022-01-24 ENCOUNTER — PATIENT MESSAGE (OUTPATIENT)
Dept: INTERNAL MEDICINE | Facility: CLINIC | Age: 82
End: 2022-01-24
Payer: MEDICARE

## 2022-01-24 RX ORDER — LEVOTHYROXINE SODIUM 125 UG/1
125 TABLET ORAL
Qty: 90 TABLET | Refills: 3 | Status: SHIPPED | OUTPATIENT
Start: 2022-01-24 | End: 2022-01-28 | Stop reason: SDUPTHER

## 2022-01-26 ENCOUNTER — OFFICE VISIT (OUTPATIENT)
Dept: ORTHOPEDICS | Facility: CLINIC | Age: 82
End: 2022-01-26
Payer: MEDICARE

## 2022-01-26 VITALS — HEIGHT: 62 IN | BODY MASS INDEX: 32.99 KG/M2 | WEIGHT: 179.25 LBS

## 2022-01-26 DIAGNOSIS — M48.062 SPINAL STENOSIS OF LUMBAR REGION WITH NEUROGENIC CLAUDICATION: Primary | ICD-10-CM

## 2022-01-26 PROCEDURE — 3288F PR FALLS RISK ASSESSMENT DOCUMENTED: ICD-10-PCS | Mod: CPTII,S$GLB,, | Performed by: ORTHOPAEDIC SURGERY

## 2022-01-26 PROCEDURE — 1101F PT FALLS ASSESS-DOCD LE1/YR: CPT | Mod: CPTII,S$GLB,, | Performed by: ORTHOPAEDIC SURGERY

## 2022-01-26 PROCEDURE — 99999 PR PBB SHADOW E&M-EST. PATIENT-LVL III: ICD-10-PCS | Mod: PBBFAC,,, | Performed by: ORTHOPAEDIC SURGERY

## 2022-01-26 PROCEDURE — 1157F ADVNC CARE PLAN IN RCRD: CPT | Mod: CPTII,S$GLB,, | Performed by: ORTHOPAEDIC SURGERY

## 2022-01-26 PROCEDURE — 1157F PR ADVANCE CARE PLAN OR EQUIV PRESENT IN MEDICAL RECORD: ICD-10-PCS | Mod: CPTII,S$GLB,, | Performed by: ORTHOPAEDIC SURGERY

## 2022-01-26 PROCEDURE — 1159F PR MEDICATION LIST DOCUMENTED IN MEDICAL RECORD: ICD-10-PCS | Mod: CPTII,S$GLB,, | Performed by: ORTHOPAEDIC SURGERY

## 2022-01-26 PROCEDURE — 99999 PR PBB SHADOW E&M-EST. PATIENT-LVL III: CPT | Mod: PBBFAC,,, | Performed by: ORTHOPAEDIC SURGERY

## 2022-01-26 PROCEDURE — 99214 OFFICE O/P EST MOD 30 MIN: CPT | Mod: S$GLB,,, | Performed by: ORTHOPAEDIC SURGERY

## 2022-01-26 PROCEDURE — 3288F FALL RISK ASSESSMENT DOCD: CPT | Mod: CPTII,S$GLB,, | Performed by: ORTHOPAEDIC SURGERY

## 2022-01-26 PROCEDURE — 1125F PR PAIN SEVERITY QUANTIFIED, PAIN PRESENT: ICD-10-PCS | Mod: CPTII,S$GLB,, | Performed by: ORTHOPAEDIC SURGERY

## 2022-01-26 PROCEDURE — 1125F AMNT PAIN NOTED PAIN PRSNT: CPT | Mod: CPTII,S$GLB,, | Performed by: ORTHOPAEDIC SURGERY

## 2022-01-26 PROCEDURE — 99214 PR OFFICE/OUTPT VISIT, EST, LEVL IV, 30-39 MIN: ICD-10-PCS | Mod: S$GLB,,, | Performed by: ORTHOPAEDIC SURGERY

## 2022-01-26 PROCEDURE — 1159F MED LIST DOCD IN RCRD: CPT | Mod: CPTII,S$GLB,, | Performed by: ORTHOPAEDIC SURGERY

## 2022-01-26 PROCEDURE — 1101F PR PT FALLS ASSESS DOC 0-1 FALLS W/OUT INJ PAST YR: ICD-10-PCS | Mod: CPTII,S$GLB,, | Performed by: ORTHOPAEDIC SURGERY

## 2022-01-26 NOTE — PROGRESS NOTES
DATE: 1/26/2022  PATIENT: Jo-Ann Escobedo    Attending Physician: Paco Fay M.D.    CHIEF COMPLAINT:  Low back, left hip pain.    HISTORY:  Jo-Ann Escobedo is a 81 y.o. female here today with history of low back left hip pain.  Patient states that pain is over the lateral aspect of the hip.  Has been present for approximately 60 days however 3 days ago the pain is just stop.  She did see Dr. Stanley for this who thought the pain might be coming from her hip.  Therefore she was referred to Sports Medicine.  MRI performed showing labral tear.  She was set up for a total hip with Dr. Salgado however they were interested in getting a 2nd opinion.  Also has a history of getting a medial branch block in 2019. Overall she feels well today.  No symptoms of myelopathy.  No bowel or bladder changes.    The Patient denies myelopathic symptoms such as handwriting changes or difficulty with buttons/coins/keys. Denies perineal paresthesias, bowel/bladder dysfunction.    Interval history 12.6.21:  Patient returns for follow-up of low back and left hip radiculopathy .  She reports she did not get the injection in her lumbar spine, this she was concerned about the possibility that would affect her in a total hip arthroplasty.  She has a known left hip labral tear.  She says that the symptoms in her low back and leg have become debilitating.  The symptoms might be related from her left leg to her right leg and then back to her left leg again.  She denies weakness.  She had previous injections over 3 years ago, she does not recall how much relief she got.  She is currently doing therapy and this has led to some improvement.    Interval update 1/26/22  Patient recently had lumbar KARLI. She reports significant improvement in radicular leg pain. Says she is completely better. She is very happy with her outcome.       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    SCC (squamous cell carcinoma) 12/2020    left 5th knuckle    Squamous Cell Carcinoma     in situ right neck      Past Surgical History:   Procedure Laterality Date    BREAST BIOPSY Right 2019    BREAST LUMPECTOMY Right 2019    CARPAL TUNNEL RELEASE Left 6/5/2020    Procedure: RELEASE, CARPAL TUNNEL Left;  Surgeon: Pricila Presley MD;  [facility];  Service: Orthopedics;  Laterality: Left;    CATARACT EXTRACTION W/  INTRAOCULAR LENS IMPLANT Bilateral     COLONOSCOPY N/A 4/15/2019    Procedure: COLONOSCOPY;  Surgeon: Artur Monet MD;  Location: [facility] (4TH FLR);  Service: Endoscopy;  Laterality: N/A;  within 1 month    COLONOSCOPY N/A 12/9/2019    Procedure: COLONOSCOPY;  Surgeon: Clyde Welch MD;  Location: [facility] ENDO (2ND FLR);  Service: Endoscopy;  Laterality: N/A;    ENDOSCOPIC ULTRASOUND OF UPPER GASTROINTESTINAL TRACT N/A  1/22/2020    Procedure: ULTRASOUND, UPPER GI TRACT, ENDOSCOPIC;  Surgeon: Eleazar Shaffer MD;  Location: [facility] ENDO (2ND FLR);  Service: Endoscopy;  Laterality: N/A;  EUS for 10mm SML w/negative pillow sign and negative naked fat sign which was found at the incisura.  Dr Welch    EPIDURAL STEROID INJECTION Bilateral 12/17/2021    Procedure: INJECTION, STEROID, EPIDURAL, L3-L4 Bilateral DIRECT REF Transforaminal;  Surgeon: Julian Fish MD;  Location: [facility] PAIN MGT;  Service: Pain Management;  Laterality: Bilateral;    ESOPHAGOGASTRODUODENOSCOPY N/A 12/7/2019    Procedure: EGD (ESOPHAGOGASTRODUODENOSCOPY);  Surgeon: Clyde Welch MD;  Location: [facility] (2ND FLR);  Service: Endoscopy;  Laterality: N/A;    EXCISIONAL BIOPSY Right 6/27/2019    Procedure: EXCISIONAL BIOPSY-breast;  Surgeon: Suki Dill MD;  [address];  Service: General;  Laterality: Right;    EYE SURGERY      HYSTERECTOMY      INJECTION FOR SENTINEL NODE IDENTIFICATION Right 7/30/2020    Procedure: INJECTION, FOR SENTINEL NODE IDENTIFICATION;  Surgeon: Suki Dill MD;  Location: [facility] OR;  Service: General;  Laterality: Right;    INJECTION OF ANESTHETIC AGENT AROUND MEDIAL BRANCH NERVES INNERVATING LUMBAR FACET JOINT Bilateral 6/7/2019    Procedure: BLOCK, NERVE, FACET JOINT, LUMBAR, MEDIAL BRANCH-deo MBB L4/5,L5/S1;  Surgeon: Jarvis Morales III, MD;  Location: [facility] CATH LAB;  Service: Pain Management;  Laterality: Bilateral;    INJECTION OF STEROID Right 6/5/2020    Procedure: INJECTION, STEROID right carpal tunel injection/ Right ring trigger finger injection;  Surgeon: Pricila Presley MD;  Location: [facility] OR;  Service: Orthopedics;  Laterality: Right;  INJECTION, STEROID right carpal tunel injection/ Right ring trigger finger injection    KNEE ARTHROSCOPY N/A     pt unsure of which knee     MASTECTOMY      OOPHORECTOMY      SENTINEL LYMPH NODE BIOPSY Right 7/30/2020    Procedure: BIOPSY, LYMPH  NODE, SENTINEL;  Surgeon: Suki Dill MD;  Location: [facility] OR;  Service: General;  Laterality: Right;    SPINE SURGERY      TOTAL THYROIDECTOMY      UNILATERAL MASTECTOMY Right 7/30/2020    Procedure: MASTECTOMY, UNILATERAL;  Surgeon: Suki Dill MD;  Location: [facility] OR;  Service: General;  Laterality: Right;    YAG Laser Capsulotomy  Left 07/29/2019 01/06/2021 OD//Dr. Hahn       Current Medications:   Current Outpatient Medications:     acetaminophen (TYLENOL) 650 MG TbSR, Take 1 tablet (650 mg total) by mouth 3 (three) times daily as needed., Disp: 42 tablet, Rfl: 0    ascorbic acid, vitamin C, (VITAMIN C) 100 MG tablet, Take 100 mg by mouth once daily., Disp: , Rfl:     cholecalciferol, vitamin D3, (VITAMIN D3) 50 mcg (2,000 unit) Tab, Take 1 tablet by mouth once daily., Disp: , Rfl:     co-enzyme Q-10 30 mg capsule, Take 30 mg by mouth once daily., Disp: , Rfl:     fenofibrate 160 MG Tab, TAKE 1 TABLET (160 MG TOTAL) BY MOUTH ONCE DAILY., Disp: 90 tablet, Rfl: 3    fluocinonide (LIDEX) 0.05 % external solution, AAA scalp qday  prn scaling or itching, Disp: 60 mL, Rfl: 3    HYDROcodone-acetaminophen (NORCO) 5-325 mg per tablet, Take 1 tablet by mouth every 12 (twelve) hours as needed for Pain., Disp: 30 tablet, Rfl: 0    LACTOBACILLUS COMBINATION NO.8 (ADULT PROBIOTIC ORAL), Take by mouth once daily. , Disp: , Rfl:     levothyroxine (SYNTHROID) 125 MCG tablet, Take 1 tablet (125 mcg total) by mouth before breakfast., Disp: 90 tablet, Rfl: 3    LIDOcaine (LIDODERM) 5 %, Place 1 patch onto the skin daily as needed (pain). Remove & Discard patch within 12 hours or as directed by MD, Disp: 30 patch, Rfl: 2    lidocaine-prilocaine (EMLA) cream, Apply topically 2 (two) times daily as needed. To hand., Disp: 30 g, Rfl: 2    losartan (COZAAR) 100 MG tablet, Take 1 tablet (100 mg total) by mouth once daily., Disp: 90 tablet, Rfl: 3    meclizine (ANTIVERT) 12.5 mg tablet, Take 1 tablet (12.5  mg total) by mouth 3 (three) times daily as needed for Dizziness., Disp: 30 tablet, Rfl: 2    turmeric root extract 500 mg Cap, Take by mouth once daily. , Disp: , Rfl:   No current facility-administered medications for this visit.    Facility-Administered Medications Ordered in Other Visits:     mupirocin 2 % ointment, , Nasal, On Call Procedure, Leandro Avila MD, Given at 20 0837    Social History:   Social History     Socioeconomic History    Marital status:    Occupational History    Occupation: [email or web address]     Employer: [employer]     Employer: [employer]   Tobacco Use    Smoking status: Former Smoker     Packs/day: 2.00     Years: 44.00     Pack years: 88.00     Types: Cigarettes     Quit date: 1969     Years since quittin.1    Smokeless tobacco: Never Used   Substance and Sexual Activity    Alcohol use: Yes     Alcohol/week: 0.0 standard drinks     Comment: maybe a beer every 3 months    Drug use: No    Sexual activity: Not Currently   Other Topics Concern    Are you pregnant or think you may be? No    Breast-feeding No     Social Determinants of Health     Financial Resource Strain: Low Risk     Difficulty of Paying Living Expenses: Not hard at all   Food Insecurity: No Food Insecurity    Worried About Running Out of Food in the Last Year: Never true    Ran Out of Food in the Last Year: Never true   Transportation Needs: No Transportation Needs    Lack of Transportation (Medical): No    Lack of Transportation (Non-Medical): No   Physical Activity: Insufficiently Active    Days of Exercise per Week: 6 days    Minutes of Exercise per Session: 20 min   Stress: No Stress Concern Present    Feeling of Stress : Only a little   Social Connections: Unknown    Frequency of Communication with Friends and Family: More than three times a week    Frequency of Social Gatherings with Friends and Family: Twice a week    Active Member of Clubs or Organizations:  "Yes    Attends Club or Organization Meetings: More than 4 times per year    Marital Status:    Housing Stability: Low Risk     Unable to Pay for Housing in the Last Year: No    Number of Places Lived in the Last Year: 1    Unstable Housing in the Last Year: No       REVIEW OF SYSTEMS:  Constitution: Negative. Negative for chills, fever and night sweats.   Cardiovascular: Negative for chest pain and syncope.   Respiratory: Negative for cough and shortness of breath.   Gastrointestinal: See HPI. Negative for nausea/vomiting. Negative for abdominal pain.  Genitourinary: See HPI. Negative for discoloration or dysuria.  Hematologic/Lymphatic: n for bleeding/clotting disorders.   Musculoskeletal: Negative for falls and muscle weakness.   Neurological: See HPI. n history of seizures. n history of cranial surgery or shunts.  Neurological: See HPI. No seizures.   Endocrine: Negative for polydipsia, polyphagia and polyuria.   Allergic/Immunologic: Negative for hives and persistent infections.     EXAM:  Ht 5' 2" (1.575 m)   Wt 81.3 kg (179 lb 3.7 oz)   LMP  (LMP Unknown)   BMI 32.78 kg/m²     PHYSICAL EXAMINATION:    General: The patient is a pleasant 81 y.o. female in no apparent distress, the patient is orientatied to person, place and time.  Psych: Normal mood and affect  HEENT: Vision grossly intact, hearing intact to the spoken word.  Lungs: Respirations unlabored.  Gait: Normal station and gait, no difficulty with toe or heel walk.   Skin: Dorsal lumbar skin negative for rashes, lesions, hairy patches and surgical scars. There is n lumbar tenderness to palpation.  Range of motion: Lumbar range of motion is acceptable.  Spinal Balance: Global saggital and coronal spinal balance acceptable, no significant for scoliosis and kyphosis.  Musculoskeletal: No pain with the range of motion of the bilateral hips. No trochanteric tenderness to palpation.  Vascular: Bilateral lower extremities warm and well " perfused, Dorsalis pedis pulses 2+ bilaterally.  Neurological: Normal strength and tone in all major motor groups in the bilateral lower extremities. Normal sensation to light touch in the L2-S1 dermatomes bilaterally.  Deep tendon reflexes symmetric 2+ in the bilateral lower extremities.  Negative Babinski bilaterally. Straight leg raise negative bilaterally.    5/5 UE strength BUE  Negative Bruner on exame    IMAGING:      Today I personally reviewed AP, Lat and Flex/Ex  upright L-spine that demonstrate generalized spondylosis throughout the lumbar spine. MRI showing left foraminal disc herniation, severe spinal stenosis at L3-L4.  Moderate spinal stenosis at L4-L5 and L5-S1.      Body mass index is 32.78 kg/m².  Hemoglobin A1C   Date Value Ref Range Status   04/30/2020 6.1 (H) 4.0 - 5.6 % Final     Comment:     ADA Screening Guidelines:  5.7-6.4%  Consistent with prediabetes  >or=6.5%  Consistent with diabetes  High levels of fetal hemoglobin interfere with the HbA1C  assay. Heterozygous hemoglobin variants (HbS, HgC, etc)do  not significantly interfere with this assay.   However, presence of multiple variants may affect accuracy.     10/18/2019 5.6 4.0 - 5.6 % Final     Comment:     ADA Screening Guidelines:  5.7-6.4%  Consistent with prediabetes  >or=6.5%  Consistent with diabetes  High levels of fetal hemoglobin interfere with the HbA1C  assay. Heterozygous hemoglobin variants (HbS, HgC, etc)do  not significantly interfere with this assay.   However, presence of multiple variants may affect accuracy.     04/18/2019 5.6 4.0 - 5.6 % Final     Comment:     ADA Screening Guidelines:  5.7-6.4%  Consistent with prediabetes  >or=6.5%  Consistent with diabetes  High levels of fetal hemoglobin interfere with the HbA1C  assay. Heterozygous hemoglobin variants (HbS, HgC, etc)do  not significantly interfere with this assay.   However, presence of multiple variants may affect accuracy.         ASSESSMENT/PLAN:    Patient  with significant improvement in spinal stenosis symptoms after injection. F/u SHRUTHINSumit Busch was seen today for follow-up.    Diagnoses and all orders for this visit:    Spinal stenosis of lumbar region with neurogenic claudication      No follow-ups on file.

## 2022-01-28 DIAGNOSIS — E89.0 HYPOTHYROIDISM, POSTSURGICAL: ICD-10-CM

## 2022-01-28 RX ORDER — LEVOTHYROXINE SODIUM 125 UG/1
125 TABLET ORAL
Qty: 90 TABLET | Refills: 3 | Status: SHIPPED | OUTPATIENT
Start: 2022-01-28 | End: 2023-01-05 | Stop reason: SDUPTHER

## 2022-01-28 NOTE — TELEPHONE ENCOUNTER
No new care gaps identified.  Powered by [employer]. Reference number: [id number].   1/28/2022 3:43:28 PM CST

## 2022-01-29 NOTE — TELEPHONE ENCOUNTER
Refill Authorization Note   Jo-Ann Escobedo  is requesting a refill authorization.  Brief Assessment and Rationale for Refill:  Approve     Medication Therapy Plan:  SENDING TO CORRECT PHARMACY    Medication Reconciliation Completed: No   Comments:   --->Care Gap information included below if applicable.       Requested Prescriptions   Pending Prescriptions Disp Refills    levothyroxine (SYNTHROID) 125 MCG tablet [Pharmacy Med Name: LEVOTHYROXINE 125 MCG TABLET] 90 tablet 3     Sig: TAKE 1 TABLET (125 MCG TOTAL) BY MOUTH BEFORE BREAKFAST.       Endocrinology:  Hypothyroid Agents Passed - 1/21/2022 11:35 AM        Passed - Patient is at least 18 years old        Passed - Valid encounter within last 15 months     Recent Visits  Date Type Provider Dept   11/03/21 Office Visit Dakota Kerr MD [facility]   08/06/21 Office Visit Dakota Kerr MD [facility]   04/12/21 Office Visit Dakota Kerr MD [facility]   03/05/21 Office Visit Dakota Kerr MD [facility]   08/31/20 Office Visit Dakota Kerr MD [facility]   05/01/20 Office Visit Dakota Kerr MD [facility]   Showing recent visits within past 720 days and meeting all other requirements  Future Appointments  No visits were found meeting these conditions.  Showing future appointments within next 150 days and meeting all other requirements                Passed - Manual Review: FT4 is not required if last TSH is WNL.        Passed - TSH in normal range and within 360 days     TSH   Date Value Ref Range Status   02/02/2021 0.923 0.400 - 4.000 uIU/mL Final   12/07/2019 2.372 0.400 - 4.000 uIU/mL Final   05/04/2019 0.536 0.400 - 4.000 uIU/mL Final              Passed - T4 free within 1080 days     Free T4   Date Value Ref Range Status   02/02/2021 1.31 0.71 - 1.51 ng/dL Final   05/04/2019 1.31 0.71 - 1.51 ng/dL Final   03/13/2014 1.35 0.71 - 1.51 ng/dL Final                   Appointments  past 12m or future 3m with PCP    Date Provider   Last Visit   11/3/2021 Dakota Kerr MD   Next Visit   1/28/2022 Dakota Kerr MD   ED visits in past 90 days: 0     Note composed:6:38 PM 01/28/2022

## 2022-01-31 RX ORDER — LEVOTHYROXINE SODIUM 125 UG/1
125 TABLET ORAL
Qty: 90 TABLET | Refills: 3 | OUTPATIENT
Start: 2022-01-31 | End: 2023-01-31

## 2022-02-08 ENCOUNTER — DOCUMENTATION ONLY (OUTPATIENT)
Dept: REHABILITATION | Facility: HOSPITAL | Age: 82
End: 2022-02-08
Payer: MEDICARE

## 2022-02-08 NOTE — PROGRESS NOTES
[facility]   Discharge Note    Name: Jo-Ann MYERS Escobedo  [facility] Number: [id number]    Therapy Diagnosis: No diagnosis found.  Physician: No ref. provider found    Physician Orders: PT Eval and Treat   Medical Diagnosis from Referral:   M25.551,M25.552 (ICD-10-CM) - Bilateral hip pain   M16.0 (ICD-10-CM) - Primary osteoarthritis of both hips         Evaluation Date: 11/18/2021          Date of Last visit: 12/7/21  Total Visits Received: 4    ASSESSMENT          Discharge reason: Patient has not attended therapy since 12/7/21      Goals: not able to assess    PLAN   This patient is discharged from Physical Therapy      Joleen Montano, PT

## 2022-03-02 ENCOUNTER — OFFICE VISIT (OUTPATIENT)
Dept: HEMATOLOGY/ONCOLOGY | Facility: CLINIC | Age: 82
End: 2022-03-02
Payer: MEDICARE

## 2022-03-02 VITALS
TEMPERATURE: 98 F | SYSTOLIC BLOOD PRESSURE: 161 MMHG | BODY MASS INDEX: 33.68 KG/M2 | OXYGEN SATURATION: 94 % | RESPIRATION RATE: 18 BRPM | DIASTOLIC BLOOD PRESSURE: 72 MMHG | HEART RATE: 71 BPM | WEIGHT: 183 LBS | HEIGHT: 62 IN

## 2022-03-02 DIAGNOSIS — D05.11 DUCTAL CARCINOMA IN SITU (DCIS) OF RIGHT BREAST: Primary | ICD-10-CM

## 2022-03-02 PROCEDURE — 1157F ADVNC CARE PLAN IN RCRD: CPT | Mod: CPTII,S$GLB,, | Performed by: INTERNAL MEDICINE

## 2022-03-02 PROCEDURE — 99999 PR PBB SHADOW E&M-EST. PATIENT-LVL III: ICD-10-PCS | Mod: PBBFAC,,, | Performed by: INTERNAL MEDICINE

## 2022-03-02 PROCEDURE — 3077F PR MOST RECENT SYSTOLIC BLOOD PRESSURE >= 140 MM HG: ICD-10-PCS | Mod: CPTII,S$GLB,, | Performed by: INTERNAL MEDICINE

## 2022-03-02 PROCEDURE — 1159F MED LIST DOCD IN RCRD: CPT | Mod: CPTII,S$GLB,, | Performed by: INTERNAL MEDICINE

## 2022-03-02 PROCEDURE — 3078F DIAST BP <80 MM HG: CPT | Mod: CPTII,S$GLB,, | Performed by: INTERNAL MEDICINE

## 2022-03-02 PROCEDURE — 99213 OFFICE O/P EST LOW 20 MIN: CPT | Mod: S$GLB,,, | Performed by: INTERNAL MEDICINE

## 2022-03-02 PROCEDURE — 1101F PT FALLS ASSESS-DOCD LE1/YR: CPT | Mod: CPTII,S$GLB,, | Performed by: INTERNAL MEDICINE

## 2022-03-02 PROCEDURE — 3077F SYST BP >= 140 MM HG: CPT | Mod: CPTII,S$GLB,, | Performed by: INTERNAL MEDICINE

## 2022-03-02 PROCEDURE — 1157F PR ADVANCE CARE PLAN OR EQUIV PRESENT IN MEDICAL RECORD: ICD-10-PCS | Mod: CPTII,S$GLB,, | Performed by: INTERNAL MEDICINE

## 2022-03-02 PROCEDURE — 1159F PR MEDICATION LIST DOCUMENTED IN MEDICAL RECORD: ICD-10-PCS | Mod: CPTII,S$GLB,, | Performed by: INTERNAL MEDICINE

## 2022-03-02 PROCEDURE — 3288F FALL RISK ASSESSMENT DOCD: CPT | Mod: CPTII,S$GLB,, | Performed by: INTERNAL MEDICINE

## 2022-03-02 PROCEDURE — 1160F RVW MEDS BY RX/DR IN RCRD: CPT | Mod: CPTII,S$GLB,, | Performed by: INTERNAL MEDICINE

## 2022-03-02 PROCEDURE — 3078F PR MOST RECENT DIASTOLIC BLOOD PRESSURE < 80 MM HG: ICD-10-PCS | Mod: CPTII,S$GLB,, | Performed by: INTERNAL MEDICINE

## 2022-03-02 PROCEDURE — 1126F AMNT PAIN NOTED NONE PRSNT: CPT | Mod: CPTII,S$GLB,, | Performed by: INTERNAL MEDICINE

## 2022-03-02 PROCEDURE — 99999 PR PBB SHADOW E&M-EST. PATIENT-LVL III: CPT | Mod: PBBFAC,,, | Performed by: INTERNAL MEDICINE

## 2022-03-02 PROCEDURE — 1101F PR PT FALLS ASSESS DOC 0-1 FALLS W/OUT INJ PAST YR: ICD-10-PCS | Mod: CPTII,S$GLB,, | Performed by: INTERNAL MEDICINE

## 2022-03-02 PROCEDURE — 99213 PR OFFICE/OUTPT VISIT, EST, LEVL III, 20-29 MIN: ICD-10-PCS | Mod: S$GLB,,, | Performed by: INTERNAL MEDICINE

## 2022-03-02 PROCEDURE — 1126F PR PAIN SEVERITY QUANTIFIED, NO PAIN PRESENT: ICD-10-PCS | Mod: CPTII,S$GLB,, | Performed by: INTERNAL MEDICINE

## 2022-03-02 PROCEDURE — 3288F PR FALLS RISK ASSESSMENT DOCUMENTED: ICD-10-PCS | Mod: CPTII,S$GLB,, | Performed by: INTERNAL MEDICINE

## 2022-03-02 PROCEDURE — 1160F PR REVIEW ALL MEDS BY PRESCRIBER/CLIN PHARMACIST DOCUMENTED: ICD-10-PCS | Mod: CPTII,S$GLB,, | Performed by: INTERNAL MEDICINE

## 2022-03-02 NOTE — PROGRESS NOTES
Subjective:       Patient ID: Jo-Ann Escobedo is a 81 y.o. female.    Chief Complaint: No chief complaint on file.    HPI   Ms. Escobedo presents today for follow up.  I had last seen her in September 2021.    On July 30, 2020 she underwent a right mastectomy and sentinel lymph node biopsy.  There was no residual DCIS identified in the mastectomy specimen, while 3 sentinel lymph nodes were negative.  Of note, on July 2, 2020 she had undergone a biopsy of the area in the right breast that was read as BI-RADS 4 on the recent mammogram.  The biopsy had shown evidence of DCIS which was ER positive.    Briefly, she is an 80 year-old female with a remote history of breast cancer treated 17 years ago with a left modified radical mastectomy.  Her tumor was a T2 N1 M0 carcinoma.  She was treated with four cycles of AC and 4 cycles of Taxotere in the adjuvant setting and has remained cancer free since then.   A mammogram in the spring of 2019 led to a contralateral biopsy and eventually to a lumpectomy on June 26th, 2019, for an area of DCIS, measuring 12mm.  Resection margins were clear, with the closest being 14 mm.  Her tumor was ER positive and AR negative.   She met with the radiation oncologist and decided against XRT.  She subsequently decided against adjuvant hormonal therapy and has been followed expectantly since.    Her last mammogram showed a 6 mm area of coarse heterogeneous calcifications in a grouped distribution in the upper outer quadrant of the right breast.  A biopsy was performed on June 29, 2020 with results as above.  This was followed by the right mastectomy.    Review of Systems  Overall she feels OK, and she has no complaints today other than the chronic bilateral hip pains that predated her surgery.  She had an epidural injection in December which alleviated her symptoms.   She denies any anxiety come depression, easy bruising, fevers, chills, night sweats, weight loss, nausea, vomiting, diarrhea,  constipation, diplopia, blurred vision headache, chest pain, abdominal pain, or difficulty ambulating. All other systems are negative.     Objective:      Physical Exam  GENERAL: She is alert, oriented to time, place, person; well nourished;   pleasant; in no acute physical distress.    VITAL SIGNS: Reviewed.   HEENT: Normal. There are no nasal, oral, lip, gingival, auricular, lid,   or conjunctival lesions. Mucosae are moist and pink, and there is no   thrush. Pupils are equal, reactive to light and accommodation.   Extraocular muscle movements are intact.   NECK: Supple without JVD, thyromegaly, or adenopathy.   LUNGS: Clear to auscultation without wheezing, rales, or rhonchi.   CARDIOVASCULAR: Reveals an S1, S2, no murmurs, no rubs, no gallops.   BREASTS:  She is status post bilateral mastectomies.    ABDOMEN: Soft, nontender without organomegaly. Bowel sounds are identified.   EXTREMITIES: Have no cyanosis, clubbing, or edema.    SKIN: Does not have petechiae, rashes, ot induration.    NEUROLOGIC: Motor function is 5/5, DTRs are 0-1+ bilaterally, symmetrical,   and cranial nerves within normal limits.   LYMPHATIC: There is no cervical, axillary, or supraclavicular adenopathy.    Assessment:       1. Remote history of breast cancer status post left mastectomy and chemotherapy 18 years ago..    2.     Contralateral DCIS, s/p lumpectomy, s/p local recurrence, treated with a completion mastectomy, clinically HUGO, doing well.    Plan:     I had a long discussion with her.  I again reasssured her that her chance of a cure after her mastectomy is in the range of 99%.  I did not recommend any additional treatment at this point.   Her multiple questions were answered to her satisfaction.  I will see her again in 6 months (at her request).

## 2022-03-09 ENCOUNTER — OFFICE VISIT (OUTPATIENT)
Dept: DERMATOLOGY | Facility: CLINIC | Age: 82
End: 2022-03-09
Payer: MEDICARE

## 2022-03-09 DIAGNOSIS — L81.4 LENTIGO: ICD-10-CM

## 2022-03-09 DIAGNOSIS — D22.9 NEVUS: ICD-10-CM

## 2022-03-09 DIAGNOSIS — L85.3 XEROSIS CUTIS: ICD-10-CM

## 2022-03-09 DIAGNOSIS — L73.8 SEBACEOUS GLAND HYPERPLASIA: ICD-10-CM

## 2022-03-09 DIAGNOSIS — Z85.828 PERSONAL HISTORY OF SKIN CANCER: ICD-10-CM

## 2022-03-09 DIAGNOSIS — L90.5 SCAR: ICD-10-CM

## 2022-03-09 DIAGNOSIS — L82.1 SK (SEBORRHEIC KERATOSIS): Primary | ICD-10-CM

## 2022-03-09 PROCEDURE — 3288F FALL RISK ASSESSMENT DOCD: CPT | Mod: CPTII,S$GLB,, | Performed by: DERMATOLOGY

## 2022-03-09 PROCEDURE — 1160F RVW MEDS BY RX/DR IN RCRD: CPT | Mod: CPTII,S$GLB,, | Performed by: DERMATOLOGY

## 2022-03-09 PROCEDURE — 1126F PR PAIN SEVERITY QUANTIFIED, NO PAIN PRESENT: ICD-10-PCS | Mod: CPTII,S$GLB,, | Performed by: DERMATOLOGY

## 2022-03-09 PROCEDURE — 3288F PR FALLS RISK ASSESSMENT DOCUMENTED: ICD-10-PCS | Mod: CPTII,S$GLB,, | Performed by: DERMATOLOGY

## 2022-03-09 PROCEDURE — 99999 PR PBB SHADOW E&M-EST. PATIENT-LVL III: ICD-10-PCS | Mod: PBBFAC,,, | Performed by: DERMATOLOGY

## 2022-03-09 PROCEDURE — 1101F PR PT FALLS ASSESS DOC 0-1 FALLS W/OUT INJ PAST YR: ICD-10-PCS | Mod: CPTII,S$GLB,, | Performed by: DERMATOLOGY

## 2022-03-09 PROCEDURE — 1159F MED LIST DOCD IN RCRD: CPT | Mod: CPTII,S$GLB,, | Performed by: DERMATOLOGY

## 2022-03-09 PROCEDURE — 99213 PR OFFICE/OUTPT VISIT, EST, LEVL III, 20-29 MIN: ICD-10-PCS | Mod: S$GLB,,, | Performed by: DERMATOLOGY

## 2022-03-09 PROCEDURE — 1126F AMNT PAIN NOTED NONE PRSNT: CPT | Mod: CPTII,S$GLB,, | Performed by: DERMATOLOGY

## 2022-03-09 PROCEDURE — 1101F PT FALLS ASSESS-DOCD LE1/YR: CPT | Mod: CPTII,S$GLB,, | Performed by: DERMATOLOGY

## 2022-03-09 PROCEDURE — 99213 OFFICE O/P EST LOW 20 MIN: CPT | Mod: S$GLB,,, | Performed by: DERMATOLOGY

## 2022-03-09 PROCEDURE — 1159F PR MEDICATION LIST DOCUMENTED IN MEDICAL RECORD: ICD-10-PCS | Mod: CPTII,S$GLB,, | Performed by: DERMATOLOGY

## 2022-03-09 PROCEDURE — 1157F PR ADVANCE CARE PLAN OR EQUIV PRESENT IN MEDICAL RECORD: ICD-10-PCS | Mod: CPTII,S$GLB,, | Performed by: DERMATOLOGY

## 2022-03-09 PROCEDURE — 99999 PR PBB SHADOW E&M-EST. PATIENT-LVL III: CPT | Mod: PBBFAC,,, | Performed by: DERMATOLOGY

## 2022-03-09 PROCEDURE — 1157F ADVNC CARE PLAN IN RCRD: CPT | Mod: CPTII,S$GLB,, | Performed by: DERMATOLOGY

## 2022-03-09 PROCEDURE — 1160F PR REVIEW ALL MEDS BY PRESCRIBER/CLIN PHARMACIST DOCUMENTED: ICD-10-PCS | Mod: CPTII,S$GLB,, | Performed by: DERMATOLOGY

## 2022-03-09 NOTE — PROGRESS NOTES
"  Subjective:       Patient ID:  Jo-Ann Escobedo is a 81 y.o. female who presents for   Chief Complaint   Patient presents with    Skin Check     tbse     Pt here today for TBSE     Patient is here today for a "mole" check.   Pt has a history of extensive sun exposure in the past.   Pt recalls several blistering sunburns in the past- yes  Pt has history of tanning bed use- no  Pt has had moles removed in the past- yes, benign per pt  Pt has history of melanoma in first degree relatives- no    History of Present Illness: The patient presents for follow up of skin check.    The patient was last seen on: 8/11/2021 for TBSE  Other skin complaints: pt c/o scaly lesion on right scalp. Scaly and sensitive. Present for 1 year. No tx. Also has lesions on arms she would like addressed    C/o very dry hands.  Uses otc moisturizers.  Does have neuropathy         Review of Systems   Skin: Positive for daily sunscreen use and activity-related sunscreen use. Negative for tendency to form keloidal scars.   Hematologic/Lymphatic: Bruises/bleeds easily.        Objective:    Physical Exam   Constitutional: She appears well-developed and well-nourished. No distress.   Neurological: She is alert and oriented to person, place, and time. She is not disoriented.   Psychiatric: She has a normal mood and affect.   Skin:   Areas Examined (abnormalities noted in diagram):   Scalp / Hair Palpated and Inspected  Head / Face Inspection Performed  Neck Inspection Performed  Chest / Axilla Inspection Performed  Abdomen Inspection Performed  Genitals / Buttocks / Groin Inspection Performed  Back Inspection Performed  RUE Inspected  LUE Inspection Performed  RLE Inspected  LLE Inspection Performed  Nails and Digits Inspection Performed                           Diagram Legend     Erythematous scaling macule/papule c/w actinic keratosis       Vascular papule c/w angioma      Pigmented verrucoid papule/plaque c/w seborrheic keratosis      Yellow " umbilicated papule c/w sebaceous hyperplasia      Irregularly shaped tan macule c/w lentigo     1-2 mm smooth white papules consistent with Milia      Movable subcutaneous cyst with punctum c/w epidermal inclusion cyst      Subcutaneous movable cyst c/w pilar cyst      Firm pink to brown papule c/w dermatofibroma      Pedunculated fleshy papule(s) c/w skin tag(s)      Evenly pigmented macule c/w junctional nevus     Mildly variegated pigmented, slightly irregular-bordered macule c/w mildly atypical nevus      Flesh colored to evenly pigmented papule c/w intradermal nevus       Pink pearly papule/plaque c/w basal cell carcinoma      Erythematous hyperkeratotic cursted plaque c/w SCC      Surgical scar with no sign of skin cancer recurrence      Open and closed comedones      Inflammatory papules and pustules      Verrucoid papule consistent consistent with wart     Erythematous eczematous patches and plaques     Dystrophic onycholytic nail with subungual debris c/w onychomycosis     Umbilicated papule    Erythematous-base heme-crusted tan verrucoid plaque consistent with inflamed seborrheic keratosis     Erythematous Silvery Scaling Plaque c/w Psoriasis     See annotation      Assessment / Plan:        SK (seborrheic keratosis)  These are benign inherited growths without a malignant potential. Reassurance given to patient. No treatment is necessary.     Lentigo  This is a benign hyperpigmented sun induced lesion. Recommend daily sun protection/avoidance and use of at least SPF 30, broad spectrum sunscreen (OTC drug) will reduce the number of new lesions. Treatment of these benign lesions are considered cosmetic.    The nature of sun-induced photo-aging and skin cancers is discussed.  Sun avoidance, protective clothing, and the use of 30-SPF sunscreens is advised. Observe closely for skin damage/changes, and call if such occurs.    Nevus  Discussed ABCDE's of nevi.  Monitor for new mole or moles that are becoming  bigger, darker, irritated, or developing irregular borders. Brochure provided. Instructed patient to observe lesion(s) for changes and follow up in clinic if changes are noted. Patient to monitor skin at home for new or changing lesions.     Sebaceous gland hyperplasia  This is a common condition representing benign enlargement of the sebaceous lobule. It typically occurs in adulthood. Reassurance given to patient.     Personal history of skin cancer  Scar  Pt with history of non melanoma skin cancer  Total body skin examination performed today including at least 12 points as noted in physical examination. No suspicious lesions noted.Monitor for new or changing lesions as discussed such as pink scaly patches that are occasionally tender or not healing, 'pimples' that never go away, lesions that are not healing or bleeding.     Xerosis cutis  Lipikar cream or AVeeno cica eczema balm to hands  Discussed with patient good skin care regimen including avoiding fragranced products and very hot showers.  Recommended dove sensitive skin bar soap or cerave hydrating cleanser or bar.           Follow up in about 1 year (around 3/9/2023).

## 2022-03-16 ENCOUNTER — OFFICE VISIT (OUTPATIENT)
Dept: OPTOMETRY | Facility: CLINIC | Age: 82
End: 2022-03-16
Payer: MEDICARE

## 2022-03-16 DIAGNOSIS — H35.363 DRUSEN OF MACULA OF BOTH EYES: Primary | ICD-10-CM

## 2022-03-16 DIAGNOSIS — H52.4 PRESBYOPIA: ICD-10-CM

## 2022-03-16 DIAGNOSIS — Z13.5 SCREENING FOR GLAUCOMA: ICD-10-CM

## 2022-03-16 PROCEDURE — 92134 OCT, RETINA - OU - BOTH EYES: ICD-10-PCS | Mod: S$GLB,,, | Performed by: OPTOMETRIST

## 2022-03-16 PROCEDURE — 99999 PR PBB SHADOW E&M-EST. PATIENT-LVL III: ICD-10-PCS | Mod: PBBFAC,,, | Performed by: OPTOMETRIST

## 2022-03-16 PROCEDURE — 1101F PT FALLS ASSESS-DOCD LE1/YR: CPT | Mod: CPTII,S$GLB,, | Performed by: OPTOMETRIST

## 2022-03-16 PROCEDURE — 1157F ADVNC CARE PLAN IN RCRD: CPT | Mod: CPTII,S$GLB,, | Performed by: OPTOMETRIST

## 2022-03-16 PROCEDURE — 99999 PR PBB SHADOW E&M-EST. PATIENT-LVL III: CPT | Mod: PBBFAC,,, | Performed by: OPTOMETRIST

## 2022-03-16 PROCEDURE — 1159F MED LIST DOCD IN RCRD: CPT | Mod: CPTII,S$GLB,, | Performed by: OPTOMETRIST

## 2022-03-16 PROCEDURE — 1126F PR PAIN SEVERITY QUANTIFIED, NO PAIN PRESENT: ICD-10-PCS | Mod: CPTII,S$GLB,, | Performed by: OPTOMETRIST

## 2022-03-16 PROCEDURE — 92134 CPTRZ OPH DX IMG PST SGM RTA: CPT | Mod: S$GLB,,, | Performed by: OPTOMETRIST

## 2022-03-16 PROCEDURE — 3288F PR FALLS RISK ASSESSMENT DOCUMENTED: ICD-10-PCS | Mod: CPTII,S$GLB,, | Performed by: OPTOMETRIST

## 2022-03-16 PROCEDURE — 92015 DETERMINE REFRACTIVE STATE: CPT | Mod: S$GLB,,, | Performed by: OPTOMETRIST

## 2022-03-16 PROCEDURE — 1126F AMNT PAIN NOTED NONE PRSNT: CPT | Mod: CPTII,S$GLB,, | Performed by: OPTOMETRIST

## 2022-03-16 PROCEDURE — 1160F PR REVIEW ALL MEDS BY PRESCRIBER/CLIN PHARMACIST DOCUMENTED: ICD-10-PCS | Mod: CPTII,S$GLB,, | Performed by: OPTOMETRIST

## 2022-03-16 PROCEDURE — 1160F RVW MEDS BY RX/DR IN RCRD: CPT | Mod: CPTII,S$GLB,, | Performed by: OPTOMETRIST

## 2022-03-16 PROCEDURE — 92014 PR EYE EXAM, EST PATIENT,COMPREHESV: ICD-10-PCS | Mod: S$GLB,,, | Performed by: OPTOMETRIST

## 2022-03-16 PROCEDURE — 1159F PR MEDICATION LIST DOCUMENTED IN MEDICAL RECORD: ICD-10-PCS | Mod: CPTII,S$GLB,, | Performed by: OPTOMETRIST

## 2022-03-16 PROCEDURE — 92014 COMPRE OPH EXAM EST PT 1/>: CPT | Mod: S$GLB,,, | Performed by: OPTOMETRIST

## 2022-03-16 PROCEDURE — 1101F PR PT FALLS ASSESS DOC 0-1 FALLS W/OUT INJ PAST YR: ICD-10-PCS | Mod: CPTII,S$GLB,, | Performed by: OPTOMETRIST

## 2022-03-16 PROCEDURE — 92015 PR REFRACTION: ICD-10-PCS | Mod: S$GLB,,, | Performed by: OPTOMETRIST

## 2022-03-16 PROCEDURE — 1157F PR ADVANCE CARE PLAN OR EQUIV PRESENT IN MEDICAL RECORD: ICD-10-PCS | Mod: CPTII,S$GLB,, | Performed by: OPTOMETRIST

## 2022-03-16 PROCEDURE — 3288F FALL RISK ASSESSMENT DOCD: CPT | Mod: CPTII,S$GLB,, | Performed by: OPTOMETRIST

## 2022-03-16 NOTE — PROGRESS NOTES
HPI     82 Y/o female is here for routine eye exam with C/o pt see's a cloudy   spot in the middle of eye in the right eye   Pt denies pain and discomfort   Pt states when she's tires she see's occasional floaters     Eye med: Systane OU BID     Last edited by Timoteo Marcus MA on 3/16/2022 10:20 AM. (History)        ROS     Positive for: Eyes (cat surgery OU)    Negative for: Constitutional, Gastrointestinal, Neurological, Skin,   Genitourinary, Musculoskeletal, HENT, Endocrine, Cardiovascular,   Respiratory, Psychiatric, Allergic/Imm, Heme/Lymph    Last edited by Ariel Hinojosa, OD on 3/16/2022 10:25 AM. (History)        Assessment /Plan     For exam results, see Encounter Report.    1. Sp pciol/YAG OU--pt wishes new Rx  2. 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  Discussed sunglasses/vitamins.  Pt non-smoker.  Instructed on Amsler Grid use  3. COREY--pt to inc Ats to QID    PLAN:    rtc 1 yr, or immediately if Amsler changes                Detail Level: Detailed

## 2022-03-16 NOTE — PATIENT INSTRUCTIONS
Using the Amsler Grid  If you are at risk for vision loss, you may be told to check your eyesight regularly using the Amsler grid. Below is the grid and instructions for using it.         The Amsler grid helps you track changes in your vision.    How to Use the Amsler Grid  Use the grid in a well-lighted area.  Wear glasses or contact lenses if you usually wear them.  Hold the grid at your normal reading distance (about 16 inches).  Cover your left eye.  With your right eye, look at the dot in the center of the grid.  While looking at the dot, notice if any of the lines look wavy, if any lines disappear, or if the boxes change shape.  Write down on a piece of paper any vision changes from the last time you used the grid.  Now repeat the exercise, this time covering your right eye.  Call your doctor right away if you notice any vision changes.  How Often Should I Check My Vision?  Use the Amsler grid as often as your eye doctor suggests. Keep the grid where youll remember to use it. Call your eye doctor right away if you notice any changes with your eyesight. This includes if your vision improves.  © 7569-3475 [employer], [address] [place] [address], [place] [address]. All rights reserved. This information is not intended as a substitute for professional medical care. Always follow your healthcare professional's instructions.

## 2022-03-19 ENCOUNTER — PATIENT MESSAGE (OUTPATIENT)
Dept: ADMINISTRATIVE | Facility: OTHER | Age: 82
End: 2022-03-19
Payer: MEDICARE

## 2022-03-28 ENCOUNTER — PATIENT MESSAGE (OUTPATIENT)
Dept: ADMINISTRATIVE | Facility: OTHER | Age: 82
End: 2022-03-28
Payer: MEDICARE

## 2022-03-29 DIAGNOSIS — E78.5 HYPERLIPIDEMIA, UNSPECIFIED HYPERLIPIDEMIA TYPE: ICD-10-CM

## 2022-03-29 RX ORDER — FENOFIBRATE 160 MG/1
160 TABLET ORAL DAILY
Qty: 90 TABLET | Refills: 3 | Status: SHIPPED | OUTPATIENT
Start: 2022-03-29 | End: 2023-03-29

## 2022-03-29 NOTE — TELEPHONE ENCOUNTER
Care Due:                  Date            Visit Type   Department     Provider  --------------------------------------------------------------------------------                                MYCHART                              FOLLOWUP/OF  [facility] INTERNAL  Last Visit: 11-      FICE VISIT   MEDICINE       Dakota Kerr  Next Visit: None Scheduled  None         None Found                                                            Last  Test          Frequency    Reason                     Performed    Due Date  --------------------------------------------------------------------------------    CBC.........  12 months..  fenofibrate..............  07- 07-    CMP.........  12 months..  fenofibrate, losartan....  Not Found    Overdue    Lipid Panel.  12 months..  fenofibrate..............  Not Found    Overdue    TSH.........  12 months..  levothyroxine............  02- 01-    Powered by [employer] Reference number: [id number].   3/29/2022 9:17:20 AM CDT

## 2022-03-29 NOTE — TELEPHONE ENCOUNTER
This Rx Request does not qualify for assessment with the [facility]   Please review protocol details and the Care Due Message for extra clinical information    Reasons Rx Request may be deferred:  Patient has been seen in the ED/Hospital since the last PCP visit    Note composed:10:47 AM 03/29/2022

## 2022-03-30 ENCOUNTER — TELEPHONE (OUTPATIENT)
Dept: AUDIOLOGY | Facility: CLINIC | Age: 82
End: 2022-03-30
Payer: MEDICARE

## 2022-03-30 NOTE — TELEPHONE ENCOUNTER
Ms. Escobedo called to get scheduled for a hearing aid consult as she is ready to try new hearing aid technology.  She was also curious to see if her insurance covered anything.  Ms. Escobedo was informed that her insurance has been known to rea patients access to a hearing aid discount service that [facility] is not in network with at this time.  She was advised to call her insurance to get more information.  Since we do have a long wait time for hearing aid appointments, Ms. Escobedo chose to schedule for April 28th for an audiogram and hearing aid consult.  She was informed that Dr. Goodson is out of clinic at this time and was okay with scheduling with a different audiologist.

## 2022-04-01 ENCOUNTER — PES CALL (OUTPATIENT)
Dept: ADMINISTRATIVE | Facility: CLINIC | Age: 82
End: 2022-04-01
Payer: MEDICARE

## 2022-04-04 ENCOUNTER — OFFICE VISIT (OUTPATIENT)
Dept: INTERNAL MEDICINE | Facility: CLINIC | Age: 82
End: 2022-04-04
Payer: MEDICARE

## 2022-04-04 VITALS
DIASTOLIC BLOOD PRESSURE: 76 MMHG | HEART RATE: 80 BPM | OXYGEN SATURATION: 95 % | BODY MASS INDEX: 33.18 KG/M2 | HEIGHT: 62 IN | WEIGHT: 180.31 LBS | SYSTOLIC BLOOD PRESSURE: 128 MMHG

## 2022-04-04 DIAGNOSIS — R26.9 ABNORMALITY OF GAIT AND MOBILITY: ICD-10-CM

## 2022-04-04 DIAGNOSIS — Z00.00 ENCOUNTER FOR PREVENTIVE HEALTH EXAMINATION: Primary | ICD-10-CM

## 2022-04-04 DIAGNOSIS — M48.062 SPINAL STENOSIS OF LUMBAR REGION WITH NEUROGENIC CLAUDICATION: ICD-10-CM

## 2022-04-04 DIAGNOSIS — E89.0 HYPOTHYROIDISM, POSTSURGICAL: ICD-10-CM

## 2022-04-04 DIAGNOSIS — E66.9 OBESITY (BMI 30.0-34.9): ICD-10-CM

## 2022-04-04 DIAGNOSIS — R73.03 PREDIABETES: ICD-10-CM

## 2022-04-04 DIAGNOSIS — J84.10 CALCIFIED GRANULOMA OF LUNG: ICD-10-CM

## 2022-04-04 DIAGNOSIS — M88.9 PAGET'S DISEASE OF BONE: ICD-10-CM

## 2022-04-04 DIAGNOSIS — M47.816 FACET ARTHRITIS OF LUMBAR REGION: ICD-10-CM

## 2022-04-04 DIAGNOSIS — M47.816 SPONDYLOSIS OF LUMBAR REGION WITHOUT MYELOPATHY OR RADICULOPATHY: ICD-10-CM

## 2022-04-04 DIAGNOSIS — I10 ESSENTIAL HYPERTENSION: ICD-10-CM

## 2022-04-04 DIAGNOSIS — E78.2 MIXED HYPERLIPIDEMIA: ICD-10-CM

## 2022-04-04 DIAGNOSIS — D51.3 OTHER DIETARY VITAMIN B12 DEFICIENCY ANEMIA: ICD-10-CM

## 2022-04-04 DIAGNOSIS — D05.11 DUCTAL CARCINOMA IN SITU (DCIS) OF RIGHT BREAST: ICD-10-CM

## 2022-04-04 DIAGNOSIS — M47.896 OTHER OSTEOARTHRITIS OF SPINE, LUMBAR REGION: ICD-10-CM

## 2022-04-04 DIAGNOSIS — I70.0 AORTIC ATHEROSCLEROSIS: ICD-10-CM

## 2022-04-04 PROBLEM — J98.4 CALCIFIED GRANULOMA OF LUNG: Status: ACTIVE | Noted: 2022-04-04

## 2022-04-04 PROCEDURE — 1160F RVW MEDS BY RX/DR IN RCRD: CPT | Mod: CPTII,S$GLB,, | Performed by: NURSE PRACTITIONER

## 2022-04-04 PROCEDURE — 3074F PR MOST RECENT SYSTOLIC BLOOD PRESSURE < 130 MM HG: ICD-10-PCS | Mod: CPTII,S$GLB,, | Performed by: NURSE PRACTITIONER

## 2022-04-04 PROCEDURE — G0439 PR MEDICARE ANNUAL WELLNESS SUBSEQUENT VISIT: ICD-10-PCS | Mod: S$GLB,,, | Performed by: NURSE PRACTITIONER

## 2022-04-04 PROCEDURE — 1157F PR ADVANCE CARE PLAN OR EQUIV PRESENT IN MEDICAL RECORD: ICD-10-PCS | Mod: CPTII,S$GLB,, | Performed by: NURSE PRACTITIONER

## 2022-04-04 PROCEDURE — 1157F ADVNC CARE PLAN IN RCRD: CPT | Mod: CPTII,S$GLB,, | Performed by: NURSE PRACTITIONER

## 2022-04-04 PROCEDURE — 99499 RISK ADDL DX/OHS AUDIT: ICD-10-PCS | Mod: S$GLB,,, | Performed by: NURSE PRACTITIONER

## 2022-04-04 PROCEDURE — 1101F PR PT FALLS ASSESS DOC 0-1 FALLS W/OUT INJ PAST YR: ICD-10-PCS | Mod: CPTII,S$GLB,, | Performed by: NURSE PRACTITIONER

## 2022-04-04 PROCEDURE — G0439 PPPS, SUBSEQ VISIT: HCPCS | Mod: S$GLB,,, | Performed by: NURSE PRACTITIONER

## 2022-04-04 PROCEDURE — 1159F MED LIST DOCD IN RCRD: CPT | Mod: CPTII,S$GLB,, | Performed by: NURSE PRACTITIONER

## 2022-04-04 PROCEDURE — 99999 PR PBB SHADOW E&M-EST. PATIENT-LVL V: ICD-10-PCS | Mod: PBBFAC,,, | Performed by: NURSE PRACTITIONER

## 2022-04-04 PROCEDURE — 1101F PT FALLS ASSESS-DOCD LE1/YR: CPT | Mod: CPTII,S$GLB,, | Performed by: NURSE PRACTITIONER

## 2022-04-04 PROCEDURE — 1159F PR MEDICATION LIST DOCUMENTED IN MEDICAL RECORD: ICD-10-PCS | Mod: CPTII,S$GLB,, | Performed by: NURSE PRACTITIONER

## 2022-04-04 PROCEDURE — 1170F PR FUNCTIONAL STATUS ASSESSED: ICD-10-PCS | Mod: CPTII,S$GLB,, | Performed by: NURSE PRACTITIONER

## 2022-04-04 PROCEDURE — 3288F FALL RISK ASSESSMENT DOCD: CPT | Mod: CPTII,S$GLB,, | Performed by: NURSE PRACTITIONER

## 2022-04-04 PROCEDURE — 99499 UNLISTED E&M SERVICE: CPT | Mod: S$GLB,,, | Performed by: NURSE PRACTITIONER

## 2022-04-04 PROCEDURE — 3288F PR FALLS RISK ASSESSMENT DOCUMENTED: ICD-10-PCS | Mod: CPTII,S$GLB,, | Performed by: NURSE PRACTITIONER

## 2022-04-04 PROCEDURE — 1160F PR REVIEW ALL MEDS BY PRESCRIBER/CLIN PHARMACIST DOCUMENTED: ICD-10-PCS | Mod: CPTII,S$GLB,, | Performed by: NURSE PRACTITIONER

## 2022-04-04 PROCEDURE — 1170F FXNL STATUS ASSESSED: CPT | Mod: CPTII,S$GLB,, | Performed by: NURSE PRACTITIONER

## 2022-04-04 PROCEDURE — 3078F DIAST BP <80 MM HG: CPT | Mod: CPTII,S$GLB,, | Performed by: NURSE PRACTITIONER

## 2022-04-04 PROCEDURE — 3078F PR MOST RECENT DIASTOLIC BLOOD PRESSURE < 80 MM HG: ICD-10-PCS | Mod: CPTII,S$GLB,, | Performed by: NURSE PRACTITIONER

## 2022-04-04 PROCEDURE — 3074F SYST BP LT 130 MM HG: CPT | Mod: CPTII,S$GLB,, | Performed by: NURSE PRACTITIONER

## 2022-04-04 PROCEDURE — 99999 PR PBB SHADOW E&M-EST. PATIENT-LVL V: CPT | Mod: PBBFAC,,, | Performed by: NURSE PRACTITIONER

## 2022-04-04 PROCEDURE — 1125F AMNT PAIN NOTED PAIN PRSNT: CPT | Mod: CPTII,S$GLB,, | Performed by: NURSE PRACTITIONER

## 2022-04-04 PROCEDURE — 1125F PR PAIN SEVERITY QUANTIFIED, PAIN PRESENT: ICD-10-PCS | Mod: CPTII,S$GLB,, | Performed by: NURSE PRACTITIONER

## 2022-04-04 NOTE — PROGRESS NOTES
"  Jo-Ann Escobedo presented for a  Medicare AWV and comprehensive Health Risk Assessment today. The following components were reviewed and updated:    · Medical history  · Family History  · Social history  · Allergies and Current Medications  · Health Risk Assessment  · Health Maintenance  · Care Team         ** See Completed Assessments for Annual Wellness Visit within the encounter summary.**         The following assessments were completed:  · Living Situation  · CAGE  · Depression Screening  · Timed Get Up and Go  · Whisper Test  · Cognitive Function Screening      ·   · Nutrition Screening  · ADL Screening  · PAQ Screening        Vitals:    04/04/22 1300 04/04/22 1307   BP:  128/76   BP Location:  Right arm   Patient Position:  Sitting   Pulse:  80   SpO2:  95%   Weight: 81.8 kg (180 lb 5.4 oz)    Height: 5' 2" (1.575 m)      Body mass index is 32.98 kg/m².  Physical Exam  Vitals and nursing note reviewed.   Constitutional:       Appearance: She is well-developed.   HENT:      Head: Normocephalic.   Cardiovascular:      Rate and Rhythm: Normal rate and regular rhythm.   Pulmonary:      Effort: Pulmonary effort is normal.      Breath sounds: Normal breath sounds.   Abdominal:      General: Bowel sounds are normal.      Palpations: Abdomen is soft.   Musculoskeletal:         General: Normal range of motion.   Skin:     General: Skin is warm and dry.   Neurological:      Mental Status: She is alert and oriented to person, place, and time.      Motor: No abnormal muscle tone.   Psychiatric:         Mood and Affect: Mood normal.               Diagnoses and health risks identified today and associated recommendations/orders:    1. Encounter for preventive health examination  Here for Health Risk Assessment/Annual Wellness Visit.  Health maintenance reviewed and updated. Follow up in one year.    2. Essential hypertension  Chronic, stable on current medications. Followed by PCP.    3. Aortic atherosclerosis  Chronic, " stable on current medications. Noted CXR 8/10/21. Followed by PCP.    4. Mixed hyperlipidemia  Chronic, stable on current medication. Followed by PCP.    5. Calcified granuloma of lung  Chronic, stable. Noted CXR 5/25/11. . Followed by PCP.    6. Ductal carcinoma in situ (DCIS) of right breast  Stable. HUGO. Under active surveillance. Followed by Oncology, PCP.    7. Other dietary vitamin B12 deficiency anemia  Chronic, stable on current medication. Followed by PCP.    8. Hypothyroidism, postsurgical  Chronic, stable on current medication. Followed by PCP.    9. Prediabetes  Chronic, stable with diet. Last A1c 6.1.  Followed by PCP.    10. Obesity (BMI 30.0-34.9)  Chronic, stable. Followed by PCP.    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 Followed by PCP.    12. Facet arthritis of lumbar region  Chronic, reports worsening right hip pain. Followed by PCP, Spine, Pain Management.    13. Other osteoarthritis of spine, lumbar region  Chronic, reports worsening right hip pain. Followed by PCP, Spine, Pain Management.    14. Spondylosis of lumbar region without myelopathy or radiculopathy  Chronic, reports worsening right hip pain. Followed by PCP, Spine, Pain Management.    15. Spinal stenosis of lumbar region with neurogenic claudication  Chronic, reports worsening right hip pain. Followed by PCP, Spine, Pain Management.    16. Abnormality of gait and mobility  Chronic, due to back and hip pain. Followed by PCP, Spine.      Provided Jo-Ann with a 5-10 year written screening schedule and personal prevention plan. Recommendations were developed using the USPSTF age appropriate recommendations. Education, counseling, and referrals were provided as needed. After Visit Summary printed and given to patient which includes a list of additional screenings\tests needed.    Follow up in about 29 days (around 5/3/2022).with PCP    Kelsea Villegas NP

## 2022-04-04 NOTE — PATIENT INSTRUCTIONS
Counseling and Referral of Other Preventative  (Italic type indicates deductible and co-insurance are waived)    Patient Name: Jo-Ann Escobedo  Today's Date: 4/4/2022    Health Maintenance       Date Due Completion Date    DEXA Scan 01/11/2023 1/11/2019    Lipid Panel 05/04/2024 5/4/2019    Colonoscopy 12/09/2024 12/9/2019    TETANUS VACCINE 10/25/2028 10/25/2018        No orders of the defined types were placed in this encounter.    The following information is provided to all patients.  This information is to help you find resources for any of the problems found today that may be affecting your health:                Living healthy guide: www.[facility].[place].gov      Understanding Diabetes: www.diabetes.org      Eating healthy: www.cdc.gov/healthyweight      CDC home safety checklist: www.cdc.gov/steadi/patient.html      Agency on Aging: www.goea.[place].[facility] [place]      Alcoholics anonymous (AA): www.aa.org      Physical Activity: www.david.nih.gov/pl8qwfb      Tobacco use: www.quitwithusla.org

## 2022-05-04 ENCOUNTER — OFFICE VISIT (OUTPATIENT)
Dept: ORTHOPEDICS | Facility: CLINIC | Age: 82
End: 2022-05-04
Payer: MEDICARE

## 2022-05-04 VITALS — HEIGHT: 62 IN | BODY MASS INDEX: 33.18 KG/M2 | WEIGHT: 180.31 LBS

## 2022-05-04 DIAGNOSIS — M54.16 LUMBAR RADICULOPATHY: ICD-10-CM

## 2022-05-04 DIAGNOSIS — G95.9 CERVICAL MYELOPATHY: Primary | ICD-10-CM

## 2022-05-04 PROCEDURE — 1159F MED LIST DOCD IN RCRD: CPT | Mod: CPTII,S$GLB,, | Performed by: ORTHOPAEDIC SURGERY

## 2022-05-04 PROCEDURE — 99999 PR PBB SHADOW E&M-EST. PATIENT-LVL III: CPT | Mod: PBBFAC,,, | Performed by: ORTHOPAEDIC SURGERY

## 2022-05-04 PROCEDURE — 1157F PR ADVANCE CARE PLAN OR EQUIV PRESENT IN MEDICAL RECORD: ICD-10-PCS | Mod: CPTII,S$GLB,, | Performed by: ORTHOPAEDIC SURGERY

## 2022-05-04 PROCEDURE — 99214 PR OFFICE/OUTPT VISIT, EST, LEVL IV, 30-39 MIN: ICD-10-PCS | Mod: S$GLB,,, | Performed by: ORTHOPAEDIC SURGERY

## 2022-05-04 PROCEDURE — 1125F AMNT PAIN NOTED PAIN PRSNT: CPT | Mod: CPTII,S$GLB,, | Performed by: ORTHOPAEDIC SURGERY

## 2022-05-04 PROCEDURE — 1157F ADVNC CARE PLAN IN RCRD: CPT | Mod: CPTII,S$GLB,, | Performed by: ORTHOPAEDIC SURGERY

## 2022-05-04 PROCEDURE — 99214 OFFICE O/P EST MOD 30 MIN: CPT | Mod: S$GLB,,, | Performed by: ORTHOPAEDIC SURGERY

## 2022-05-04 PROCEDURE — 1125F PR PAIN SEVERITY QUANTIFIED, PAIN PRESENT: ICD-10-PCS | Mod: CPTII,S$GLB,, | Performed by: ORTHOPAEDIC SURGERY

## 2022-05-04 PROCEDURE — 99999 PR PBB SHADOW E&M-EST. PATIENT-LVL III: ICD-10-PCS | Mod: PBBFAC,,, | Performed by: ORTHOPAEDIC SURGERY

## 2022-05-04 PROCEDURE — 99499 RISK ADDL DX/OHS AUDIT: ICD-10-PCS | Mod: S$GLB,,, | Performed by: ORTHOPAEDIC SURGERY

## 2022-05-04 PROCEDURE — 99499 UNLISTED E&M SERVICE: CPT | Mod: S$GLB,,, | Performed by: ORTHOPAEDIC SURGERY

## 2022-05-04 PROCEDURE — 1159F PR MEDICATION LIST DOCUMENTED IN MEDICAL RECORD: ICD-10-PCS | Mod: CPTII,S$GLB,, | Performed by: ORTHOPAEDIC SURGERY

## 2022-05-05 NOTE — PROGRESS NOTES
DATE: 5/4/2022  PATIENT: Jo-Ann Escobedo    Attending Physician: Paco Fay M.D.    CHIEF COMPLAINT:  Low back,    HISTORY:  Jo-Ann Escobedo is a 81 y.o. female here today with history of low back left hip pain.  Patient states that pain is over the lateral aspect of the hip.  Has been present for approximately 60 days however 3 days ago the pain is just stop.  She did see Dr. Stanley for this who thought the pain might be coming from her hip.  Therefore she was referred to Sports Medicine.  MRI performed showing labral tear.  She was set up for a total hip with Dr. Salgado however they were interested in getting a 2nd opinion.  Also has a history of getting a medial branch block in 2019. Overall she feels well today.  No symptoms of myelopathy.  No bowel or bladder changes.    The Patient denies myelopathic symptoms such as handwriting changes or difficulty with buttons/coins/keys. Denies perineal paresthesias, bowel/bladder dysfunction.    Interval history 12.6.21:  Patient returns for follow-up of low back and left hip radiculopathy .  She reports she did not get the injection in her lumbar spine, this she was concerned about the possibility that would affect her in a total hip arthroplasty.  She has a known left hip labral tear.  She says that the symptoms in her low back and leg have become debilitating.  The symptoms might be related from her left leg to her right leg and then back to her left leg again.  She denies weakness.  She had previous injections over 3 years ago, she does not recall how much relief she got.  She is currently doing therapy and this has led to some improvement.    Interval update 1/26/22  Patient recently had lumbar KARLI. She reports significant improvement in radicular leg pain. Says she is completely better. She is very happy with her outcome.     Interval history 5/4/22: She returns due to recurrence of her row back pain with right side radiculopathy. She had relief of her symptoms  till roughly half way through 3/22. No she is back to her base line symptoms. Since out last visit she has noticed some increased difficulty with keys and buttons. No falls or traumas. She denies any upper extremity weakness, tingling, numbness.      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    SCC (squamous cell carcinoma) 12/2020    left 5th knuckle    Squamous Cell Carcinoma     in situ right neck      Past Surgical History:   Procedure Laterality Date    BREAST BIOPSY Right 2019    BREAST LUMPECTOMY Right 2019    CARPAL TUNNEL RELEASE Left 6/5/2020    Procedure: RELEASE, CARPAL TUNNEL Left;  Surgeon: Pricila Presley MD;  Location: [facility];  Service: Orthopedics;  Laterality: Left;    CATARACT EXTRACTION W/  INTRAOCULAR LENS IMPLANT Bilateral     COLONOSCOPY N/A 4/15/2019     Procedure: COLONOSCOPY;  Surgeon: Artur Monet MD;  Location: [facility] (4TH FLR);  Service: Endoscopy;  Laterality: N/A;  within 1 month    COLONOSCOPY N/A 12/9/2019    Procedure: COLONOSCOPY;  Surgeon: Clyde Welch MD;  Location: [facility] (2ND FLR);  Service: Endoscopy;  Laterality: N/A;    ENDOSCOPIC ULTRASOUND OF UPPER GASTROINTESTINAL TRACT N/A 1/22/2020    Procedure: ULTRASOUND, UPPER GI TRACT, ENDOSCOPIC;  Surgeon: Eleazar Shaffer MD;  Location: [facility] (2ND FLR);  Service: Endoscopy;  Laterality: N/A;  EUS for 10mm SML w/negative pillow sign and negative naked fat sign which was found at the incisura.  Dr Welch    EPIDURAL STEROID INJECTION Bilateral 12/17/2021    Procedure: INJECTION, STEROID, EPIDURAL, L3-L4 Bilateral DIRECT REF Transforaminal;  Surgeon: Julian Fish MD;  Location: [facility]IN [facility];  Service: Pain Management;  Laterality: Bilateral;    ESOPHAGOGASTRODUODENOSCOPY N/A 12/7/2019    Procedure: EGD (ESOPHAGOGASTRODUODENOSCOPY);  Surgeon: Clyde Welch MD;  Location: [facility] (2ND FLR);  Service: Endoscopy;  Laterality: N/A;    EXCISIONAL BIOPSY Right 6/27/2019    Procedure: EXCISIONAL BIOPSY-breast;  Surgeon: Suki Dill MD;  Location: [facility] 2ND FLR;  Service: General;  Laterality: Right;    EYE SURGERY      HYSTERECTOMY      INJECTION FOR SENTINEL NODE IDENTIFICATION Right 7/30/2020    Procedure: INJECTION, FOR SENTINEL NODE IDENTIFICATION;  Surgeon: Suki Dill MD;  Location: [facility];  Service: General;  Laterality: Right;    INJECTION OF ANESTHETIC AGENT AROUND MEDIAL BRANCH NERVES INNERVATING LUMBAR FACET JOINT Bilateral 6/7/2019    Procedure: BLOCK, NERVE, FACET JOINT, LUMBAR, MEDIAL BRANCH-deo MBB L4/5,L5/S1;  Surgeon: Jarvis Morales III, MD;  Location: [facility] CATH LAB;  Service: Pain Management;  Laterality: Bilateral;    INJECTION OF STEROID Right 6/5/2020    Procedure: INJECTION, STEROID right carpal tunel injection/ Right ring trigger  finger injection;  Surgeon: Pricila Presley MD;  Location: [facility] OR;  Service: Orthopedics;  Laterality: Right;  INJECTION, STEROID right carpal tunel injection/ Right ring trigger finger injection    KNEE ARTHROSCOPY N/A     pt unsure of which knee     MASTECTOMY      OOPHORECTOMY      SENTINEL LYMPH NODE BIOPSY Right 7/30/2020    Procedure: BIOPSY, LYMPH NODE, SENTINEL;  Surgeon: Suki Dill MD;  Location: [facility] OR;  Service: General;  Laterality: Right;    SPINE SURGERY      TOTAL THYROIDECTOMY      UNILATERAL MASTECTOMY Right 7/30/2020    Procedure: MASTECTOMY, UNILATERAL;  Surgeon: Suki Dill MD;  Location: [facility] OR;  Service: General;  Laterality: Right;    YAG Laser Capsulotomy  Left 07/29/2019 01/06/2021 OD//Dr. Hahn       Current Medications:   Current Outpatient Medications:     acetaminophen (TYLENOL) 650 MG TbSR, Take 1 tablet (650 mg total) by mouth 3 (three) times daily as needed., Disp: 42 tablet, Rfl: 0    ascorbic acid, vitamin C, (VITAMIN C) 100 MG tablet, Take 100 mg by mouth once daily., Disp: , Rfl:     cholecalciferol, vitamin D3, (VITAMIN D3) 50 mcg (2,000 unit) Tab, Take 1 tablet by mouth once daily., Disp: , Rfl:     co-enzyme Q-10 30 mg capsule, Take 30 mg by mouth once daily., Disp: , Rfl:     fenofibrate 160 MG Tab, TAKE 1 TABLET (160 MG TOTAL) BY MOUTH ONCE DAILY., Disp: 90 tablet, Rfl: 3    fluocinonide (LIDEX) 0.05 % external solution, AAA scalp qday  prn scaling or itching, Disp: 60 mL, Rfl: 3    HYDROcodone-acetaminophen (NORCO) 5-325 mg per tablet, Take 1 tablet by mouth every 12 (twelve) hours as needed for Pain. (Patient not taking: Reported on 4/4/2022), Disp: 30 tablet, Rfl: 0    LACTOBACILLUS COMBINATION NO.8 (ADULT PROBIOTIC ORAL), Take by mouth once daily. , Disp: , Rfl:     levothyroxine (SYNTHROID) 125 MCG tablet, TAKE 1 TABLET (125 MCG TOTAL) BY MOUTH BEFORE BREAKFAST., Disp: 90 tablet, Rfl: 3    LIDOcaine (LIDODERM) 5 %, Place 1 patch  onto the skin daily as needed (pain). Remove & Discard patch within 12 hours or as directed by MD (Patient not taking: Reported on 2022), Disp: 30 patch, Rfl: 2    lidocaine-prilocaine (EMLA) cream, Apply topically 2 (two) times daily as needed. To hand. (Patient not taking: Reported on 2022), Disp: 30 g, Rfl: 2    losartan (COZAAR) 100 MG tablet, Take 1 tablet (100 mg total) by mouth once daily., Disp: 90 tablet, Rfl: 3    meclizine (ANTIVERT) 12.5 mg tablet, Take 1 tablet (12.5 mg total) by mouth 3 (three) times daily as needed for Dizziness., Disp: 30 tablet, Rfl: 2    turmeric root extract 500 mg Cap, Take by mouth once daily. , Disp: , Rfl:   No current facility-administered medications for this visit.    Facility-Administered Medications Ordered in Other Visits:     mupirocin 2 % ointment, , Nasal, On Call Procedure, Leandro Avila MD, Given at 20 0837    Social History:   Social History     Socioeconomic History    Marital status:    Occupational History    Occupation: [employer]     Employer: [employer]     Employer: [employer]   Tobacco Use    Smoking status: Former Smoker     Packs/day: 2.00     Years: 44.00     Pack years: 88.00     Types: Cigarettes     Quit date: 1969     Years since quittin.3    Smokeless tobacco: Never Used   Substance and Sexual Activity    Alcohol use: Not Currently    Drug use: No    Sexual activity: Not Currently   Other Topics Concern    Are you pregnant or think you may be? No    Breast-feeding No     Social Determinants of Health     Financial Resource Strain: Low Risk     Difficulty of Paying Living Expenses: Not hard at all   Food Insecurity: No Food Insecurity    Worried About Running Out of Food in the Last Year: Never true    Ran Out of Food in the Last Year: Never true   Transportation Needs: Unmet Transportation Needs    Lack of Transportation (Medical): Yes    Lack of Transportation (Non-Medical): No  "  Physical Activity: Insufficiently Active    Days of Exercise per Week: 3 days    Minutes of Exercise per Session: 30 min   Stress: No Stress Concern Present    Feeling of Stress : Not at all   Social Connections: Moderately Isolated    Frequency of Communication with Friends and Family: More than three times a week    Frequency of Social Gatherings with Friends and Family: Three times a week    Attends [employer] Services: Never    Active Member of Clubs or Organizations: Yes    Attends Club or Organization Meetings: 1 to 4 times per year    Marital Status:    Housing Stability: Low Risk     Unable to Pay for Housing in the Last Year: No    Number of Places Lived in the Last Year: 1    Unstable Housing in the Last Year: No       REVIEW OF SYSTEMS:  Constitution: Negative. Negative for chills, fever and night sweats.   Cardiovascular: Negative for chest pain and syncope.   Respiratory: Negative for cough and shortness of breath.   Gastrointestinal: See HPI. Negative for nausea/vomiting. Negative for abdominal pain.  Genitourinary: See HPI. Negative for discoloration or dysuria.  Hematologic/Lymphatic: n for bleeding/clotting disorders.   Musculoskeletal: Negative for falls and muscle weakness.   Neurological: See HPI. n history of seizures. n history of cranial surgery or shunts.  Neurological: See HPI. No seizures.   Endocrine: Negative for polydipsia, polyphagia and polyuria.   Allergic/Immunologic: Negative for hives and persistent infections.     EXAM:  Ht 5' 2" (1.575 m)   Wt 81.8 kg (180 lb 5.4 oz)   LMP  (LMP Unknown)   BMI 32.98 kg/m²     PHYSICAL EXAMINATION:    General: The patient is a pleasant 81 y.o. female in no apparent distress, the patient is orientatied to person, place and time.  Psych: Normal mood and affect  HEENT: Vision grossly intact, hearing intact to the spoken word.  Lungs: Respirations unlabored.  Gait: Normal station and gait, no difficulty with toe or heel walk. "   Skin: Dorsal lumbar skin negative for rashes, lesions, hairy patches and surgical scars. There is n lumbar tenderness to palpation.  Range of motion: Lumbar range of motion is acceptable.  Spinal Balance: Global saggital and coronal spinal balance acceptable, no significant for scoliosis and kyphosis.  Musculoskeletal: No pain with the range of motion of the bilateral hips. No trochanteric tenderness to palpation.  Vascular: Bilateral lower extremities warm and well perfused, Dorsalis pedis pulses 2+ bilaterally.  Neurological: Normal strength and tone in all major motor groups in the bilateral lower extremities. Normal sensation to light touch in the L2-S1 dermatomes bilaterally.  Deep tendon reflexes symmetric 2+ in the bilateral lower extremities.  Negative Babinski bilaterally. Straight leg raise negative bilaterally.    5/5 UE strength BUE  Negative Bruner on exam    IMAGING:      Today I personally reviewed AP, Lat and Flex/Ex  upright L-spine that demonstrate generalized spondylosis throughout the lumbar spine. MRI showing left foraminal disc herniation, severe spinal stenosis at L3-L4.  Moderate spinal stenosis at L4-L5 and L5-S1.      Body mass index is 32.98 kg/m².  Hemoglobin A1C   Date Value Ref Range Status   04/30/2020 6.1 (H) 4.0 - 5.6 % Final     Comment:     ADA Screening Guidelines:  5.7-6.4%  Consistent with prediabetes  >or=6.5%  Consistent with diabetes  High levels of fetal hemoglobin interfere with the HbA1C  assay. Heterozygous hemoglobin variants (HbS, HgC, etc)do  not significantly interfere with this assay.   However, presence of multiple variants may affect accuracy.     10/18/2019 5.6 4.0 - 5.6 % Final     Comment:     ADA Screening Guidelines:  5.7-6.4%  Consistent with prediabetes  >or=6.5%  Consistent with diabetes  High levels of fetal hemoglobin interfere with the HbA1C  assay. Heterozygous hemoglobin variants (HbS, HgC, etc)do  not significantly interfere with this assay.    However, presence of multiple variants may affect accuracy.     04/18/2019 5.6 4.0 - 5.6 % Final     Comment:     ADA Screening Guidelines:  5.7-6.4%  Consistent with prediabetes  >or=6.5%  Consistent with diabetes  High levels of fetal hemoglobin interfere with the HbA1C  assay. Heterozygous hemoglobin variants (HbS, HgC, etc)do  not significantly interfere with this assay.   However, presence of multiple variants may affect accuracy.         ASSESSMENT/PLAN:    Patient with significant improvement in spinal stenosis symptoms after injection. F/u PRN.     Jo-Ann was seen today for pain.    Diagnoses and all orders for this visit:    Cervical myelopathy  -     MRI Cervical Spine Without Contrast; Future  -     Procedure Order to Pain Management; Future     She had an excellent response to KARLI, I recommended she return to her pain management doctor for continued treatment. MRI c spine for assess for possible stenosis.

## 2022-05-08 ENCOUNTER — HOSPITAL ENCOUNTER (OUTPATIENT)
Dept: RADIOLOGY | Facility: HOSPITAL | Age: 82
Discharge: HOME | End: 2022-05-08
Attending: ORTHOPAEDIC SURGERY
Payer: MEDICARE

## 2022-05-08 DIAGNOSIS — G95.9 CERVICAL MYELOPATHY: ICD-10-CM

## 2022-05-08 PROCEDURE — 72141 MRI NECK SPINE W/O DYE: CPT | Mod: TC

## 2022-05-08 PROCEDURE — 72141 MRI NECK SPINE W/O DYE: CPT | Mod: 26,,, | Performed by: RADIOLOGY

## 2022-05-08 PROCEDURE — 72141 MRI CERVICAL SPINE WITHOUT CONTRAST: ICD-10-PCS | Mod: 26,,, | Performed by: RADIOLOGY

## 2022-05-09 ENCOUNTER — PATIENT MESSAGE (OUTPATIENT)
Dept: PAIN MEDICINE | Facility: OTHER | Age: 82
End: 2022-05-09
Payer: MEDICARE

## 2022-05-11 ENCOUNTER — OFFICE VISIT (OUTPATIENT)
Dept: ORTHOPEDICS | Facility: CLINIC | Age: 82
End: 2022-05-11
Payer: MEDICARE

## 2022-05-11 VITALS — BODY MASS INDEX: 33.63 KG/M2 | WEIGHT: 182.75 LBS | HEIGHT: 62 IN

## 2022-05-11 DIAGNOSIS — M54.16 LUMBAR RADICULOPATHY: Primary | ICD-10-CM

## 2022-05-11 PROCEDURE — 1157F ADVNC CARE PLAN IN RCRD: CPT | Mod: CPTII,S$GLB,, | Performed by: ORTHOPAEDIC SURGERY

## 2022-05-11 PROCEDURE — 3288F PR FALLS RISK ASSESSMENT DOCUMENTED: ICD-10-PCS | Mod: CPTII,S$GLB,, | Performed by: ORTHOPAEDIC SURGERY

## 2022-05-11 PROCEDURE — 1101F PR PT FALLS ASSESS DOC 0-1 FALLS W/OUT INJ PAST YR: ICD-10-PCS | Mod: CPTII,S$GLB,, | Performed by: ORTHOPAEDIC SURGERY

## 2022-05-11 PROCEDURE — 1125F AMNT PAIN NOTED PAIN PRSNT: CPT | Mod: CPTII,S$GLB,, | Performed by: ORTHOPAEDIC SURGERY

## 2022-05-11 PROCEDURE — 1159F MED LIST DOCD IN RCRD: CPT | Mod: CPTII,S$GLB,, | Performed by: ORTHOPAEDIC SURGERY

## 2022-05-11 PROCEDURE — 1159F PR MEDICATION LIST DOCUMENTED IN MEDICAL RECORD: ICD-10-PCS | Mod: CPTII,S$GLB,, | Performed by: ORTHOPAEDIC SURGERY

## 2022-05-11 PROCEDURE — 99999 PR PBB SHADOW E&M-EST. PATIENT-LVL III: CPT | Mod: PBBFAC,,, | Performed by: ORTHOPAEDIC SURGERY

## 2022-05-11 PROCEDURE — 99214 OFFICE O/P EST MOD 30 MIN: CPT | Mod: S$GLB,,, | Performed by: ORTHOPAEDIC SURGERY

## 2022-05-11 PROCEDURE — 1125F PR PAIN SEVERITY QUANTIFIED, PAIN PRESENT: ICD-10-PCS | Mod: CPTII,S$GLB,, | Performed by: ORTHOPAEDIC SURGERY

## 2022-05-11 PROCEDURE — 99999 PR PBB SHADOW E&M-EST. PATIENT-LVL III: ICD-10-PCS | Mod: PBBFAC,,, | Performed by: ORTHOPAEDIC SURGERY

## 2022-05-11 PROCEDURE — 1101F PT FALLS ASSESS-DOCD LE1/YR: CPT | Mod: CPTII,S$GLB,, | Performed by: ORTHOPAEDIC SURGERY

## 2022-05-11 PROCEDURE — 3288F FALL RISK ASSESSMENT DOCD: CPT | Mod: CPTII,S$GLB,, | Performed by: ORTHOPAEDIC SURGERY

## 2022-05-11 PROCEDURE — 99214 PR OFFICE/OUTPT VISIT, EST, LEVL IV, 30-39 MIN: ICD-10-PCS | Mod: S$GLB,,, | Performed by: ORTHOPAEDIC SURGERY

## 2022-05-11 PROCEDURE — 1157F PR ADVANCE CARE PLAN OR EQUIV PRESENT IN MEDICAL RECORD: ICD-10-PCS | Mod: CPTII,S$GLB,, | Performed by: ORTHOPAEDIC SURGERY

## 2022-05-11 NOTE — PROGRESS NOTES
DATE: 5/11/2022  PATIENT: Jo-Ann Escobedo    Attending Physician: Paco Fay M.D.    CHIEF COMPLAINT:  Low back,    HISTORY:  Jo-Ann Escobedo is a 81 y.o. female here today with history of low back left hip pain.  Patient states that pain is over the lateral aspect of the hip.  Has been present for approximately 60 days however 3 days ago the pain is just stop.  She did see Dr. Stanley for this who thought the pain might be coming from her hip.  Therefore she was referred to Sports Medicine.  MRI performed showing labral tear.  She was set up for a total hip with Dr. Salgado however they were interested in getting a 2nd opinion.  Also has a history of getting a medial branch block in 2019. Overall she feels well today.  No symptoms of myelopathy.  No bowel or bladder changes.    The Patient denies myelopathic symptoms such as handwriting changes or difficulty with buttons/coins/keys. Denies perineal paresthesias, bowel/bladder dysfunction.    Interval history 12.6.21:  Patient returns for follow-up of low back and left hip radiculopathy .  She reports she did not get the injection in her lumbar spine, this she was concerned about the possibility that would affect her in a total hip arthroplasty.  She has a known left hip labral tear.  She says that the symptoms in her low back and leg have become debilitating.  The symptoms might be related from her left leg to her right leg and then back to her left leg again.  She denies weakness.  She had previous injections over 3 years ago, she does not recall how much relief she got.  She is currently doing therapy and this has led to some improvement.    Interval update 1/26/22  Patient recently had lumbar KARLI. She reports significant improvement in radicular leg pain. Says she is completely better. She is very happy with her outcome.     Interval history 5/4/22: She returns due to recurrence of her low back pain with right side radiculopathy. She had relief of her symptoms  till roughly half way through 3/22. No she is back to her base line symptoms. Since out last visit she has noticed some increased difficulty with keys and buttons. No falls or traumas. She denies any upper extremity weakness, tingling, numbness.    5/11/22: She returns to clinic today for f/u of c spine MRI. She endorses increased difficulty with small objects. She states today is a good day.       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    SCC (squamous cell carcinoma) 12/2020    left 5th knuckle    Squamous Cell Carcinoma     in situ right neck      Past Surgical History:   Procedure Laterality Date    BREAST BIOPSY Right 2019    BREAST LUMPECTOMY Right 2019    CARPAL TUNNEL RELEASE Left 6/5/2020    Procedure: RELEASE, CARPAL TUNNEL Left;  Surgeon: Pricila Presley MD;  Location:  [facility] OR;  Service: Orthopedics;  Laterality: Left;    CATARACT EXTRACTION W/  INTRAOCULAR LENS IMPLANT Bilateral     COLONOSCOPY N/A 4/15/2019    Procedure: COLONOSCOPY;  Surgeon: Artur Monet MD;  Location: [facility] (4TH FLR);  Service: Endoscopy;  Laterality: N/A;  within 1 month    COLONOSCOPY N/A 12/9/2019    Procedure: COLONOSCOPY;  Surgeon: Clyde Welch MD;  Location: [facility] ENDO (2ND FLR);  Service: Endoscopy;  Laterality: N/A;    ENDOSCOPIC ULTRASOUND OF UPPER GASTROINTESTINAL TRACT N/A 1/22/2020    Procedure: ULTRASOUND, UPPER GI TRACT, ENDOSCOPIC;  Surgeon: Eleazar Shaffer MD;  Location: [facility] ENDO (2ND FLR);  Service: Endoscopy;  Laterality: N/A;  EUS for 10mm SML w/negative pillow sign and negative naked fat sign which was found at the incisura.  Dr Welch    EPIDURAL STEROID INJECTION Bilateral 12/17/2021    Procedure: INJECTION, STEROID, EPIDURAL, L3-L4 Bilateral DIRECT REF Transforaminal;  Surgeon: Julian Fish MD;  Location: [facility] PAIN MGT;  Service: Pain Management;  Laterality: Bilateral;    ESOPHAGOGASTRODUODENOSCOPY N/A 12/7/2019    Procedure: EGD (ESOPHAGOGASTRODUODENOSCOPY);  Surgeon: Clyde Welch MD;  Location: [facility] (2ND FLR);  Service: Endoscopy;  Laterality: N/A;    EXCISIONAL BIOPSY Right 6/27/2019    Procedure: EXCISIONAL BIOPSY-breast;  Surgeon: Suki Dill MD;  Location: [facility] 2ND FLR;  Service: General;  Laterality: Right;    EYE SURGERY      HYSTERECTOMY      INJECTION FOR SENTINEL NODE IDENTIFICATION Right 7/30/2020    Procedure: INJECTION, FOR SENTINEL NODE IDENTIFICATION;  Surgeon: Suki Dill MD;  Location: [facility] OR;  Service: General;  Laterality: Right;    INJECTION OF ANESTHETIC AGENT AROUND MEDIAL BRANCH NERVES INNERVATING LUMBAR FACET JOINT Bilateral 6/7/2019    Procedure: BLOCK, NERVE, FACET JOINT, LUMBAR, MEDIAL BRANCH-deo MBB L4/5,L5/S1;  Surgeon: Jarvis Morales III, MD;  Location: [facility] CATH LAB;  Service: Pain Management;   Laterality: Bilateral;    INJECTION OF STEROID Right 6/5/2020    Procedure: INJECTION, STEROID right carpal tunel injection/ Right ring trigger finger injection;  Surgeon: Pricila Presley MD;  Location: [facility] OR;  Service: Orthopedics;  Laterality: Right;  INJECTION, STEROID right carpal tunel injection/ Right ring trigger finger injection    KNEE ARTHROSCOPY N/A     pt unsure of which knee     MASTECTOMY      OOPHORECTOMY      SENTINEL LYMPH NODE BIOPSY Right 7/30/2020    Procedure: BIOPSY, LYMPH NODE, SENTINEL;  Surgeon: Suki Dill MD;  Location: [facility] OR;  Service: General;  Laterality: Right;    SPINE SURGERY      TOTAL THYROIDECTOMY      UNILATERAL MASTECTOMY Right 7/30/2020    Procedure: MASTECTOMY, UNILATERAL;  Surgeon: Suki Dill MD;  Location: [facility] OR;  Service: General;  Laterality: Right;    YAG Laser Capsulotomy  Left 07/29/2019 01/06/2021 OD//Dr. Hahn       Current Medications:   Current Outpatient Medications:     acetaminophen (TYLENOL) 650 MG TbSR, Take 1 tablet (650 mg total) by mouth 3 (three) times daily as needed., Disp: 42 tablet, Rfl: 0    ascorbic acid, vitamin C, (VITAMIN C) 100 MG tablet, Take 100 mg by mouth once daily., Disp: , Rfl:     cholecalciferol, vitamin D3, (VITAMIN D3) 50 mcg (2,000 unit) Tab, Take 1 tablet by mouth once daily., Disp: , Rfl:     co-enzyme Q-10 30 mg capsule, Take 30 mg by mouth once daily., Disp: , Rfl:     fenofibrate 160 MG Tab, TAKE 1 TABLET (160 MG TOTAL) BY MOUTH ONCE DAILY., Disp: 90 tablet, Rfl: 3    fluocinonide (LIDEX) 0.05 % external solution, AAA scalp qday  prn scaling or itching, Disp: 60 mL, Rfl: 3    LACTOBACILLUS COMBINATION NO.8 (ADULT PROBIOTIC ORAL), Take by mouth once daily. , Disp: , Rfl:     levothyroxine (SYNTHROID) 125 MCG tablet, TAKE 1 TABLET (125 MCG TOTAL) BY MOUTH BEFORE BREAKFAST., Disp: 90 tablet, Rfl: 3    losartan (COZAAR) 100 MG tablet, Take 1 tablet (100 mg total) by mouth once daily., Disp:  90 tablet, Rfl: 3    meclizine (ANTIVERT) 12.5 mg tablet, Take 1 tablet (12.5 mg total) by mouth 3 (three) times daily as needed for Dizziness., Disp: 30 tablet, Rfl: 2    turmeric root extract 500 mg Cap, Take by mouth once daily. , Disp: , Rfl:     HYDROcodone-acetaminophen (NORCO) 5-325 mg per tablet, Take 1 tablet by mouth every 12 (twelve) hours as needed for Pain. (Patient not taking: No sig reported), Disp: 30 tablet, Rfl: 0    LIDOcaine (LIDODERM) 5 %, Place 1 patch onto the skin daily as needed (pain). Remove & Discard patch within 12 hours or as directed by MD (Patient not taking: No sig reported), Disp: 30 patch, Rfl: 2    lidocaine-prilocaine (EMLA) cream, Apply topically 2 (two) times daily as needed. To hand. (Patient not taking: No sig reported), Disp: 30 g, Rfl: 2  No current facility-administered medications for this visit.    Facility-Administered Medications Ordered in Other Visits:     mupirocin 2 % ointment, , Nasal, On Call Procedure, Leandro Avila MD, Given at 20 0837    Social History:   Social History     Socioeconomic History    Marital status:    Occupational History    Occupation: realtor     Employer: [employer]     Employer: [employer]   Tobacco Use    Smoking status: Former Smoker     Packs/day: 2.00     Years: 44.00     Pack years: 88.00     Types: Cigarettes     Quit date: 1969     Years since quittin.3    Smokeless tobacco: Never Used   Substance and Sexual Activity    Alcohol use: Not Currently    Drug use: No    Sexual activity: Not Currently   Other Topics Concern    Are you pregnant or think you may be? No    Breast-feeding No     Social Determinants of Health     Financial Resource Strain: Low Risk     Difficulty of Paying Living Expenses: Not hard at all   Food Insecurity: No Food Insecurity    Worried About Running Out of Food in the Last Year: Never true    Ran Out of Food in the Last Year: Never true  "  Transportation Needs: Unmet Transportation Needs    Lack of Transportation (Medical): Yes    Lack of Transportation (Non-Medical): No   Physical Activity: Insufficiently Active    Days of Exercise per Week: 3 days    Minutes of Exercise per Session: 30 min   Stress: No Stress Concern Present    Feeling of Stress : Not at all   Social Connections: Moderately Isolated    Frequency of Communication with Friends and Family: More than three times a week    Frequency of Social Gatherings with Friends and Family: Three times a week    Attends Anabaptism Services: Never    Active Member of Clubs or Organizations: Yes    Attends Club or Organization Meetings: 1 to 4 times per year    Marital Status:    Housing Stability: Low Risk     Unable to Pay for Housing in the Last Year: No    Number of Places Lived in the Last Year: 1    Unstable Housing in the Last Year: No       REVIEW OF SYSTEMS:  Constitution: Negative. Negative for chills, fever and night sweats.   Cardiovascular: Negative for chest pain and syncope.   Respiratory: Negative for cough and shortness of breath.   Gastrointestinal: See HPI. Negative for nausea/vomiting. Negative for abdominal pain.  Genitourinary: See HPI. Negative for discoloration or dysuria.  Hematologic/Lymphatic: n for bleeding/clotting disorders.   Musculoskeletal: Negative for falls and muscle weakness.   Neurological: See HPI. n history of seizures. n history of cranial surgery or shunts.  Neurological: See HPI. No seizures.   Endocrine: Negative for polydipsia, polyphagia and polyuria.   Allergic/Immunologic: Negative for hives and persistent infections.     EXAM:  Ht 5' 2" (1.575 m)   Wt 82.9 kg (182 lb 12.2 oz)   LMP  (LMP Unknown)   BMI 33.43 kg/m²     PHYSICAL EXAMINATION:    General: The patient is a pleasant 81 y.o. female in no apparent distress, the patient is orientatied to person, place and time.  Psych: Normal mood and affect  HEENT: Vision grossly intact, " hearing intact to the spoken word.  Lungs: Respirations unlabored.  Gait: Normal station and gait, no difficulty with toe or heel walk.   Skin: Dorsal lumbar skin negative for rashes, lesions, hairy patches and surgical scars. There is n lumbar tenderness to palpation.  Range of motion: Lumbar range of motion is acceptable.  Spinal Balance: Global saggital and coronal spinal balance acceptable, no significant for scoliosis and kyphosis.  Musculoskeletal: No pain with the range of motion of the bilateral hips. No trochanteric tenderness to palpation.  Vascular: Bilateral lower extremities warm and well perfused, Dorsalis pedis pulses 2+ bilaterally.  Neurological: Normal strength and tone in all major motor groups in the bilateral lower extremities. Normal sensation to light touch in the L2-S1 dermatomes bilaterally.  Deep tendon reflexes symmetric 2+ in the bilateral lower extremities.  Negative Babinski bilaterally. Straight leg raise negative bilaterally.    5/5 UE strength BUE  Negative Bruner on exam    IMAGING:      Today I personally reviewed MRI C spine: Diffuse spondylosis and canal narrowing C3- T1. No evidence of myelomalacia. Foraminal stenosis R C 5/6 and bilaterally C6/7        Body mass index is 33.43 kg/m².  Hemoglobin A1C   Date Value Ref Range Status   04/30/2020 6.1 (H) 4.0 - 5.6 % Final     Comment:     ADA Screening Guidelines:  5.7-6.4%  Consistent with prediabetes  >or=6.5%  Consistent with diabetes  High levels of fetal hemoglobin interfere with the HbA1C  assay. Heterozygous hemoglobin variants (HbS, HgC, etc)do  not significantly interfere with this assay.   However, presence of multiple variants may affect accuracy.     10/18/2019 5.6 4.0 - 5.6 % Final     Comment:     ADA Screening Guidelines:  5.7-6.4%  Consistent with prediabetes  >or=6.5%  Consistent with diabetes  High levels of fetal hemoglobin interfere with the HbA1C  assay. Heterozygous hemoglobin variants (HbS, HgC, etc)do  not  significantly interfere with this assay.   However, presence of multiple variants may affect accuracy.     04/18/2019 5.6 4.0 - 5.6 % Final     Comment:     ADA Screening Guidelines:  5.7-6.4%  Consistent with prediabetes  >or=6.5%  Consistent with diabetes  High levels of fetal hemoglobin interfere with the HbA1C  assay. Heterozygous hemoglobin variants (HbS, HgC, etc)do  not significantly interfere with this assay.   However, presence of multiple variants may affect accuracy.         ASSESSMENT/PLAN:    Patient with significant improvement in spinal stenosis symptoms after injection. F/u PRN.     There are no diagnoses linked to this encounter.     She is scheduled for KARLI for low back pain in the near future.  She comes not have any symptoms of cervical radiculopathy.  She understands warning signs of radiculopathy and myelopathy and will call with any issues.

## 2022-05-26 ENCOUNTER — PATIENT MESSAGE (OUTPATIENT)
Dept: PAIN MEDICINE | Facility: OTHER | Age: 82
End: 2022-05-26
Payer: MEDICARE

## 2022-05-27 ENCOUNTER — HOSPITAL ENCOUNTER (OUTPATIENT)
Facility: OTHER | Age: 82
Discharge: HOME | End: 2022-05-27
Attending: ANESTHESIOLOGY | Admitting: ANESTHESIOLOGY
Payer: MEDICARE

## 2022-05-27 VITALS
WEIGHT: 180 LBS | HEART RATE: 67 BPM | BODY MASS INDEX: 33.13 KG/M2 | DIASTOLIC BLOOD PRESSURE: 75 MMHG | SYSTOLIC BLOOD PRESSURE: 162 MMHG | RESPIRATION RATE: 16 BRPM | TEMPERATURE: 98 F | OXYGEN SATURATION: 95 % | HEIGHT: 62 IN

## 2022-05-27 DIAGNOSIS — G89.29 CHRONIC PAIN: ICD-10-CM

## 2022-05-27 DIAGNOSIS — M54.17 LUMBOSACRAL RADICULOPATHY: Primary | ICD-10-CM

## 2022-05-27 DIAGNOSIS — M51.37 DDD (DEGENERATIVE DISC DISEASE), LUMBOSACRAL: ICD-10-CM

## 2022-05-27 PROCEDURE — 64483 PR EPIDURAL INJ, ANES/STEROID, TRANSFORAMINAL, LUMB/SACR, SNGL LEVL: ICD-10-PCS | Mod: 50,,, | Performed by: ANESTHESIOLOGY

## 2022-05-27 PROCEDURE — 25500020 PHARM REV CODE 255: Performed by: ANESTHESIOLOGY

## 2022-05-27 PROCEDURE — 63600175 PHARM REV CODE 636 W HCPCS: Performed by: ANESTHESIOLOGY

## 2022-05-27 PROCEDURE — 64483 NJX AA&/STRD TFRM EPI L/S 1: CPT | Mod: 50,,, | Performed by: ANESTHESIOLOGY

## 2022-05-27 PROCEDURE — 64483 NJX AA&/STRD TFRM EPI L/S 1: CPT | Mod: 50 | Performed by: ANESTHESIOLOGY

## 2022-05-27 RX ORDER — FENTANYL CITRATE 50 UG/ML
INJECTION, SOLUTION INTRAMUSCULAR; INTRAVENOUS
Status: DISCONTINUED | OUTPATIENT
Start: 2022-05-27 | End: 2022-05-27 | Stop reason: HOSPADM

## 2022-05-27 RX ORDER — SODIUM CHLORIDE 9 MG/ML
INJECTION, SOLUTION INTRAVENOUS CONTINUOUS
Status: DISCONTINUED | OUTPATIENT
Start: 2022-05-27 | End: 2022-05-27 | Stop reason: HOSPADM

## 2022-05-27 RX ORDER — MIDAZOLAM HYDROCHLORIDE 1 MG/ML
INJECTION INTRAMUSCULAR; INTRAVENOUS
Status: DISCONTINUED | OUTPATIENT
Start: 2022-05-27 | End: 2022-05-27 | Stop reason: HOSPADM

## 2022-05-27 NOTE — DISCHARGE INSTRUCTIONS
Thank you for allowing us to care for you today. You may receive a survey about the care we provided. Your feedback is valuable and helps us provide excellent care throughout the community.     Home Care Instructions for Pain Management:    1. DIET:   You may resume your normal diet today.   2. BATHING:   You may shower with luke warm water. No tub baths or anything that will soak injection sites under water for the next 24 hours.  3. DRESSING:   You may remove your bandage today.   4. ACTIVITY LEVEL:   You may resume your normal activities 24 hrs after your procedure. Nothing strenuous today.  5. MEDICATIONS:   You may resume your normal medications today. To restart blood thinners, ask your doctor.  6. DRIVING    If you have received any sedatives by mouth today, you may not drive for 12 hours.    If you have received any sedation through your IV, you may not drive for 24 hrs.   7. SPECIAL INSTRUCTIONS:   No heat to the injection site for 24 hrs including, hot bath or shower, heating pad, moist heat, or hot tubs.    Use ice pack to injection site for any pain or discomfort.  Apply ice packs for 20 minute intervals as needed.    IF you have diabetes, be sure to monitor your blood sugar more closely. IF your injection contained steroids your blood sugar levels may become higher than normal.    If you are still having pain upon discharge:  Your pain may improve over the next 48 hours. The anesthetic (numbing medication) works immediately to 48 hours. IF your injection contained a steroid (anti-inflammatory medication), it takes approximately 3 days to start feeling relief and 7-10 days to see your greatest results from the medication. It is possible you may need subsequent injections. This would be discussed at your follow up appointment with pain management or your referring doctor.    Please call the PAIN MANAGEMENT office at [phone] or ON CALL pager at [phone] if you experienced any:   -Weakness or  loss of sensation  -Fever > 101.5  -Pain uncontrolled with oral medications   -Persistent nausea, vomiting, or diarrhea  -Redness or drainage from the injection sites, or any other worrisome concerns.   If physician on call was not reached or could not communicate with our office for any reason please go to the nearest emergency department. Adult Procedural Sedation Instructions    Recovery After Procedural Sedation (Adult)  You have been given medicine by vein to make you sleep during your surgery. This may have included both a pain medicine and sleeping medicine. Most of the effects have worn off. But you may still have some drowsiness for the next 6 to 8 hours.  Home care  Follow these guidelines when you get home:  For the next 8 hours, you should be watched by a responsible adult. This person should make sure your condition is not getting worse.  Don't drink any alcohol for the next 24 hours.  Don't drive, operate dangerous machinery, or make important business or personal decisions during the next 24 hours.  Note: Your healthcare provider may tell you not to take any medicine by mouth for pain or sleep in the next 4 hours. These medicines may react with the medicines you were given in the hospital. This could cause a much stronger response than usual.  Follow-up care  Follow up with your healthcare provider if you are not alert and back to your usual level of activity within 12 hours.  When to seek medical advice  Call your healthcare provider right away if any of these occur:  Drowsiness gets worse  Weakness or dizziness gets worse  Repeated vomiting  You can't be awakened   Date Last Reviewed: 10/18/2016  © 3540-9036 [email or web address]. [address] [place] [address], [place] [address]. All rights reserved. This information is not intended as a substitute for professional medical care. Always follow your healthcare professional's instructions.

## 2022-05-27 NOTE — DISCHARGE SUMMARY
Discharge Note  Short Stay      SUMMARY     Admit Date: 5/27/2022    Attending Physician: Julian Fish      Discharge Physician: Julian Fish      Discharge Date: 5/27/2022 2:33 PM    Procedure(s) (LRB):  INJECTION, STEROID, EPIDURAL, TF BILATERAL L3-L4 CONTRAST DIRECT REF (Bilateral)    Final Diagnosis: Lumbar radiculopathy [M54.16]    Disposition: Home or self care    Patient Instructions:   Current Discharge Medication List      CONTINUE these medications which have NOT CHANGED    Details   acetaminophen (TYLENOL) 650 MG TbSR Take 1 tablet (650 mg total) by mouth 3 (three) times daily as needed.  Qty: 42 tablet, Refills: 0      ascorbic acid, vitamin C, (VITAMIN C) 100 MG tablet Take 100 mg by mouth once daily.      cholecalciferol, vitamin D3, (VITAMIN D3) 50 mcg (2,000 unit) Tab Take 1 tablet by mouth once daily.      co-enzyme Q-10 30 mg capsule Take 30 mg by mouth once daily.      fenofibrate 160 MG Tab TAKE 1 TABLET (160 MG TOTAL) BY MOUTH ONCE DAILY.  Qty: 90 tablet, Refills: 3    Associated Diagnoses: Hyperlipidemia, unspecified hyperlipidemia type      fluocinonide (LIDEX) 0.05 % external solution AAA scalp qday  prn scaling or itching  Qty: 60 mL, Refills: 3    Associated Diagnoses: Seborrheic dermatitis, unspecified      HYDROcodone-acetaminophen (NORCO) 5-325 mg per tablet Take 1 tablet by mouth every 12 (twelve) hours as needed for Pain.  Qty: 30 tablet, Refills: 0    Comments: Quantity prescribed more than 7 day supply? Yes, quantity medically necessary  Associated Diagnoses: Bilateral hip pain; Primary osteoarthritis of both hips; Chronic left hip pain      LACTOBACILLUS COMBINATION NO.8 (ADULT PROBIOTIC ORAL) Take by mouth once daily.       levothyroxine (SYNTHROID) 125 MCG tablet TAKE 1 TABLET (125 MCG TOTAL) BY MOUTH BEFORE BREAKFAST.  Qty: 90 tablet, Refills: 3    Associated Diagnoses: Hypothyroidism, postsurgical      LIDOcaine (LIDODERM) 5 % Place 1 patch onto the skin daily as needed  (pain). Remove & Discard patch within 12 hours or as directed by MD  Qty: 30 patch, Refills: 2    Associated Diagnoses: Rib pain on right side      lidocaine-prilocaine (EMLA) cream Apply topically 2 (two) times daily as needed. To hand.  Qty: 30 g, Refills: 2    Associated Diagnoses: S/P carpal tunnel release      losartan (COZAAR) 100 MG tablet Take 1 tablet (100 mg total) by mouth once daily.  Qty: 90 tablet, Refills: 3    Comments: .      meclizine (ANTIVERT) 12.5 mg tablet Take 1 tablet (12.5 mg total) by mouth 3 (three) times daily as needed for Dizziness.  Qty: 30 tablet, Refills: 2    Associated Diagnoses: Benign paroxysmal positional vertigo, unspecified laterality      turmeric root extract 500 mg Cap Take by mouth once daily.                  Discharge Diagnosis: Lumbar radiculopathy [M54.16]  Condition on Discharge: Stable with no complications to procedure   Diet on Discharge: Same as before.  Activity: as per instruction sheet.  Discharge to: Home with a responsible adult.  Follow up: 2-4 weeks

## 2022-05-27 NOTE — H&P
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    SCC (squamous cell carcinoma) 12/2020    left 5th knuckle    Squamous Cell Carcinoma     in situ right neck      Past Surgical History:   Procedure Laterality Date    BREAST BIOPSY Right 2019    BREAST LUMPECTOMY Right 2019    CARPAL TUNNEL RELEASE Left 6/5/2020    Procedure: RELEASE, CARPAL TUNNEL Left;  Surgeon: Pricila Presley MD;  Location: [facility];  Service: Orthopedics;  Laterality: Left;    CATARACT EXTRACTION W/  INTRAOCULAR LENS IMPLANT Bilateral     COLONOSCOPY N/A 4/15/2019    Procedure: COLONOSCOPY;  Surgeon: Artur Monet MD;  Location: [facility]  ENDO (4TH FLR);  Service: Endoscopy;  Laterality: N/A;  within 1 month    COLONOSCOPY N/A 12/9/2019    Procedure: COLONOSCOPY;  Surgeon: Clyde Welch MD;  Location: [facility] ENDO (2ND FLR);  Service: Endoscopy;  Laterality: N/A;    ENDOSCOPIC ULTRASOUND OF UPPER GASTROINTESTINAL TRACT N/A 1/22/2020    Procedure: ULTRASOUND, UPPER GI TRACT, ENDOSCOPIC;  Surgeon: Eleazar Shaffer MD;  Location: [facility] ENDO (2ND FLR);  Service: Endoscopy;  Laterality: N/A;  EUS for 10mm SML w/negative pillow sign and negative naked fat sign which was found at the incisura.  Dr Welch    EPIDURAL STEROID INJECTION Bilateral 12/17/2021    Procedure: INJECTION, STEROID, EPIDURAL, L3-L4 Bilateral DIRECT REF Transforaminal;  Surgeon: Julian Fish MD;  Location: [facility] [place] PAIN MGT;  Service: Pain Management;  Laterality: Bilateral;    ESOPHAGOGASTRODUODENOSCOPY N/A 12/7/2019    Procedure: EGD (ESOPHAGOGASTRODUODENOSCOPY);  Surgeon: Clyde Welch MD;  Location: [facility] ENDO (2ND FLR);  Service: Endoscopy;  Laterality: N/A;    EXCISIONAL BIOPSY Right 6/27/2019    Procedure: EXCISIONAL BIOPSY-breast;  Surgeon: Suki Dill MD;  Location: [facility] 2ND FLR;  Service: General;  Laterality: Right;    EYE SURGERY      HYSTERECTOMY      INJECTION FOR SENTINEL NODE IDENTIFICATION Right 7/30/2020    Procedure: INJECTION, FOR SENTINEL NODE IDENTIFICATION;  Surgeon: Suki Dill MD;  Location: [facility] OR;  Service: General;  Laterality: Right;    INJECTION OF ANESTHETIC AGENT AROUND MEDIAL BRANCH NERVES INNERVATING LUMBAR FACET JOINT Bilateral 6/7/2019    Procedure: BLOCK, NERVE, FACET JOINT, LUMBAR, MEDIAL BRANCH-deo MBB L4/5,L5/S1;  Surgeon: Jarvis Morales III, MD;  Location: [facility] CATH LAB;  Service: Pain Management;  Laterality: Bilateral;    INJECTION OF STEROID Right 6/5/2020    Procedure: INJECTION, STEROID right carpal tunel injection/ Right ring trigger finger injection;  Surgeon: Pricila Presley MD;  Location: [facility];   "Service: Orthopedics;  Laterality: Right;  INJECTION, STEROID right carpal tunel injection/ Right ring trigger finger injection    KNEE ARTHROSCOPY N/A     pt unsure of which knee     MASTECTOMY      OOPHORECTOMY      SENTINEL LYMPH NODE BIOPSY Right 7/30/2020    Procedure: BIOPSY, LYMPH NODE, SENTINEL;  Surgeon: Suki Dill MD;  Location: [facility] OR;  Service: General;  Laterality: Right;    SPINE SURGERY      TOTAL THYROIDECTOMY      UNILATERAL MASTECTOMY Right 7/30/2020    Procedure: MASTECTOMY, UNILATERAL;  Surgeon: Suki Dill MD;  Location: [facility] OR;  Service: General;  Laterality: Right;    YAG Laser Capsulotomy  Left 07/29/2019 01/06/2021 OD//Dr. Hahn     Review of patient's allergies indicates:   Allergen Reactions    Voltaren [diclofenac sodium] Other (See Comments)     Hematochezia and hospitalization for hematochezia.    Codeine Diarrhea and Hallucinations    Niacin preparations      Redness, warming      Current Facility-Administered Medications   Medication    0.9%  NaCl infusion    fentaNYL injection    iohexoL (OMNIPAQUE 300) injection    midazolam (VERSED) 1 mg/mL injection     Facility-Administered Medications Ordered in Other Encounters   Medication    mupirocin 2 % ointment       PMHx, PSHx, Allergies, Medications reviewed in epic    ROS negative except pain complaints in HPI    OBJECTIVE:    BP (!) 157/75   Pulse 71   Temp 98.4 °F (36.9 °C) (Oral)   Resp 16   Ht 5' 2" (1.575 m)   Wt 81.6 kg (180 lb)   LMP  (LMP Unknown)   SpO2 95%   Breastfeeding No   BMI 32.92 kg/m²     PHYSICAL EXAMINATION:    GENERAL: Well appearing, in no acute distress, alert and oriented x3.  PSYCH:  Mood and affect appropriate.  SKIN: Skin color, texture, turgor normal, no rashes or lesions which will impact the procedure.  CV: RRR with palpation of the radial artery.  PULM: No evidence of respiratory difficulty, symmetric chest rise. Clear to auscultation.  NEURO: Cranial nerves " grossly intact.    Plan:    Proceed with procedure as planned Procedure(s) (LRB):  INJECTION, STEROID, EPIDURAL, TF BILATERAL L3-L4 CONTRAST DIRECT REF (Bilateral)    Yoel Lara  05/27/2022

## 2022-05-27 NOTE — OP NOTE
Lumbar Transforaminal Epidural Steroid Injection under Fluoroscopic Guidance    The procedure, risks, benefits, and options were discussed with the patient. There are no contraindications to the procedure. The patent expressed understanding and agreed to the procedure. Informed written consent was obtained prior to the start of the procedure and can be found in the patient's chart.    PATIENT NAME: Jo-Ann Escobedo   MRN: [id number]     DATE OF PROCEDURE: 05/27/2022    PROCEDURE:  Bilateral  L3/4 Lumbar Transforaminal Epidural Steroid Injection under Fluoroscopic Guidance    PRE-OP DIAGNOSIS: Lumbar radiculopathy [M54.16] Lumbar radiculopathy [M54.16]    POST-OP DIAGNOSIS: Same    PHYSICIAN: Julian Fish MD    ASSISTANTS: None     MEDICATIONS INJECTED: Preservative-free Decadron 10mg with 5cc of Lidocaine 1% MPF     LOCAL ANESTHETIC INJECTED: Xylocaine 2%     SEDATION:   Versed 1mg and Fentanyl 50mcg                                                                                                                                                                                     Conscious sedation ordered by MERIN. Patient re-evaluation prior to administration of conscious sedation. No changes noted in patient's status from initial evaluation. The patient's vital signs were monitored by RN and patient remained hemodynamically stable throughout the procedure.    Event Time In   Sedation Start 1430   Sedation End 1432       ESTIMATED BLOOD LOSS: None    COMPLICATIONS: None    TECHNIQUE: Time-out was performed to identify the patient and procedure to be performed. With the patient laying in a prone position, the surgical area was prepped and draped in the usual sterile fashion using ChloraPrep and a fenestrated drape.The levels were determined under fluoroscopy guidance. Skin anesthesia was achieved by injecting Lidocaine 2% over the injection sites. The transforaminal spaces were then approached with a 22 gauge, 5 inch  spinal quinke needle that was introduced under fluoroscopic guidance in the AP and Lateral views. Once the needle tip was in the area of the transforaminal space, and there was no blood, CSF or paraesthesias, contrast dye Omnipaque (300mg/mL) was injected to confirm placement and there was no vascular runoff. Fluoroscopic imaging in the AP and lateral views revealed a clear outline of the spinal nerve with proximal spread of agent through the neural foramen into the epidural space. 3 mL of the medication mixture listed above was injected slowly at each site. Displacement of the radio opaque contrast after injection of the medication confirmed that the medication went into the area of the transforaminal spaces. The needles were removed and bleeding was nil. A sterile dressing was applied. No specimens collected. The patient tolerated the procedure well.       The patient was monitored after the procedure in the recovery area. They were given post-procedure and discharge instructions to follow at home. The patient was discharged in a stable condition.    I reviewed and edited the fellow's note. I conducted my own interview and physical examination. I agree with the findings. I was present and supervising all critical portions of the procedure.    Julian Fish MD

## 2022-06-30 ENCOUNTER — OFFICE VISIT (OUTPATIENT)
Dept: OPTOMETRY | Facility: CLINIC | Age: 82
End: 2022-06-30
Payer: MEDICARE

## 2022-06-30 DIAGNOSIS — H04.123 DRY EYES, BILATERAL: Primary | ICD-10-CM

## 2022-06-30 PROCEDURE — 1159F MED LIST DOCD IN RCRD: CPT | Mod: CPTII,S$GLB,, | Performed by: OPTOMETRIST

## 2022-06-30 PROCEDURE — 3288F FALL RISK ASSESSMENT DOCD: CPT | Mod: CPTII,S$GLB,, | Performed by: OPTOMETRIST

## 2022-06-30 PROCEDURE — 1157F ADVNC CARE PLAN IN RCRD: CPT | Mod: CPTII,S$GLB,, | Performed by: OPTOMETRIST

## 2022-06-30 PROCEDURE — 92012 INTRM OPH EXAM EST PATIENT: CPT | Mod: S$GLB,,, | Performed by: OPTOMETRIST

## 2022-06-30 PROCEDURE — 1101F PT FALLS ASSESS-DOCD LE1/YR: CPT | Mod: CPTII,S$GLB,, | Performed by: OPTOMETRIST

## 2022-06-30 PROCEDURE — 3288F PR FALLS RISK ASSESSMENT DOCUMENTED: ICD-10-PCS | Mod: CPTII,S$GLB,, | Performed by: OPTOMETRIST

## 2022-06-30 PROCEDURE — 99999 PR PBB SHADOW E&M-EST. PATIENT-LVL III: ICD-10-PCS | Mod: PBBFAC,,, | Performed by: OPTOMETRIST

## 2022-06-30 PROCEDURE — 1101F PR PT FALLS ASSESS DOC 0-1 FALLS W/OUT INJ PAST YR: ICD-10-PCS | Mod: CPTII,S$GLB,, | Performed by: OPTOMETRIST

## 2022-06-30 PROCEDURE — 1157F PR ADVANCE CARE PLAN OR EQUIV PRESENT IN MEDICAL RECORD: ICD-10-PCS | Mod: CPTII,S$GLB,, | Performed by: OPTOMETRIST

## 2022-06-30 PROCEDURE — 1160F PR REVIEW ALL MEDS BY PRESCRIBER/CLIN PHARMACIST DOCUMENTED: ICD-10-PCS | Mod: CPTII,S$GLB,, | Performed by: OPTOMETRIST

## 2022-06-30 PROCEDURE — 1160F RVW MEDS BY RX/DR IN RCRD: CPT | Mod: CPTII,S$GLB,, | Performed by: OPTOMETRIST

## 2022-06-30 PROCEDURE — 1126F PR PAIN SEVERITY QUANTIFIED, NO PAIN PRESENT: ICD-10-PCS | Mod: CPTII,S$GLB,, | Performed by: OPTOMETRIST

## 2022-06-30 PROCEDURE — 92012 PR EYE EXAM, EST PATIENT,INTERMED: ICD-10-PCS | Mod: S$GLB,,, | Performed by: OPTOMETRIST

## 2022-06-30 PROCEDURE — 1126F AMNT PAIN NOTED NONE PRSNT: CPT | Mod: CPTII,S$GLB,, | Performed by: OPTOMETRIST

## 2022-06-30 PROCEDURE — 99999 PR PBB SHADOW E&M-EST. PATIENT-LVL III: CPT | Mod: PBBFAC,,, | Performed by: OPTOMETRIST

## 2022-06-30 PROCEDURE — 1159F PR MEDICATION LIST DOCUMENTED IN MEDICAL RECORD: ICD-10-PCS | Mod: CPTII,S$GLB,, | Performed by: OPTOMETRIST

## 2022-06-30 NOTE — PROGRESS NOTES
"HPI     82 Y/o female is here for  C/o pt states cloudy vision that "comes and   goes" when on the computer.  Had full exam w me 3 mos ago  Pt denies pain and discomfort   Occasional floaters     Eye med: Systane OU 2-4 times a day    Last edited by Ariel Hinojosa, OD on 6/30/2022  9:02 AM. (History)        ROS     Positive for: Eyes (cat surgery OU)    Negative for: Constitutional, Gastrointestinal, Neurological, Skin,   Genitourinary, Musculoskeletal, HENT, Endocrine, Cardiovascular,   Respiratory, Psychiatric, Allergic/Imm, Heme/Lymph    Last edited by Ariel Hinojosa, OD on 6/30/2022  9:02 AM. (History)        Assessment /Plan     For exam results, see Encounter Report.    Dry eyes, bilateral      See notes from 3 mos ago:  1. Sp pciol/YAG OU--pt wishes new Rx  2. 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  Discussed sunglasses/vitamins.  Pt non-smoker.  Instructed on [employer] use  3. COREY--pt to inc Ats to QID    Pt presents TODAY w transient blur when on computer.  Using SYSTANE ATs twice a day.  No signs mac heme.  Suspect dry eye issues cauing complaint    PLAN:    1. Switch to SOOTHE XP, but discussed need for QID usage, and once hourly when on computer  2. rtc as sched March 2023, or immediately if [employer] changes                 "

## 2022-07-14 ENCOUNTER — OFFICE VISIT (OUTPATIENT)
Dept: INTERNAL MEDICINE | Facility: CLINIC | Age: 82
End: 2022-07-14
Payer: MEDICARE

## 2022-07-14 VITALS
HEART RATE: 55 BPM | DIASTOLIC BLOOD PRESSURE: 70 MMHG | HEIGHT: 62 IN | BODY MASS INDEX: 33.68 KG/M2 | SYSTOLIC BLOOD PRESSURE: 156 MMHG | WEIGHT: 183 LBS

## 2022-07-14 DIAGNOSIS — E66.9 OBESITY (BMI 30-39.9): ICD-10-CM

## 2022-07-14 DIAGNOSIS — D05.11 DUCTAL CARCINOMA IN SITU (DCIS) OF RIGHT BREAST: ICD-10-CM

## 2022-07-14 DIAGNOSIS — I10 ESSENTIAL HYPERTENSION: Primary | ICD-10-CM

## 2022-07-14 DIAGNOSIS — I70.0 AORTIC ATHEROSCLEROSIS: ICD-10-CM

## 2022-07-14 DIAGNOSIS — E89.0 HYPOTHYROIDISM, POSTSURGICAL: ICD-10-CM

## 2022-07-14 DIAGNOSIS — Z76.89 ENCOUNTER TO ESTABLISH CARE WITH NEW DOCTOR: ICD-10-CM

## 2022-07-14 DIAGNOSIS — R73.03 PREDIABETES: ICD-10-CM

## 2022-07-14 DIAGNOSIS — E78.2 MIXED HYPERLIPIDEMIA: ICD-10-CM

## 2022-07-14 DIAGNOSIS — M54.17 LUMBOSACRAL RADICULOPATHY: ICD-10-CM

## 2022-07-14 DIAGNOSIS — J84.10 CALCIFIED GRANULOMA OF LUNG: ICD-10-CM

## 2022-07-14 DIAGNOSIS — G56.02 LEFT CARPAL TUNNEL SYNDROME: ICD-10-CM

## 2022-07-14 DIAGNOSIS — D51.3 OTHER DIETARY VITAMIN B12 DEFICIENCY ANEMIA: ICD-10-CM

## 2022-07-14 PROCEDURE — 1157F PR ADVANCE CARE PLAN OR EQUIV PRESENT IN MEDICAL RECORD: ICD-10-PCS | Mod: CPTII,S$GLB,, | Performed by: NURSE PRACTITIONER

## 2022-07-14 PROCEDURE — 3077F SYST BP >= 140 MM HG: CPT | Mod: CPTII,S$GLB,, | Performed by: NURSE PRACTITIONER

## 2022-07-14 PROCEDURE — 1160F RVW MEDS BY RX/DR IN RCRD: CPT | Mod: CPTII,S$GLB,, | Performed by: NURSE PRACTITIONER

## 2022-07-14 PROCEDURE — 3288F PR FALLS RISK ASSESSMENT DOCUMENTED: ICD-10-PCS | Mod: CPTII,S$GLB,, | Performed by: NURSE PRACTITIONER

## 2022-07-14 PROCEDURE — 1101F PT FALLS ASSESS-DOCD LE1/YR: CPT | Mod: CPTII,S$GLB,, | Performed by: NURSE PRACTITIONER

## 2022-07-14 PROCEDURE — 99999 PR PBB SHADOW E&M-EST. PATIENT-LVL IV: CPT | Mod: PBBFAC,,, | Performed by: NURSE PRACTITIONER

## 2022-07-14 PROCEDURE — 3077F PR MOST RECENT SYSTOLIC BLOOD PRESSURE >= 140 MM HG: ICD-10-PCS | Mod: CPTII,S$GLB,, | Performed by: NURSE PRACTITIONER

## 2022-07-14 PROCEDURE — 1157F ADVNC CARE PLAN IN RCRD: CPT | Mod: CPTII,S$GLB,, | Performed by: NURSE PRACTITIONER

## 2022-07-14 PROCEDURE — 3078F DIAST BP <80 MM HG: CPT | Mod: CPTII,S$GLB,, | Performed by: NURSE PRACTITIONER

## 2022-07-14 PROCEDURE — 3078F PR MOST RECENT DIASTOLIC BLOOD PRESSURE < 80 MM HG: ICD-10-PCS | Mod: CPTII,S$GLB,, | Performed by: NURSE PRACTITIONER

## 2022-07-14 PROCEDURE — 1159F MED LIST DOCD IN RCRD: CPT | Mod: CPTII,S$GLB,, | Performed by: NURSE PRACTITIONER

## 2022-07-14 PROCEDURE — 99999 PR PBB SHADOW E&M-EST. PATIENT-LVL IV: ICD-10-PCS | Mod: PBBFAC,,, | Performed by: NURSE PRACTITIONER

## 2022-07-14 PROCEDURE — 3288F FALL RISK ASSESSMENT DOCD: CPT | Mod: CPTII,S$GLB,, | Performed by: NURSE PRACTITIONER

## 2022-07-14 PROCEDURE — 1126F PR PAIN SEVERITY QUANTIFIED, NO PAIN PRESENT: ICD-10-PCS | Mod: CPTII,S$GLB,, | Performed by: NURSE PRACTITIONER

## 2022-07-14 PROCEDURE — 1159F PR MEDICATION LIST DOCUMENTED IN MEDICAL RECORD: ICD-10-PCS | Mod: CPTII,S$GLB,, | Performed by: NURSE PRACTITIONER

## 2022-07-14 PROCEDURE — 99214 OFFICE O/P EST MOD 30 MIN: CPT | Mod: S$GLB,,, | Performed by: NURSE PRACTITIONER

## 2022-07-14 PROCEDURE — 1160F PR REVIEW ALL MEDS BY PRESCRIBER/CLIN PHARMACIST DOCUMENTED: ICD-10-PCS | Mod: CPTII,S$GLB,, | Performed by: NURSE PRACTITIONER

## 2022-07-14 PROCEDURE — 99214 PR OFFICE/OUTPT VISIT, EST, LEVL IV, 30-39 MIN: ICD-10-PCS | Mod: S$GLB,,, | Performed by: NURSE PRACTITIONER

## 2022-07-14 PROCEDURE — 1101F PR PT FALLS ASSESS DOC 0-1 FALLS W/OUT INJ PAST YR: ICD-10-PCS | Mod: CPTII,S$GLB,, | Performed by: NURSE PRACTITIONER

## 2022-07-14 PROCEDURE — 1126F AMNT PAIN NOTED NONE PRSNT: CPT | Mod: CPTII,S$GLB,, | Performed by: NURSE PRACTITIONER

## 2022-07-14 NOTE — PROGRESS NOTES
Subjective:       Patient ID: Jo-Ann Escobedo is a 81 y.o. female.    Chief Complaint: Annual Exam    Former pt of Dr. Kerr, here for Annual check up, blood work    Has been having issues with carpal tunnel in both hands, left worse than rgith. She had CTS release on left 6-5-20 by Dr. Presley. Never really fixed the issues.    Sees Pain Management, recently had an epidural in May with Dr. Fish. Sees Dr. Fay for her hip pain. Still has not had relief.    Review of Systems   Constitutional: Negative for activity change, appetite change and unexpected weight change.   HENT: Negative for dental problem and hearing loss.    Eyes: Negative for visual disturbance.   Respiratory: Negative for apnea, cough, chest tightness and shortness of breath.    Cardiovascular: Negative for chest pain, palpitations and leg swelling.   Gastrointestinal: Negative for abdominal distention, abdominal pain, anal bleeding, blood in stool, constipation, diarrhea, nausea, rectal pain and vomiting.   Endocrine: Negative for cold intolerance, heat intolerance, polydipsia, polyphagia and polyuria.   Genitourinary: Negative for difficulty urinating, hematuria, menstrual problem, pelvic pain and vaginal pain.   Musculoskeletal: Positive for arthralgias.        Back and hip as documented in HPI   Integumentary:  Negative for color change.   Allergic/Immunologic: Negative for environmental allergies, food allergies and immunocompromised state.   Neurological: Positive for numbness. Negative for dizziness, speech difficulty, weakness, light-headedness and headaches.        CTS bilateral as documented in HPI   Hematological: Negative for adenopathy. Does not bruise/bleed easily.   Psychiatric/Behavioral: Negative for agitation, behavioral problems, sleep disturbance and suicidal ideas.        Review of patient's allergies indicates:   Allergen Reactions    Voltaren [diclofenac sodium] Other (See Comments)     Hematochezia and  hospitalization for hematochezia.    Codeine Diarrhea and Hallucinations    Niacin preparations      Redness, warming     Current Outpatient Medications:     acetaminophen (TYLENOL) 650 MG TbSR, Take 1 tablet (650 mg total) by mouth 3 (three) times daily as needed., Disp: 42 tablet, Rfl: 0    ascorbic acid, vitamin C, (VITAMIN C) 100 MG tablet, Take 100 mg by mouth once daily., Disp: , Rfl:     cholecalciferol, vitamin D3, (VITAMIN D3) 50 mcg (2,000 unit) Tab, Take 1 tablet by mouth once daily., Disp: , Rfl:     co-enzyme Q-10 30 mg capsule, Take 30 mg by mouth once daily., Disp: , Rfl:     fenofibrate 160 MG Tab, TAKE 1 TABLET (160 MG TOTAL) BY MOUTH ONCE DAILY., Disp: 90 tablet, Rfl: 3    LACTOBACILLUS COMBINATION NO.8 (ADULT PROBIOTIC ORAL), Take by mouth once daily. , Disp: , Rfl:     levothyroxine (SYNTHROID) 125 MCG tablet, TAKE 1 TABLET (125 MCG TOTAL) BY MOUTH BEFORE BREAKFAST., Disp: 90 tablet, Rfl: 3    losartan (COZAAR) 100 MG tablet, Take 1 tablet (100 mg total) by mouth once daily., Disp: 90 tablet, Rfl: 3    turmeric root extract 500 mg Cap, Take by mouth once daily. , Disp: , Rfl:   No current facility-administered medications for this visit.  Facility-Administered Medications Ordered in Other Visits:     mupirocin 2 % ointment, , Nasal, On Call Procedure, Leandro Avila MD, Given at 06/05/20 0837    Patient Active Problem List   Diagnosis    PCO (posterior capsular opacification), right    Mixed hyperlipidemia    Sensorineural hearing loss, bilateral    Paget's disease of bone    Obesity (BMI 30.0-34.9)    Osteoarthritis of lumbar spine    Hypothyroidism, postsurgical    Lipoma of arm    Essential hypertension    Prediabetes    Facet arthritis of lumbar region    Aortic atherosclerosis    Ductal carcinoma in situ (DCIS) of right breast    Pseudophakia    Nephrolithiasis    BPPV (benign paroxysmal positional vertigo)    Diverticulitis of large intestine without  perforation or abscess with bleeding    Abnormal CT of the head    Gastric nodule    History of GI bleed    Other dietary vitamin B12 deficiency anemia    Left carpal tunnel syndrome    Cervical radiculopathy at C7    Spondylosis of lumbar region without myelopathy or radiculopathy    Chronic right shoulder pain    Nontraumatic complete tear of right rotator cuff    Rotator cuff arthropathy of right shoulder    Spinal stenosis of lumbar region with neurogenic claudication    DDD (degenerative disc disease), lumbosacral    Lumbosacral radiculopathy    Personal history of skin cancer    Hip pain    Calcified granuloma of lung     Past Medical History:   Diagnosis Date    Acute blood loss anemia 12/7/2019    After-cataract of both eyes - Both Eyes 9/26/2012    Arthritis of foot 7/11/2014    right subtalar     Cholelithiasis     Deaf, left     Diverticulitis of large intestine without perforation or abscess with bleeding 12/7/2019    Diverticulosis     Ductal carcinoma in situ (DCIS) of right breast 7/15/2019    Encounter for blood transfusion     Essential hypertension 2/28/2018    Gastric nodule     GI bleed     Hearing loss in right ear     60% hearing loss    Hematochezia 12/15/2019    12- GI was consulted and she underwent endoscopy 12/7 with normal esophagus, erythematous mucosa in the pylorus (biopsied), normal duodenum, 10mm lesion at incisura (biopsied). 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    SCC (squamous cell carcinoma) 12/2020    left 5th knuckle    Squamous Cell Carcinoma     in situ right neck      Past Surgical History:    Procedure Laterality Date    BREAST BIOPSY Right 2019    BREAST LUMPECTOMY Right 2019    CARPAL TUNNEL RELEASE Left 6/5/2020    Procedure: RELEASE, CARPAL TUNNEL Left;  Surgeon: Pricila Presley MD;  [facility] OR;  Service: Orthopedics;  Laterality: Left;    CATARACT EXTRACTION W/  INTRAOCULAR LENS IMPLANT Bilateral     COLONOSCOPY N/A 4/15/2019    Procedure: COLONOSCOPY;  Surgeon: Artur Monet MD;  Location: [facility] (4TH FLR);  Service: Endoscopy;  Laterality: N/A;  within 1 month    COLONOSCOPY N/A 12/9/2019    Procedure: COLONOSCOPY;  Surgeon: Clyde Welch MD;  Location: [facility] (2ND FLR);  Service: Endoscopy;  Laterality: N/A;    ENDOSCOPIC ULTRASOUND OF UPPER GASTROINTESTINAL TRACT N/A 1/22/2020    Procedure: ULTRASOUND, UPPER GI TRACT, ENDOSCOPIC;  Surgeon: Eleazar Shaffer MD;  Location: [facility] (2ND FLR);  Service: Endoscopy;  Laterality: N/A;  EUS for 10mm SML w/negative pillow sign and negative naked fat sign which was found at the incisura.  Dr Welch    EPIDURAL STEROID INJECTION Bilateral 12/17/2021    Procedure: INJECTION, STEROID, EPIDURAL, L3-L4 Bilateral DIRECT REF Transforaminal;  Surgeon: Julian Fish MD;  [facility];  Service: Pain Management;  Laterality: Bilateral;    ESOPHAGOGASTRODUODENOSCOPY N/A 12/7/2019    Procedure: EGD (ESOPHAGOGASTRODUODENOSCOPY);  Surgeon: Clyde Welch MD;  Location: [facility] (2ND FLR);  Service: Endoscopy;  Laterality: N/A;    EXCISIONAL BIOPSY Right 6/27/2019    Procedure: EXCISIONAL BIOPSY-breast;  Surgeon: Suki Dill MD;  Location: [facility] 2ND FLR;  Service: General;  Laterality: Right;    EYE SURGERY      HYSTERECTOMY      INJECTION FOR SENTINEL NODE IDENTIFICATION Right 7/30/2020    Procedure: INJECTION, FOR SENTINEL NODE IDENTIFICATION;  Surgeon: Suki Dill MD;  Location: [facility];  Service: General;  Laterality: Right;    INJECTION OF ANESTHETIC AGENT AROUND MEDIAL BRANCH NERVES  INNERVATING LUMBAR FACET JOINT Bilateral 2019    Procedure: BLOCK, NERVE, FACET JOINT, LUMBAR, MEDIAL BRANCH-deo MBB L4/5,L5/S1;  Surgeon: Jarvis Morales III, MD;  Location: [facility] CATH LAB;  Service: Pain Management;  Laterality: Bilateral;    INJECTION OF STEROID Right 2020    Procedure: INJECTION, STEROID right carpal tunel injection/ Right ring trigger finger injection;  Surgeon: Pricila Presley MD;  Location: [facility] OR;  Service: Orthopedics;  Laterality: Right;  INJECTION, STEROID right carpal tunel injection/ Right ring trigger finger injection    KNEE ARTHROSCOPY N/A     pt unsure of which knee     MASTECTOMY      OOPHORECTOMY      SENTINEL LYMPH NODE BIOPSY Right 2020    Procedure: BIOPSY, LYMPH NODE, SENTINEL;  Surgeon: Suki Dill MD;  Location: [facility] OR;  Service: General;  Laterality: Right;    SPINE SURGERY      TOTAL THYROIDECTOMY      TRANSFORAMINAL EPIDURAL INJECTION OF STEROID Bilateral 2022    Procedure: INJECTION, STEROID, EPIDURAL, TF BILATERAL L3-L4 CONTRAST DIRECT REF;  Surgeon: Julian Fish MD;  Location: [facility] PAIN MGT;  Service: Pain Management;  Laterality: Bilateral;    UNILATERAL MASTECTOMY Right 2020    Procedure: MASTECTOMY, UNILATERAL;  Surgeon: Suki Dill MD;  [facility];  Service: General;  Laterality: Right;    YAG Laser Capsulotomy  Left 2019 OD//Dr. Hahn     Social History     Socioeconomic History    Marital status:    Occupational History    Occupation: realtor     Employer: [employer]     Employer: [employer]   Tobacco Use    Smoking status: Former Smoker     Packs/day: 2.00     Years: 44.00     Pack years: 88.00     Types: Cigarettes     Quit date: 1969     Years since quittin.5    Smokeless tobacco: Never Used   Substance and Sexual Activity    Alcohol use: Not Currently    Drug use: No    Sexual activity: Not Currently   Other Topics Concern    Are you  "pregnant or think you may be? No    Breast-feeding No     Social Determinants of Health     Financial Resource Strain: Low Risk     Difficulty of Paying Living Expenses: Not hard at all   Food Insecurity: No Food Insecurity    Worried About Running Out of Food in the Last Year: Never true    Ran Out of Food in the Last Year: Never true   Transportation Needs: No Transportation Needs    Lack of Transportation (Medical): No    Lack of Transportation (Non-Medical): No   Physical Activity: Sufficiently Active    Days of Exercise per Week: 7 days    Minutes of Exercise per Session: 30 min   Stress: No Stress Concern Present    Feeling of Stress : Only a little   Social Connections: Moderately Isolated    Frequency of Communication with Friends and Family: More than three times a week    Frequency of Social Gatherings with Friends and Family: Three times a week    Attends Spiritism Services: Never    Active Member of Clubs or Organizations: Yes    Attends Club or Organization Meetings: More than 4 times per year    Marital Status:    Housing Stability: Low Risk     Unable to Pay for Housing in the Last Year: No    Number of Places Lived in the Last Year: 1    Unstable Housing in the Last Year: No     Family History   Problem Relation Age of Onset    Cancer Father         lung    Dementia Mother     Glaucoma Brother     Macular degeneration Brother     Diabetes Neg Hx     Amblyopia Neg Hx     Blindness Neg Hx     Cataracts Neg Hx     Retinal detachment Neg Hx     Strabismus Neg Hx     Melanoma Neg Hx            Objective:       Vitals:    07/14/22 1330   BP: (!) 156/70   Pulse: (!) 55   Weight: 83 kg (182 lb 15.7 oz)   Height: 5' 2" (1.575 m)   PainSc: 0-No pain     Body mass index is 33.47 kg/m².    Physical Exam  Vitals reviewed.   Constitutional:       Appearance: Normal appearance. She is well-developed. She is obese.   HENT:      Head: Normocephalic.      Right Ear: Hearing, " tympanic membrane, ear canal and external ear normal.      Left Ear: Hearing, tympanic membrane, ear canal and external ear normal.      Nose: Nose normal.      Mouth/Throat:      Mouth: Mucous membranes are moist.      Pharynx: Oropharynx is clear.   Eyes:      General: Lids are normal. Lids are everted, no foreign bodies appreciated.      Extraocular Movements: Extraocular movements intact.      Conjunctiva/sclera: Conjunctivae normal.      Pupils: Pupils are equal, round, and reactive to light.   Neck:      Vascular: No carotid bruit or JVD.      Trachea: Trachea normal.   Cardiovascular:      Rate and Rhythm: Regular rhythm. Bradycardia present.      Pulses: Normal pulses.      Heart sounds: Normal heart sounds, S1 normal and S2 normal.   Pulmonary:      Effort: Pulmonary effort is normal.      Breath sounds: Normal breath sounds.   Abdominal:      General: Bowel sounds are normal.      Palpations: Abdomen is soft.   Musculoskeletal:         General: Normal range of motion.      Cervical back: Full passive range of motion without pain, normal range of motion and neck supple.   Skin:     General: Skin is warm and dry.      Capillary Refill: Capillary refill takes less than 2 seconds.   Neurological:      General: No focal deficit present.      Mental Status: She is alert and oriented to person, place, and time.      Deep Tendon Reflexes: Reflexes are normal and symmetric.   Psychiatric:         Mood and Affect: Mood normal.         Speech: Speech normal.         Behavior: Behavior normal.         Thought Content: Thought content normal.         Judgment: Judgment normal.         Assessment:       Problem List Items Addressed This Visit        Neuro    Left carpal tunnel syndrome    Relevant Orders    Ambulatory referral/consult to Orthopedics    Lumbosacral radiculopathy       Pulmonary    Calcified granuloma of lung       Cardiac/Vascular    Mixed hyperlipidemia    Relevant Orders    Lipid Panel    Essential  hypertension - Primary    Relevant Orders    CBC Auto Differential    Comprehensive Metabolic Panel    Aortic atherosclerosis       Oncology    Ductal carcinoma in situ (DCIS) of right breast    Other dietary vitamin B12 deficiency anemia    Relevant Orders    Vitamin B12       Endocrine    Hypothyroidism, postsurgical    Relevant Orders    TSH    T4, Free    Prediabetes    Relevant Orders    Hemoglobin A1C      Other Visit Diagnoses     Encounter to establish care with new doctor        Relevant Orders    Ambulatory referral/consult to Internal Medicine    BMI 33.0-33.9,adult        Obesity (BMI 30-39.9)              Plan:       Jo-Ann was seen today for annual exam.    Diagnoses and all orders for this visit:    Essential hypertension  -     CBC Auto Differential; Future  -     Comprehensive Metabolic Panel; Future    Take medications as prescribed.    Monitor BP at home, goal BP < or = 140/80, call office if consistently above this range.    Follow low salt DASH diet and exercise.    BMI reviewed.    Go to ED if Headaches, blurred vision, chest pain, or SOB occurs along with elevated readings > or = 160/90.    Aortic atherosclerosis  Chronic    Mixed hyperlipidemia  -     Lipid Panel; Future    Continue cholesterol med, low fat diet, and exercise. Check lipids.    Hypothyroidism, postsurgical  -     TSH; Future  -     T4, Free; Future    On thyroid meds, advised to take separate from other meds and vitamins    Other dietary vitamin B12 deficiency anemia  -     Vitamin B12; Future    Prediabetes  -     Hemoglobin A1C; Future    Calcified granuloma of lung  Chronic    Ductal carcinoma in situ (DCIS) of right breast  Managed by Hem/Onc    Encounter to establish care with new doctor  -     Ambulatory referral/consult to Internal Medicine; Future    Left carpal tunnel syndrome  -     Ambulatory referral/consult to Orthopedics; Future    BMI 33.0-33.9,adult  BMI reviewed    Obesity (BMI 30-39.9)  BMI reviewed.    Diet  and exercise to lose weight.    Lumbosacral radiculopathy  Managed by Dr. Fish and Dr. Fay    Fasting lab orders, will call with results, if results ok, RTC in 1 yr for annual or sooner prn with one of MDs I work with who can be your new PCP: Dr. Emiliano Cavazos, Dr. Pan Adams, Dr. Evelyn Bird, Dr. Garth Arellano, or  Dr. Andre Narayanan, or Dr. Emilia Hankins    Referral to Dr. Presley to follow up on carpal tunnel syndrome    Follow up in about 1 year (around 7/14/2023) for annual or sooner as needed with one of MDs recommended on AVS.

## 2022-07-14 NOTE — PATIENT INSTRUCTIONS
Fasting lab orders, will call with results, if results ok, RTC in 1 yr for annual or sooner prn with one of MDs I work with who can be your new PCP: Dr. Emiliano Cavazos, Dr. Pan Adams, Dr. Evelyn Bird, Dr. Garth Arellano, or  Dr. Andre Narayanan, or Dr. Emilia Hankins    Referral to Dr. Presley to follow up on carpal tunnel syndrome

## 2022-07-17 ENCOUNTER — PATIENT MESSAGE (OUTPATIENT)
Dept: ADMINISTRATIVE | Facility: OTHER | Age: 82
End: 2022-07-17
Payer: MEDICARE

## 2022-07-18 ENCOUNTER — LAB VISIT (OUTPATIENT)
Dept: LAB | Facility: HOSPITAL | Age: 82
End: 2022-07-18
Payer: MEDICARE

## 2022-07-18 DIAGNOSIS — R73.03 PREDIABETES: ICD-10-CM

## 2022-07-18 DIAGNOSIS — D51.3 OTHER DIETARY VITAMIN B12 DEFICIENCY ANEMIA: ICD-10-CM

## 2022-07-18 DIAGNOSIS — E89.0 HYPOTHYROIDISM, POSTSURGICAL: ICD-10-CM

## 2022-07-18 DIAGNOSIS — I10 ESSENTIAL HYPERTENSION: ICD-10-CM

## 2022-07-18 DIAGNOSIS — E78.2 MIXED HYPERLIPIDEMIA: ICD-10-CM

## 2022-07-18 LAB
ALBUMIN SERPL BCP-MCNC: 3.9 G/DL (ref 3.5–5.2)
ALP SERPL-CCNC: 126 U/L (ref 55–135)
ALT SERPL W/O P-5'-P-CCNC: 19 U/L (ref 10–44)
ANION GAP SERPL CALC-SCNC: 6 MMOL/L (ref 8–16)
AST SERPL-CCNC: 23 U/L (ref 10–40)
BASOPHILS # BLD AUTO: 0.07 K/UL (ref 0–0.2)
BASOPHILS NFR BLD: 1 % (ref 0–1.9)
BILIRUB SERPL-MCNC: 0.6 MG/DL (ref 0.1–1)
BUN SERPL-MCNC: 22 MG/DL (ref 8–23)
CALCIUM SERPL-MCNC: 10.1 MG/DL (ref 8.7–10.5)
CHLORIDE SERPL-SCNC: 109 MMOL/L (ref 95–110)
CHOLEST SERPL-MCNC: 157 MG/DL (ref 120–199)
CHOLEST/HDLC SERPL: 3.8 {RATIO} (ref 2–5)
CO2 SERPL-SCNC: 27 MMOL/L (ref 23–29)
CREAT SERPL-MCNC: 0.8 MG/DL (ref 0.5–1.4)
DIFFERENTIAL METHOD: ABNORMAL
EOSINOPHIL # BLD AUTO: 0.1 K/UL (ref 0–0.5)
EOSINOPHIL NFR BLD: 1.8 % (ref 0–8)
ERYTHROCYTE [DISTWIDTH] IN BLOOD BY AUTOMATED COUNT: 13.4 % (ref 11.5–14.5)
EST. GFR  (AFRICAN AMERICAN): >60 ML/MIN/1.73 M^2
EST. GFR  (NON AFRICAN AMERICAN): >60 ML/MIN/1.73 M^2
ESTIMATED AVG GLUCOSE: 117 MG/DL (ref 68–131)
GLUCOSE SERPL-MCNC: 97 MG/DL (ref 70–110)
HBA1C MFR BLD: 5.7 % (ref 4–5.6)
HCT VFR BLD AUTO: 40.3 % (ref 37–48.5)
HDLC SERPL-MCNC: 41 MG/DL (ref 40–75)
HDLC SERPL: 26.1 % (ref 20–50)
HGB BLD-MCNC: 12.7 G/DL (ref 12–16)
IMM GRANULOCYTES # BLD AUTO: 0.03 K/UL (ref 0–0.04)
IMM GRANULOCYTES NFR BLD AUTO: 0.4 % (ref 0–0.5)
LDLC SERPL CALC-MCNC: 96.2 MG/DL (ref 63–159)
LYMPHOCYTES # BLD AUTO: 1.8 K/UL (ref 1–4.8)
LYMPHOCYTES NFR BLD: 24.7 % (ref 18–48)
MCH RBC QN AUTO: 31.5 PG (ref 27–31)
MCHC RBC AUTO-ENTMCNC: 31.5 G/DL (ref 32–36)
MCV RBC AUTO: 100 FL (ref 82–98)
MONOCYTES # BLD AUTO: 0.8 K/UL (ref 0.3–1)
MONOCYTES NFR BLD: 10.2 % (ref 4–15)
NEUTROPHILS # BLD AUTO: 4.5 K/UL (ref 1.8–7.7)
NEUTROPHILS NFR BLD: 61.9 % (ref 38–73)
NONHDLC SERPL-MCNC: 116 MG/DL
NRBC BLD-RTO: 0 /100 WBC
PLATELET # BLD AUTO: 206 K/UL (ref 150–450)
PMV BLD AUTO: 11.6 FL (ref 9.2–12.9)
POTASSIUM SERPL-SCNC: 4.4 MMOL/L (ref 3.5–5.1)
PROT SERPL-MCNC: 6.8 G/DL (ref 6–8.4)
RBC # BLD AUTO: 4.03 M/UL (ref 4–5.4)
SODIUM SERPL-SCNC: 142 MMOL/L (ref 136–145)
T4 FREE SERPL-MCNC: 1.38 NG/DL (ref 0.71–1.51)
TRIGL SERPL-MCNC: 99 MG/DL (ref 30–150)
TSH SERPL DL<=0.005 MIU/L-ACNC: 0.31 UIU/ML (ref 0.4–4)
VIT B12 SERPL-MCNC: 355 PG/ML (ref 210–950)
WBC # BLD AUTO: 7.32 K/UL (ref 3.9–12.7)

## 2022-07-18 PROCEDURE — 82607 VITAMIN B-12: CPT | Performed by: NURSE PRACTITIONER

## 2022-07-18 PROCEDURE — 80061 LIPID PANEL: CPT | Performed by: NURSE PRACTITIONER

## 2022-07-18 PROCEDURE — 83036 HEMOGLOBIN GLYCOSYLATED A1C: CPT | Performed by: NURSE PRACTITIONER

## 2022-07-18 PROCEDURE — 84439 ASSAY OF FREE THYROXINE: CPT | Performed by: NURSE PRACTITIONER

## 2022-07-18 PROCEDURE — 84443 ASSAY THYROID STIM HORMONE: CPT | Performed by: NURSE PRACTITIONER

## 2022-07-18 PROCEDURE — 85025 COMPLETE CBC W/AUTO DIFF WBC: CPT | Performed by: NURSE PRACTITIONER

## 2022-07-18 PROCEDURE — 36415 COLL VENOUS BLD VENIPUNCTURE: CPT | Performed by: NURSE PRACTITIONER

## 2022-07-18 PROCEDURE — 80053 COMPREHEN METABOLIC PANEL: CPT | Performed by: NURSE PRACTITIONER

## 2022-07-22 ENCOUNTER — OFFICE VISIT (OUTPATIENT)
Dept: SURGERY | Facility: CLINIC | Age: 82
End: 2022-07-22
Payer: MEDICARE

## 2022-07-22 VITALS
SYSTOLIC BLOOD PRESSURE: 133 MMHG | WEIGHT: 179.63 LBS | DIASTOLIC BLOOD PRESSURE: 67 MMHG | BODY MASS INDEX: 33.06 KG/M2 | HEART RATE: 78 BPM | HEIGHT: 62 IN

## 2022-07-22 DIAGNOSIS — K57.90 DIVERTICULOSIS: ICD-10-CM

## 2022-07-22 DIAGNOSIS — K92.1 MELENA: ICD-10-CM

## 2022-07-22 DIAGNOSIS — K92.1 HEMATOCHEZIA: Primary | ICD-10-CM

## 2022-07-22 PROCEDURE — 99213 PR OFFICE/OUTPT VISIT, EST, LEVL III, 20-29 MIN: ICD-10-PCS | Mod: S$GLB,,, | Performed by: COLON & RECTAL SURGERY

## 2022-07-22 PROCEDURE — 3288F FALL RISK ASSESSMENT DOCD: CPT | Mod: CPTII,S$GLB,, | Performed by: COLON & RECTAL SURGERY

## 2022-07-22 PROCEDURE — 3288F PR FALLS RISK ASSESSMENT DOCUMENTED: ICD-10-PCS | Mod: CPTII,S$GLB,, | Performed by: COLON & RECTAL SURGERY

## 2022-07-22 PROCEDURE — 3078F DIAST BP <80 MM HG: CPT | Mod: CPTII,S$GLB,, | Performed by: COLON & RECTAL SURGERY

## 2022-07-22 PROCEDURE — 99213 OFFICE O/P EST LOW 20 MIN: CPT | Mod: S$GLB,,, | Performed by: COLON & RECTAL SURGERY

## 2022-07-22 PROCEDURE — 1160F RVW MEDS BY RX/DR IN RCRD: CPT | Mod: CPTII,S$GLB,, | Performed by: COLON & RECTAL SURGERY

## 2022-07-22 PROCEDURE — 1157F PR ADVANCE CARE PLAN OR EQUIV PRESENT IN MEDICAL RECORD: ICD-10-PCS | Mod: CPTII,S$GLB,, | Performed by: COLON & RECTAL SURGERY

## 2022-07-22 PROCEDURE — 1159F MED LIST DOCD IN RCRD: CPT | Mod: CPTII,S$GLB,, | Performed by: COLON & RECTAL SURGERY

## 2022-07-22 PROCEDURE — 1157F ADVNC CARE PLAN IN RCRD: CPT | Mod: CPTII,S$GLB,, | Performed by: COLON & RECTAL SURGERY

## 2022-07-22 PROCEDURE — 3078F PR MOST RECENT DIASTOLIC BLOOD PRESSURE < 80 MM HG: ICD-10-PCS | Mod: CPTII,S$GLB,, | Performed by: COLON & RECTAL SURGERY

## 2022-07-22 PROCEDURE — 3075F SYST BP GE 130 - 139MM HG: CPT | Mod: CPTII,S$GLB,, | Performed by: COLON & RECTAL SURGERY

## 2022-07-22 PROCEDURE — 3075F PR MOST RECENT SYSTOLIC BLOOD PRESS GE 130-139MM HG: ICD-10-PCS | Mod: CPTII,S$GLB,, | Performed by: COLON & RECTAL SURGERY

## 2022-07-22 PROCEDURE — 99999 PR PBB SHADOW E&M-EST. PATIENT-LVL III: ICD-10-PCS | Mod: PBBFAC,,, | Performed by: COLON & RECTAL SURGERY

## 2022-07-22 PROCEDURE — 1159F PR MEDICATION LIST DOCUMENTED IN MEDICAL RECORD: ICD-10-PCS | Mod: CPTII,S$GLB,, | Performed by: COLON & RECTAL SURGERY

## 2022-07-22 PROCEDURE — 1101F PT FALLS ASSESS-DOCD LE1/YR: CPT | Mod: CPTII,S$GLB,, | Performed by: COLON & RECTAL SURGERY

## 2022-07-22 PROCEDURE — 1160F PR REVIEW ALL MEDS BY PRESCRIBER/CLIN PHARMACIST DOCUMENTED: ICD-10-PCS | Mod: CPTII,S$GLB,, | Performed by: COLON & RECTAL SURGERY

## 2022-07-22 PROCEDURE — 1101F PR PT FALLS ASSESS DOC 0-1 FALLS W/OUT INJ PAST YR: ICD-10-PCS | Mod: CPTII,S$GLB,, | Performed by: COLON & RECTAL SURGERY

## 2022-07-22 PROCEDURE — 1126F PR PAIN SEVERITY QUANTIFIED, NO PAIN PRESENT: ICD-10-PCS | Mod: CPTII,S$GLB,, | Performed by: COLON & RECTAL SURGERY

## 2022-07-22 PROCEDURE — 99999 PR PBB SHADOW E&M-EST. PATIENT-LVL III: CPT | Mod: PBBFAC,,, | Performed by: COLON & RECTAL SURGERY

## 2022-07-22 PROCEDURE — 1126F AMNT PAIN NOTED NONE PRSNT: CPT | Mod: CPTII,S$GLB,, | Performed by: COLON & RECTAL SURGERY

## 2022-07-22 NOTE — PROGRESS NOTES
CRS Office Visit History and Physical    Referring Md:   No referring provider defined for this encounter.    SUBJECTIVE:     Chief Complaint:  Hematochezia     History of Present Illness:  81 y.o. F here today for hematochezia that started 6 days ago. She actually had a similar episode 12/2019 where she was having lower GIB, saw CRS outpatient and was told to go to ER. She was admitted for several days. Underwent EGD which was neg for sources of bleeding. Then colonoscopy showed blood in the colon, diverticulosis without obvious bleeding tic, no intervention performed during colonoscopy. Then IR performed angiogram without obvious source of bleeding. Pt reports bleeding self resolved. She did require blood transfusion. In 2019 her hgb was down to 7s. Four days ago (in the middle of this bleeding episode) hgb stable at 12.7.    She reports she has been fine since 2019 event. Then on Saturday (6 days ago) she developed dark stools with Abdirizak blood as well. She states it has been off and on but has been slowly improving.   This morning smaller amount of blood in the stool.   No stomach pain, fever chills, n/v. Appetite is fine. Reports a fair amount of flatulenc that's more than normal . Michael lost 5lbs in the last week that she attributes to trying to eat an easier to digest diet. Denies abdominal distention and rectal pain.   Prior to this bowel habits described as a week before going a few days without a BM, But other than that usually has a regular soft BM in the am and maybe a second later in the day.   Takes a daily probiotic and has been for several years.  No fiber supplement  Has taken aleve a few times in the last 3 weeks for arthritis    She is accompanied by her daughter Natalie today    Review of patient's allergies indicates:   Allergen Reactions    Voltaren [diclofenac sodium] Other (See Comments)     Hematochezia and hospitalization for hematochezia.   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    SCC (squamous cell carcinoma) 12/2020    left 5th knuckle    Squamous Cell Carcinoma     in situ right neck      Past Surgical History:   Procedure Laterality Date    BREAST BIOPSY Right 2019    BREAST LUMPECTOMY Right 2019    CARPAL TUNNEL RELEASE Left 6/5/2020    Procedure: RELEASE, CARPAL TUNNEL Left;  Surgeon: Pricila Presley MD;  Location: [facility];  Service: Orthopedics;  Laterality: Left;    CATARACT EXTRACTION W/  INTRAOCULAR LENS IMPLANT Bilateral     COLONOSCOPY N/A 4/15/2019    Procedure: COLONOSCOPY;  Surgeon: Artur Monet MD;  Location: [facility] ([address] [place] [address]);  Service: Endoscopy;  Laterality: N/A;  within 1 month    COLONOSCOPY N/A 12/9/2019    Procedure: COLONOSCOPY;  Surgeon: Clyde Welch MD;  Location:  [facility] ENDO (2ND FLR);  Service: Endoscopy;  Laterality: N/A;    ENDOSCOPIC ULTRASOUND OF UPPER GASTROINTESTINAL TRACT N/A 1/22/2020    Procedure: ULTRASOUND, UPPER GI TRACT, ENDOSCOPIC;  Surgeon: Eleazar Shaffer MD;  Location: [facility] ENDO (2ND FLR);  Service: Endoscopy;  Laterality: N/A;  EUS for 10mm SML w/negative pillow sign and negative naked fat sign which was found at the incisura.  Dr Welch    EPIDURAL STEROID INJECTION Bilateral 12/17/2021    Procedure: INJECTION, STEROID, EPIDURAL, L3-L4 Bilateral DIRECT REF Transforaminal;  Surgeon: Julian Fish MD;  Location: [facility];  Service: Pain Management;  Laterality: Bilateral;    ESOPHAGOGASTRODUODENOSCOPY N/A 12/7/2019    Procedure: EGD (ESOPHAGOGASTRODUODENOSCOPY);  Surgeon: Clyde Welch MD;  Location: [facility] (2ND FLR);  Service: Endoscopy;  Laterality: N/A;    EXCISIONAL BIOPSY Right 6/27/2019    Procedure: EXCISIONAL BIOPSY-breast;  Surgeon: Suki Dill MD;  [address]  Service: General;  Laterality: Right;    EYE SURGERY      HYSTERECTOMY      INJECTION FOR SENTINEL NODE IDENTIFICATION Right 7/30/2020    Procedure: INJECTION, FOR SENTINEL NODE IDENTIFICATION;  Surgeon: Suki Dill MD;  Location: [facility] OR;  Service: General;  Laterality: Right;    INJECTION OF ANESTHETIC AGENT AROUND MEDIAL BRANCH NERVES INNERVATING LUMBAR FACET JOINT Bilateral 6/7/2019    Procedure: BLOCK, NERVE, FACET JOINT, LUMBAR, MEDIAL BRANCH-deo MBB L4/5,L5/S1;  Surgeon: Jarvis Morales III, MD;  Location: [facility] CATH LAB;  Service: Pain Management;  Laterality: Bilateral;    INJECTION OF STEROID Right 6/5/2020    Procedure: INJECTION, STEROID right carpal tunel injection/ Right ring trigger finger injection;  Surgeon: Pricila Presley MD;  Location: [facility] OR;  Service: Orthopedics;  Laterality: Right;  INJECTION, STEROID right carpal tunel injection/ Right ring trigger finger injection    KNEE ARTHROSCOPY N/A     pt unsure of  "which knee     MASTECTOMY      OOPHORECTOMY      SENTINEL LYMPH NODE BIOPSY Right 2020    Procedure: BIOPSY, LYMPH NODE, SENTINEL;  Surgeon: Suki Dill MD;  Location: [facility] OR;  Service: General;  Laterality: Right;    SPINE SURGERY      TOTAL THYROIDECTOMY      TRANSFORAMINAL EPIDURAL INJECTION OF STEROID Bilateral 2022    Procedure: INJECTION, STEROID, EPIDURAL, TF BILATERAL L3-L4 CONTRAST DIRECT REF;  Surgeon: Julian Fish MD;  Location: [facility] PAIN MGT;  Service: Pain Management;  Laterality: Bilateral;    UNILATERAL MASTECTOMY Right 2020    Procedure: MASTECTOMY, UNILATERAL;  Surgeon: Suki Dill MD;  Location: [facility] OR;  Service: General;  Laterality: Right;    YAG Laser Capsulotomy  Left 2019 OD//Dr. Hahn     Family History   Problem Relation Age of Onset    Cancer Father         lung    Dementia Mother     Glaucoma Brother     Macular degeneration Brother     Diabetes Neg Hx     Amblyopia Neg Hx     Blindness Neg Hx     Cataracts Neg Hx     Retinal detachment Neg Hx     Strabismus Neg Hx     Melanoma Neg Hx      Social History     Tobacco Use    Smoking status: Former Smoker     Packs/day: 2.00     Years: 44.00     Pack years: 88.00     Types: Cigarettes     Quit date: 1969     Years since quittin.5    Smokeless tobacco: Never Used   Substance Use Topics    Alcohol use: Not Currently    Drug use: No        Review of Systems:  Review of Systems   Constitutional: Positive for malaise/fatigue.   Respiratory: Negative for shortness of breath.    Cardiovascular: Negative for chest pain.   Musculoskeletal: Positive for joint pain.       OBJECTIVE:     Vital Signs (Most Recent)  /67 (BP Location: Right arm, Patient Position: Sitting, BP Method: Large (Automatic))   Pulse 78   Ht 5' 2" (1.575 m)   Wt 81.5 kg (179 lb 9.6 oz)   LMP  (LMP Unknown)   BMI 32.85 kg/m²     Physical Exam:  General: White female in no distress   Neuro: " Alert and oriented to person, place, and time.  Moves all extremities.     HEENT: No icterus.  Trachea midline  Respiratory: Respirations are even and unlabored, no cough or audible wheezing  Abdomen: nontender  Skin: Warm dry and intact, No visible rashes, no jaundice    Labs reviewed today:  Lab Results   Component Value Date    WBC 7.32 07/18/2022    HGB 12.7 07/18/2022    HCT 40.3 07/18/2022     07/18/2022    CHOL 157 07/18/2022    TRIG 99 07/18/2022    HDL 41 07/18/2022    ALT 19 07/18/2022    AST 23 07/18/2022     07/18/2022    K 4.4 07/18/2022     07/18/2022    CREATININE 0.8 07/18/2022    BUN 22 07/18/2022    CO2 27 07/18/2022    TSH 0.314 (L) 07/18/2022    INR 1.1 12/15/2019    GLUF 100 02/12/2004    HGBA1C 5.7 (H) 07/18/2022       Imaging reviewed today:  CTA abd pelv 12/2019  1. Diverticulosis with decreased conspicuity of previously identified pericolonic inflammatory change involving the sigmoid colon suggestive of improving/resolving uncomplicated sigmoid diverticulitis.  No evidence of new superimposed acute abnormality on today's examination.  2. Cholelithiasis.  3. Bilateral renal cortical hypodensities the larger of which likely represent cysts and many which are too small to definitively characterize.  Nonobstructive right nephrolithiasis.  4. Hysterectomy.    Endoscopy reviewed today:  Colonoscopy 12/9/2019 - The examined portion of the ileum was normal.                         - Diverticulosis in the recto-sigmoid colon, in the                         sigmoid colon, in the descending colon, in the                         transverse colon and in the ascending colon. Clip                         was placed.                         - Blood in the entire examined colon.                         - No specimens collected.     EGD 12/7/2019 Z-line regular, 39 cm from the incisors.                         - Normal esophagus.                         - Erythematous mucosa in the pylorus.  Biopsied.                         - Normal duodenal bulb, first portion of the                         duodenum, second portion of the duodenum and third                         portion of the duodenum.                         - A 10mm, submucosal, soft, lesion at the incisura.                         Biopsied. No fat seen after biopsying the same area                         twice.     Flex sig 4/2019 for hematochezia   - Preparation of the colon was fair.                         - Three 3 to 6 mm polyps in the transverse colon                         and in the ascending colon, removed with a cold                         snare. Resected and retrieved.                         - Diverticulosis in the sigmoid colon and in the                         descending colon.    Anorectal Exam:    Anal Skin: Normal    Digital Rectal Exam:  Resting Tone normal  Mass none  Rectocele  absent  Tenderness  absent    Anoscopy:  Verbal consent was obtained. SANDRA Orellana NP present during exam  Clear plastic anoscope inserted.  Stool noted to be green and melenic in appearance  Hemorrhoids  present  Stigmata of bleeding  absent  Stigmata of prolapsed  absent  Distal rectal mucosa normal        ASSESSMENT/PLAN:     Diagnoses and all orders for this visit:    Hematochezia  -     CBC Auto Differential; Future  -     Case Request Endoscopy: COLONOSCOPY    Melena  -     CBC Auto Differential; Future  -     Case Request Endoscopy: COLONOSCOPY    Diverticulosis  -     CBC Auto Differential; Future  -     Case Request Endoscopy: COLONOSCOPY      81 y.o. F with PMH of lower GIB and diverticulosis requiring admission and transfusions in 2019. EGD, Colon and CTA without obvious source or intervention for the bleeding. Has been doing fine with this up until 6 days ago bleeding returned but has improved. Hgb 4 days ago 12.7.   In office anoscopy without obvious source for bleeding.     The patient was instructed to:  Make sure she is staying  hydrated.   Go to ER over weekend if bleeding worsens, gets dizzy/faint, elevated HR  Recheck cbc on Tuesday  Colonoscopy about 4 weeks from now if pt is stable and bleeding doesn't worsen. If bleeding does worsen will need colonoscopy sooner    Follow-up pending order results      Artur Monet MD  Colon and Rectal Surgery

## 2022-07-26 ENCOUNTER — LAB VISIT (OUTPATIENT)
Dept: LAB | Facility: HOSPITAL | Age: 82
End: 2022-07-26
Payer: MEDICARE

## 2022-07-26 ENCOUNTER — TELEPHONE (OUTPATIENT)
Dept: SURGERY | Facility: CLINIC | Age: 82
End: 2022-07-26
Payer: MEDICARE

## 2022-07-26 DIAGNOSIS — K92.1 HEMATOCHEZIA: ICD-10-CM

## 2022-07-26 DIAGNOSIS — K92.1 HEMATOCHEZIA: Primary | ICD-10-CM

## 2022-07-26 DIAGNOSIS — K57.90 DIVERTICULOSIS: ICD-10-CM

## 2022-07-26 DIAGNOSIS — K92.1 MELENA: ICD-10-CM

## 2022-07-26 LAB
BASOPHILS # BLD AUTO: 0.05 K/UL (ref 0–0.2)
BASOPHILS NFR BLD: 0.8 % (ref 0–1.9)
DIFFERENTIAL METHOD: ABNORMAL
EOSINOPHIL # BLD AUTO: 0.2 K/UL (ref 0–0.5)
EOSINOPHIL NFR BLD: 3.2 % (ref 0–8)
ERYTHROCYTE [DISTWIDTH] IN BLOOD BY AUTOMATED COUNT: 13.9 % (ref 11.5–14.5)
HCT VFR BLD AUTO: 33.4 % (ref 37–48.5)
HGB BLD-MCNC: 10.7 G/DL (ref 12–16)
IMM GRANULOCYTES # BLD AUTO: 0.06 K/UL (ref 0–0.04)
IMM GRANULOCYTES NFR BLD AUTO: 0.9 % (ref 0–0.5)
LYMPHOCYTES # BLD AUTO: 2 K/UL (ref 1–4.8)
LYMPHOCYTES NFR BLD: 29.8 % (ref 18–48)
MCH RBC QN AUTO: 31.8 PG (ref 27–31)
MCHC RBC AUTO-ENTMCNC: 32 G/DL (ref 32–36)
MCV RBC AUTO: 99 FL (ref 82–98)
MONOCYTES # BLD AUTO: 0.7 K/UL (ref 0.3–1)
MONOCYTES NFR BLD: 10.7 % (ref 4–15)
NEUTROPHILS # BLD AUTO: 3.6 K/UL (ref 1.8–7.7)
NEUTROPHILS NFR BLD: 54.6 % (ref 38–73)
NRBC BLD-RTO: 0 /100 WBC
PLATELET # BLD AUTO: 229 K/UL (ref 150–450)
PMV BLD AUTO: 10.9 FL (ref 9.2–12.9)
RBC # BLD AUTO: 3.36 M/UL (ref 4–5.4)
WBC # BLD AUTO: 6.54 K/UL (ref 3.9–12.7)

## 2022-07-26 PROCEDURE — 36415 COLL VENOUS BLD VENIPUNCTURE: CPT | Performed by: NURSE PRACTITIONER

## 2022-07-26 PROCEDURE — 85025 COMPLETE CBC W/AUTO DIFF WBC: CPT | Performed by: NURSE PRACTITIONER

## 2022-07-26 NOTE — TELEPHONE ENCOUNTER
CBC returned and showed Hgb down to 10.7.  Spoke w patient.   She reports she continued to have minimal bleeding over the weekend with brown stools. Compared to on Friday when we saw her inclinic they were dark almost melenic stools.  She reports she wasn't fatigued at all yesterday, had car issues today and feels that may be why she feels worn down today. But all in all she feels she is on the uphill of things    We agreed to repeat CBC this Friday

## 2022-07-29 ENCOUNTER — TELEPHONE (OUTPATIENT)
Dept: SURGERY | Facility: CLINIC | Age: 82
End: 2022-07-29
Payer: MEDICARE

## 2022-07-29 ENCOUNTER — LAB VISIT (OUTPATIENT)
Dept: LAB | Facility: HOSPITAL | Age: 82
End: 2022-07-29
Payer: MEDICARE

## 2022-07-29 DIAGNOSIS — K92.1 HEMATOCHEZIA: ICD-10-CM

## 2022-07-29 LAB
BASOPHILS # BLD AUTO: 0.06 K/UL (ref 0–0.2)
BASOPHILS NFR BLD: 1.3 % (ref 0–1.9)
DIFFERENTIAL METHOD: ABNORMAL
EOSINOPHIL # BLD AUTO: 0.1 K/UL (ref 0–0.5)
EOSINOPHIL NFR BLD: 3 % (ref 0–8)
ERYTHROCYTE [DISTWIDTH] IN BLOOD BY AUTOMATED COUNT: 14.1 % (ref 11.5–14.5)
HCT VFR BLD AUTO: 34.2 % (ref 37–48.5)
HGB BLD-MCNC: 10.8 G/DL (ref 12–16)
IMM GRANULOCYTES # BLD AUTO: 0.04 K/UL (ref 0–0.04)
IMM GRANULOCYTES NFR BLD AUTO: 0.9 % (ref 0–0.5)
LYMPHOCYTES # BLD AUTO: 1.6 K/UL (ref 1–4.8)
LYMPHOCYTES NFR BLD: 35 % (ref 18–48)
MCH RBC QN AUTO: 31.8 PG (ref 27–31)
MCHC RBC AUTO-ENTMCNC: 31.6 G/DL (ref 32–36)
MCV RBC AUTO: 101 FL (ref 82–98)
MONOCYTES # BLD AUTO: 0.5 K/UL (ref 0.3–1)
MONOCYTES NFR BLD: 10.9 % (ref 4–15)
NEUTROPHILS # BLD AUTO: 2.3 K/UL (ref 1.8–7.7)
NEUTROPHILS NFR BLD: 48.9 % (ref 38–73)
NRBC BLD-RTO: 0 /100 WBC
PLATELET # BLD AUTO: 211 K/UL (ref 150–450)
PMV BLD AUTO: 11 FL (ref 9.2–12.9)
RBC # BLD AUTO: 3.4 M/UL (ref 4–5.4)
WBC # BLD AUTO: 4.68 K/UL (ref 3.9–12.7)

## 2022-07-29 PROCEDURE — 85025 COMPLETE CBC W/AUTO DIFF WBC: CPT | Performed by: NURSE PRACTITIONER

## 2022-07-29 PROCEDURE — 36415 COLL VENOUS BLD VENIPUNCTURE: CPT | Performed by: NURSE PRACTITIONER

## 2022-07-29 NOTE — TELEPHONE ENCOUNTER
Called lab and inquired about pts CBC drawn 3.5 hours ago. They are stated they have not received specimen yet form primary care. They are supposed to look in to this.     I also called pt. She reports she is feeling better today than she has been. More energy. No overt GIB.

## 2022-08-01 ENCOUNTER — OFFICE VISIT (OUTPATIENT)
Dept: HEMATOLOGY/ONCOLOGY | Facility: CLINIC | Age: 82
End: 2022-08-01
Payer: MEDICARE

## 2022-08-01 VITALS
HEART RATE: 88 BPM | DIASTOLIC BLOOD PRESSURE: 63 MMHG | TEMPERATURE: 98 F | SYSTOLIC BLOOD PRESSURE: 130 MMHG | WEIGHT: 184.06 LBS | BODY MASS INDEX: 33.87 KG/M2 | OXYGEN SATURATION: 95 % | HEIGHT: 62 IN | RESPIRATION RATE: 17 BRPM

## 2022-08-01 DIAGNOSIS — D05.11 DUCTAL CARCINOMA IN SITU (DCIS) OF RIGHT BREAST: Primary | ICD-10-CM

## 2022-08-01 PROCEDURE — 1101F PR PT FALLS ASSESS DOC 0-1 FALLS W/OUT INJ PAST YR: ICD-10-PCS | Mod: CPTII,S$GLB,, | Performed by: INTERNAL MEDICINE

## 2022-08-01 PROCEDURE — 3288F FALL RISK ASSESSMENT DOCD: CPT | Mod: CPTII,S$GLB,, | Performed by: INTERNAL MEDICINE

## 2022-08-01 PROCEDURE — 99999 PR PBB SHADOW E&M-EST. PATIENT-LVL IV: ICD-10-PCS | Mod: PBBFAC,,, | Performed by: INTERNAL MEDICINE

## 2022-08-01 PROCEDURE — 1157F PR ADVANCE CARE PLAN OR EQUIV PRESENT IN MEDICAL RECORD: ICD-10-PCS | Mod: CPTII,S$GLB,, | Performed by: INTERNAL MEDICINE

## 2022-08-01 PROCEDURE — 1159F PR MEDICATION LIST DOCUMENTED IN MEDICAL RECORD: ICD-10-PCS | Mod: CPTII,S$GLB,, | Performed by: INTERNAL MEDICINE

## 2022-08-01 PROCEDURE — 99999 PR PBB SHADOW E&M-EST. PATIENT-LVL IV: CPT | Mod: PBBFAC,,, | Performed by: INTERNAL MEDICINE

## 2022-08-01 PROCEDURE — 3075F SYST BP GE 130 - 139MM HG: CPT | Mod: CPTII,S$GLB,, | Performed by: INTERNAL MEDICINE

## 2022-08-01 PROCEDURE — 1126F AMNT PAIN NOTED NONE PRSNT: CPT | Mod: CPTII,S$GLB,, | Performed by: INTERNAL MEDICINE

## 2022-08-01 PROCEDURE — 3078F DIAST BP <80 MM HG: CPT | Mod: CPTII,S$GLB,, | Performed by: INTERNAL MEDICINE

## 2022-08-01 PROCEDURE — 3075F PR MOST RECENT SYSTOLIC BLOOD PRESS GE 130-139MM HG: ICD-10-PCS | Mod: CPTII,S$GLB,, | Performed by: INTERNAL MEDICINE

## 2022-08-01 PROCEDURE — 1101F PT FALLS ASSESS-DOCD LE1/YR: CPT | Mod: CPTII,S$GLB,, | Performed by: INTERNAL MEDICINE

## 2022-08-01 PROCEDURE — 3288F PR FALLS RISK ASSESSMENT DOCUMENTED: ICD-10-PCS | Mod: CPTII,S$GLB,, | Performed by: INTERNAL MEDICINE

## 2022-08-01 PROCEDURE — 1157F ADVNC CARE PLAN IN RCRD: CPT | Mod: CPTII,S$GLB,, | Performed by: INTERNAL MEDICINE

## 2022-08-01 PROCEDURE — 1159F MED LIST DOCD IN RCRD: CPT | Mod: CPTII,S$GLB,, | Performed by: INTERNAL MEDICINE

## 2022-08-01 PROCEDURE — 99213 OFFICE O/P EST LOW 20 MIN: CPT | Mod: S$GLB,,, | Performed by: INTERNAL MEDICINE

## 2022-08-01 PROCEDURE — 3078F PR MOST RECENT DIASTOLIC BLOOD PRESSURE < 80 MM HG: ICD-10-PCS | Mod: CPTII,S$GLB,, | Performed by: INTERNAL MEDICINE

## 2022-08-01 PROCEDURE — 1126F PR PAIN SEVERITY QUANTIFIED, NO PAIN PRESENT: ICD-10-PCS | Mod: CPTII,S$GLB,, | Performed by: INTERNAL MEDICINE

## 2022-08-01 PROCEDURE — 1160F PR REVIEW ALL MEDS BY PRESCRIBER/CLIN PHARMACIST DOCUMENTED: ICD-10-PCS | Mod: CPTII,S$GLB,, | Performed by: INTERNAL MEDICINE

## 2022-08-01 PROCEDURE — 99213 PR OFFICE/OUTPT VISIT, EST, LEVL III, 20-29 MIN: ICD-10-PCS | Mod: S$GLB,,, | Performed by: INTERNAL MEDICINE

## 2022-08-01 PROCEDURE — 1160F RVW MEDS BY RX/DR IN RCRD: CPT | Mod: CPTII,S$GLB,, | Performed by: INTERNAL MEDICINE

## 2022-08-01 NOTE — PROGRESS NOTES
Subjective:       Patient ID: Jo-Ann Escobedo is a 81 y.o. female.    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 Escobedo presents today for follow up.  I had last seen her in early March 2022  On July 30, 2020 she underwent a right mastectomy and sentinel lymph node biopsy.  There was no residual DCIS identified in the mastectomy specimen, while 3 sentinel lymph nodes were negative.  Of note, on July 2, 2020 she had undergone a biopsy of the area in the right breast that was read as BI-RADS 4 on the recent mammogram.  The biopsy had shown evidence of DCIS which was ER positive.    Briefly, she is an 80 year-old female with a remote history of breast cancer treated 17 years ago with a left modified radical mastectomy.  Her tumor was a T2 N1 M0 carcinoma.  She was treated with four cycles of AC and 4 cycles of Taxotere in the adjuvant setting and has remained cancer free since then.   A mammogram in the spring of 2019 led to a contralateral biopsy and eventually to a lumpectomy on June 26th, 2019, for an area of DCIS, measuring 12mm.  Resection margins were clear, with the closest being 14 mm.  Her tumor was ER positive and MD negative.   She met with the radiation oncologist and decided against XRT.  She subsequently decided against adjuvant hormonal therapy and has been followed expectantly since.    A mammogram in early 2020 had shown a 6 mm area of coarse heterogeneous calcifications in a grouped distribution in the upper outer quadrant of the right breast.  A biopsy was performed on June 29, 2020 with results as above.  This was followed by the right mastectomy.    Review of Systems  Overall she feels OK, and she has no complaints today.  She states that she recently experienced a GI bleed, underwent an EGD that was normal and a colonoscopy that showed diverticulosis.  and she is scheduled for a repeat colonoscopy by Dr. Monet.  She denies any anxiety come depression, easy  bruising, fevers, chills, night sweats, weight loss, nausea, vomiting, diarrhea, constipation, diplopia, blurred vision headache, chest pain, abdominal pain, or difficulty ambulating. All other systems are negative.     Objective:      Physical Exam  GENERAL: She is alert, oriented to time, place, person; well nourished;   pleasant; in no acute physical distress.    VITAL SIGNS: Reviewed.   HEENT: Normal. There are no nasal, oral, lip, gingival, auricular, lid,   or conjunctival lesions. Mucosae are moist and pink, and there is no   thrush. Pupils are equal, reactive to light and accommodation.   Extraocular muscle movements are intact.   NECK: Supple without JVD, thyromegaly, or adenopathy.   LUNGS: Clear to auscultation without wheezing, rales, or rhonchi.   CARDIOVASCULAR: Reveals an S1, S2, no murmurs, no rubs, no gallops.   BREASTS:  She is status post bilateral mastectomies.    ABDOMEN: Soft, nontender without organomegaly. Bowel sounds are identified.   EXTREMITIES: Have no cyanosis, clubbing, or edema.    SKIN: Does not have petechiae, rashes, ot induration.    NEUROLOGIC: Motor function is 5/5, DTRs are 0-1+ bilaterally, symmetrical,   and cranial nerves within normal limits.   LYMPHATIC: There is no cervical, axillary, or supraclavicular adenopathy.    Assessment:       1. Remote history of breast cancer status post left mastectomy and chemotherapy 18 years ago..    2.     Contralateral DCIS, s/p lumpectomy, s/p local recurrence, treated with a completion mastectomy, clinically HUGO, doing well.    Plan:     I had a long discussion with her.  I again reasssured her that her chance of a cure after her mastectomy is in the range of 99%.  I do not recommend any additional treatment at this point.   Her multiple questions were answered to her satisfaction.  I will see her again in 6 months (at her request).       Route Chart for Scheduling    Med Onc Chart Routing      Follow up with physician 6 months. See me  in 6 months.  No labs   Follow up with ROSA    Infusion scheduling note    Injection scheduling note    Labs    Imaging    Pharmacy appointment    Other referrals

## 2022-08-09 DIAGNOSIS — M79.642 LEFT HAND PAIN: Primary | ICD-10-CM

## 2022-08-11 ENCOUNTER — PATIENT MESSAGE (OUTPATIENT)
Dept: ORTHOPEDICS | Facility: CLINIC | Age: 82
End: 2022-08-11
Payer: MEDICARE

## 2022-08-18 ENCOUNTER — HOSPITAL ENCOUNTER (OUTPATIENT)
Dept: RADIOLOGY | Facility: OTHER | Age: 82
Discharge: HOME | End: 2022-08-18
Attending: ORTHOPAEDIC SURGERY
Payer: MEDICARE

## 2022-08-18 ENCOUNTER — OFFICE VISIT (OUTPATIENT)
Dept: ORTHOPEDICS | Facility: CLINIC | Age: 82
End: 2022-08-18
Payer: MEDICARE

## 2022-08-18 VITALS
BODY MASS INDEX: 33.87 KG/M2 | HEART RATE: 76 BPM | WEIGHT: 184.06 LBS | HEIGHT: 62 IN | SYSTOLIC BLOOD PRESSURE: 124 MMHG | DIASTOLIC BLOOD PRESSURE: 68 MMHG

## 2022-08-18 DIAGNOSIS — G56.03 BILATERAL CARPAL TUNNEL SYNDROME: Primary | ICD-10-CM

## 2022-08-18 DIAGNOSIS — G56.02 LEFT CARPAL TUNNEL SYNDROME: ICD-10-CM

## 2022-08-18 DIAGNOSIS — M65.30 TRIGGER FINGER OF LEFT HAND, UNSPECIFIED FINGER: ICD-10-CM

## 2022-08-18 DIAGNOSIS — M79.642 LEFT HAND PAIN: ICD-10-CM

## 2022-08-18 PROCEDURE — 76942 ECHO GUIDE FOR BIOPSY: CPT | Mod: S$GLB,,, | Performed by: ORTHOPAEDIC SURGERY

## 2022-08-18 PROCEDURE — 99214 OFFICE O/P EST MOD 30 MIN: CPT | Mod: 25,S$GLB,, | Performed by: ORTHOPAEDIC SURGERY

## 2022-08-18 PROCEDURE — 1157F ADVNC CARE PLAN IN RCRD: CPT | Mod: CPTII,S$GLB,, | Performed by: ORTHOPAEDIC SURGERY

## 2022-08-18 PROCEDURE — 1125F AMNT PAIN NOTED PAIN PRSNT: CPT | Mod: CPTII,S$GLB,, | Performed by: ORTHOPAEDIC SURGERY

## 2022-08-18 PROCEDURE — 99999 PR PBB SHADOW E&M-EST. PATIENT-LVL III: CPT | Mod: PBBFAC,,, | Performed by: ORTHOPAEDIC SURGERY

## 2022-08-18 PROCEDURE — 3078F DIAST BP <80 MM HG: CPT | Mod: CPTII,S$GLB,, | Performed by: ORTHOPAEDIC SURGERY

## 2022-08-18 PROCEDURE — 3074F SYST BP LT 130 MM HG: CPT | Mod: CPTII,S$GLB,, | Performed by: ORTHOPAEDIC SURGERY

## 2022-08-18 PROCEDURE — 20550 NJX 1 TENDON SHEATH/LIGAMENT: CPT | Mod: LT,S$GLB,, | Performed by: ORTHOPAEDIC SURGERY

## 2022-08-18 PROCEDURE — 76942 TENDON SHEATH: ICD-10-PCS | Mod: S$GLB,,, | Performed by: ORTHOPAEDIC SURGERY

## 2022-08-18 PROCEDURE — 99214 PR OFFICE/OUTPT VISIT, EST, LEVL IV, 30-39 MIN: ICD-10-PCS | Mod: 25,S$GLB,, | Performed by: ORTHOPAEDIC SURGERY

## 2022-08-18 PROCEDURE — 3074F PR MOST RECENT SYSTOLIC BLOOD PRESSURE < 130 MM HG: ICD-10-PCS | Mod: CPTII,S$GLB,, | Performed by: ORTHOPAEDIC SURGERY

## 2022-08-18 PROCEDURE — 20550 PR INJECT TENDON SHEATH/LIGAMENT: ICD-10-PCS | Mod: LT,S$GLB,, | Performed by: ORTHOPAEDIC SURGERY

## 2022-08-18 PROCEDURE — 73130 X-RAY EXAM OF HAND: CPT | Mod: 26,LT,, | Performed by: RADIOLOGY

## 2022-08-18 PROCEDURE — 1157F PR ADVANCE CARE PLAN OR EQUIV PRESENT IN MEDICAL RECORD: ICD-10-PCS | Mod: CPTII,S$GLB,, | Performed by: ORTHOPAEDIC SURGERY

## 2022-08-18 PROCEDURE — 3078F PR MOST RECENT DIASTOLIC BLOOD PRESSURE < 80 MM HG: ICD-10-PCS | Mod: CPTII,S$GLB,, | Performed by: ORTHOPAEDIC SURGERY

## 2022-08-18 PROCEDURE — 73130 XR HAND COMPLETE 3 VIEW LEFT: ICD-10-PCS | Mod: 26,LT,, | Performed by: RADIOLOGY

## 2022-08-18 PROCEDURE — 1125F PR PAIN SEVERITY QUANTIFIED, PAIN PRESENT: ICD-10-PCS | Mod: CPTII,S$GLB,, | Performed by: ORTHOPAEDIC SURGERY

## 2022-08-18 PROCEDURE — 73130 X-RAY EXAM OF HAND: CPT | Mod: TC,FY,LT

## 2022-08-18 PROCEDURE — 99999 PR PBB SHADOW E&M-EST. PATIENT-LVL III: ICD-10-PCS | Mod: PBBFAC,,, | Performed by: ORTHOPAEDIC SURGERY

## 2022-08-18 RX ADMIN — DEXAMETHASONE SODIUM PHOSPHATE 4 MG: 4 INJECTION, SOLUTION INTRA-ARTICULAR; INTRALESIONAL; INTRAMUSCULAR; INTRAVENOUS; SOFT TISSUE at 11:08

## 2022-08-18 NOTE — PROGRESS NOTES
[facility]  History & Physical  Orthopedics    SUBJECTIVE:     Chief Complaint: History of left carpal tunnel release    Referring Provider: Pamela Meza DNP     History of Present Illness:  Patient is a 81 y.o. right hand dominant female who presents today s/p L carpal tunnel release with Dr. Desai on 6/5/2020. She is here today to discuss her ongoing symptoms. She reports limited relief from her carpal tunnel release including constant numbness, pain, and some weakness in the left hand. She also endorses relatively new left ring finger triggering that is occasionally bothersome and is contributing to her left hand pain. She has been seeing Dr. Fay for lumbar and cervical radiculopathy.        The patient denies any fevers, chills, N/V, D/C and presents for evaluation.    Review of patient's allergies indicates:   Allergen Reactions    Voltaren [diclofenac sodium] Other (See Comments)     Hematochezia and hospitalization for hematochezia.   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 She subsequently underwent colonoscopy 12/9 and several large diverticula in the sigmoid colon was seen  with hematin throughout the colon; blood pooling also noted in the sigmoid colon, during which clip was placed f    Hematochezia     Hypothyroidism, postsurgical 7/1/2015    Mixed hyperlipidemia 9/27/2012    Multinodular goiter 7/31/2014    Paget's disease of bone 7/10/2013    SCC (squamous cell carcinoma) 12/2020    left 5th knuckle    Squamous Cell Carcinoma     in situ right neck      Past Surgical History:   Procedure Laterality Date    BREAST BIOPSY Right 2019    BREAST LUMPECTOMY Right 2019    CARPAL TUNNEL RELEASE Left 6/5/2020    Procedure: RELEASE, CARPAL TUNNEL Left;  Surgeon: Pricila Presley MD;  Location: [facility] OR;  Service: Orthopedics;  Laterality: Left;    CATARACT EXTRACTION W/  INTRAOCULAR LENS IMPLANT Bilateral     COLONOSCOPY N/A 4/15/2019    Procedure: COLONOSCOPY;  Surgeon: Artur Monet MD;  Location: [facility] (4TH FLR);  Service: Endoscopy;  Laterality: N/A;  within 1 month    COLONOSCOPY N/A 12/9/2019    Procedure: COLONOSCOPY;  Surgeon: Clyde Welch MD;  Location: [facility] (2ND FLR);  Service: Endoscopy;  Laterality: N/A;    ENDOSCOPIC ULTRASOUND OF UPPER GASTROINTESTINAL TRACT N/A 1/22/2020    Procedure: ULTRASOUND, UPPER GI TRACT, ENDOSCOPIC;  Surgeon: Eleazar Shaffer MD;  Location: [facility] (2ND FLR);  Service: Endoscopy;  Laterality: N/A;  EUS for 10mm SML w/negative pillow sign and negative naked fat sign which was found at the incisura.  Dr Welch    EPIDURAL STEROID INJECTION Bilateral 12/17/2021    Procedure: INJECTION, STEROID, EPIDURAL, L3-L4 Bilateral DIRECT REF Transforaminal;  Surgeon: Julian Fish MD;  Location: [facility] PAIN MGT;  Service: Pain Management;  Laterality: Bilateral;    ESOPHAGOGASTRODUODENOSCOPY N/A 12/7/2019    Procedure: EGD (ESOPHAGOGASTRODUODENOSCOPY);  Surgeon: Clyde Welch MD;  Location: [facility] (2ND FLR);  Service: Endoscopy;  Laterality: N/A;    EXCISIONAL BIOPSY Right 6/27/2019    Procedure: EXCISIONAL  BIOPSY-breast;  Surgeon: Suki Dill MD;  Location: [address] FLR;  Service: General;  Laterality: Right;    EYE SURGERY      HYSTERECTOMY      INJECTION FOR SENTINEL NODE IDENTIFICATION Right 7/30/2020    Procedure: INJECTION, FOR SENTINEL NODE IDENTIFICATION;  Surgeon: Suki Dill MD;  Location: [facility] OR;  Service: General;  Laterality: Right;    INJECTION OF ANESTHETIC AGENT AROUND MEDIAL BRANCH NERVES INNERVATING LUMBAR FACET JOINT Bilateral 6/7/2019    Procedure: BLOCK, NERVE, FACET JOINT, LUMBAR, MEDIAL BRANCH-deo MBB L4/5,L5/S1;  Surgeon: Jarvis Morales III, MD;  Location: [facility] CATH LAB;  Service: Pain Management;  Laterality: Bilateral;    INJECTION OF STEROID Right 6/5/2020    Procedure: INJECTION, STEROID right carpal tunel injection/ Right ring trigger finger injection;  Surgeon: Pricila Presley MD;  Location: [facility] OR;  Service: Orthopedics;  Laterality: Right;  INJECTION, STEROID right carpal tunel injection/ Right ring trigger finger injection    KNEE ARTHROSCOPY N/A     pt unsure of which knee     MASTECTOMY      OOPHORECTOMY      SENTINEL LYMPH NODE BIOPSY Right 7/30/2020    Procedure: BIOPSY, LYMPH NODE, SENTINEL;  Surgeon: Suki Dill MD;  Location: [facility] OR;  Service: General;  Laterality: Right;    SPINE SURGERY      TOTAL THYROIDECTOMY      TRANSFORAMINAL EPIDURAL INJECTION OF STEROID Bilateral 5/27/2022    Procedure: INJECTION, STEROID, EPIDURAL, TF BILATERAL L3-L4 CONTRAST DIRECT REF;  Surgeon: Julian Fish MD;  Location: [facility] PAIN MGT;  Service: Pain Management;  Laterality: Bilateral;    UNILATERAL MASTECTOMY Right 7/30/2020    Procedure: MASTECTOMY, UNILATERAL;  Surgeon: Suki Dill MD;  Location: [facility] OR;  Service: General;  Laterality: Right;    YAG Laser Capsulotomy  Left 07/29/2019 01/06/2021 OD//Dr. Hahn     Family History   Problem Relation Age of Onset    Cancer Father         lung    Dementia Mother     Glaucoma Brother      Macular degeneration Brother     Diabetes Neg Hx     Amblyopia Neg Hx     Blindness Neg Hx     Cataracts Neg Hx     Retinal detachment Neg Hx     Strabismus Neg Hx     Melanoma Neg Hx      Social History     Tobacco Use    Smoking status: Former Smoker     Packs/day: 2.00     Years: 44.00     Pack years: 88.00     Types: Cigarettes     Quit date: 1969     Years since quittin.6    Smokeless tobacco: Never Used   Substance Use Topics    Alcohol use: Not Currently    Drug use: No        Review of Systems:  Constitutional: Denies fever/chills  Neurological: Denies numbness/tingling (any exceptions noted in orthopaedic exam)   Psychiatric/Behavioral: Denies change in normal mood  Eyes: Denies change in vision  Cardiovascular: Denies chest pain  Respiratory: Denies shortness of breath  Hematologic/Lymphatic: Denies easy bleeding/bruising   Skin: Denies new rash or skin lesions   Gastrointestinal: Denies nausea/vomitting/diarrhea, change in bowel habits, abdominal pain   Allergic/Immunologic: Denies adverse reactions to current medications  Musculoskeletal: see HPI      OBJECTIVE:     Vital Signs (Most Recent)  Pulse: 76 (22 1150)  BP: 124/68 (22 1150)    Physical Exam:  Gen:  No acute distress  CV:  Peripherally well-perfused.  Pulses 2+ bilaterally.  Lungs:  Normal respiratory effort.  Abdomen:  Soft, non-tender, non-distended  Head/Neck:  Normocephalic.  Atraumatic. No TTP, AROM and PROM intact without pain  Neuro:  CN intact without deficit, SILT throughout B/L Upper & Lower Extremities    Bilateral Hand/Wrist Examination:    Observation/Inspection:  Swelling  none    Deformity  none  Discoloration  none     Scars   none    Atrophy  none    HAND/WRIST EXAMINATION:  Finkelstein's Test   Neg  WHAT Test    Neg  Snuff box tenderness   Neg  Kirby's Test    Neg  Hook of Hamate Tenderness  Neg  CMC grind    Neg  Circumduction test   Neg    Neurovascular Exam:  Digits WWP, brisk CR < 3s  throughout  NVI motor/LTS to M/R/U nerves, radial pulse 2+  Tinel's Test - Carpal Tunnel  Pos left, Neg right  Tinel's Test - Cubital Tunnel  Neg  Phalen's Test    Neg  Median Nerve Compression Test Neg    ROM hand full, painless; no reproducible triggering of left ring finger at A1 pulley     ROM wrist full, painless    ROM elbow full, painless    Abdomen not guarded  Respirations nonlabored  Perfusion intact    Diagnostic Results:     Imaging - I independently viewed the patient's imaging as well as the radiology report.  Xrays of the patient's left hand demonstrate no evidence of any acute fractures or dislocations with mild degenerative changes.    EMG - new order placed today     ASSESSMENT/PLAN:     Jo-Ann was seen today for tingling, pain, numbness and swelling.    Diagnoses and all orders for this visit:    Bilateral carpal tunnel syndrome  -     EMG W/ ULTRASOUND AND NERVE CONDUCTION TEST 2 Extremities; Future    Left carpal tunnel syndrome  -     Ambulatory referral/consult to Orthopedics  -     Ambulatory referral/consult to Physical/Occupational Therapy; Future         81 y.o. yo female with history of left carpal tunnel release who presents with consistent symptoms of left hand paresthesias and pain. We will evaluate for recurrence/persistence of carpal tunnel syndrome. New EMG ordered for bilateral upper extremities as she also endorses mild carpal tunnel symptoms on the right and previously endorsed improvement with CSI administered during her left carpal tunnel release. She also reports some new triggering of the left ring finger.       Plan:  - EMG BUE  - Left ring finger trigger injection  - FU to discuss EMG results       Sidney Rowell MD  Orthopaedic Surgery Resident

## 2022-08-22 RX ORDER — DEXAMETHASONE SODIUM PHOSPHATE 4 MG/ML
4 INJECTION, SOLUTION INTRA-ARTICULAR; INTRALESIONAL; INTRAMUSCULAR; INTRAVENOUS; SOFT TISSUE
Status: DISCONTINUED | OUTPATIENT
Start: 2022-08-18 | End: 2022-08-22 | Stop reason: HOSPADM

## 2022-08-22 NOTE — PROCEDURES
Tendon Sheath    Date/Time: 8/18/2022 11:15 AM  Performed by: Pricila Presley MD  Authorized by: Pricila Presley MD     Consent Done?:  Yes (Verbal)  Indications:  Pain  Timeout: prior to procedure the correct patient, procedure, and site was verified    Prep: patient was prepped and draped in usual sterile fashion      Local anesthesia used?: Yes    Anesthesia:  Local infiltration  Local anesthetic:  Lidocaine 1% without epinephrine  Ultrasonic guidance for needle placement?: Yes    Needle size:  25 G  Medications:  4 mg dexamethasone 4 mg/mL

## 2022-08-22 NOTE — PROGRESS NOTES
I have personally taken the history and examined this patient. I agree with the resident's note as stated above.    Bilateral carpal tunnel syndrome  -     EMG W/ ULTRASOUND AND NERVE CONDUCTION TEST 2 Extremities; Future     Left carpal tunnel syndrome  -     Ambulatory referral/consult to Orthopedics  -     Ambulatory referral/consult to Physical/Occupational Therapy; Future           81 y.o. yo female with history of left carpal tunnel release who presents with consistent symptoms of left hand paresthesias and pain. We will evaluate for recurrence/persistence of carpal tunnel syndrome. New EMG ordered for bilateral upper extremities as she also endorses mild carpal tunnel symptoms on the right and previously endorsed improvement with CSI administered during her left carpal tunnel release. She also reports some new triggering of the left ring finger.    I also do not think pt understands that the trigger finger was not due to the surgery and it is not CTS- pt thinks the CTsurgery made this trigger finger.  Pt also does not understand with severe compression and constant numbness prior to the surgery that the nerve regeneration may not occur. I repeated this discussion  Again with pt.         Plan:  - EMG BUE  - Left ring finger trigger injection  - FU to discuss EMG results

## 2022-09-09 DIAGNOSIS — K92.1 BLOOD IN STOOL: Primary | ICD-10-CM

## 2022-09-09 DIAGNOSIS — K57.90 DIVERTICULOSIS: ICD-10-CM

## 2022-09-12 ENCOUNTER — CLINICAL SUPPORT (OUTPATIENT)
Dept: REHABILITATION | Facility: HOSPITAL | Age: 82
End: 2022-09-12
Payer: MEDICARE

## 2022-09-12 DIAGNOSIS — G56.02 LEFT CARPAL TUNNEL SYNDROME: ICD-10-CM

## 2022-09-12 PROCEDURE — 97110 THERAPEUTIC EXERCISES: CPT

## 2022-09-12 PROCEDURE — 97165 OT EVAL LOW COMPLEX 30 MIN: CPT

## 2022-09-12 NOTE — PATIENT INSTRUCTIONS
"[facility] - OCCUPATIONAL THERAPY  HOME EXERCISE PROGRAM     Complete the following massages for 5  minutes each, 20 x/day.                       Complete the following exercises with 10  repetitions each, 1x/day.     AROM: DIP Flexion / Extension  Pinch middle knuckle to prevent bending. Bend end knuckle until stretch is felt.   Hold 3 seconds. Relax. Straighten finger as far as possible.    AROM: PIP Flexion / Extension  Pinch bottom knuckle  to prevent bending. Actively bend middle knuckle until stretch is felt.   Hold 3 seconds. Relax. Straighten finger as far as possible.      AROM: Isolated MCP Flexion / Extension ("Wave")   Bend only your large, bottom knuckles. Hold 3 seconds. Keep the tips of your fingers straight. Straighten fingers.    AROM: Isolated IPJ Flexion / Extension ("Hook")  Bend only your middle and end knuckles. Hold 3 seconds.   Straighten your fingers.     AROM: MCP and PIP Flexion / Extension ("Straight Fist")  Bend your bottom and middle knuckles, keeping the tips of your fingers straight. Try to touch the pads of your fingers on your palm. Hold 3 seconds. Straighten your fingers.     AROM: Composite Flexion / Extension ("Full Fist")  Bend every joint in your hand into a fist. Hold 3 seconds. Straighten your fingers.     AROM: Composte Extension ("Finger Lifts")  Lift your finger off of the table one at a time. Hold 3 seconds. Relax your finger.    AROM: Abduction / Adduction  With hand flat on table, spread all fingers apart, then bring them together as close as possible.    Copyright © I. All rights reserved.     Therapist:  Evelyn Sweet   "

## 2022-09-19 NOTE — PROGRESS NOTES
Occupational Therapy Daily Treatment Note   Date 9/20/2022    Name: Jo-Ann Escobedo  Clinic Number: [id number]     Therapy Diagnosis:        Encounter Diagnosis   Name Primary?    Left carpal tunnel syndrome        Physician: Sidney Rowell MD     Physician Orders:  eval and treat  Medical Diagnosis: carpal tunnel syndrome and long finger trigger finger      Surgical Procedure/ Date :injection to left LF 3 weeks ago   Evaluation Date: 9/12/2022  Insurance:[employer]   Insurance Authorization period Expiration: 10/10/22   Plan of Care Certification Period: 10/30/22      Visit # / Visits Authortized: 2 / 20  Time In: 2:00 pm    Time Out: 3:00 pm   Total Billable Time: 60  minutes     Precautions: Standard    Subjective     Pt reports: she was compliant with home exercise program given last session.     Response to previous treatment:reports  ring splint  she has worn it about 75% of the time     Functional change: 6-7/10     Pain: 1/10  Location: left fingers  LF and felt wrist     Objective   Hand ROM. Measured in degrees. Full ROM all digits        9/12/2022 9/12/2022           Left Right                        WRIST               flexion 35 45         Ext  35 45         RD 15 15         UD 15 15         Pro 90 90         Sup  90 90                   Left LF               MP  0/90           PIP locks every 4-5 times  aday even after injection .  0/50           DIP  0/50                               Strength (Dyanmometer) and Pinch Strength (Pinch Gauge)  Measured in pounds and psi. Average of three trials.    9/12/2022 9/12/2022             Left Right            Rung II 28 40           Key Pinch 8 10           3pt Pinch 8 12           2pt Pinch 7 6              Jo-Ann received the following supervised modalities after being cleared for contradictions for 10  minutes:   -paraffin with .     Jo-Ann received the following manual therapy techniques for 8 minutes:   -STM over a 1 pulley  for trigger finger     Jo-Ann received therapeutic exercises for 15 minutes including:  -TGE,    Joint blocking   Towel walking     1 YRB 2 minutes     Home Exercises and Education Provided     Education provided:   - use of ring pulley splint during the day   - Progress towards goals     Written Home Exercises Provided: Patient instructed to cont prior HEP.  Exercises were reviewed and Jo-Ann was able to demonstrate them prior to the end of the session.  Jo-Ann demonstrated good  understanding of the education provided.   .   See EMR under Patient Instructions for exercises provided prior visit.       Assessment     Pt would continue to benefit from skilled OT.  Yes      Jo-Ann is progressing well towards her goals and there are no updates to goals at this time. Pt prognosis is Good.     Pt will continue to benefit from skilled outpatient occupational therapy to address the deficits listed in the problem list on initial evaluation provide pt/family education and to maximize pt's level of independence in the home and community environment.     Anticipated barriers to occupational therapy:  none     Pt's spiritual, cultural and educational needs considered and pt agreeable to plan of care and goals.      GOALS: 6 weeks. Pt agrees with goals set.        Short Term (4 weeks on 10/12/22 ):  1)   Patient to be IND with HEP and modalities for pain management, progressing   2)   Increase ROM 10 degrees  For  wrist  motion to increase functional hand use for left UE , progressing   3)   Increase  strength by  6 lbs. to grasp left hand , progressing   4)   Increase pinch 1-3 psis for  lateral pinch , progressing   5)   Decrease edema .1-2cm to increase joint mobility /flexibility for improved overall functional hand use. , progressing   6)   Patient to be IND with HEP and modalities for pain/edema managment.    Plan     Discussed Plan of Care with patient: Yes  Updates/Grading for next session:  none       Evelyn  Kee, OT

## 2022-09-20 ENCOUNTER — CLINICAL SUPPORT (OUTPATIENT)
Dept: REHABILITATION | Facility: HOSPITAL | Age: 82
End: 2022-09-20
Payer: MEDICARE

## 2022-09-20 DIAGNOSIS — G56.02 LEFT CARPAL TUNNEL SYNDROME: Primary | ICD-10-CM

## 2022-09-20 PROCEDURE — 97018 PARAFFIN BATH THERAPY: CPT | Mod: 59

## 2022-09-20 PROCEDURE — 97110 THERAPEUTIC EXERCISES: CPT

## 2022-09-20 PROCEDURE — 97140 MANUAL THERAPY 1/> REGIONS: CPT

## 2022-09-26 ENCOUNTER — CLINICAL SUPPORT (OUTPATIENT)
Dept: REHABILITATION | Facility: HOSPITAL | Age: 82
End: 2022-09-26
Payer: MEDICARE

## 2022-09-26 DIAGNOSIS — M79.642 LEFT HAND PAIN: Primary | ICD-10-CM

## 2022-09-26 PROCEDURE — 97018 PARAFFIN BATH THERAPY: CPT

## 2022-09-26 PROCEDURE — 97110 THERAPEUTIC EXERCISES: CPT

## 2022-09-26 NOTE — PROGRESS NOTES
Occupational Therapy Daily Treatment Note   Date 9/26/2022    Name: Jo-Ann Escobedo  Clinic Number: [id number]     Therapy Diagnosis:        Encounter Diagnosis   Name Primary?    Left carpal tunnel syndrome        Physician: Sidney Rowell MD     Physician Orders:  eval and treat  Medical Diagnosis: carpal tunnel syndrome and long finger trigger finger      Surgical Procedure/ Date :injection to left LF 3 weeks ago   Evaluation Date: 9/12/2022  Insurance:[employer]   Insurance Authorization period Expiration: 10/10/22   Plan of Care Certification Period: 10/30/22      Visit # / Visits Authortized: 2 / 20  Time In: 2:00 pm    Time Out: 3:00 pm   Total Billable Time: 60  minutes     Precautions: Standard    Subjective     Pt reports: she was compliant with home exercise program given last session.     Response to previous treatment:reports  ring splint  she has worn it about 75% of the time     Functional change: 6-7/10     Pain: 1/10  Location: left fingers  LF and felt wrist     Objective   Hand ROM. Measured in degrees. Full ROM all digits        9/12/2022 9/12/2022 9/26/22           Left Right  Left                        WRIST               flexion 35 45 40        Ext  35 45 40        RD 15 15 15       UD 15 15 15        Pro 90 90 90        Sup  90 90 90                  Left LF               MP  0/90   0/90       PIP locks every 4-5 times  aday even after injection .  0/50   0/90        DIP  0/50    0/45                              Strength (Dyanmometer) and Pinch Strength (Pinch Gauge)  Measured in pounds and psi. Average of three trials.    9/12/2022 9/12/2022 9/26/22 9/26/22          Left Right   Left    Left        Rung II 28 40  38 30        Diaz Pinch 8 10  10  8       3pt Pinch 8 12 10   8       2pt Pinch 7 6 10   7           Jo-Ann received the following supervised modalities after being cleared for contradictions for 10  minutes:   -paraffin with YAHIR.     Jo-Ann  received the following manual therapy techniques for 8 minutes:   -STM over a 1 pulley for trigger finger     Jo-Ann received therapeutic exercises for 28 minutes including:  -TGE,    Joint blocking   Towel walking     1 YRB 2 minutes    Discussed anatomy and how pulleys work with trigger fingers      In 30 minutes, observed no triggering  of left long finger.  She reports she can not tell if therapy is working , she plans to follow exercises and wear pulley ring as able . She has some other medical issues that she wants to have addressed with her back and hip at this time.     Pt reports that she is okay making today her last visit .     Home Exercises and Education Provided     Education provided:   - use of ring pulley splint during the day   - Progress towards goals     Written Home Exercises Provided: Patient instructed to cont prior HEP.  Exercises were reviewed and Jo-Ann was able to demonstrate them prior to the end of the session.  Jo-Ann demonstrated good  understanding of the education provided.   .   See EMR under Patient Instructions for exercises provided prior visit.       Assessment     Pt would continue to benefit from skilled OT.  Yes      Jo-Ann is progressing  fairly with goals, she would like not make today her last visit and she will try exercises on her own .      Anticipated barriers to occupational therapy:  none       Pt's spiritual, cultural and educational needs considered and pt agreeable to plan of care and goals.      GOALS: 6 weeks. Pt agrees with goals set.        Short Term (4 weeks on 10/12/22 ):  1)   Patient to be IND with HEP and modalities for pain management, met   2)   Increase ROM 10 degrees  For  wrist  motion to increase functional hand use for left UE ,  met   3)   Increase  strength by  6 lbs. to grasp left hand ,   met   4)   Increase pinch 1-3 psis for  lateral pinch ,  met   5)   Decrease edema .1-2cm to increase joint mobility /flexibility for improved  overall functional hand use. ,  met   6)   Patient to be IND with HEP and modalities for pain/edema managment.    Plan     Discharge from OT     Evelyn Sweet OT

## 2022-09-29 ENCOUNTER — TELEPHONE (OUTPATIENT)
Dept: PHYSICAL MEDICINE AND REHAB | Facility: CLINIC | Age: 82
End: 2022-09-29
Payer: MEDICARE

## 2022-09-29 NOTE — TELEPHONE ENCOUNTER
Called and spoke with Mrs Mejia. Per Dr Mittal  The provider has a 12;00 meeting on this day  I called to  asked if it was ok to switch her appointment from 10/26/22 @ 12:00 to 10/27/22 for 13;00, Mrs Mejia was ok with this.   No need to mail an Appointment slip  per patient,  she will check her patient portal.

## 2022-10-05 ENCOUNTER — OFFICE VISIT (OUTPATIENT)
Dept: ORTHOPEDICS | Facility: CLINIC | Age: 82
End: 2022-10-05
Payer: MEDICARE

## 2022-10-05 ENCOUNTER — HOSPITAL ENCOUNTER (OUTPATIENT)
Dept: RADIOLOGY | Facility: HOSPITAL | Age: 82
Discharge: HOME | End: 2022-10-05
Attending: ORTHOPAEDIC SURGERY
Payer: MEDICARE

## 2022-10-05 VITALS — BODY MASS INDEX: 33.87 KG/M2 | WEIGHT: 184.06 LBS | HEIGHT: 62 IN

## 2022-10-05 DIAGNOSIS — M54.16 LUMBAR RADICULOPATHY, CHRONIC: ICD-10-CM

## 2022-10-05 DIAGNOSIS — M43.10 SPONDYLOLISTHESIS, ACQUIRED: Primary | ICD-10-CM

## 2022-10-05 DIAGNOSIS — M54.16 LUMBAR RADICULOPATHY: ICD-10-CM

## 2022-10-05 DIAGNOSIS — M51.36 DDD (DEGENERATIVE DISC DISEASE), LUMBAR: ICD-10-CM

## 2022-10-05 PROCEDURE — 1101F PT FALLS ASSESS-DOCD LE1/YR: CPT | Mod: CPTII,S$GLB,, | Performed by: ORTHOPAEDIC SURGERY

## 2022-10-05 PROCEDURE — 1125F AMNT PAIN NOTED PAIN PRSNT: CPT | Mod: CPTII,S$GLB,, | Performed by: ORTHOPAEDIC SURGERY

## 2022-10-05 PROCEDURE — 99999 PR PBB SHADOW E&M-EST. PATIENT-LVL III: ICD-10-PCS | Mod: PBBFAC,,, | Performed by: ORTHOPAEDIC SURGERY

## 2022-10-05 PROCEDURE — 99999 PR PBB SHADOW E&M-EST. PATIENT-LVL III: CPT | Mod: PBBFAC,,, | Performed by: ORTHOPAEDIC SURGERY

## 2022-10-05 PROCEDURE — 72110 X-RAY EXAM L-2 SPINE 4/>VWS: CPT | Mod: TC

## 2022-10-05 PROCEDURE — 1157F PR ADVANCE CARE PLAN OR EQUIV PRESENT IN MEDICAL RECORD: ICD-10-PCS | Mod: CPTII,S$GLB,, | Performed by: ORTHOPAEDIC SURGERY

## 2022-10-05 PROCEDURE — 3288F FALL RISK ASSESSMENT DOCD: CPT | Mod: CPTII,S$GLB,, | Performed by: ORTHOPAEDIC SURGERY

## 2022-10-05 PROCEDURE — 1157F ADVNC CARE PLAN IN RCRD: CPT | Mod: CPTII,S$GLB,, | Performed by: ORTHOPAEDIC SURGERY

## 2022-10-05 PROCEDURE — 72110 XR LUMBAR SPINE AP AND LAT WITH FLEX/EXT: ICD-10-PCS | Mod: 26,,, | Performed by: RADIOLOGY

## 2022-10-05 PROCEDURE — 3288F PR FALLS RISK ASSESSMENT DOCUMENTED: ICD-10-PCS | Mod: CPTII,S$GLB,, | Performed by: ORTHOPAEDIC SURGERY

## 2022-10-05 PROCEDURE — 1101F PR PT FALLS ASSESS DOC 0-1 FALLS W/OUT INJ PAST YR: ICD-10-PCS | Mod: CPTII,S$GLB,, | Performed by: ORTHOPAEDIC SURGERY

## 2022-10-05 PROCEDURE — 99214 PR OFFICE/OUTPT VISIT, EST, LEVL IV, 30-39 MIN: ICD-10-PCS | Mod: S$GLB,,, | Performed by: ORTHOPAEDIC SURGERY

## 2022-10-05 PROCEDURE — 1125F PR PAIN SEVERITY QUANTIFIED, PAIN PRESENT: ICD-10-PCS | Mod: CPTII,S$GLB,, | Performed by: ORTHOPAEDIC SURGERY

## 2022-10-05 PROCEDURE — 99214 OFFICE O/P EST MOD 30 MIN: CPT | Mod: S$GLB,,, | Performed by: ORTHOPAEDIC SURGERY

## 2022-10-05 PROCEDURE — 72110 X-RAY EXAM L-2 SPINE 4/>VWS: CPT | Mod: 26,,, | Performed by: RADIOLOGY

## 2022-10-09 ENCOUNTER — HOSPITAL ENCOUNTER (OUTPATIENT)
Dept: RADIOLOGY | Facility: HOSPITAL | Age: 82
Discharge: HOME | End: 2022-10-09
Attending: ORTHOPAEDIC SURGERY
Payer: MEDICARE

## 2022-10-09 DIAGNOSIS — M54.16 LUMBAR RADICULOPATHY, CHRONIC: ICD-10-CM

## 2022-10-09 PROCEDURE — 72148 MRI LUMBAR SPINE W/O DYE: CPT | Mod: TC

## 2022-10-09 PROCEDURE — 72148 MRI LUMBAR SPINE W/O DYE: CPT | Mod: 26,,, | Performed by: RADIOLOGY

## 2022-10-09 PROCEDURE — 72148 MRI LUMBAR SPINE WITHOUT CONTRAST: ICD-10-PCS | Mod: 26,,, | Performed by: RADIOLOGY

## 2022-10-10 ENCOUNTER — OFFICE VISIT (OUTPATIENT)
Dept: SPINE | Facility: CLINIC | Age: 82
End: 2022-10-10
Attending: ORTHOPAEDIC SURGERY
Payer: MEDICARE

## 2022-10-10 VITALS — HEIGHT: 62 IN | WEIGHT: 185.31 LBS | BODY MASS INDEX: 34.1 KG/M2

## 2022-10-10 DIAGNOSIS — M54.16 LUMBAR RADICULOPATHY: Primary | ICD-10-CM

## 2022-10-10 PROCEDURE — 1157F ADVNC CARE PLAN IN RCRD: CPT | Mod: CPTII,S$GLB,, | Performed by: ORTHOPAEDIC SURGERY

## 2022-10-10 PROCEDURE — 99999 PR PBB SHADOW E&M-EST. PATIENT-LVL II: ICD-10-PCS | Mod: PBBFAC,,, | Performed by: ORTHOPAEDIC SURGERY

## 2022-10-10 PROCEDURE — 1125F PR PAIN SEVERITY QUANTIFIED, PAIN PRESENT: ICD-10-PCS | Mod: CPTII,S$GLB,, | Performed by: ORTHOPAEDIC SURGERY

## 2022-10-10 PROCEDURE — 99214 OFFICE O/P EST MOD 30 MIN: CPT | Mod: S$GLB,,, | Performed by: ORTHOPAEDIC SURGERY

## 2022-10-10 PROCEDURE — 99214 PR OFFICE/OUTPT VISIT, EST, LEVL IV, 30-39 MIN: ICD-10-PCS | Mod: S$GLB,,, | Performed by: ORTHOPAEDIC SURGERY

## 2022-10-10 PROCEDURE — 1159F PR MEDICATION LIST DOCUMENTED IN MEDICAL RECORD: ICD-10-PCS | Mod: CPTII,S$GLB,, | Performed by: ORTHOPAEDIC SURGERY

## 2022-10-10 PROCEDURE — 1125F AMNT PAIN NOTED PAIN PRSNT: CPT | Mod: CPTII,S$GLB,, | Performed by: ORTHOPAEDIC SURGERY

## 2022-10-10 PROCEDURE — 99999 PR PBB SHADOW E&M-EST. PATIENT-LVL II: CPT | Mod: PBBFAC,,, | Performed by: ORTHOPAEDIC SURGERY

## 2022-10-10 PROCEDURE — 1157F PR ADVANCE CARE PLAN OR EQUIV PRESENT IN MEDICAL RECORD: ICD-10-PCS | Mod: CPTII,S$GLB,, | Performed by: ORTHOPAEDIC SURGERY

## 2022-10-10 PROCEDURE — 1159F MED LIST DOCD IN RCRD: CPT | Mod: CPTII,S$GLB,, | Performed by: ORTHOPAEDIC SURGERY

## 2022-10-10 NOTE — PROGRESS NOTES
The patient returns for follow-up.  She has a history of low back pain, and right buttock pain.  Her last epidural steroid injection was in May and resulted in minimal relief, however back in December of 2021 she had an epidural that was very helpful.      Overall she has more bad than good days.  She has been participating in self-directed physical therapy program, as well as taking Tylenol.    Today reviewed lumbar spine radiographs demonstrate multilevel spondylosis from L3-S1 notable L3-4 retrolisthesis.      I also reviewed a prior MRI of her lumbar spine from April of 2021 that demonstrated a left L4-5 disc herniation, and L3-4 moderate to severe stenosis.      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      Today we discussed options, I recommended a new MRI of her lumbar spine L further characterize the source of her pain.  I will see her back and we can discuss options.

## 2022-10-10 NOTE — H&P (VIEW-ONLY)
DATE: 10/10/2022  PATIENT: Jo-Ann Escobedo    Attending Physician: Paco Fay M.D.    CHIEF COMPLAINT:  Low back,    HISTORY:  Jo-Ann Escobedo is a 81 y.o. female here today with history of low back left hip pain.  Patient states that pain is over the lateral aspect of the hip.  Has been present for approximately 60 days however 3 days ago the pain is just stop.  She did see Dr. Stanley for this who thought the pain might be coming from her hip.  Therefore she was referred to Sports Medicine.  MRI performed showing labral tear.  She was set up for a total hip with Dr. Salgado however they were interested in getting a 2nd opinion.  Also has a history of getting a medial branch block in 2019. Overall she feels well today.  No symptoms of myelopathy.  No bowel or bladder changes.    The Patient denies myelopathic symptoms such as handwriting changes or difficulty with buttons/coins/keys. Denies perineal paresthesias, bowel/bladder dysfunction.    Interval history 12.6.21:  Patient returns for follow-up of low back and left hip radiculopathy .  She reports she did not get the injection in her lumbar spine, this she was concerned about the possibility that would affect her in a total hip arthroplasty.  She has a known left hip labral tear.  She says that the symptoms in her low back and leg have become debilitating.  The symptoms might be related from her left leg to her right leg and then back to her left leg again.  She denies weakness.  She had previous injections over 3 years ago, she does not recall how much relief she got.  She is currently doing therapy and this has led to some improvement.    Interval update 1/26/22  Patient recently had lumbar KARLI. She reports significant improvement in radicular leg pain. Says she is completely better. She is very happy with her outcome.     Interval history 5/4/22: She returns due to recurrence of her low back pain with right side radiculopathy. She had relief of her symptoms  till roughly half way through 3/22. No she is back to her base line symptoms. Since out last visit she has noticed some increased difficulty with keys and buttons. No falls or traumas. She denies any upper extremity weakness, tingling, numbness.    5/11/22: She returns to clinic today for f/u of c spine MRI. She endorses increased difficulty with small objects. She states today is a good day.     Interval Hx 10/10/22: Had her L spine KARLI and provided no relief. Obtained repeat MRI of L spine to determine if any surgical intervention would be indicated at this time. Presents to discuss MRI results. Her pain hasn't improved and still significantly hindering her quality of life.  Reminded us she had a L-spine surgery (likely micro diskectomy at what she endorses was probably L3-4 over 30 years ago.      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 She subsequently underwent colonoscopy 12/9 and several large diverticula in the sigmoid colon was seen with hematin throughout the colon; blood pooling also noted in the sigmoid colon, during which clip was placed f    Hematochezia     Hypothyroidism, postsurgical 7/1/2015    Mixed hyperlipidemia 9/27/2012    Multinodular goiter 7/31/2014    Paget's  disease of bone 7/10/2013    SCC (squamous cell carcinoma) 12/2020    left 5th knuckle    Squamous Cell Carcinoma     in situ right neck      Past Surgical History:   Procedure Laterality Date    BREAST BIOPSY Right 2019    BREAST LUMPECTOMY Right 2019    CARPAL TUNNEL RELEASE Left 6/5/2020    Procedure: RELEASE, CARPAL TUNNEL Left;  Surgeon: Pricila Presley MD;  Location: [facility];  Service: Orthopedics;  Laterality: Left;    CATARACT EXTRACTION W/  INTRAOCULAR LENS IMPLANT Bilateral     COLONOSCOPY N/A 4/15/2019    Procedure: COLONOSCOPY;  Surgeon: Artur Monet MD;  Location: [facility] (4TH FLR);  Service: Endoscopy;  Laterality: N/A;  within 1 month    COLONOSCOPY N/A 12/9/2019    Procedure: COLONOSCOPY;  Surgeon: Clyde Welch MD;  Location: [facility] (2ND FLR);  Service: Endoscopy;  Laterality: N/A;    ENDOSCOPIC ULTRASOUND OF UPPER GASTROINTESTINAL TRACT N/A 1/22/2020    Procedure: ULTRASOUND, UPPER GI TRACT, ENDOSCOPIC;  Surgeon: Eleazar Shaffer MD;  Location: [facility] (2ND FLR);  Service: Endoscopy;  Laterality: N/A;  EUS for 10mm SML w/negative pillow sign and negative naked fat sign which was found at the incisura.  Dr Welch    EPIDURAL STEROID INJECTION Bilateral 12/17/2021    Procedure: INJECTION, STEROID, EPIDURAL, L3-L4 Bilateral DIRECT REF Transforaminal;  Surgeon: Julian Fish MD;  Location: [facility] PAIN T;  Service: Pain Management;  Laterality: Bilateral;    ESOPHAGOGASTRODUODENOSCOPY N/A 12/7/2019    Procedure: EGD (ESOPHAGOGASTRODUODENOSCOPY);  Surgeon: Clyde Welch MD;  Location: [facility] (2ND FLR);  Service: Endoscopy;  Laterality: N/A;    EXCISIONAL BIOPSY Right 6/27/2019    Procedure: EXCISIONAL BIOPSY-breast;  Surgeon: Suki Dill MD;  Location: [facility] 2ND FLR;  Service: General;  Laterality: Right;    EYE SURGERY      HYSTERECTOMY      INJECTION FOR SENTINEL NODE IDENTIFICATION Right 7/30/2020    Procedure: INJECTION, FOR SENTINEL NODE IDENTIFICATION;   Surgeon: Suki Dill MD;  Location: [facility] OR;  Service: General;  Laterality: Right;    INJECTION OF ANESTHETIC AGENT AROUND MEDIAL BRANCH NERVES INNERVATING LUMBAR FACET JOINT Bilateral 6/7/2019    Procedure: BLOCK, NERVE, FACET JOINT, LUMBAR, MEDIAL BRANCH-deo MBB L4/5,L5/S1;  Surgeon: Jarvis Morales III, MD;  Location: [facility] CATH LAB;  Service: Pain Management;  Laterality: Bilateral;    INJECTION OF STEROID Right 6/5/2020    Procedure: INJECTION, STEROID right carpal tunel injection/ Right ring trigger finger injection;  Surgeon: Pricila Presley MD;  Location: [facility] OR;  Service: Orthopedics;  Laterality: Right;  INJECTION, STEROID right carpal tunel injection/ Right ring trigger finger injection    KNEE ARTHROSCOPY N/A     pt unsure of which knee     MASTECTOMY      OOPHORECTOMY      SENTINEL LYMPH NODE BIOPSY Right 7/30/2020    Procedure: BIOPSY, LYMPH NODE, SENTINEL;  Surgeon: Suki Dill MD;  Location: [facility] OR;  Service: General;  Laterality: Right;    SPINE SURGERY      TOTAL THYROIDECTOMY      TRANSFORAMINAL EPIDURAL INJECTION OF STEROID Bilateral 5/27/2022    Procedure: INJECTION, STEROID, EPIDURAL, TF BILATERAL L3-L4 CONTRAST DIRECT REF;  Surgeon: Julian Fish MD;  Location: [facility] PAIN MGT;  Service: Pain Management;  Laterality: Bilateral;    UNILATERAL MASTECTOMY Right 7/30/2020    Procedure: MASTECTOMY, UNILATERAL;  Surgeon: Suki Dill MD;  Location: [facility] OR;  Service: General;  Laterality: Right;    YAG Laser Capsulotomy  Left 07/29/2019 01/06/2021 OD//Dr. Hahn       Current Medications:   Current Outpatient Medications:     acetaminophen (TYLENOL) 650 MG TbSR, Take 1 tablet (650 mg total) by mouth 3 (three) times daily as needed., Disp: 42 tablet, Rfl: 0    ascorbic acid, vitamin C, (VITAMIN C) 100 MG tablet, Take 100 mg by mouth once daily., Disp: , Rfl:     cholecalciferol, vitamin D3, (VITAMIN D3) 50 mcg (2,000 unit) Tab, Take 1 tablet by mouth once daily., Disp:  , Rfl:     co-enzyme Q-10 30 mg capsule, Take 30 mg by mouth once daily., Disp: , Rfl:     fenofibrate 160 MG Tab, TAKE 1 TABLET (160 MG TOTAL) BY MOUTH ONCE DAILY., Disp: 90 tablet, Rfl: 3    LACTOBACILLUS COMBINATION NO.8 (ADULT PROBIOTIC ORAL), Take by mouth once daily. , Disp: , Rfl:     levothyroxine (SYNTHROID) 125 MCG tablet, TAKE 1 TABLET (125 MCG TOTAL) BY MOUTH BEFORE BREAKFAST., Disp: 90 tablet, Rfl: 3    losartan (COZAAR) 100 MG tablet, Take 1 tablet (100 mg total) by mouth once daily., Disp: 90 tablet, Rfl: 3    turmeric root extract 500 mg Cap, Take by mouth once daily. , Disp: , Rfl:     Social History:   Social History     Socioeconomic History    Marital status:    Occupational History    Occupation: Appiterate     Employer: [employer]     Employer: [employer]   Tobacco Use    Smoking status: Former     Packs/day: 2.00     Years: 44.00     Pack years: 88.00     Types: Cigarettes     Quit date: 1969     Years since quittin.8    Smokeless tobacco: Never   Substance and Sexual Activity    Alcohol use: Not Currently    Drug use: No    Sexual activity: Not Currently   Other Topics Concern    Are you pregnant or think you may be? No    Breast-feeding No     Social Determinants of Health     Financial Resource Strain: Low Risk     Difficulty of Paying Living Expenses: Not hard at all   Food Insecurity: No Food Insecurity    Worried About Running Out of Food in the Last Year: Never true    Ran Out of Food in the Last Year: Never true   Transportation Needs: No Transportation Needs    Lack of Transportation (Medical): No    Lack of Transportation (Non-Medical): No   Physical Activity: Sufficiently Active    Days of Exercise per Week: 7 days    Minutes of Exercise per Session: 30 min   Stress: No Stress Concern Present    Feeling of Stress : Only a little   Social Connections: Moderately Isolated    Frequency of Communication with Friends and Family: More than three times a week     "Frequency of Social Gatherings with Friends and Family: More than three times a week    Attends Spiritism Services: Never    Active Member of Clubs or Organizations: Yes    Attends Club or Organization Meetings: More than 4 times per year    Marital Status:    Housing Stability: Low Risk     Unable to Pay for Housing in the Last Year: No    Number of Places Lived in the Last Year: 1    Unstable Housing in the Last Year: No       REVIEW OF SYSTEMS:  Constitution: Negative. Negative for chills, fever and night sweats.   Cardiovascular: Negative for chest pain and syncope.   Respiratory: Negative for cough and shortness of breath.   Gastrointestinal: See HPI. Negative for nausea/vomiting. Negative for abdominal pain.  Genitourinary: See HPI. Negative for discoloration or dysuria.  Hematologic/Lymphatic: n for bleeding/clotting disorders.   Musculoskeletal: Negative for falls and muscle weakness.   Neurological: See HPI. n history of seizures. n history of cranial surgery or shunts.  Neurological: See HPI. No seizures.   Endocrine: Negative for polydipsia, polyphagia and polyuria.   Allergic/Immunologic: Negative for hives and persistent infections.     EXAM:  Ht 5' 2" (1.575 m)   Wt 84.1 kg (185 lb 4.8 oz)   LMP  (LMP Unknown)   BMI 33.89 kg/m²     PHYSICAL EXAMINATION:    General: The patient is a pleasant 81 y.o. female in no apparent distress, the patient is orientatied to person, place and time.  Psych: Normal mood and affect  HEENT: Vision grossly intact, hearing intact to the spoken word.  Lungs: Respirations unlabored.  Gait: Normal station and gait, no difficulty with toe or heel walk.   Skin: Dorsal lumbar skin negative for rashes, lesions, hairy patches and surgical scars. There is n lumbar tenderness to palpation.  Range of motion: Lumbar range of motion is acceptable.  Spinal Balance: Global saggital and coronal spinal balance acceptable, no significant for scoliosis and " kyphosis.  Musculoskeletal: No pain with the range of motion of the bilateral hips. No trochanteric tenderness to palpation.  Vascular: Bilateral lower extremities warm and well perfused, Dorsalis pedis pulses 2+ bilaterally.  Neurological: Normal strength and tone in all major motor groups in the bilateral lower extremities. Normal sensation to light touch in the L2-S1 dermatomes bilaterally.  Deep tendon reflexes symmetric 2+ in the bilateral lower extremities.  Negative Babinski bilaterally. Straight leg raise negative bilaterally.    5/5 UE strength BUE  Negative Bruner on exam    IMAGING:      Today I personally reviewed MRI C spine: Diffuse spondylosis and canal narrowing C3- T1. No evidence of myelomalacia. Foraminal stenosis R C 5/6 and bilaterally C6/7    MRI L-spine with moderate to severe central stenosis at L3-4 and moderate stenosis centrally at L4-5.     Body mass index is 33.89 kg/m².  Hemoglobin A1C   Date Value Ref Range Status   07/18/2022 5.7 (H) 4.0 - 5.6 % Final     Comment:     ADA Screening Guidelines:  5.7-6.4%  Consistent with prediabetes  >or=6.5%  Consistent with diabetes    High levels of fetal hemoglobin interfere with the HbA1C  assay. Heterozygous hemoglobin variants (HbS, HgC, etc)do  not significantly interfere with this assay.   However, presence of multiple variants may affect accuracy.     04/30/2020 6.1 (H) 4.0 - 5.6 % Final     Comment:     ADA Screening Guidelines:  5.7-6.4%  Consistent with prediabetes  >or=6.5%  Consistent with diabetes  High levels of fetal hemoglobin interfere with the HbA1C  assay. Heterozygous hemoglobin variants (HbS, HgC, etc)do  not significantly interfere with this assay.   However, presence of multiple variants may affect accuracy.     10/18/2019 5.6 4.0 - 5.6 % Final     Comment:     ADA Screening Guidelines:  5.7-6.4%  Consistent with prediabetes  >or=6.5%  Consistent with diabetes  High levels of fetal hemoglobin interfere with the HbA1C  assay.  Heterozygous hemoglobin variants (HbS, HgC, etc)do  not significantly interfere with this assay.   However, presence of multiple variants may affect accuracy.         ASSESSMENT/PLAN:    Patient with significant improvement in spinal stenosis symptoms after injection. F/u PRN.     Diagnoses and all orders for this visit:    Lumbar radiculopathy  -     Procedure Order to Pain Management; Future     Patient would like to proceed with an epidural steroid injection which will be scheduled for her.  Follow-up with our clinic as needed to possibly discuss L3-5 TLIF if in the event she does not get any relief or symptoms worsen.

## 2022-10-10 NOTE — PROGRESS NOTES
DATE: 10/10/2022  PATIENT: Jo-Ann Escobedo    Attending Physician: Paco Fay M.D.    CHIEF COMPLAINT:  Low back,    HISTORY:  Jo-Ann Escobedo is a 81 y.o. female here today with history of low back left hip pain.  Patient states that pain is over the lateral aspect of the hip.  Has been present for approximately 60 days however 3 days ago the pain is just stop.  She did see Dr. Stanley for this who thought the pain might be coming from her hip.  Therefore she was referred to Sports Medicine.  MRI performed showing labral tear.  She was set up for a total hip with Dr. Salgado however they were interested in getting a 2nd opinion.  Also has a history of getting a medial branch block in 2019. Overall she feels well today.  No symptoms of myelopathy.  No bowel or bladder changes.    The Patient denies myelopathic symptoms such as handwriting changes or difficulty with buttons/coins/keys. Denies perineal paresthesias, bowel/bladder dysfunction.    Interval history 12.6.21:  Patient returns for follow-up of low back and left hip radiculopathy .  She reports she did not get the injection in her lumbar spine, this she was concerned about the possibility that would affect her in a total hip arthroplasty.  She has a known left hip labral tear.  She says that the symptoms in her low back and leg have become debilitating.  The symptoms might be related from her left leg to her right leg and then back to her left leg again.  She denies weakness.  She had previous injections over 3 years ago, she does not recall how much relief she got.  She is currently doing therapy and this has led to some improvement.    Interval update 1/26/22  Patient recently had lumbar KARLI. She reports significant improvement in radicular leg pain. Says she is completely better. She is very happy with her outcome.     Interval history 5/4/22: She returns due to recurrence of her low back pain with right side radiculopathy. She had relief of her symptoms  till roughly half way through 3/22. No she is back to her base line symptoms. Since out last visit she has noticed some increased difficulty with keys and buttons. No falls or traumas. She denies any upper extremity weakness, tingling, numbness.    5/11/22: She returns to clinic today for f/u of c spine MRI. She endorses increased difficulty with small objects. She states today is a good day.     Interval Hx 10/10/22: Had her L spine KARLI and provided no relief. Obtained repeat MRI of L spine to determine if any surgical intervention would be indicated at this time. Presents to discuss MRI results. Her pain hasn't improved and still significantly hindering her quality of life.  Reminded us she had a L-spine surgery (likely micro diskectomy at what she endorses was probably L3-4 over 30 years ago.      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 She subsequently underwent colonoscopy 12/9 and several large diverticula in the sigmoid colon was seen with hematin throughout the colon; blood pooling also noted in the sigmoid colon, during which clip was placed f    Hematochezia     Hypothyroidism, postsurgical 7/1/2015    Mixed hyperlipidemia 9/27/2012    Multinodular goiter 7/31/2014    Paget's  disease of bone 7/10/2013    SCC (squamous cell carcinoma) 12/2020    left 5th knuckle    Squamous Cell Carcinoma     in situ right neck      Past Surgical History:   Procedure Laterality Date    BREAST BIOPSY Right 2019    BREAST LUMPECTOMY Right 2019    CARPAL TUNNEL RELEASE Left 6/5/2020    Procedure: RELEASE, CARPAL TUNNEL Left;  Surgeon: Pricila Prseley MD;  Location: [facility];  Service: Orthopedics;  Laterality: Left;    CATARACT EXTRACTION W/  INTRAOCULAR LENS IMPLANT Bilateral     COLONOSCOPY N/A 4/15/2019    Procedure: COLONOSCOPY;  Surgeon: Artur Monet MD;  Location: [facility] (4TH FLR);  Service: Endoscopy;  Laterality: N/A;  within 1 month    COLONOSCOPY N/A 12/9/2019    Procedure: COLONOSCOPY;  Surgeon: Clyde Welch MD;  Location: [facility] (2ND FLR);  Service: Endoscopy;  Laterality: N/A;    ENDOSCOPIC ULTRASOUND OF UPPER GASTROINTESTINAL TRACT N/A 1/22/2020    Procedure: ULTRASOUND, UPPER GI TRACT, ENDOSCOPIC;  Surgeon: Eleazar Shaffer MD;  Location: [facility] (2ND FLR);  Service: Endoscopy;  Laterality: N/A;  EUS for 10mm SML w/negative pillow sign and negative naked fat sign which was found at the incisura.  Dr Welch    EPIDURAL STEROID INJECTION Bilateral 12/17/2021    Procedure: INJECTION, STEROID, EPIDURAL, L3-L4 Bilateral DIRECT REF Transforaminal;  Surgeon: Julian Fish MD;  Location: [facility] PAIN T;  Service: Pain Management;  Laterality: Bilateral;    ESOPHAGOGASTRODUODENOSCOPY N/A 12/7/2019    Procedure: EGD (ESOPHAGOGASTRODUODENOSCOPY);  Surgeon: Clyde Welch MD;  Location: [facility] (2ND FLR);  Service: Endoscopy;  Laterality: N/A;    EXCISIONAL BIOPSY Right 6/27/2019    Procedure: EXCISIONAL BIOPSY-breast;  Surgeon: Suki Dill MD;  Location: [facility] 2ND FLR;  Service: General;  Laterality: Right;    EYE SURGERY      HYSTERECTOMY      INJECTION FOR SENTINEL NODE IDENTIFICATION Right 7/30/2020    Procedure: INJECTION, FOR SENTINEL NODE IDENTIFICATION;   Surgeon: Suki Dill MD;  Location: [facility] OR;  Service: General;  Laterality: Right;    INJECTION OF ANESTHETIC AGENT AROUND MEDIAL BRANCH NERVES INNERVATING LUMBAR FACET JOINT Bilateral 6/7/2019    Procedure: BLOCK, NERVE, FACET JOINT, LUMBAR, MEDIAL BRANCH-deo MBB L4/5,L5/S1;  Surgeon: Jarvis Morales III, MD;  Location: [facility] CATH LAB;  Service: Pain Management;  Laterality: Bilateral;    INJECTION OF STEROID Right 6/5/2020    Procedure: INJECTION, STEROID right carpal tunel injection/ Right ring trigger finger injection;  Surgeon: Pricila Presley MD;  Location: [facility] OR;  Service: Orthopedics;  Laterality: Right;  INJECTION, STEROID right carpal tunel injection/ Right ring trigger finger injection    KNEE ARTHROSCOPY N/A     pt unsure of which knee     MASTECTOMY      OOPHORECTOMY      SENTINEL LYMPH NODE BIOPSY Right 7/30/2020    Procedure: BIOPSY, LYMPH NODE, SENTINEL;  Surgeon: Suki Dill MD;  Location: [facility] OR;  Service: General;  Laterality: Right;    SPINE SURGERY      TOTAL THYROIDECTOMY      TRANSFORAMINAL EPIDURAL INJECTION OF STEROID Bilateral 5/27/2022    Procedure: INJECTION, STEROID, EPIDURAL, TF BILATERAL L3-L4 CONTRAST DIRECT REF;  Surgeon: Julian Fish MD;  Location: [facility] PAIN MGT;  Service: Pain Management;  Laterality: Bilateral;    UNILATERAL MASTECTOMY Right 7/30/2020    Procedure: MASTECTOMY, UNILATERAL;  Surgeon: Suki Dill MD;  Location: [facility] OR;  Service: General;  Laterality: Right;    YAG Laser Capsulotomy  Left 07/29/2019 01/06/2021 OD//Dr. Hahn       Current Medications:   Current Outpatient Medications:     acetaminophen (TYLENOL) 650 MG TbSR, Take 1 tablet (650 mg total) by mouth 3 (three) times daily as needed., Disp: 42 tablet, Rfl: 0    ascorbic acid, vitamin C, (VITAMIN C) 100 MG tablet, Take 100 mg by mouth once daily., Disp: , Rfl:     cholecalciferol, vitamin D3, (VITAMIN D3) 50 mcg (2,000 unit) Tab, Take 1 tablet by mouth once daily., Disp:  , Rfl:     co-enzyme Q-10 30 mg capsule, Take 30 mg by mouth once daily., Disp: , Rfl:     fenofibrate 160 MG Tab, TAKE 1 TABLET (160 MG TOTAL) BY MOUTH ONCE DAILY., Disp: 90 tablet, Rfl: 3    LACTOBACILLUS COMBINATION NO.8 (ADULT PROBIOTIC ORAL), Take by mouth once daily. , Disp: , Rfl:     levothyroxine (SYNTHROID) 125 MCG tablet, TAKE 1 TABLET (125 MCG TOTAL) BY MOUTH BEFORE BREAKFAST., Disp: 90 tablet, Rfl: 3    losartan (COZAAR) 100 MG tablet, Take 1 tablet (100 mg total) by mouth once daily., Disp: 90 tablet, Rfl: 3    turmeric root extract 500 mg Cap, Take by mouth once daily. , Disp: , Rfl:     Social History:   Social History     Socioeconomic History    Marital status:    Occupational History    Occupation: [employer]     Employer: [employer]     Employer: [employer]   Tobacco Use    Smoking status: Former     Packs/day: 2.00     Years: 44.00     Pack years: 88.00     Types: Cigarettes     Quit date: 1969     Years since quittin.8    Smokeless tobacco: Never   Substance and Sexual Activity    Alcohol use: Not Currently    Drug use: No    Sexual activity: Not Currently   Other Topics Concern    Are you pregnant or think you may be? No    Breast-feeding No     Social Determinants of Health     Financial Resource Strain: Low Risk     Difficulty of Paying Living Expenses: Not hard at all   Food Insecurity: No Food Insecurity    Worried About Running Out of Food in the Last Year: Never true    Ran Out of Food in the Last Year: Never true   Transportation Needs: No Transportation Needs    Lack of Transportation (Medical): No    Lack of Transportation (Non-Medical): No   Physical Activity: Sufficiently Active    Days of Exercise per Week: 7 days    Minutes of Exercise per Session: 30 min   Stress: No Stress Concern Present    Feeling of Stress : Only a little   Social Connections: Moderately Isolated    Frequency of Communication with Friends and Family: More than three times a week     "Frequency of Social Gatherings with Friends and Family: More than three times a week    Attends Religion Services: Never    Active Member of Clubs or Organizations: Yes    Attends Club or Organization Meetings: More than 4 times per year    Marital Status:    Housing Stability: Low Risk     Unable to Pay for Housing in the Last Year: No    Number of Places Lived in the Last Year: 1    Unstable Housing in the Last Year: No       REVIEW OF SYSTEMS:  Constitution: Negative. Negative for chills, fever and night sweats.   Cardiovascular: Negative for chest pain and syncope.   Respiratory: Negative for cough and shortness of breath.   Gastrointestinal: See HPI. Negative for nausea/vomiting. Negative for abdominal pain.  Genitourinary: See HPI. Negative for discoloration or dysuria.  Hematologic/Lymphatic: n for bleeding/clotting disorders.   Musculoskeletal: Negative for falls and muscle weakness.   Neurological: See HPI. n history of seizures. n history of cranial surgery or shunts.  Neurological: See HPI. No seizures.   Endocrine: Negative for polydipsia, polyphagia and polyuria.   Allergic/Immunologic: Negative for hives and persistent infections.     EXAM:  Ht 5' 2" (1.575 m)   Wt 84.1 kg (185 lb 4.8 oz)   LMP  (LMP Unknown)   BMI 33.89 kg/m²     PHYSICAL EXAMINATION:    General: The patient is a pleasant 81 y.o. female in no apparent distress, the patient is orientatied to person, place and time.  Psych: Normal mood and affect  HEENT: Vision grossly intact, hearing intact to the spoken word.  Lungs: Respirations unlabored.  Gait: Normal station and gait, no difficulty with toe or heel walk.   Skin: Dorsal lumbar skin negative for rashes, lesions, hairy patches and surgical scars. There is n lumbar tenderness to palpation.  Range of motion: Lumbar range of motion is acceptable.  Spinal Balance: Global saggital and coronal spinal balance acceptable, no significant for scoliosis and " kyphosis.  Musculoskeletal: No pain with the range of motion of the bilateral hips. No trochanteric tenderness to palpation.  Vascular: Bilateral lower extremities warm and well perfused, Dorsalis pedis pulses 2+ bilaterally.  Neurological: Normal strength and tone in all major motor groups in the bilateral lower extremities. Normal sensation to light touch in the L2-S1 dermatomes bilaterally.  Deep tendon reflexes symmetric 2+ in the bilateral lower extremities.  Negative Babinski bilaterally. Straight leg raise negative bilaterally.    5/5 UE strength BUE  Negative Bruner on exam    IMAGING:      Today I personally reviewed MRI C spine: Diffuse spondylosis and canal narrowing C3- T1. No evidence of myelomalacia. Foraminal stenosis R C 5/6 and bilaterally C6/7    MRI L-spine with moderate to severe central stenosis at L3-4 and moderate stenosis centrally at L4-5.     Body mass index is 33.89 kg/m².  Hemoglobin A1C   Date Value Ref Range Status   07/18/2022 5.7 (H) 4.0 - 5.6 % Final     Comment:     ADA Screening Guidelines:  5.7-6.4%  Consistent with prediabetes  >or=6.5%  Consistent with diabetes    High levels of fetal hemoglobin interfere with the HbA1C  assay. Heterozygous hemoglobin variants (HbS, HgC, etc)do  not significantly interfere with this assay.   However, presence of multiple variants may affect accuracy.     04/30/2020 6.1 (H) 4.0 - 5.6 % Final     Comment:     ADA Screening Guidelines:  5.7-6.4%  Consistent with prediabetes  >or=6.5%  Consistent with diabetes  High levels of fetal hemoglobin interfere with the HbA1C  assay. Heterozygous hemoglobin variants (HbS, HgC, etc)do  not significantly interfere with this assay.   However, presence of multiple variants may affect accuracy.     10/18/2019 5.6 4.0 - 5.6 % Final     Comment:     ADA Screening Guidelines:  5.7-6.4%  Consistent with prediabetes  >or=6.5%  Consistent with diabetes  High levels of fetal hemoglobin interfere with the HbA1C  assay.  Heterozygous hemoglobin variants (HbS, HgC, etc)do  not significantly interfere with this assay.   However, presence of multiple variants may affect accuracy.         ASSESSMENT/PLAN:    Patient with significant improvement in spinal stenosis symptoms after injection. F/u PRN.     Diagnoses and all orders for this visit:    Lumbar radiculopathy  -     Procedure Order to Pain Management; Future     Patient would like to proceed with an epidural steroid injection which will be scheduled for her.  Follow-up with our clinic as needed to possibly discuss L3-5 TLIF if in the event she does not get any relief or symptoms worsen.

## 2022-10-11 ENCOUNTER — PATIENT MESSAGE (OUTPATIENT)
Dept: PAIN MEDICINE | Facility: OTHER | Age: 82
End: 2022-10-11
Payer: MEDICARE

## 2022-10-11 ENCOUNTER — TELEPHONE (OUTPATIENT)
Dept: PAIN MEDICINE | Facility: OTHER | Age: 82
End: 2022-10-11
Payer: MEDICARE

## 2022-10-11 DIAGNOSIS — M54.16 LUMBAR RADICULOPATHY: Primary | ICD-10-CM

## 2022-10-11 NOTE — TELEPHONE ENCOUNTER
Spoke to patient to schedule direct referral procedure. Patient is scheduled on 10/21/22 at 1:00 PM with Dr. Fish. Patient was offered an opportunity to be scheduled in the Pain Management Clinic for a consult with the performing provider before scheduling. Patient declined provider consult. Date, time, and instructions for procedure given to patient. Patient verbalized understanding.

## 2022-10-21 ENCOUNTER — HOSPITAL ENCOUNTER (OUTPATIENT)
Facility: OTHER | Age: 82
Discharge: HOME | End: 2022-10-21
Attending: ANESTHESIOLOGY | Admitting: ANESTHESIOLOGY
Payer: MEDICARE

## 2022-10-21 VITALS
HEART RATE: 90 BPM | BODY MASS INDEX: 34.04 KG/M2 | WEIGHT: 185 LBS | SYSTOLIC BLOOD PRESSURE: 151 MMHG | DIASTOLIC BLOOD PRESSURE: 62 MMHG | TEMPERATURE: 98 F | RESPIRATION RATE: 14 BRPM | OXYGEN SATURATION: 95 % | HEIGHT: 62 IN

## 2022-10-21 DIAGNOSIS — M51.37 DDD (DEGENERATIVE DISC DISEASE), LUMBOSACRAL: ICD-10-CM

## 2022-10-21 DIAGNOSIS — M54.17 LUMBOSACRAL RADICULOPATHY: Primary | ICD-10-CM

## 2022-10-21 DIAGNOSIS — G89.29 CHRONIC PAIN: ICD-10-CM

## 2022-10-21 DIAGNOSIS — M48.062 SPINAL STENOSIS OF LUMBAR REGION WITH NEUROGENIC CLAUDICATION: ICD-10-CM

## 2022-10-21 PROCEDURE — 64483 NJX AA&/STRD TFRM EPI L/S 1: CPT | Mod: 50 | Performed by: ANESTHESIOLOGY

## 2022-10-21 PROCEDURE — 25500020 PHARM REV CODE 255: Performed by: ANESTHESIOLOGY

## 2022-10-21 PROCEDURE — 63600175 PHARM REV CODE 636 W HCPCS: Performed by: ANESTHESIOLOGY

## 2022-10-21 PROCEDURE — 25000003 PHARM REV CODE 250: Performed by: ANESTHESIOLOGY

## 2022-10-21 PROCEDURE — 25000003 PHARM REV CODE 250: Performed by: STUDENT IN AN ORGANIZED HEALTH CARE EDUCATION/TRAINING PROGRAM

## 2022-10-21 PROCEDURE — 64483 NJX AA&/STRD TFRM EPI L/S 1: CPT | Mod: 50,,, | Performed by: ANESTHESIOLOGY

## 2022-10-21 PROCEDURE — 64483 PR EPIDURAL INJ, ANES/STEROID, TRANSFORAMINAL, LUMB/SACR, SNGL LEVL: ICD-10-PCS | Mod: 50,,, | Performed by: ANESTHESIOLOGY

## 2022-10-21 RX ORDER — LIDOCAINE HYDROCHLORIDE 10 MG/ML
INJECTION, SOLUTION EPIDURAL; INFILTRATION; INTRACAUDAL; PERINEURAL
Status: DISCONTINUED | OUTPATIENT
Start: 2022-10-21 | End: 2022-10-21 | Stop reason: HOSPADM

## 2022-10-21 RX ORDER — LIDOCAINE HYDROCHLORIDE 20 MG/ML
INJECTION, SOLUTION INFILTRATION; PERINEURAL
Status: DISCONTINUED | OUTPATIENT
Start: 2022-10-21 | End: 2022-10-21 | Stop reason: HOSPADM

## 2022-10-21 RX ORDER — SODIUM CHLORIDE 9 MG/ML
500 INJECTION, SOLUTION INTRAVENOUS CONTINUOUS
Status: DISCONTINUED | OUTPATIENT
Start: 2022-10-21 | End: 2022-10-21 | Stop reason: HOSPADM

## 2022-10-21 RX ORDER — DEXAMETHASONE SODIUM PHOSPHATE 10 MG/ML
INJECTION INTRAMUSCULAR; INTRAVENOUS
Status: DISCONTINUED | OUTPATIENT
Start: 2022-10-21 | End: 2022-10-21 | Stop reason: HOSPADM

## 2022-10-21 RX ORDER — MIDAZOLAM HYDROCHLORIDE 1 MG/ML
INJECTION INTRAMUSCULAR; INTRAVENOUS
Status: DISCONTINUED | OUTPATIENT
Start: 2022-10-21 | End: 2022-10-21 | Stop reason: HOSPADM

## 2022-10-21 RX ORDER — FENTANYL CITRATE 50 UG/ML
INJECTION, SOLUTION INTRAMUSCULAR; INTRAVENOUS
Status: DISCONTINUED | OUTPATIENT
Start: 2022-10-21 | End: 2022-10-21 | Stop reason: HOSPADM

## 2022-10-21 NOTE — DISCHARGE INSTRUCTIONS
Thank you for allowing us to care for you today. You may receive a survey about the care we provided. Your feedback is valuable and helps us provide excellent care throughout the community.     Home Care Instructions for Pain Management:    1. DIET:   You may resume your normal diet today.   2. BATHING:   You may shower with luke warm water. No tub baths or anything that will soak injection sites under water for the next 24 hours.  3. DRESSING:   You may remove your bandage today.   4. ACTIVITY LEVEL:   You may resume your normal activities 24 hrs after your procedure. Nothing strenuous today.  5. MEDICATIONS:   You may resume your normal medications today. To restart blood thinners, ask your doctor.  6. DRIVING    If you have received any sedatives by mouth today, you may not drive for 12 hours.    If you have received any sedation through your IV, you may not drive for 24 hrs.   7. SPECIAL INSTRUCTIONS:   No heat to the injection site for 24 hrs including, hot bath or shower, heating pad, moist heat, or hot tubs.    Use ice pack to injection site for any pain or discomfort.  Apply ice packs for 20 minute intervals as needed.    IF you have diabetes, be sure to monitor your blood sugar more closely. IF your injection contained steroids your blood sugar levels may become higher than normal.    If you are still having pain upon discharge:  Your pain may improve over the next 48 hours. The anesthetic (numbing medication) works immediately to 48 hours. IF your injection contained a steroid (anti-inflammatory medication), it takes approximately 3 days to start feeling relief and 7-10 days to see your greatest results from the medication. It is possible you may need subsequent injections. This would be discussed at your follow up appointment with pain management or your referring doctor.    Please call the PAIN MANAGEMENT office at [phone] or ON CALL pager at [phone] if you experienced any:   -Weakness or  loss of sensation  -Fever > 101.5  -Pain uncontrolled with oral medications   -Persistent nausea, vomiting, or diarrhea  -Redness or drainage from the injection sites, or any other worrisome concerns.   If physician on call was not reached or could not communicate with our office for any reason please go to the nearest emergency department. Adult Procedural Sedation Instructions    Recovery After Procedural Sedation (Adult)  You have been given medicine by vein to make you sleep during your surgery. This may have included both a pain medicine and sleeping medicine. Most of the effects have worn off. But you may still have some drowsiness for the next 6 to 8 hours.  Home care  Follow these guidelines when you get home:  For the next 8 hours, you should be watched by a responsible adult. This person should make sure your condition is not getting worse.  Don't drink any alcohol for the next 24 hours.  Don't drive, operate dangerous machinery, or make important business or personal decisions during the next 24 hours.  Note: Your healthcare provider may tell you not to take any medicine by mouth for pain or sleep in the next 4 hours. These medicines may react with the medicines you were given in the hospital. This could cause a much stronger response than usual.  Follow-up care  Follow up with your healthcare provider if you are not alert and back to your usual level of activity within 12 hours.  When to seek medical advice  Call your healthcare provider right away if any of these occur:  Drowsiness gets worse  Weakness or dizziness gets worse  Repeated vomiting  You can't be awakened   Date Last Reviewed: 10/18/2016  © 9522-8418 [employer] [address] [place] [address], [place] [address]. All rights reserved. This information is not intended as a substitute for professional medical care. Always follow your healthcare professional's instructions.

## 2022-10-21 NOTE — DISCHARGE SUMMARY
Discharge Note  Short Stay      SUMMARY     Admit Date: 10/21/2022    Attending Physician: Julian Fish      Discharge Physician: Julian Fish      Discharge Date: 10/21/2022 2:14 PM    Procedure(s) (LRB):  LUMBAR TRANSFORAMINAL BILATERAL L3/4 CONTRAST DIRECT REFERRAL (Bilateral)    Final Diagnosis: Lumbar radiculopathy [M54.16]    Disposition: Home or self care    Patient Instructions:   Current Discharge Medication List        CONTINUE these medications which have NOT CHANGED    Details   acetaminophen (TYLENOL) 650 MG TbSR Take 1 tablet (650 mg total) by mouth 3 (three) times daily as needed.  Qty: 42 tablet, Refills: 0      ascorbic acid, vitamin C, (VITAMIN C) 100 MG tablet Take 100 mg by mouth once daily.      cholecalciferol, vitamin D3, (VITAMIN D3) 50 mcg (2,000 unit) Tab Take 1 tablet by mouth once daily.      co-enzyme Q-10 30 mg capsule Take 30 mg by mouth once daily.      fenofibrate 160 MG Tab TAKE 1 TABLET (160 MG TOTAL) BY MOUTH ONCE DAILY.  Qty: 90 tablet, Refills: 3    Associated Diagnoses: Hyperlipidemia, unspecified hyperlipidemia type      LACTOBACILLUS COMBINATION NO.8 (ADULT PROBIOTIC ORAL) Take by mouth once daily.       levothyroxine (SYNTHROID) 125 MCG tablet TAKE 1 TABLET (125 MCG TOTAL) BY MOUTH BEFORE BREAKFAST.  Qty: 90 tablet, Refills: 3    Associated Diagnoses: Hypothyroidism, postsurgical      losartan (COZAAR) 100 MG tablet Take 1 tablet (100 mg total) by mouth once daily.  Qty: 90 tablet, Refills: 3    Comments: .      turmeric root extract 500 mg Cap Take by mouth once daily.                  Discharge Diagnosis: Lumbar radiculopathy [M54.16]  Condition on Discharge: Stable with no complications to procedure   Diet on Discharge: Same as before.  Activity: as per instruction sheet.  Discharge to: Home with a responsible adult.  Follow up: 2-4 weeks

## 2022-10-24 ENCOUNTER — PATIENT MESSAGE (OUTPATIENT)
Dept: PAIN MEDICINE | Facility: OTHER | Age: 82
End: 2022-10-24
Payer: MEDICARE

## 2022-10-27 ENCOUNTER — TELEPHONE (OUTPATIENT)
Dept: ORTHOPEDICS | Facility: CLINIC | Age: 82
End: 2022-10-27
Payer: MEDICARE

## 2022-10-27 ENCOUNTER — PROCEDURE VISIT (OUTPATIENT)
Dept: NEUROLOGY | Facility: CLINIC | Age: 82
End: 2022-10-27
Payer: MEDICARE

## 2022-10-27 DIAGNOSIS — G56.03 BILATERAL CARPAL TUNNEL SYNDROME: ICD-10-CM

## 2022-10-27 PROCEDURE — 95910 NRV CNDJ TEST 7-8 STUDIES: CPT | Mod: S$GLB,,, | Performed by: PHYSICAL MEDICINE & REHABILITATION

## 2022-10-27 PROCEDURE — 95910 PR NERVE CONDUCTION STUDY; 7-8 STUDIES: ICD-10-PCS | Mod: S$GLB,,, | Performed by: PHYSICAL MEDICINE & REHABILITATION

## 2022-10-27 PROCEDURE — 95885 MUSC TST DONE W/NERV TST LIM: CPT | Mod: 59,S$GLB,, | Performed by: PHYSICAL MEDICINE & REHABILITATION

## 2022-10-27 PROCEDURE — 95886 PR EMG COMPLETE, W/ NERVE CONDUCTION STUDIES, 5+ MUSCLES: ICD-10-PCS | Mod: S$GLB,,, | Performed by: PHYSICAL MEDICINE & REHABILITATION

## 2022-10-27 PROCEDURE — 95885 PR MUSC TST DONE W/NERV TST LIM: ICD-10-PCS | Mod: 59,S$GLB,, | Performed by: PHYSICAL MEDICINE & REHABILITATION

## 2022-10-27 PROCEDURE — 95886 MUSC TEST DONE W/N TEST COMP: CPT | Mod: S$GLB,,, | Performed by: PHYSICAL MEDICINE & REHABILITATION

## 2022-10-27 NOTE — PROCEDURES
Test Date:  10/27/2022    Patient: jo-ann jimenez : 1940 Physician: Gregorio Mittal D.O.   ID#: [id number] SEX: Female Ref. Phys: Sidney Rowell MD     HPI: Jo-Ann Jimenez is a 81 y.o.female who presents for NCS/EMG to evaluate CTS.  Pt had prior NCS/EMG in  which showed severe left and moderate right CTS, along with left C7 radiculopathy.  She has since had left CT release.  Symptoms have persisted.      NCV & EMG Findings:  Evaluation of the right median motor nerve showed prolonged distal onset latency and reduced amplitude.  The right median sensory nerve showed prolonged distal peak latency, reduced amplitude, and decreased conduction velocity.  All remaining nerves (as indicated in the following tables) were within normal limits.  Needle evaluation of the right Abductor Pollicis Brevis muscle showed increased insertional activity, moderately increased spontaneous activity, increased motor unit amplitude, increased motor unit duration, and diminished recruitment.  All remaining muscles (as indicated in the following table) showed no evidence of electrical instability.    Impression:  There is electrophysiologic evidence of a RIGHT sensorimotor median mononeuropathy across the wrist (I.e. Carpal tunnel syndrome).  There is no motor axonal loss.  There is a 50% conduction block across the right wrist.  There is active denervation.  This is graded as Moderate-Severe in severity on the right.  There has been interval worsening on the right when compared to the  study (now with conduction block and active denervation).    There has been significant interval improvement/resolution in the left median mononeuropathy/carpal tunnel syndrome when compared to the study from .    No evidence of a left cervical radiculopathy today that was seen in the prior study from .      ___________________________  Gregorio Mittal D.O.        NCS+  Motor Nerve Results      Latency Amplitude F-Lat Segment  Distance CV Comment   Site (ms) Norm (mV) Norm (ms)  (cm) (m/s) Norm    Left Median (APB)   Wrist 3.1  < 4.4 6.2  > 3.8  Wrist-Palm - - -    Elbow 7.4 - 3.7 -  Elbow-Wrist 24 56  > 51    Right Median (APB)   Palm 1.48 - 6.6 -         Wrist *5.3  < 4.4 *3.3  > 3.8  Wrist-Palm 7 18 -    Elbow 8.6 - 2.6 -  Elbow-Wrist 20 61  > 51    Left Ulnar (ADM)   Wrist 2.7  < 3.7 7.8  > 3.0         Bel Elbow 6.2 - 6.3 -  Bel Elbow-Wrist 22 63  > 52    Abv Elbow 7.3 - 6.3 -  Abv Elbow-Bel Elbow 9 82  > 43    Right Ulnar (ADM)   Wrist 2.5  < 3.7 6.2  > 3.0         Bel Elbow 6.1 - 4.5 -  Bel Elbow-Wrist 21 58  > 52    Abv Elbow 7.5 - 5.7 -  Abv Elbow-Bel Elbow 10 71  > 43      Sensory Nerve Results      Latency (Peak) Amplitude (P-P) Segment Distance CV Comment   Site (ms) Norm (µV) Norm  (cm) (m/s) Norm    Left Median (Stim:Wrist)   Dig II 3.9  < 4.0 14  > 8 Wrist-Dig II 16 41  > 39    Right Median (Stim:Wrist)   Dig II *4.7  < 4.0 *8  > 8 Wrist-Dig II 14 *30  > 39    Left Ulnar (Stim:Wrist)   Dig V 3.0  < 4.0 26  > 4 Wrist-Dig V 14 47  > 38    Right Ulnar (Stim:Wrist)   Dig V 2.8  < 4.0 24  > 4 Wrist-Dig V 14 50  > 38      EMG+     Side Muscle Nerve Root Ins Act Fibs Psw Amp Dur Poly Recrt Int Pat Comment   Right APB Median C8-T1 *Incr *2+ *2+ *Incr *>12ms 0 *Reduced Nml    Left Deltoid Axillary C5-C6 Nml Nml Nml Nml Nml 0 Nml Nml    Left Biceps Musculocut C5-C6 Nml Nml Nml Nml Nml 0 Nml Nml    Left FCR Median C6-C7 Nml Nml Nml Nml Nml 0 Nml Nml    Left Pronator Teres Median C6-C7 Nml Nml Nml Nml Nml 0 Nml Nml    Left Triceps Radial C6-C8 Nml Nml Nml Nml Nml 0 Nml Nml    Left Cervical Parasp (Lower) Rami C7-C8 Nml Nml Nml         Left FDI Ulnar C8-T1 Nml Nml Nml Nml Nml 0 Nml Nml            Waveforms:    Motor              Sensory

## 2022-11-01 ENCOUNTER — OFFICE VISIT (OUTPATIENT)
Dept: ORTHOPEDICS | Facility: CLINIC | Age: 82
End: 2022-11-01
Payer: MEDICARE

## 2022-11-01 VITALS
HEIGHT: 62 IN | SYSTOLIC BLOOD PRESSURE: 127 MMHG | WEIGHT: 185 LBS | DIASTOLIC BLOOD PRESSURE: 73 MMHG | BODY MASS INDEX: 34.04 KG/M2 | HEART RATE: 65 BPM

## 2022-11-01 DIAGNOSIS — M65.30 TRIGGER FINGER OF RIGHT HAND, UNSPECIFIED FINGER: Primary | ICD-10-CM

## 2022-11-01 PROCEDURE — 3074F SYST BP LT 130 MM HG: CPT | Mod: CPTII,S$GLB,, | Performed by: ORTHOPAEDIC SURGERY

## 2022-11-01 PROCEDURE — 1159F MED LIST DOCD IN RCRD: CPT | Mod: CPTII,S$GLB,, | Performed by: ORTHOPAEDIC SURGERY

## 2022-11-01 PROCEDURE — 99999 PR PBB SHADOW E&M-EST. PATIENT-LVL III: CPT | Mod: PBBFAC,,, | Performed by: ORTHOPAEDIC SURGERY

## 2022-11-01 PROCEDURE — 3078F DIAST BP <80 MM HG: CPT | Mod: CPTII,S$GLB,, | Performed by: ORTHOPAEDIC SURGERY

## 2022-11-01 PROCEDURE — 3074F PR MOST RECENT SYSTOLIC BLOOD PRESSURE < 130 MM HG: ICD-10-PCS | Mod: CPTII,S$GLB,, | Performed by: ORTHOPAEDIC SURGERY

## 2022-11-01 PROCEDURE — 1157F PR ADVANCE CARE PLAN OR EQUIV PRESENT IN MEDICAL RECORD: ICD-10-PCS | Mod: CPTII,S$GLB,, | Performed by: ORTHOPAEDIC SURGERY

## 2022-11-01 PROCEDURE — 20550 NJX 1 TENDON SHEATH/LIGAMENT: CPT | Mod: LT,S$GLB,, | Performed by: ORTHOPAEDIC SURGERY

## 2022-11-01 PROCEDURE — 99214 PR OFFICE/OUTPT VISIT, EST, LEVL IV, 30-39 MIN: ICD-10-PCS | Mod: 25,S$GLB,, | Performed by: ORTHOPAEDIC SURGERY

## 2022-11-01 PROCEDURE — 99214 OFFICE O/P EST MOD 30 MIN: CPT | Mod: 25,S$GLB,, | Performed by: ORTHOPAEDIC SURGERY

## 2022-11-01 PROCEDURE — 1159F PR MEDICATION LIST DOCUMENTED IN MEDICAL RECORD: ICD-10-PCS | Mod: CPTII,S$GLB,, | Performed by: ORTHOPAEDIC SURGERY

## 2022-11-01 PROCEDURE — 3078F PR MOST RECENT DIASTOLIC BLOOD PRESSURE < 80 MM HG: ICD-10-PCS | Mod: CPTII,S$GLB,, | Performed by: ORTHOPAEDIC SURGERY

## 2022-11-01 PROCEDURE — 1157F ADVNC CARE PLAN IN RCRD: CPT | Mod: CPTII,S$GLB,, | Performed by: ORTHOPAEDIC SURGERY

## 2022-11-01 PROCEDURE — 99999 PR PBB SHADOW E&M-EST. PATIENT-LVL III: ICD-10-PCS | Mod: PBBFAC,,, | Performed by: ORTHOPAEDIC SURGERY

## 2022-11-01 PROCEDURE — 20550 TENDON SHEATH: ICD-10-PCS | Mod: LT,S$GLB,, | Performed by: ORTHOPAEDIC SURGERY

## 2022-11-01 RX ADMIN — DEXAMETHASONE SODIUM PHOSPHATE 4 MG: 4 INJECTION, SOLUTION INTRA-ARTICULAR; INTRALESIONAL; INTRAMUSCULAR; INTRAVENOUS; SOFT TISSUE at 11:11

## 2022-11-01 NOTE — PROGRESS NOTES
[facility]  History & Physical  Orthopedics    SUBJECTIVE:     Chief Complaint: History of left carpal tunnel release    Referring Provider: No ref. provider found     History of Present Illness:  Patient is a 81 y.o. right hand dominant female who presents today s/p L carpal tunnel release with Dr. Desai on 6/5/2020. She is here today to discuss her ongoing symptoms. She reports limited relief from her carpal tunnel release including constant numbness, pain, and some weakness in the left hand. She also endorses relatively new left ring finger triggering that is occasionally bothersome and is contributing to her left hand pain. She has been seeing Dr. Fay for lumbar and cervical radiculopathy.      11/1/22: She presents for follow up. Reports minimal improvement in symptoms following L RF trigger finger injection in august. EMG results consistent with moderate to severe R carpal tunnel and improved L carpal tunnel since prior EMG. She had L carpal tunnel release 6/5/22. Incision is well healed. She does have active triggering of the L RF.       The patient denies any fevers, chills, N/V, D/C and presents for evaluation.    Review of patient's allergies indicates:   Allergen Reactions    Voltaren [diclofenac sodium] Other (See Comments)     Hematochezia and hospitalization for hematochezia.    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 She subsequently underwent colonoscopy 12/9 and several large diverticula in the sigmoid colon was seen with hematin throughout the colon; blood pooling also noted in the sigmoid colon, during which clip was placed f    Hematochezia     Hypothyroidism, postsurgical 7/1/2015    Mixed hyperlipidemia 9/27/2012    Multinodular goiter 7/31/2014    Paget's disease of bone 7/10/2013    SCC (squamous cell carcinoma) 12/2020    left 5th knuckle    Squamous Cell Carcinoma     in situ right neck      Past Surgical History:   Procedure Laterality Date    BREAST BIOPSY Right 2019    BREAST LUMPECTOMY Right 2019    CARPAL TUNNEL RELEASE Left 6/5/2020    Procedure: RELEASE, CARPAL TUNNEL Left;  Surgeon: Pricila Presley MD;  Location: [facility];  Service: Orthopedics;  Laterality: Left;    CATARACT EXTRACTION W/  INTRAOCULAR LENS IMPLANT Bilateral     COLONOSCOPY N/A 4/15/2019    Procedure: COLONOSCOPY;  Surgeon: Artur Monet MD;  Location: [facility] (4TH FLR);  Service: Endoscopy;  Laterality: N/A;  within 1 month    COLONOSCOPY N/A 12/9/2019    Procedure: COLONOSCOPY;  Surgeon: Clyde Welch MD;  Location: [facility] (2ND FLR);  Service: Endoscopy;  Laterality: N/A;    ENDOSCOPIC ULTRASOUND OF UPPER GASTROINTESTINAL TRACT N/A 1/22/2020    Procedure: ULTRASOUND, UPPER GI TRACT, ENDOSCOPIC;  Surgeon: Eleazar Shaffer MD;  Location: [facility] (2ND FLR);  Service: Endoscopy;  Laterality: N/A;  EUS for 10mm SML w/negative pillow sign and negative naked fat sign which was found at the incisura.  Dr Welch    EPIDURAL STEROID INJECTION Bilateral 12/17/2021    Procedure: INJECTION, STEROID, EPIDURAL, L3-L4 Bilateral DIRECT REF Transforaminal;  Surgeon: Julian Fish MD;  Location: [facility] PAIN  MGT;  Service: Pain Management;  Laterality: Bilateral;    ESOPHAGOGASTRODUODENOSCOPY N/A 12/7/2019    Procedure: EGD (ESOPHAGOGASTRODUODENOSCOPY);  Surgeon: Clyde Welch MD;  [address]  Service: Endoscopy;  Laterality: N/A;    EXCISIONAL BIOPSY Right 6/27/2019    Procedure: EXCISIONAL BIOPSY-breast;  Surgeon: Suki Dill MD;  [address];  Service: General;  Laterality: Right;    EYE SURGERY      HYSTERECTOMY      INJECTION FOR SENTINEL NODE IDENTIFICATION Right 7/30/2020    Procedure: INJECTION, FOR SENTINEL NODE IDENTIFICATION;  Surgeon: Suki Dill MD;  Location: [facility] OR;  Service: General;  Laterality: Right;    INJECTION OF ANESTHETIC AGENT AROUND MEDIAL BRANCH NERVES INNERVATING LUMBAR FACET JOINT Bilateral 6/7/2019    Procedure: BLOCK, NERVE, FACET JOINT, LUMBAR, MEDIAL BRANCH-edo MBB L4/5,L5/S1;  Surgeon: Jarvis Morales III, MD;  Location: [facility] CATH LAB;  Service: Pain Management;  Laterality: Bilateral;    INJECTION OF STEROID Right 6/5/2020    Procedure: INJECTION, STEROID right carpal tunel injection/ Right ring trigger finger injection;  Surgeon: Pricila Presley MD;  Location: [facility] OR;  Service: Orthopedics;  Laterality: Right;  INJECTION, STEROID right carpal tunel injection/ Right ring trigger finger injection    KNEE ARTHROSCOPY N/A     pt unsure of which knee     MASTECTOMY      OOPHORECTOMY      SENTINEL LYMPH NODE BIOPSY Right 7/30/2020    Procedure: BIOPSY, LYMPH NODE, SENTINEL;  Surgeon: Suki Dill MD;  Location: [facility] OR;  Service: General;  Laterality: Right;    SPINE SURGERY      TOTAL THYROIDECTOMY      TRANSFORAMINAL EPIDURAL INJECTION OF STEROID Bilateral 5/27/2022    Procedure: INJECTION, STEROID, EPIDURAL, TF BILATERAL L3-L4 CONTRAST DIRECT REF;  Surgeon: Julian Fish MD;  Location: [facility] PAIN MGT;  Service: Pain Management;  Laterality: Bilateral;    TRANSFORAMINAL EPIDURAL INJECTION OF STEROID Bilateral 10/21/2022     Procedure: LUMBAR TRANSFORAMINAL BILATERAL L3/4 CONTRAST DIRECT REFERRAL;  Surgeon: Julian Fish MD;  Location: [facility] PAIN MGT;  Service: Pain Management;  Laterality: Bilateral;    UNILATERAL MASTECTOMY Right 2020    Procedure: MASTECTOMY, UNILATERAL;  Surgeon: Suki Dill MD;  Location: [facility] OR;  Service: General;  Laterality: Right;    YAG Laser Capsulotomy  Left 2019 OD//Dr. Hahn     Family History   Problem Relation Age of Onset    Cancer Father         lung    Dementia Mother     Glaucoma Brother     Macular degeneration Brother     Diabetes Neg Hx     Amblyopia Neg Hx     Blindness Neg Hx     Cataracts Neg Hx     Retinal detachment Neg Hx     Strabismus Neg Hx     Melanoma Neg Hx      Social History     Tobacco Use    Smoking status: Former     Packs/day: 2.00     Years: 44.00     Pack years: 88.00     Types: Cigarettes     Quit date: 1969     Years since quittin.8    Smokeless tobacco: Never   Substance Use Topics    Alcohol use: Not Currently    Drug use: No        Review of Systems:  Constitutional: Denies fever/chills  Neurological: Denies numbness/tingling (any exceptions noted in orthopaedic exam)   Psychiatric/Behavioral: Denies change in normal mood  Eyes: Denies change in vision  Cardiovascular: Denies chest pain  Respiratory: Denies shortness of breath  Hematologic/Lymphatic: Denies easy bleeding/bruising   Skin: Denies new rash or skin lesions   Gastrointestinal: Denies nausea/vomitting/diarrhea, change in bowel habits, abdominal pain   Allergic/Immunologic: Denies adverse reactions to current medications  Musculoskeletal: see HPI      OBJECTIVE:     Vital Signs (Most Recent)  Pulse: 65 (22 1109)  BP: 127/73 (22 1109)    Physical Exam:  Gen:  No acute distress  CV:  Peripherally well-perfused.  Pulses 2+ bilaterally.  Lungs:  Normal respiratory effort.  Abdomen:  Soft, non-tender, non-distended  Head/Neck:  Normocephalic.  Atraumatic. No  TTP, AROM and PROM intact without pain  Neuro:  CN intact without deficit, SILT throughout B/L Upper & Lower Extremities    Bilateral Hand/Wrist Examination:    Observation/Inspection:  Swelling  none    Deformity  none  Discoloration  none     Scars   none    Atrophy  none    HAND/WRIST EXAMINATION:  Finkelstein's Test   Neg  WHAT Test    Neg  Snuff box tenderness   Neg  Kirby's Test    Neg  Hook of Hamate Tenderness  Neg  CMC grind    Neg  Circumduction test   Neg    Neurovascular Exam:  Digits WWP, brisk CR < 3s throughout  NVI motor/LTS to M/R/U nerves, radial pulse 2+  Tinel's Test - Carpal Tunnel  Pos right, neg left  Tinel's Test - Cubital Tunnel  Neg  Phalen's Test    Neg  Median Nerve Compression Test Neg    ROM hand full, painless; slight triggering of left ring finger at A1 pulley   Atrophy of the R thenar eminence     ROM wrist full, painless    ROM elbow full, painless      Diagnostic Results:     Imaging - I independently viewed the patient's imaging as well as the radiology report.  Xrays of the patient's left hand demonstrate no evidence of any acute fractures or dislocations with mild degenerative changes.    EMG - With R median nerve mononeuropathy moderate to severe, and interval improvement in L median nerve mononeuropathy since prior EMG     ASSESSMENT/PLAN:     There are no diagnoses linked to this encounter.       81 y.o. yo female with history of left carpal tunnel release and persistent numbness in bilateral hands and L RF triggering with no improvement following CSI in august. EMG with mod/severe R carpal tunnel and exam with atrophy of the right thenar eminence       Plan:  Consented for R carpal tunnel release   Injection of L RF TF today       Sergey Grimm, PGY2

## 2022-11-03 DIAGNOSIS — G56.02 LEFT CARPAL TUNNEL SYNDROME: Primary | ICD-10-CM

## 2022-11-07 RX ORDER — DEXAMETHASONE SODIUM PHOSPHATE 4 MG/ML
4 INJECTION, SOLUTION INTRA-ARTICULAR; INTRALESIONAL; INTRAMUSCULAR; INTRAVENOUS; SOFT TISSUE
Status: DISCONTINUED | OUTPATIENT
Start: 2022-11-01 | End: 2022-11-07 | Stop reason: HOSPADM

## 2022-11-07 NOTE — PROCEDURES
Tendon Sheath    Date/Time: 11/1/2022 11:00 AM  Performed by: Pricila Presley MD  Authorized by: Pricila Presley MD     Consent Done?:  Yes (Verbal)  Indications:  Pain  Timeout: prior to procedure the correct patient, procedure, and site was verified    Prep: patient was prepped and draped in usual sterile fashion      Local anesthesia used?: Yes    Anesthesia:  Local infiltration  Local anesthetic:  Lidocaine 1% without epinephrine  Location:  Ring finger  Site:  R ring MCP  Ultrasonic guidance for needle placement?: Yes    Needle size:  25 G  Approach:  Volar  Medications:  4 mg dexAMETHasone 4 mg/mL  This is for the left ring trigger finger not the right

## 2022-11-07 NOTE — PROGRESS NOTES
I have personally taken the history and examined this patient. I agree with the resident's note as stated above.        Plan:  Consented for R carpal tunnel release   Injection of L RF TF today   This was a very lengthy conversation discussing predictions of outcome after carpal tunnel release patient did not understand that if she had constant numbness or weakness in her hand that this most likely will not change postoperatively after carpal tunnel release carpal tunnel released just takes the transverse ligament pressure off of the nerve it is up to the nerve to regenerate if patient has severe changes in the nerve that it is only up to the nerve to regenerate and it may or may not do so after carpal tunnel release the goal after carpal tunnel release is to decrease pain and decrease pretty aggression of damage to the nerve we can hope that the nerve regenerate but that does not happen with severe compression patient understands this she also understands that the wear consenting her for carpal tunnel release on the right side in the face of severe compression and constant numbness which she have so this may or may not get better with surgery patient understands this at this point and she would like to proceed with surgery but she of note she also has a trigger finger that is locking today she would like that addressed as well

## 2022-11-15 ENCOUNTER — OFFICE VISIT (OUTPATIENT)
Dept: INTERNAL MEDICINE | Facility: CLINIC | Age: 82
End: 2022-11-15
Payer: MEDICARE

## 2022-11-15 VITALS
OXYGEN SATURATION: 96 % | BODY MASS INDEX: 34.12 KG/M2 | DIASTOLIC BLOOD PRESSURE: 75 MMHG | HEART RATE: 81 BPM | HEIGHT: 62 IN | WEIGHT: 185.44 LBS | SYSTOLIC BLOOD PRESSURE: 139 MMHG

## 2022-11-15 DIAGNOSIS — M54.17 LUMBOSACRAL RADICULOPATHY: Primary | ICD-10-CM

## 2022-11-15 DIAGNOSIS — N28.1 RENAL CYST: ICD-10-CM

## 2022-11-15 DIAGNOSIS — M48.062 SPINAL STENOSIS OF LUMBAR REGION WITH NEUROGENIC CLAUDICATION: ICD-10-CM

## 2022-11-15 PROCEDURE — 1157F ADVNC CARE PLAN IN RCRD: CPT | Mod: CPTII,GC,S$GLB,

## 2022-11-15 PROCEDURE — 1125F AMNT PAIN NOTED PAIN PRSNT: CPT | Mod: CPTII,GC,S$GLB,

## 2022-11-15 PROCEDURE — 1101F PR PT FALLS ASSESS DOC 0-1 FALLS W/OUT INJ PAST YR: ICD-10-PCS | Mod: CPTII,GC,S$GLB,

## 2022-11-15 PROCEDURE — 99999 PR PBB SHADOW E&M-EST. PATIENT-LVL IV: ICD-10-PCS | Mod: PBBFAC,GC,,

## 2022-11-15 PROCEDURE — 3075F PR MOST RECENT SYSTOLIC BLOOD PRESS GE 130-139MM HG: ICD-10-PCS | Mod: CPTII,GC,S$GLB,

## 2022-11-15 PROCEDURE — 99213 PR OFFICE/OUTPT VISIT, EST, LEVL III, 20-29 MIN: ICD-10-PCS | Mod: GC,S$GLB,,

## 2022-11-15 PROCEDURE — 3288F FALL RISK ASSESSMENT DOCD: CPT | Mod: CPTII,GC,S$GLB,

## 2022-11-15 PROCEDURE — 1125F PR PAIN SEVERITY QUANTIFIED, PAIN PRESENT: ICD-10-PCS | Mod: CPTII,GC,S$GLB,

## 2022-11-15 PROCEDURE — 3078F DIAST BP <80 MM HG: CPT | Mod: CPTII,GC,S$GLB,

## 2022-11-15 PROCEDURE — 1101F PT FALLS ASSESS-DOCD LE1/YR: CPT | Mod: CPTII,GC,S$GLB,

## 2022-11-15 PROCEDURE — 99213 OFFICE O/P EST LOW 20 MIN: CPT | Mod: GC,S$GLB,,

## 2022-11-15 PROCEDURE — 1157F PR ADVANCE CARE PLAN OR EQUIV PRESENT IN MEDICAL RECORD: ICD-10-PCS | Mod: CPTII,GC,S$GLB,

## 2022-11-15 PROCEDURE — 99999 PR PBB SHADOW E&M-EST. PATIENT-LVL IV: CPT | Mod: PBBFAC,GC,,

## 2022-11-15 PROCEDURE — 3078F PR MOST RECENT DIASTOLIC BLOOD PRESSURE < 80 MM HG: ICD-10-PCS | Mod: CPTII,GC,S$GLB,

## 2022-11-15 PROCEDURE — 3075F SYST BP GE 130 - 139MM HG: CPT | Mod: CPTII,GC,S$GLB,

## 2022-11-15 PROCEDURE — 3288F PR FALLS RISK ASSESSMENT DOCUMENTED: ICD-10-PCS | Mod: CPTII,GC,S$GLB,

## 2022-11-15 RX ORDER — METHOCARBAMOL 500 MG/1
500 TABLET, FILM COATED ORAL 3 TIMES DAILY PRN
Qty: 30 TABLET | Refills: 0 | Status: SHIPPED | OUTPATIENT
Start: 2022-11-15 | End: 2022-11-29

## 2022-11-15 RX ORDER — GABAPENTIN 100 MG/1
100 CAPSULE ORAL 3 TIMES DAILY
Qty: 60 CAPSULE | Refills: 1 | Status: SHIPPED | OUTPATIENT
Start: 2022-11-15 | End: 2023-01-04 | Stop reason: SDUPTHER

## 2022-11-15 NOTE — PROGRESS NOTES
Subjective     Chief Complaint: Lower back pain, R hip pain    History of Present Illness:  Ms. Jo-Ann Escobedo is a 81 y.o. female PMHx lumbosacral radiculopathy followed at pain clinic presenting for follow up and additional management for lower back pain with R hip pain. Patient also with questions whether pain may be related to her renal cysts found incidentally on US and MRI.    #R hip pain  #Lumbosacral radiculopathy  Patient with existing multilevel lumbar spondylosis with variable neural foraminal narrowing as detailed on MRI from 10/09/2022. Patient follows closely with pain clinic (Dr Paco Fay MD) where she receives epidural injections, most recently done 10/21/2022. Also with pre-existing L labral tear, non operative. Patient states there is usually moderate to good relief with epidural injections, but this most recent one did not have the desired effect. Patient able to ambulate with mild assistance of cane, however patient also can ambulate without. Reports paraspinal pain of lower back, R groin pain, R hip pain. Denies urinary / fecal incontinence, changes in sensation in lower extremity and perineum.     -- Robaxin 500 TID PRN   -- Gabapentin 100 TID PRN  -- Graduated up-titration of doses and frequency for medications for side effect management, start with QD etc.  -- Topical Voltaren, Gabapentin/Lidocaine agents PRN  -- Referral to Pain Clinic    #Bilateral Renal Cysts  Incidental renal cysts found on MRI and US. No  symptoms at this time.    -- Per patient request, nephrology referral placed.    Review of Systems   Constitutional:  Negative for chills and fever.   HENT:  Negative for sinus pain and sore throat.    Respiratory:  Negative for cough and shortness of breath.    Cardiovascular:  Negative for chest pain, palpitations and leg swelling.   Gastrointestinal:  Negative for abdominal pain, blood in stool, constipation, diarrhea, nausea and vomiting.   Genitourinary:  Negative for  dysuria and hematuria.   Musculoskeletal:  Positive for back pain and joint pain (R hip). Negative for myalgias.   Neurological:  Negative for weakness and headaches.     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 She subsequently underwent colonoscopy 12/9 and several large diverticula in the sigmoid colon was seen with hematin throughout the colon; blood pooling also noted in the sigmoid colon, during which clip was placed f    Hematochezia     Hypothyroidism, postsurgical 7/1/2015    Mixed hyperlipidemia 9/27/2012    Multinodular goiter 7/31/2014    Paget's disease of bone 7/10/2013    SCC (squamous cell carcinoma) 12/2020    left 5th knuckle    Squamous Cell Carcinoma     in situ right neck        Past Surgical History:   Procedure Laterality Date    BREAST BIOPSY Right 2019    BREAST LUMPECTOMY Right 2019    CARPAL TUNNEL RELEASE Left 6/5/2020    Procedure: RELEASE, CARPAL TUNNEL Left;  Surgeon: Pricila Presley MD;  Location: [facility];  Service: Orthopedics;  Laterality: Left;    CATARACT EXTRACTION W/  INTRAOCULAR LENS IMPLANT Bilateral     COLONOSCOPY N/A 4/15/2019    Procedure: COLONOSCOPY;  Surgeon: Artur Monet MD;  Location: [facility] ([address] [place] [address]);  Service: Endoscopy;  Laterality:  N/A;  within 1 month    COLONOSCOPY N/A 12/9/2019    Procedure: COLONOSCOPY;  Surgeon: Clyde Welch MD;  Location: [facility] (2ND FLR);  Service: Endoscopy;  Laterality: N/A;    ENDOSCOPIC ULTRASOUND OF UPPER GASTROINTESTINAL TRACT N/A 1/22/2020    Procedure: ULTRASOUND, UPPER GI TRACT, ENDOSCOPIC;  Surgeon: Eleazar Shaffer MD;  Location: [facility] ENDO (2ND FLR);  Service: Endoscopy;  Laterality: N/A;  EUS for 10mm SML w/negative pillow sign and negative naked fat sign which was found at the incisura.  Dr Welch    EPIDURAL STEROID INJECTION Bilateral 12/17/2021    Procedure: INJECTION, STEROID, EPIDURAL, L3-L4 Bilateral DIRECT REF Transforaminal;  Surgeon: Julian Fish MD;  Location: [facility] PAIN MGT;  Service: Pain Management;  Laterality: Bilateral;    ESOPHAGOGASTRODUODENOSCOPY N/A 12/7/2019    Procedure: EGD (ESOPHAGOGASTRODUODENOSCOPY);  Surgeon: Clyde Welch MD;  Location: [facility] (2ND FLR);  Service: Endoscopy;  Laterality: N/A;    EXCISIONAL BIOPSY Right 6/27/2019    Procedure: EXCISIONAL BIOPSY-breast;  Surgeon: Suki Dill MD;  Location: [address];  Service: General;  Laterality: Right;    EYE SURGERY      HYSTERECTOMY      INJECTION FOR SENTINEL NODE IDENTIFICATION Right 7/30/2020    Procedure: INJECTION, FOR SENTINEL NODE IDENTIFICATION;  Surgeon: Suki Dill MD;  Location: [facility] OR;  Service: General;  Laterality: Right;    INJECTION OF ANESTHETIC AGENT AROUND MEDIAL BRANCH NERVES INNERVATING LUMBAR FACET JOINT Bilateral 6/7/2019    Procedure: BLOCK, NERVE, FACET JOINT, LUMBAR, MEDIAL BRANCH-deo MBB L4/5,L5/S1;  Surgeon: Jarvis Morales III, MD;  Location: [facility] CATH LAB;  Service: Pain Management;  Laterality: Bilateral;    INJECTION OF STEROID Right 6/5/2020    Procedure: INJECTION, STEROID right carpal tunel injection/ Right ring trigger finger injection;  Surgeon: Pricila Presley MD;  Location: [facility] OR;  Service: Orthopedics;  Laterality: Right;  INJECTION, STEROID  right carpal tunel injection/ Right ring trigger finger injection    KNEE ARTHROSCOPY N/A     pt unsure of which knee     MASTECTOMY      OOPHORECTOMY      SENTINEL LYMPH NODE BIOPSY Right 2020    Procedure: BIOPSY, LYMPH NODE, SENTINEL;  Surgeon: Suki Dill MD;  Location: [facility] OR;  Service: General;  Laterality: Right;    SPINE SURGERY      TOTAL THYROIDECTOMY      TRANSFORAMINAL EPIDURAL INJECTION OF STEROID Bilateral 2022    Procedure: INJECTION, STEROID, EPIDURAL, TF BILATERAL L3-L4 CONTRAST DIRECT REF;  Surgeon: Julian Fish MD;  Location: [facility] PAIN MGT;  Service: Pain Management;  Laterality: Bilateral;    TRANSFORAMINAL EPIDURAL INJECTION OF STEROID Bilateral 10/21/2022    Procedure: LUMBAR TRANSFORAMINAL BILATERAL L3/4 CONTRAST DIRECT REFERRAL;  Surgeon: Julian Fish MD;  Location: [facility] PAIN MGT;  Service: Pain Management;  Laterality: Bilateral;    UNILATERAL MASTECTOMY Right 2020    Procedure: MASTECTOMY, UNILATERAL;  Surgeon: Suki Dill MD;  Location: [facility] OR;  Service: General;  Laterality: Right;    YAG Laser Capsulotomy  Left 2019 OD//Dr. Hahn       Family History   Problem Relation Age of Onset    Cancer Father         lung    Dementia Mother     Glaucoma Brother     Macular degeneration Brother     Diabetes Neg Hx     Amblyopia Neg Hx     Blindness Neg Hx     Cataracts Neg Hx     Retinal detachment Neg Hx     Strabismus Neg Hx     Melanoma Neg Hx        Social History     Socioeconomic History    Marital status:    Occupational History    Occupation: [employer]     Employer: [employer]     Employer: [employer]   Tobacco Use    Smoking status: Former     Packs/day: 2.00     Years: 44.00     Pack years: 88.00     Types: Cigarettes     Quit date: 1969     Years since quittin.9    Smokeless tobacco: Never   Substance and Sexual Activity    Alcohol use: Not Currently    Drug use: No    Sexual activity: Not Currently   Other  Topics Concern    Are you pregnant or think you may be? No    Breast-feeding No     Social Determinants of Health     Financial Resource Strain: Low Risk     Difficulty of Paying Living Expenses: Not hard at all   Food Insecurity: No Food Insecurity    Worried About Running Out of Food in the Last Year: Never true    Ran Out of Food in the Last Year: Never true   Transportation Needs: No Transportation Needs    Lack of Transportation (Medical): No    Lack of Transportation (Non-Medical): No   Physical Activity: Sufficiently Active    Days of Exercise per Week: 7 days    Minutes of Exercise per Session: 30 min   Stress: No Stress Concern Present    Feeling of Stress : Only a little   Social Connections: Moderately Isolated    Frequency of Communication with Friends and Family: More than three times a week    Frequency of Social Gatherings with Friends and Family: Three times a week    Attends Shinto Services: Never    Active Member of Clubs or Organizations: Yes    Attends Club or Organization Meetings: More than 4 times per year    Marital Status:    Housing Stability: Low Risk     Unable to Pay for Housing in the Last Year: No    Number of Places Lived in the Last Year: 1    Unstable Housing in the Last Year: No       MEDICATIONS & ALLERGIES:     Current Outpatient Medications on File Prior to Visit   Medication Sig    acetaminophen (TYLENOL) 650 MG TbSR Take 1 tablet (650 mg total) by mouth 3 (three) times daily as needed.    ascorbic acid, vitamin C, (VITAMIN C) 100 MG tablet Take 100 mg by mouth once daily.    cholecalciferol, vitamin D3, (VITAMIN D3) 50 mcg (2,000 unit) Tab Take 1 tablet by mouth once daily.    co-enzyme Q-10 30 mg capsule Take 30 mg by mouth once daily.    fenofibrate 160 MG Tab TAKE 1 TABLET (160 MG TOTAL) BY MOUTH ONCE DAILY.    LACTOBACILLUS COMBINATION NO.8 (ADULT PROBIOTIC ORAL) Take by mouth once daily.     levothyroxine (SYNTHROID) 125 MCG tablet TAKE 1 TABLET (125 MCG TOTAL)  "BY MOUTH BEFORE BREAKFAST.    losartan (COZAAR) 100 MG tablet Take 1 tablet (100 mg total) by mouth once daily.    turmeric root extract 500 mg Cap Take by mouth once daily.      No current facility-administered medications on file prior to visit.       Review of patient's allergies indicates:   Allergen Reactions    Voltaren [diclofenac sodium] Other (See Comments)     Hematochezia and hospitalization for hematochezia.    Codeine Diarrhea and Hallucinations    Niacin preparations      Redness, warming       OBJECTIVE:     Vital Signs:  Vitals:    11/15/22 1358   BP: 139/75   BP Location: Right arm   Patient Position: Sitting   BP Method: Medium (Manual)   Pulse: 81   SpO2: 96%   Weight: 84.1 kg (185 lb 6.5 oz)   Height: 5' 2" (1.575 m)       Body mass index is 33.91 kg/m².     Physical Exam  Vitals reviewed.   Constitutional:       General: She is not in acute distress.     Appearance: Normal appearance. She is obese.   HENT:      Head: Normocephalic and atraumatic.   Eyes:      General:         Right eye: No discharge.         Left eye: No discharge.      Extraocular Movements: Extraocular movements intact.      Pupils: Pupils are equal, round, and reactive to light.   Cardiovascular:      Rate and Rhythm: Normal rate and regular rhythm.      Pulses: Normal pulses.      Heart sounds: Normal heart sounds. No murmur heard.  Pulmonary:      Effort: Pulmonary effort is normal. No respiratory distress.      Breath sounds: Normal breath sounds.   Abdominal:      General: There is no distension.      Palpations: There is no mass.      Tenderness: There is no abdominal tenderness. There is no guarding or rebound.   Musculoskeletal:         General: No swelling or deformity. Normal range of motion.      Cervical back: Normal range of motion. No rigidity.      Right upper leg: Tenderness present. No bony tenderness.      Left upper leg: No bony tenderness.      Right lower leg: No edema.      Left lower leg: No edema.      " Comments: No hitches on ranging bilaterally, smooth; limited by pain  R groin pain on R hip flexion, difficulty with abduction d/t pain (localized to R lateral hip and lower back R)  Gait mildly interrupted w/o limping; using a cane intermittently   Skin:     General: Skin is warm and dry.      Coloration: Skin is not jaundiced.   Neurological:      General: No focal deficit present.      Mental Status: She is alert and oriented to person, place, and time. Mental status is at baseline.      Motor: No weakness.   Psychiatric:         Mood and Affect: Mood normal.         Behavior: Behavior normal.         Thought Content: Thought content normal.       Laboratory  No results found for this or any previous visit (from the past 24 hour(s)).    Diagnostic Results:      There are no preventive care reminders to display for this patient.      ASSESSMENT & PLAN:     Lumbosacral radiculopathy    Spinal stenosis of lumbar region with neurogenic claudication  -     OPW GABAPENTIN 5% LIDOCAINE HCL 5% TOPICAL CREAM 50G; Apply 50 g topically 3 (three) times daily as needed (Pain). Apply topically. This compounded medication will  in 30 days.  Dispense: 50 g; Refill: 1  -     gabapentin (NEURONTIN) 100 MG capsule; Take 1 capsule (100 mg total) by mouth 3 (three) times daily.  Dispense: 60 capsule; Refill: 1  -     methocarbamoL (ROBAXIN) 500 MG Tab; Take 1 tablet (500 mg total) by mouth 3 (three) times daily as needed (Pain).  Dispense: 30 tablet; Refill: 0    Renal cyst  -     Ambulatory referral/consult to Nephrology; Future; Expected date: 2022      See above for further detail.    RTC in 4-6 months    Discussed with Dr Andre Narayanan MD - staff attestation to follow      Nathan-jose Neely MD  Internal Medicine PGY-2  [facility]

## 2022-11-15 NOTE — PATIENT INSTRUCTIONS
- Please take your medications as directed below:  Methocarbamol (Robaxin) - This medication is a muscle relaxant, and can cause some drowsiness.   Gabapentin - This medication is a nerve pain medication, and can also cause some drowsiness.    Please start by taking these medications once in the evenings when you at home and settled. If you do not feel a sense of drowsiness or unsteadiness from the medications after 5-7 days of using them, then you can start taking them more often as directed on the bottle. If you feel at all unsteady on your feet or feel at risk of falling because of the medications, please STOP taking them and call the clinic. We can adjust the dose.    - Please arrange an appointment with the pain clinic at your earliest convenience to speak about further options.   - If you feel you would like a physical therapy referral, I am happy to place them as well. Reach out to the clinic if you would like.    Plan to return to clinic for your next visit in 4-6 months. You are always welcome to reach out or schedule an appointment sooner if something else comes up.    Sincerely,  Dr Neely

## 2022-11-16 DIAGNOSIS — N18.1 CKD (CHRONIC KIDNEY DISEASE) STAGE 1, GFR 90 ML/MIN OR GREATER: Primary | ICD-10-CM

## 2022-11-17 ENCOUNTER — OFFICE VISIT (OUTPATIENT)
Dept: DERMATOLOGY | Facility: CLINIC | Age: 82
End: 2022-11-17
Payer: MEDICARE

## 2022-11-17 DIAGNOSIS — L57.0 AK (ACTINIC KERATOSIS): ICD-10-CM

## 2022-11-17 DIAGNOSIS — L90.5 SCAR: ICD-10-CM

## 2022-11-17 DIAGNOSIS — D22.9 NEVUS: ICD-10-CM

## 2022-11-17 DIAGNOSIS — L81.4 LENTIGO: ICD-10-CM

## 2022-11-17 DIAGNOSIS — L82.0 INFLAMED SEBORRHEIC KERATOSIS: Primary | ICD-10-CM

## 2022-11-17 DIAGNOSIS — L82.1 SK (SEBORRHEIC KERATOSIS): ICD-10-CM

## 2022-11-17 DIAGNOSIS — Z85.828 PERSONAL HISTORY OF SKIN CANCER: ICD-10-CM

## 2022-11-17 PROCEDURE — 1160F RVW MEDS BY RX/DR IN RCRD: CPT | Mod: CPTII,S$GLB,, | Performed by: DERMATOLOGY

## 2022-11-17 PROCEDURE — 99213 PR OFFICE/OUTPT VISIT, EST, LEVL III, 20-29 MIN: ICD-10-PCS | Mod: 25,S$GLB,, | Performed by: DERMATOLOGY

## 2022-11-17 PROCEDURE — 1126F AMNT PAIN NOTED NONE PRSNT: CPT | Mod: CPTII,S$GLB,, | Performed by: DERMATOLOGY

## 2022-11-17 PROCEDURE — 99999 PR PBB SHADOW E&M-EST. PATIENT-LVL III: ICD-10-PCS | Mod: PBBFAC,,, | Performed by: DERMATOLOGY

## 2022-11-17 PROCEDURE — 17000 PR DESTRUCTION(LASER SURGERY,CRYOSURGERY,CHEMOSURGERY),PREMALIGNANT LESIONS,FIRST LESION: ICD-10-PCS | Mod: 59,S$GLB,, | Performed by: DERMATOLOGY

## 2022-11-17 PROCEDURE — 17000 DESTRUCT PREMALG LESION: CPT | Mod: 59,S$GLB,, | Performed by: DERMATOLOGY

## 2022-11-17 PROCEDURE — 1126F PR PAIN SEVERITY QUANTIFIED, NO PAIN PRESENT: ICD-10-PCS | Mod: CPTII,S$GLB,, | Performed by: DERMATOLOGY

## 2022-11-17 PROCEDURE — 1157F PR ADVANCE CARE PLAN OR EQUIV PRESENT IN MEDICAL RECORD: ICD-10-PCS | Mod: CPTII,S$GLB,, | Performed by: DERMATOLOGY

## 2022-11-17 PROCEDURE — 1159F PR MEDICATION LIST DOCUMENTED IN MEDICAL RECORD: ICD-10-PCS | Mod: CPTII,S$GLB,, | Performed by: DERMATOLOGY

## 2022-11-17 PROCEDURE — 1159F MED LIST DOCD IN RCRD: CPT | Mod: CPTII,S$GLB,, | Performed by: DERMATOLOGY

## 2022-11-17 PROCEDURE — 1101F PT FALLS ASSESS-DOCD LE1/YR: CPT | Mod: CPTII,S$GLB,, | Performed by: DERMATOLOGY

## 2022-11-17 PROCEDURE — 1157F ADVNC CARE PLAN IN RCRD: CPT | Mod: CPTII,S$GLB,, | Performed by: DERMATOLOGY

## 2022-11-17 PROCEDURE — 1160F PR REVIEW ALL MEDS BY PRESCRIBER/CLIN PHARMACIST DOCUMENTED: ICD-10-PCS | Mod: CPTII,S$GLB,, | Performed by: DERMATOLOGY

## 2022-11-17 PROCEDURE — 99999 PR PBB SHADOW E&M-EST. PATIENT-LVL III: CPT | Mod: PBBFAC,,, | Performed by: DERMATOLOGY

## 2022-11-17 PROCEDURE — 3288F PR FALLS RISK ASSESSMENT DOCUMENTED: ICD-10-PCS | Mod: CPTII,S$GLB,, | Performed by: DERMATOLOGY

## 2022-11-17 PROCEDURE — 1101F PR PT FALLS ASSESS DOC 0-1 FALLS W/OUT INJ PAST YR: ICD-10-PCS | Mod: CPTII,S$GLB,, | Performed by: DERMATOLOGY

## 2022-11-17 PROCEDURE — 3288F FALL RISK ASSESSMENT DOCD: CPT | Mod: CPTII,S$GLB,, | Performed by: DERMATOLOGY

## 2022-11-17 PROCEDURE — 17003 DESTRUCT PREMALG LES 2-14: CPT | Mod: 59,S$GLB,, | Performed by: DERMATOLOGY

## 2022-11-17 PROCEDURE — 99213 OFFICE O/P EST LOW 20 MIN: CPT | Mod: 25,S$GLB,, | Performed by: DERMATOLOGY

## 2022-11-17 PROCEDURE — 17110 PR DESTRUCTION BENIGN LESIONS UP TO 14: ICD-10-PCS | Mod: S$GLB,,, | Performed by: DERMATOLOGY

## 2022-11-17 PROCEDURE — 17003 DESTRUCTION, PREMALIGNANT LESIONS; SECOND THROUGH 14 LESIONS: ICD-10-PCS | Mod: 59,S$GLB,, | Performed by: DERMATOLOGY

## 2022-11-17 PROCEDURE — 17110 DESTRUCTION B9 LES UP TO 14: CPT | Mod: S$GLB,,, | Performed by: DERMATOLOGY

## 2022-11-17 NOTE — PROGRESS NOTES
Subjective:       Patient ID:  Jo-Ann Escobedo is a 81 y.o. female who presents for   Chief Complaint   Patient presents with    Skin Check     TBSE    Lesion     Neck     History of Present Illness: The patient presents for follow up of skin check.    The patient was last seen on: 3/09/2022 for skin check.    Other skin complaints:   Patient with new complaint of lesion(s)  Location: neck  Duration: 3-4 years  Symptoms: growing  Relieving factors/Previous treatments: none    Has lesion on right hand . Tender when she applies lotion. Present for several months and no tx.         Review of Systems   Skin:  Positive for daily sunscreen use and activity-related sunscreen use. Negative for tendency to form keloidal scars.   Hematologic/Lymphatic: Bruises/bleeds easily.      Objective:    Physical Exam   Constitutional: She appears well-developed and well-nourished. No distress.   Neurological: She is alert and oriented to person, place, and time. She is not disoriented.   Psychiatric: She has a normal mood and affect.   Skin:   Areas Examined (abnormalities noted in diagram):   Scalp / Hair Palpated and Inspected  Head / Face Inspection Performed  Neck Inspection Performed  Chest / Axilla Inspection Performed  Abdomen Inspection Performed  Genitals / Buttocks / Groin Inspection Performed  Back Inspection Performed  RUE Inspected  LUE Inspection Performed  RLE Inspected  LLE Inspection Performed  Nails and Digits Inspection Performed                         Diagram Legend     Erythematous scaling macule/papule c/w actinic keratosis       Vascular papule c/w angioma      Pigmented verrucoid papule/plaque c/w seborrheic keratosis      Yellow umbilicated papule c/w sebaceous hyperplasia      Irregularly shaped tan macule c/w lentigo     1-2 mm smooth white papules consistent with Milia      Movable subcutaneous cyst with punctum c/w epidermal inclusion cyst      Subcutaneous movable cyst c/w pilar cyst      Firm pink to  brown papule c/w dermatofibroma      Pedunculated fleshy papule(s) c/w skin tag(s)      Evenly pigmented macule c/w junctional nevus     Mildly variegated pigmented, slightly irregular-bordered macule c/w mildly atypical nevus      Flesh colored to evenly pigmented papule c/w intradermal nevus       Pink pearly papule/plaque c/w basal cell carcinoma      Erythematous hyperkeratotic cursted plaque c/w SCC      Surgical scar with no sign of skin cancer recurrence      Open and closed comedones      Inflammatory papules and pustules      Verrucoid papule consistent consistent with wart     Erythematous eczematous patches and plaques     Dystrophic onycholytic nail with subungual debris c/w onychomycosis     Umbilicated papule    Erythematous-base heme-crusted tan verrucoid plaque consistent with inflamed seborrheic keratosis     Erythematous Silvery Scaling Plaque c/w Psoriasis     See annotation      Assessment / Plan:        Inflamed seborrheic keratosis  Cryosurgery procedure note:    Verbal consent from the patient is obtained including, but not limited to, risk of hypopigmentation/hyperpigmentation, scar, recurrence of lesion. Liquid nitrogen cryosurgery is applied to 2 lesions to produce a freeze injury. The patient is aware that blisters may form and is instructed on wound care with gentle cleansing and use of vaseline ointment to keep moist until healed. The patient is supplied a handout on cryosurgery and is instructed to call if lesions do not completely resolve.    SK (seborrheic keratosis)  These are benign inherited growths without a malignant potential. Reassurance given to patient. No treatment is necessary.     AK (actinic keratosis)  Cryosurgery Procedure Note    Verbal consent from the patient is obtained including, but not limited to, risk of hypopigmentation/hyperpigmentation, scar, recurrence of lesion. The patient is aware of the precancerous quality and need for treatment of these lesions. Liquid  nitrogen cryosurgery is applied to the 2 actinic keratoses, as detailed in the physical exam, to produce a freeze injury. The patient is aware that blisters may form and is instructed on wound care with gentle cleansing and use of vaseline ointment to keep moist until healed. The patient is supplied a handout on cryosurgery and is instructed to call if lesions do not completely resolve.    Lentigo  This is a benign hyperpigmented sun induced lesion. Recommend daily sun protection/avoidance and use of at least SPF 30, broad spectrum sunscreen (OTC drug) will reduce the number of new lesions. Treatment of these benign lesions are considered cosmetic.  The nature of sun-induced photo-aging and skin cancers is discussed.  Sun avoidance, protective clothing, and the use of 30-SPF sunscreens is advised. Observe closely for skin damage/changes, and call if such occurs.    Nevus  Discussed ABCDE's of nevi.  Monitor for new mole or moles that are becoming bigger, darker, irritated, or developing irregular borders. Brochure provided. Instructed patient to observe lesion(s) for changes and follow up in clinic if changes are noted. Patient to monitor skin at home for new or changing lesions.     Personal history of skin cancer  Scar  Pt with history of non melanoma skin cancer  Total body skin examination performed today including at least 12 points as noted in physical examination. No suspicious lesions noted.Monitor for new or changing lesions as discussed such as pink scaly patches that are occasionally tender or not healing, 'pimples' that never go away, lesions that are not healing or bleeding.            Follow up in about 6 months (around 5/17/2023) for UBSE.

## 2022-11-28 ENCOUNTER — PATIENT MESSAGE (OUTPATIENT)
Dept: ADMINISTRATIVE | Facility: OTHER | Age: 82
End: 2022-11-28
Payer: MEDICARE

## 2022-11-30 ENCOUNTER — ANESTHESIA EVENT (OUTPATIENT)
Dept: SURGERY | Facility: HOSPITAL | Age: 82
End: 2022-11-30
Payer: MEDICARE

## 2022-11-30 NOTE — ANESTHESIA PREPROCEDURE EVALUATION
11/30/2022  Pre-operative evaluation for Procedure(s) (LRB):  RELEASE, CARPAL TUNNEL, right (Right)    Jo-Ann Escobedo is a 82 y.o. female     Patient Active Problem List   Diagnosis    PCO (posterior capsular opacification), right    Mixed hyperlipidemia    Sensorineural hearing loss, bilateral    Paget's disease of bone    Obesity (BMI 30.0-34.9)    Osteoarthritis of lumbar spine    Hypothyroidism, postsurgical    Lipoma of arm    Essential hypertension    Prediabetes    Facet arthritis of lumbar region    Aortic atherosclerosis    Ductal carcinoma in situ (DCIS) of right breast    Pseudophakia    Nephrolithiasis    BPPV (benign paroxysmal positional vertigo)    Diverticulitis of large intestine without perforation or abscess with bleeding    Abnormal CT of the head    Gastric nodule    History of GI bleed    Other dietary vitamin B12 deficiency anemia    Left carpal tunnel syndrome    Cervical radiculopathy at C7    Spondylosis of lumbar region without myelopathy or radiculopathy    Chronic right shoulder pain    Nontraumatic complete tear of right rotator cuff    Rotator cuff arthropathy of right shoulder    Spinal stenosis of lumbar region with neurogenic claudication    DDD (degenerative disc disease), lumbosacral    Lumbosacral radiculopathy    Personal history of skin cancer    Hip pain    Calcified granuloma of lung       Review of patient's allergies indicates:   Allergen Reactions    Voltaren [diclofenac sodium] Other (See Comments)     Hematochezia and hospitalization for hematochezia.    Codeine Diarrhea and Hallucinations    Niacin preparations      Redness, warming       No current facility-administered medications on file prior to encounter.     Current Outpatient Medications on File Prior to Encounter   Medication Sig Dispense Refill    acetaminophen  (TYLENOL) 650 MG TbSR Take 1 tablet (650 mg total) by mouth 3 (three) times daily as needed. 42 tablet 0    ascorbic acid, vitamin C, (VITAMIN C) 100 MG tablet Take 100 mg by mouth once daily.      cholecalciferol, vitamin D3, (VITAMIN D3) 50 mcg (2,000 unit) Tab Take 1 tablet by mouth once daily.      co-enzyme Q-10 30 mg capsule Take 30 mg by mouth once daily.      fenofibrate 160 MG Tab TAKE 1 TABLET (160 MG TOTAL) BY MOUTH ONCE DAILY. 90 tablet 3    LACTOBACILLUS COMBINATION NO.8 (ADULT PROBIOTIC ORAL) Take by mouth once daily.       levothyroxine (SYNTHROID) 125 MCG tablet TAKE 1 TABLET (125 MCG TOTAL) BY MOUTH BEFORE BREAKFAST. 90 tablet 3    losartan (COZAAR) 100 MG tablet Take 1 tablet (100 mg total) by mouth once daily. 90 tablet 3    is-nz-xk3-dha-epa-fish-lut-carlos (OCUVITE ADULT 50 PLUS) 250 mg (90 mg-160 mg) Cap Take by mouth.      turmeric root extract 500 mg Cap Take by mouth once daily.          Past Surgical History:   Procedure Laterality Date    BREAST BIOPSY Right 2019    BREAST LUMPECTOMY Right 2019    CARPAL TUNNEL RELEASE Left 6/5/2020    Procedure: RELEASE, CARPAL TUNNEL Left;  Surgeon: Pricila Presley MD;  Location: [facility];  Service: Orthopedics;  Laterality: Left;    CATARACT EXTRACTION W/  INTRAOCULAR LENS IMPLANT Bilateral     COLONOSCOPY N/A 4/15/2019    Procedure: COLONOSCOPY;  Surgeon: Artur Monet MD;  Location: [facility] (4TH FLR);  Service: Endoscopy;  Laterality: N/A;  within 1 month    COLONOSCOPY N/A 12/9/2019    Procedure: COLONOSCOPY;  Surgeon: Clyde Welch MD;  Location: [facility] (2ND FLR);  Service: Endoscopy;  Laterality: N/A;    ENDOSCOPIC ULTRASOUND OF UPPER GASTROINTESTINAL TRACT N/A 1/22/2020    Procedure: ULTRASOUND, UPPER GI TRACT, ENDOSCOPIC;  Surgeon: Eleazar Shaffer MD;  Location: [facility] (2ND FLR);  Service: Endoscopy;  Laterality: N/A;  EUS for 10mm SML w/negative pillow sign and negative naked fat sign which was found at the  incisura.  Dr Welch    EPIDURAL STEROID INJECTION Bilateral 12/17/2021    Procedure: INJECTION, STEROID, EPIDURAL, L3-L4 Bilateral DIRECT REF Transforaminal;  Surgeon: Julian Fish MD;  Location: [facility] PAIN MGT;  Service: Pain Management;  Laterality: Bilateral;    ESOPHAGOGASTRODUODENOSCOPY N/A 12/7/2019    Procedure: EGD (ESOPHAGOGASTRODUODENOSCOPY);  Surgeon: Clyde Welch MD;  Location: [facility] ENDO (2ND FLR);  Service: Endoscopy;  Laterality: N/A;    EXCISIONAL BIOPSY Right 6/27/2019    Procedure: EXCISIONAL BIOPSY-breast;  Surgeon: Suki Dill MD;  Location: [facility] 2ND FLR;  Service: General;  Laterality: Right;    EYE SURGERY      HYSTERECTOMY      INJECTION FOR SENTINEL NODE IDENTIFICATION Right 7/30/2020    Procedure: INJECTION, FOR SENTINEL NODE IDENTIFICATION;  Surgeon: Suki Dill MD;  Location: [facility] OR;  Service: General;  Laterality: Right;    INJECTION OF ANESTHETIC AGENT AROUND MEDIAL BRANCH NERVES INNERVATING LUMBAR FACET JOINT Bilateral 6/7/2019    Procedure: BLOCK, NERVE, FACET JOINT, LUMBAR, MEDIAL BRANCH-deo MBB L4/5,L5/S1;  Surgeon: Jarvis Morales III, MD;  Location: [facility] CATH LAB;  Service: Pain Management;  Laterality: Bilateral;    INJECTION OF STEROID Right 6/5/2020    Procedure: INJECTION, STEROID right carpal tunel injection/ Right ring trigger finger injection;  Surgeon: Pricila Presley MD;  Location: [facility] OR;  Service: Orthopedics;  Laterality: Right;  INJECTION, STEROID right carpal tunel injection/ Right ring trigger finger injection    KNEE ARTHROSCOPY N/A     pt unsure of which knee     MASTECTOMY      OOPHORECTOMY      SENTINEL LYMPH NODE BIOPSY Right 7/30/2020    Procedure: BIOPSY, LYMPH NODE, SENTINEL;  Surgeon: Suki Dill MD;  Location: [facility] OR;  Service: General;  Laterality: Right;    SPINE SURGERY      TOTAL THYROIDECTOMY      TRANSFORAMINAL EPIDURAL INJECTION OF STEROID Bilateral 5/27/2022    Procedure: INJECTION, STEROID,  EPIDURAL, TF BILATERAL L3-L4 CONTRAST DIRECT REF;  Surgeon: Julian Fish MD;  Location: [facility] PAIN MGT;  Service: Pain Management;  Laterality: Bilateral;    TRANSFORAMINAL EPIDURAL INJECTION OF STEROID Bilateral 10/21/2022    Procedure: LUMBAR TRANSFORAMINAL BILATERAL L3/4 CONTRAST DIRECT REFERRAL;  Surgeon: Julian Fish MD;  Location: [facility] PAIN MGT;  Service: Pain Management;  Laterality: Bilateral;    UNILATERAL MASTECTOMY Right 2020    Procedure: MASTECTOMY, UNILATERAL;  Surgeon: Suki Dill MD;  Location: [facility] OR;  Service: General;  Laterality: Right;    YAG Laser Capsulotomy  Left 2019 OD//Dr. Hahn       Social History     Socioeconomic History    Marital status:    Occupational History    Occupation: [employer]     Employer: [employer]     Employer: for[employer] to (do)   Tobacco Use    Smoking status: Former     Packs/day: 2.00     Years: 44.00     Pack years: 88.00     Types: Cigarettes     Quit date: 1969     Years since quittin.9    Smokeless tobacco: Never   Substance and Sexual Activity    Alcohol use: Not Currently    Drug use: No    Sexual activity: Not Currently   Other Topics Concern    Are you pregnant or think you may be? No    Breast-feeding No     Social Determinants of Health     Financial Resource Strain: Low Risk     Difficulty of Paying Living Expenses: Not hard at all   Food Insecurity: No Food Insecurity    Worried About Running Out of Food in the Last Year: Never true    Ran Out of Food in the Last Year: Never true   Transportation Needs: No Transportation Needs    Lack of Transportation (Medical): No    Lack of Transportation (Non-Medical): No   Physical Activity: Sufficiently Active    Days of Exercise per Week: 7 days    Minutes of Exercise per Session: 30 min   Stress: No Stress Concern Present    Feeling of Stress : Only a little   Social Connections: Moderately Isolated    Frequency of Communication with  Friends and Family: More than three times a week    Frequency of Social Gatherings with Friends and Family: Three times a week    Attends Congregation Services: Never    Active Member of Clubs or Organizations: Yes    Attends Club or Organization Meetings: More than 4 times per year    Marital Status:    Housing Stability: Low Risk     Unable to Pay for Housing in the Last Year: No    Number of Places Lived in the Last Year: 1    Unstable Housing in the Last Year: No         EKD Echo:  No results found. However, due to the size of the patient record, not all encounters were searched. Please check Results Review for a complete set of results.    Pre-op Assessment    I have reviewed the Patient Summary Reports.     I have reviewed the Nursing Notes.    I have reviewed the Medications.     Review of Systems  Anesthesia Hx:  No problems with previous Anesthesia Sore lip without history of difficult intubation Denies Family Hx of Anesthesia complications.   Denies Personal Hx of Anesthesia complications.   Hematology/Oncology:         -- Cancer in past history: Breast left and right axillary node dissection no lymphedema chemotherapy and surgery  Oncology Comments: Left lnd also     Cardiovascular:   Hypertension, well controlled Denies MI.    Denies Angina. hyperlipidemia    Pulmonary:  Pulmonary Normal  Denies COPD.  Denies Asthma.  Denies Shortness of breath.    Renal/:   renal calculi    Hepatic/GI:   Denies GERD. Denies Liver Disease.    Musculoskeletal:   Arthritis   Spine Disorders: cervical and lumbar    Neurological:   Denies TIA. Denies CVA. Denies Seizures.    Endocrine:   Denies Diabetes.      Chart review complete. Patient's medical history reviewed.  OK to proceed at [facility].   2022  Pre-operative evaluation for Procedure(s) (LRB):  RELEASE, CARPAL TUNNEL  Left (Left)  INJECTION, STEROID right carpal tunel injection/ Right ring trigger finger injection (Right)    Jo-Ann Escobedo is a 82 y.o. female     Patient Active Problem List   Diagnosis    PCO (posterior capsular opacification), right    Mixed hyperlipidemia    Sensorineural hearing loss, bilateral    Paget's disease of bone    Obesity (BMI 30.0-34.9)    Osteoarthritis of lumbar spine    Hypothyroidism, postsurgical    Lipoma of arm    Essential hypertension    Prediabetes    Facet arthritis of lumbar region    Aortic atherosclerosis    Ductal carcinoma in situ (DCIS) of right breast    Pseudophakia    Nephrolithiasis    BPPV (benign paroxysmal positional vertigo)    Diverticulitis of large intestine without perforation or abscess with bleeding    Abnormal CT of the head    Gastric nodule    History of GI bleed    Other dietary vitamin B12 deficiency anemia    Left carpal tunnel syndrome    Cervical radiculopathy at C7    Spondylosis of lumbar region without myelopathy or radiculopathy    Chronic right shoulder pain    Nontraumatic complete tear of right rotator cuff    Rotator cuff arthropathy of right shoulder    Spinal stenosis of lumbar region with neurogenic claudication    DDD (degenerative disc disease), lumbosacral    Lumbosacral radiculopathy    Personal history of skin cancer    Hip pain    Calcified granuloma of lung       Review of patient's allergies indicates:   Allergen Reactions    Voltaren [diclofenac sodium] Other (See Comments)     Hematochezia and hospitalization for hematochezia.    Codeine Diarrhea and Hallucinations    Niacin preparations      Redness, warming       Current Facility-Administered Medications on File Prior to Visit   Medication Dose Route Frequency Provider Last Rate Last Admin    cefazolin (ANCEF) 2 gram in dextrose 5% 50 mL IVPB (premix)  2 g Intravenous On Call Procedure Hugo Alan MD        mupirocin 2 % ointment   Nasal On  Call Procedure Hugo Alan MD   Given at 22 0694     Current Outpatient Medications on File Prior to Visit   Medication Sig Dispense Refill    acetaminophen (TYLENOL) 650 MG TbSR Take 1 tablet (650 mg total) by mouth 3 (three) times daily as needed. 42 tablet 0    ascorbic acid, vitamin C, (VITAMIN C) 100 MG tablet Take 100 mg by mouth once daily.      cholecalciferol, vitamin D3, (VITAMIN D3) 50 mcg (2,000 unit) Tab Take 1 tablet by mouth once daily.      co-enzyme Q-10 30 mg capsule Take 30 mg by mouth once daily.      fenofibrate 160 MG Tab TAKE 1 TABLET (160 MG TOTAL) BY MOUTH ONCE DAILY. 90 tablet 3    gabapentin (NEURONTIN) 100 MG capsule Take 1 capsule (100 mg total) by mouth 3 (three) times daily. 60 capsule 1    LACTOBACILLUS COMBINATION NO.8 (ADULT PROBIOTIC ORAL) Take by mouth once daily.       levothyroxine (SYNTHROID) 125 MCG tablet TAKE 1 TABLET (125 MCG TOTAL) BY MOUTH BEFORE BREAKFAST. 90 tablet 3    losartan (COZAAR) 100 MG tablet Take 1 tablet (100 mg total) by mouth once daily. 90 tablet 3    tg-wm-iz2-dha-epa-fish-lut-carlos (OCUVITE ADULT 50 PLUS) 250 mg (90 mg-160 mg) Cap Take by mouth.      OPW GABAPENTIN 5% LIDOCAINE HCL 5% TOPICAL CREAM 50G Apply 50 g topically 3 (three) times daily as needed (Pain). Apply topically. This compounded medication will  in 30 days. 50 g 1    turmeric root extract 500 mg Cap Take by mouth once daily.          Past Surgical History:   Procedure Laterality Date    BREAST BIOPSY Right 2019    BREAST LUMPECTOMY Right 2019    CARPAL TUNNEL RELEASE Left 2020    Procedure: RELEASE, CARPAL TUNNEL Left;  Surgeon: Pricila Presley MD;  Location: [facility] OR;  Service: Orthopedics;  Laterality: Left;    CATARACT EXTRACTION W/  INTRAOCULAR LENS IMPLANT Bilateral     COLONOSCOPY N/A 4/15/2019    Procedure: COLONOSCOPY;  Surgeon: Artur Monet MD;  Location: [facility] ([address] [place] [address]);  Service: Endoscopy;  Laterality: N/A;  within 1  month    COLONOSCOPY N/A 12/9/2019    Procedure: COLONOSCOPY;  Surgeon: Clyde Welch MD;  Location: [facility] (2ND FLR);  Service: Endoscopy;  Laterality: N/A;    ENDOSCOPIC ULTRASOUND OF UPPER GASTROINTESTINAL TRACT N/A 1/22/2020    Procedure: ULTRASOUND, UPPER GI TRACT, ENDOSCOPIC;  Surgeon: Eleazar Shaffer MD;  Location: [facility] ENDO (2ND FLR);  Service: Endoscopy;  Laterality: N/A;  EUS for 10mm SML w/negative pillow sign and negative naked fat sign which was found at the incisura.  Dr Welch    EPIDURAL STEROID INJECTION Bilateral 12/17/2021    Procedure: INJECTION, STEROID, EPIDURAL, L3-L4 Bilateral DIRECT REF Transforaminal;  Surgeon: Julian Fish MD;  Location: [facility] PAIN MGT;  Service: Pain Management;  Laterality: Bilateral;    ESOPHAGOGASTRODUODENOSCOPY N/A 12/7/2019    Procedure: EGD (ESOPHAGOGASTRODUODENOSCOPY);  Surgeon: Clyde Welch MD;  Location: [facility] (2ND FLR);  Service: Endoscopy;  Laterality: N/A;    EXCISIONAL BIOPSY Right 6/27/2019    Procedure: EXCISIONAL BIOPSY-breast;  Surgeon: Suki Dill MD;  Location: [address];  Service: General;  Laterality: Right;    EYE SURGERY      HYSTERECTOMY      INJECTION FOR SENTINEL NODE IDENTIFICATION Right 7/30/2020    Procedure: INJECTION, FOR SENTINEL NODE IDENTIFICATION;  Surgeon: Suki Dill MD;  Location: [facility] OR;  Service: General;  Laterality: Right;    INJECTION OF ANESTHETIC AGENT AROUND MEDIAL BRANCH NERVES INNERVATING LUMBAR FACET JOINT Bilateral 6/7/2019    Procedure: BLOCK, NERVE, FACET JOINT, LUMBAR, MEDIAL BRANCH-deo MBB L4/5,L5/S1;  Surgeon: Jarvis Morales III, MD;  Location: [facility] CATH LAB;  Service: Pain Management;  Laterality: Bilateral;    INJECTION OF STEROID Right 6/5/2020    Procedure: INJECTION, STEROID right carpal tunel injection/ Right ring trigger finger injection;  Surgeon: Pricila Presley MD;  Location: [facility] OR;  Service: Orthopedics;  Laterality: Right;  INJECTION, STEROID right  carpal tunel injection/ Right ring trigger finger injection    KNEE ARTHROSCOPY N/A     pt unsure of which knee     MASTECTOMY      OOPHORECTOMY      SENTINEL LYMPH NODE BIOPSY Right 2020    Procedure: BIOPSY, LYMPH NODE, SENTINEL;  Surgeon: Suki Dill MD;  Location: [facility] OR;  Service: General;  Laterality: Right;    SPINE SURGERY      TOTAL THYROIDECTOMY      TRANSFORAMINAL EPIDURAL INJECTION OF STEROID Bilateral 2022    Procedure: INJECTION, STEROID, EPIDURAL, TF BILATERAL L3-L4 CONTRAST DIRECT REF;  Surgeon: Julian Fish MD;  Location: [facility] PAIN MGT;  Service: Pain Management;  Laterality: Bilateral;    TRANSFORAMINAL EPIDURAL INJECTION OF STEROID Bilateral 10/21/2022    Procedure: LUMBAR TRANSFORAMINAL BILATERAL L3/4 CONTRAST DIRECT REFERRAL;  Surgeon: Julian Fish MD;  Location: [facility] PAIN MGT;  Service: Pain Management;  Laterality: Bilateral;    UNILATERAL MASTECTOMY Right 2020    Procedure: MASTECTOMY, UNILATERAL;  Surgeon: Suki Dill MD;  Location: [facility] OR;  Service: General;  Laterality: Right;    YAG Laser Capsulotomy  Left 2019 OD//Dr. Hahn       Social History     Socioeconomic History    Marital status:    Occupational History    Occupation: [employer]     Employer: [employer]     Employer: [employer]   Tobacco Use    Smoking status: Former     Packs/day: 2.00     Years: 44.00     Pack years: 88.00     Types: Cigarettes     Quit date: 1969     Years since quittin.9    Smokeless tobacco: Never   Substance and Sexual Activity    Alcohol use: Not Currently    Drug use: No    Sexual activity: Not Currently   Other Topics Concern    Are you pregnant or think you may be? No    Breast-feeding No     Social Determinants of Health     Financial Resource Strain: Low Risk     Difficulty of Paying Living Expenses: Not hard at all   Food Insecurity: No Food Insecurity    Worried About Running Out of Food in the Last  Year: Never true    Ran Out of Food in the Last Year: Never true   Transportation Needs: No Transportation Needs    Lack of Transportation (Medical): No    Lack of Transportation (Non-Medical): No   Physical Activity: Sufficiently Active    Days of Exercise per Week: 7 days    Minutes of Exercise per Session: 30 min   Stress: No Stress Concern Present    Feeling of Stress : Only a little   Social Connections: Moderately Isolated    Frequency of Communication with Friends and Family: More than three times a week    Frequency of Social Gatherings with Friends and Family: Three times a week    Attends Yazidi Services: Never    Active Member of Clubs or Organizations: Yes    Attends Club or Organization Meetings: More than 4 times per year    Marital Status:    Housing Stability: Low Risk     Unable to Pay for Housing in the Last Year: No    Number of Places Lived in the Last Year: 1    Unstable Housing in the Last Year: No       EKD Echo:  No results found. However, due to the size of the patient record, not all encounters were searched. Please check Results Review for a complete set of results.      Anesthesia Evaluation    I have reviewed the Patient Summary Reports.    I have reviewed the Nursing Notes.    I have reviewed the Medications.     Review of Systems  Anesthesia Hx:  No problems with previous Anesthesia Sore lip without history of difficult intubation Denies Family Hx of Anesthesia complications.   Denies Personal Hx of Anesthesia complications.   Hematology/Oncology:        Hematology Comments: Recent transfusions for GIB --  Cancer in past history:  Breast left and right axillary node dissection no lymphedema chemotherapy and surgery  Oncology Comments: Left lnd also     Cardiovascular:   Hypertension, well controlled Denies MI.    Denies Angina. hyperlipidemia    Pulmonary:  Pulmonary Normal  Denies COPD.  Denies Asthma.  Denies Shortness of breath.    Renal/:    renal calculi    Hepatic/GI:   Denies GERD. Denies Liver Disease.    Musculoskeletal:   Arthritis   Spine Disorders: cervical and lumbar    Neurological:   Denies TIA. Denies CVA. Denies Seizures.    Endocrine:   Denies Diabetes.        Physical Exam  General:  Well nourished    Airway/Jaw/Neck:  Airway Findings: Mouth Opening: Normal Tongue: Normal  General Airway Assessment: Adult, Average  Mallampati: II  TM Distance: Normal, at least 6 cm  Jaw/Neck Findings:  Neck ROM: Normal ROM            Mental Status:  Mental Status Findings:  Cooperative, Alert and Oriented         Anesthesia Plan  Type of Anesthesia, risks & benefits discussed:  Anesthesia Type:  general  Patient's Preference:   Intra-op Monitoring Plan: standard ASA monitors  Intra-op Monitoring Plan Comments:   Post Op Pain Control Plan: multimodal analgesia and per primary service following discharge from PACU  Post Op Pain Control Plan Comments:   Induction:   IV  Beta Blocker:  Patient is not currently on a Beta-Blocker (No further documentation required).       Informed Consent: Patient understands risks and agrees with Anesthesia plan.  Questions answered. Anesthesia consent signed with patient.  ASA Score: 3     Day of Surgery Review of History & Physical:    H&P update referred to the surgeon.         Ready For Surgery From Anesthesia Perspective.            Anesthesia Plan  Type of Anesthesia, risks & benefits discussed:    Anesthesia Type: Gen Natural Airway  Intra-op Monitoring Plan: Standard ASA Monitors  Post Op Pain Control Plan: multimodal analgesia and IV/PO Opioids PRN  Induction:  IV  Informed Consent: Informed consent signed with the Patient and all parties understand the risks and agree with anesthesia plan.  All questions answered. Patient consented to blood products? Yes  ASA Score: 2  Day of Surgery Review of History & Physical: H&P Update referred to the surgeon/provider.    Ready For Surgery From Anesthesia Perspective.      .

## 2022-12-02 ENCOUNTER — TELEPHONE (OUTPATIENT)
Dept: ORTHOPEDICS | Facility: CLINIC | Age: 82
End: 2022-12-02
Payer: MEDICARE

## 2022-12-02 RX ORDER — ACETAMINOPHEN 500 MG
1000 TABLET ORAL EVERY 8 HOURS PRN
Qty: 54 TABLET | Refills: 0 | Status: SHIPPED | OUTPATIENT
Start: 2022-12-02

## 2022-12-02 NOTE — TELEPHONE ENCOUNTER
Spoke c pt. Informed pt of 5:30 a.m. arrival time for 12/05/22 surgery at the [facility]. Reminded pt of NPO status & PO appt. Pt expressed understanding & was thankful.

## 2022-12-05 ENCOUNTER — ANESTHESIA (OUTPATIENT)
Dept: SURGERY | Facility: HOSPITAL | Age: 82
End: 2022-12-05
Payer: MEDICARE

## 2022-12-05 ENCOUNTER — HOSPITAL ENCOUNTER (OUTPATIENT)
Facility: HOSPITAL | Age: 82
Discharge: HOME | End: 2022-12-05
Attending: ORTHOPAEDIC SURGERY | Admitting: ORTHOPAEDIC SURGERY
Payer: MEDICARE

## 2022-12-05 DIAGNOSIS — G56.00 CARPAL TUNNEL SYNDROME: ICD-10-CM

## 2022-12-05 PROCEDURE — D9220A PRA ANESTHESIA: Mod: ANES,,, | Performed by: ANESTHESIOLOGY

## 2022-12-05 PROCEDURE — 36000706: Performed by: ORTHOPAEDIC SURGERY

## 2022-12-05 PROCEDURE — 25000003 PHARM REV CODE 250: Performed by: ORTHOPAEDIC SURGERY

## 2022-12-05 PROCEDURE — 71000033 HC RECOVERY, INTIAL HOUR: Performed by: ORTHOPAEDIC SURGERY

## 2022-12-05 PROCEDURE — 25000003 PHARM REV CODE 250: Performed by: NURSE ANESTHETIST, CERTIFIED REGISTERED

## 2022-12-05 PROCEDURE — 94761 N-INVAS EAR/PLS OXIMETRY MLT: CPT

## 2022-12-05 PROCEDURE — 37000009 HC ANESTHESIA EA ADD 15 MINS: Performed by: ORTHOPAEDIC SURGERY

## 2022-12-05 PROCEDURE — 71000015 HC POSTOP RECOV 1ST HR: Performed by: ORTHOPAEDIC SURGERY

## 2022-12-05 PROCEDURE — D9220A PRA ANESTHESIA: ICD-10-PCS | Mod: ANES,,, | Performed by: ANESTHESIOLOGY

## 2022-12-05 PROCEDURE — 99900035 HC TECH TIME PER 15 MIN (STAT)

## 2022-12-05 PROCEDURE — D9220A PRA ANESTHESIA: Mod: CRNA,,, | Performed by: NURSE ANESTHETIST, CERTIFIED REGISTERED

## 2022-12-05 PROCEDURE — 64721 PR REVISE MEDIAN N/CARPAL TUNNEL SURG: ICD-10-PCS | Mod: RT,,, | Performed by: ORTHOPAEDIC SURGERY

## 2022-12-05 PROCEDURE — 25000003 PHARM REV CODE 250: Performed by: STUDENT IN AN ORGANIZED HEALTH CARE EDUCATION/TRAINING PROGRAM

## 2022-12-05 PROCEDURE — 27201423 OPTIME MED/SURG SUP & DEVICES STERILE SUPPLY: Performed by: ORTHOPAEDIC SURGERY

## 2022-12-05 PROCEDURE — 63600175 PHARM REV CODE 636 W HCPCS: Performed by: NURSE ANESTHETIST, CERTIFIED REGISTERED

## 2022-12-05 PROCEDURE — 36000707: Performed by: ORTHOPAEDIC SURGERY

## 2022-12-05 PROCEDURE — 63600175 PHARM REV CODE 636 W HCPCS: Performed by: STUDENT IN AN ORGANIZED HEALTH CARE EDUCATION/TRAINING PROGRAM

## 2022-12-05 PROCEDURE — 64721 CARPAL TUNNEL SURGERY: CPT | Mod: RT,,, | Performed by: ORTHOPAEDIC SURGERY

## 2022-12-05 PROCEDURE — D9220A PRA ANESTHESIA: ICD-10-PCS | Mod: CRNA,,, | Performed by: NURSE ANESTHETIST, CERTIFIED REGISTERED

## 2022-12-05 PROCEDURE — 37000008 HC ANESTHESIA 1ST 15 MINUTES: Performed by: ORTHOPAEDIC SURGERY

## 2022-12-05 RX ORDER — ACETAMINOPHEN 500 MG
1000 TABLET ORAL
Status: COMPLETED | OUTPATIENT
Start: 2022-12-05 | End: 2022-12-05

## 2022-12-05 RX ORDER — CEFAZOLIN SODIUM 2 G/50ML
2 SOLUTION INTRAVENOUS
Status: COMPLETED | OUTPATIENT
Start: 2022-12-05 | End: 2022-12-05

## 2022-12-05 RX ORDER — ONDANSETRON 2 MG/ML
4 INJECTION INTRAMUSCULAR; INTRAVENOUS ONCE AS NEEDED
Status: DISCONTINUED | OUTPATIENT
Start: 2022-12-05 | End: 2022-12-05 | Stop reason: HOSPADM

## 2022-12-05 RX ORDER — MV-MN/OM3/DHA/EPA/FISH/LUT/ZEA 250-5-1 MG
CAPSULE ORAL
COMMUNITY

## 2022-12-05 RX ORDER — LIDOCAINE HYDROCHLORIDE 10 MG/ML
INJECTION, SOLUTION EPIDURAL; INFILTRATION; INTRACAUDAL; PERINEURAL
Status: DISCONTINUED | OUTPATIENT
Start: 2022-12-05 | End: 2022-12-05 | Stop reason: HOSPADM

## 2022-12-05 RX ORDER — SODIUM CHLORIDE 0.9 % (FLUSH) 0.9 %
3 SYRINGE (ML) INJECTION
Status: DISCONTINUED | OUTPATIENT
Start: 2022-12-05 | End: 2022-12-05 | Stop reason: HOSPADM

## 2022-12-05 RX ORDER — HYDROCODONE BITARTRATE AND ACETAMINOPHEN 5; 325 MG/1; MG/1
1 TABLET ORAL EVERY 4 HOURS PRN
Status: DISCONTINUED | OUTPATIENT
Start: 2022-12-05 | End: 2022-12-05 | Stop reason: HOSPADM

## 2022-12-05 RX ORDER — PROPOFOL 10 MG/ML
VIAL (ML) INTRAVENOUS CONTINUOUS PRN
Status: DISCONTINUED | OUTPATIENT
Start: 2022-12-05 | End: 2022-12-05

## 2022-12-05 RX ORDER — BUPIVACAINE HYDROCHLORIDE 2.5 MG/ML
INJECTION, SOLUTION EPIDURAL; INFILTRATION; INTRACAUDAL
Status: DISCONTINUED | OUTPATIENT
Start: 2022-12-05 | End: 2022-12-05 | Stop reason: HOSPADM

## 2022-12-05 RX ORDER — MUPIROCIN 20 MG/G
OINTMENT TOPICAL
Status: DISCONTINUED | OUTPATIENT
Start: 2022-12-05 | End: 2022-12-05 | Stop reason: HOSPADM

## 2022-12-05 RX ORDER — LIDOCAINE HCL/PF 100 MG/5ML
SYRINGE (ML) INTRAVENOUS
Status: DISCONTINUED | OUTPATIENT
Start: 2022-12-05 | End: 2022-12-05

## 2022-12-05 RX ORDER — BACITRACIN ZINC 500 UNIT/G
OINTMENT (GRAM) TOPICAL
Status: DISCONTINUED | OUTPATIENT
Start: 2022-12-05 | End: 2022-12-05 | Stop reason: HOSPADM

## 2022-12-05 RX ORDER — PROPOFOL 10 MG/ML
VIAL (ML) INTRAVENOUS
Status: DISCONTINUED | OUTPATIENT
Start: 2022-12-05 | End: 2022-12-05

## 2022-12-05 RX ADMIN — CEFAZOLIN SODIUM 2 G: 2 SOLUTION INTRAVENOUS at 07:12

## 2022-12-05 RX ADMIN — SODIUM CHLORIDE: 9 INJECTION, SOLUTION INTRAVENOUS at 07:12

## 2022-12-05 RX ADMIN — MUPIROCIN: 20 OINTMENT TOPICAL at 06:12

## 2022-12-05 RX ADMIN — ACETAMINOPHEN 1000 MG: 500 TABLET ORAL at 06:12

## 2022-12-05 RX ADMIN — PROPOFOL 100 MCG/KG/MIN: 10 INJECTION, EMULSION INTRAVENOUS at 07:12

## 2022-12-05 RX ADMIN — LIDOCAINE HYDROCHLORIDE 100 MG: 20 INJECTION, SOLUTION INTRAVENOUS at 07:12

## 2022-12-05 RX ADMIN — PROPOFOL 70 MG: 10 INJECTION, EMULSION INTRAVENOUS at 07:12

## 2022-12-05 NOTE — TRANSFER OF CARE
"Anesthesia Transfer of Care Note    Patient: Jo-Ann Escobedo    Procedure(s) Performed: Procedure(s) (LRB):  RELEASE, CARPAL TUNNEL, right (Right)    Patient location: PACU    Anesthesia Type: general    Transport from OR: Transported from OR on 6-10 L/min O2 by face mask with adequate spontaneous ventilation    Post pain: adequate analgesia    Post assessment: no apparent anesthetic complications    Post vital signs: stable    Level of consciousness: sedated    Nausea/Vomiting: no nausea/vomiting    Complications: none    Transfer of care protocol was followed      Last vitals:   Visit Vitals  BP (!) 152/74 (BP Location: Left arm, Patient Position: Lying)   Pulse 60   Temp 36.6 °C (97.9 °F) (Oral)   Resp 16   Ht 5' 2" (1.575 m)   Wt 83.9 kg (185 lb)   LMP  (LMP Unknown)   SpO2 96%   Breastfeeding No   BMI 33.84 kg/m²     "

## 2022-12-05 NOTE — DISCHARGE SUMMARY
[place] [facility] (Hospital)  Discharge Note  Short Stay    Procedure(s) (LRB):  RELEASE, CARPAL TUNNEL, right (Right)      OUTCOME: Patient tolerated treatment/procedure well without complication and is now ready for discharge.    DISPOSITION: Home or Self Care    FINAL DIAGNOSIS:  Left carpal tunnel syndrome    FOLLOWUP: In clinic    DISCHARGE INSTRUCTIONS:    Discharge Procedure Orders   Diet general     Call MD for:  temperature >100.4     Call MD for:  persistent nausea and vomiting     Call MD for:  severe uncontrolled pain     Call MD for:  difficulty breathing, headache or visual disturbances     Call MD for:  redness, tenderness, or signs of infection (pain, swelling, redness, odor or green/yellow discharge around incision site)     Call MD for:  hives     Call MD for:  persistent dizziness or light-headedness     Call MD for:  extreme fatigue     Leave dressing on - Keep it clean, dry, and intact until clinic visit

## 2022-12-05 NOTE — OP NOTE
OPERATIVE NOTE    DATE OF PROCEDURE:  12.5.22    PREOPERATIVE DIAGNOSIS:   Right Carpal Tunnel Syndrome    POSTOPERATIVE DIAGNOSIS:   Right Carpal Tunnel Syndrome    PROCEDURE:   Right Carpal Tunnel Release    SURGEON:   LARRY Desai MD    ASSISTANT:    LUCIA Alan MD    ANESTHESIA:   Local Mac    EBL:   1 cc    Fluids:  250 crystalloid    COMPLICATIONS:  None     IMPLANTS:   None    SPECIMENS:   None    INDICATIONS FOR PROCEDURE:  Jo-Ann Escobedo  is a 82 y.o. female with a longstanding history of symptoms consistant with carpal tunnel syndrome and elected to have this released. Risks and complications were discussed including, but not limited to risks of anesthetic complications, infections, wound healing complications and in particular, the most considerable risk of continued numbness. Surgical consents signed in clinic. Informed consent was obtained.    Procedure in Detail:  The patient was taken to the Operating Room where IV sedation was administered by the Anesthesia Department.  The right upper extremity was then sterilely prepped and draped in the normal fashion. Formal timeout was performed confirming the correct patient, site, and procedure. The skin and subcutaneous tissues were infiltrated with 1% lidocaine without epinephrine over the proposed incision site.    Under tourniquet control, a 2 cm longitudinal incision was made in the palm of the operative hand extending from the distal wrist crease into the palm of the hand along the ulnar border of the fourth ray.  Subcutaneous tissue was sharply dissected down to the palmar aponeurosis, which was incised. The transverse carpal ligament was identified and sharply incised.  The antebrachial fascia was also released.  Contents of carpal canal were unremarkable and no further stenotic areas on the nerve were noted after palpation with hemostat.    The wound was irrigated. Skin was then closed with interrupted horizontal mattress sutures 4-0 nylon. Incision site was  infiltrated with 0.25% bupivacaine. Sterile dressing was applied with adaptic, bacitracin, 4x4, Kerlix, and ACE bandage. Tourniquet was deflated. Patient was returned to the Postanesthesia Care Unit in stable condition.     Postoperative Plan:  Keep the dressing clean, dry, and intact. Patient will follow-up in two weeks time wound check and suture removal.

## 2022-12-05 NOTE — PLAN OF CARE
VSS. Patient able to tolerate oral liquids.  Patient denies c/o pain. Patient/family received home medication per bedside delivery. Dressing intact. No distress noted. Patient states she is ready for discharge. Discharge instructions reviewed with patient and family, verbalized understanding. IV discontinued with catheter tip intact. Staff at bedside to help patient dress. Patient wheeled to lobby via staff.

## 2022-12-05 NOTE — ANESTHESIA POSTPROCEDURE EVALUATION
Anesthesia Post Evaluation    Patient: Jo-Ann Escobedo    Procedure(s) Performed: Procedure(s) (LRB):  RELEASE, CARPAL TUNNEL, right (Right)    Final Anesthesia Type: general      Patient location during evaluation: PACU  Patient participation: Yes- Able to Participate  Level of consciousness: awake and alert and oriented  Post-procedure vital signs: reviewed and stable  Pain management: adequate  Airway patency: patent    PONV status at discharge: No PONV  Anesthetic complications: no      Cardiovascular status: blood pressure returned to baseline and hemodynamically stable  Respiratory status: unassisted, room air and spontaneous ventilation  Hydration status: euvolemic  Follow-up not needed.          Vitals Value Taken Time   /59 12/05/22 0905   Temp 36.6 °C (97.8 °F) 12/05/22 0900   Pulse 60 12/05/22 0910   Resp 29 12/05/22 0910   SpO2 97 % 12/05/22 0910   Vitals shown include unvalidated device data.      Event Time   Out of Recovery 09:00:00         Pain/Parris Score: Pain Rating Prior to Med Admin: 0 (12/5/2022  6:47 AM)  Parris Score: 10 (12/5/2022  9:06 AM)

## 2022-12-05 NOTE — H&P
Chief Complaint: History of left carpal tunnel release     Referring Provider: No ref. provider found      History of Present Illness:  Patient is a 81 y.o. right hand dominant female who presents today s/p L carpal tunnel release with Dr. Desai on 6/5/2020. She is here today to discuss her ongoing symptoms. She reports limited relief from her carpal tunnel release including constant numbness, pain, and some weakness in the left hand. She also endorses relatively new left ring finger triggering that is occasionally bothersome and is contributing to her left hand pain. She has been seeing Dr. Fay for lumbar and cervical radiculopathy.       11/1/22: She presents for follow up. Reports minimal improvement in symptoms following L RF trigger finger injection in august. EMG results consistent with moderate to severe R carpal tunnel and improved L carpal tunnel since prior EMG. She had L carpal tunnel release 6/5/22. Incision is well healed. She does have active triggering of the L RF.         The patient denies any fevers, chills, N/V, D/C and presents for evaluation.           Review of patient's allergies indicates:   Allergen Reactions    Voltaren [diclofenac sodium] Other (See Comments)       Hematochezia and hospitalization for hematochezia.    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 She subsequently underwent colonoscopy 12/9 and several large diverticula in the sigmoid colon was seen with hematin throughout the colon; blood pooling also noted in the sigmoid colon, during which clip was placed f    Hematochezia      Hypothyroidism, postsurgical 7/1/2015    Mixed hyperlipidemia 9/27/2012    Multinodular goiter 7/31/2014    Paget's disease of bone 7/10/2013    SCC (squamous cell carcinoma) 12/2020     left 5th knuckle    Squamous Cell Carcinoma       in situ right neck             Past Surgical History:   Procedure Laterality Date    BREAST BIOPSY Right 2019    BREAST LUMPECTOMY Right 2019    CARPAL TUNNEL RELEASE Left 6/5/2020     Procedure: RELEASE, CARPAL TUNNEL Left;  Surgeon: Pricila Presley MD;  Location: [facility] OR;  Service: Orthopedics;  Laterality: Left;    CATARACT EXTRACTION W/  INTRAOCULAR LENS IMPLANT Bilateral      COLONOSCOPY N/A 4/15/2019     Procedure: COLONOSCOPY;  Surgeon: Artur Monet MD;  Location: [facility] (4TH FLR);  Service: Endoscopy;  Laterality: N/A;  within 1 month    COLONOSCOPY N/A 12/9/2019     Procedure: COLONOSCOPY;  Surgeon: Clyde Welch MD;  Location: [facility] (2ND FLR);  Service: Endoscopy;  Laterality: N/A;    ENDOSCOPIC ULTRASOUND OF UPPER GASTROINTESTINAL TRACT N/A 1/22/2020     Procedure: ULTRASOUND, UPPER GI TRACT, ENDOSCOPIC;  Surgeon: Eleazar Shaffer MD;  Location: [facility] (2ND FLR);  Service: Endoscopy;  Laterality: N/A;  EUS for 10mm SML w/negative pillow sign and negative naked fat sign which was found at the incisura.  Dr Welch    EPIDURAL STEROID INJECTION Bilateral 12/17/2021     Procedure: INJECTION, STEROID, EPIDURAL, L3-L4 Bilateral DIRECT REF Transforaminal;  Surgeon: Julian Fish MD;  Location: [facility] PAIN MGT;  Service: Pain Management;   Laterality: Bilateral;    ESOPHAGOGASTRODUODENOSCOPY N/A 12/7/2019     Procedure: EGD (ESOPHAGOGASTRODUODENOSCOPY);  Surgeon: Clyde Welch MD;  [address]  Service: Endoscopy;  Laterality: N/A;    EXCISIONAL BIOPSY Right 6/27/2019     Procedure: EXCISIONAL BIOPSY-breast;  Surgeon: Suki Dill MD;  Location: [facility] 2ND FLR;  Service: General;  Laterality: Right;    EYE SURGERY        HYSTERECTOMY        INJECTION FOR SENTINEL NODE IDENTIFICATION Right 7/30/2020     Procedure: INJECTION, FOR SENTINEL NODE IDENTIFICATION;  Surgeon: Suki Dill MD;  Location: [facility] OR;  Service: General;  Laterality: Right;    INJECTION OF ANESTHETIC AGENT AROUND MEDIAL BRANCH NERVES INNERVATING LUMBAR FACET JOINT Bilateral 6/7/2019     Procedure: BLOCK, NERVE, FACET JOINT, LUMBAR, MEDIAL BRANCH-deo MBB L4/5,L5/S1;  Surgeon: Jarvis Morales III, MD;  Location: [facility] CATH LAB;  Service: Pain Management;  Laterality: Bilateral;    INJECTION OF STEROID Right 6/5/2020     Procedure: INJECTION, STEROID right carpal tunel injection/ Right ring trigger finger injection;  Surgeon: Pricila Presley MD;  Location: [facility] OR;  Service: Orthopedics;  Laterality: Right;  INJECTION, STEROID right carpal tunel injection/ Right ring trigger finger injection    KNEE ARTHROSCOPY N/A       pt unsure of which knee     MASTECTOMY        OOPHORECTOMY        SENTINEL LYMPH NODE BIOPSY Right 7/30/2020     Procedure: BIOPSY, LYMPH NODE, SENTINEL;  Surgeon: Suki Dill MD;  Location: [facility] OR;  Service: General;  Laterality: Right;    SPINE SURGERY        TOTAL THYROIDECTOMY        TRANSFORAMINAL EPIDURAL INJECTION OF STEROID Bilateral 5/27/2022     Procedure: INJECTION, STEROID, EPIDURAL, TF BILATERAL L3-L4 CONTRAST DIRECT REF;  Surgeon: Julian Fish MD;  Location: [facility] PAIN MGT;  Service: Pain Management;  Laterality: Bilateral;    TRANSFORAMINAL EPIDURAL INJECTION OF STEROID Bilateral 10/21/2022     Procedure:  LUMBAR TRANSFORAMINAL BILATERAL L3/4 CONTRAST DIRECT REFERRAL;  Surgeon: Julian Fish MD;  Location: [facility] PAIN MGT;  Service: Pain Management;  Laterality: Bilateral;    UNILATERAL MASTECTOMY Right 2020     Procedure: MASTECTOMY, UNILATERAL;  Surgeon: Suki Dill MD;  Location: [facility] OR;  Service: General;  Laterality: Right;    YAG Laser Capsulotomy  Left 2019 OD//Dr. Hahn            Family History   Problem Relation Age of Onset    Cancer Father           lung    Dementia Mother      Glaucoma Brother      Macular degeneration Brother      Diabetes Neg Hx      Amblyopia Neg Hx      Blindness Neg Hx      Cataracts Neg Hx      Retinal detachment Neg Hx      Strabismus Neg Hx      Melanoma Neg Hx        Social History            Tobacco Use    Smoking status: Former       Packs/day: 2.00       Years: 44.00       Pack years: 88.00       Types: Cigarettes       Quit date: 1969       Years since quittin.8    Smokeless tobacco: Never   Substance Use Topics    Alcohol use: Not Currently    Drug use: No         Review of Systems:  Constitutional: Denies fever/chills  Neurological: Denies numbness/tingling (any exceptions noted in orthopaedic exam)   Psychiatric/Behavioral: Denies change in normal mood  Eyes: Denies change in vision  Cardiovascular: Denies chest pain  Respiratory: Denies shortness of breath  Hematologic/Lymphatic: Denies easy bleeding/bruising   Skin: Denies new rash or skin lesions   Gastrointestinal: Denies nausea/vomitting/diarrhea, change in bowel habits, abdominal pain   Allergic/Immunologic: Denies adverse reactions to current medications  Musculoskeletal: see HPI        OBJECTIVE:      Vital Signs (Most Recent)  Pulse: 65 (22 1109)  BP: 127/73 (22 1109)     Physical Exam:  Gen:  No acute distress  CV:  Peripherally well-perfused.  Pulses 2+ bilaterally.  Lungs:  Normal respiratory effort.  Abdomen:  Soft, non-tender, non-distended  Head/Neck:   Normocephalic.  Atraumatic. No TTP, AROM and PROM intact without pain  Neuro:  CN intact without deficit, SILT throughout B/L Upper & Lower Extremities     Bilateral Hand/Wrist Examination:     Observation/Inspection:  Swelling                       none                  Deformity                     none  Discoloration               none                  Scars                           none                  Atrophy                        none     HAND/WRIST EXAMINATION:  Finkelstein's Test                                Neg  WHAT Test                                         Neg  Snuff box tenderness                          Neg  Kirby's Test                                     Neg  Hook of Hamate Tenderness              Neg  CMC grind                                           Neg  Circumduction test                              Neg     Neurovascular Exam:  Digits WWP, brisk CR < 3s throughout  NVI motor/LTS to M/R/U nerves, radial pulse 2+  Tinel's Test - Carpal Tunnel                Pos right, neg left  Tinel's Test - Cubital Tunnel               Neg  Phalen's Test                                      Neg  Median Nerve Compression Test       Neg     ROM hand full, painless; slight triggering of left ring finger at A1 pulley   Atrophy of the R thenar eminence      ROM wrist full, painless    ROM elbow full, painless        Diagnostic Results:     Imaging - I independently viewed the patient's imaging as well as the radiology report.  Xrays of the patient's left hand demonstrate no evidence of any acute fractures or dislocations with mild degenerative changes.     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        81 y.o. yo female with history of left carpal tunnel release and persistent numbness in bilateral hands and L RF triggering with no  improvement following CSI in august. EMG with mod/severe R carpal tunnel and exam with atrophy of the right thenar eminence         Plan:  Consented for R carpal tunnel release 12.5.22

## 2022-12-06 VITALS
HEART RATE: 61 BPM | SYSTOLIC BLOOD PRESSURE: 154 MMHG | OXYGEN SATURATION: 98 % | RESPIRATION RATE: 21 BRPM | BODY MASS INDEX: 34.04 KG/M2 | TEMPERATURE: 98 F | HEIGHT: 62 IN | WEIGHT: 185 LBS | DIASTOLIC BLOOD PRESSURE: 59 MMHG

## 2022-12-15 ENCOUNTER — TELEPHONE (OUTPATIENT)
Dept: ORTHOPEDICS | Facility: CLINIC | Age: 82
End: 2022-12-15
Payer: MEDICARE

## 2022-12-20 ENCOUNTER — OFFICE VISIT (OUTPATIENT)
Dept: ORTHOPEDICS | Facility: CLINIC | Age: 82
End: 2022-12-20
Payer: MEDICARE

## 2022-12-20 VITALS
BODY MASS INDEX: 34.04 KG/M2 | SYSTOLIC BLOOD PRESSURE: 124 MMHG | HEIGHT: 62 IN | WEIGHT: 185 LBS | DIASTOLIC BLOOD PRESSURE: 74 MMHG | HEART RATE: 83 BPM

## 2022-12-20 DIAGNOSIS — G56.03 BILATERAL CARPAL TUNNEL SYNDROME: Primary | ICD-10-CM

## 2022-12-20 PROCEDURE — 1157F PR ADVANCE CARE PLAN OR EQUIV PRESENT IN MEDICAL RECORD: ICD-10-PCS | Mod: CPTII,S$GLB,, | Performed by: PHYSICIAN ASSISTANT

## 2022-12-20 PROCEDURE — 3078F PR MOST RECENT DIASTOLIC BLOOD PRESSURE < 80 MM HG: ICD-10-PCS | Mod: CPTII,S$GLB,, | Performed by: PHYSICIAN ASSISTANT

## 2022-12-20 PROCEDURE — 1160F RVW MEDS BY RX/DR IN RCRD: CPT | Mod: CPTII,S$GLB,, | Performed by: PHYSICIAN ASSISTANT

## 2022-12-20 PROCEDURE — 1159F PR MEDICATION LIST DOCUMENTED IN MEDICAL RECORD: ICD-10-PCS | Mod: CPTII,S$GLB,, | Performed by: PHYSICIAN ASSISTANT

## 2022-12-20 PROCEDURE — 1159F MED LIST DOCD IN RCRD: CPT | Mod: CPTII,S$GLB,, | Performed by: PHYSICIAN ASSISTANT

## 2022-12-20 PROCEDURE — 3078F DIAST BP <80 MM HG: CPT | Mod: CPTII,S$GLB,, | Performed by: PHYSICIAN ASSISTANT

## 2022-12-20 PROCEDURE — 1160F PR REVIEW ALL MEDS BY PRESCRIBER/CLIN PHARMACIST DOCUMENTED: ICD-10-PCS | Mod: CPTII,S$GLB,, | Performed by: PHYSICIAN ASSISTANT

## 2022-12-20 PROCEDURE — 1125F AMNT PAIN NOTED PAIN PRSNT: CPT | Mod: CPTII,S$GLB,, | Performed by: PHYSICIAN ASSISTANT

## 2022-12-20 PROCEDURE — 3074F PR MOST RECENT SYSTOLIC BLOOD PRESSURE < 130 MM HG: ICD-10-PCS | Mod: CPTII,S$GLB,, | Performed by: PHYSICIAN ASSISTANT

## 2022-12-20 PROCEDURE — 3288F PR FALLS RISK ASSESSMENT DOCUMENTED: ICD-10-PCS | Mod: CPTII,S$GLB,, | Performed by: PHYSICIAN ASSISTANT

## 2022-12-20 PROCEDURE — 99999 PR PBB SHADOW E&M-EST. PATIENT-LVL III: ICD-10-PCS | Mod: PBBFAC,,, | Performed by: PHYSICIAN ASSISTANT

## 2022-12-20 PROCEDURE — 99024 POSTOP FOLLOW-UP VISIT: CPT | Mod: S$GLB,,, | Performed by: PHYSICIAN ASSISTANT

## 2022-12-20 PROCEDURE — 1157F ADVNC CARE PLAN IN RCRD: CPT | Mod: CPTII,S$GLB,, | Performed by: PHYSICIAN ASSISTANT

## 2022-12-20 PROCEDURE — 1101F PR PT FALLS ASSESS DOC 0-1 FALLS W/OUT INJ PAST YR: ICD-10-PCS | Mod: CPTII,S$GLB,, | Performed by: PHYSICIAN ASSISTANT

## 2022-12-20 PROCEDURE — 3074F SYST BP LT 130 MM HG: CPT | Mod: CPTII,S$GLB,, | Performed by: PHYSICIAN ASSISTANT

## 2022-12-20 PROCEDURE — 1125F PR PAIN SEVERITY QUANTIFIED, PAIN PRESENT: ICD-10-PCS | Mod: CPTII,S$GLB,, | Performed by: PHYSICIAN ASSISTANT

## 2022-12-20 PROCEDURE — 3288F FALL RISK ASSESSMENT DOCD: CPT | Mod: CPTII,S$GLB,, | Performed by: PHYSICIAN ASSISTANT

## 2022-12-20 PROCEDURE — 99999 PR PBB SHADOW E&M-EST. PATIENT-LVL III: CPT | Mod: PBBFAC,,, | Performed by: PHYSICIAN ASSISTANT

## 2022-12-20 PROCEDURE — 1101F PT FALLS ASSESS-DOCD LE1/YR: CPT | Mod: CPTII,S$GLB,, | Performed by: PHYSICIAN ASSISTANT

## 2022-12-20 PROCEDURE — 99024 PR POST-OP FOLLOW-UP VISIT: ICD-10-PCS | Mod: S$GLB,,, | Performed by: PHYSICIAN ASSISTANT

## 2022-12-20 NOTE — PROGRESS NOTES
"Ms. Escobedo is here today for a post-operative visit.  She is 15 days status post right carpal tunnel release by Dr. Presley on 12/5/2022. She reports that she is doing well.  Pain is mild.  She is taking pain medication as needed.  She reports right thumb numbness, but otherwise sensation is improved.  She denies fever, chills, and sweats since the time of the surgery.   She is status post left carpal tunnel release in 06/2020 by Dr. Presley.    Physical exam:    Vitals:    12/20/22 1402   BP: 124/74   Pulse: 83   Weight: 83.9 kg (185 lb)   Height: 5' 2" (1.575 m)   PainSc:   4     Vital signs are stable, patient is afebrile.  Patient is well dressed and well groomed, no acute distress.  Alert and oriented to person, place, and time.  Post op dressing taken down.  Incision is clean, dry, and intact.  There is no erythema or exudate.  There is no sign of any infection. She is NVI - decreased sensation right thumb, good long and index finger sensation. Sutures removed without difficulty.      Assessment: 15 days status post right carpal tunnel release    Plan:  Jo-Ann was seen today for post-op evaluation.    Diagnoses and all orders for this visit:    Bilateral carpal tunnel syndrome        - PO instruction reviewed and provided to patient  - Gel brace provided  - Discussed scar massage  - Follow up in 4 weeks if needed  - Call with questions or concerns        "

## 2022-12-21 ENCOUNTER — LAB VISIT (OUTPATIENT)
Dept: LAB | Facility: HOSPITAL | Age: 82
End: 2022-12-21
Payer: MEDICARE

## 2022-12-21 DIAGNOSIS — N18.1 CKD (CHRONIC KIDNEY DISEASE) STAGE 1, GFR 90 ML/MIN OR GREATER: ICD-10-CM

## 2022-12-21 LAB
ALBUMIN SERPL BCP-MCNC: 3.9 G/DL (ref 3.5–5.2)
ALP SERPL-CCNC: 176 U/L (ref 55–135)
ALT SERPL W/O P-5'-P-CCNC: 18 U/L (ref 10–44)
ANION GAP SERPL CALC-SCNC: 8 MMOL/L (ref 8–16)
AST SERPL-CCNC: 26 U/L (ref 10–40)
BASOPHILS # BLD AUTO: 0.06 K/UL (ref 0–0.2)
BASOPHILS NFR BLD: 1.1 % (ref 0–1.9)
BILIRUB SERPL-MCNC: 0.8 MG/DL (ref 0.1–1)
BUN SERPL-MCNC: 25 MG/DL (ref 8–23)
CALCIUM SERPL-MCNC: 10.2 MG/DL (ref 8.7–10.5)
CHLORIDE SERPL-SCNC: 108 MMOL/L (ref 95–110)
CO2 SERPL-SCNC: 27 MMOL/L (ref 23–29)
CREAT SERPL-MCNC: 0.8 MG/DL (ref 0.5–1.4)
DIFFERENTIAL METHOD: ABNORMAL
EOSINOPHIL # BLD AUTO: 0.2 K/UL (ref 0–0.5)
EOSINOPHIL NFR BLD: 3 % (ref 0–8)
ERYTHROCYTE [DISTWIDTH] IN BLOOD BY AUTOMATED COUNT: 14.3 % (ref 11.5–14.5)
EST. GFR  (NO RACE VARIABLE): >60 ML/MIN/1.73 M^2
GLUCOSE SERPL-MCNC: 156 MG/DL (ref 70–110)
HCT VFR BLD AUTO: 42.1 % (ref 37–48.5)
HGB BLD-MCNC: 13.5 G/DL (ref 12–16)
IMM GRANULOCYTES # BLD AUTO: 0.03 K/UL (ref 0–0.04)
IMM GRANULOCYTES NFR BLD AUTO: 0.6 % (ref 0–0.5)
LYMPHOCYTES # BLD AUTO: 1.7 K/UL (ref 1–4.8)
LYMPHOCYTES NFR BLD: 31.3 % (ref 18–48)
MCH RBC QN AUTO: 31.5 PG (ref 27–31)
MCHC RBC AUTO-ENTMCNC: 32.1 G/DL (ref 32–36)
MCV RBC AUTO: 98 FL (ref 82–98)
MONOCYTES # BLD AUTO: 0.6 K/UL (ref 0.3–1)
MONOCYTES NFR BLD: 11 % (ref 4–15)
NEUTROPHILS # BLD AUTO: 2.8 K/UL (ref 1.8–7.7)
NEUTROPHILS NFR BLD: 53 % (ref 38–73)
NRBC BLD-RTO: 0 /100 WBC
PLATELET # BLD AUTO: 197 K/UL (ref 150–450)
PMV BLD AUTO: 11.2 FL (ref 9.2–12.9)
POTASSIUM SERPL-SCNC: 4.4 MMOL/L (ref 3.5–5.1)
PROT SERPL-MCNC: 7.3 G/DL (ref 6–8.4)
RBC # BLD AUTO: 4.29 M/UL (ref 4–5.4)
SODIUM SERPL-SCNC: 143 MMOL/L (ref 136–145)
WBC # BLD AUTO: 5.27 K/UL (ref 3.9–12.7)

## 2022-12-21 PROCEDURE — 36415 COLL VENOUS BLD VENIPUNCTURE: CPT | Performed by: NURSE PRACTITIONER

## 2022-12-21 PROCEDURE — 85025 COMPLETE CBC W/AUTO DIFF WBC: CPT | Performed by: NURSE PRACTITIONER

## 2022-12-21 PROCEDURE — 80053 COMPREHEN METABOLIC PANEL: CPT | Performed by: NURSE PRACTITIONER

## 2022-12-28 ENCOUNTER — OFFICE VISIT (OUTPATIENT)
Dept: NEPHROLOGY | Facility: CLINIC | Age: 82
End: 2022-12-28
Payer: MEDICARE

## 2022-12-28 VITALS — SYSTOLIC BLOOD PRESSURE: 130 MMHG | WEIGHT: 183 LBS | BODY MASS INDEX: 33.47 KG/M2 | DIASTOLIC BLOOD PRESSURE: 72 MMHG

## 2022-12-28 DIAGNOSIS — I10 ESSENTIAL HYPERTENSION: ICD-10-CM

## 2022-12-28 DIAGNOSIS — N20.0 NEPHROLITHIASIS: ICD-10-CM

## 2022-12-28 DIAGNOSIS — N18.2 CKD (CHRONIC KIDNEY DISEASE) STAGE 2, GFR 60-89 ML/MIN: ICD-10-CM

## 2022-12-28 DIAGNOSIS — N28.1 BILATERAL RENAL CYSTS: Primary | ICD-10-CM

## 2022-12-28 PROCEDURE — 1159F PR MEDICATION LIST DOCUMENTED IN MEDICAL RECORD: ICD-10-PCS | Mod: CPTII,S$GLB,, | Performed by: NURSE PRACTITIONER

## 2022-12-28 PROCEDURE — 3288F PR FALLS RISK ASSESSMENT DOCUMENTED: ICD-10-PCS | Mod: CPTII,S$GLB,, | Performed by: NURSE PRACTITIONER

## 2022-12-28 PROCEDURE — 3078F DIAST BP <80 MM HG: CPT | Mod: CPTII,S$GLB,, | Performed by: NURSE PRACTITIONER

## 2022-12-28 PROCEDURE — 3078F PR MOST RECENT DIASTOLIC BLOOD PRESSURE < 80 MM HG: ICD-10-PCS | Mod: CPTII,S$GLB,, | Performed by: NURSE PRACTITIONER

## 2022-12-28 PROCEDURE — 1101F PT FALLS ASSESS-DOCD LE1/YR: CPT | Mod: CPTII,S$GLB,, | Performed by: NURSE PRACTITIONER

## 2022-12-28 PROCEDURE — 99204 OFFICE O/P NEW MOD 45 MIN: CPT | Mod: S$GLB,,, | Performed by: NURSE PRACTITIONER

## 2022-12-28 PROCEDURE — 3075F SYST BP GE 130 - 139MM HG: CPT | Mod: CPTII,S$GLB,, | Performed by: NURSE PRACTITIONER

## 2022-12-28 PROCEDURE — 3075F PR MOST RECENT SYSTOLIC BLOOD PRESS GE 130-139MM HG: ICD-10-PCS | Mod: CPTII,S$GLB,, | Performed by: NURSE PRACTITIONER

## 2022-12-28 PROCEDURE — 99999 PR PBB SHADOW E&M-EST. PATIENT-LVL III: CPT | Mod: PBBFAC,,, | Performed by: NURSE PRACTITIONER

## 2022-12-28 PROCEDURE — 1101F PR PT FALLS ASSESS DOC 0-1 FALLS W/OUT INJ PAST YR: ICD-10-PCS | Mod: CPTII,S$GLB,, | Performed by: NURSE PRACTITIONER

## 2022-12-28 PROCEDURE — 1159F MED LIST DOCD IN RCRD: CPT | Mod: CPTII,S$GLB,, | Performed by: NURSE PRACTITIONER

## 2022-12-28 PROCEDURE — 99999 PR PBB SHADOW E&M-EST. PATIENT-LVL III: ICD-10-PCS | Mod: PBBFAC,,, | Performed by: NURSE PRACTITIONER

## 2022-12-28 PROCEDURE — 1126F PR PAIN SEVERITY QUANTIFIED, NO PAIN PRESENT: ICD-10-PCS | Mod: CPTII,S$GLB,, | Performed by: NURSE PRACTITIONER

## 2022-12-28 PROCEDURE — 1126F AMNT PAIN NOTED NONE PRSNT: CPT | Mod: CPTII,S$GLB,, | Performed by: NURSE PRACTITIONER

## 2022-12-28 PROCEDURE — 1157F ADVNC CARE PLAN IN RCRD: CPT | Mod: CPTII,S$GLB,, | Performed by: NURSE PRACTITIONER

## 2022-12-28 PROCEDURE — 99204 PR OFFICE/OUTPT VISIT, NEW, LEVL IV, 45-59 MIN: ICD-10-PCS | Mod: S$GLB,,, | Performed by: NURSE PRACTITIONER

## 2022-12-28 PROCEDURE — 1157F PR ADVANCE CARE PLAN OR EQUIV PRESENT IN MEDICAL RECORD: ICD-10-PCS | Mod: CPTII,S$GLB,, | Performed by: NURSE PRACTITIONER

## 2022-12-28 PROCEDURE — 3288F FALL RISK ASSESSMENT DOCD: CPT | Mod: CPTII,S$GLB,, | Performed by: NURSE PRACTITIONER

## 2022-12-28 NOTE — PROGRESS NOTES
Subjective:       Patient ID: Jo-Ann Escobedo is a 82 y.o.  female who presents for new evaluation of renal cysts.      HPI     Patient is new to me.  Prior pertinent chart reviewed since this is patient's first appointment with me.    Patient presents for new evaluation of renal cysts.  Baseline creatinine of 0.8. Significant other medical problems include nephrolithiasis, renal cysts, lumbar osteoarthritis, DDD, spinal stenosis of lumbar region with neurogenic claudication, preDM, HTN, hypothyroidism, HLD, skin cancer. She is being treated with epidural injections for pain per pain management with recent minimal effect. Noted renal cysts on MRI on 10/9/22. History of simple renal cysts on US in 2021 with largest on right measuring 4.1 cm. Also with nonobstructive nephrolithiasis. Denies known history of stones, passing stone. No longer taking NSAIDs, has in the past. She takes tylenol for pain. The patient denies taking NSAIDs, herbal supplements, or new antibiotics, recreational drugs, recent episode of dehydration, diarrhea, nausea or vomiting, acute illness, hospitalization or recent exposure to IV radiocontrast.     Significant family hx includes: No known    Last renal US: 3/5/21 , reviewed.      Review of Systems   Respiratory:  Negative for shortness of breath.    Cardiovascular:  Negative for chest pain and leg swelling.   Gastrointestinal:  Negative for diarrhea, nausea and vomiting.   Genitourinary:  Negative for difficulty urinating, dysuria and hematuria.   Musculoskeletal:  Positive for back pain.   All other systems reviewed and are negative.    Objective:       Blood pressure 130/72, weight 83 kg (182 lb 15.7 oz).  Physical Exam  Vitals reviewed.   Constitutional:       General: She is not in acute distress.     Appearance: Normal appearance.   HENT:      Head: Normocephalic and atraumatic.   Eyes:      General: No scleral icterus.  Cardiovascular:      Rate and Rhythm: Normal rate and regular  rhythm.      Heart sounds: No murmur heard.  Pulmonary:      Effort: Pulmonary effort is normal. No respiratory distress.      Breath sounds: No wheezing or rales.   Musculoskeletal:      Right lower leg: No edema.      Left lower leg: No edema.   Skin:     General: Skin is warm and dry.   Neurological:      Mental Status: She is alert and oriented to person, place, and time.   Psychiatric:         Mood and Affect: Mood normal.         Behavior: Behavior normal.         Lab Results   Component Value Date    CREATININE 0.8 12/21/2022     No results found for: UTPCR  Lab Results   Component Value Date     12/21/2022    K 4.4 12/21/2022    CO2 27 12/21/2022     12/21/2022     Lab Results   Component Value Date    PTH 46 10/05/2011    CALCIUM 10.2 12/21/2022    CAION 1.15 12/11/2019    PHOS 2.7 12/17/2019     Lab Results   Component Value Date    HGB 13.5 12/21/2022    WBC 5.27 12/21/2022    HCT 42.1 12/21/2022      Lab Results   Component Value Date    HGBA1C 5.7 (H) 07/18/2022     12/21/2022    BUN 25 (H) 12/21/2022     Lab Results   Component Value Date    LDLCALC 96.2 07/18/2022         Assessment:       1. Bilateral renal cysts    2. Nephrolithiasis    3. CKD (chronic kidney disease) stage 2, GFR 60-89 ml/min    4. Essential hypertension        Plan:   Renal cysts  - bilateral simple cysts per US and MRI - largest 4.1cm right  - Repeat US    Nephrolithiasis  - nonobstructive per imaging  - repeat US  - consideration of 24 hr urine supersaturation  - Recommend increased hydration >2L per day    CKD stage 2 c eGFR 73.5 mL/min -  - baseline Cr 0.8, at baseline  - CKD in setting of age-related changes, history of NSAID use    HTN   - Stable on losartan  - Follows with [facility] program      All questions patient had were answered.  Asked if further questions. None. F/u in clinic pending US results  with labs and urine prior to next visit or sooner if needed.  ER for emergency  concerns.    Summary of Plan:  - Repeat renal US

## 2022-12-29 ENCOUNTER — CLINICAL SUPPORT (OUTPATIENT)
Dept: ENDOSCOPY | Facility: HOSPITAL | Age: 82
End: 2022-12-29
Attending: COLON & RECTAL SURGERY
Payer: MEDICARE

## 2022-12-29 VITALS — BODY MASS INDEX: 33.31 KG/M2 | HEIGHT: 62 IN | WEIGHT: 181 LBS

## 2022-12-29 DIAGNOSIS — K57.90 DIVERTICULOSIS: ICD-10-CM

## 2022-12-29 DIAGNOSIS — K92.1 BLOOD IN STOOL: ICD-10-CM

## 2022-12-29 DIAGNOSIS — Z12.11 SPECIAL SCREENING FOR MALIGNANT NEOPLASMS, COLON: Primary | ICD-10-CM

## 2022-12-29 RX ORDER — SODIUM, POTASSIUM,MAG SULFATES 17.5-3.13G
SOLUTION, RECONSTITUTED, ORAL ORAL
Qty: 1 KIT | Refills: 0 | Status: ON HOLD | OUTPATIENT
Start: 2022-12-29 | End: 2023-07-05

## 2023-01-01 ENCOUNTER — PATIENT MESSAGE (OUTPATIENT)
Dept: ADMINISTRATIVE | Facility: OTHER | Age: 83
End: 2023-01-01
Payer: MEDICARE

## 2023-01-09 ENCOUNTER — HOSPITAL ENCOUNTER (OUTPATIENT)
Dept: RADIOLOGY | Facility: HOSPITAL | Age: 83
Discharge: HOME | End: 2023-01-09
Attending: NURSE PRACTITIONER
Payer: MEDICARE

## 2023-01-09 DIAGNOSIS — N28.1 BILATERAL RENAL CYSTS: ICD-10-CM

## 2023-01-09 PROCEDURE — 76770 US EXAM ABDO BACK WALL COMP: CPT | Mod: 26,,, | Performed by: STUDENT IN AN ORGANIZED HEALTH CARE EDUCATION/TRAINING PROGRAM

## 2023-01-09 PROCEDURE — 76770 US RETROPERITONEAL COMPLETE: ICD-10-PCS | Mod: 26,,, | Performed by: STUDENT IN AN ORGANIZED HEALTH CARE EDUCATION/TRAINING PROGRAM

## 2023-01-09 PROCEDURE — 76770 US EXAM ABDO BACK WALL COMP: CPT | Mod: TC

## 2023-01-10 ENCOUNTER — PATIENT MESSAGE (OUTPATIENT)
Dept: ENDOSCOPY | Facility: HOSPITAL | Age: 83
End: 2023-01-10
Payer: MEDICARE

## 2023-01-10 ENCOUNTER — TELEPHONE (OUTPATIENT)
Dept: ENDOSCOPY | Facility: HOSPITAL | Age: 83
End: 2023-01-10
Payer: MEDICARE

## 2023-01-10 DIAGNOSIS — N20.0 NEPHROLITHIASIS: Primary | ICD-10-CM

## 2023-01-10 NOTE — PLAN OF CARE
Endoscopy procedure RE-scheduled, prep instructions reviewed, Pt verbalized understanding of new time and MD on the same date of 2/13/23

## 2023-01-11 ENCOUNTER — TELEPHONE (OUTPATIENT)
Dept: NEPHROLOGY | Facility: CLINIC | Age: 83
End: 2023-01-11
Payer: MEDICARE

## 2023-01-11 DIAGNOSIS — N18.2 CKD (CHRONIC KIDNEY DISEASE) STAGE 2, GFR 60-89 ML/MIN: Primary | ICD-10-CM

## 2023-01-15 ENCOUNTER — PATIENT MESSAGE (OUTPATIENT)
Dept: ADMINISTRATIVE | Facility: OTHER | Age: 83
End: 2023-01-15
Payer: MEDICARE

## 2023-02-01 ENCOUNTER — OFFICE VISIT (OUTPATIENT)
Dept: ORTHOPEDICS | Facility: CLINIC | Age: 83
End: 2023-02-01
Payer: MEDICARE

## 2023-02-01 VITALS — WEIGHT: 182.56 LBS | BODY MASS INDEX: 33.39 KG/M2

## 2023-02-01 DIAGNOSIS — M25.559 HIP PAIN: Primary | ICD-10-CM

## 2023-02-01 PROCEDURE — 1157F ADVNC CARE PLAN IN RCRD: CPT | Mod: CPTII,S$GLB,, | Performed by: ORTHOPAEDIC SURGERY

## 2023-02-01 PROCEDURE — 99214 PR OFFICE/OUTPT VISIT, EST, LEVL IV, 30-39 MIN: ICD-10-PCS | Mod: S$GLB,,, | Performed by: ORTHOPAEDIC SURGERY

## 2023-02-01 PROCEDURE — 99999 PR PBB SHADOW E&M-EST. PATIENT-LVL III: CPT | Mod: PBBFAC,,, | Performed by: ORTHOPAEDIC SURGERY

## 2023-02-01 PROCEDURE — 3288F PR FALLS RISK ASSESSMENT DOCUMENTED: ICD-10-PCS | Mod: CPTII,S$GLB,, | Performed by: ORTHOPAEDIC SURGERY

## 2023-02-01 PROCEDURE — 1159F PR MEDICATION LIST DOCUMENTED IN MEDICAL RECORD: ICD-10-PCS | Mod: CPTII,S$GLB,, | Performed by: ORTHOPAEDIC SURGERY

## 2023-02-01 PROCEDURE — 3288F FALL RISK ASSESSMENT DOCD: CPT | Mod: CPTII,S$GLB,, | Performed by: ORTHOPAEDIC SURGERY

## 2023-02-01 PROCEDURE — 99214 OFFICE O/P EST MOD 30 MIN: CPT | Mod: S$GLB,,, | Performed by: ORTHOPAEDIC SURGERY

## 2023-02-01 PROCEDURE — 1159F MED LIST DOCD IN RCRD: CPT | Mod: CPTII,S$GLB,, | Performed by: ORTHOPAEDIC SURGERY

## 2023-02-01 PROCEDURE — 1125F AMNT PAIN NOTED PAIN PRSNT: CPT | Mod: CPTII,S$GLB,, | Performed by: ORTHOPAEDIC SURGERY

## 2023-02-01 PROCEDURE — 1101F PT FALLS ASSESS-DOCD LE1/YR: CPT | Mod: CPTII,S$GLB,, | Performed by: ORTHOPAEDIC SURGERY

## 2023-02-01 PROCEDURE — 1125F PR PAIN SEVERITY QUANTIFIED, PAIN PRESENT: ICD-10-PCS | Mod: CPTII,S$GLB,, | Performed by: ORTHOPAEDIC SURGERY

## 2023-02-01 PROCEDURE — 1101F PR PT FALLS ASSESS DOC 0-1 FALLS W/OUT INJ PAST YR: ICD-10-PCS | Mod: CPTII,S$GLB,, | Performed by: ORTHOPAEDIC SURGERY

## 2023-02-01 PROCEDURE — 1157F PR ADVANCE CARE PLAN OR EQUIV PRESENT IN MEDICAL RECORD: ICD-10-PCS | Mod: CPTII,S$GLB,, | Performed by: ORTHOPAEDIC SURGERY

## 2023-02-01 PROCEDURE — 99999 PR PBB SHADOW E&M-EST. PATIENT-LVL III: ICD-10-PCS | Mod: PBBFAC,,, | Performed by: ORTHOPAEDIC SURGERY

## 2023-02-01 NOTE — PROGRESS NOTES
DATE: 2/1/2023  PATIENT: Jo-Ann Escobedo    Attending Physician: Paco Fay M.D.    CHIEF COMPLAINT:  Low back,    HISTORY:  Jo-Ann Escobedo is a 82 y.o. female here today with history of low back left hip pain.  Patient states that pain is over the lateral aspect of the hip.  Has been present for approximately 60 days however 3 days ago the pain is just stop.  She did see Dr. Stanley for this who thought the pain might be coming from her hip.  Therefore she was referred to Sports Medicine.  MRI performed showing labral tear.  She was set up for a total hip with Dr. Salgado however they were interested in getting a 2nd opinion.  Also has a history of getting a medial branch block in 2019. Overall she feels well today.  No symptoms of myelopathy.  No bowel or bladder changes.    The Patient denies myelopathic symptoms such as handwriting changes or difficulty with buttons/coins/keys. Denies perineal paresthesias, bowel/bladder dysfunction.    Interval history 12.6.21:  Patient returns for follow-up of low back and left hip radiculopathy .  She reports she did not get the injection in her lumbar spine, this she was concerned about the possibility that would affect her in a total hip arthroplasty.  She has a known left hip labral tear.  She says that the symptoms in her low back and leg have become debilitating.  The symptoms might be related from her left leg to her right leg and then back to her left leg again.  She denies weakness.  She had previous injections over 3 years ago, she does not recall how much relief she got.  She is currently doing therapy and this has led to some improvement.    Interval update 1/26/22  Patient recently had lumbar KARLI. She reports significant improvement in radicular leg pain. Says she is completely better. She is very happy with her outcome.     Interval history 5/4/22: She returns due to recurrence of her low back pain with right side radiculopathy. She had relief of her symptoms  till roughly half way through 3/22. No she is back to her base line symptoms. Since out last visit she has noticed some increased difficulty with keys and buttons. No falls or traumas. She denies any upper extremity weakness, tingling, numbness.    5/11/22: She returns to clinic today for f/u of c spine MRI. She endorses increased difficulty with small objects. She states today is a good day.     Interval Hx 10/10/22: Had her L spine KARLI and provided no relief. Obtained repeat MRI of L spine to determine if any surgical intervention would be indicated at this time. Presents to discuss MRI results. Her pain hasn't improved and still significantly hindering her quality of life.  Reminded us she had a L-spine surgery (likely micro diskectomy at what she endorses was probably L3-4 over 30 years ago.    Interval history: 2/1/23  Returns today for continued observation of her lumbar radiculopathy stenosis.  She did not obtain any benefit from her last is here to further discuss surgical options.  She wishes to proceed with surgery at this time.  We will get her in to the preop clinic for clearance and risk stratification.  There has been no acute changes medical or surgical history since she was last seen.      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 She subsequently underwent colonoscopy 12/9 and several large diverticula in the sigmoid colon was seen with hematin throughout the colon; blood pooling also noted in the sigmoid colon, during which clip was placed f    Hematochezia     Hypothyroidism, postsurgical 7/1/2015    Mixed hyperlipidemia 9/27/2012    Multinodular goiter 7/31/2014    Paget's disease of bone 7/10/2013    SCC (squamous cell carcinoma) 12/2020    left 5th knuckle    Squamous Cell Carcinoma     in situ right neck      Past Surgical History:   Procedure Laterality Date    BREAST BIOPSY Right 2019    BREAST LUMPECTOMY Right 2019    CARPAL TUNNEL RELEASE Left 6/5/2020    Procedure: RELEASE, CARPAL TUNNEL Left;  Surgeon: Pricila Presley MD;  Location: [facility] OR;  Service: Orthopedics;  Laterality: Left;    CARPAL TUNNEL RELEASE Right 12/5/2022    Procedure: RELEASE, CARPAL TUNNEL, right;  Surgeon: Pricila Presley MD;  Location: [facility] OR;  Service: Orthopedics;  Laterality: Right;    CATARACT EXTRACTION W/  INTRAOCULAR LENS IMPLANT Bilateral     COLONOSCOPY N/A 4/15/2019    Procedure: COLONOSCOPY;  Surgeon: Artur Monet MD;  Location: [facility] (4TH FLR);  Service: Endoscopy;  Laterality: N/A;  within 1 month    COLONOSCOPY N/A 12/9/2019    Procedure: COLONOSCOPY;  Surgeon: Clyde Welch MD;  Location: [facility] (2ND FLR);  Service: Endoscopy;  Laterality: N/A;    ENDOSCOPIC ULTRASOUND OF UPPER GASTROINTESTINAL TRACT N/A 1/22/2020    Procedure: ULTRASOUND, UPPER GI TRACT, ENDOSCOPIC;  Surgeon: Eleazar Shaffer MD;  Location: [facility] ENDO (2ND FLR);  Service: Endoscopy;  Laterality: N/A;  EUS for 10mm SML w/negative pillow sign and negative naked fat sign which was found at the incisura.  Dr Welch    EPIDURAL STEROID INJECTION Bilateral 12/17/2021    Procedure: INJECTION, STEROID, EPIDURAL, L3-L4 Bilateral DIRECT REF Transforaminal;  Surgeon: Julian Fish MD;   Location: [facility] PAIN MGT;  Service: Pain Management;  Laterality: Bilateral;    ESOPHAGOGASTRODUODENOSCOPY N/A 12/7/2019    Procedure: EGD (ESOPHAGOGASTRODUODENOSCOPY);  Surgeon: Clyde Welch MD;  Location: [facility] ENDO (2ND FLR);  Service: Endoscopy;  Laterality: N/A;    EXCISIONAL BIOPSY Right 6/27/2019    Procedure: EXCISIONAL BIOPSY-breast;  Surgeon: Suki Dill MD;  Location: [facility] 2ND FLR;  Service: General;  Laterality: Right;    EYE SURGERY      HYSTERECTOMY      INJECTION FOR SENTINEL NODE IDENTIFICATION Right 7/30/2020    Procedure: INJECTION, FOR SENTINEL NODE IDENTIFICATION;  Surgeon: Suki Dill MD;  Location: [facility] OR;  Service: General;  Laterality: Right;    INJECTION OF ANESTHETIC AGENT AROUND MEDIAL BRANCH NERVES INNERVATING LUMBAR FACET JOINT Bilateral 6/7/2019    Procedure: BLOCK, NERVE, FACET JOINT, LUMBAR, MEDIAL BRANCH-deo MBB L4/5,L5/S1;  Surgeon: Jarvis Morales III, MD;  Location: [facility] CATH LAB;  Service: Pain Management;  Laterality: Bilateral;    INJECTION OF STEROID Right 6/5/2020    Procedure: INJECTION, STEROID right carpal tunel injection/ Right ring trigger finger injection;  Surgeon: Pricila Presley MD;  Location: [facility] OR;  Service: Orthopedics;  Laterality: Right;  INJECTION, STEROID right carpal tunel injection/ Right ring trigger finger injection    KNEE ARTHROSCOPY N/A     pt unsure of which knee     MASTECTOMY      OOPHORECTOMY      SENTINEL LYMPH NODE BIOPSY Right 7/30/2020    Procedure: BIOPSY, LYMPH NODE, SENTINEL;  Surgeon: Suki Dill MD;  Location: [facility] OR;  Service: General;  Laterality: Right;    SPINE SURGERY      TOTAL THYROIDECTOMY      TRANSFORAMINAL EPIDURAL INJECTION OF STEROID Bilateral 5/27/2022    Procedure: INJECTION, STEROID, EPIDURAL, TF BILATERAL L3-L4 CONTRAST DIRECT REF;  Surgeon: Julian Fish MD;  Location: [facility] PAIN MGT;  Service: Pain Management;  Laterality: Bilateral;    TRANSFORAMINAL EPIDURAL INJECTION OF STEROID  Bilateral 10/21/2022    Procedure: LUMBAR TRANSFORAMINAL BILATERAL L3/4 CONTRAST DIRECT REFERRAL;  Surgeon: Julian Fish MD;  Location: [facility] PAIN MGT;  Service: Pain Management;  Laterality: Bilateral;    UNILATERAL MASTECTOMY Right 2020    Procedure: MASTECTOMY, UNILATERAL;  Surgeon: Suki Dill MD;  Location: [facility] OR;  Service: General;  Laterality: Right;    YAG Laser Capsulotomy  Left 2019 OD//Dr. Hahn       Current Medications:   Current Outpatient Medications:     acetaminophen (TYLENOL) 500 MG tablet, Take 2 tablets (1,000 mg total) by mouth every 8 (eight) hours as needed for Pain., Disp: 54 tablet, Rfl: 0    ascorbic acid, vitamin C, (VITAMIN C) 100 MG tablet, Take 100 mg by mouth once daily., Disp: , Rfl:     cholecalciferol, vitamin D3, (VITAMIN D3) 50 mcg (2,000 unit) Tab, Take 1 tablet by mouth once daily., Disp: , Rfl:     co-enzyme Q-10 30 mg capsule, Take 30 mg by mouth once daily., Disp: , Rfl:     fenofibrate 160 MG Tab, TAKE 1 TABLET (160 MG TOTAL) BY MOUTH ONCE DAILY., Disp: 90 tablet, Rfl: 3    gabapentin (NEURONTIN) 100 MG capsule, Take 1 capsule (100 mg total) by mouth 3 (three) times daily., Disp: 60 capsule, Rfl: 1    LACTOBACILLUS COMBINATION NO.8 (ADULT PROBIOTIC ORAL), Take by mouth once daily. , Disp: , Rfl:     levothyroxine (SYNTHROID) 125 MCG tablet, Take 1 tablet (125 mcg total) by mouth before breakfast., Disp: 90 tablet, Rfl: 3    losartan (COZAAR) 100 MG tablet, Take 1 tablet (100 mg total) by mouth once daily., Disp: 90 tablet, Rfl: 3    im-gz-cf2-dha-epa-fish-lut-carlos (OCUVITE ADULT 50 PLUS) 250 mg (90 mg-160 mg) Cap, Take by mouth., Disp: , Rfl:     OPW GABAPENTIN 5% LIDOCAINE HCL 5% TOPICAL CREAM 50G, Apply 50 g topically 3 (three) times daily as needed (Pain). Apply topically. This compounded medication will  in 30 days., Disp: 50 g, Rfl: 1    sodium,potassium,mag sulfates (SUPREP BOWEL PREP KIT) 17.5-3.13-1.6 gram SolR, Follow  instructions given by Endoscopy scheduling nurse, Disp: 1 kit, Rfl: 0    turmeric root extract 500 mg Cap, Take by mouth once daily. , Disp: , Rfl:     Social History:   Social History     Socioeconomic History    Marital status:    Occupational History    Occupation: realtor     Employer: [employer]     Employer: [employer]   Tobacco Use    Smoking status: Former     Packs/day: 2.00     Years: 44.00     Pack years: 88.00     Types: Cigarettes     Quit date: 1969     Years since quittin.1    Smokeless tobacco: Never   Substance and Sexual Activity    Alcohol use: Not Currently    Drug use: No    Sexual activity: Not Currently   Other Topics Concern    Are you pregnant or think you may be? No    Breast-feeding No     Social Determinants of Health     Financial Resource Strain: Low Risk     Difficulty of Paying Living Expenses: Not hard at all   Food Insecurity: No Food Insecurity    Worried About Running Out of Food in the Last Year: Never true    Ran Out of Food in the Last Year: Never true   Transportation Needs: No Transportation Needs    Lack of Transportation (Medical): No    Lack of Transportation (Non-Medical): No   Physical Activity: Sufficiently Active    Days of Exercise per Week: 7 days    Minutes of Exercise per Session: 30 min   Stress: No Stress Concern Present    Feeling of Stress : Only a little   Social Connections: Moderately Isolated    Frequency of Communication with Friends and Family: More than three times a week    Frequency of Social Gatherings with Friends and Family: Three times a week    Attends Druze Services: Never    Active Member of Clubs or Organizations: Yes    Attends Club or Organization Meetings: More than 4 times per year    Marital Status:    Housing Stability: Low Risk     Unable to Pay for Housing in the Last Year: No    Number of Places Lived in the Last Year: 1    Unstable Housing in the Last Year: No       REVIEW OF  SYSTEMS:  Constitution: Negative. Negative for chills, fever and night sweats.   Cardiovascular: Negative for chest pain and syncope.   Respiratory: Negative for cough and shortness of breath.   Gastrointestinal: See HPI. Negative for nausea/vomiting. Negative for abdominal pain.  Genitourinary: See HPI. Negative for discoloration or dysuria.  Hematologic/Lymphatic: n for bleeding/clotting disorders.   Musculoskeletal: Negative for falls and muscle weakness.   Neurological: See HPI. n history of seizures. n history of cranial surgery or shunts.  Neurological: See HPI. No seizures.   Endocrine: Negative for polydipsia, polyphagia and polyuria.   Allergic/Immunologic: Negative for hives and persistent infections.     EXAM:  Wt 82.8 kg (182 lb 8.7 oz)   LMP  (LMP Unknown)   BMI 33.39 kg/m²     PHYSICAL EXAMINATION:    General: The patient is a pleasant 82 y.o. female in no apparent distress, the patient is orientatied to person, place and time.  Psych: Normal mood and affect  HEENT: Vision grossly intact, hearing intact to the spoken word.  Lungs: Respirations unlabored.  Gait: Normal station and gait, no difficulty with toe or heel walk.   Skin: Dorsal lumbar skin negative for rashes, lesions, hairy patches and surgical scars. There is n lumbar tenderness to palpation.  Range of motion: Lumbar range of motion is acceptable.  Spinal Balance: Global saggital and coronal spinal balance acceptable, no significant for scoliosis and kyphosis.  Musculoskeletal: No pain with the range of motion of the bilateral hips. No trochanteric tenderness to palpation.  Vascular: Bilateral lower extremities warm and well perfused, Dorsalis pedis pulses 2+ bilaterally.  Neurological: Normal strength and tone in all major motor groups in the bilateral lower extremities. Normal sensation to light touch in the L2-S1 dermatomes bilaterally.  Deep tendon reflexes symmetric 2+ in the bilateral lower extremities.  Negative Babinski  bilaterally. Straight leg raise negative bilaterally.    5/5 UE strength BUE  Negative Bruner on exam    IMAGING:      Today I personally reviewed MRI C spine: Diffuse spondylosis and canal narrowing C3- T1. No evidence of myelomalacia. Foraminal stenosis R C 5/6 and bilaterally C6/7    MRI L-spine with moderate to severe central stenosis at L3-4 and moderate stenosis centrally at L4-5.     Body mass index is 33.39 kg/m².  Hemoglobin A1C   Date Value Ref Range Status   07/18/2022 5.7 (H) 4.0 - 5.6 % Final     Comment:     ADA Screening Guidelines:  5.7-6.4%  Consistent with prediabetes  >or=6.5%  Consistent with diabetes    High levels of fetal hemoglobin interfere with the HbA1C  assay. Heterozygous hemoglobin variants (HbS, HgC, etc)do  not significantly interfere with this assay.   However, presence of multiple variants may affect accuracy.     04/30/2020 6.1 (H) 4.0 - 5.6 % Final     Comment:     ADA Screening Guidelines:  5.7-6.4%  Consistent with prediabetes  >or=6.5%  Consistent with diabetes  High levels of fetal hemoglobin interfere with the HbA1C  assay. Heterozygous hemoglobin variants (HbS, HgC, etc)do  not significantly interfere with this assay.   However, presence of multiple variants may affect accuracy.     10/18/2019 5.6 4.0 - 5.6 % Final     Comment:     ADA Screening Guidelines:  5.7-6.4%  Consistent with prediabetes  >or=6.5%  Consistent with diabetes  High levels of fetal hemoglobin interfere with the HbA1C  assay. Heterozygous hemoglobin variants (HbS, HgC, etc)do  not significantly interfere with this assay.   However, presence of multiple variants may affect accuracy.         ASSESSMENT/PLAN:    Patient with significant improvement in spinal stenosis symptoms after injection. F/u PRN.     There are no diagnoses linked to this encounter.     Patient presents to clinic today for discussion of surgical options.  Upon further discussing her symptoms she clearly points to the right groin until  her pain is worse with internal rotation of right hip.  At this time I would like to rule out intra-articular hip pathology with a hip injection.  If the hip injection does not resolve her pain then we will further discuss surgical options including a 1 or 2 level TLIF.

## 2023-02-02 ENCOUNTER — TELEPHONE (OUTPATIENT)
Dept: ADMINISTRATIVE | Facility: OTHER | Age: 83
End: 2023-02-02
Payer: MEDICARE

## 2023-02-13 ENCOUNTER — ANESTHESIA EVENT (OUTPATIENT)
Dept: ENDOSCOPY | Facility: HOSPITAL | Age: 83
End: 2023-02-13

## 2023-02-13 ENCOUNTER — HOSPITAL ENCOUNTER (OUTPATIENT)
Facility: HOSPITAL | Age: 83
Discharge: HOME | End: 2023-02-13
Attending: COLON & RECTAL SURGERY | Admitting: COLON & RECTAL SURGERY
Payer: MEDICARE

## 2023-02-13 ENCOUNTER — ANESTHESIA (OUTPATIENT)
Dept: ENDOSCOPY | Facility: HOSPITAL | Age: 83
End: 2023-02-13
Payer: MEDICARE

## 2023-02-13 VITALS
TEMPERATURE: 98 F | RESPIRATION RATE: 20 BRPM | OXYGEN SATURATION: 93 % | HEART RATE: 72 BPM | DIASTOLIC BLOOD PRESSURE: 71 MMHG | SYSTOLIC BLOOD PRESSURE: 152 MMHG

## 2023-02-13 DIAGNOSIS — Z12.11 SCREENING FOR COLON CANCER: ICD-10-CM

## 2023-02-13 PROCEDURE — 63600175 PHARM REV CODE 636 W HCPCS: Performed by: NURSE ANESTHETIST, CERTIFIED REGISTERED

## 2023-02-13 PROCEDURE — 45385 PR COLONOSCOPY,REMV LESN,SNARE: ICD-10-PCS | Mod: PT,,, | Performed by: COLON & RECTAL SURGERY

## 2023-02-13 PROCEDURE — 37000008 HC ANESTHESIA 1ST 15 MINUTES: Performed by: COLON & RECTAL SURGERY

## 2023-02-13 PROCEDURE — 88305 TISSUE EXAM BY PATHOLOGIST: CPT | Mod: 26,,, | Performed by: PATHOLOGY

## 2023-02-13 PROCEDURE — 25000003 PHARM REV CODE 250: Performed by: NURSE ANESTHETIST, CERTIFIED REGISTERED

## 2023-02-13 PROCEDURE — 88305 TISSUE EXAM BY PATHOLOGIST: ICD-10-PCS | Mod: 26,,, | Performed by: PATHOLOGY

## 2023-02-13 PROCEDURE — 37000009 HC ANESTHESIA EA ADD 15 MINS: Performed by: COLON & RECTAL SURGERY

## 2023-02-13 PROCEDURE — 88305 TISSUE EXAM BY PATHOLOGIST: CPT | Mod: 59 | Performed by: PATHOLOGY

## 2023-02-13 PROCEDURE — E9220 PRA ENDO ANESTHESIA: ICD-10-PCS | Mod: PT,,, | Performed by: NURSE ANESTHETIST, CERTIFIED REGISTERED

## 2023-02-13 PROCEDURE — 25000003 PHARM REV CODE 250: Performed by: COLON & RECTAL SURGERY

## 2023-02-13 PROCEDURE — 27201089 HC SNARE, DISP (ANY): Performed by: COLON & RECTAL SURGERY

## 2023-02-13 PROCEDURE — 45385 COLONOSCOPY W/LESION REMOVAL: CPT | Mod: PT,,, | Performed by: COLON & RECTAL SURGERY

## 2023-02-13 PROCEDURE — E9220 PRA ENDO ANESTHESIA: HCPCS | Mod: PT,,, | Performed by: NURSE ANESTHETIST, CERTIFIED REGISTERED

## 2023-02-13 PROCEDURE — 45385 COLONOSCOPY W/LESION REMOVAL: CPT | Mod: PT | Performed by: COLON & RECTAL SURGERY

## 2023-02-13 RX ORDER — SODIUM CHLORIDE 9 MG/ML
INJECTION, SOLUTION INTRAVENOUS CONTINUOUS
Status: CANCELLED | OUTPATIENT
Start: 2023-02-13

## 2023-02-13 RX ORDER — SODIUM CHLORIDE 9 MG/ML
INJECTION, SOLUTION INTRAVENOUS CONTINUOUS
Status: DISCONTINUED | OUTPATIENT
Start: 2023-02-13 | End: 2023-02-13 | Stop reason: HOSPADM

## 2023-02-13 RX ORDER — LIDOCAINE HYDROCHLORIDE 20 MG/ML
INJECTION INTRAVENOUS
Status: DISCONTINUED | OUTPATIENT
Start: 2023-02-13 | End: 2023-02-13

## 2023-02-13 RX ORDER — SODIUM CHLORIDE 0.9 % (FLUSH) 0.9 %
10 SYRINGE (ML) INJECTION
Status: CANCELLED | OUTPATIENT
Start: 2023-02-13

## 2023-02-13 RX ORDER — PROPOFOL 10 MG/ML
VIAL (ML) INTRAVENOUS
Status: DISCONTINUED | OUTPATIENT
Start: 2023-02-13 | End: 2023-02-13

## 2023-02-13 RX ORDER — PROPOFOL 10 MG/ML
VIAL (ML) INTRAVENOUS CONTINUOUS PRN
Status: DISCONTINUED | OUTPATIENT
Start: 2023-02-13 | End: 2023-02-13

## 2023-02-13 RX ADMIN — PROPOFOL 50 MG: 10 INJECTION, EMULSION INTRAVENOUS at 08:02

## 2023-02-13 RX ADMIN — SODIUM CHLORIDE: 9 INJECTION, SOLUTION INTRAVENOUS at 07:02

## 2023-02-13 RX ADMIN — LIDOCAINE HYDROCHLORIDE 30 MG: 20 INJECTION INTRAVENOUS at 08:02

## 2023-02-13 RX ADMIN — SODIUM CHLORIDE: 0.9 INJECTION, SOLUTION INTRAVENOUS at 07:02

## 2023-02-13 RX ADMIN — Medication 150 MCG/KG/MIN: at 08:02

## 2023-02-13 NOTE — TRANSFER OF CARE
Anesthesia Transfer of Care Note    Patient: Jo-Ann Escobedo    Procedure(s) Performed: Procedure(s) (LRB):  COLONOSCOPY (N/A)    Patient location: PACU    Anesthesia Type: general    Transport from OR: Transported from OR on room air with adequate spontaneous ventilation    Post pain: adequate analgesia    Post assessment: no apparent anesthetic complications and tolerated procedure well    Post vital signs: stable    Level of consciousness: awake, alert and oriented    Nausea/Vomiting: no nausea/vomiting    Complications: none    Transfer of care protocol was followed      Last vitals:   Visit Vitals  BP (!) 138//67(BP Location: Left arm, Patient Position: Lying)   Pulse 67   Temp 98   Resp 18   LMP  (LMP Unknown)   SpO2 95%   Breastfeeding No

## 2023-02-13 NOTE — PLAN OF CARE
Dr. Hoyos at bedside reviewing procedure report with pt & daughter. Plan of care & discharge instructions reviewed with pt & daughter. Verbalize understanding.

## 2023-02-13 NOTE — ANESTHESIA PREPROCEDURE EVALUATION
02/13/2023  Jo-Ann Escobedo is a 82 y.o., female.      Patient Name: Jo-Ann Escobedo  YOB: 1940  MRN: [id number]  [facility]: [id number]      Code Status: Prior   Date of Procedure: 2/13/2023  Anesthesia: Choice Procedure: Procedure(s) (LRB):  COLONOSCOPY (N/A)  Pre-Operative Diagnosis: Hematochezia [K92.1]  Melena [K92.1]  Diverticulosis [K57.90]  Proceduralist: Surgeon(s) and Role:     * Johan Hoyos MD - Primary        SUBJECTIVE:   Jo-Ann Escobedo is a 82 y.o. female who  has a past medical history of Acute blood loss anemia (12/7/2019), After-cataract of both eyes - Both Eyes (9/26/2012), Arthritis of foot (7/11/2014), Cholelithiasis, Deaf, left, Diverticulitis of large intestine without perforation or abscess with bleeding (12/7/2019), Diverticulosis, Ductal carcinoma in situ (DCIS) of right breast (7/15/2019), Encounter for blood transfusion, Essential hypertension (2/28/2018), Gastric nodule, GI bleed, Hearing loss in right ear, Hematochezia (12/15/2019), Hematochezia, Hypothyroidism, postsurgical (7/1/2015), Mixed hyperlipidemia (9/27/2012), Multinodular goiter (7/31/2014), Paget's disease of bone (7/10/2013), SCC (squamous cell carcinoma) (12/2020), and Squamous Cell Carcinoma. No notes on file    ALLERGIES:     Review of patient's allergies indicates:   Allergen Reactions    Voltaren [diclofenac sodium] Other (See Comments)     Hematochezia and hospitalization for hematochezia.    Codeine Diarrhea and Hallucinations    Niacin preparations      Redness, warming     MEDICATIONS:     Current Facility-Administered Medications   Medication Dose Route Frequency Provider Last Rate Last Admin    0.9%  NaCl infusion   Intravenous Continuous Johan Hoyos MD 10 mL/hr at 02/13/23 0729 New Bag at 02/13/23 0729          History:     Patient Active Problem List   Diagnosis    PCO (posterior  capsular opacification), right    Mixed hyperlipidemia    Sensorineural hearing loss, bilateral    Paget's disease of bone    Obesity (BMI 30.0-34.9)    Osteoarthritis of lumbar spine    Hypothyroidism, postsurgical    Lipoma of arm    Essential hypertension    Prediabetes    Facet arthritis of lumbar region    Aortic atherosclerosis    Ductal carcinoma in situ (DCIS) of right breast    Pseudophakia    Nephrolithiasis    BPPV (benign paroxysmal positional vertigo)    Diverticulitis of large intestine without perforation or abscess with bleeding    Abnormal CT of the head    Gastric nodule    History of GI bleed    Other dietary vitamin B12 deficiency anemia    Left carpal tunnel syndrome    Cervical radiculopathy at C7    Spondylosis of lumbar region without myelopathy or radiculopathy    Chronic right shoulder pain    Nontraumatic complete tear of right rotator cuff    Rotator cuff arthropathy of right shoulder    Spinal stenosis of lumbar region with neurogenic claudication    DDD (degenerative disc disease), lumbosacral    Lumbosacral radiculopathy    Personal history of skin cancer    Hip pain    Calcified granuloma of lung     Surgical History:    has a past surgical history that includes Hysterectomy; Eye surgery; Spine surgery; Mastectomy; Total thyroidectomy; Cataract extraction w/  intraocular lens implant (Bilateral); Knee arthroscopy (N/A); Colonoscopy (N/A, 4/15/2019); Injection of anesthetic agent around medial branch nerves innervating lumbar facet joint (Bilateral, 6/7/2019); Excisional biopsy (Right, 6/27/2019); YAG Laser Capsulotomy  (Left, 07/29/2019); Esophagogastroduodenoscopy (N/A, 12/7/2019); Colonoscopy (N/A, 12/9/2019); Endoscopic ultrasound of upper gastrointestinal tract (N/A, 1/22/2020); Breast biopsy (Right, 2019); Oophorectomy; Breast lumpectomy (Right, 2019); Carpal tunnel release (Left, 6/5/2020); Injection of steroid (Right, 6/5/2020); Unilateral  mastectomy (Right, 7/30/2020); Injection for sentinel node identification (Right, 7/30/2020); [place] lymph node biopsy (Right, 7/30/2020); Epidural steroid injection (Bilateral, 12/17/2021); Transforaminal epidural injection of steroid (Bilateral, 5/27/2022); Transforaminal epidural injection of steroid (Bilateral, 10/21/2022); and Carpal tunnel release (Right, 12/5/2022).   Social History:    reports that she is not currently sexually active.  reports that she quit smoking about 54 years ago. Her smoking use included cigarettes. She has a 88.00 pack-year smoking history. She has never used smokeless tobacco. She reports that she does not currently use alcohol. She reports that she does not use drugs.       Pre-op Assessment    I have reviewed the Patient Summary Reports.     I have reviewed the Nursing Notes. I have reviewed the NPO Status.   I have reviewed the Medications.     Review of Systems  Anesthesia Hx:  No problems with previous Anesthesia  Denies Family Hx of Anesthesia complications.   Denies Personal Hx of Anesthesia complications.   Hematology/Oncology:  Hematology Normal        Cardiovascular:   Hypertension    Renal/:   renal calculi    Hepatic/GI:   Bowel Prep.    Musculoskeletal:  Musculoskeletal Normal    Neurological:  Neurology Normal    Endocrine:   Hypothyroidism    Dermatological:  Skin Normal        Physical Exam  General: Cooperative, Alert and Oriented    Airway:  Mallampati: II   Mouth Opening: Normal  TM Distance: Normal  Tongue: Normal  Neck ROM: Normal ROM    Dental:  Intact        Anesthesia Plan  Type of Anesthesia, risks & benefits discussed:    Anesthesia Type: Gen Natural Airway  Intra-op Monitoring Plan: Standard ASA Monitors  Induction:  IV  Informed Consent: Informed consent signed with the Patient and all parties understand the risks and agree with anesthesia plan.  All questions answered.   ASA Score: 2  Day of Surgery Review of History & Physical: H&P Update referred to  the surgeon/provider.I have interviewed and examined the patient. I have reviewed the patient's H&P dated: There are no significant changes.     Ready For Surgery From Anesthesia Perspective.     .

## 2023-02-13 NOTE — H&P
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 She subsequently underwent colonoscopy 12/9 and several large diverticula in the sigmoid colon was seen with hematin throughout the colon; blood pooling also noted in the sigmoid colon, during which clip was placed f    Hematochezia     Hypothyroidism, postsurgical 7/1/2015    Mixed hyperlipidemia 9/27/2012    Multinodular goiter 7/31/2014    Paget's disease of bone 7/10/2013    SCC (squamous cell carcinoma) 12/2020    left 5th knuckle    Squamous Cell Carcinoma     in situ right neck        Family History   Problem Relation Age of Onset    Cancer Father         lung    Dementia Mother     Glaucoma Brother     Macular degeneration Brother     Diabetes Neg Hx     Amblyopia Neg Hx     Blindness Neg Hx     Cataracts Neg Hx     Retinal detachment Neg Hx     Strabismus Neg Hx     Melanoma Neg Hx        Social History     Socioeconomic History    Marital status:    Occupational History    Occupation: realtor     Employer: [employer]     Employer: [employer]   Tobacco Use    Smoking status: Former     Packs/day: 2.00      Years: 44.00     Pack years: 88.00     Types: Cigarettes     Quit date: 1969     Years since quittin.1    Smokeless tobacco: Never   Substance and Sexual Activity    Alcohol use: Not Currently    Drug use: No    Sexual activity: Not Currently   Other Topics Concern    Are you pregnant or think you may be? No    Breast-feeding No     Social Determinants of Health     Financial Resource Strain: Low Risk     Difficulty of Paying Living Expenses: Not hard at all   Food Insecurity: No Food Insecurity    Worried About Running Out of Food in the Last Year: Never true    Ran Out of Food in the Last Year: Never true   Transportation Needs: No Transportation Needs    Lack of Transportation (Medical): No    Lack of Transportation (Non-Medical): No   Physical Activity: Sufficiently Active    Days of Exercise per Week: 7 days    Minutes of Exercise per Session: 30 min   Stress: No Stress Concern Present    Feeling of Stress : Only a little   Social Connections: Moderately Isolated    Frequency of Communication with Friends and Family: More than three times a week    Frequency of Social Gatherings with Friends and Family: Three times a week    Attends Yazidism Services: Never    Active Member of Clubs or Organizations: Yes    Attends Club or Organization Meetings: More than 4 times per year    Marital Status:    Housing Stability: Low Risk     Unable to Pay for Housing in the Last Year: No    Number of Places Lived in the Last Year: 1    Unstable Housing in the Last Year: No       Review of Systems:  Respiratory ROS: no cough, shortness of breath, or wheezing  Cardiovascular ROS: no chest pain or dyspnea on exertion  Gastrointestinal ROS: no abdominal pain, change in bowel habits, or black or bloody stools  Musculoskeletal ROS: negative  Neurological ROS: no TIA or stroke symptoms        Physical Exam:  General: no distress  Head: normocephalic  Neck: supple, symmetrical, trachea midline  Lungs:  clear to  auscultation bilaterally and normal respiratory effort  Heart: regular rate and rhythm, S1, S2 normal, no murmur, rub or gallop  Abdomen: soft, non-tender non-distented; bowel sounds normal; no masses,  no organomegaly  Extremities: no cyanosis or edema, or clubbing       Deep Sedation: Mallampati Score II (hard and soft palate, upper portion of tonsils anduvula visible)    II    Assessment and Plan:  Proceed with Colonoscopy   crna

## 2023-02-13 NOTE — PROVATION PATIENT INSTRUCTIONS
Discharge Summary/Instructions after an Endoscopic Procedure  Patient Name: Jo-Ann Orr  Patient MRN: [id number]  Patient YOB: 1940 Monday, February 13, 2023  Johan Hoyos MD  Dear patient,  As a result of recent federal legislation (The Federal Cures Act), you may   receive lab or pathology results from your procedure in your [facility]   account before your physician is able to contact you. Your physician or   their representative will relay the results to you with their   recommendations at their soonest availability.  Thank you,  RESTRICTIONS:  During your procedure today, you received medications for sedation.  These   medications may affect your judgment, balance and coordination.  Therefore,   for 24 hours, you have the following restrictions:   - DO NOT drive a car, operate machinery, make legal/financial decisions,   sign important papers or drink alcohol.    ACTIVITY:  Today: no heavy lifting, straining or running due to procedural   sedation/anesthesia.  The following day: return to full activity including work.  DIET:  Eat and drink normally unless instructed otherwise.     TREATMENT FOR COMMON SIDE EFFECTS:  - Mild abdominal pain, nausea, belching, bloating or excessive gas:  rest,   eat lightly and use a heating pad.  - Sore Throat: treat with throat lozenges and/or gargle with warm salt   water.  - Because air was used during the procedure, expelling large amounts of air   from your rectum or belching is normal.  - If a bowel prep was taken, you may not have a bowel movement for 1-3 days.    This is normal.  SYMPTOMS TO WATCH FOR AND REPORT TO YOUR PHYSICIAN:  1. Abdominal pain or bloating, other than gas cramps.  2. Chest pain.  3. Back pain.  4. Signs of infection such as: chills or fever occurring within 24 hours   after the procedure.  5. Rectal bleeding, which would show as bright red, maroon, or black stools.   (A tablespoon of blood from the rectum is not serious, especially if    hemorrhoids are present.)  6. Vomiting.  7. Weakness or dizziness.  GO DIRECTLY TO THE NEAREST EMERGENCY ROOM IF YOU HAVE ANY OF THE FOLLOWING:      Difficulty breathing              Chills and/or fever over 101 F   Persistent vomiting and/or vomiting blood   Severe abdominal pain   Severe chest pain   Black, tarry stools   Bleeding- more than one tablespoon   Any other symptom or condition that you feel may need urgent attention  Your doctor recommends these additional instructions:  If any biopsies were taken, your doctors clinic will contact you in 1 to 2   weeks with any results.  - Discharge patient to home (ambulatory).   - Resume previous diet indefinitely.   - Continue present medications.   - Repeat colonoscopy within 3 months for retreatment.  For questions, problems or results please call your physician - Johan Hoyos MD at Work:  [phone].  [facility], EMERGENCY ROOM PHONE NUMBER: [phone]  IF A COMPLICATION OR EMERGENCY SITUATION ARISES AND YOU ARE UNABLE TO REACH   YOUR PHYSICIAN - GO DIRECTLY TO THE EMERGENCY ROOM.  Johan Hoyos MD  2/13/2023 9:42:37 AM  This report has been verified and signed electronically.  Dear patient,  As a result of recent federal legislation (The Federal Cures Act), you may   receive lab or pathology results from your procedure in your [facility]   account before your physician is able to contact you. Your physician or   their representative will relay the results to you with their   recommendations at their soonest availability.  Thank you,  PROVATION

## 2023-02-15 ENCOUNTER — LAB VISIT (OUTPATIENT)
Dept: LAB | Facility: HOSPITAL | Age: 83
End: 2023-02-15
Payer: MEDICARE

## 2023-02-15 DIAGNOSIS — N18.2 CKD (CHRONIC KIDNEY DISEASE) STAGE 2, GFR 60-89 ML/MIN: ICD-10-CM

## 2023-02-15 LAB
ALBUMIN SERPL BCP-MCNC: 3.7 G/DL (ref 3.5–5.2)
ALBUMIN SERPL BCP-MCNC: 3.7 G/DL (ref 3.5–5.2)
ALP SERPL-CCNC: 172 U/L (ref 55–135)
ALT SERPL W/O P-5'-P-CCNC: 18 U/L (ref 10–44)
ANION GAP SERPL CALC-SCNC: 10 MMOL/L (ref 8–16)
AST SERPL-CCNC: 25 U/L (ref 10–40)
BILIRUB SERPL-MCNC: 0.6 MG/DL (ref 0.1–1)
BUN SERPL-MCNC: 16 MG/DL (ref 8–23)
CALCIUM SERPL-MCNC: 9.8 MG/DL (ref 8.7–10.5)
CHLORIDE SERPL-SCNC: 109 MMOL/L (ref 95–110)
CO2 SERPL-SCNC: 23 MMOL/L (ref 23–29)
CREAT SERPL-MCNC: 0.7 MG/DL (ref 0.5–1.4)
EST. GFR  (NO RACE VARIABLE): >60 ML/MIN/1.73 M^2
GLUCOSE SERPL-MCNC: 89 MG/DL (ref 70–110)
PHOSPHATE SERPL-MCNC: 2.7 MG/DL (ref 2.7–4.5)
POTASSIUM SERPL-SCNC: 4 MMOL/L (ref 3.5–5.1)
PROT SERPL-MCNC: 6.9 G/DL (ref 6–8.4)
SODIUM SERPL-SCNC: 142 MMOL/L (ref 136–145)

## 2023-02-15 PROCEDURE — 80053 COMPREHEN METABOLIC PANEL: CPT | Performed by: NURSE PRACTITIONER

## 2023-02-15 PROCEDURE — 84100 ASSAY OF PHOSPHORUS: CPT | Performed by: NURSE PRACTITIONER

## 2023-02-15 PROCEDURE — 36415 COLL VENOUS BLD VENIPUNCTURE: CPT | Performed by: NURSE PRACTITIONER

## 2023-02-15 NOTE — ANESTHESIA POSTPROCEDURE EVALUATION
Anesthesia Post Evaluation    Patient: Jo-Ann Escobedo    Procedure(s) Performed: Procedure(s) (LRB):  COLONOSCOPY (N/A)    Final Anesthesia Type: general      Patient location during evaluation: GI PACU  Patient participation: Yes- Able to Participate  Level of consciousness: awake and alert  Post-procedure vital signs: reviewed and stable  Pain management: adequate  Airway patency: patent    PONV status at discharge: No PONV  Anesthetic complications: no      Cardiovascular status: stable  Respiratory status: unassisted and spontaneous ventilation  Hydration status: euvolemic  Follow-up not needed.          Vitals Value Taken Time   /71 02/13/23 1015   Temp 36.4 °C (97.5 °F) 02/13/23 0946   Pulse 72 02/13/23 1015   Resp 20 02/13/23 1015   SpO2 93 % 02/13/23 1015         Event Time   Out of Recovery 10:21:49         Pain/Parris Score: No data recorded

## 2023-02-16 LAB
FINAL PATHOLOGIC DIAGNOSIS: NORMAL
GROSS: NORMAL
Lab: NORMAL

## 2023-02-20 ENCOUNTER — OFFICE VISIT (OUTPATIENT)
Dept: NEPHROLOGY | Facility: CLINIC | Age: 83
End: 2023-02-20
Payer: MEDICARE

## 2023-02-20 VITALS
WEIGHT: 182 LBS | BODY MASS INDEX: 33.29 KG/M2 | HEART RATE: 67 BPM | SYSTOLIC BLOOD PRESSURE: 145 MMHG | OXYGEN SATURATION: 96 % | DIASTOLIC BLOOD PRESSURE: 78 MMHG

## 2023-02-20 DIAGNOSIS — I10 ESSENTIAL HYPERTENSION: ICD-10-CM

## 2023-02-20 DIAGNOSIS — N18.2 CKD (CHRONIC KIDNEY DISEASE) STAGE 2, GFR 60-89 ML/MIN: ICD-10-CM

## 2023-02-20 DIAGNOSIS — N28.1 BILATERAL RENAL CYSTS: Primary | ICD-10-CM

## 2023-02-20 DIAGNOSIS — N20.0 NEPHROLITHIASIS: ICD-10-CM

## 2023-02-20 PROCEDURE — 1157F ADVNC CARE PLAN IN RCRD: CPT | Mod: CPTII,S$GLB,, | Performed by: NURSE PRACTITIONER

## 2023-02-20 PROCEDURE — 3077F SYST BP >= 140 MM HG: CPT | Mod: CPTII,S$GLB,, | Performed by: NURSE PRACTITIONER

## 2023-02-20 PROCEDURE — 3288F PR FALLS RISK ASSESSMENT DOCUMENTED: ICD-10-PCS | Mod: CPTII,S$GLB,, | Performed by: NURSE PRACTITIONER

## 2023-02-20 PROCEDURE — 1157F PR ADVANCE CARE PLAN OR EQUIV PRESENT IN MEDICAL RECORD: ICD-10-PCS | Mod: CPTII,S$GLB,, | Performed by: NURSE PRACTITIONER

## 2023-02-20 PROCEDURE — 99214 PR OFFICE/OUTPT VISIT, EST, LEVL IV, 30-39 MIN: ICD-10-PCS | Mod: S$GLB,,, | Performed by: NURSE PRACTITIONER

## 2023-02-20 PROCEDURE — 1126F AMNT PAIN NOTED NONE PRSNT: CPT | Mod: CPTII,S$GLB,, | Performed by: NURSE PRACTITIONER

## 2023-02-20 PROCEDURE — 1159F MED LIST DOCD IN RCRD: CPT | Mod: CPTII,S$GLB,, | Performed by: NURSE PRACTITIONER

## 2023-02-20 PROCEDURE — 1126F PR PAIN SEVERITY QUANTIFIED, NO PAIN PRESENT: ICD-10-PCS | Mod: CPTII,S$GLB,, | Performed by: NURSE PRACTITIONER

## 2023-02-20 PROCEDURE — 3078F PR MOST RECENT DIASTOLIC BLOOD PRESSURE < 80 MM HG: ICD-10-PCS | Mod: CPTII,S$GLB,, | Performed by: NURSE PRACTITIONER

## 2023-02-20 PROCEDURE — 99999 PR PBB SHADOW E&M-EST. PATIENT-LVL III: CPT | Mod: PBBFAC,,, | Performed by: NURSE PRACTITIONER

## 2023-02-20 PROCEDURE — 3077F PR MOST RECENT SYSTOLIC BLOOD PRESSURE >= 140 MM HG: ICD-10-PCS | Mod: CPTII,S$GLB,, | Performed by: NURSE PRACTITIONER

## 2023-02-20 PROCEDURE — 1101F PR PT FALLS ASSESS DOC 0-1 FALLS W/OUT INJ PAST YR: ICD-10-PCS | Mod: CPTII,S$GLB,, | Performed by: NURSE PRACTITIONER

## 2023-02-20 PROCEDURE — 1159F PR MEDICATION LIST DOCUMENTED IN MEDICAL RECORD: ICD-10-PCS | Mod: CPTII,S$GLB,, | Performed by: NURSE PRACTITIONER

## 2023-02-20 PROCEDURE — 99214 OFFICE O/P EST MOD 30 MIN: CPT | Mod: S$GLB,,, | Performed by: NURSE PRACTITIONER

## 2023-02-20 PROCEDURE — 99999 PR PBB SHADOW E&M-EST. PATIENT-LVL III: ICD-10-PCS | Mod: PBBFAC,,, | Performed by: NURSE PRACTITIONER

## 2023-02-20 PROCEDURE — 3288F FALL RISK ASSESSMENT DOCD: CPT | Mod: CPTII,S$GLB,, | Performed by: NURSE PRACTITIONER

## 2023-02-20 PROCEDURE — 1101F PT FALLS ASSESS-DOCD LE1/YR: CPT | Mod: CPTII,S$GLB,, | Performed by: NURSE PRACTITIONER

## 2023-02-20 PROCEDURE — 3078F DIAST BP <80 MM HG: CPT | Mod: CPTII,S$GLB,, | Performed by: NURSE PRACTITIONER

## 2023-02-20 NOTE — PROGRESS NOTES
Subjective:       Patient ID: Jo-Ann Escobedo is a 82 y.o.  female who presents for evaluation of renal cysts.      HPI     Prior pertinent chart reviewed.    Patient presents for new evaluation of renal cysts.  Baseline creatinine of 0.8. Significant other medical problems include nephrolithiasis, renal cysts, lumbar osteoarthritis, DDD, spinal stenosis of lumbar region with neurogenic claudication, preDM, HTN, hypothyroidism, HLD, skin cancer. She is being treated with epidural injections for pain per pain management with recent minimal effect. Noted renal cysts on MRI on 10/9/22. History of simple renal cysts on US in 2021 with largest on right measuring 4.1 cm. Also with nonobstructive nephrolithiasis. Denies known history of stones, passing stone. No longer taking NSAIDs, has in the past. She takes tylenol for pain. The patient denies taking NSAIDs, herbal supplements, or new antibiotics, recreational drugs, recent episode of dehydration, diarrhea, nausea or vomiting, acute illness, hospitalization or recent exposure to IV radiocontrast.     Update 2/20/23  - Presents for evaluation of renal cysts  - losing some weight, changing diet  - Drinking 2.5L of water per day  - Reports urinary incontinence which is chronic   - colonoscopy 1 week ago with several polyps, repeat in 3 months    Significant family hx includes: No known    Last renal US: 1/9/23 , reviewed. Showed bilateral simple renal cysts. Bilateral nonobstructing renal stones.     Review of Systems   Constitutional:  Negative for fever.   Respiratory:  Negative for shortness of breath.    Cardiovascular:  Negative for chest pain and leg swelling.   Gastrointestinal:  Negative for diarrhea, nausea and vomiting.   Genitourinary:  Positive for frequency (chronic). Negative for difficulty urinating, dysuria and hematuria.   Musculoskeletal:  Positive for arthralgias (right hip pain).   Neurological:  Negative for syncope.   All other systems reviewed and are  negative.    Objective:       Blood pressure (!) 145/78, pulse 67, weight 82.6 kg (182 lb), SpO2 96 %.  Physical Exam  Vitals reviewed.   Constitutional:       General: She is not in acute distress.     Appearance: Normal appearance.   HENT:      Head: Normocephalic and atraumatic.   Eyes:      General: No scleral icterus.  Cardiovascular:      Rate and Rhythm: Normal rate and regular rhythm.      Heart sounds: No murmur heard.  Pulmonary:      Effort: Pulmonary effort is normal. No respiratory distress.      Breath sounds: No wheezing or rales.   Musculoskeletal:      Right lower leg: No edema.      Left lower leg: No edema.   Skin:     General: Skin is warm and dry.   Neurological:      Mental Status: She is alert and oriented to person, place, and time.   Psychiatric:         Mood and Affect: Mood normal.         Behavior: Behavior normal.         Lab Results   Component Value Date    CREATININE 0.7 02/15/2023    CREATININE 0.7 02/15/2023    CREATININE 0.7 02/15/2023     No results found for: UTPCR  Lab Results   Component Value Date     02/15/2023     02/15/2023     02/15/2023    K 4.0 02/15/2023    K 4.0 02/15/2023    K 4.0 02/15/2023    CO2 23 02/15/2023    CO2 23 02/15/2023    CO2 23 02/15/2023     02/15/2023     02/15/2023     02/15/2023     Lab Results   Component Value Date    PTH 46 10/05/2011    CALCIUM 9.8 02/15/2023    CALCIUM 9.8 02/15/2023    CALCIUM 9.8 02/15/2023    CAION 1.15 12/11/2019    PHOS 2.7 02/15/2023     Lab Results   Component Value Date    HGB 13.5 12/21/2022    WBC 5.27 12/21/2022    HCT 42.1 12/21/2022      Lab Results   Component Value Date    HGBA1C 5.7 (H) 07/18/2022     12/21/2022    BUN 16 02/15/2023    BUN 16 02/15/2023    BUN 16 02/15/2023     Lab Results   Component Value Date    LDLCALC 96.2 07/18/2022       US Retroperitoneal 1/9/23:  FINDINGS:  Right kidney: The right kidney measures 12.7 cm. No cortical thinning. No loss of  corticomedullary distinction. Resistive index measures 0.76.  Several simple cysts, largest measuring 4.6 x 4.2 x 3.7 cm within the mid kidney.  Two nonobstructing renal stones within the inferior pole measuring 0.2 cm.  No hydronephrosis.     Left kidney: The left kidney measures 12.9 cm. No cortical thinning. No loss of corticomedullary distinction. Resistive index measures 0.75.  Two simple cyst within the superior pole, largest measuring 3.2 x 3.2 x 3.0 cm.  Nonobstructing stone within the superior pole measuring 0.3 cm.  No hydronephrosis.     The bladder is partially distended at the time of scanning and has an unremarkable appearance.     Impression:     1. Bilateral simple renal cysts.  2. Bilateral nonobstructing renal stones.  Assessment:       1. Bilateral renal cysts    2. Nephrolithiasis    3. CKD (chronic kidney disease) stage 2, GFR 60-89 ml/min    4. Essential hypertension          Plan:   Renal cysts  - bilateral simple cysts per US and MRI - largest 4.1cm right    Nephrolithiasis  - nonobstructive per imaging  - Check 24 hr urine supersaturation  - Recommend increased hydration >2L per day, low Na diet    CKD stage 2   - baseline Cr 0.8, at baseline  - CKD in setting of age-related changes, history of NSAID use    HTN   - Stable on losartan  - Follows with digital medicine program      All questions patient had were answered.  Asked if further questions. None. F/u in clinic 3 months with labs and urine prior to next visit or sooner if needed.  ER for emergency concerns.    Summary of Plan:  - 24 hr urine supersaturation, RFP, UA

## 2023-02-24 ENCOUNTER — PATIENT MESSAGE (OUTPATIENT)
Dept: ADMINISTRATIVE | Facility: OTHER | Age: 83
End: 2023-02-24
Payer: MEDICARE

## 2023-02-27 ENCOUNTER — PATIENT MESSAGE (OUTPATIENT)
Dept: ADMINISTRATIVE | Facility: OTHER | Age: 83
End: 2023-02-27
Payer: MEDICARE

## 2023-02-28 ENCOUNTER — LAB VISIT (OUTPATIENT)
Dept: LAB | Facility: HOSPITAL | Age: 83
End: 2023-02-28
Payer: MEDICARE

## 2023-02-28 DIAGNOSIS — N20.0 NEPHROLITHIASIS: ICD-10-CM

## 2023-02-28 PROCEDURE — 84392 ASSAY OF URINE SULFATE: CPT | Performed by: NURSE PRACTITIONER

## 2023-03-02 ENCOUNTER — PATIENT MESSAGE (OUTPATIENT)
Dept: PAIN MEDICINE | Facility: OTHER | Age: 83
End: 2023-03-02
Payer: MEDICARE

## 2023-03-03 ENCOUNTER — HOSPITAL ENCOUNTER (OUTPATIENT)
Facility: OTHER | Age: 83
Discharge: HOME | End: 2023-03-03
Attending: ANESTHESIOLOGY | Admitting: ANESTHESIOLOGY
Payer: MEDICARE

## 2023-03-03 VITALS
DIASTOLIC BLOOD PRESSURE: 76 MMHG | BODY MASS INDEX: 32.2 KG/M2 | HEART RATE: 75 BPM | RESPIRATION RATE: 16 BRPM | HEIGHT: 62 IN | WEIGHT: 175 LBS | SYSTOLIC BLOOD PRESSURE: 158 MMHG | TEMPERATURE: 99 F | OXYGEN SATURATION: 98 %

## 2023-03-03 DIAGNOSIS — G89.29 CHRONIC PAIN: ICD-10-CM

## 2023-03-03 DIAGNOSIS — M25.559 HIP PAIN: Primary | ICD-10-CM

## 2023-03-03 LAB
AMMONIA 24H UR-SRATE: 15 MMOL/24 H
BSA: ABNORMAL 1.73M(2)
CA H2 PHOS DIHYD 24H SATFR UR: 2.05 DG
CALCIUM 24H UR-MRATE: 238 MG/24 H
CAOX 24H ENGDIFF UR: 1.98 DG
CHLORIDE 24H UR-SRATE: 94 MMOL/24 H (ref 34–286)
CITRATE 24H UR-MRATE: 615 MG/24 H
CLINICAL BIOCHEMIST REVIEW: ABNORMAL
COLLECT DURATION TIME UR: 24 H
CREAT 24H UR-MRATE: 660 MG/24 H (ref 603–1783)
HYDROXYAPATITE 24H ENGDIFF UR: 6.65 DG
MAGNESIUM 24H UR-MRATE: 53 MG/24 H (ref 51–269)
OSMOLALITY 24H UR: 559 MOSM/KG (ref 150–1150)
OXALATE 24H UR-MRATE: 9.7 MG/24 H (ref 9.7–40.5)
OXALATE 24H UR-SRATE: 0.11 MMOL/24 H (ref 0.11–0.46)
PH 24H UR: 6.4 [PH] (ref 4.5–8)
PHOSPHATE 24H UR-MRATE: 625 MG/24 H (ref 226–1797)
POTASSIUM 24H UR-SRATE: 37 MMOL/24 H (ref 16–105)
PROTEIN CATABOLIC RATE, URINE: 59 G/24 H (ref 56–125)
SODIUM 24H UR-SRATE: 111 MMOL/24 H (ref 22–328)
SPECIMEN VOL 24H UR: 880 ML
SULFATE 24H UR-SRATE: 9 MMOL/24 H (ref 7–47)
URATE 24H SATFR UR: -0.73 DG
URATE 24H UR-MRATE: 387 MG/24 H (ref 250–750)
UUN 24H UR-MRATE: 5.4 G/24 H (ref 7–42)

## 2023-03-03 PROCEDURE — 25500020 PHARM REV CODE 255: Performed by: ANESTHESIOLOGY

## 2023-03-03 PROCEDURE — 77002 NEEDLE LOCALIZATION BY XRAY: CPT | Mod: 26,,, | Performed by: ANESTHESIOLOGY

## 2023-03-03 PROCEDURE — 20610 PR DRAIN/INJECT LARGE JOINT/BURSA: ICD-10-PCS | Mod: RT,,, | Performed by: ANESTHESIOLOGY

## 2023-03-03 PROCEDURE — 77002 NEEDLE LOCALIZATION BY XRAY: CPT | Performed by: ANESTHESIOLOGY

## 2023-03-03 PROCEDURE — 63600175 PHARM REV CODE 636 W HCPCS: Performed by: ANESTHESIOLOGY

## 2023-03-03 PROCEDURE — 77002 PR FLUOROSCOPIC GUIDANCE NEEDLE PLACEMENT: ICD-10-PCS | Mod: 26,,, | Performed by: ANESTHESIOLOGY

## 2023-03-03 PROCEDURE — 20610 DRAIN/INJ JOINT/BURSA W/O US: CPT | Mod: RT,,, | Performed by: ANESTHESIOLOGY

## 2023-03-03 PROCEDURE — 25000003 PHARM REV CODE 250: Performed by: ANESTHESIOLOGY

## 2023-03-03 PROCEDURE — 20610 DRAIN/INJ JOINT/BURSA W/O US: CPT | Mod: RT | Performed by: ANESTHESIOLOGY

## 2023-03-03 RX ORDER — BUPIVACAINE HYDROCHLORIDE 2.5 MG/ML
INJECTION, SOLUTION EPIDURAL; INFILTRATION; INTRACAUDAL
Status: DISCONTINUED | OUTPATIENT
Start: 2023-03-03 | End: 2023-03-03 | Stop reason: HOSPADM

## 2023-03-03 RX ORDER — LIDOCAINE HYDROCHLORIDE 20 MG/ML
INJECTION, SOLUTION INFILTRATION; PERINEURAL
Status: DISCONTINUED | OUTPATIENT
Start: 2023-03-03 | End: 2023-03-03 | Stop reason: HOSPADM

## 2023-03-03 RX ORDER — DEXAMETHASONE SODIUM PHOSPHATE 10 MG/ML
INJECTION INTRAMUSCULAR; INTRAVENOUS
Status: DISCONTINUED | OUTPATIENT
Start: 2023-03-03 | End: 2023-03-03 | Stop reason: HOSPADM

## 2023-03-03 RX ORDER — TRIAMCINOLONE ACETONIDE 40 MG/ML
INJECTION, SUSPENSION INTRA-ARTICULAR; INTRAMUSCULAR
Status: DISCONTINUED | OUTPATIENT
Start: 2023-03-03 | End: 2023-03-03 | Stop reason: HOSPADM

## 2023-03-03 RX ORDER — MIDAZOLAM HYDROCHLORIDE 1 MG/ML
INJECTION INTRAMUSCULAR; INTRAVENOUS
Status: DISCONTINUED | OUTPATIENT
Start: 2023-03-03 | End: 2023-03-03 | Stop reason: HOSPADM

## 2023-03-03 RX ORDER — SODIUM CHLORIDE 9 MG/ML
500 INJECTION, SOLUTION INTRAVENOUS CONTINUOUS
Status: DISCONTINUED | OUTPATIENT
Start: 2023-03-03 | End: 2023-03-03 | Stop reason: HOSPADM

## 2023-03-03 NOTE — DISCHARGE INSTRUCTIONS

## 2023-03-03 NOTE — OP NOTE
Hip Joint Injection under Fluoroscopic Guidance    The procedure, risks, benefits, and options were discussed with the patient. There are no contraindications to the procedure. The patent expressed understanding and agreed to the procedure. Informed written consent was obtained prior to the start of the procedure and can be found in the patient's chart.    PATIENT NAME: Jo-Ann Escobedo   MRN: [id number]     DATE OF PROCEDURE: 03/03/2023    PROCEDURE: Right Hip Joint Injection under Fluoroscopic Guidance    PRE-OP DIAGNOSIS: Hip pain [M25.559]    POST-OP DIAGNOSIS: Hip pain [M25.559]    PHYSICIAN: Julian Fish MD    ASSISTANTS: Dr. Churchill     MEDICATIONS INJECTED: Preservative-free Kenalog 40mg with 3cc of Bupivacine 0.25%     LOCAL ANESTHETIC INJECTED: Xylocaine 2%     SEDATION: Versed 2mg and Fentanyl 0mcg                                                                                                                                                                                     Conscious sedation ordered by M.D. Patient re-evaluation prior to administration of conscious sedation. No changes noted in patient's status from initial evaluation. The patient's vital signs were monitored by RN and patient remained hemodynamically stable throughout the procedure.    Event Time In   Sedation Start 1410   Sedation End 1414       ESTIMATED BLOOD LOSS: None    COMPLICATIONS: None    TECHNIQUE: Time-out was performed to identify the patient and procedure to be performed. With the patient laying in a supine position, the surgical area was prepped and draped in the usual sterile fashion using ChloraPrep and a fenestrated drape. The area overlying the hip joint was determined under fluoroscopy guidance. Skin anesthesia was achieved by injecting Lidocaine 2% over the injection site. A 25 gauge, 3.5 inch spinal quinke needle was introduced under fluoroscopy until the tip reached the greater trochanter. The tip of the needle was  hinged cephalad from the greater trochanter into the joint space.  When the needle tip was in the appropriate position, and there was no blood aspiration, Contrast dye  Omnipaque (300mg/mL) was injected to confirm placement and there was no vascular runoff. 4 mL of the medication mixture listed above was injected slowly. The needles were removed and bleeding was nil. A sterile dressing was applied. No specimens collected. The patient tolerated the procedure well.    The patient was monitored after the procedure in the recovery area. They were given post-procedure and discharge instructions to follow at home. The patient was discharged in a stable condition.        Julian Fish MD

## 2023-03-03 NOTE — H&P
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 She subsequently underwent colonoscopy 12/9 and several large diverticula in the sigmoid colon was seen with hematin throughout the colon; blood pooling also noted in the sigmoid colon, during which clip was placed f    Hematochezia     Hypothyroidism, postsurgical 7/1/2015    Mixed hyperlipidemia 9/27/2012    Multinodular goiter 7/31/2014    Paget's disease of bone 7/10/2013    SCC (squamous cell carcinoma) 12/2020    left 5th knuckle    Squamous Cell Carcinoma     in situ right neck      Past Surgical History:   Procedure Laterality Date    BREAST BIOPSY Right 2019    BREAST LUMPECTOMY Right 2019    CARPAL TUNNEL RELEASE Left 6/5/2020    Procedure: RELEASE, CARPAL TUNNEL Left;  Surgeon: Pricila Presley MD;  Location: [facility] OR;  Service: Orthopedics;  Laterality: Left;    CARPAL TUNNEL RELEASE Right 12/5/2022    Procedure: RELEASE, CARPAL TUNNEL, right;  Surgeon: Pricila Presley MD;  Location: [facility] OR;  Service: Orthopedics;  Laterality: Right;    CATARACT  EXTRACTION W/  INTRAOCULAR LENS IMPLANT Bilateral     COLONOSCOPY N/A 4/15/2019    Procedure: COLONOSCOPY;  Surgeon: Artur Monet MD;  Location: [facility] (4TH FLR);  Service: Endoscopy;  Laterality: N/A;  within 1 month    COLONOSCOPY N/A 12/9/2019    Procedure: COLONOSCOPY;  Surgeon: Clyde Welch MD;  Location: [facility] (2ND FLR);  Service: Endoscopy;  Laterality: N/A;    COLONOSCOPY N/A 2/13/2023    Procedure: COLONOSCOPY;  Surgeon: Johan Hoyos MD;  Location: [facility] (4TH FLR);  Service: Endoscopy;  Laterality: N/A;  Okay for any CRS MD if Dr. Monet booked. but hopeful to get this done about 4 weeks from now  1/10 - pt called about time change and MD change. patient verbalized understanding and new instructions sent - sm  Precall complete- [place]    ENDOSCOPIC ULTRASOUND OF UPPER GASTROINTESTINAL TRACT N/A 1/22/2020    Procedure: ULTRASOUND, UPPER GI TRACT, ENDOSCOPIC;  Surgeon: Eleazar Shaffer MD;  Location: [facility] (2ND FLR);  Service: Endoscopy;  Laterality: N/A;  EUS for 10mm SML w/negative pillow sign and negative naked fat sign which was found at the incisura.  Dr Welch    EPIDURAL STEROID INJECTION Bilateral 12/17/2021    Procedure: INJECTION, STEROID, EPIDURAL, L3-L4 Bilateral DIRECT REF Transforaminal;  Surgeon: Julian Fish MD;  Location: [facility] PAIN MGT;  Service: Pain Management;  Laterality: Bilateral;    ESOPHAGOGASTRODUODENOSCOPY N/A 12/7/2019    Procedure: EGD (ESOPHAGOGASTRODUODENOSCOPY);  Surgeon: Clyde Welch MD;  Location: [facility]2ND FLR);  Service: Endoscopy;  Laterality: N/A;    EXCISIONAL BIOPSY Right 6/27/2019    Procedure: EXCISIONAL BIOPSY-breast;  Surgeon: Suki Dill MD;  Location: [address];  Service: General;  Laterality: Right;    EYE SURGERY      HYSTERECTOMY      INJECTION FOR SENTINEL NODE IDENTIFICATION Right 7/30/2020    Procedure: INJECTION, FOR SENTINEL NODE IDENTIFICATION;  Surgeon: Suki Dill MD;  Location: [facility];  Service:  General;  Laterality: Right;    INJECTION OF ANESTHETIC AGENT AROUND MEDIAL BRANCH NERVES INNERVATING LUMBAR FACET JOINT Bilateral 6/7/2019    Procedure: BLOCK, NERVE, FACET JOINT, LUMBAR, MEDIAL BRANCH-deo MBB L4/5,L5/S1;  Surgeon: Jarvis Morales III, MD;  Location: [facility] CATH LAB;  Service: Pain Management;  Laterality: Bilateral;    INJECTION OF STEROID Right 6/5/2020    Procedure: INJECTION, STEROID right carpal tunel injection/ Right ring trigger finger injection;  Surgeon: Pricila Presley MD;  Location: [facility] OR;  Service: Orthopedics;  Laterality: Right;  INJECTION, STEROID right carpal tunel injection/ Right ring trigger finger injection    KNEE ARTHROSCOPY N/A     pt unsure of which knee     MASTECTOMY      OOPHORECTOMY      SENTINEL LYMPH NODE BIOPSY Right 7/30/2020    Procedure: BIOPSY, LYMPH NODE, SENTINEL;  Surgeon: Suki Dill MD;  Location: [facility] OR;  Service: General;  Laterality: Right;    SPINE SURGERY      TOTAL THYROIDECTOMY      TRANSFORAMINAL EPIDURAL INJECTION OF STEROID Bilateral 5/27/2022    Procedure: INJECTION, STEROID, EPIDURAL, TF BILATERAL L3-L4 CONTRAST DIRECT REF;  Surgeon: Julian Fish MD;  Location: [facility] PAIN MGT;  Service: Pain Management;  Laterality: Bilateral;    TRANSFORAMINAL EPIDURAL INJECTION OF STEROID Bilateral 10/21/2022    Procedure: LUMBAR TRANSFORAMINAL BILATERAL L3/4 CONTRAST DIRECT REFERRAL;  Surgeon: Julian Fish MD;  Location: [facility] PAIN MGT;  Service: Pain Management;  Laterality: Bilateral;    UNILATERAL MASTECTOMY Right 7/30/2020    Procedure: MASTECTOMY, UNILATERAL;  Surgeon: Suki Dill MD;  Location: [facility];  Service: General;  Laterality: Right;    YAG Laser Capsulotomy  Left 07/29/2019 01/06/2021 OD//Dr. Hahn     Review of patient's allergies indicates:   Allergen Reactions    Voltaren [diclofenac sodium] Other (See Comments)     Hematochezia and hospitalization for hematochezia.    Codeine Diarrhea and Hallucinations     "Niacin preparations      Redness, warming      Current Facility-Administered Medications   Medication    0.9%  NaCl infusion       PMHx, PSHx, Allergies, Medications reviewed in epic    ROS negative except pain complaints in HPI    OBJECTIVE:    /67   Pulse 74   Temp 98.5 °F (36.9 °C) (Oral)   Resp 14   Ht 5' 2" (1.575 m)   Wt 79.4 kg (175 lb)   LMP  (LMP Unknown)   SpO2 95%   Breastfeeding No   BMI 32.01 kg/m²     PHYSICAL EXAMINATION:    GENERAL: Well appearing, in no acute distress, alert and oriented x3.  PSYCH:  Mood and affect appropriate.  SKIN: Skin color, texture, turgor normal, no rashes or lesions which will impact the procedure.  CV: RRR with palpation of the radial artery.  PULM: No evidence of respiratory difficulty, symmetric chest rise. Clear to auscultation.  NEURO: Cranial nerves grossly intact.    Plan:    Proceed with procedure as planned Procedure(s) (LRB):  INJECTION, RIGHT HIP CORTICOSTEROID /LOCAL INJECTION CONTRAST DIRECT REF (Right)    Fco Edwards  03/03/2023           "

## 2023-03-03 NOTE — DISCHARGE SUMMARY
Discharge Note  Short Stay      SUMMARY     Admit Date: 3/3/2023    Attending Physician: Julian Fish      Discharge Physician: Julian Fish      Discharge Date: 3/3/2023 2:15 PM    Procedure(s) (LRB):  INJECTION, RIGHT HIP CORTICOSTEROID /LOCAL INJECTION CONTRAST DIRECT REF (Right)    Final Diagnosis: Hip pain [M25.559]    Disposition: Home or self care    Patient Instructions:   Current Discharge Medication List        CONTINUE these medications which have NOT CHANGED    Details   acetaminophen (TYLENOL) 500 MG tablet Take 2 tablets (1,000 mg total) by mouth every 8 (eight) hours as needed for Pain.  Qty: 54 tablet, Refills: 0    Comments: Bedside delivery      ascorbic acid, vitamin C, (VITAMIN C) 100 MG tablet Take 100 mg by mouth once daily.      cholecalciferol, vitamin D3, (VITAMIN D3) 50 mcg (2,000 unit) Tab Take 1 tablet by mouth once daily.      co-enzyme Q-10 30 mg capsule Take 30 mg by mouth once daily.      fenofibrate 160 MG Tab TAKE 1 TABLET (160 MG TOTAL) BY MOUTH ONCE DAILY.  Qty: 90 tablet, Refills: 3    Associated Diagnoses: Hyperlipidemia, unspecified hyperlipidemia type      gabapentin (NEURONTIN) 100 MG capsule Take 1 capsule (100 mg total) by mouth 3 (three) times daily.  Qty: 60 capsule, Refills: 1    Associated Diagnoses: Spinal stenosis of lumbar region with neurogenic claudication      LACTOBACILLUS COMBINATION NO.8 (ADULT PROBIOTIC ORAL) Take by mouth once daily.       levothyroxine (SYNTHROID) 125 MCG tablet Take 1 tablet (125 mcg total) by mouth before breakfast.  Qty: 90 tablet, Refills: 3    Associated Diagnoses: Hypothyroidism, postsurgical      losartan (COZAAR) 100 MG tablet Take 1 tablet (100 mg total) by mouth once daily.  Qty: 90 tablet, Refills: 3    Comments: .      vj-wi-fc6-dha-epa-fish-lut-carlos (OCUVITE ADULT 50 PLUS) 250 mg (90 mg-160 mg) Cap Take by mouth.      OPW GABAPENTIN 5% LIDOCAINE HCL 5% TOPICAL CREAM 50G Apply 50 g topically 3 (three) times daily as needed  (Pain). Apply topically. This compounded medication will  in 30 days.  Qty: 50 g, Refills: 1    Associated Diagnoses: Spinal stenosis of lumbar region with neurogenic claudication      sodium,potassium,mag sulfates (SUPREP BOWEL PREP KIT) 17.5-3.13-1.6 gram SolR Follow instructions given by Endoscopy scheduling nurse  Qty: 1 kit, Refills: 0    Comments: May substitute with generic suprep  Associated Diagnoses: Special screening for malignant neoplasms, colon      turmeric root extract 500 mg Cap Take by mouth once daily.                  Discharge Diagnosis: Hip pain [M25.559]  Condition on Discharge: Stable with no complications to procedure   Diet on Discharge: Same as before.  Activity: as per instruction sheet.  Discharge to: Home with a responsible adult.  Follow up: 2-4 weeks

## 2023-03-04 ENCOUNTER — PATIENT MESSAGE (OUTPATIENT)
Dept: ADMINISTRATIVE | Facility: OTHER | Age: 83
End: 2023-03-04
Payer: MEDICARE

## 2023-03-06 ENCOUNTER — OFFICE VISIT (OUTPATIENT)
Dept: HEMATOLOGY/ONCOLOGY | Facility: CLINIC | Age: 83
End: 2023-03-06
Payer: MEDICARE

## 2023-03-06 ENCOUNTER — PATIENT MESSAGE (OUTPATIENT)
Dept: PAIN MEDICINE | Facility: OTHER | Age: 83
End: 2023-03-06
Payer: MEDICARE

## 2023-03-06 VITALS
BODY MASS INDEX: 32.37 KG/M2 | OXYGEN SATURATION: 95 % | HEART RATE: 58 BPM | DIASTOLIC BLOOD PRESSURE: 72 MMHG | WEIGHT: 175.94 LBS | RESPIRATION RATE: 18 BRPM | HEIGHT: 62 IN | SYSTOLIC BLOOD PRESSURE: 159 MMHG | TEMPERATURE: 97 F

## 2023-03-06 DIAGNOSIS — D05.11 DUCTAL CARCINOMA IN SITU (DCIS) OF RIGHT BREAST: Primary | ICD-10-CM

## 2023-03-06 PROCEDURE — 1101F PR PT FALLS ASSESS DOC 0-1 FALLS W/OUT INJ PAST YR: ICD-10-PCS | Mod: CPTII,S$GLB,, | Performed by: INTERNAL MEDICINE

## 2023-03-06 PROCEDURE — 3078F PR MOST RECENT DIASTOLIC BLOOD PRESSURE < 80 MM HG: ICD-10-PCS | Mod: CPTII,S$GLB,, | Performed by: INTERNAL MEDICINE

## 2023-03-06 PROCEDURE — 99999 PR PBB SHADOW E&M-EST. PATIENT-LVL IV: CPT | Mod: PBBFAC,,, | Performed by: INTERNAL MEDICINE

## 2023-03-06 PROCEDURE — 99213 PR OFFICE/OUTPT VISIT, EST, LEVL III, 20-29 MIN: ICD-10-PCS | Mod: S$GLB,,, | Performed by: INTERNAL MEDICINE

## 2023-03-06 PROCEDURE — 3077F PR MOST RECENT SYSTOLIC BLOOD PRESSURE >= 140 MM HG: ICD-10-PCS | Mod: CPTII,S$GLB,, | Performed by: INTERNAL MEDICINE

## 2023-03-06 PROCEDURE — 3078F DIAST BP <80 MM HG: CPT | Mod: CPTII,S$GLB,, | Performed by: INTERNAL MEDICINE

## 2023-03-06 PROCEDURE — 99999 PR PBB SHADOW E&M-EST. PATIENT-LVL IV: ICD-10-PCS | Mod: PBBFAC,,, | Performed by: INTERNAL MEDICINE

## 2023-03-06 PROCEDURE — 3288F FALL RISK ASSESSMENT DOCD: CPT | Mod: CPTII,S$GLB,, | Performed by: INTERNAL MEDICINE

## 2023-03-06 PROCEDURE — 1101F PT FALLS ASSESS-DOCD LE1/YR: CPT | Mod: CPTII,S$GLB,, | Performed by: INTERNAL MEDICINE

## 2023-03-06 PROCEDURE — 3077F SYST BP >= 140 MM HG: CPT | Mod: CPTII,S$GLB,, | Performed by: INTERNAL MEDICINE

## 2023-03-06 PROCEDURE — 1159F PR MEDICATION LIST DOCUMENTED IN MEDICAL RECORD: ICD-10-PCS | Mod: CPTII,S$GLB,, | Performed by: INTERNAL MEDICINE

## 2023-03-06 PROCEDURE — 1125F AMNT PAIN NOTED PAIN PRSNT: CPT | Mod: CPTII,S$GLB,, | Performed by: INTERNAL MEDICINE

## 2023-03-06 PROCEDURE — 3288F PR FALLS RISK ASSESSMENT DOCUMENTED: ICD-10-PCS | Mod: CPTII,S$GLB,, | Performed by: INTERNAL MEDICINE

## 2023-03-06 PROCEDURE — 1125F PR PAIN SEVERITY QUANTIFIED, PAIN PRESENT: ICD-10-PCS | Mod: CPTII,S$GLB,, | Performed by: INTERNAL MEDICINE

## 2023-03-06 PROCEDURE — 1157F PR ADVANCE CARE PLAN OR EQUIV PRESENT IN MEDICAL RECORD: ICD-10-PCS | Mod: CPTII,S$GLB,, | Performed by: INTERNAL MEDICINE

## 2023-03-06 PROCEDURE — 99213 OFFICE O/P EST LOW 20 MIN: CPT | Mod: S$GLB,,, | Performed by: INTERNAL MEDICINE

## 2023-03-06 PROCEDURE — 1159F MED LIST DOCD IN RCRD: CPT | Mod: CPTII,S$GLB,, | Performed by: INTERNAL MEDICINE

## 2023-03-06 PROCEDURE — 1157F ADVNC CARE PLAN IN RCRD: CPT | Mod: CPTII,S$GLB,, | Performed by: INTERNAL MEDICINE

## 2023-03-16 DIAGNOSIS — R82.994 HYPERCALCIURIA: ICD-10-CM

## 2023-03-16 DIAGNOSIS — N20.0 NEPHROLITHIASIS: Primary | ICD-10-CM

## 2023-03-16 RX ORDER — HYDROCHLOROTHIAZIDE 25 MG/1
12.5 TABLET ORAL DAILY
Qty: 15 TABLET | Refills: 11 | Status: SHIPPED | OUTPATIENT
Start: 2023-03-16 | End: 2024-03-05

## 2023-03-17 ENCOUNTER — PATIENT MESSAGE (OUTPATIENT)
Dept: NEPHROLOGY | Facility: CLINIC | Age: 83
End: 2023-03-17
Payer: MEDICARE

## 2023-03-24 ENCOUNTER — PATIENT MESSAGE (OUTPATIENT)
Dept: INTERNAL MEDICINE | Facility: CLINIC | Age: 83
End: 2023-03-24
Payer: MEDICARE

## 2023-03-24 DIAGNOSIS — M48.062 SPINAL STENOSIS OF LUMBAR REGION WITH NEUROGENIC CLAUDICATION: ICD-10-CM

## 2023-03-24 RX ORDER — GABAPENTIN 100 MG/1
100 CAPSULE ORAL 3 TIMES DAILY
Qty: 60 CAPSULE | Refills: 1 | Status: SHIPPED | OUTPATIENT
Start: 2023-03-24 | End: 2023-04-26

## 2023-03-30 ENCOUNTER — OFFICE VISIT (OUTPATIENT)
Dept: DERMATOLOGY | Facility: CLINIC | Age: 83
End: 2023-03-30
Payer: MEDICARE

## 2023-03-30 DIAGNOSIS — L91.8 SKIN TAG: ICD-10-CM

## 2023-03-30 DIAGNOSIS — L82.1 SK (SEBORRHEIC KERATOSIS): ICD-10-CM

## 2023-03-30 DIAGNOSIS — Z85.828 PERSONAL HISTORY OF SKIN CANCER: ICD-10-CM

## 2023-03-30 DIAGNOSIS — L82.0 INFLAMED SEBORRHEIC KERATOSIS: Primary | ICD-10-CM

## 2023-03-30 DIAGNOSIS — L81.4 LENTIGO: ICD-10-CM

## 2023-03-30 DIAGNOSIS — L90.5 SCAR: ICD-10-CM

## 2023-03-30 DIAGNOSIS — D22.9 NEVUS: ICD-10-CM

## 2023-03-30 PROCEDURE — 17110 DESTRUCTION B9 LES UP TO 14: CPT | Mod: S$GLB,,, | Performed by: DERMATOLOGY

## 2023-03-30 PROCEDURE — 1160F PR REVIEW ALL MEDS BY PRESCRIBER/CLIN PHARMACIST DOCUMENTED: ICD-10-PCS | Mod: CPTII,S$GLB,, | Performed by: DERMATOLOGY

## 2023-03-30 PROCEDURE — 17110 PR DESTRUCTION BENIGN LESIONS UP TO 14: ICD-10-PCS | Mod: S$GLB,,, | Performed by: DERMATOLOGY

## 2023-03-30 PROCEDURE — 1126F PR PAIN SEVERITY QUANTIFIED, NO PAIN PRESENT: ICD-10-PCS | Mod: CPTII,S$GLB,, | Performed by: DERMATOLOGY

## 2023-03-30 PROCEDURE — 1159F PR MEDICATION LIST DOCUMENTED IN MEDICAL RECORD: ICD-10-PCS | Mod: CPTII,S$GLB,, | Performed by: DERMATOLOGY

## 2023-03-30 PROCEDURE — 1101F PR PT FALLS ASSESS DOC 0-1 FALLS W/OUT INJ PAST YR: ICD-10-PCS | Mod: CPTII,S$GLB,, | Performed by: DERMATOLOGY

## 2023-03-30 PROCEDURE — 3288F FALL RISK ASSESSMENT DOCD: CPT | Mod: CPTII,S$GLB,, | Performed by: DERMATOLOGY

## 2023-03-30 PROCEDURE — 1160F RVW MEDS BY RX/DR IN RCRD: CPT | Mod: CPTII,S$GLB,, | Performed by: DERMATOLOGY

## 2023-03-30 PROCEDURE — 99999 PR PBB SHADOW E&M-EST. PATIENT-LVL III: ICD-10-PCS | Mod: PBBFAC,,, | Performed by: DERMATOLOGY

## 2023-03-30 PROCEDURE — 1157F ADVNC CARE PLAN IN RCRD: CPT | Mod: CPTII,S$GLB,, | Performed by: DERMATOLOGY

## 2023-03-30 PROCEDURE — 1159F MED LIST DOCD IN RCRD: CPT | Mod: CPTII,S$GLB,, | Performed by: DERMATOLOGY

## 2023-03-30 PROCEDURE — 99999 PR PBB SHADOW E&M-EST. PATIENT-LVL III: CPT | Mod: PBBFAC,,, | Performed by: DERMATOLOGY

## 2023-03-30 PROCEDURE — 99213 PR OFFICE/OUTPT VISIT, EST, LEVL III, 20-29 MIN: ICD-10-PCS | Mod: 25,S$GLB,, | Performed by: DERMATOLOGY

## 2023-03-30 PROCEDURE — 1157F PR ADVANCE CARE PLAN OR EQUIV PRESENT IN MEDICAL RECORD: ICD-10-PCS | Mod: CPTII,S$GLB,, | Performed by: DERMATOLOGY

## 2023-03-30 PROCEDURE — 1101F PT FALLS ASSESS-DOCD LE1/YR: CPT | Mod: CPTII,S$GLB,, | Performed by: DERMATOLOGY

## 2023-03-30 PROCEDURE — 1126F AMNT PAIN NOTED NONE PRSNT: CPT | Mod: CPTII,S$GLB,, | Performed by: DERMATOLOGY

## 2023-03-30 PROCEDURE — 99213 OFFICE O/P EST LOW 20 MIN: CPT | Mod: 25,S$GLB,, | Performed by: DERMATOLOGY

## 2023-03-30 PROCEDURE — 3288F PR FALLS RISK ASSESSMENT DOCUMENTED: ICD-10-PCS | Mod: CPTII,S$GLB,, | Performed by: DERMATOLOGY

## 2023-03-30 NOTE — PROGRESS NOTES
"  Subjective:      Patient ID:  Jo-Ann Escobedo is a 82 y.o. female who presents for   Chief Complaint   Patient presents with    Skin Check     ubse    Lesion     R temple     Pt here today for UBSE     Patient is here today for a "mole" check.   Pt has a history of extensive sun exposure in the past.   Pt recalls several blistering sunburns in the past- yes  Pt has history of tanning bed use- no  Pt has had moles removed in the past- yes, benign per pt  Pt has history of melanoma in first degree relatives- no    Patient with new complaint of lesion(s)  Location: R temple  Duration: >1yr  Symptoms: none  Relieving factors/Previous treatments: OTC cortisone      Lesion    Review of Systems   Skin:  Positive for daily sunscreen use and activity-related sunscreen use. Negative for tendency to form keloidal scars.   Hematologic/Lymphatic: Bruises/bleeds easily.     Objective:   Physical Exam   Constitutional: She appears well-developed and well-nourished. No distress.   Neurological: She is alert and oriented to person, place, and time. She is not disoriented.   Psychiatric: She has a normal mood and affect.   Skin:   Areas Examined (abnormalities noted in diagram):   Scalp / Hair Palpated and Inspected  Head / Face Inspection Performed  Neck Inspection Performed  Chest / Axilla Inspection Performed  Abdomen Inspection Performed  Back Inspection Performed  RUE Inspected  LUE Inspection Performed                   Diagram Legend     Erythematous scaling macule/papule c/w actinic keratosis       Vascular papule c/w angioma      Pigmented verrucoid papule/plaque c/w seborrheic keratosis      Yellow umbilicated papule c/w sebaceous hyperplasia      Irregularly shaped tan macule c/w lentigo     1-2 mm smooth white papules consistent with Milia      Movable subcutaneous cyst with punctum c/w epidermal inclusion cyst      Subcutaneous movable cyst c/w pilar cyst      Firm pink to brown papule c/w dermatofibroma      " Pedunculated fleshy papule(s) c/w skin tag(s)      Evenly pigmented macule c/w junctional nevus     Mildly variegated pigmented, slightly irregular-bordered macule c/w mildly atypical nevus      Flesh colored to evenly pigmented papule c/w intradermal nevus       Pink pearly papule/plaque c/w basal cell carcinoma      Erythematous hyperkeratotic cursted plaque c/w SCC      Surgical scar with no sign of skin cancer recurrence      Open and closed comedones      Inflammatory papules and pustules      Verrucoid papule consistent consistent with wart     Erythematous eczematous patches and plaques     Dystrophic onycholytic nail with subungual debris c/w onychomycosis     Umbilicated papule    Erythematous-base heme-crusted tan verrucoid plaque consistent with inflamed seborrheic keratosis     Erythematous Silvery Scaling Plaque c/w Psoriasis     See annotation      Assessment / Plan:        Inflamed seborrheic keratosis  Cryosurgery procedure note:    Verbal consent from the patient is obtained including, but not limited to, risk of hypopigmentation/hyperpigmentation, scar, recurrence of lesion. Liquid nitrogen cryosurgery is applied to 1 lesions to produce a freeze injury. The patient is aware that blisters may form and is instructed on wound care with gentle cleansing and use of vaseline ointment to keep moist until healed. The patient is supplied a handout on cryosurgery and is instructed to call if lesions do not completely resolve.    SK (seborrheic keratosis)  These are benign inherited growths without a malignant potential. Reassurance given to patient. No treatment is necessary.     Skin tag  Reassurance given to patient. No treatment is necessary.   Treatment of benign, asymptomatic lesions may be considered cosmetic.    Nevus  Discussed ABCDE's of nevi.  Monitor for new mole or moles that are becoming bigger, darker, irritated, or developing irregular borders. Brochure provided. Instructed patient to observe  lesion(s) for changes and follow up in clinic if changes are noted. Patient to monitor skin at home for new or changing lesions.     Lentigo  This is a benign hyperpigmented sun induced lesion. Recommend daily sun protection/avoidance and use of at least SPF 30, broad spectrum sunscreen (OTC drug) will reduce the number of new lesions. Treatment of these benign lesions are considered cosmetic.  The nature of sun-induced photo-aging and skin cancers is discussed.  Sun avoidance, protective clothing, and the use of 30-SPF sunscreens is advised. Observe closely for skin damage/changes, and call if such occurs.    Personal history of skin cancer  Scar  Area(s) of previous NMSC evaluated with no signs of recurrence.    Upper body skin examination performed today including at least 6 points as noted in physical examination. No lesions suspicious for malignancy noted.    Recommend daily sun protection/avoidance and use of at least SPF 30, broad spectrum sunscreen (OTC drug).              Follow up in about 1 year (around 3/30/2024) for UBSE.

## 2023-04-13 ENCOUNTER — TELEPHONE (OUTPATIENT)
Dept: OPTOMETRY | Facility: CLINIC | Age: 83
End: 2023-04-13
Payer: MEDICARE

## 2023-04-26 ENCOUNTER — OFFICE VISIT (OUTPATIENT)
Dept: OPTOMETRY | Facility: CLINIC | Age: 83
End: 2023-04-26
Payer: MEDICARE

## 2023-04-26 DIAGNOSIS — Z13.5 SCREENING FOR GLAUCOMA: ICD-10-CM

## 2023-04-26 DIAGNOSIS — H35.363 DRUSEN OF MACULA OF BOTH EYES: Primary | ICD-10-CM

## 2023-04-26 DIAGNOSIS — H52.4 PRESBYOPIA: ICD-10-CM

## 2023-04-26 PROCEDURE — 92015 PR REFRACTION: ICD-10-PCS | Mod: S$GLB,,, | Performed by: OPTOMETRIST

## 2023-04-26 PROCEDURE — 1157F ADVNC CARE PLAN IN RCRD: CPT | Mod: CPTII,S$GLB,, | Performed by: OPTOMETRIST

## 2023-04-26 PROCEDURE — 92015 DETERMINE REFRACTIVE STATE: CPT | Mod: S$GLB,,, | Performed by: OPTOMETRIST

## 2023-04-26 PROCEDURE — 92014 COMPRE OPH EXAM EST PT 1/>: CPT | Mod: S$GLB,,, | Performed by: OPTOMETRIST

## 2023-04-26 PROCEDURE — 1157F PR ADVANCE CARE PLAN OR EQUIV PRESENT IN MEDICAL RECORD: ICD-10-PCS | Mod: CPTII,S$GLB,, | Performed by: OPTOMETRIST

## 2023-04-26 PROCEDURE — 1126F PR PAIN SEVERITY QUANTIFIED, NO PAIN PRESENT: ICD-10-PCS | Mod: CPTII,S$GLB,, | Performed by: OPTOMETRIST

## 2023-04-26 PROCEDURE — 1126F AMNT PAIN NOTED NONE PRSNT: CPT | Mod: CPTII,S$GLB,, | Performed by: OPTOMETRIST

## 2023-04-26 PROCEDURE — 99999 PR PBB SHADOW E&M-EST. PATIENT-LVL III: CPT | Mod: PBBFAC,,, | Performed by: OPTOMETRIST

## 2023-04-26 PROCEDURE — 1101F PT FALLS ASSESS-DOCD LE1/YR: CPT | Mod: CPTII,S$GLB,, | Performed by: OPTOMETRIST

## 2023-04-26 PROCEDURE — 1159F MED LIST DOCD IN RCRD: CPT | Mod: CPTII,S$GLB,, | Performed by: OPTOMETRIST

## 2023-04-26 PROCEDURE — 3288F PR FALLS RISK ASSESSMENT DOCUMENTED: ICD-10-PCS | Mod: CPTII,S$GLB,, | Performed by: OPTOMETRIST

## 2023-04-26 PROCEDURE — 92014 PR EYE EXAM, EST PATIENT,COMPREHESV: ICD-10-PCS | Mod: S$GLB,,, | Performed by: OPTOMETRIST

## 2023-04-26 PROCEDURE — 99999 PR PBB SHADOW E&M-EST. PATIENT-LVL III: ICD-10-PCS | Mod: PBBFAC,,, | Performed by: OPTOMETRIST

## 2023-04-26 PROCEDURE — 1160F RVW MEDS BY RX/DR IN RCRD: CPT | Mod: CPTII,S$GLB,, | Performed by: OPTOMETRIST

## 2023-04-26 PROCEDURE — 3288F FALL RISK ASSESSMENT DOCD: CPT | Mod: CPTII,S$GLB,, | Performed by: OPTOMETRIST

## 2023-04-26 PROCEDURE — 1160F PR REVIEW ALL MEDS BY PRESCRIBER/CLIN PHARMACIST DOCUMENTED: ICD-10-PCS | Mod: CPTII,S$GLB,, | Performed by: OPTOMETRIST

## 2023-04-26 PROCEDURE — 1101F PR PT FALLS ASSESS DOC 0-1 FALLS W/OUT INJ PAST YR: ICD-10-PCS | Mod: CPTII,S$GLB,, | Performed by: OPTOMETRIST

## 2023-04-26 PROCEDURE — 1159F PR MEDICATION LIST DOCUMENTED IN MEDICAL RECORD: ICD-10-PCS | Mod: CPTII,S$GLB,, | Performed by: OPTOMETRIST

## 2023-04-26 NOTE — PROGRESS NOTES
HPI     Concerns About Ocular Health            Comments: DLS: 6/30/2022          Comments    Presents today for routine eye exam. Pt reports noticeable vision changes   and dry eyes. Pt denies eye pain, floaters and flashes. Use Soothe XP   gtts.    S/P PCIOL/YAG OU          Last edited by Bobbi Romero MA on 4/26/2023  1:09 PM.            Assessment /Plan     For exam results, see Encounter Report.    Drusen of macula of both eyes    Screening for glaucoma    Presbyopia      1. Sp pciol/YAG OU--pt wishes new Rx  2. Mac drusen OU (see last OCT--no CME/CNVM).  Discussed sunglasses/vitamins.  Pt non-smoker.  Re-Instructed on Amsler Grid use  3. COREY--pt to inc Ats to QID      PLAN:   Rtc 1 yr, or immediately if Amsler changes

## 2023-05-03 ENCOUNTER — OFFICE VISIT (OUTPATIENT)
Dept: ORTHOPEDICS | Facility: CLINIC | Age: 83
End: 2023-05-03
Payer: MEDICARE

## 2023-05-03 VITALS — WEIGHT: 175.5 LBS | HEIGHT: 62 IN | BODY MASS INDEX: 32.3 KG/M2

## 2023-05-03 DIAGNOSIS — M54.17 LUMBOSACRAL RADICULOPATHY: Primary | ICD-10-CM

## 2023-05-03 DIAGNOSIS — M48.062 SPINAL STENOSIS OF LUMBAR REGION WITH NEUROGENIC CLAUDICATION: ICD-10-CM

## 2023-05-03 DIAGNOSIS — M16.11 PRIMARY OSTEOARTHRITIS OF RIGHT HIP: ICD-10-CM

## 2023-05-03 PROCEDURE — 1101F PR PT FALLS ASSESS DOC 0-1 FALLS W/OUT INJ PAST YR: ICD-10-PCS | Mod: CPTII,S$GLB,, | Performed by: ORTHOPAEDIC SURGERY

## 2023-05-03 PROCEDURE — 1125F AMNT PAIN NOTED PAIN PRSNT: CPT | Mod: CPTII,S$GLB,, | Performed by: ORTHOPAEDIC SURGERY

## 2023-05-03 PROCEDURE — 99214 OFFICE O/P EST MOD 30 MIN: CPT | Mod: S$GLB,,, | Performed by: ORTHOPAEDIC SURGERY

## 2023-05-03 PROCEDURE — 1157F PR ADVANCE CARE PLAN OR EQUIV PRESENT IN MEDICAL RECORD: ICD-10-PCS | Mod: CPTII,S$GLB,, | Performed by: ORTHOPAEDIC SURGERY

## 2023-05-03 PROCEDURE — 1159F MED LIST DOCD IN RCRD: CPT | Mod: CPTII,S$GLB,, | Performed by: ORTHOPAEDIC SURGERY

## 2023-05-03 PROCEDURE — 1125F PR PAIN SEVERITY QUANTIFIED, PAIN PRESENT: ICD-10-PCS | Mod: CPTII,S$GLB,, | Performed by: ORTHOPAEDIC SURGERY

## 2023-05-03 PROCEDURE — 3288F FALL RISK ASSESSMENT DOCD: CPT | Mod: CPTII,S$GLB,, | Performed by: ORTHOPAEDIC SURGERY

## 2023-05-03 PROCEDURE — 99999 PR PBB SHADOW E&M-EST. PATIENT-LVL III: CPT | Mod: PBBFAC,,, | Performed by: ORTHOPAEDIC SURGERY

## 2023-05-03 PROCEDURE — 99214 PR OFFICE/OUTPT VISIT, EST, LEVL IV, 30-39 MIN: ICD-10-PCS | Mod: S$GLB,,, | Performed by: ORTHOPAEDIC SURGERY

## 2023-05-03 PROCEDURE — 3288F PR FALLS RISK ASSESSMENT DOCUMENTED: ICD-10-PCS | Mod: CPTII,S$GLB,, | Performed by: ORTHOPAEDIC SURGERY

## 2023-05-03 PROCEDURE — 1159F PR MEDICATION LIST DOCUMENTED IN MEDICAL RECORD: ICD-10-PCS | Mod: CPTII,S$GLB,, | Performed by: ORTHOPAEDIC SURGERY

## 2023-05-03 PROCEDURE — 99999 PR PBB SHADOW E&M-EST. PATIENT-LVL III: ICD-10-PCS | Mod: PBBFAC,,, | Performed by: ORTHOPAEDIC SURGERY

## 2023-05-03 PROCEDURE — 1101F PT FALLS ASSESS-DOCD LE1/YR: CPT | Mod: CPTII,S$GLB,, | Performed by: ORTHOPAEDIC SURGERY

## 2023-05-03 PROCEDURE — 1157F ADVNC CARE PLAN IN RCRD: CPT | Mod: CPTII,S$GLB,, | Performed by: ORTHOPAEDIC SURGERY

## 2023-05-04 NOTE — PROGRESS NOTES
DATE: 5/4/2023  PATIENT: Jo-Ann Escobedo    Attending Physician: Paco Fay M.D.    CHIEF COMPLAINT:  Right hip pain    HISTORY:  Jo-Ann Escobedo is a 82 y.o. female here today with history of low back left hip pain.  Patient states that pain is over the lateral aspect of the hip.  Has been present for approximately 60 days however 3 days ago the pain is just stop.  She did see Dr. Stanley for this who thought the pain might be coming from her hip.  Therefore she was referred to Sports Medicine.  MRI performed showing labral tear.  She was set up for a total hip with Dr. Salgado however they were interested in getting a 2nd opinion.  Also has a history of getting a medial branch block in 2019. Overall she feels well today.  No symptoms of myelopathy.  No bowel or bladder changes.    The Patient denies myelopathic symptoms such as handwriting changes or difficulty with buttons/coins/keys. Denies perineal paresthesias, bowel/bladder dysfunction.    Interval history 12.6.21:  Patient returns for follow-up of low back and left hip radiculopathy .  She reports she did not get the injection in her lumbar spine, this she was concerned about the possibility that would affect her in a total hip arthroplasty.  She has a known left hip labral tear.  She says that the symptoms in her low back and leg have become debilitating.  The symptoms might be related from her left leg to her right leg and then back to her left leg again.  She denies weakness.  She had previous injections over 3 years ago, she does not recall how much relief she got.  She is currently doing therapy and this has led to some improvement.    Interval update 1/26/22  Patient recently had lumbar KARLI. She reports significant improvement in radicular leg pain. Says she is completely better. She is very happy with her outcome.     Interval history 5/4/22: She returns due to recurrence of her low back pain with right side radiculopathy. She had relief of her  symptoms till roughly half way through 3/22. No she is back to her base line symptoms. Since out last visit she has noticed some increased difficulty with keys and buttons. No falls or traumas. She denies any upper extremity weakness, tingling, numbness.    5/11/22: She returns to clinic today for f/u of c spine MRI. She endorses increased difficulty with small objects. She states today is a good day.     Interval Hx 10/10/22: Had her L spine KARLI and provided no relief. Obtained repeat MRI of L spine to determine if any surgical intervention would be indicated at this time. Presents to discuss MRI results. Her pain hasn't improved and still significantly hindering her quality of life.  Reminded us she had a L-spine surgery (likely micro diskectomy at what she endorses was probably L3-4 over 30 years ago.    Interval history: 2/1/23  Returns today for continued observation of her lumbar radiculopathy stenosis.  She did not obtain any benefit from her last is here to further discuss surgical options.  She wishes to proceed with surgery at this time.  We will get her in to the preop clinic for clearance and risk stratification.  There has been no acute changes medical or surgical history since she was last seen.    Interval history 5/4/23:  Pt here for fu after right hip intraarticular CSI. Pt reports only around 20% relief for 2 days after injection. Still reporting significant pain in C sign distribution to right hip, going deep into groin. Worst with ambulation and improved at rest. No N/T to BLE. No subjective weakness to BLE.       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 She subsequently underwent colonoscopy 12/9 and several large diverticula in the sigmoid colon was seen with hematin throughout the colon; blood pooling also noted in the sigmoid colon, during which clip was placed f    Hematochezia     Hypothyroidism, postsurgical 7/1/2015    Mixed hyperlipidemia 9/27/2012    Multinodular goiter 7/31/2014    Paget's disease of bone 7/10/2013    SCC (squamous cell carcinoma) 12/2020    left 5th knuckle    Squamous Cell Carcinoma     in situ right neck      Past Surgical History:   Procedure Laterality Date    BREAST BIOPSY Right 2019    BREAST LUMPECTOMY Right 2019    CARPAL TUNNEL RELEASE Left 6/5/2020    Procedure: RELEASE, CARPAL TUNNEL Left;  Surgeon: Pricila Presley MD;  Location: [facility] OR;  Service: Orthopedics;  Laterality: Left;    CARPAL TUNNEL RELEASE Right 12/5/2022    Procedure: RELEASE, CARPAL TUNNEL, right;  Surgeon: Pricila Presley MD;  Location: [facility] OR;  Service: Orthopedics;  Laterality: Right;    CATARACT EXTRACTION W/  INTRAOCULAR LENS IMPLANT Bilateral     COLONOSCOPY N/A 4/15/2019    Procedure: COLONOSCOPY;  Surgeon: Artur Monet MD;  Location: [facility] (4TH FLR);  Service: Endoscopy;  Laterality: N/A;  within 1 month    COLONOSCOPY N/A 12/9/2019    Procedure: COLONOSCOPY;  Surgeon: Clyde Welch MD;  Location: [facility] (2ND FLR);  Service: Endoscopy;  Laterality: N/A;    COLONOSCOPY N/A 2/13/2023    Procedure: COLONOSCOPY;  Surgeon: Johan Hoyos MD;  Location: [facility] (4TH FLR);  Service: Endoscopy;  Laterality: N/A;  Okay for any CRS  MD if Dr. Monet booked. but hopeful to get this done about 4 weeks from now  1/10 - pt called about time change and MD change. patient verbalized understanding and new instructions sent - sm  Precall complete- [place]    ENDOSCOPIC ULTRASOUND OF UPPER GASTROINTESTINAL TRACT N/A 1/22/2020    Procedure: ULTRASOUND, UPPER GI TRACT, ENDOSCOPIC;  Surgeon: Eleazar Shaffer MD;  Location: [facility] ENDO (2ND FLR);  Service: Endoscopy;  Laterality: N/A;  EUS for 10mm SML w/negative pillow sign and negative naked fat sign which was found at the incisura.  Dr Welch    EPIDURAL STEROID INJECTION Bilateral 12/17/2021    Procedure: INJECTION, STEROID, EPIDURAL, L3-L4 Bilateral DIRECT REF Transforaminal;  Surgeon: Julian Fish MD;  Location: [facility] PAIN MGT;  Service: Pain Management;  Laterality: Bilateral;    ESOPHAGOGASTRODUODENOSCOPY N/A 12/7/2019    Procedure: EGD (ESOPHAGOGASTRODUODENOSCOPY);  Surgeon: Clyde Welch MD;  Location: [facility] (2ND FLR);  Service: Endoscopy;  Laterality: N/A;    EXCISIONAL BIOPSY Right 6/27/2019    Procedure: EXCISIONAL BIOPSY-breast;  Surgeon: Skui Dill MD;  [address]  Service: General;  Laterality: Right;    EYE SURGERY      HYSTERECTOMY      INJECTION Right 3/3/2023    Procedure: INJECTION, RIGHT HIP CORTICOSTEROID /LOCAL INJECTION CONTRAST DIRECT REF;  Surgeon: Julian Fish MD;  Location: [facility] PAIN MGT;  Service: Pain Management;  Laterality: Right;    INJECTION FOR SENTINEL NODE IDENTIFICATION Right 7/30/2020    Procedure: INJECTION, FOR SENTINEL NODE IDENTIFICATION;  Surgeon: Suki Dill MD;  Location: [facility] OR;  Service: General;  Laterality: Right;    INJECTION OF ANESTHETIC AGENT AROUND MEDIAL BRANCH NERVES INNERVATING LUMBAR FACET JOINT Bilateral 6/7/2019    Procedure: BLOCK, NERVE, FACET JOINT, LUMBAR, MEDIAL BRANCH-deo MBB L4/5,L5/S1;  Surgeon: Jarvis Morales III, MD;  Location: [facility] CATH LAB;  Service: Pain Management;  Laterality: Bilateral;     INJECTION OF STEROID Right 6/5/2020    Procedure: INJECTION, STEROID right carpal tunel injection/ Right ring trigger finger injection;  Surgeon: Pricila Presley MD;  Location: [facility] OR;  Service: Orthopedics;  Laterality: Right;  INJECTION, STEROID right carpal tunel injection/ Right ring trigger finger injection    KNEE ARTHROSCOPY N/A     pt unsure of which knee     MASTECTOMY      OOPHORECTOMY      SENTINEL LYMPH NODE BIOPSY Right 7/30/2020    Procedure: BIOPSY, LYMPH NODE, SENTINEL;  Surgeon: Suki Dill MD;  Location: [facility] OR;  Service: General;  Laterality: Right;    SPINE SURGERY      TOTAL THYROIDECTOMY      TRANSFORAMINAL EPIDURAL INJECTION OF STEROID Bilateral 5/27/2022    Procedure: INJECTION, STEROID, EPIDURAL, TF BILATERAL L3-L4 CONTRAST DIRECT REF;  Surgeon: Julian Fish MD;  Location: [facility] PAIN MGT;  Service: Pain Management;  Laterality: Bilateral;    TRANSFORAMINAL EPIDURAL INJECTION OF STEROID Bilateral 10/21/2022    Procedure: LUMBAR TRANSFORAMINAL BILATERAL L3/4 CONTRAST DIRECT REFERRAL;  Surgeon: Julian Fish MD;  Location: [facility] PAIN MGT;  Service: Pain Management;  Laterality: Bilateral;    UNILATERAL MASTECTOMY Right 7/30/2020    Procedure: MASTECTOMY, UNILATERAL;  Surgeon: Suki Dill MD;  Location: [facility] OR;  Service: General;  Laterality: Right;    YAG Laser Capsulotomy  Left 07/29/2019 01/06/2021 OD//Dr. Hahn       Current Medications:   Current Outpatient Medications:     acetaminophen (TYLENOL) 500 MG tablet, Take 2 tablets (1,000 mg total) by mouth every 8 (eight) hours as needed for Pain., Disp: 54 tablet, Rfl: 0    ascorbic acid, vitamin C, (VITAMIN C) 100 MG tablet, Take 100 mg by mouth once daily., Disp: , Rfl:     cholecalciferol, vitamin D3, (VITAMIN D3) 50 mcg (2,000 unit) Tab, Take 1 tablet by mouth once daily., Disp: , Rfl:     co-enzyme Q-10 30 mg capsule, Take 30 mg by mouth once daily., Disp: , Rfl:     hydroCHLOROthiazide (HYDRODIURIL) 25 MG  tablet, Take 0.5 tablets (12.5 mg total) by mouth once daily., Disp: 15 tablet, Rfl: 11    LACTOBACILLUS COMBINATION NO.8 (ADULT PROBIOTIC ORAL), Take by mouth once daily. , Disp: , Rfl:     levothyroxine (SYNTHROID) 125 MCG tablet, Take 1 tablet (125 mcg total) by mouth before breakfast., Disp: 90 tablet, Rfl: 3    losartan (COZAAR) 100 MG tablet, Take 1 tablet (100 mg total) by mouth once daily., Disp: 90 tablet, Rfl: 3    rc-xl-yq7-dha-epa-fish-lut-carlos (OCUVITE ADULT 50 PLUS) 250 mg (90 mg-160 mg) Cap, Take by mouth., Disp: , Rfl:     sodium,potassium,mag sulfates (SUPREP BOWEL PREP KIT) 17.5-3.13-1.6 gram SolR, Follow instructions given by Endoscopy scheduling nurse, Disp: 1 kit, Rfl: 0    turmeric root extract 500 mg Cap, Take by mouth once daily. , Disp: , Rfl:     fenofibrate 160 MG Tab, TAKE 1 TABLET (160 MG TOTAL) BY MOUTH ONCE DAILY., Disp: 90 tablet, Rfl: 3    OPW GABAPENTIN 5% LIDOCAINE HCL 5% TOPICAL CREAM 50G, Apply 50 g topically 3 (three) times daily as needed (Pain). Apply topically. This compounded medication will  in 30 days. (Patient not taking: Reported on 5/3/2023), Disp: 50 g, Rfl: 1    Social History:   Social History     Socioeconomic History    Marital status:    Occupational History    Occupation: realtor     Employer: [employer]     Employer: [employer]   Tobacco Use    Smoking status: Former     Packs/day: 2.00     Years: 44.00     Pack years: 88.00     Types: Cigarettes     Quit date: 1969     Years since quittin.3    Smokeless tobacco: Never   Substance and Sexual Activity    Alcohol use: Not Currently    Drug use: No    Sexual activity: Not Currently   Other Topics Concern    Are you pregnant or think you may be? No    Breast-feeding No     Social Determinants of Health     Financial Resource Strain: Low Risk     Difficulty of Paying Living Expenses: Not hard at all   Food Insecurity: No Food Insecurity    Worried About Running Out of Food in the Last  "Year: Never true    Ran Out of Food in the Last Year: Never true   Transportation Needs: No Transportation Needs    Lack of Transportation (Medical): No    Lack of Transportation (Non-Medical): No   Physical Activity: Sufficiently Active    Days of Exercise per Week: 7 days    Minutes of Exercise per Session: 30 min   Stress: No Stress Concern Present    Feeling of Stress : Not at all   Social Connections: Unknown    Frequency of Communication with Friends and Family: More than three times a week    Frequency of Social Gatherings with Friends and Family: More than three times a week    Active Member of Clubs or Organizations: Yes    Attends Club or Organization Meetings: More than 4 times per year    Marital Status:    Housing Stability: Low Risk     Unable to Pay for Housing in the Last Year: No    Number of Places Lived in the Last Year: 1    Unstable Housing in the Last Year: No       REVIEW OF SYSTEMS:  Constitution: Negative. Negative for chills, fever and night sweats.   Cardiovascular: Negative for chest pain and syncope.   Respiratory: Negative for cough and shortness of breath.   Gastrointestinal: See HPI. Negative for nausea/vomiting. Negative for abdominal pain.  Genitourinary: See HPI. Negative for discoloration or dysuria.  Hematologic/Lymphatic: n for bleeding/clotting disorders.   Musculoskeletal: Negative for falls and muscle weakness.   Neurological: See HPI. n history of seizures. n history of cranial surgery or shunts.  Neurological: See HPI. No seizures.   Endocrine: Negative for polydipsia, polyphagia and polyuria.   Allergic/Immunologic: Negative for hives and persistent infections.     EXAM:  Ht 5' 2" (1.575 m)   Wt 79.6 kg (175 lb 7.8 oz)   LMP  (LMP Unknown)   BMI 32.10 kg/m²     PHYSICAL EXAMINATION:    General: The patient is a pleasant 82 y.o. female in no apparent distress, the patient is orientatied to person, place and time.  Psych: Normal mood and affect  HEENT: Vision " grossly intact, hearing intact to the spoken word.  Lungs: Respirations unlabored.  Gait: Normal station and gait, no difficulty with toe or heel walk.   Skin: Dorsal lumbar skin negative for rashes, lesions, hairy patches and surgical scars. There is n lumbar tenderness to palpation.  Range of motion: Lumbar range of motion is acceptable.  Spinal Balance: Global saggital and coronal spinal balance acceptable, no significant for scoliosis and kyphosis.  Musculoskeletal: No pain with the range of motion of the bilateral hips. No trochanteric tenderness to palpation.  Vascular: Bilateral lower extremities warm and well perfused, Dorsalis pedis pulses 2+ bilaterally.  Neurological: Normal strength and tone in all major motor groups in the bilateral lower extremities. Normal sensation to light touch in the L2-S1 dermatomes bilaterally.  Deep tendon reflexes symmetric 2+ in the bilateral lower extremities.  Negative Babinski bilaterally. Straight leg raise negative bilaterally.    5/5 UE strength BUE  Negative Bruner on exam    IMAGING:      Today I personally reviewed MRI C spine: Diffuse spondylosis and canal narrowing C3- T1. No evidence of myelomalacia. Foraminal stenosis R C 5/6 and bilaterally C6/7    MRI L-spine with moderate to severe central stenosis at L3-4 and moderate stenosis centrally at L4-5.     Body mass index is 32.1 kg/m².  Hemoglobin A1C   Date Value Ref Range Status   07/18/2022 5.7 (H) 4.0 - 5.6 % Final     Comment:     ADA Screening Guidelines:  5.7-6.4%  Consistent with prediabetes  >or=6.5%  Consistent with diabetes    High levels of fetal hemoglobin interfere with the HbA1C  assay. Heterozygous hemoglobin variants (HbS, HgC, etc)do  not significantly interfere with this assay.   However, presence of multiple variants may affect accuracy.     04/30/2020 6.1 (H) 4.0 - 5.6 % Final     Comment:     ADA Screening Guidelines:  5.7-6.4%  Consistent with prediabetes  >or=6.5%  Consistent with  diabetes  High levels of fetal hemoglobin interfere with the HbA1C  assay. Heterozygous hemoglobin variants (HbS, HgC, etc)do  not significantly interfere with this assay.   However, presence of multiple variants may affect accuracy.     10/18/2019 5.6 4.0 - 5.6 % Final     Comment:     ADA Screening Guidelines:  5.7-6.4%  Consistent with prediabetes  >or=6.5%  Consistent with diabetes  High levels of fetal hemoglobin interfere with the HbA1C  assay. Heterozygous hemoglobin variants (HbS, HgC, etc)do  not significantly interfere with this assay.   However, presence of multiple variants may affect accuracy.         ASSESSMENT/PLAN:      Jo-Ann was seen today for back pain.    Diagnoses and all orders for this visit:    Lumbosacral radiculopathy    Spinal stenosis of lumbar region with neurogenic claudication         Patient reports minimal relief from right hip intraarticular CSI, however clinically I am still concerned that some percentage of her pain is coming from her right and unclear what percentage of her pain is coming from her known spine pathology. She has not seen anyone for her R hip so I will refer her to one of my joint replacement partners for further evaluation for possible arthroplasty.

## 2023-05-17 ENCOUNTER — LAB VISIT (OUTPATIENT)
Dept: LAB | Facility: HOSPITAL | Age: 83
End: 2023-05-17
Payer: MEDICARE

## 2023-05-17 ENCOUNTER — OFFICE VISIT (OUTPATIENT)
Dept: INTERNAL MEDICINE | Facility: CLINIC | Age: 83
End: 2023-05-17
Payer: MEDICARE

## 2023-05-17 VITALS
OXYGEN SATURATION: 97 % | HEART RATE: 96 BPM | SYSTOLIC BLOOD PRESSURE: 129 MMHG | DIASTOLIC BLOOD PRESSURE: 71 MMHG | WEIGHT: 176.13 LBS | BODY MASS INDEX: 32.41 KG/M2 | HEIGHT: 62 IN

## 2023-05-17 DIAGNOSIS — R73.03 PREDIABETES: ICD-10-CM

## 2023-05-17 DIAGNOSIS — Z78.0 MENOPAUSE: ICD-10-CM

## 2023-05-17 DIAGNOSIS — D12.6 ADENOMATOUS POLYP OF COLON, UNSPECIFIED PART OF COLON: ICD-10-CM

## 2023-05-17 DIAGNOSIS — E89.0 HYPOTHYROIDISM, POSTSURGICAL: ICD-10-CM

## 2023-05-17 DIAGNOSIS — M54.17 LUMBOSACRAL RADICULOPATHY: ICD-10-CM

## 2023-05-17 DIAGNOSIS — Z00.00 HEALTHCARE MAINTENANCE: ICD-10-CM

## 2023-05-17 DIAGNOSIS — Z00.00 HEALTHCARE MAINTENANCE: Primary | ICD-10-CM

## 2023-05-17 DIAGNOSIS — L60.0 IGTN (INGROWING TOE NAIL): ICD-10-CM

## 2023-05-17 LAB
ESTIMATED AVG GLUCOSE: 108 MG/DL (ref 68–131)
HBA1C MFR BLD: 5.4 % (ref 4–5.6)
T4 FREE SERPL-MCNC: 1.33 NG/DL (ref 0.71–1.51)
TSH SERPL DL<=0.005 MIU/L-ACNC: 0.04 UIU/ML (ref 0.4–4)

## 2023-05-17 PROCEDURE — 99999 PR PBB SHADOW E&M-EST. PATIENT-LVL V: ICD-10-PCS | Mod: PBBFAC,GC,,

## 2023-05-17 PROCEDURE — 1160F RVW MEDS BY RX/DR IN RCRD: CPT | Mod: CPTII,GC,,

## 2023-05-17 PROCEDURE — 3288F FALL RISK ASSESSMENT DOCD: CPT | Mod: CPTII,GC,,

## 2023-05-17 PROCEDURE — 1101F PR PT FALLS ASSESS DOC 0-1 FALLS W/OUT INJ PAST YR: ICD-10-PCS | Mod: CPTII,GC,,

## 2023-05-17 PROCEDURE — 99215 OFFICE O/P EST HI 40 MIN: CPT

## 2023-05-17 PROCEDURE — 1159F MED LIST DOCD IN RCRD: CPT | Mod: CPTII,GC,,

## 2023-05-17 PROCEDURE — 1157F PR ADVANCE CARE PLAN OR EQUIV PRESENT IN MEDICAL RECORD: ICD-10-PCS | Mod: CPTII,GC,,

## 2023-05-17 PROCEDURE — 36415 COLL VENOUS BLD VENIPUNCTURE: CPT

## 2023-05-17 PROCEDURE — 3074F PR MOST RECENT SYSTOLIC BLOOD PRESSURE < 130 MM HG: ICD-10-PCS | Mod: CPTII,GC,,

## 2023-05-17 PROCEDURE — 99213 OFFICE O/P EST LOW 20 MIN: CPT | Mod: GC,,,

## 2023-05-17 PROCEDURE — 3078F PR MOST RECENT DIASTOLIC BLOOD PRESSURE < 80 MM HG: ICD-10-PCS | Mod: CPTII,GC,,

## 2023-05-17 PROCEDURE — 3078F DIAST BP <80 MM HG: CPT | Mod: CPTII,GC,,

## 2023-05-17 PROCEDURE — 1157F ADVNC CARE PLAN IN RCRD: CPT | Mod: CPTII,GC,,

## 2023-05-17 PROCEDURE — 84439 ASSAY OF FREE THYROXINE: CPT

## 2023-05-17 PROCEDURE — 3288F PR FALLS RISK ASSESSMENT DOCUMENTED: ICD-10-PCS | Mod: CPTII,GC,,

## 2023-05-17 PROCEDURE — 83036 HEMOGLOBIN GLYCOSYLATED A1C: CPT

## 2023-05-17 PROCEDURE — 1125F PR PAIN SEVERITY QUANTIFIED, PAIN PRESENT: ICD-10-PCS | Mod: CPTII,GC,,

## 2023-05-17 PROCEDURE — 1101F PT FALLS ASSESS-DOCD LE1/YR: CPT | Mod: CPTII,GC,,

## 2023-05-17 PROCEDURE — 99999 PR PBB SHADOW E&M-EST. PATIENT-LVL V: CPT | Mod: PBBFAC,GC,,

## 2023-05-17 PROCEDURE — 84443 ASSAY THYROID STIM HORMONE: CPT

## 2023-05-17 PROCEDURE — 1159F PR MEDICATION LIST DOCUMENTED IN MEDICAL RECORD: ICD-10-PCS | Mod: CPTII,GC,,

## 2023-05-17 PROCEDURE — 1160F PR REVIEW ALL MEDS BY PRESCRIBER/CLIN PHARMACIST DOCUMENTED: ICD-10-PCS | Mod: CPTII,GC,,

## 2023-05-17 PROCEDURE — 1125F AMNT PAIN NOTED PAIN PRSNT: CPT | Mod: CPTII,GC,,

## 2023-05-17 PROCEDURE — 3074F SYST BP LT 130 MM HG: CPT | Mod: CPTII,GC,,

## 2023-05-17 PROCEDURE — 99213 PR OFFICE/OUTPT VISIT, EST, LEVL III, 20-29 MIN: ICD-10-PCS | Mod: GC,,,

## 2023-05-17 NOTE — PATIENT INSTRUCTIONS
- Please get the labs drawn as previously discussed at your earliest convenience. You can access your results early through the [facility]. I will follow up as well. Don't hesitate to reach out to the clinic if you have further questions.  - Please continue improving your diet and exercise regimen as tolerated.  - Please continue taking your medications as directed below.  - I have placed the following referrals for you: Colonoscopy. Please schedule those appointments at your earliest convenience. Please follow up with your Pain Clinic and Hip Specialist at your earliest convenience.    Plan to return to clinic for your next visit in 5-6 months. You are always welcome to reach out or schedule an appointment sooner if something else comes up.    Sincerely,  Dr Neely

## 2023-05-18 ENCOUNTER — HOSPITAL ENCOUNTER (OUTPATIENT)
Dept: RADIOLOGY | Facility: HOSPITAL | Age: 83
Discharge: HOME | End: 2023-05-18
Payer: MEDICARE

## 2023-05-18 DIAGNOSIS — Z00.00 HEALTHCARE MAINTENANCE: ICD-10-CM

## 2023-05-18 DIAGNOSIS — Z78.0 MENOPAUSE: ICD-10-CM

## 2023-05-18 PROBLEM — E05.90 SUBCLINICAL HYPERTHYROIDISM: Status: ACTIVE | Noted: 2023-05-18

## 2023-05-18 PROCEDURE — 77080 DXA BONE DENSITY AXIAL SKELETON 1 OR MORE SITES: ICD-10-PCS | Mod: 26,,, | Performed by: INTERNAL MEDICINE

## 2023-05-18 PROCEDURE — 77080 DXA BONE DENSITY AXIAL: CPT | Mod: TC

## 2023-05-18 PROCEDURE — 77080 DXA BONE DENSITY AXIAL: CPT | Mod: 26,,, | Performed by: INTERNAL MEDICINE

## 2023-05-18 RX ORDER — LEVOTHYROXINE SODIUM 112 UG/1
112 TABLET ORAL
Qty: 90 TABLET | Refills: 3 | Status: SHIPPED | OUTPATIENT
Start: 2023-05-18 | End: 2024-03-14

## 2023-05-18 NOTE — PROGRESS NOTES
Subjective     Chief Complaint: Annual Examination    History of Present Illness:  Ms. Jo-Ann Escobedo is a 82 y.o. female with HTN, colonic polyp disease, previous diverticulitis, chronic lower back pain 2/2 L-spine radiculopathy, arthritis, Paget's disease of bone presenting for annual examination.    1. Healthcare maintenance  Overview:  #Health Maintenance  Overall: No recent illnesses, still has moderate/severe R hip pain.  Work: Semi-retired (real-estate)  Family: Daughter in [facility], 2 grand kids  Stressors: None  Diet: Balanced (vegetables, lean meats), no sweets, no sodas  Exercise: Limited due to hip pain, still walks 3x 15 minutes at a time for her dog, uses a cane; two story house in [place], has a stair-life  Substance Use: None  Sexual History: Not currently (LMP, contraception)  PHQ: 0     -- CBC; deferred  -- CMP; deferred  -- A1C; future  -- TSH / FT4; future  -- Lipid Panel; deferred  -- Vaccines: Up to date  -- Cancer screenings: C-scope 05/2023 or 06/2023, referral placed, follows with GI (multiple polyps)  -- DEXA scan; future      Orders:  -     DXA Bone Density Axial Skeleton 1 or more sites; Future; Expected date: 05/17/2023  -     HEMOGLOBIN A1C; Future; Expected date: 05/17/2023    2. Lumbosacral radiculopathy  Overview:  Patient with existing multilevel lumbar spondylosis with variable neural foraminal narrowing as detailed on MRI from 10/09/2022. Patient follows closely with pain clinic (Dr Paco Fay MD) where she receives epidural injections, most recently done 10/21/2022. Also with pre-existing L labral tear, non operative. Patient states there is usually moderate to good relief with epidural injections, but this most recent one did not have the desired effect. Patient able to ambulate with mild assistance of cane, however patient also can ambulate without. Reports paraspinal pain of lower back, R groin pain, R hip pain. Denies urinary / fecal incontinence, changes in  sensation in lower extremity and perineum.      -- Did not want to take Robaxin 500 TID PRN  -- Did not tolerate gabapentin 100 TID PRN (light-headed)  -- Limited to no effect w/ topical Voltaren, Gabapentin/Lidocaine agents PRN  -- F/U with Pain Clinic      3. IGTN (ingrowing toe nail)  Overview:  Ingrown toenail of L great toe.     -- Referral to podiatry  -- Counseled on regular hygeine    Orders:  -     Ambulatory referral/consult to Podiatry; Future; Expected date: 05/24/2023    4. Hypothyroidism, postsurgical  Overview:  TSH 0.035, decreased from previous draw, which was low. On levothyroxine 125 mcg.     -- Decrease dose to 112 mcg  -- Recheck TSH/T4 in 3 months    Orders:  -     TSH; Future; Expected date: 05/17/2023  -     levothyroxine (SYNTHROID) 112 MCG tablet; Take 1 tablet (112 mcg total) by mouth before breakfast.  Dispense: 90 tablet; Refill: 3  -     TSH; Future; Expected date: 08/18/2023    5. Adenomatous polyp of colon, unspecified part of colon  -     Ambulatory referral/consult to Endo Procedure ; Future; Expected date: 05/18/2023    6. Prediabetes  -     HEMOGLOBIN A1C; Future; Expected date: 05/17/2023    7. Menopause  -     DXA Bone Density Axial Skeleton 1 or more sites; Future; Expected date: 05/17/2023         Review of Systems   HENT:  Positive for hearing loss.    Eyes:  Negative for discharge.   Respiratory:  Negative for wheezing.    Cardiovascular:  Negative for chest pain and palpitations.   Gastrointestinal:  Negative for blood in stool, constipation, diarrhea and vomiting.   Genitourinary:  Negative for dysuria and hematuria.   Musculoskeletal:  Negative for neck pain.   Neurological:  Negative for weakness and headaches.   Endo/Heme/Allergies:  Negative for polydipsia.     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 She subsequently underwent colonoscopy 12/9 and several large diverticula in the sigmoid colon was seen with hematin throughout the colon; blood pooling also noted in the sigmoid colon, during which clip was placed f    Hematochezia     Hypothyroidism, postsurgical 7/1/2015    Mixed hyperlipidemia 9/27/2012    Multinodular goiter 7/31/2014    Paget's disease of bone 7/10/2013    SCC (squamous cell carcinoma) 12/2020    left 5th knuckle    Squamous Cell Carcinoma     in situ right neck        Past Surgical History:   Procedure Laterality Date    BREAST BIOPSY Right 2019    BREAST LUMPECTOMY Right 2019    CARPAL TUNNEL RELEASE Left 6/5/2020    Procedure: RELEASE, CARPAL TUNNEL Left;  Surgeon: Pricila Presley MD;  Location: [facility] OR;  Service: Orthopedics;  Laterality: Left;    CARPAL TUNNEL RELEASE Right 12/5/2022    Procedure: RELEASE, CARPAL TUNNEL, right;  Surgeon: Pricila Presley MD;  Location: [facility] OR;  Service: Orthopedics;  Laterality: Right;    CATARACT EXTRACTION W/  INTRAOCULAR LENS IMPLANT Bilateral     COLONOSCOPY N/A 4/15/2019    Procedure: COLONOSCOPY;  Surgeon: Artur Monet MD;  Location: [facility] (4TH FLR);  Service: Endoscopy;  Laterality: N/A;  within 1 month    COLONOSCOPY N/A 12/9/2019    Procedure: COLONOSCOPY;  Surgeon: Clyde Welch MD;  Location: [facility] ENDO (2ND FLR);  Service: Endoscopy;  Laterality: N/A;     COLONOSCOPY N/A 2/13/2023    Procedure: COLONOSCOPY;  Surgeon: Johan Hoyos MD;  Location: [facility] ENDO (4TH FLR);  Service: Endoscopy;  Laterality: N/A;  Okay for any CRS MD if Dr. Monet booked. but hopeful to get this done about 4 weeks from now  1/10 - pt called about time change and MD change. patient verbalized understanding and new instructions sent - sm  Precall complete- [place]    ENDOSCOPIC ULTRASOUND OF UPPER GASTROINTESTINAL TRACT N/A 1/22/2020    Procedure: ULTRASOUND, UPPER GI TRACT, ENDOSCOPIC;  Surgeon: Eleazar Shaffer MD;  Location: [facility] ENDO (2ND FLR);  Service: Endoscopy;  Laterality: N/A;  EUS for 10mm SML w/negative pillow sign and negative naked fat sign which was found at the incisura.  Dr Welch    EPIDURAL STEROID INJECTION Bilateral 12/17/2021    Procedure: INJECTION, STEROID, EPIDURAL, L3-L4 Bilateral DIRECT REF Transforaminal;  Surgeon: Julian Fish MD;  Location: [facility] PAIN MGT;  Service: Pain Management;  Laterality: Bilateral;    ESOPHAGOGASTRODUODENOSCOPY N/A 12/7/2019    Procedure: EGD (ESOPHAGOGASTRODUODENOSCOPY);  Surgeon: Clyde Welch MD;  Location: [facility] (2ND FLR);  Service: Endoscopy;  Laterality: N/A;    EXCISIONAL BIOPSY Right 6/27/2019    Procedure: EXCISIONAL BIOPSY-breast;  Surgeon: Suki Dill MD;  Location: [facility] 2ND FLR;  Service: General;  Laterality: Right;    EYE SURGERY      HYSTERECTOMY      INJECTION Right 3/3/2023    Procedure: INJECTION, RIGHT HIP CORTICOSTEROID /LOCAL INJECTION CONTRAST DIRECT REF;  Surgeon: Julian Fish MD;  Location: [facility] PAIN MGT;  Service: Pain Management;  Laterality: Right;    INJECTION FOR SENTINEL NODE IDENTIFICATION Right 7/30/2020    Procedure: INJECTION, FOR SENTINEL NODE IDENTIFICATION;  Surgeon: Suki Dill MD;  Location: [facility] OR;  Service: General;  Laterality: Right;    INJECTION OF ANESTHETIC AGENT AROUND MEDIAL BRANCH NERVES INNERVATING LUMBAR FACET JOINT Bilateral 6/7/2019    Procedure: BLOCK, NERVE,  FACET JOINT, LUMBAR, MEDIAL BRANCH-deo MBB L4/5,L5/S1;  Surgeon: Jarvis Morales III, MD;  Location: [facility] CATH LAB;  Service: Pain Management;  Laterality: Bilateral;    INJECTION OF STEROID Right 6/5/2020    Procedure: INJECTION, STEROID right carpal tunel injection/ Right ring trigger finger injection;  Surgeon: Pricila Presley MD;  Location: [facility] OR;  Service: Orthopedics;  Laterality: Right;  INJECTION, STEROID right carpal tunel injection/ Right ring trigger finger injection    KNEE ARTHROSCOPY N/A     pt unsure of which knee     MASTECTOMY      OOPHORECTOMY      SENTINEL LYMPH NODE BIOPSY Right 7/30/2020    Procedure: BIOPSY, LYMPH NODE, SENTINEL;  Surgeon: Suki Dill MD;  Location: [facility] OR;  Service: General;  Laterality: Right;    SPINE SURGERY      TOTAL THYROIDECTOMY      TRANSFORAMINAL EPIDURAL INJECTION OF STEROID Bilateral 5/27/2022    Procedure: INJECTION, STEROID, EPIDURAL, TF BILATERAL L3-L4 CONTRAST DIRECT REF;  Surgeon: Julian Fish MD;  Location: [facility] PAIN MGT;  Service: Pain Management;  Laterality: Bilateral;    TRANSFORAMINAL EPIDURAL INJECTION OF STEROID Bilateral 10/21/2022    Procedure: LUMBAR TRANSFORAMINAL BILATERAL L3/4 CONTRAST DIRECT REFERRAL;  Surgeon: Julian Fish MD;  Location: [facility] PAIN MGT;  Service: Pain Management;  Laterality: Bilateral;    UNILATERAL MASTECTOMY Right 7/30/2020    Procedure: MASTECTOMY, UNILATERAL;  Surgeon: Suki Dill MD;  Location: [facility] OR;  Service: General;  Laterality: Right;    YAG Laser Capsulotomy  Left 07/29/2019 01/06/2021 OD//Dr. Hahn       Family History   Problem Relation Age of Onset    Cancer Father         lung    Dementia Mother     Glaucoma Brother     Macular degeneration Brother     Diabetes Neg Hx     Amblyopia Neg Hx     Blindness Neg Hx     Cataracts Neg Hx     Retinal detachment Neg Hx     Strabismus Neg Hx     Melanoma Neg Hx        Social History     Socioeconomic History    Marital status:     Occupational History    Occupation: [employer]     Employer: [employer]     Employer: [employer]   Tobacco Use    Smoking status: Former     Packs/day: 2.00     Years: 44.00     Pack years: 88.00     Types: Cigarettes     Quit date: 1969     Years since quittin.4    Smokeless tobacco: Never   Substance and Sexual Activity    Alcohol use: Not Currently    Drug use: No    Sexual activity: Not Currently   Other Topics Concern    Are you pregnant or think you may be? No    Breast-feeding No     Social Determinants of Health     Financial Resource Strain: Low Risk     Difficulty of Paying Living Expenses: Not hard at all   Food Insecurity: No Food Insecurity    Worried About Running Out of Food in the Last Year: Never true    Ran Out of Food in the Last Year: Never true   Transportation Needs: No Transportation Needs    Lack of Transportation (Medical): No    Lack of Transportation (Non-Medical): No   Physical Activity: Sufficiently Active    Days of Exercise per Week: 7 days    Minutes of Exercise per Session: 50 min   Stress: No Stress Concern Present    Feeling of Stress : Not at all   Social Connections: Unknown    Frequency of Communication with Friends and Family: More than three times a week    Frequency of Social Gatherings with Friends and Family: Three times a week    Active Member of Clubs or Organizations: Yes    Attends Club or Organization Meetings: More than 4 times per year    Marital Status:    Housing Stability: Low Risk     Unable to Pay for Housing in the Last Year: No    Number of Places Lived in the Last Year: 1    Unstable Housing in the Last Year: No       MEDICATIONS & ALLERGIES:     Current Outpatient Medications on File Prior to Visit   Medication Sig    acetaminophen (TYLENOL) 500 MG tablet Take 2 tablets (1,000 mg total) by mouth every 8 (eight) hours as needed for Pain.    ascorbic acid, vitamin C, (VITAMIN C) 100 MG tablet Take 100 mg by mouth once daily.     "cholecalciferol, vitamin D3, (VITAMIN D3) 50 mcg (2,000 unit) Tab Take 1 tablet by mouth once daily.    co-enzyme Q-10 30 mg capsule Take 30 mg by mouth once daily.    fenofibrate 160 MG Tab TAKE 1 TABLET (160 MG TOTAL) BY MOUTH ONCE DAILY.    hydroCHLOROthiazide (HYDRODIURIL) 25 MG tablet Take 0.5 tablets (12.5 mg total) by mouth once daily.    LACTOBACILLUS COMBINATION NO.8 (ADULT PROBIOTIC ORAL) Take by mouth once daily.     losartan (COZAAR) 100 MG tablet Take 1 tablet (100 mg total) by mouth once daily.    ky-hz-el5-dha-epa-fish-lut-carlos (OCUVITE ADULT 50 PLUS) 250 mg (90 mg-160 mg) Cap Take by mouth.    OPW GABAPENTIN 5% LIDOCAINE HCL 5% TOPICAL CREAM 50G Apply 50 g topically 3 (three) times daily as needed (Pain). Apply topically. This compounded medication will  in 30 days. (Patient not taking: Reported on 5/3/2023)    sodium,potassium,mag sulfates (SUPREP BOWEL PREP KIT) 17.5-3.13-1.6 gram SolR Follow instructions given by Endoscopy scheduling nurse    turmeric root extract 500 mg Cap Take by mouth once daily.     [DISCONTINUED] levothyroxine (SYNTHROID) 125 MCG tablet Take 1 tablet (125 mcg total) by mouth before breakfast.     No current facility-administered medications on file prior to visit.       Review of patient's allergies indicates:   Allergen Reactions    Voltaren [diclofenac sodium] Other (See Comments)     Hematochezia and hospitalization for hematochezia.    Codeine Diarrhea and Hallucinations    Niacin preparations      Redness, warming       OBJECTIVE:     Vital Signs:  Vitals:    23 1350   BP: 129/71   BP Location: Left arm   Patient Position: Sitting   BP Method: Medium (Automatic)   Pulse: 96   SpO2: 97%   Weight: 79.9 kg (176 lb 2.4 oz)   Height: 5' 2" (1.575 m)       Body mass index is 32.22 kg/m².     Physical Exam  Vitals reviewed.   Constitutional:       General: She is not in acute distress.     Appearance: Normal appearance. She is obese.   HENT:      Head: Normocephalic " and atraumatic.   Eyes:      General:         Right eye: No discharge.         Left eye: No discharge.      Extraocular Movements: Extraocular movements intact.      Pupils: Pupils are equal, round, and reactive to light.   Cardiovascular:      Rate and Rhythm: Normal rate and regular rhythm.      Pulses: Normal pulses.      Heart sounds: Normal heart sounds. No murmur heard.  Pulmonary:      Effort: Pulmonary effort is normal. No respiratory distress.      Breath sounds: Normal breath sounds.   Abdominal:      General: There is no distension.      Palpations: There is no mass.      Tenderness: There is no abdominal tenderness. There is no guarding or rebound.   Musculoskeletal:         General: No swelling or deformity. Normal range of motion.      Cervical back: Normal range of motion. No rigidity.      Right upper leg: Tenderness present. No bony tenderness.      Left upper leg: No bony tenderness.      Right lower leg: No edema.      Left lower leg: No edema.      Comments: No hitches on ranging bilaterally, smooth; limited by pain  R groin pain on R hip flexion, difficulty with abduction d/t pain (localized to R lateral hip and lower back R)  Gait mildly interrupted w/o limping; using a cane intermittently. Most difficulty is with changing from standing to seated and vice/versa   Skin:     General: Skin is warm and dry.      Coloration: Skin is not jaundiced.   Neurological:      General: No focal deficit present.      Mental Status: She is alert and oriented to person, place, and time. Mental status is at baseline.      Motor: No weakness.   Psychiatric:         Mood and Affect: Mood normal.         Behavior: Behavior normal.         Thought Content: Thought content normal.       Laboratory  Recent Results (from the past 24 hour(s))   TSH    Collection Time: 05/17/23  3:29 PM   Result Value Ref Range    TSH 0.035 (L) 0.400 - 4.000 uIU/mL   HEMOGLOBIN A1C    Collection Time: 05/17/23  3:29 PM   Result Value Ref  Range    Hemoglobin A1C 5.4 4.0 - 5.6 %    Estimated Avg Glucose 108 68 - 131 mg/dL   T4, Free    Collection Time: 05/17/23  3:29 PM   Result Value Ref Range    Free T4 1.33 0.71 - 1.51 ng/dL       Diagnostic Results:      Health Maintenance Due   Topic Date Due    DEXA Scan  01/11/2023         ASSESSMENT & PLAN:     1. Healthcare maintenance  Overview:  #Health Maintenance  Overall: No recent illnesses, still has moderate/severe R hip pain.  Work: Semi-retired (real-estate)  Family: Daughter in [facility], 2 grand kids  Stressors: None  Diet: Balanced (vegetables, lean meats), no sweets, no sodas  Exercise: Limited due to hip pain, still walks 3x 15 minutes at a time for her dog, uses a cane; two story house in [place], has a stair-life  Substance Use: None  Sexual History: Not currently (LMP, contraception)  PHQ: 0     -- CBC; deferred  -- CMP; deferred  -- A1C; future  -- TSH / FT4; future  -- Lipid Panel; deferred  -- Vaccines: Up to date  -- Cancer screenings: C-scope 05/2023 or 06/2023, referral placed, follows with GI (multiple polyps)  -- DEXA scan; future      Orders:  -     DXA Bone Density Axial Skeleton 1 or more sites; Future; Expected date: 05/17/2023  -     HEMOGLOBIN A1C; Future; Expected date: 05/17/2023    2. Lumbosacral radiculopathy  Overview:  Patient with existing multilevel lumbar spondylosis with variable neural foraminal narrowing as detailed on MRI from 10/09/2022. Patient follows closely with pain clinic (Dr Paco Fay MD) where she receives epidural injections, most recently done 10/21/2022. Also with pre-existing L labral tear, non operative. Patient states there is usually moderate to good relief with epidural injections, but this most recent one did not have the desired effect. Patient able to ambulate with mild assistance of cane, however patient also can ambulate without. Reports paraspinal pain of lower back, R groin pain, R hip pain. Denies urinary / fecal incontinence,  changes in sensation in lower extremity and perineum.      -- Did not want to take Robaxin 500 TID PRN  -- Did not tolerate gabapentin 100 TID PRN (light-headed)  -- Limited to no effect w/ topical Voltaren, Gabapentin/Lidocaine agents PRN  -- F/U with Pain Clinic      3. IGTN (ingrowing toe nail)  Overview:  Ingrown toenail of L great toe.     -- Referral to podiatry  -- Counseled on regular hygeine    Orders:  -     Ambulatory referral/consult to Podiatry; Future; Expected date: 05/24/2023    4. Hypothyroidism, postsurgical  Overview:  TSH 0.035, decreased from previous draw, which was low. On levothyroxine 125 mcg.     -- Decrease dose to 112 mcg  -- Recheck TSH/T4 in 3 months    Orders:  -     TSH; Future; Expected date: 05/17/2023  -     levothyroxine (SYNTHROID) 112 MCG tablet; Take 1 tablet (112 mcg total) by mouth before breakfast.  Dispense: 90 tablet; Refill: 3  -     TSH; Future; Expected date: 08/18/2023    5. Adenomatous polyp of colon, unspecified part of colon  -     Ambulatory referral/consult to Endo Procedure ; Future; Expected date: 05/18/2023    6. Prediabetes  -     HEMOGLOBIN A1C; Future; Expected date: 05/17/2023    7. Menopause  -     DXA Bone Density Axial Skeleton 1 or more sites; Future; Expected date: 05/17/2023        See above for further detail.    RTC in 5-6 months    Discussed with Dr Jd MD -- staff attestation to follow      Nathan-jose Neely MD  Internal Medicine PGY-2  [facility]

## 2023-05-19 ENCOUNTER — TELEPHONE (OUTPATIENT)
Dept: INTERNAL MEDICINE | Facility: CLINIC | Age: 83
End: 2023-05-19
Payer: MEDICARE

## 2023-05-19 NOTE — TELEPHONE ENCOUNTER
----- Message from Alexus Neely MD sent at 5/18/2023  1:58 PM CDT -----  Jarod Romero -- Can you please call this patient to let them know that I have lowered their thyroid medication dosing because of her recent lab results? Thank you.      ----- Message -----  From: [facility] Interface  Sent: 5/17/2023   8:21 PM CDT  To: Alexus Neely MD

## 2023-05-22 ENCOUNTER — PES CALL (OUTPATIENT)
Dept: ADMINISTRATIVE | Facility: CLINIC | Age: 83
End: 2023-05-22
Payer: MEDICARE

## 2023-05-24 ENCOUNTER — OFFICE VISIT (OUTPATIENT)
Dept: PODIATRY | Facility: CLINIC | Age: 83
End: 2023-05-24
Payer: MEDICARE

## 2023-05-24 VITALS
HEIGHT: 62 IN | HEART RATE: 88 BPM | SYSTOLIC BLOOD PRESSURE: 114 MMHG | DIASTOLIC BLOOD PRESSURE: 66 MMHG | WEIGHT: 175.69 LBS | BODY MASS INDEX: 32.33 KG/M2

## 2023-05-24 DIAGNOSIS — L60.0 IGTN (INGROWING TOE NAIL): ICD-10-CM

## 2023-05-24 DIAGNOSIS — L60.1 ONYCHOLYSIS: ICD-10-CM

## 2023-05-24 DIAGNOSIS — B35.1 ONYCHOMYCOSIS DUE TO DERMATOPHYTE: Primary | ICD-10-CM

## 2023-05-24 DIAGNOSIS — M16.11 PRIMARY OSTEOARTHRITIS OF RIGHT HIP: Primary | ICD-10-CM

## 2023-05-24 PROCEDURE — 99203 PR OFFICE/OUTPT VISIT, NEW, LEVL III, 30-44 MIN: ICD-10-PCS | Mod: S$GLB,,, | Performed by: PODIATRIST

## 2023-05-24 PROCEDURE — 3074F SYST BP LT 130 MM HG: CPT | Mod: CPTII,S$GLB,, | Performed by: PODIATRIST

## 2023-05-24 PROCEDURE — 1157F PR ADVANCE CARE PLAN OR EQUIV PRESENT IN MEDICAL RECORD: ICD-10-PCS | Mod: CPTII,S$GLB,, | Performed by: PODIATRIST

## 2023-05-24 PROCEDURE — 99203 OFFICE O/P NEW LOW 30 MIN: CPT | Mod: S$GLB,,, | Performed by: PODIATRIST

## 2023-05-24 PROCEDURE — 1159F MED LIST DOCD IN RCRD: CPT | Mod: CPTII,S$GLB,, | Performed by: PODIATRIST

## 2023-05-24 PROCEDURE — 1160F PR REVIEW ALL MEDS BY PRESCRIBER/CLIN PHARMACIST DOCUMENTED: ICD-10-PCS | Mod: CPTII,S$GLB,, | Performed by: PODIATRIST

## 2023-05-24 PROCEDURE — 1159F PR MEDICATION LIST DOCUMENTED IN MEDICAL RECORD: ICD-10-PCS | Mod: CPTII,S$GLB,, | Performed by: PODIATRIST

## 2023-05-24 PROCEDURE — 1157F ADVNC CARE PLAN IN RCRD: CPT | Mod: CPTII,S$GLB,, | Performed by: PODIATRIST

## 2023-05-24 PROCEDURE — 99999 PR PBB SHADOW E&M-EST. PATIENT-LVL IV: CPT | Mod: PBBFAC,,, | Performed by: PODIATRIST

## 2023-05-24 PROCEDURE — 3078F PR MOST RECENT DIASTOLIC BLOOD PRESSURE < 80 MM HG: ICD-10-PCS | Mod: CPTII,S$GLB,, | Performed by: PODIATRIST

## 2023-05-24 PROCEDURE — 1126F PR PAIN SEVERITY QUANTIFIED, NO PAIN PRESENT: ICD-10-PCS | Mod: CPTII,S$GLB,, | Performed by: PODIATRIST

## 2023-05-24 PROCEDURE — 1126F AMNT PAIN NOTED NONE PRSNT: CPT | Mod: CPTII,S$GLB,, | Performed by: PODIATRIST

## 2023-05-24 PROCEDURE — 3074F PR MOST RECENT SYSTOLIC BLOOD PRESSURE < 130 MM HG: ICD-10-PCS | Mod: CPTII,S$GLB,, | Performed by: PODIATRIST

## 2023-05-24 PROCEDURE — 1160F RVW MEDS BY RX/DR IN RCRD: CPT | Mod: CPTII,S$GLB,, | Performed by: PODIATRIST

## 2023-05-24 PROCEDURE — 3078F DIAST BP <80 MM HG: CPT | Mod: CPTII,S$GLB,, | Performed by: PODIATRIST

## 2023-05-24 PROCEDURE — 99999 PR PBB SHADOW E&M-EST. PATIENT-LVL IV: ICD-10-PCS | Mod: PBBFAC,,, | Performed by: PODIATRIST

## 2023-05-24 RX ORDER — KETOCONAZOLE 20 MG/G
CREAM TOPICAL DAILY
Qty: 60 G | Refills: 4 | Status: SHIPPED | OUTPATIENT
Start: 2023-05-24

## 2023-05-24 NOTE — PROGRESS NOTES
"Subjective:     Patient ID: Jo-Ann Escobedo is a 82 y.o. female.    Chief Complaint: Ingrown Toenail (Left big toe)    Jo-Ann is a 82 y.o. female who presents to the clinic complaining of thick and discolored toenails (great toe and 2nd toe) on the left foot. Jo-Ann is inquiring about treatment options. Not sure if its ingrown or something else. Nails do not hurt but are discolored and loose.     Vitals:    05/24/23 0934   BP: 114/66   Pulse: 88   Weight: 79.7 kg (175 lb 11.3 oz)   Height: 5' 2" (1.575 m)   PainSc: 0-No pain         Review of Systems   Constitutional: Negative for chills and fever.   Cardiovascular:  Negative for chest pain, claudication and leg swelling.   Respiratory:  Negative for cough and shortness of breath.    Skin:  Positive for nail changes. Negative for dry skin.   Musculoskeletal: Negative.    Gastrointestinal:  Negative for nausea and vomiting.   Neurological:  Negative for numbness and paresthesias.   Psychiatric/Behavioral:  Negative for altered mental status.       Objective:     Physical Exam  Vitals reviewed.   Constitutional:       Appearance: She is well-developed.   HENT:      Head: Normocephalic.   Cardiovascular:      Pulses:           Dorsalis pedis pulses are 2+ on the right side and 2+ on the left side.        Posterior tibial pulses are 2+ on the right side and 2+ on the left side.   Pulmonary:      Effort: No respiratory distress.   Musculoskeletal:      Right lower leg: No edema.      Left lower leg: No edema.      Comments: No pain with palpation of L hallux and 2nd toenails. Otherwise rectus foot and toe position b/l without any major deformities.    Skin:     General: Skin is warm and dry.      Findings: No erythema.      Comments: L hallux and 2nd toenails thickened with yellow/brown discoloration and subungual debris with 50-70% lysis of distal aspect with proximal attachment. No erythema, bleeding, drainage or SOI. No proximal streaking or lymphangitis noted to " dorsum of foot. Otherwise normal integumentary exam to foot.    Neurological:      Mental Status: She is alert and oriented to person, place, and time.      Sensory: No sensory deficit.   Psychiatric:         Behavior: Behavior normal.         Thought Content: Thought content normal.         Judgment: Judgment normal.           Assessment:      Encounter Diagnoses   Name Primary?    IGTN (ingrowing toe nail)     Onychomycosis due to dermatophyte Yes    Onycholysis      Plan:     Jo-Ann was seen today for ingrown toenail.    Diagnoses and all orders for this visit:    Onychomycosis due to dermatophyte    IGTN (ingrowing toe nail)  -     Ambulatory referral/consult to Podiatry    Onycholysis    Other orders  -     ketoconazole (NIZORAL) 2 % cream; Apply topically once daily. Apply daily to affected toenails for 6-12 months      I counseled the patient on her conditions, their implications and medical management.    Rx Ketoconazole cream to be applied to affected toenails for up to 1 year.    Consider trimming toenails all the way back but patient would like to hold off on that for now and continue routine trimming herself at home.     I warned patient of signs and symptoms of infection including redness, drainage, purulence, odor, streaking, fever, chills, etc and I advised patient to call clinic during business hours or seek medical attention (ER or urgent care) after hours if these symptoms arise.     RTC 4 months, sooner PRN

## 2023-05-26 ENCOUNTER — PATIENT MESSAGE (OUTPATIENT)
Dept: INTERNAL MEDICINE | Facility: CLINIC | Age: 83
End: 2023-05-26
Payer: MEDICARE

## 2023-05-26 ENCOUNTER — OFFICE VISIT (OUTPATIENT)
Dept: ORTHOPEDICS | Facility: CLINIC | Age: 83
End: 2023-05-26
Payer: MEDICARE

## 2023-05-26 ENCOUNTER — TELEPHONE (OUTPATIENT)
Dept: INTERNAL MEDICINE | Facility: CLINIC | Age: 83
End: 2023-05-26
Payer: MEDICARE

## 2023-05-26 ENCOUNTER — HOSPITAL ENCOUNTER (OUTPATIENT)
Dept: RADIOLOGY | Facility: HOSPITAL | Age: 83
Discharge: HOME | End: 2023-05-26
Attending: ORTHOPAEDIC SURGERY
Payer: MEDICARE

## 2023-05-26 VITALS — HEIGHT: 64 IN | WEIGHT: 172.06 LBS | BODY MASS INDEX: 29.38 KG/M2

## 2023-05-26 DIAGNOSIS — M16.11 PRIMARY OSTEOARTHRITIS OF RIGHT HIP: ICD-10-CM

## 2023-05-26 DIAGNOSIS — M16.11 PRIMARY OSTEOARTHRITIS OF RIGHT HIP: Primary | ICD-10-CM

## 2023-05-26 PROCEDURE — 1101F PR PT FALLS ASSESS DOC 0-1 FALLS W/OUT INJ PAST YR: ICD-10-PCS | Mod: CPTII,S$GLB,, | Performed by: ORTHOPAEDIC SURGERY

## 2023-05-26 PROCEDURE — 99999 PR PBB SHADOW E&M-EST. PATIENT-LVL III: ICD-10-PCS | Mod: PBBFAC,,, | Performed by: ORTHOPAEDIC SURGERY

## 2023-05-26 PROCEDURE — 73502 X-RAY EXAM HIP UNI 2-3 VIEWS: CPT | Mod: 26,RT,, | Performed by: RADIOLOGY

## 2023-05-26 PROCEDURE — 99213 PR OFFICE/OUTPT VISIT, EST, LEVL III, 20-29 MIN: ICD-10-PCS | Mod: S$GLB,,, | Performed by: ORTHOPAEDIC SURGERY

## 2023-05-26 PROCEDURE — 1101F PT FALLS ASSESS-DOCD LE1/YR: CPT | Mod: CPTII,S$GLB,, | Performed by: ORTHOPAEDIC SURGERY

## 2023-05-26 PROCEDURE — 1159F PR MEDICATION LIST DOCUMENTED IN MEDICAL RECORD: ICD-10-PCS | Mod: CPTII,S$GLB,, | Performed by: ORTHOPAEDIC SURGERY

## 2023-05-26 PROCEDURE — 3288F PR FALLS RISK ASSESSMENT DOCUMENTED: ICD-10-PCS | Mod: CPTII,S$GLB,, | Performed by: ORTHOPAEDIC SURGERY

## 2023-05-26 PROCEDURE — 1125F AMNT PAIN NOTED PAIN PRSNT: CPT | Mod: CPTII,S$GLB,, | Performed by: ORTHOPAEDIC SURGERY

## 2023-05-26 PROCEDURE — 73502 XR HIP WITH PELVIS WHEN PERFORMED, 2 OR 3  VIEWS RIGHT: ICD-10-PCS | Mod: 26,RT,, | Performed by: RADIOLOGY

## 2023-05-26 PROCEDURE — 1159F MED LIST DOCD IN RCRD: CPT | Mod: CPTII,S$GLB,, | Performed by: ORTHOPAEDIC SURGERY

## 2023-05-26 PROCEDURE — 1157F ADVNC CARE PLAN IN RCRD: CPT | Mod: CPTII,S$GLB,, | Performed by: ORTHOPAEDIC SURGERY

## 2023-05-26 PROCEDURE — 1160F RVW MEDS BY RX/DR IN RCRD: CPT | Mod: CPTII,S$GLB,, | Performed by: ORTHOPAEDIC SURGERY

## 2023-05-26 PROCEDURE — 73502 X-RAY EXAM HIP UNI 2-3 VIEWS: CPT | Mod: TC,RT

## 2023-05-26 PROCEDURE — 1125F PR PAIN SEVERITY QUANTIFIED, PAIN PRESENT: ICD-10-PCS | Mod: CPTII,S$GLB,, | Performed by: ORTHOPAEDIC SURGERY

## 2023-05-26 PROCEDURE — 3288F FALL RISK ASSESSMENT DOCD: CPT | Mod: CPTII,S$GLB,, | Performed by: ORTHOPAEDIC SURGERY

## 2023-05-26 PROCEDURE — 99999 PR PBB SHADOW E&M-EST. PATIENT-LVL III: CPT | Mod: PBBFAC,,, | Performed by: ORTHOPAEDIC SURGERY

## 2023-05-26 PROCEDURE — 1160F PR REVIEW ALL MEDS BY PRESCRIBER/CLIN PHARMACIST DOCUMENTED: ICD-10-PCS | Mod: CPTII,S$GLB,, | Performed by: ORTHOPAEDIC SURGERY

## 2023-05-26 PROCEDURE — 99213 OFFICE O/P EST LOW 20 MIN: CPT | Mod: S$GLB,,, | Performed by: ORTHOPAEDIC SURGERY

## 2023-05-26 PROCEDURE — 1157F PR ADVANCE CARE PLAN OR EQUIV PRESENT IN MEDICAL RECORD: ICD-10-PCS | Mod: CPTII,S$GLB,, | Performed by: ORTHOPAEDIC SURGERY

## 2023-05-26 NOTE — TELEPHONE ENCOUNTER
----- Message from Alexus Neely MD sent at 5/24/2023  8:52 PM CDT -----  Hi Heather / Ms MAC    I'm currently on night inpatient service the next two weeks. Can you please call this patient back and inform her to take the following:    -- Daily calcium intake 1200 mg, dietary sources preferred but can use a multivitamin to supplement  -- Daily Vitamin D 800 IU (OTC supplement is easiest).  -- Continue weight bearing exercise such as walking, but be careful of falling given her hip pain.      ----- Message -----  From: Interface, Rad Results In  Sent: 5/24/2023   5:39 PM CDT  To: Alexus Neely MD

## 2023-05-30 ENCOUNTER — PATIENT MESSAGE (OUTPATIENT)
Dept: ORTHOPEDICS | Facility: CLINIC | Age: 83
End: 2023-05-30
Payer: MEDICARE

## 2023-05-30 DIAGNOSIS — M16.11 PRIMARY OSTEOARTHRITIS OF RIGHT HIP: Primary | ICD-10-CM

## 2023-05-31 ENCOUNTER — PATIENT MESSAGE (OUTPATIENT)
Dept: ADMINISTRATIVE | Facility: OTHER | Age: 83
End: 2023-05-31
Payer: MEDICARE

## 2023-05-31 ENCOUNTER — TELEPHONE (OUTPATIENT)
Dept: ORTHOPEDICS | Facility: CLINIC | Age: 83
End: 2023-05-31
Payer: MEDICARE

## 2023-05-31 NOTE — TELEPHONE ENCOUNTER
Pt had already spoke with Petrona Rosa RN regards tooth procedure. Verified there were no further questions. Pt was pleasant and verified information.  ----- Message from Priti Velasquez sent at 5/31/2023 11:24 AM CDT -----  Regarding: Call back  Contact: [phone]  Hi, pt is having surgery on July the 5th and is asking for a call back to discuss the dental clearance papers. Pls call the pt at [phone]

## 2023-05-31 NOTE — TELEPHONE ENCOUNTER
Attempted to reach patient regarding questionnaire response to question: A loose tooth, tooth pain, and/or dental changes?  No answer; vcm with cb # left for pt

## 2023-05-31 NOTE — TELEPHONE ENCOUNTER
Jo-Ann returned my call.  Had root canal which is healed.  It scheduled to have implant done in June.   States she could put it off if necessary.  Explained that we ask no dental procedures x 90 days post op.  Pt v/u and will keep existing appt for tooth, move up if possible.  Will call back if there are issues.

## 2023-06-01 PROBLEM — M16.11 PRIMARY OSTEOARTHRITIS OF RIGHT HIP: Status: ACTIVE | Noted: 2023-06-01

## 2023-06-01 NOTE — PROGRESS NOTES
Subjective:      Patient ID: Jo-Ann Escobedo is a 82 y.o. female.    Chief Complaint:   Pain of the Right Hip    HPI  She is an established patient with right hip pain, worsening.  She is complaining of pain in the right groin that goes down to her knee, 8/10 severity on a daily basis with weight-bearing activities.  She says she initially had some pain on the left side and it seemed to move over to the right with time.  She has daily pain with ADLs and night pain interfering with sleep.  No fall, accident, injury, history of pertinent surgery.  From 08/09/2021 visit here, reviewed with the patient and verified:    Jo-Ann Escobedo is a 80 y.o. year old female presents today for constant right hip pain which started about 4-6 weeks ago.  There is not a history of trauma. The pain is located in the groin.  The pain is described as tight and achy.  It is is aggravated by walking.  Previous treatments include acetaminophen, rest and tramadol which have provided minimal relief.  She reports history of left hip pain- scheduled for hip replacement but pain resolved.  She has also been evaluation by Dr. Fay and Neurosurgery.  She states all of the pain she was having at the time finally improved so she did not proceed with injections or surgery.  There is not a history of previous injury or surgery to the hip.  The patient is ambulating with a cane.      Objective:        Ortho/SPM Exam    Right hip:  Positive C sign, positive Stinchfield sign.  No tenderness at the greater trochanter or iliotibial band.  No tenderness at the SI joint.  The patient has pain in the groin with passive flexion and internal rotation of the hip which is limited.  Knee benign without tenderness or effusion, with normal range of motion.  Skin intact.  Distal neurovascular intact.  Flip test negative.        IMAGING:  X-rays reveal advanced DJD of both hips, primarily medial, with bone-on-bone there.  Mild protrusio.  Osteoporosis is  seen.  Assessment:   End-stage DJD, right hip      Plan:   Right total hip arthroplasty.  We will do a hybrid technique because of her age and osteoporosis.  The surgical process of joint replacement was discussed in detail with the patient including a detailed discussion of the procedure itself (including visual model, x-ray review). The typical perioperative and postoperative course was discussed and perioperative risks were discussed to the patient's satisfaction.  Risks and complications discussed included but were not limited to the risks of anesthetic complications, infection, bleeding, wound healing complications, aseptic loosening, instability, limb length inequality, neurologic dysfunction including numbness,  DVT, pulmonary embolism, perioperative medical risks (cardiac, pulmonary, renal, neurologic), and death and the patient elects to proceed. We will initiate pre-operative medical evaluation and clearance and set a provisional date for surgical intervention according to the patient's schedule. We discussed surgical options including implant type, and why I believe the implant selected is a good match for the patient's needs. The patient expressed understanding and would like to proceed with surgery.       The patient's pathophysiology was explained in detail with reference to x-rays, models, other visual aids as appropriate.  Treatment options were discussed in detail.  Questions were invited and answered to the patient's satisfaction.      Federico Ramirez MD  Orthopedic Surgery

## 2023-06-05 ENCOUNTER — PATIENT MESSAGE (OUTPATIENT)
Dept: ADMINISTRATIVE | Facility: OTHER | Age: 83
End: 2023-06-05
Payer: MEDICARE

## 2023-06-07 DIAGNOSIS — M79.609 PAIN IN EXTREMITY, UNSPECIFIED EXTREMITY: ICD-10-CM

## 2023-06-07 DIAGNOSIS — Z01.818 PRE-OP TESTING: Primary | ICD-10-CM

## 2023-06-07 NOTE — ANESTHESIA PAT ROS NOTE
06/07/2023  Jo-Ann Escobedo is a 82 y.o., female.      Pre-op Assessment          Review of Systems         Anesthesia Assessment: Preoperative EQUATION    Planned Procedure: Procedure(s) (LRB):  ARTHROPLASTY, HIP, TOTAL, ANTERIOR APPROACH: RIGHT: CHOCO HERITAGE & CONTINUUM (Right)  Requested Anesthesia Type:Spinal  Surgeon: Federico Ramirez MD  Service: Orthopedics  Known or anticipated Date of Surgery:7/5/2023    Surgeon notes: reviewed    Electronic QUestionnaire Assessment completed via nurse interview with patient.        Triage considerations:     The patient has no apparent active cardiac condition (No unstable coronary Syndrome such as severe unstable angina or recent [<1 month] myocardial infarction, decompensated CHF, severe valvular   disease or significant arrhythmia)    Previous anesthesia records:GETA and MAC  Patient reports no personal or family history of anesthesia complications.    2/13/23          COLONOSCOPY (Abdomen)    Airway:  Mallampati: II   Mouth Opening: Normal  TM Distance: Normal  Tongue: Normal  Neck ROM: Normal ROM    7/30/20              MASTECTOMY, UNILATERAL (Right: Breast)   INJECTION, FOR SENTINEL NODE IDENTIFICATION (Right: Chest)   BIOPSY, LYMPH NODE, SENTINEL (Right: Chest)     Airway/Jaw/Neck:  Airway Findings: Mouth Opening: Small, but > 3cm Tongue: Normal  General Airway Assessment: Adult, Average  Mallampati: III  Improves to II with phonation.  TM Distance: 4 - 6 cm          Airway Placement Date: 07/30/20 Placement Time: 0753 , created via procedure documentation  Method of Intubation: Video Laryngoscopy Mask Ventilation: Easy Intubated: Postinduction Airway Device Size: 7.5 Placement Verified By: Capnometry Complicating Factors: None;Small mouth Findings Post-Intubation: Bilateral breath sounds Secured at: Lips Complications: None        Last PCP note: within 1  month , within [facility]    Dr Neely   Subspecialty notes: Ortho  Hem/Onc          (Dr. Peace)  Nephrology      (BRINDA Stevens NP)  Back & Spine   (Dr. Fay)  Neurosurgery   (Dr. 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  5/17/23     A1C  TSH  T4 Free  2/15/23     CMP   Renal Function Panel  12/21822  CMP  CBC  10/9/22     MRI Lumbar Spine  10/5/22     X Ray Lumbar Spine  5/8/22       MRI Cervical Spine  8/10/21     CXR  7/10/20     EKG  12/8/19     CT  ABD Pelvis  12/6/19     CTA  ABD and Pelvis               Instructions given. (See in Nurse's note)    Optimization:  Anesthesia Preop Clinic Assessment  Indicated Will schedule POC    Medical Opinion Indicated       Sub-specialist consult indicated:   [facility] Pre op [facility] NP      Plan:    Testing:  CBC, CMP, EKG, and PT/INR   Pre-anesthesia  visit       Visit focus: possible regional anesthesia and/or nerve block      Consultation: [facility] NP for medical and surgical optimization.     Patient  has previously scheduled Medical Appointment:   6/13 6/22    Navigation: Tests Scheduled.              Consults scheduled.             Results will be tracked by Preop Clinic.              6/30/23      Patient is optimized for surgery.           Peter Berman, JONATAN  Perioperative Medicine  [facility]    Otezla Counseling: The side effects of Otezla were discussed with the patient, including but not limited to worsening or new depression, weight loss, diarrhea, nausea, upper respiratory tract infection, and headache. Patient instructed to call the office should any adverse effect occur.  The patient verbalized understanding of the proper use and possible adverse effects of Otezla.  All the patient's questions and concerns were addressed.

## 2023-06-08 ENCOUNTER — TELEPHONE (OUTPATIENT)
Dept: PREADMISSION TESTING | Facility: HOSPITAL | Age: 83
End: 2023-06-08

## 2023-06-09 NOTE — PRE-PROCEDURE INSTRUCTIONS
Chart review; triage plan initiated.    Spoke to patient regarding upcoming scheduled surgery.  Name, , and allergies verified.  Medical, surgical, and anesthesia history reviewed.  Instructed to hold vitamins, supplements and NSAIDs for one week prior to surgery (last day can take is ). Informed that the remaining medication instructions will be provided at the POC visit.  Pt verbalized understanding.

## 2023-06-12 ENCOUNTER — PATIENT MESSAGE (OUTPATIENT)
Dept: ADMINISTRATIVE | Facility: OTHER | Age: 83
End: 2023-06-12
Payer: MEDICARE

## 2023-06-13 ENCOUNTER — HOSPITAL ENCOUNTER (OUTPATIENT)
Dept: CARDIOLOGY | Facility: CLINIC | Age: 83
Discharge: HOME | End: 2023-06-13
Payer: MEDICARE

## 2023-06-13 ENCOUNTER — HOSPITAL ENCOUNTER (OUTPATIENT)
Dept: RADIOLOGY | Facility: HOSPITAL | Age: 83
Discharge: HOME | End: 2023-06-13
Attending: ORTHOPAEDIC SURGERY
Payer: MEDICARE

## 2023-06-13 ENCOUNTER — OFFICE VISIT (OUTPATIENT)
Dept: ORTHOPEDICS | Facility: CLINIC | Age: 83
End: 2023-06-13
Payer: MEDICARE

## 2023-06-13 VITALS — BODY MASS INDEX: 29.94 KG/M2 | WEIGHT: 175.38 LBS | HEIGHT: 64 IN

## 2023-06-13 DIAGNOSIS — M25.559 HIP PAIN, UNSPECIFIED LATERALITY: ICD-10-CM

## 2023-06-13 DIAGNOSIS — Z01.818 PRE-OP TESTING: ICD-10-CM

## 2023-06-13 DIAGNOSIS — Z96.641 STATUS POST RIGHT HIP REPLACEMENT: ICD-10-CM

## 2023-06-13 DIAGNOSIS — M16.11 PRIMARY OSTEOARTHRITIS OF RIGHT HIP: Primary | ICD-10-CM

## 2023-06-13 DIAGNOSIS — M25.559 HIP PAIN, UNSPECIFIED LATERALITY: Primary | ICD-10-CM

## 2023-06-13 PROCEDURE — 1157F PR ADVANCE CARE PLAN OR EQUIV PRESENT IN MEDICAL RECORD: ICD-10-PCS | Mod: CPTII,S$GLB,, | Performed by: NURSE PRACTITIONER

## 2023-06-13 PROCEDURE — 3288F FALL RISK ASSESSMENT DOCD: CPT | Mod: CPTII,S$GLB,, | Performed by: NURSE PRACTITIONER

## 2023-06-13 PROCEDURE — 93010 ELECTROCARDIOGRAM REPORT: CPT | Mod: S$GLB,,, | Performed by: INTERNAL MEDICINE

## 2023-06-13 PROCEDURE — 1125F AMNT PAIN NOTED PAIN PRSNT: CPT | Mod: CPTII,S$GLB,, | Performed by: NURSE PRACTITIONER

## 2023-06-13 PROCEDURE — 99214 PR OFFICE/OUTPT VISIT, EST, LEVL IV, 30-39 MIN: ICD-10-PCS | Mod: S$GLB,,, | Performed by: NURSE PRACTITIONER

## 2023-06-13 PROCEDURE — 72170 XR PELVIS ROUTINE AP: ICD-10-PCS | Mod: 26,,, | Performed by: RADIOLOGY

## 2023-06-13 PROCEDURE — 1159F PR MEDICATION LIST DOCUMENTED IN MEDICAL RECORD: ICD-10-PCS | Mod: CPTII,S$GLB,, | Performed by: NURSE PRACTITIONER

## 2023-06-13 PROCEDURE — 99999 PR PBB SHADOW E&M-EST. PATIENT-LVL III: CPT | Mod: PBBFAC,,, | Performed by: NURSE PRACTITIONER

## 2023-06-13 PROCEDURE — 3288F PR FALLS RISK ASSESSMENT DOCUMENTED: ICD-10-PCS | Mod: CPTII,S$GLB,, | Performed by: NURSE PRACTITIONER

## 2023-06-13 PROCEDURE — 93010 EKG 12-LEAD: ICD-10-PCS | Mod: S$GLB,,, | Performed by: INTERNAL MEDICINE

## 2023-06-13 PROCEDURE — 1160F PR REVIEW ALL MEDS BY PRESCRIBER/CLIN PHARMACIST DOCUMENTED: ICD-10-PCS | Mod: CPTII,S$GLB,, | Performed by: NURSE PRACTITIONER

## 2023-06-13 PROCEDURE — 1101F PT FALLS ASSESS-DOCD LE1/YR: CPT | Mod: CPTII,S$GLB,, | Performed by: NURSE PRACTITIONER

## 2023-06-13 PROCEDURE — 1157F ADVNC CARE PLAN IN RCRD: CPT | Mod: CPTII,S$GLB,, | Performed by: NURSE PRACTITIONER

## 2023-06-13 PROCEDURE — 99214 OFFICE O/P EST MOD 30 MIN: CPT | Mod: S$GLB,,, | Performed by: NURSE PRACTITIONER

## 2023-06-13 PROCEDURE — 1159F MED LIST DOCD IN RCRD: CPT | Mod: CPTII,S$GLB,, | Performed by: NURSE PRACTITIONER

## 2023-06-13 PROCEDURE — 72170 X-RAY EXAM OF PELVIS: CPT | Mod: TC

## 2023-06-13 PROCEDURE — 93005 EKG 12-LEAD: ICD-10-PCS | Mod: S$GLB,,, | Performed by: ANESTHESIOLOGY

## 2023-06-13 PROCEDURE — 1160F RVW MEDS BY RX/DR IN RCRD: CPT | Mod: CPTII,S$GLB,, | Performed by: NURSE PRACTITIONER

## 2023-06-13 PROCEDURE — 1101F PR PT FALLS ASSESS DOC 0-1 FALLS W/OUT INJ PAST YR: ICD-10-PCS | Mod: CPTII,S$GLB,, | Performed by: NURSE PRACTITIONER

## 2023-06-13 PROCEDURE — 72170 X-RAY EXAM OF PELVIS: CPT | Mod: 26,,, | Performed by: RADIOLOGY

## 2023-06-13 PROCEDURE — 93005 ELECTROCARDIOGRAM TRACING: CPT | Mod: S$GLB,,, | Performed by: ANESTHESIOLOGY

## 2023-06-13 PROCEDURE — 99999 PR PBB SHADOW E&M-EST. PATIENT-LVL III: ICD-10-PCS | Mod: PBBFAC,,, | Performed by: NURSE PRACTITIONER

## 2023-06-13 PROCEDURE — 1125F PR PAIN SEVERITY QUANTIFIED, PAIN PRESENT: ICD-10-PCS | Mod: CPTII,S$GLB,, | Performed by: NURSE PRACTITIONER

## 2023-06-14 ENCOUNTER — TELEPHONE (OUTPATIENT)
Dept: INTERNAL MEDICINE | Facility: CLINIC | Age: 83
End: 2023-06-14
Payer: MEDICARE

## 2023-06-14 RX ORDER — ACETAMINOPHEN 500 MG
1000 TABLET ORAL
Status: CANCELLED | OUTPATIENT
Start: 2023-06-14

## 2023-06-14 RX ORDER — PREGABALIN 75 MG/1
75 CAPSULE ORAL
Status: CANCELLED | OUTPATIENT
Start: 2023-06-14

## 2023-06-14 RX ORDER — POLYETHYLENE GLYCOL 3350 17 G/17G
17 POWDER, FOR SOLUTION ORAL DAILY PRN
Status: CANCELLED | OUTPATIENT
Start: 2023-06-14

## 2023-06-14 RX ORDER — CEFAZOLIN SODIUM 2 G/50ML
2 SOLUTION INTRAVENOUS
Status: CANCELLED | OUTPATIENT
Start: 2023-06-14 | End: 2023-06-15

## 2023-06-14 RX ORDER — PROCHLORPERAZINE EDISYLATE 5 MG/ML
5 INJECTION INTRAMUSCULAR; INTRAVENOUS EVERY 6 HOURS PRN
Status: CANCELLED | OUTPATIENT
Start: 2023-06-14

## 2023-06-14 RX ORDER — METHOCARBAMOL 750 MG/1
750 TABLET, FILM COATED ORAL 3 TIMES DAILY
Status: CANCELLED | OUTPATIENT
Start: 2023-06-14

## 2023-06-14 RX ORDER — MORPHINE SULFATE 2 MG/ML
2 INJECTION, SOLUTION INTRAMUSCULAR; INTRAVENOUS
Status: CANCELLED | OUTPATIENT
Start: 2023-06-14

## 2023-06-14 RX ORDER — AMOXICILLIN 250 MG
1 CAPSULE ORAL 2 TIMES DAILY
Status: CANCELLED | OUTPATIENT
Start: 2023-06-14

## 2023-06-14 RX ORDER — FENTANYL CITRATE 50 UG/ML
25 INJECTION, SOLUTION INTRAMUSCULAR; INTRAVENOUS EVERY 5 MIN PRN
Status: CANCELLED | OUTPATIENT
Start: 2023-06-14

## 2023-06-14 RX ORDER — ONDANSETRON 2 MG/ML
4 INJECTION INTRAMUSCULAR; INTRAVENOUS EVERY 8 HOURS PRN
Status: CANCELLED | OUTPATIENT
Start: 2023-06-14

## 2023-06-14 RX ORDER — CEFAZOLIN SODIUM 2 G/50ML
2 SOLUTION INTRAVENOUS
Status: CANCELLED | OUTPATIENT
Start: 2023-06-14

## 2023-06-14 RX ORDER — SODIUM CHLORIDE 9 MG/ML
INJECTION, SOLUTION INTRAVENOUS CONTINUOUS
Status: CANCELLED | OUTPATIENT
Start: 2023-06-14 | End: 2023-06-15

## 2023-06-14 RX ORDER — ACETAMINOPHEN 500 MG
1000 TABLET ORAL EVERY 6 HOURS
Status: CANCELLED | OUTPATIENT
Start: 2023-06-14

## 2023-06-14 RX ORDER — OXYCODONE HYDROCHLORIDE 5 MG/1
5 TABLET ORAL
Status: CANCELLED | OUTPATIENT
Start: 2023-06-14

## 2023-06-14 RX ORDER — FAMOTIDINE 20 MG/1
20 TABLET, FILM COATED ORAL 2 TIMES DAILY
Status: CANCELLED | OUTPATIENT
Start: 2023-06-14

## 2023-06-14 RX ORDER — OXYCODONE HYDROCHLORIDE 5 MG/1
10 TABLET ORAL
Status: CANCELLED | OUTPATIENT
Start: 2023-06-14

## 2023-06-14 RX ORDER — FENTANYL CITRATE 50 UG/ML
100 INJECTION, SOLUTION INTRAMUSCULAR; INTRAVENOUS
Status: CANCELLED | OUTPATIENT
Start: 2023-06-14 | End: 2023-06-15

## 2023-06-14 RX ORDER — MIDAZOLAM HYDROCHLORIDE 1 MG/ML
4 INJECTION INTRAMUSCULAR; INTRAVENOUS
Status: CANCELLED | OUTPATIENT
Start: 2023-06-14 | End: 2023-06-15

## 2023-06-14 RX ORDER — LIDOCAINE HYDROCHLORIDE 10 MG/ML
1 INJECTION, SOLUTION EPIDURAL; INFILTRATION; INTRACAUDAL; PERINEURAL
Status: CANCELLED | OUTPATIENT
Start: 2023-06-14

## 2023-06-14 RX ORDER — SODIUM CHLORIDE 9 MG/ML
INJECTION, SOLUTION INTRAVENOUS
Status: CANCELLED | OUTPATIENT
Start: 2023-06-14

## 2023-06-14 RX ORDER — POLYETHYLENE GLYCOL 3350 17 G/17G
17 POWDER, FOR SOLUTION ORAL DAILY
Status: CANCELLED | OUTPATIENT
Start: 2023-06-14

## 2023-06-14 RX ORDER — PREGABALIN 75 MG/1
75 CAPSULE ORAL NIGHTLY
Status: CANCELLED | OUTPATIENT
Start: 2023-06-14

## 2023-06-14 RX ORDER — MUPIROCIN 20 MG/G
1 OINTMENT TOPICAL
Status: CANCELLED | OUTPATIENT
Start: 2023-06-14

## 2023-06-14 RX ORDER — MUPIROCIN 20 MG/G
1 OINTMENT TOPICAL 2 TIMES DAILY
Status: CANCELLED | OUTPATIENT
Start: 2023-06-14 | End: 2023-06-19

## 2023-06-14 RX ORDER — NALOXONE HCL 0.4 MG/ML
0.02 VIAL (ML) INJECTION
Status: CANCELLED | OUTPATIENT
Start: 2023-06-14

## 2023-06-14 RX ORDER — ASPIRIN 81 MG/1
81 TABLET ORAL 2 TIMES DAILY
Status: CANCELLED | OUTPATIENT
Start: 2023-06-14

## 2023-06-14 NOTE — PROGRESS NOTES
Jo-Ann Escobedo is a 82 y.o. year old here today for pre surgery optimization visit  in preparation for a Right total hip arthroplasty to be performed by Dr. Ramirez  on 7/5/2023.  she was last seen and treated in the clinic on 5/26/2023. she will be medically optimized by the pre op center. There has been no significant change in medical status since last visit. No fever, chills, malaise, or unexplained weight change.      Allergies, Medications, past medical and surgical history reviewed.    Focused examination performed.    Patient declined to see surgeon today. All questions answered. Patient encouraged to call with questions. Contact information given.     Pre, priscilla, and post operative procedures and expectations discussed. Goals of successful surgery reviewed and include manageable pain levels, surgical site free of infection, medication management, and ambulation with PT/OT assistance. Healthy weight management discussed with patient and caregiver who were receptive to eduction of healthy diet and activity. No other necessary lifestyle changes identified. Educated patient about signs and symptoms of infection, medication management, anticoagulation therapy, risk of tobacco and alcohol use, and self-care to promote healing. Surgical guide given for future reference. Hibiclens given to patient with instructions. All questions were answered.     Jo-Ann Escobedo verbalized an understanding to the education and goals. Patient has displayed readiness to engage in care and is ready to proceed with surgery.  Patient reports her daughter is able and ready to provide assistance at home after discharge.    Surgical and blood consents signed.    Jo-Ann Escobedo will contact us if there are any questions, concerns, or changes in medical status prior to surgery.       Joint class: completed    Patient has discussed discharge planning with surgeon. Patient will be discharged to home following surgery.   patient will be scheduled  with Home Health during hospitalization.     40 minutes of time was spent on patient education, review of records, templating, H&P, , appointment scheduling and optimizing patient for surgery.

## 2023-06-14 NOTE — TELEPHONE ENCOUNTER
----- Message from Qi Barboza sent at 6/14/2023 10:37 AM CDT -----  Contact: [phone]  Good morning,     Pt has a scheduled appointment on 06/22 @ 11:00, but she also has a 10;30 appointment on the same day.I tried rescheduling, but no appointments were available before 07/20.  Pt said, I have surgery on the 5th and will be incapacited for 6 weeks.  Pt is requesting a call from staff, in hopes she can be seen before 06/22/23/    DX: ENHANCED ANNUAL WELLNESS VISIT    Thank you,   Concepcion Hope Navigator

## 2023-06-14 NOTE — PROGRESS NOTES
[facility]    HOME HEALTH ORDERS  FACE TO FACE ENCOUNTER    Patient Name: Jo-Ann Escobedo  YOB: 1940    PCP: Alexus Neely MD   PCP Address: [address] / [place] [address]  PCP Phone Number: [phone]  PCP Fax: [phone]    Encounter Date: 06/13/2023    Admit to Home Health    Diagnoses:  There are no hospital problems to display for this patient.      Future Appointments   Date Time Provider Department Center   6/22/2023 10:30 AM Peter Berman NP [facility] PREOPC [address]   6/22/2023 11:00 AM Estela Frye PA-C N[facility] IM [address] [facility]   7/7/2023 12:00 PM TELEMED NURSE, [facility] ORTHO [facility] ORTHO [address]   7/18/2023  1:00 PM Denise Bass NP [facility] ORTHO [address]   8/15/2023 11:15 AM Federico Ramirez MD [facility] ORTHO [address]   9/22/2023 11:15 AM Federico Ramirez MD [facility] ORTHO [address]   9/27/2023 10:45 AM Tiara Ann DPM [facility] POD [address]   9/27/2023  2:30 PM PRE-ADMIT, ENDO -[facility] ENDOPP4 [facility]           I have seen and examined this patient face to face today. My clinical findings that support the need for the home health skilled services and home bound status are the following:  Weakness/numbness causing balance and gait disturbance due to Joint Replacement making it taxing to leave home.  Requiring assistive device to leave home due to unsteady gait caused by Joint Replacement.    Allergies:  Review of patient's allergies indicates:   Allergen Reactions    Voltaren [diclofenac sodium] Other (See Comments)     Hematochezia and hospitalization for hematochezia.    Codeine Diarrhea and Hallucinations    Niacin preparations      Redness, warming       Diet: regular diet    Activities: activity as tolerated    Home Health Admitting Clinician:   SN/PT to complete comprehensive assessment including routine vital signs. Instruct on disease process and s/s of complications to report to MD. Follow specific home health  arthoplasty protocol. Review/verify medication list sent home with the patient at time of discharge  and instruct patient/caregiver as needed. If coumadin ordered, coumadin clinic to manage INR with INR draws 2x per week with a goal to maintain INR between 1.8 and 2.2. Frequency may be adjusted depending on start of care date.    Notify MD if SBP > 160 or < 90; DBP > 90 or < 50; HR > 120 or < 50; Temp > 101    Home Medical Equipment:  Walker, 3-1 bedside commode, transfer tub bench    CONSULTS:    Physical Therapy may admit if patient not on coumadin, PT to perform comprehensive assessment if performing admit visit and generate therapy plan of care. Evaluate for home safety and equipment needs; Establish/upgrade home exercise program. Perform/instruct on therapeutic exercises, gait training, transfer training, and Range of Motion.      OTHER: (only select if patient needs other therapy disciplines)  Occupational Therapy to evaluate and treat. Evaluate home environment for safety and equipment needs. Perform/Instruct on transfers, ADL training, ROM, and therapeutic exercises.      WOUND CARE ORDERS:  Assess Surgical Incision/DSRG each TX  Aquacel AG drsg applied post-op leave on 14 days post op. Call MD if any drainage reaches border to border of drsg horizontally, s/s of infection, temp >101, induration, swelling or redness.  If dressing is removed per MD order, then apply island dressing, change/teach caregiver to perform daily dressing change if island dressing present.    Medications: Review discharge medications with patient and family and provide education.      Cannot display discharge medications since this is not an admission.      I certify that this patient is confined to her home and needs physical therapy and occupational therapy.

## 2023-06-14 NOTE — H&P
CC:  Right hip pain    Jo-Ann Escobedo is a 82 y.o. female with Right hip pain.  Pain is worse with activity and weight bearing.  Patient has experienced interference of activities of daily living due to increased pain and decreased range of motion. Patient has failed non-operative treatment including NSAIDs, as well as greater than 3 months of activity modification. Jo-Ann Escobedo ambulates using assistive device .     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 She subsequently underwent colonoscopy 12/9 and several large diverticula in the sigmoid colon was seen with hematin throughout the colon; blood pooling also noted in the sigmoid colon, during which clip was placed f    Hematochezia     Hypothyroidism, postsurgical 7/1/2015    Mixed hyperlipidemia 9/27/2012    Multinodular goiter 7/31/2014    Paget's disease of bone 7/10/2013    SCC (squamous cell carcinoma) 12/2020    left 5th knuckle    Squamous Cell Carcinoma     in situ right neck        Past Surgical History:   Procedure Laterality Date    BREAST BIOPSY Right 2019    BREAST  LUMPECTOMY Right 2019    CARPAL TUNNEL RELEASE Left 6/5/2020    Procedure: RELEASE, CARPAL TUNNEL Left;  Surgeon: Pricila Presley MD;  Location: [facility] OR;  Service: Orthopedics;  Laterality: Left;    CARPAL TUNNEL RELEASE Right 12/5/2022    Procedure: RELEASE, CARPAL TUNNEL, right;  Surgeon: Pricila Presley MD;  Location: [facility] OR;  Service: Orthopedics;  Laterality: Right;    CATARACT EXTRACTION W/  INTRAOCULAR LENS IMPLANT Bilateral     COLONOSCOPY N/A 4/15/2019    Procedure: COLONOSCOPY;  Surgeon: Artur Monet MD;  Location: [facility] (4TH FLR);  Service: Endoscopy;  Laterality: N/A;  within 1 month    COLONOSCOPY N/A 12/9/2019    Procedure: COLONOSCOPY;  Surgeon: Clyde Welch MD;  Location: [facility] (2ND FLR);  Service: Endoscopy;  Laterality: N/A;    COLONOSCOPY N/A 2/13/2023    Procedure: COLONOSCOPY;  Surgeon: Johan Hoyos MD;  Location: [facility] (4TH FLR);  Service: Endoscopy;  Laterality: N/A;  Okay for any CRS MD if Dr. Monet booked. but hopeful to get this done about 4 weeks from now  1/10 - pt called about time change and MD change. patient verbalized understanding and new instructions sent - sm  Precall complete- [place]    ENDOSCOPIC ULTRASOUND OF UPPER GASTROINTESTINAL TRACT N/A 1/22/2020    Procedure: ULTRASOUND, UPPER GI TRACT, ENDOSCOPIC;  Surgeon: Eleazar Shaffer MD;  Location: [facility] (2ND FLR);  Service: Endoscopy;  Laterality: N/A;  EUS for 10mm SML w/negative pillow sign and negative naked fat sign which was found at the incisura.  Dr Welch    EPIDURAL STEROID INJECTION Bilateral 12/17/2021    Procedure: INJECTION, STEROID, EPIDURAL, L3-L4 Bilateral DIRECT REF Transforaminal;  Surgeon: Julian Fish MD;  Location: [facility] PAIN MGT;  Service: Pain Management;  Laterality: Bilateral;    ESOPHAGOGASTRODUODENOSCOPY N/A 12/7/2019    Procedure: EGD (ESOPHAGOGASTRODUODENOSCOPY);  Surgeon: Clyde Welch MD;  Location: [facility] (2ND FLR);  Service: Endoscopy;   Laterality: N/A;    EXCISIONAL BIOPSY Right 6/27/2019    Procedure: EXCISIONAL BIOPSY-breast;  Surgeon: Suki Dill MD;  Location: [address];  Service: General;  Laterality: Right;    EYE SURGERY      HYSTERECTOMY      INJECTION Right 3/3/2023    Procedure: INJECTION, RIGHT HIP CORTICOSTEROID /LOCAL INJECTION CONTRAST DIRECT REF;  Surgeon: Julian Fish MD;  Location: [facility];  Service: Pain Management;  Laterality: Right;    INJECTION FOR SENTINEL NODE IDENTIFICATION Right 7/30/2020    Procedure: INJECTION, FOR SENTINEL NODE IDENTIFICATION;  Surgeon: Suki Dill MD;  Location: [facility] OR;  Service: General;  Laterality: Right;    INJECTION OF ANESTHETIC AGENT AROUND MEDIAL BRANCH NERVES INNERVATING LUMBAR FACET JOINT Bilateral 6/7/2019    Procedure: BLOCK, NERVE, FACET JOINT, LUMBAR, MEDIAL BRANCH-deo MBB L4/5,L5/S1;  Surgeon: Jarvis Morales III, MD;  Location: [facility] CATH LAB;  Service: Pain Management;  Laterality: Bilateral;    INJECTION OF STEROID Right 6/5/2020    Procedure: INJECTION, STEROID right carpal tunel injection/ Right ring trigger finger injection;  Surgeon: Pricila Presley MD;  Location: [facility] OR;  Service: Orthopedics;  Laterality: Right;  INJECTION, STEROID right carpal tunel injection/ Right ring trigger finger injection    KNEE ARTHROSCOPY N/A     pt unsure of which knee     MASTECTOMY      OOPHORECTOMY      SENTINEL LYMPH NODE BIOPSY Right 7/30/2020    Procedure: BIOPSY, LYMPH NODE, SENTINEL;  Surgeon: Suki Dill MD;  Location: [facility] OR;  Service: General;  Laterality: Right;    SPINE SURGERY      TOTAL THYROIDECTOMY      TRANSFORAMINAL EPIDURAL INJECTION OF STEROID Bilateral 5/27/2022    Procedure: INJECTION, STEROID, EPIDURAL, TF BILATERAL L3-L4 CONTRAST DIRECT REF;  Surgeon: Julian Fish MD;  Location: [facility];  Service: Pain Management;  Laterality: Bilateral;    TRANSFORAMINAL EPIDURAL INJECTION OF STEROID Bilateral 10/21/2022    Procedure: LUMBAR  TRANSFORAMINAL BILATERAL L3/4 CONTRAST DIRECT REFERRAL;  Surgeon: Julian Fish MD;  Location: [facility] PAIN MGT;  Service: Pain Management;  Laterality: Bilateral;    UNILATERAL MASTECTOMY Right 7/30/2020    Procedure: MASTECTOMY, UNILATERAL;  Surgeon: Suki Dill MD;  Location: [facility] OR;  Service: General;  Laterality: Right;    YAG Laser Capsulotomy  Left 07/29/2019 01/06/2021 OD//Dr. Hahn       Family History   Problem Relation Age of Onset    Cancer Father         lung    Dementia Mother     Glaucoma Brother     Macular degeneration Brother     Diabetes Neg Hx     Amblyopia Neg Hx     Blindness Neg Hx     Cataracts Neg Hx     Retinal detachment Neg Hx     Strabismus Neg Hx     Melanoma Neg Hx        Review of patient's allergies indicates:   Allergen Reactions    Voltaren [diclofenac sodium] Other (See Comments)     Hematochezia and hospitalization for hematochezia.    Codeine Diarrhea and Hallucinations    Niacin preparations      Redness, warming         Current Outpatient Medications:     acetaminophen (TYLENOL) 500 MG tablet, Take 2 tablets (1,000 mg total) by mouth every 8 (eight) hours as needed for Pain., Disp: 54 tablet, Rfl: 0    ascorbic acid, vitamin C, (VITAMIN C) 100 MG tablet, Take 100 mg by mouth once daily., Disp: , Rfl:     cholecalciferol, vitamin D3, (VITAMIN D3) 50 mcg (2,000 unit) Tab, Take 1 tablet by mouth once daily., Disp: , Rfl:     co-enzyme Q-10 30 mg capsule, Take 30 mg by mouth once daily., Disp: , Rfl:     hydroCHLOROthiazide (HYDRODIURIL) 25 MG tablet, Take 0.5 tablets (12.5 mg total) by mouth once daily., Disp: 15 tablet, Rfl: 11    ketoconazole (NIZORAL) 2 % cream, Apply topically once daily. Apply daily to affected toenails for 6-12 months, Disp: 60 g, Rfl: 4    LACTOBACILLUS COMBINATION NO.8 (ADULT PROBIOTIC ORAL), Take by mouth once daily. , Disp: , Rfl:     levothyroxine (SYNTHROID) 112 MCG tablet, Take 1 tablet (112 mcg total) by mouth before breakfast., Disp: 90  "tablet, Rfl: 3    losartan (COZAAR) 100 MG tablet, Take 1 tablet (100 mg total) by mouth once daily., Disp: 90 tablet, Rfl: 3    fi-dr-ys8-dha-epa-fish-lut-carlos (OCUVITE ADULT 50 PLUS) 250 mg (90 mg-160 mg) Cap, Take by mouth., Disp: , Rfl:     sodium,potassium,mag sulfates (SUPREP BOWEL PREP KIT) 17.5-3.13-1.6 gram SolR, Follow instructions given by Endoscopy scheduling nurse, Disp: 1 kit, Rfl: 0    turmeric root extract 500 mg Cap, Take by mouth once daily. , Disp: , Rfl:     fenofibrate 160 MG Tab, TAKE 1 TABLET (160 MG TOTAL) BY MOUTH ONCE DAILY., Disp: 90 tablet, Rfl: 3    OPW GABAPENTIN 5% LIDOCAINE HCL 5% TOPICAL CREAM 50G, Apply 50 g topically 3 (three) times daily as needed (Pain). Apply topically. This compounded medication will  in 30 days. (Patient not taking: Reported on 5/3/2023), Disp: 50 g, Rfl: 1    Review of Systems:   Constitutional: no fever or chills  Eyes: no visual changes  ENT: no nasal congestion or sore throat  Respiratory: no cough or shortness of breath  Cardiovascular: no chest pain or palpitations  Gastrointestinal: no nausea or vomiting, tolerating diet  Genitourinary: no hematuria or dysuria  Integument/Breast: no rash or pruritis  Hematologic/Lymphatic: no easy bruising or lymphadenopathy  Musculoskeletal: positive for hip pain  Neurological: no seizures or tremors  Behavioral/Psych: no auditory or visual hallucinations  Endocrine: no heat or cold intolerance    PE:  Ht 5' 4" (1.626 m)   Wt 79.5 kg (175 lb 6 oz)   LMP  (LMP Unknown)   BMI 30.10 kg/m²   General: Pleasant, cooperative, NAD   Gait: antalgic  HEENT: NCAT, sclera nonicteric   Lungs: Respirations are clear, equal and unlabored.   CV: S1S2; 2+ bilateral upper and lower extremity pulses.   Skin: Intact throughout LE with no rashes, erythema, or lesions  Extremities: No LE edema, NVI lower extremities    Right Hip Exam:  90 degrees flexion  0 degrees extension   15 degrees internal rotation  15 degrees external " rotation  20 degrees abduction  20 degrees adduction  There is pain with passive range of motion.     Radiographs: Radiographs reveal advanced degenerative changes including subchondral cyst formation, subchondral sclerosis, osteophyte formation, joint space narrowing.     Diagnosis: Primary osteoarthritis Right hip    Plan: Right total hip arthroplasty     Due to the serious nature of total joint infection and the high prevalence of community acquired MRSA, vancomycin will be used perioperatively.

## 2023-06-21 NOTE — ASSESSMENT & PLAN NOTE
S/P right mastectomy 7/30/20; followed per Hem-Onc.  Also has history of left breast cancer in  2001 ; S/P left radical mastectomy and adjuvant chemo

## 2023-06-21 NOTE — ASSESSMENT & PLAN NOTE
Currently treated with Levothyroxine 112 mcg  TSH:  abnormal but T4 is normal  S/P total thyroidectomy in 2011   Followed by PCP

## 2023-06-21 NOTE — ASSESSMENT & PLAN NOTE
History of ESIs  Seen by [facility] 10/11/22: possible L3-5 TLIF if symptoms do not improve  No loss of bladder or bowel control    Denies N/T to buttocks, perineum and inner surfaces of the thighs (saddle anesthesia)        Recent imaging: Xray L-Spine 10/5/22  FINDINGS:  Vertebral bodies are intact.  There is narrowing of the L3-L4 and L4-L5 disc space with significant degenerative change.  No instability in flexion and extension.  No collapse or destruction is noted.  Incidental note of a rounded calcification in the right upper quadrant that may represent a gallstone.     Impression:     See above

## 2023-06-21 NOTE — ASSESSMENT & PLAN NOTE
Current BP  not at goal today; 144/65.    Taking: HCTZ/losartan   Patient reports today's home BP readin/66      Lifestyle changes to reduce systolic BP:  exercise 30 minutes per day,  5 days per week or 150 minutes weekly; sodium reduction and avoidance of high salt foods such as processed meats, frozen meals and  fast foods.   Keeping a healthy weight/BMI can help with better BP control    BP acceptable for surgery. I recommend monitoring BP during perioperative period as uncontrolled pain can elevate blood pressure.

## 2023-06-21 NOTE — ASSESSMENT & PLAN NOTE
Stable; followed per PCP  Alk Phos mildly elevated- 152 U/L    Bone Scan 5/2023  Impression:     Normal bone mineral density  Fracture risk is Moderate  Compared with previous DXA, BMD at the lumbar spine has remained stable, and BMD at the total hip has decreased 9.4%.

## 2023-06-21 NOTE — ASSESSMENT & PLAN NOTE
S/P Colonoscopy 2/13/23  1. Cecal polyps x2, biopsy:  Fragments of tubular adenoma   2. Right colon polyps, biopsy:  Fragments of tubular adenoma   3. Polyps x2 (90 cm), biopsy:  Fragments of tubular adenoma   3. Sigmoid colon polyp, biopsy:  Fragments of tubular adenoma

## 2023-06-21 NOTE — ASSESSMENT & PLAN NOTE
A1c is 5.4  I recommend monitoring patient's glucose level during the perioperative period. Glucose may be elevated from stress hyperglycemia and may require insulin.

## 2023-06-21 NOTE — ASSESSMENT & PLAN NOTE
Episode in 2019; attributed to NSAID use (diclofenac)  Received multiple units of PRBCs; no recurrence

## 2023-06-21 NOTE — ASSESSMENT & PLAN NOTE
Recommend weight loss, healthy diet (DASH/Mediterranean) and exercise.   Patient should exercise 30 minutes at least five times weekly. Limit alcohol.  Treated with: fenofibrate

## 2023-06-22 ENCOUNTER — OFFICE VISIT (OUTPATIENT)
Dept: INTERNAL MEDICINE | Facility: CLINIC | Age: 83
End: 2023-06-22
Payer: MEDICARE

## 2023-06-22 VITALS
TEMPERATURE: 98 F | BODY MASS INDEX: 31.47 KG/M2 | HEIGHT: 62 IN | SYSTOLIC BLOOD PRESSURE: 144 MMHG | OXYGEN SATURATION: 96 % | DIASTOLIC BLOOD PRESSURE: 65 MMHG | WEIGHT: 171 LBS | HEART RATE: 80 BPM

## 2023-06-22 DIAGNOSIS — E89.0 HYPOTHYROIDISM, POSTSURGICAL: ICD-10-CM

## 2023-06-22 DIAGNOSIS — D05.11 DUCTAL CARCINOMA IN SITU (DCIS) OF RIGHT BREAST: ICD-10-CM

## 2023-06-22 DIAGNOSIS — I10 ESSENTIAL HYPERTENSION: ICD-10-CM

## 2023-06-22 DIAGNOSIS — M54.17 LUMBOSACRAL RADICULOPATHY: ICD-10-CM

## 2023-06-22 DIAGNOSIS — Z87.19 HISTORY OF GI BLEED: ICD-10-CM

## 2023-06-22 DIAGNOSIS — I70.0 AORTIC ATHEROSCLEROSIS: ICD-10-CM

## 2023-06-22 DIAGNOSIS — D51.3 OTHER DIETARY VITAMIN B12 DEFICIENCY ANEMIA: ICD-10-CM

## 2023-06-22 DIAGNOSIS — Z01.818 PREOPERATIVE EXAMINATION: Primary | ICD-10-CM

## 2023-06-22 DIAGNOSIS — D12.6 ADENOMATOUS POLYP OF COLON, UNSPECIFIED PART OF COLON: ICD-10-CM

## 2023-06-22 DIAGNOSIS — M88.9 PAGET'S DISEASE OF BONE: ICD-10-CM

## 2023-06-22 DIAGNOSIS — J84.10 CALCIFIED GRANULOMA OF LUNG: ICD-10-CM

## 2023-06-22 DIAGNOSIS — R10.811 RIGHT UPPER QUADRANT ABDOMINAL TENDERNESS, REBOUND TENDERNESS PRESENCE NOT SPECIFIED: ICD-10-CM

## 2023-06-22 DIAGNOSIS — E66.9 OBESITY (BMI 30.0-34.9): ICD-10-CM

## 2023-06-22 DIAGNOSIS — R73.03 PREDIABETES: ICD-10-CM

## 2023-06-22 DIAGNOSIS — E78.2 MIXED HYPERLIPIDEMIA: ICD-10-CM

## 2023-06-22 DIAGNOSIS — M16.11 PRIMARY OSTEOARTHRITIS OF RIGHT HIP: ICD-10-CM

## 2023-06-22 PROCEDURE — 3077F SYST BP >= 140 MM HG: CPT | Mod: CPTII,S$GLB,, | Performed by: NURSE PRACTITIONER

## 2023-06-22 PROCEDURE — 3078F PR MOST RECENT DIASTOLIC BLOOD PRESSURE < 80 MM HG: ICD-10-PCS | Mod: CPTII,S$GLB,, | Performed by: NURSE PRACTITIONER

## 2023-06-22 PROCEDURE — 1159F PR MEDICATION LIST DOCUMENTED IN MEDICAL RECORD: ICD-10-PCS | Mod: CPTII,S$GLB,, | Performed by: NURSE PRACTITIONER

## 2023-06-22 PROCEDURE — 99417 PROLNG OP E/M EACH 15 MIN: CPT | Mod: S$GLB,,, | Performed by: NURSE PRACTITIONER

## 2023-06-22 PROCEDURE — 99999 PR PBB SHADOW E&M-EST. PATIENT-LVL IV: CPT | Mod: PBBFAC,,, | Performed by: NURSE PRACTITIONER

## 2023-06-22 PROCEDURE — 1157F ADVNC CARE PLAN IN RCRD: CPT | Mod: CPTII,S$GLB,, | Performed by: NURSE PRACTITIONER

## 2023-06-22 PROCEDURE — 1159F MED LIST DOCD IN RCRD: CPT | Mod: CPTII,S$GLB,, | Performed by: NURSE PRACTITIONER

## 2023-06-22 PROCEDURE — 99215 OFFICE O/P EST HI 40 MIN: CPT | Mod: S$GLB,,, | Performed by: NURSE PRACTITIONER

## 2023-06-22 PROCEDURE — 99215 PR OFFICE/OUTPT VISIT, EST, LEVL V, 40-54 MIN: ICD-10-PCS | Mod: S$GLB,,, | Performed by: NURSE PRACTITIONER

## 2023-06-22 PROCEDURE — 99999 PR PBB SHADOW E&M-EST. PATIENT-LVL IV: ICD-10-PCS | Mod: PBBFAC,,, | Performed by: NURSE PRACTITIONER

## 2023-06-22 PROCEDURE — 1157F PR ADVANCE CARE PLAN OR EQUIV PRESENT IN MEDICAL RECORD: ICD-10-PCS | Mod: CPTII,S$GLB,, | Performed by: NURSE PRACTITIONER

## 2023-06-22 PROCEDURE — 3077F PR MOST RECENT SYSTOLIC BLOOD PRESSURE >= 140 MM HG: ICD-10-PCS | Mod: CPTII,S$GLB,, | Performed by: NURSE PRACTITIONER

## 2023-06-22 PROCEDURE — 99417 PR PROLONGED SVC, OUTPT, W/WO DIRECT PT CONTACT,  EA ADDTL 15 MIN: ICD-10-PCS | Mod: S$GLB,,, | Performed by: NURSE PRACTITIONER

## 2023-06-22 PROCEDURE — 3078F DIAST BP <80 MM HG: CPT | Mod: CPTII,S$GLB,, | Performed by: NURSE PRACTITIONER

## 2023-06-22 RX ORDER — CYANOCOBALAMIN (VITAMIN B-12) 250 MCG
250 TABLET ORAL DAILY
COMMUNITY

## 2023-06-22 NOTE — DISCHARGE INSTRUCTIONS
Your surgery has been scheduled for:_____7/5/23_____________________________________    You should report to:  ____[facility], located on the [place] side of the first floor of the           [facility] ([phone])  __X__[facility], located on the [facility] side of the            Second floor of the [facility] ([phone])  ____3rd Floor [facility] located on the [facility] side of the [facility] [phone]  ____[facility]: located at [address] [place] [address]. [address]     Please Note   Tell your doctor if you take Aspirin, products containing Aspirin, herbal medications  or blood thinners, such as Coumadin, Ticlid, or Plavix.  (Consult your provider regarding holding or stopping before surgery).  Arrange for someone to drive you home following surgery.  You will not be allowed to leave the surgical facility alone or drive yourself home following sedation and anesthesia.    Before Surgery  Stop taking all herbal medications, vitamins, and supplements 7 days prior to surgery  No Motrin/Advil (Ibuprofen) 7 days before surgery  No Aleve (Naproxen) 7 days before surgery  Stop Taking Asprin, products containing Aspirin __7___days before surgery   No Goody's/BC Powder 7 days before surgery  Refrain from drinking alcoholic beverages for 24 hours before and after surgery  Stop or limit smoking at least 24 hours prior to surgery  You may take Tylenol for pain    Night before Surgery  Do not eat or drink after midnight  Take a shower or bath (shower is recommended).  Bathe with Hibiclens soap or an antibacterial soap from the neck down.  If not supplied by your surgeon, hibiclens soap will need to be purchased over the counter in pharmacy.  Rinse soap off thoroughly.  Shampoo your hair with your regular shampoo    The Day of Surgery  Take another bath or shower with hibiclens or  any antibacterial soap, to reduce the chance of infection.  Take heart and blood pressure medications with a small sip of water, as advised by the perioperative team.  Do not take fluid pills  You may brush your teeth and rinse your mouth, but do not swallow any additional water.   Do not apply perfumes, powder, body lotions or deodorant on the day of surgery.  Nail polish should be removed.  Do not wear makeup or moisturizer  Wear comfortable clothes, such as a button front shirt and loose fitting pants.  Leave all jewelry, including body piercings, and valuables at home.    Bring any devices you will neeed after surgery such as crutches or canes.  If you have sleep apnea, please bring your CPAP machine  In the event that your physical condition changes including the onset of a cold or respiratory illness, or if you have to delay or cancel your surgery, please notify your surgeon.

## 2023-06-22 NOTE — HPI
This is a 82 y.o. female  who presents today for a preoperative evaluation in preparation for an Orthopedic  procedure.  Scheduled for  right hip arthroplasty.   Surgery is indicated for osteoarthritis of right hip.   Patient is new to me.  Details of current problem: The duration of problem is > 2 years. Denies trauma.  Pain is becoming progressively worse.   Reports symptoms of  intermittent aching pain to right hip/groin.   Aggravating factors include:  weightbearing activities and decreased ADLs.  Relieving factors are  Tylenol prn.   Reports pain: 8/10 with movement; uses cane for assistance.  The history has been obtained by speaking with the patient and reviewing the electronic medical record and/or outside health information. Significant health conditions for the perioperative period are discussed below in assessment and plan.     Patient reports current health status to be Good.  Denies any new symptoms before surgery.

## 2023-06-22 NOTE — ASSESSMENT & PLAN NOTE
Reported tenderness upon abdominal exam today  Denies N/V or abdominal pain at any other time; denies liver/gallbladder issues  Will request US abdomen  No reports of constipation/fever/chills/dysuria  US abdomen shows cholelithiasis/hepatic cysts- may be the reason for discomfort  Suggest follow-up with PCP if abdominal pain becomes worse.

## 2023-06-22 NOTE — PROGRESS NOTES
[facility]  Progress Note    Patient Name: Jo-Ann Escobedo  MRN: [id number]  Date of Evaluation- 06/30/2023  PCP- Alexus Neely MD    Future cases for Jo-Ann Escobedo [[id number]]       Case ID Status Date Time Markos Procedure Provider Location    [id number] [facility] 7/5/2023 12:15  ARTHROPLASTY, HIP, TOTAL, ANTERIOR APPROACH: RIGHT: [facility] Federico Ramirez MD [[id number]] [facility] OR 2ND FLR            HPI:  This is a 82 y.o. female  who presents today for a preoperative evaluation in preparation for an Orthopedic  procedure.  Scheduled for  right hip arthroplasty.   Surgery is indicated for osteoarthritis of right hip.   Patient is new to me.  Details of current problem: The duration of problem is > 2 years. Denies trauma.  Pain is becoming progressively worse.   Reports symptoms of  intermittent aching pain to right hip/groin.   Aggravating factors include:  weightbearing activities and decreased ADLs.  Relieving factors are  Tylenol prn.   Reports pain: 8/10 with movement; uses cane for assistance.  The history has been obtained by speaking with the patient and reviewing the electronic medical record and/or outside health information. Significant health conditions for the perioperative period are discussed below in assessment and plan.     Patient reports current health status to be Good.  Denies any new symptoms before surgery.        Subjective/ Objective:     Chief Complaint: Preoperative evaulation, perioperative medical management, and complication reduction plan.     Functional Capacity: walks dog 3x daily for 5-6 blocks without CP/SOB.       Anesthesia issues: None    Difficulty mouth opening: No    Steroid use in the last 12 months:  yes    Dental Issues: upper denture    Family anesthesia difficulty: None       Family Hx of Thrombosis: None    Past Medical History:   Diagnosis Date    Acute blood loss anemia 12/07/2019    After-cataract of both eyes - Both Eyes 09/26/2012     Arthritis of foot 07/11/2014    right subtalar     Cholelithiasis     Deaf, left     Diverticulitis of large intestine without perforation or abscess with bleeding 12/07/2019    Diverticulosis     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 She subsequently underwent colonoscopy 12/9 and several large diverticula in the sigmoid colon was seen with hematin throughout the colon; blood pooling also noted in the sigmoid colon, during which clip was placed f    Hematochezia     Hypothyroidism, postsurgical 07/01/2015    Mixed hyperlipidemia 09/27/2012    Multinodular goiter 07/31/2014    Paget's disease of bone 07/10/2013    SCC (squamous cell carcinoma) 12/2020    left 5th knuckle    Squamous Cell Carcinoma     in situ right neck          Past Medical History Pertinent Negatives:   Diagnosis Date Noted    Amblyopia 09/26/2012    Anxiety 06/22/2023    Asthma 06/22/2023    Basal cell carcinoma 08/11/2021    CHF (congestive heart failure) 06/22/2023    Coronary artery disease 06/22/2023    Deep vein thrombosis 06/22/2023    Depression 06/22/2023    Diabetes mellitus 09/26/2012    Diabetes mellitus, type 2 06/22/2023    Diabetic retinopathy 09/26/2012    Disorder of kidney and ureter 06/22/2023    GERD (gastroesophageal reflux disease) 06/22/2023    Glaucoma 09/26/2012    Macular degeneration 09/26/2012    Melanoma 07/22/2013    Pulmonary embolism 06/22/2023    Retinal detachment 09/26/2012    Seizures 06/22/2023    Strabismus 09/26/2012    Stroke 06/22/2023    Uveitis 09/26/2012         Past Surgical History:   Procedure Laterality Date    BREAST  BIOPSY Right 2019    BREAST LUMPECTOMY Right 2019    CARPAL TUNNEL RELEASE Left 6/5/2020    Procedure: RELEASE, CARPAL TUNNEL Left;  Surgeon: Pricila Presley MD;  Location: [facility] OR;  Service: Orthopedics;  Laterality: Left;    CARPAL TUNNEL RELEASE Right 12/5/2022    Procedure: RELEASE, CARPAL TUNNEL, right;  Surgeon: Pricila Presley MD;  Location: [facility] OR;  Service: Orthopedics;  Laterality: Right;    CATARACT EXTRACTION W/  INTRAOCULAR LENS IMPLANT Bilateral     COLONOSCOPY N/A 4/15/2019    Procedure: COLONOSCOPY;  Surgeon: Artur Monet MD;  Location: [facility] (4TH FLR);  Service: Endoscopy;  Laterality: N/A;  within 1 month    COLONOSCOPY N/A 12/9/2019    Procedure: COLONOSCOPY;  Surgeon: Clyde Welch MD;  Location: [facility] (2ND FLR);  Service: Endoscopy;  Laterality: N/A;    COLONOSCOPY N/A 2/13/2023    Procedure: COLONOSCOPY;  Surgeon: Johan Hoyos MD;  Location: [facility] (4TH FLR);  Service: Endoscopy;  Laterality: N/A;  Okay for any CRS MD if Dr. Mnoet booked. but hopeful to get this done about 4 weeks from now  1/10 - pt called about time change and MD change. patient verbalized understanding and new instructions sent - sm  Precall complete- [place]    ENDOSCOPIC ULTRASOUND OF UPPER GASTROINTESTINAL TRACT N/A 1/22/2020    Procedure: ULTRASOUND, UPPER GI TRACT, ENDOSCOPIC;  Surgeon: Eleazar Shaffre MD;  Location: [facility] (2ND FLR);  Service: Endoscopy;  Laterality: N/A;  EUS for 10mm SML w/negative pillow sign and negative naked fat sign which was found at the incisura.  Dr Welch    EPIDURAL STEROID INJECTION Bilateral 12/17/2021    Procedure: INJECTION, STEROID, EPIDURAL, L3-L4 Bilateral DIRECT REF Transforaminal;  Surgeon: Julian Fish MD;  Location: [facility] PAIN MGT;  Service: Pain Management;  Laterality: Bilateral;    ESOPHAGOGASTRODUODENOSCOPY N/A 12/7/2019    Procedure: EGD (ESOPHAGOGASTRODUODENOSCOPY);  Surgeon: Clyde Welch MD;  Location: [facility] (2ND FLR);   Service: Endoscopy;  Laterality: N/A;    EXCISIONAL BIOPSY Right 6/27/2019    Procedure: EXCISIONAL BIOPSY-breast;  Surgeon: Suki Dill MD;  Location: [address];  Service: General;  Laterality: Right;    EYE SURGERY      HYSTERECTOMY      INJECTION Right 3/3/2023    Procedure: INJECTION, RIGHT HIP CORTICOSTEROID /LOCAL INJECTION CONTRAST DIRECT REF;  Surgeon: Julian Fish MD;  Location: [facility] PAIN MGT;  Service: Pain Management;  Laterality: Right;    INJECTION FOR SENTINEL NODE IDENTIFICATION Right 7/30/2020    Procedure: INJECTION, FOR SENTINEL NODE IDENTIFICATION;  Surgeon: Suki Dill MD;  Location: [facility] OR;  Service: General;  Laterality: Right;    INJECTION OF ANESTHETIC AGENT AROUND MEDIAL BRANCH NERVES INNERVATING LUMBAR FACET JOINT Bilateral 6/7/2019    Procedure: BLOCK, NERVE, FACET JOINT, LUMBAR, MEDIAL BRANCH-deo MBB L4/5,L5/S1;  Surgeon: Jarvis Morales III, MD;  Location: [facility] CATH LAB;  Service: Pain Management;  Laterality: Bilateral;    INJECTION OF STEROID Right 6/5/2020    Procedure: INJECTION, STEROID right carpal tunel injection/ Right ring trigger finger injection;  Surgeon: Pricila Presley MD;  Location: [facility] OR;  Service: Orthopedics;  Laterality: Right;  INJECTION, STEROID right carpal tunel injection/ Right ring trigger finger injection    KNEE ARTHROSCOPY N/A     pt unsure of which knee     MASTECTOMY      OOPHORECTOMY      SENTINEL LYMPH NODE BIOPSY Right 7/30/2020    Procedure: BIOPSY, LYMPH NODE, SENTINEL;  Surgeon: Suki Dill MD;  Location: [facility] OR;  Service: General;  Laterality: Right;    SPINE SURGERY      TOTAL THYROIDECTOMY      TRANSFORAMINAL EPIDURAL INJECTION OF STEROID Bilateral 5/27/2022    Procedure: INJECTION, STEROID, EPIDURAL, TF BILATERAL L3-L4 CONTRAST DIRECT REF;  Surgeon: Julian Fish MD;  Location: [facility] PAIN MGT;  Service: Pain Management;  Laterality: Bilateral;    TRANSFORAMINAL EPIDURAL INJECTION OF STEROID Bilateral 10/21/2022  "   Procedure: LUMBAR TRANSFORAMINAL BILATERAL L3/4 CONTRAST DIRECT REFERRAL;  Surgeon: Julian Fish MD;  Location: [facility] PAIN MGT;  Service: Pain Management;  Laterality: Bilateral;    UNILATERAL MASTECTOMY Right 7/30/2020    Procedure: MASTECTOMY, UNILATERAL;  Surgeon: Suki Dill MD;  Location: [facility] OR;  Service: General;  Laterality: Right;    YAG Laser Capsulotomy  Left 07/29/2019 01/06/2021 OD//Dr. Hahn       Review of Systems   Constitutional:  Negative for chills, fatigue, fever and unexpected weight change.   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 Negative for dental problem, postnasal drip, rhinorrhea, sore throat, tinnitus and trouble swallowing.    Eyes:  Negative for photophobia, pain, discharge and visual disturbance.   Respiratory:  Negative for apnea, cough, chest tightness, shortness of breath and wheezing.    Cardiovascular:  Negative for chest pain, palpitations and leg swelling.   Gastrointestinal:  Positive for constipation. Negative for abdominal pain, blood in stool, nausea and vomiting.        Denies Fatty liver, Hepatitis   Endocrine: Negative for cold intolerance and heat intolerance.   Genitourinary:  Negative for decreased urine volume, difficulty urinating, dysuria, frequency, hematuria and urgency.        Nocturia x 1    Musculoskeletal:  Positive for arthralgias (right hip). Negative for back pain, neck pain and neck stiffness.   Skin:  Negative for rash and wound.   Neurological:  Positive for dizziness (reports vertigo every 3-4 years; resolves quickly) and numbness (BUE/BLE). Negative for tremors, seizures, syncope, weakness and headaches.   Hematological:  Negative for adenopathy. Bruises/bleeds easily.   Psychiatric/Behavioral:  Negative for confusion, sleep disturbance and suicidal ideas.             VITALS  Visit Vitals  BP (!) 144/65 (BP Location: Left arm, Patient Position: Sitting)   Pulse 80   Temp 98.3 °F (36.8 °C) (Oral)   Ht 5' 2" " (1.575 m)   Wt 77.6 kg (171 lb)   LMP  (LMP Unknown)   SpO2 96%   BMI 31.28 kg/m²          Physical Exam  Vitals reviewed.   Constitutional:       General: She is not in acute distress.     Appearance: She is well-developed. She is obese.   HENT:      Head: Normocephalic.      Nose: Nose normal.      Mouth/Throat:      Pharynx: No oropharyngeal exudate.   Eyes:      General:         Right eye: No discharge.         Left eye: No discharge.      Conjunctiva/sclera: Conjunctivae normal.      Pupils: Pupils are equal, round, and reactive to light.   Neck:      Thyroid: No thyromegaly.      Vascular: No carotid bruit or JVD.      Trachea: No tracheal deviation.   Cardiovascular:      Rate and Rhythm: Normal rate and regular rhythm.      Pulses:           Carotid pulses are 2+ on the right side and 2+ on the left side.       Dorsalis pedis pulses are 2+ on the right side and 2+ on the left side.        Posterior tibial pulses are 2+ on the right side and 2+ on the left side.      Heart sounds: Normal heart sounds. No murmur heard.  Pulmonary:      Effort: Pulmonary effort is normal. No respiratory distress.      Breath sounds: Normal breath sounds. No stridor. No wheezing, rhonchi or rales.   Abdominal:      General: Bowel sounds are normal. There is no distension.      Palpations: Abdomen is soft.      Tenderness: There is abdominal tenderness in the right upper quadrant. There is no guarding.   Musculoskeletal:      Cervical back: Normal range of motion. No pain with movement.      Right lower leg: Edema (trace) present.      Left lower leg: No edema.   Lymphadenopathy:      Cervical: No cervical adenopathy.   Skin:     General: Skin is warm and dry.      Capillary Refill: Capillary refill takes less than 2 seconds.      Findings: No erythema or rash.   Neurological:      Mental Status: She is alert and oriented to person, place, and time.      Coordination: Coordination normal.        Significant Labs:  Lab Results    Component Value Date    WBC 5.83 06/13/2023    HGB 12.5 06/13/2023    HCT 38.1 06/13/2023     06/13/2023    CHOL 157 07/18/2022    TRIG 99 07/18/2022    HDL 41 07/18/2022    ALT 27 06/13/2023    AST 26 06/13/2023     06/13/2023    K 4.1 06/13/2023     (H) 06/13/2023    CREATININE 0.6 06/13/2023    BUN 12 06/13/2023    CO2 24 06/13/2023    TSH 0.035 (L) 05/17/2023    INR 1.0 06/13/2023    GLUF 100 02/12/2004    HGBA1C 5.4 05/17/2023       Diagnostic Studies: No relevant studies.    EKG:   Results for orders placed or performed during the hospital encounter of 06/13/23   EKG 12-lead    Collection Time: 06/13/23  5:09 PM    Narrative    Test Reason : Z01.818,    Vent. Rate : 066 BPM     Atrial Rate : 066 BPM     P-R Int : 164 ms          QRS Dur : 082 ms      QT Int : 378 ms       P-R-T Axes : 047 005 032 degrees     QTc Int : 396 ms    Normal sinus rhythm  Voltage criteria for left ventricular hypertrophy  Abnormal ECG  When compared with ECG of 10-JUL-2020 11:35,  Premature atrial complexes are no longer Present  Confirmed by ADELITA DE LA GARZA MD ([id number]) on 6/14/2023 11:28:10 AM    Referred By: SHAYNA KING           Confirmed By:ADELITA DE LA GARZA MD           Imaging   MRI L-Spine 10/9/22  FINDINGS:  Lumbar spine vertebral body heights are maintained.  Minimal degenerative grade 1 retrolisthesis at L3-L4 and L4-L5 with mild grade 1 anterolisthesis at L5-S1.  No infiltrative T1 marrow process.  Multilevel disc desiccation with variable disc space narrowing, most pronounced at L3-L4 and L4-L5.  Spinal cord terminus lies inferiorly at L1.  Visualized prevertebral and paraspinal soft tissues demonstrate bilateral renal cysts.  Cortical hypointense focus of the left and right kidney which measures 0.9 cm and 0.6 cm, respectively and indeterminate (series 9, image 4 and 7).     T12-L1: Small central disc extrusion without significant spinal canal stenosis or neural foraminal impingement.     L1-L2: Central  disc protrusion and facet hypertrophy without significant central canal stenosis or neural foraminal impingement.     L2-L3: Circumferential bulge and facet hypertrophy resulting in some flattening of the ventral thecal sac with AP canal reduction and mild stenosis.  No significant neural foraminal impingement.     L3-L4: Mild Modic type 1 endplate change.  Circumferential bulge with associated osteophyte complex and facet hypertrophy.  Findings contribute to moderate-severe spinal canal stenosis and mild-moderate neural foraminal narrowing.  Superimposed inferior projecting left paracentral disc extrusion with contact and partial displacement of the left transiting L4 nerve root.     L4-L5: Circumferential bulge with associated osteophyte complex and facet hypertrophy.  Superimposed central/left paracentral extrusion with inferior migration, again demonstrating variable signal to the parent disc.  There is partial indentation of the left paracentral thecal sac without significant spinal canal stenosis and possible focal contact of the left transiting L5 nerve root.  Moderate-severe neural foraminal narrowing.     L5-S1: Circumferential bulge with partial uncovering of the posterior disc with advanced facet DJD.  No significant spinal canal stenosis.  Mild-moderate neural foraminal narrowing.     Impression:     Multilevel lumbar spondylosis with variable neural foraminal narrowing as detailed above.  Similar degree of spinal canal stenosis, moderate-severe at L3-L4.     Bilateral renal cysts with indeterminate cortical focus of the left and right kidney.  Recommend correlation with nonemergent follow-up ultrasound.     This report was flagged in Epic as containing an incidental finding.        Electronically signed by: Ronaldo Coronado  Date:                                            10/09/2022  Time:                                           16:11        MRI C-Spine 5/8/22  FINDINGS:  There is no evidence of  advanced marrow edema or osseous destructive process.     There is developmental narrowing of the spinal canal due to shortened pedicles with superimposed degenerative changes.  Diffuse degenerative disc space narrowing is identified throughout the cervical spine sparing C2-3.  Mild degenerative anterolisthesis of C4 on C5 and C7 on T1.     The cervical spinal cord maintains normal signal intensity.     C2-3 no advanced stenosis.     C3-4 through C7-T1 there is disc bulging with endplate osteophyte formation and facet and ligamentous hypertrophy that encroaches on the ventral and dorsal cord mildly flattening it.  No overt cord compression is identified however there is diffuse spinal stenosis with mild flattening of the cord diffusely and effacement of the ventral and dorsal thecal sac.  Foraminal stenosis is moderate bilaterally but severe on the right at C5-6 and bilaterally at C6-7.     T1-2 and T2-3 disc bulging mildly flattens the traversing cord without impingement or advanced foraminal stenosis.     Impression:     Degenerative changes are superimposed on developmental narrowing of the spinal canal due to shortened pedicles.     Disc bulging and endplate osteophyte formation with facet and ligamentous hypertrophy diffusely from C3-4 through C7-T1 flattens the ventral and dorsal cord without overt cord impingement or cord signal abnormality however much of the ventral and dorsal thecal sac is effaced at each level.  Foraminal stenosis is most advanced and severe on the right at C5-6 and bilaterally at C6-7.     Electronically signed by resident: Nohemy Albert MD  Date:                                            05/08/2022  Time:                                           15:40     Electronically signed by: Dirk Moore  Date:                                            05/09/2022  Time:                                           07:58      US abdomen 6/30/23    Impression:     1. No acute abnormality.  2.  Cholelithiasis.  3. Multiple simple hepatic cysts.  4. Multiple bilateral simple renal cysts.        Electronically signed by: Ronaldo Munoz  Date:                                            06/30/2023  Time:                                           12:17          Active Cardiac Conditions: None      Revised Cardiac Risk Index   High -Risk Surgery  Intraperitoneal; Intrathoracic; suprainguinal vascular Yes- + 1 No- 0   History of Ischemic Heart Disease   (Hx of MI/positive exercise test/current chest pain due to ischemia/use of nitrate therapy/EKG with pathological Q waves) Yes- + 1 No- 0   History of CHF  (Pulmonary edema/bilateral rales or S3 gallop/PND/CXR showing pulmonary vascular redistribution) Yes- + 1 No- 0   History of CVA   (Prior stroke or TIA) Yes- + 1 No- 0   Pre-operative treatment with insulin Yes- + 1 No- 0   Pre-operative creatinine > 2mg/dl Yes- + 1 No- 0   Total: 0      Risk Status:  Estimated risk of cardiac complications after non-cardiac surgery using the Revised Cardiac Risk Index for Preoperative risk is 3.9 %      ARISCAT (Canet) risk index: Intermediate: 13.3% risk of post-op pulmonary complications.    American Society of Anesthesiologists Physical Status classification (ASA): 2           No further cardiac workup needed prior to surgery.          Orders Placed This Encounter    US Abdomen Complete           Assessment/Plan:     Lumbosacral radiculopathy  History of ESIs  Seen by [facility] 10/11/22: possible L3-5 TLIF if symptoms do not improve  No loss of bladder or bowel control    Denies N/T to buttocks, perineum and inner surfaces of the thighs (saddle anesthesia)        Recent imaging: Xray L-Spine 10/5/22  FINDINGS:  Vertebral bodies are intact.  There is narrowing of the L3-L4 and L4-L5 disc space with significant degenerative change.  No instability in flexion and extension.  No collapse or destruction is noted.  Incidental note of a rounded calcification in the right upper  quadrant that may represent a gallstone.     Impression:     See above            Calcified granuloma of lung  Per CXR in   Denies CP/SOB/cough/congestion/wheezing    Mixed hyperlipidemia  Recommend weight loss, healthy diet (DASH/Mediterranean) and exercise.   Patient should exercise 30 minutes at least five times weekly. Limit alcohol.  Treated with: fenofibrate    Essential hypertension  Current BP  not at goal today; 144/65.    Taking: HCTZ/losartan   Patient reports today's home BP readin/66      Lifestyle changes to reduce systolic BP:  exercise 30 minutes per day,  5 days per week or 150 minutes weekly; sodium reduction and avoidance of high salt foods such as processed meats, frozen meals and  fast foods.   Keeping a healthy weight/BMI can help with better BP control    BP acceptable for surgery. I recommend monitoring BP during perioperative period as uncontrolled pain can elevate blood pressure.           Aortic atherosclerosis  Per CT chest 2019  Not on statin or ASA use    Ductal carcinoma in situ (DCIS) of right breast  S/P right mastectomy 20; followed per Hem-Onc.  Also has history of left breast cancer in   ; S/P left radical mastectomy and adjuvant chemo    Obesity (BMI 30.0-34.9)  Lifestyle changes should be made by eating healthy, exercising at least 150 minutes weekly, and avoiding sedentary behavior.       Hypothyroidism, postsurgical  Currently treated with Levothyroxine 112 mcg  TSH:  abnormal but T4 is normal  S/P total thyroidectomy in    Followed by PCP    Prediabetes  A1c is 5.4  I recommend monitoring patient's glucose level during the perioperative period. Glucose may be elevated from stress hyperglycemia and may require insulin.    History of GI bleed  Episode in ; attributed to NSAID use (diclofenac)  Received multiple units of PRBCs; no recurrence    Adenomatous polyp of colon  S/P Colonoscopy 23  1. Cecal polyps x2, biopsy:  Fragments of tubular  adenoma   2. Right colon polyps, biopsy:  Fragments of tubular adenoma   3. Polyps x2 (90 cm), biopsy:  Fragments of tubular adenoma   3. Sigmoid colon polyp, biopsy:  Fragments of tubular adenoma       Paget's disease of bone  Stable; followed per PCP  Alk Phos mildly elevated- 152 U/L    Bone Scan 5/2023  Impression:     Normal bone mineral density  Fracture risk is Moderate  Compared with previous DXA, BMD at the lumbar spine has remained stable, and BMD at the total hip has decreased 9.4%.         Primary osteoarthritis of right hip  Scheduled with Dr. Vasquez on 7/5/23 for right hip arthroplasty.    Other dietary vitamin B12 deficiency anemia  Takes OTC B12 supplement  No recent level      Right upper quadrant abdominal tenderness  Reported tenderness upon abdominal exam today  Denies N/V or abdominal pain at any other time; denies liver/gallbladder issues  Will request US abdomen  No reports of constipation/fever/chills/dysuria  US abdomen shows cholelithiasis/hepatic cysts- may be the reason for discomfort  Suggest follow-up with PCP if abdominal pain becomes worse.       Discussion/Management of Perioperative Care    Thromboembolic prophylaxis (VTE) Care: Risk factors for thrombosis include: age, obesity, and surgical procedure.  I recommend prophylaxis of thromboembolism with the use of compression stockings/pneumatic devices, and/or pharmacologic agents. The benefits should outweigh the risks for pharmacologic prophylaxis in the perioperative period. I also encourage early ambulation if not contraindicated during the post-operative period.    Risk factors for post-operative pulmonary complications include:age > 65 years, HTN, and surgery > 3 hours. To reduce the risk of pulmonary complications, prophylactic recommendations include: incentive spirometry use/deep breathing, early ambulation, and pain control.    Risk factors for renal complications include: age and HTN. To reduce the risk of postoperative  renal complications, I recommend the patient maintain adequate fluid volume status by drinking 2 liters of water daily.  Avoid/reduce NSAIDS and RAGSDALE-2 inhibitors use as well as IV contrast for renal protection.    I recommend the use of appropriate prophylactic antibiotics to reduce the risk of surgical site infections.    Delirium risk factors include advanced age. I recommend to avoid/reduce use of benzodiazepine use (not for patients who take on a regular basis), anticholinergics, Benadryl,  and agents that may cause postoperative serotonin syndrome.  Controlled pain can decrease the risk for postop delirium and since opioids are used for postoperative pain control, I suggest using the lowest dose for the shortest amount of time necessary for pain management.     The patient is at an increased risk for urinary retention due to : possible regional anesthesia and advanced age. I recommend to avoid/decrease the use of benzodiazepines, anticholinergics, and Benadryl in the perioperative period. I also recommend using opioids for the shortest period of time if possible.          This visit was focused on Preoperative evaluation, Perioperative Medical management, complication reduction plans. I suggest that the patient follows up with primary care or relevant sub specialists for ongoing health care.    I appreciate the opportunity to be involved in this patients care. Please feel free to contact me if there were any questions about this consultation.        I spent a total of 87 minutes on the day of the visit.This includes face to face time and non-face to face time preparing to see the patient (e.g., review of tests), obtaining and/or reviewing separately obtained history, documenting clinical information in the electronic or other health record, independently interpreting results and communicating results to the patient/family/caregiver, or care coordinator.       Patient is optimized for surgery.        Peter SHAH  JONATAN Berman  Perioperative Medicine  [facility]

## 2023-06-22 NOTE — OUTPATIENT SUBJECTIVE & OBJECTIVE
Outpatient Subjective & Objective      Chief Complaint: Preoperative evaulation, perioperative medical management, and complication reduction plan.     Functional Capacity: walks dog 3x daily for 5-6 blocks without CP/SOB.       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 She subsequently underwent colonoscopy 12/9 and several large diverticula in the sigmoid colon was seen with hematin throughout the colon; blood pooling also noted in the sigmoid colon, during which clip was placed f    Hematochezia     Hypothyroidism, postsurgical 07/01/2015    Mixed hyperlipidemia 09/27/2012    Multinodular goiter 07/31/2014    Paget's disease of bone 07/10/2013    SCC (squamous cell carcinoma) 12/2020    left 5th knuckle    Squamous Cell Carcinoma     in situ right neck          Past Medical History Pertinent Negatives:   Diagnosis Date Noted    Amblyopia 09/26/2012    Anxiety 06/22/2023    Asthma 06/22/2023    Basal cell carcinoma 08/11/2021    CHF (congestive heart failure) 06/22/2023    Coronary artery disease 06/22/2023     Deep vein thrombosis 06/22/2023    Depression 06/22/2023    Diabetes mellitus 09/26/2012    Diabetes mellitus, type 2 06/22/2023    Diabetic retinopathy 09/26/2012    Disorder of kidney and ureter 06/22/2023    GERD (gastroesophageal reflux disease) 06/22/2023    Glaucoma 09/26/2012    Macular degeneration 09/26/2012    Melanoma 07/22/2013    Pulmonary embolism 06/22/2023    Retinal detachment 09/26/2012    Seizures 06/22/2023    Strabismus 09/26/2012    Stroke 06/22/2023    Uveitis 09/26/2012         Past Surgical History:   Procedure Laterality Date    BREAST BIOPSY Right 2019    BREAST LUMPECTOMY Right 2019    CARPAL TUNNEL RELEASE Left 6/5/2020    Procedure: RELEASE, CARPAL TUNNEL Left;  Surgeon: Pricila Presley MD;  Location: [facility] OR;  Service: Orthopedics;  Laterality: Left;    CARPAL TUNNEL RELEASE Right 12/5/2022    Procedure: RELEASE, CARPAL TUNNEL, right;  Surgeon: Pricila Presley MD;  Location: [facility] OR;  Service: Orthopedics;  Laterality: Right;    CATARACT EXTRACTION W/  INTRAOCULAR LENS IMPLANT Bilateral     COLONOSCOPY N/A 4/15/2019    Procedure: COLONOSCOPY;  Surgeon: Artur Monet MD;  Location: [facility] (4TH FLR);  Service: Endoscopy;  Laterality: N/A;  within 1 month    COLONOSCOPY N/A 12/9/2019    Procedure: COLONOSCOPY;  Surgeon: Clyde Welch MD;  Location: [facility] (2ND FLR);  Service: Endoscopy;  Laterality: N/A;    COLONOSCOPY N/A 2/13/2023    Procedure: COLONOSCOPY;  Surgeon: Johan Hoyos MD;  Location: [facility] (4TH FLR);  Service: Endoscopy;  Laterality: N/A;  Okay for any CRS MD if Dr. Monet booked. but hopeful to get this done about 4 weeks from now  1/10 - pt called about time change and MD change. patient verbalized understanding and new instructions sent - sm  Precall complete- [place]    ENDOSCOPIC ULTRASOUND OF UPPER GASTROINTESTINAL TRACT N/A 1/22/2020    Procedure: ULTRASOUND, UPPER GI TRACT, ENDOSCOPIC;  Surgeon: Eleazar Shaffer MD;  Location: [facility]  ENDO (2ND FLR);  Service: Endoscopy;  Laterality: N/A;  EUS for 10mm SML w/negative pillow sign and negative naked fat sign which was found at the incisura.  Dr Welch    EPIDURAL STEROID INJECTION Bilateral 12/17/2021    Procedure: INJECTION, STEROID, EPIDURAL, L3-L4 Bilateral DIRECT REF Transforaminal;  Surgeon: Julian Fish MD;  Location: [facility] PAIN MGT;  Service: Pain Management;  Laterality: Bilateral;    ESOPHAGOGASTRODUODENOSCOPY N/A 12/7/2019    Procedure: EGD (ESOPHAGOGASTRODUODENOSCOPY);  Surgeon: Clyde Welch MD;  Location: [facility] ENDO (2ND FLR);  Service: Endoscopy;  Laterality: N/A;    EXCISIONAL BIOPSY Right 6/27/2019    Procedure: EXCISIONAL BIOPSY-breast;  Surgeon: Suki Dill MD;  Location: [facility] 2ND FLR;  Service: General;  Laterality: Right;    EYE SURGERY      HYSTERECTOMY      INJECTION Right 3/3/2023    Procedure: INJECTION, RIGHT HIP CORTICOSTEROID /LOCAL INJECTION CONTRAST DIRECT REF;  Surgeon: Julian Fish MD;  Location: [facility] MGT;  Service: Pain Management;  Laterality: Right;    INJECTION FOR SENTINEL NODE IDENTIFICATION Right 7/30/2020    Procedure: INJECTION, FOR SENTINEL NODE IDENTIFICATION;  Surgeon: Suki Dill MD;  Location: [facility] OR;  Service: General;  Laterality: Right;    INJECTION OF ANESTHETIC AGENT AROUND MEDIAL BRANCH NERVES INNERVATING LUMBAR FACET JOINT Bilateral 6/7/2019    Procedure: BLOCK, NERVE, FACET JOINT, LUMBAR, MEDIAL BRANCH-deo MBB L4/5,L5/S1;  Surgeon: Jarvis Morales III, MD;  Location: [facility] CATH LAB;  Service: Pain Management;  Laterality: Bilateral;    INJECTION OF STEROID Right 6/5/2020    Procedure: INJECTION, STEROID right carpal tunel injection/ Right ring trigger finger injection;  Surgeon: Pricila Presley MD;  Location: [facility] OR;  Service: Orthopedics;  Laterality: Right;  INJECTION, STEROID right carpal tunel injection/ Right ring trigger finger injection    KNEE ARTHROSCOPY N/A     pt unsure of which knee      MASTECTOMY      OOPHORECTOMY      SENTINEL LYMPH NODE BIOPSY Right 7/30/2020    Procedure: BIOPSY, LYMPH NODE, SENTINEL;  Surgeon: Suki Dill MD;  Location: [facility] OR;  Service: General;  Laterality: Right;    SPINE SURGERY      TOTAL THYROIDECTOMY      TRANSFORAMINAL EPIDURAL INJECTION OF STEROID Bilateral 5/27/2022    Procedure: INJECTION, STEROID, EPIDURAL, TF BILATERAL L3-L4 CONTRAST DIRECT REF;  Surgeon: Julian Fish MD;  Location: [facility] PAIN MGT;  Service: Pain Management;  Laterality: Bilateral;    TRANSFORAMINAL EPIDURAL INJECTION OF STEROID Bilateral 10/21/2022    Procedure: LUMBAR TRANSFORAMINAL BILATERAL L3/4 CONTRAST DIRECT REFERRAL;  Surgeon: Julian Fish MD;  Location: [facility] PAIN MGT;  Service: Pain Management;  Laterality: Bilateral;    UNILATERAL MASTECTOMY Right 7/30/2020    Procedure: MASTECTOMY, UNILATERAL;  Surgeon: Suki Dill MD;  Location: [facility] OR;  Service: General;  Laterality: Right;    YAG Laser Capsulotomy  Left 07/29/2019 01/06/2021 OD//Dr. Hahn       Review of Systems   Constitutional:  Negative for chills, fatigue, fever and unexpected weight change.   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 Negative for dental problem, postnasal drip, rhinorrhea, sore throat, tinnitus and trouble swallowing.    Eyes:  Negative for photophobia, pain, discharge and visual disturbance.   Respiratory:  Negative for apnea, cough, chest tightness, shortness of breath and wheezing.    Cardiovascular:  Negative for chest pain, palpitations and leg swelling.   Gastrointestinal:  Positive for constipation. Negative for abdominal pain, blood in stool, nausea and vomiting.        Denies Fatty liver, Hepatitis   Endocrine: Negative for cold intolerance and heat intolerance.   Genitourinary:  Negative for decreased urine volume, difficulty urinating, dysuria, frequency, hematuria and urgency.        Nocturia x 1    Musculoskeletal:  Positive for arthralgias (right hip).  "Negative for back pain, neck pain and neck stiffness.   Skin:  Negative for rash and wound.   Neurological:  Positive for dizziness (reports vertigo every 3-4 years; resolves quickly) and numbness (BUE/BLE). Negative for tremors, seizures, syncope, weakness and headaches.   Hematological:  Negative for adenopathy. Bruises/bleeds easily.   Psychiatric/Behavioral:  Negative for confusion, sleep disturbance and suicidal ideas.             VITALS  Visit Vitals  BP (!) 144/65 (BP Location: Left arm, Patient Position: Sitting)   Pulse 80   Temp 98.3 °F (36.8 °C) (Oral)   Ht 5' 2" (1.575 m)   Wt 77.6 kg (171 lb)   LMP  (LMP Unknown)   SpO2 96%   BMI 31.28 kg/m²          Physical Exam  Vitals reviewed.   Constitutional:       General: She is not in acute distress.     Appearance: She is well-developed. She is obese.   HENT:      Head: Normocephalic.      Nose: Nose normal.      Mouth/Throat:      Pharynx: No oropharyngeal exudate.   Eyes:      General:         Right eye: No discharge.         Left eye: No discharge.      Conjunctiva/sclera: Conjunctivae normal.      Pupils: Pupils are equal, round, and reactive to light.   Neck:      Thyroid: No thyromegaly.      Vascular: No carotid bruit or JVD.      Trachea: No tracheal deviation.   Cardiovascular:      Rate and Rhythm: Normal rate and regular rhythm.      Pulses:           Carotid pulses are 2+ on the right side and 2+ on the left side.       Dorsalis pedis pulses are 2+ on the right side and 2+ on the left side.        Posterior tibial pulses are 2+ on the right side and 2+ on the left side.      Heart sounds: Normal heart sounds. No murmur heard.  Pulmonary:      Effort: Pulmonary effort is normal. No respiratory distress.      Breath sounds: Normal breath sounds. No stridor. No wheezing, rhonchi or rales.   Abdominal:      General: Bowel sounds are normal. There is no distension.      Palpations: Abdomen is soft.      Tenderness: There is abdominal tenderness in the " right upper quadrant. There is no guarding.   Musculoskeletal:      Cervical back: Normal range of motion. No pain with movement.      Right lower leg: Edema (trace) present.      Left lower leg: No edema.   Lymphadenopathy:      Cervical: No cervical adenopathy.   Skin:     General: Skin is warm and dry.      Capillary Refill: Capillary refill takes less than 2 seconds.      Findings: No erythema or rash.   Neurological:      Mental Status: She is alert and oriented to person, place, and time.      Coordination: Coordination normal.        Significant Labs:  Lab Results   Component Value Date    WBC 5.83 06/13/2023    HGB 12.5 06/13/2023    HCT 38.1 06/13/2023     06/13/2023    CHOL 157 07/18/2022    TRIG 99 07/18/2022    HDL 41 07/18/2022    ALT 27 06/13/2023    AST 26 06/13/2023     06/13/2023    K 4.1 06/13/2023     (H) 06/13/2023    CREATININE 0.6 06/13/2023    BUN 12 06/13/2023    CO2 24 06/13/2023    TSH 0.035 (L) 05/17/2023    INR 1.0 06/13/2023    GLUF 100 02/12/2004    HGBA1C 5.4 05/17/2023       Diagnostic Studies: No relevant studies.    EKG:   Results for orders placed or performed during the hospital encounter of 06/13/23   EKG 12-lead    Collection Time: 06/13/23  5:09 PM    Narrative    Test Reason : Z01.818,    Vent. Rate : 066 BPM     Atrial Rate : 066 BPM     P-R Int : 164 ms          QRS Dur : 082 ms      QT Int : 378 ms       P-R-T Axes : 047 005 032 degrees     QTc Int : 396 ms    Normal sinus rhythm  Voltage criteria for left ventricular hypertrophy  Abnormal ECG  When compared with ECG of 10-JUL-2020 11:35,  Premature atrial complexes are no longer Present  Confirmed by ADELITA DE LA GARZA MD ([id number]) on 6/14/2023 11:28:10 AM    Referred By: SHAYNA KING           Confirmed By:ADELITA DE LA GARZA MD           Imaging   MRI L-Spine 10/9/22  FINDINGS:  Lumbar spine vertebral body heights are maintained.  Minimal degenerative grade 1 retrolisthesis at L3-L4 and L4-L5 with mild grade 1  anterolisthesis at L5-S1.  No infiltrative T1 marrow process.  Multilevel disc desiccation with variable disc space narrowing, most pronounced at L3-L4 and L4-L5.  Spinal cord terminus lies inferiorly at L1.  Visualized prevertebral and paraspinal soft tissues demonstrate bilateral renal cysts.  Cortical hypointense focus of the left and right kidney which measures 0.9 cm and 0.6 cm, respectively and indeterminate (series 9, image 4 and 7).     T12-L1: Small central disc extrusion without significant spinal canal stenosis or neural foraminal impingement.     L1-L2: Central disc protrusion and facet hypertrophy without significant central canal stenosis or neural foraminal impingement.     L2-L3: Circumferential bulge and facet hypertrophy resulting in some flattening of the ventral thecal sac with AP canal reduction and mild stenosis.  No significant neural foraminal impingement.     L3-L4: Mild Modic type 1 endplate change.  Circumferential bulge with associated osteophyte complex and facet hypertrophy.  Findings contribute to moderate-severe spinal canal stenosis and mild-moderate neural foraminal narrowing.  Superimposed inferior projecting left paracentral disc extrusion with contact and partial displacement of the left transiting L4 nerve root.     L4-L5: Circumferential bulge with associated osteophyte complex and facet hypertrophy.  Superimposed central/left paracentral extrusion with inferior migration, again demonstrating variable signal to the parent disc.  There is partial indentation of the left paracentral thecal sac without significant spinal canal stenosis and possible focal contact of the left transiting L5 nerve root.  Moderate-severe neural foraminal narrowing.     L5-S1: Circumferential bulge with partial uncovering of the posterior disc with advanced facet DJD.  No significant spinal canal stenosis.  Mild-moderate neural foraminal narrowing.     Impression:     Multilevel lumbar spondylosis with  variable neural foraminal narrowing as detailed above.  Similar degree of spinal canal stenosis, moderate-severe at L3-L4.     Bilateral renal cysts with indeterminate cortical focus of the left and right kidney.  Recommend correlation with nonemergent follow-up ultrasound.     This report was flagged in Epic as containing an incidental finding.        Electronically signed by: Ronaldo Coronado  Date:                                            10/09/2022  Time:                                           16:11        MRI C-Spine 5/8/22  FINDINGS:  There is no evidence of advanced marrow edema or osseous destructive process.     There is developmental narrowing of the spinal canal due to shortened pedicles with superimposed degenerative changes.  Diffuse degenerative disc space narrowing is identified throughout the cervical spine sparing C2-3.  Mild degenerative anterolisthesis of C4 on C5 and C7 on T1.     The cervical spinal cord maintains normal signal intensity.     C2-3 no advanced stenosis.     C3-4 through C7-T1 there is disc bulging with endplate osteophyte formation and facet and ligamentous hypertrophy that encroaches on the ventral and dorsal cord mildly flattening it.  No overt cord compression is identified however there is diffuse spinal stenosis with mild flattening of the cord diffusely and effacement of the ventral and dorsal thecal sac.  Foraminal stenosis is moderate bilaterally but severe on the right at C5-6 and bilaterally at C6-7.     T1-2 and T2-3 disc bulging mildly flattens the traversing cord without impingement or advanced foraminal stenosis.     Impression:     Degenerative changes are superimposed on developmental narrowing of the spinal canal due to shortened pedicles.     Disc bulging and endplate osteophyte formation with facet and ligamentous hypertrophy diffusely from C3-4 through C7-T1 flattens the ventral and dorsal cord without overt cord impingement or cord signal abnormality  however much of the ventral and dorsal thecal sac is effaced at each level.  Foraminal stenosis is most advanced and severe on the right at C5-6 and bilaterally at C6-7.     Electronically signed by resident: Nohemy Albert MD  Date:                                            05/08/2022  Time:                                           15:40     Electronically signed by: Dirk Moore  Date:                                            05/09/2022  Time:                                           07:58      US abdomen 6/30/23    Impression:     1. No acute abnormality.  2. Cholelithiasis.  3. Multiple simple hepatic cysts.  4. Multiple bilateral simple renal cysts.        Electronically signed by: Ronaldo Munoz  Date:                                            06/30/2023  Time:                                           12:17          Active Cardiac Conditions: None      Revised Cardiac Risk Index   High -Risk Surgery  Intraperitoneal; Intrathoracic; suprainguinal vascular Yes- + 1 No- 0   History of Ischemic Heart Disease   (Hx of MI/positive exercise test/current chest pain due to ischemia/use of nitrate therapy/EKG with pathological Q waves) Yes- + 1 No- 0   History of CHF  (Pulmonary edema/bilateral rales or S3 gallop/PND/CXR showing pulmonary vascular redistribution) Yes- + 1 No- 0   History of CVA   (Prior stroke or TIA) Yes- + 1 No- 0   Pre-operative treatment with insulin Yes- + 1 No- 0   Pre-operative creatinine > 2mg/dl Yes- + 1 No- 0   Total: 0      Risk Status:  Estimated risk of cardiac complications after non-cardiac surgery using the Revised Cardiac Risk Index for Preoperative risk is 3.9 %      ARISCAT (Canet) risk index: Intermediate: 13.3% risk of post-op pulmonary complications.    American Society of Anesthesiologists Physical Status classification (ASA): 2           No further cardiac workup needed prior to surgery.    Outpatient Subjective & Objective

## 2023-06-23 ENCOUNTER — TELEPHONE (OUTPATIENT)
Dept: PREADMISSION TESTING | Facility: HOSPITAL | Age: 83
End: 2023-06-23
Payer: MEDICARE

## 2023-06-28 ENCOUNTER — PATIENT MESSAGE (OUTPATIENT)
Dept: ADMINISTRATIVE | Facility: OTHER | Age: 83
End: 2023-06-28
Payer: MEDICARE

## 2023-06-30 ENCOUNTER — HOSPITAL ENCOUNTER (OUTPATIENT)
Dept: RADIOLOGY | Facility: HOSPITAL | Age: 83
Discharge: HOME | End: 2023-06-30
Attending: NURSE PRACTITIONER
Payer: MEDICARE

## 2023-06-30 DIAGNOSIS — R10.811 RIGHT UPPER QUADRANT ABDOMINAL TENDERNESS, REBOUND TENDERNESS PRESENCE NOT SPECIFIED: ICD-10-CM

## 2023-06-30 PROCEDURE — 76700 US EXAM ABDOM COMPLETE: CPT | Mod: 26,,, | Performed by: STUDENT IN AN ORGANIZED HEALTH CARE EDUCATION/TRAINING PROGRAM

## 2023-06-30 PROCEDURE — 76700 US ABDOMEN COMPLETE: ICD-10-PCS | Mod: 26,,, | Performed by: STUDENT IN AN ORGANIZED HEALTH CARE EDUCATION/TRAINING PROGRAM

## 2023-06-30 PROCEDURE — 76700 US EXAM ABDOM COMPLETE: CPT | Mod: TC

## 2023-07-03 ENCOUNTER — PATIENT MESSAGE (OUTPATIENT)
Dept: ORTHOPEDICS | Facility: CLINIC | Age: 83
End: 2023-07-03
Payer: MEDICARE

## 2023-07-03 ENCOUNTER — TELEPHONE (OUTPATIENT)
Dept: ORTHOPEDICS | Facility: CLINIC | Age: 83
End: 2023-07-03
Payer: MEDICARE

## 2023-07-03 ENCOUNTER — PATIENT MESSAGE (OUTPATIENT)
Dept: ADMINISTRATIVE | Facility: OTHER | Age: 83
End: 2023-07-03
Payer: MEDICARE

## 2023-07-03 ENCOUNTER — ANESTHESIA EVENT (OUTPATIENT)
Dept: SURGERY | Facility: HOSPITAL | Age: 83
End: 2023-07-03
Payer: MEDICARE

## 2023-07-03 NOTE — TELEPHONE ENCOUNTER
I called the patient today regarding surgery on 7/5/2023 with Dr. Federico Ramirez. I informed the patient that her surgery will be at  [facility] ([address] [place] [place] [id number]). I informed the patient they must arrive at 6:00am  and their surgery will start at 8:00am.     I reminded the patient that they cannot eat or drink after midnight, the night before surgery.     I reminded the patient to be careful of their skin over the next few days to make sure they do not get any cuts, scratches or scrapes.    The patient verbalized understanding and has no further questions.

## 2023-07-04 RX ORDER — ACETAMINOPHEN 500 MG
1000 TABLET ORAL
Status: CANCELLED | OUTPATIENT
Start: 2023-07-05 | End: 2023-07-05

## 2023-07-04 NOTE — ANESTHESIA PREPROCEDURE EVALUATION
[facility]  Anesthesia Pre-Operative Evaluation         Patient Name: Jo-Ann Escobedo  YOB: 1940  MRN: [id number]    SUBJECTIVE:     Pre-operative evaluation for Procedure(s) (LRB):  ARTHROPLASTY, HIP, TOTAL, ANTERIOR APPROACH: RIGHT: [employer] CONTINUUM (Right)     07/04/2023    Jo-Ann Escobedo is a 82 y.o. 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     Patient now presents for the above procedure(s).      Prev airway: Method of Intubation: Video Laryngoscopy; Mask Ventilation: Easy; Intubated: Postinduction; Airway Device Size: 7.5; Placement Verified By: Capnometry; Complicating Factors: None, Small mouth; Mallampati 3    No previous ECHOs    Patient Active Problem List   Diagnosis    PCO (posterior capsular opacification), right    Mixed hyperlipidemia    Sensorineural hearing loss, bilateral    Paget's disease of bone    Obesity (BMI 30.0-34.9)    Osteoarthritis of lumbar spine    Hypothyroidism, postsurgical    Lipoma of arm    Essential hypertension    Prediabetes    Facet arthritis of lumbar region    Aortic atherosclerosis    Ductal carcinoma in situ (DCIS) of right breast    Pseudophakia    Nephrolithiasis    BPPV (benign paroxysmal positional vertigo)    Diverticulitis of large intestine without perforation or abscess with bleeding    Abnormal CT of the head    Gastric nodule    History of GI bleed    Other dietary vitamin B12 deficiency anemia    Left carpal tunnel syndrome    Cervical radiculopathy at C7    Spondylosis of lumbar region without myelopathy or radiculopathy    Chronic right shoulder pain    Nontraumatic complete tear of right rotator cuff    Rotator  cuff arthropathy of right shoulder    Spinal stenosis of lumbar region with neurogenic claudication    DDD (degenerative disc disease), lumbosacral    Lumbosacral radiculopathy    Personal history of skin cancer    Hip pain    Calcified granuloma of lung    Healthcare maintenance    IGTN (ingrowing toe nail)    Adenomatous polyp of colon    Primary osteoarthritis of right hip    Right upper quadrant abdominal tenderness       Review of patient's allergies indicates:   Allergen Reactions    Voltaren [diclofenac sodium] Other (See Comments)     Hematochezia and hospitalization for hematochezia.    Codeine Diarrhea and Hallucinations    Niacin preparations      Redness, warming       Current Inpatient Medications:      No current facility-administered medications on file prior to encounter.     Current Outpatient Medications on File Prior to Encounter   Medication Sig Dispense Refill    acetaminophen (TYLENOL) 500 MG tablet Take 2 tablets (1,000 mg total) by mouth every 8 (eight) hours as needed for Pain. 54 tablet 0    ascorbic acid, vitamin C, (VITAMIN C) 100 MG tablet Take 100 mg by mouth once daily.      cholecalciferol, vitamin D3, (VITAMIN D3) 50 mcg (2,000 unit) Tab Take 1 tablet by mouth once daily.      co-enzyme Q-10 30 mg capsule Take 30 mg by mouth once daily.      ketoconazole (NIZORAL) 2 % cream Apply topically once daily. Apply daily to affected toenails for 6-12 months 60 g 4    LACTOBACILLUS COMBINATION NO.8 (ADULT PROBIOTIC ORAL) Take by mouth once daily.       levothyroxine (SYNTHROID) 112 MCG tablet Take 1 tablet (112 mcg total) by mouth before breakfast. 90 tablet 3    losartan (COZAAR) 100 MG tablet Take 1 tablet (100 mg total) by mouth once daily. 90 tablet 3    aj-ov-zl9-dha-epa-fish-lut-carlos (OCUVITE ADULT 50 PLUS) 250 mg (90 mg-160 mg) Cap Take by mouth.      turmeric root extract 500 mg Cap Take by mouth once daily.       fenofibrate 160 MG Tab TAKE 1 TABLET (160 MG  TOTAL) BY MOUTH ONCE DAILY. 90 tablet 3    hydroCHLOROthiazide (HYDRODIURIL) 25 MG tablet Take 0.5 tablets (12.5 mg total) by mouth once daily. 15 tablet 11    sodium,potassium,mag sulfates (SUPREP BOWEL PREP KIT) 17.5-3.13-1.6 gram SolR Follow instructions given by Endoscopy scheduling nurse 1 kit 0       Past Surgical History:   Procedure Laterality Date    BREAST BIOPSY Right 2019    BREAST LUMPECTOMY Right 2019    CARPAL TUNNEL RELEASE Left 6/5/2020    Procedure: RELEASE, CARPAL TUNNEL Left;  Surgeon: Pricila Presley MD;  Location: [facility] OR;  Service: Orthopedics;  Laterality: Left;    CARPAL TUNNEL RELEASE Right 12/5/2022    Procedure: RELEASE, CARPAL TUNNEL, right;  Surgeon: Pricila Presley MD;  Location: [facility] OR;  Service: Orthopedics;  Laterality: Right;    CATARACT EXTRACTION W/  INTRAOCULAR LENS IMPLANT Bilateral     COLONOSCOPY N/A 4/15/2019    Procedure: COLONOSCOPY;  Surgeon: Artur Monet MD;  Location: [facility] (4TH FLR);  Service: Endoscopy;  Laterality: N/A;  within 1 month    COLONOSCOPY N/A 12/9/2019    Procedure: COLONOSCOPY;  Surgeon: Clyde Welch MD;  Location: [facility] ENDO (2ND FLR);  Service: Endoscopy;  Laterality: N/A;    COLONOSCOPY N/A 2/13/2023    Procedure: COLONOSCOPY;  Surgeon: Johan Hoyos MD;  Location: [facility] ENDO (4TH FLR);  Service: Endoscopy;  Laterality: N/A;  Okay for any CRS MD if Dr. Monet booked. but hopeful to get this done about 4 weeks from now  1/10 - pt called about time change and MD change. patient verbalized understanding and new instructions sent - sm  Precall complete- [place]    ENDOSCOPIC ULTRASOUND OF UPPER GASTROINTESTINAL TRACT N/A 1/22/2020    Procedure: ULTRASOUND, UPPER GI TRACT, ENDOSCOPIC;  Surgeon: Eleazar Shaffer MD;  Location: [facility] ENDO (2ND FLR);  Service: Endoscopy;  Laterality: N/A;  EUS for 10mm SML w/negative pillow sign and negative naked fat sign which was found at the [facility]isura.  Dr Welch    EPIDURAL STEROID  INJECTION Bilateral 12/17/2021    Procedure: INJECTION, STEROID, EPIDURAL, L3-L4 Bilateral DIRECT REF Transforaminal;  Surgeon: Julian Fish MD;  Location: [facility];  Service: Pain Management;  Laterality: Bilateral;    ESOPHAGOGASTRODUODENOSCOPY N/A 12/7/2019    Procedure: EGD (ESOPHAGOGASTRODUODENOSCOPY);  Surgeon: Clyde Welch MD;  [address]  Service: Endoscopy;  Laterality: N/A;    EXCISIONAL BIOPSY Right 6/27/2019    Procedure: EXCISIONAL BIOPSY-breast;  Surgeon: Suki Dill MD;  Location: [facility] 2ND FLR;  Service: General;  Laterality: Right;    EYE SURGERY      HYSTERECTOMY      INJECTION Right 3/3/2023    Procedure: INJECTION, RIGHT HIP CORTICOSTEROID /LOCAL INJECTION CONTRAST DIRECT REF;  Surgeon: Julian Fish MD;  [facility];  Service: Pain Management;  Laterality: Right;    INJECTION FOR SENTINEL NODE IDENTIFICATION Right 7/30/2020    Procedure: INJECTION, FOR SENTINEL NODE IDENTIFICATION;  Surgeon: Suki Dill MD;  Location: [facility];  Service: General;  Laterality: Right;    INJECTION OF ANESTHETIC AGENT AROUND MEDIAL BRANCH NERVES INNERVATING LUMBAR FACET JOINT Bilateral 6/7/2019    Procedure: BLOCK, NERVE, FACET JOINT, LUMBAR, MEDIAL BRANCH-deo MBB L4/5,L5/S1;  Surgeon: Jarvis Morales III, MD;  Location: [facility] CATH LAB;  Service: Pain Management;  Laterality: Bilateral;    INJECTION OF STEROID Right 6/5/2020    Procedure: INJECTION, STEROID right carpal tunel injection/ Right ring trigger finger injection;  Surgeon: Pricila Presley MD;  Location: [facility] OR;  Service: Orthopedics;  Laterality: Right;  INJECTION, STEROID right carpal tunel injection/ Right ring trigger finger injection    KNEE ARTHROSCOPY N/A     pt unsure of which knee     MASTECTOMY      OOPHORECTOMY      SENTINEL LYMPH NODE BIOPSY Right 7/30/2020    Procedure: BIOPSY, LYMPH NODE, SENTINEL;  Surgeon: Suki Dill MD;  Location: [facility] OR;  Service:  General;  Laterality: Right;    SPINE SURGERY      TOTAL THYROIDECTOMY      TRANSFORAMINAL EPIDURAL INJECTION OF STEROID Bilateral 2022    Procedure: INJECTION, STEROID, EPIDURAL, TF BILATERAL L3-L4 CONTRAST DIRECT REF;  Surgeon: Julian Fish MD;  Location: [facility] PAIN MGT;  Service: Pain Management;  Laterality: Bilateral;    TRANSFORAMINAL EPIDURAL INJECTION OF STEROID Bilateral 10/21/2022    Procedure: LUMBAR TRANSFORAMINAL BILATERAL L3/4 CONTRAST DIRECT REFERRAL;  Surgeon: Julian Fish MD;  Location: [facility] PAIN MGT;  Service: Pain Management;  Laterality: Bilateral;    UNILATERAL MASTECTOMY Right 2020    Procedure: MASTECTOMY, UNILATERAL;  Surgeon: Suki Dill MD;  Location: [facility] OR;  Service: General;  Laterality: Right;    YAG Laser Capsulotomy  Left 2019 OD//Dr. Hahn       Social History     Socioeconomic History    Marital status:    Occupational History    Occupation: [employer]     Employer: [employer]     Employer: [employer]   Tobacco Use    Smoking status: Former     Packs/day: 2.00     Years: 44.00     Pack years: 88.00     Types: Cigarettes     Quit date: 1969     Years since quittin.5    Smokeless tobacco: Never   Substance and Sexual Activity    Alcohol use: Not Currently    Drug use: No    Sexual activity: Not Currently   Other Topics Concern    Are you pregnant or think you may be? No    Breast-feeding No     Social Determinants of Health     Financial Resource Strain: Low Risk     Difficulty of Paying Living Expenses: Not hard at all   Food Insecurity: No Food Insecurity    Worried About Running Out of Food in the Last Year: Never true    Ran Out of Food in the Last Year: Never true   Transportation Needs: No Transportation Needs    Lack of Transportation (Medical): No    Lack of Transportation (Non-Medical): No   Physical Activity: Sufficiently Active    Days of Exercise per Week: 5 days    Minutes of  Exercise per Session: 30 min   Stress: No Stress Concern Present    Feeling of Stress : Only a little   Social Connections: Unknown    Frequency of Communication with Friends and Family: More than three times a week    Frequency of Social Gatherings with Friends and Family: Three times a week    Active Member of Clubs or Organizations: Yes    Attends Club or Organization Meetings: More than 4 times per year    Marital Status:    Housing Stability: Low Risk     Unable to Pay for Housing in the Last Year: No    Number of Places Lived in the Last Year: 1    Unstable Housing in the Last Year: No       OBJECTIVE:     Vital Signs Range (Last 24H):         Significant Labs:  Lab Results   Component Value Date    WBC 5.83 06/13/2023    HGB 12.5 06/13/2023    HCT 38.1 06/13/2023     06/13/2023    CHOL 157 07/18/2022    TRIG 99 07/18/2022    HDL 41 07/18/2022    ALT 27 06/13/2023    AST 26 06/13/2023     06/13/2023    K 4.1 06/13/2023     (H) 06/13/2023    CREATININE 0.6 06/13/2023    BUN 12 06/13/2023    CO2 24 06/13/2023    TSH 0.035 (L) 05/17/2023    INR 1.0 06/13/2023    GLUF 100 02/12/2004    HGBA1C 5.4 05/17/2023       Diagnostic Studies: No relevant studies.    EKG:   Results for orders placed or performed during the hospital encounter of 06/13/23   EKG 12-lead    Collection Time: 06/13/23  5:09 PM    Narrative    Test Reason : Z01.818,    Vent. Rate : 066 BPM     Atrial Rate : 066 BPM     P-R Int : 164 ms          QRS Dur : 082 ms      QT Int : 378 ms       P-R-T Axes : 047 005 032 degrees     QTc Int : 396 ms    Normal sinus rhythm  Voltage criteria for left ventricular hypertrophy  Abnormal ECG  When compared with ECG of 10-JUL-2020 11:35,  Premature atrial complexes are no longer Present  Confirmed by ADELITA DE LA GARZA MD ([id number]) on 6/14/2023 11:28:10 AM    Referred By: SHAYNA KING           Confirmed By:ADELITA DE LA GARZA MD       ASSESSMENT/PLAN:       Pre-op Assessment    I have  reviewed the Patient Summary Reports.     I have reviewed the Nursing Notes.    I have reviewed the Medications.     Review of Systems  Anesthesia Hx:  Denies Family Hx of Anesthesia complications.   Denies Personal Hx of Anesthesia complications.   Social:  Former Smoker    Hematology/Oncology:  Hematology Normal   Oncology Normal     EENT/Dental:EENT/Dental Normal   Cardiovascular:   Hypertension    Pulmonary:  Pulmonary Normal    Renal/:  Renal/ Normal     Hepatic/GI:  Hepatic/GI Normal    Musculoskeletal:   Arthritis     Neurological:  Neurology Normal    Endocrine:   Hypothyroidism    Dermatological:  Skin Normal    Psych:  Psychiatric Normal           Physical Exam  General: Well nourished, Cooperative, Alert and Oriented    Airway:  Mallampati: II   Mouth Opening: Small, but > 3cm  TM Distance: Normal  Tongue: Normal  Neck ROM: Normal ROM    Dental:  Intact        Anesthesia Plan  Type of Anesthesia, risks & benefits discussed:    Anesthesia Type: MAC, CSE  Intra-op Monitoring Plan: Standard ASA Monitors  Post Op Pain Control Plan: multimodal analgesia and IV/PO Opioids PRN  Induction:  IV  Airway Plan: Direct and Video  Informed Consent: Informed consent signed with the Patient and all parties understand the risks and agree with anesthesia plan.  All questions answered.   ASA Score: 2  Day of Surgery Review of History & Physical: H&P Update referred to the surgeon/provider.    Ready For Surgery From Anesthesia Perspective.     .

## 2023-07-05 ENCOUNTER — ANESTHESIA (OUTPATIENT)
Dept: SURGERY | Facility: HOSPITAL | Age: 83
End: 2023-07-05
Payer: MEDICARE

## 2023-07-05 ENCOUNTER — HOSPITAL ENCOUNTER (OUTPATIENT)
Facility: HOSPITAL | Age: 83
Discharge: HOME | End: 2023-07-06
Attending: ORTHOPAEDIC SURGERY | Admitting: ORTHOPAEDIC SURGERY
Payer: MEDICARE

## 2023-07-05 DIAGNOSIS — M12.811 ROTATOR CUFF ARTHROPATHY OF RIGHT SHOULDER: ICD-10-CM

## 2023-07-05 DIAGNOSIS — M16.11 PRIMARY OSTEOARTHRITIS OF RIGHT HIP: ICD-10-CM

## 2023-07-05 DIAGNOSIS — E66.9 OBESITY (BMI 30.0-34.9): ICD-10-CM

## 2023-07-05 DIAGNOSIS — M51.37 DDD (DEGENERATIVE DISC DISEASE), LUMBOSACRAL: ICD-10-CM

## 2023-07-05 DIAGNOSIS — G56.02 LEFT CARPAL TUNNEL SYNDROME: ICD-10-CM

## 2023-07-05 DIAGNOSIS — M54.12 CERVICAL RADICULOPATHY AT C7: ICD-10-CM

## 2023-07-05 DIAGNOSIS — M25.511 CHRONIC RIGHT SHOULDER PAIN: ICD-10-CM

## 2023-07-05 DIAGNOSIS — M48.062 SPINAL STENOSIS OF LUMBAR REGION WITH NEUROGENIC CLAUDICATION: ICD-10-CM

## 2023-07-05 DIAGNOSIS — M47.816 SPONDYLOSIS OF LUMBAR REGION WITHOUT MYELOPATHY OR RADICULOPATHY: ICD-10-CM

## 2023-07-05 DIAGNOSIS — M25.559 HIP PAIN, UNSPECIFIED LATERALITY: ICD-10-CM

## 2023-07-05 DIAGNOSIS — M47.896 OTHER OSTEOARTHRITIS OF SPINE, LUMBAR REGION: ICD-10-CM

## 2023-07-05 DIAGNOSIS — Z96.641 STATUS POST RIGHT HIP REPLACEMENT: ICD-10-CM

## 2023-07-05 DIAGNOSIS — R10.811 RIGHT UPPER QUADRANT ABDOMINAL TENDERNESS, REBOUND TENDERNESS PRESENCE NOT SPECIFIED: ICD-10-CM

## 2023-07-05 DIAGNOSIS — I10 ESSENTIAL HYPERTENSION: ICD-10-CM

## 2023-07-05 DIAGNOSIS — H90.3 SENSORINEURAL HEARING LOSS, BILATERAL: ICD-10-CM

## 2023-07-05 DIAGNOSIS — E89.0 HYPOTHYROIDISM, POSTSURGICAL: ICD-10-CM

## 2023-07-05 DIAGNOSIS — M88.9 PAGET'S DISEASE OF BONE: ICD-10-CM

## 2023-07-05 DIAGNOSIS — E78.2 MIXED HYPERLIPIDEMIA: ICD-10-CM

## 2023-07-05 DIAGNOSIS — M54.17 LUMBOSACRAL RADICULOPATHY: ICD-10-CM

## 2023-07-05 DIAGNOSIS — H26.491 PCO (POSTERIOR CAPSULAR OPACIFICATION), RIGHT: Primary | ICD-10-CM

## 2023-07-05 DIAGNOSIS — G89.29 CHRONIC RIGHT SHOULDER PAIN: ICD-10-CM

## 2023-07-05 LAB
ABO + RH BLD: NORMAL
BLD GP AB SCN CELLS X3 SERPL QL: NORMAL
SPECIMEN OUTDATE: NORMAL

## 2023-07-05 PROCEDURE — 71000033 HC RECOVERY, INTIAL HOUR: Performed by: ORTHOPAEDIC SURGERY

## 2023-07-05 PROCEDURE — 27130 PR TOTAL HIP ARTHROPLASTY: ICD-10-PCS | Mod: RT,,, | Performed by: ORTHOPAEDIC SURGERY

## 2023-07-05 PROCEDURE — 36000710: Performed by: ORTHOPAEDIC SURGERY

## 2023-07-05 PROCEDURE — 63600175 PHARM REV CODE 636 W HCPCS: Performed by: NURSE PRACTITIONER

## 2023-07-05 PROCEDURE — 25000003 PHARM REV CODE 250: Performed by: NURSE PRACTITIONER

## 2023-07-05 PROCEDURE — 86900 BLOOD TYPING SEROLOGIC ABO: CPT | Performed by: ORTHOPAEDIC SURGERY

## 2023-07-05 PROCEDURE — 76942 PERIPHERAL BLOCK: ICD-10-PCS | Mod: 26,,, | Performed by: ANESTHESIOLOGY

## 2023-07-05 PROCEDURE — 71000015 HC POSTOP RECOV 1ST HR: Performed by: ORTHOPAEDIC SURGERY

## 2023-07-05 PROCEDURE — 97166 OT EVAL MOD COMPLEX 45 MIN: CPT

## 2023-07-05 PROCEDURE — 64450 PERIPHERAL BLOCK: ICD-10-PCS | Mod: 59,RT,, | Performed by: ANESTHESIOLOGY

## 2023-07-05 PROCEDURE — 25000003 PHARM REV CODE 250: Performed by: ANESTHESIOLOGY

## 2023-07-05 PROCEDURE — 36000711: Performed by: ORTHOPAEDIC SURGERY

## 2023-07-05 PROCEDURE — 97530 THERAPEUTIC ACTIVITIES: CPT

## 2023-07-05 PROCEDURE — 37000008 HC ANESTHESIA 1ST 15 MINUTES: Performed by: ORTHOPAEDIC SURGERY

## 2023-07-05 PROCEDURE — 71000016 HC POSTOP RECOV ADDL HR: Performed by: ORTHOPAEDIC SURGERY

## 2023-07-05 PROCEDURE — 25000003 PHARM REV CODE 250: Performed by: STUDENT IN AN ORGANIZED HEALTH CARE EDUCATION/TRAINING PROGRAM

## 2023-07-05 PROCEDURE — 27130 TOTAL HIP ARTHROPLASTY: CPT | Mod: RT,,, | Performed by: ORTHOPAEDIC SURGERY

## 2023-07-05 PROCEDURE — 64450 NJX AA&/STRD OTHER PN/BRANCH: CPT | Mod: 59,RT,, | Performed by: ANESTHESIOLOGY

## 2023-07-05 PROCEDURE — D9220A PRA ANESTHESIA: Mod: ,,, | Performed by: ANESTHESIOLOGY

## 2023-07-05 PROCEDURE — 25000003 PHARM REV CODE 250

## 2023-07-05 PROCEDURE — D9220A PRA ANESTHESIA: ICD-10-PCS | Mod: ,,, | Performed by: ANESTHESIOLOGY

## 2023-07-05 PROCEDURE — 27201423 OPTIME MED/SURG SUP & DEVICES STERILE SUPPLY: Performed by: ORTHOPAEDIC SURGERY

## 2023-07-05 PROCEDURE — 76942 ECHO GUIDE FOR BIOPSY: CPT

## 2023-07-05 PROCEDURE — 63600175 PHARM REV CODE 636 W HCPCS

## 2023-07-05 PROCEDURE — C1713 ANCHOR/SCREW BN/BN,TIS/BN: HCPCS | Performed by: ORTHOPAEDIC SURGERY

## 2023-07-05 PROCEDURE — 37000009 HC ANESTHESIA EA ADD 15 MINS: Performed by: ORTHOPAEDIC SURGERY

## 2023-07-05 PROCEDURE — 76942 ECHO GUIDE FOR BIOPSY: CPT | Mod: 26,,, | Performed by: ANESTHESIOLOGY

## 2023-07-05 PROCEDURE — C1776 JOINT DEVICE (IMPLANTABLE): HCPCS | Performed by: ORTHOPAEDIC SURGERY

## 2023-07-05 PROCEDURE — 97162 PT EVAL MOD COMPLEX 30 MIN: CPT

## 2023-07-05 PROCEDURE — 94761 N-INVAS EAR/PLS OXIMETRY MLT: CPT

## 2023-07-05 PROCEDURE — 63600175 PHARM REV CODE 636 W HCPCS: Performed by: ORTHOPAEDIC SURGERY

## 2023-07-05 DEVICE — CENTRALIZER SPACER VERSYS DIST: Type: IMPLANTABLE DEVICE | Site: HIP | Status: FUNCTIONAL

## 2023-07-05 DEVICE — IMPLANTABLE DEVICE: Type: IMPLANTABLE DEVICE | Site: HIP | Status: FUNCTIONAL

## 2023-07-05 DEVICE — LINER DUAL MOBILITY G7 56MM E: Type: IMPLANTABLE DEVICE | Site: HIP | Status: FUNCTIONAL

## 2023-07-05 DEVICE — CEMENT BONE SMPLX HV GENTMYCN: Type: IMPLANTABLE DEVICE | Site: HIP | Status: FUNCTIONAL

## 2023-07-05 DEVICE — CEMENT BONE W/GENT SIMPLEX HV: Type: IMPLANTABLE DEVICE | Site: HIP | Status: FUNCTIONAL

## 2023-07-05 RX ORDER — AMOXICILLIN 250 MG
1 CAPSULE ORAL 2 TIMES DAILY
Status: DISCONTINUED | OUTPATIENT
Start: 2023-07-05 | End: 2023-07-06 | Stop reason: HOSPADM

## 2023-07-05 RX ORDER — LIDOCAINE HYDROCHLORIDE 10 MG/ML
1 INJECTION, SOLUTION EPIDURAL; INFILTRATION; INTRACAUDAL; PERINEURAL
Status: COMPLETED | OUTPATIENT
Start: 2023-07-05 | End: 2023-07-05

## 2023-07-05 RX ORDER — TRANEXAMIC ACID 100 MG/ML
INJECTION, SOLUTION INTRAVENOUS
Status: DISCONTINUED | OUTPATIENT
Start: 2023-07-05 | End: 2023-07-05

## 2023-07-05 RX ORDER — METHOCARBAMOL 750 MG/1
750 TABLET, FILM COATED ORAL 3 TIMES DAILY
Status: DISCONTINUED | OUTPATIENT
Start: 2023-07-05 | End: 2023-07-06

## 2023-07-05 RX ORDER — LOSARTAN POTASSIUM 50 MG/1
100 TABLET ORAL DAILY
Status: DISCONTINUED | OUTPATIENT
Start: 2023-07-06 | End: 2023-07-06

## 2023-07-05 RX ORDER — VANCOMYCIN HYDROCHLORIDE 1 G/20ML
INJECTION, POWDER, LYOPHILIZED, FOR SOLUTION INTRAVENOUS
Status: DISCONTINUED | OUTPATIENT
Start: 2023-07-05 | End: 2023-07-05 | Stop reason: HOSPADM

## 2023-07-05 RX ORDER — BUPIVACAINE HYDROCHLORIDE AND EPINEPHRINE 2.5; 5 MG/ML; UG/ML
INJECTION, SOLUTION EPIDURAL; INFILTRATION; INTRACAUDAL; PERINEURAL
Status: COMPLETED | OUTPATIENT
Start: 2023-07-05 | End: 2023-07-05

## 2023-07-05 RX ORDER — PROPOFOL 10 MG/ML
VIAL (ML) INTRAVENOUS
Status: DISCONTINUED | OUTPATIENT
Start: 2023-07-05 | End: 2023-07-05

## 2023-07-05 RX ORDER — HYDROCHLOROTHIAZIDE 12.5 MG/1
12.5 TABLET ORAL DAILY
Status: DISCONTINUED | OUTPATIENT
Start: 2023-07-06 | End: 2023-07-06

## 2023-07-05 RX ORDER — PROCHLORPERAZINE EDISYLATE 5 MG/ML
5 INJECTION INTRAMUSCULAR; INTRAVENOUS EVERY 6 HOURS PRN
Status: DISCONTINUED | OUTPATIENT
Start: 2023-07-05 | End: 2023-07-06 | Stop reason: HOSPADM

## 2023-07-05 RX ORDER — FAMOTIDINE 20 MG/1
20 TABLET, FILM COATED ORAL 2 TIMES DAILY
Status: DISCONTINUED | OUTPATIENT
Start: 2023-07-05 | End: 2023-07-06 | Stop reason: HOSPADM

## 2023-07-05 RX ORDER — FENTANYL CITRATE 50 UG/ML
INJECTION, SOLUTION INTRAMUSCULAR; INTRAVENOUS
Status: DISCONTINUED | OUTPATIENT
Start: 2023-07-05 | End: 2023-07-05

## 2023-07-05 RX ORDER — NALOXONE HCL 0.4 MG/ML
0.02 VIAL (ML) INJECTION
Status: DISCONTINUED | OUTPATIENT
Start: 2023-07-05 | End: 2023-07-06 | Stop reason: HOSPADM

## 2023-07-05 RX ORDER — POLYETHYLENE GLYCOL 3350 17 G/17G
17 POWDER, FOR SOLUTION ORAL DAILY
Status: DISCONTINUED | OUTPATIENT
Start: 2023-07-05 | End: 2023-07-06 | Stop reason: HOSPADM

## 2023-07-05 RX ORDER — POLYETHYLENE GLYCOL 3350 17 G/17G
17 POWDER, FOR SOLUTION ORAL DAILY PRN
Status: DISCONTINUED | OUTPATIENT
Start: 2023-07-05 | End: 2023-07-06 | Stop reason: HOSPADM

## 2023-07-05 RX ORDER — PREGABALIN 75 MG/1
75 CAPSULE ORAL
Status: COMPLETED | OUTPATIENT
Start: 2023-07-05 | End: 2023-07-05

## 2023-07-05 RX ORDER — CALCIUM CARBONATE 200(500)MG
500 TABLET,CHEWABLE ORAL DAILY PRN
Status: DISCONTINUED | OUTPATIENT
Start: 2023-07-05 | End: 2023-07-06 | Stop reason: HOSPADM

## 2023-07-05 RX ORDER — HYDROMORPHONE HYDROCHLORIDE 1 MG/ML
0.2 INJECTION, SOLUTION INTRAMUSCULAR; INTRAVENOUS; SUBCUTANEOUS EVERY 5 MIN PRN
Status: DISCONTINUED | OUTPATIENT
Start: 2023-07-05 | End: 2023-07-05 | Stop reason: HOSPADM

## 2023-07-05 RX ORDER — PROPOFOL 10 MG/ML
VIAL (ML) INTRAVENOUS CONTINUOUS PRN
Status: DISCONTINUED | OUTPATIENT
Start: 2023-07-05 | End: 2023-07-05

## 2023-07-05 RX ORDER — OXYCODONE HYDROCHLORIDE 10 MG/1
10 TABLET ORAL
Status: DISCONTINUED | OUTPATIENT
Start: 2023-07-05 | End: 2023-07-06

## 2023-07-05 RX ORDER — ONDANSETRON 2 MG/ML
INJECTION INTRAMUSCULAR; INTRAVENOUS
Status: DISCONTINUED | OUTPATIENT
Start: 2023-07-05 | End: 2023-07-05

## 2023-07-05 RX ORDER — ASPIRIN 81 MG/1
81 TABLET ORAL 2 TIMES DAILY
Status: DISCONTINUED | OUTPATIENT
Start: 2023-07-05 | End: 2023-07-06 | Stop reason: HOSPADM

## 2023-07-05 RX ORDER — PHENYLEPHRINE HYDROCHLORIDE 10 MG/ML
INJECTION INTRAVENOUS
Status: DISCONTINUED | OUTPATIENT
Start: 2023-07-05 | End: 2023-07-05

## 2023-07-05 RX ORDER — FENTANYL CITRATE 50 UG/ML
25-200 INJECTION, SOLUTION INTRAMUSCULAR; INTRAVENOUS
Status: DISCONTINUED | OUTPATIENT
Start: 2023-07-05 | End: 2023-07-05 | Stop reason: HOSPADM

## 2023-07-05 RX ORDER — ACETAMINOPHEN 500 MG
1000 TABLET ORAL EVERY 6 HOURS
Status: DISCONTINUED | OUTPATIENT
Start: 2023-07-05 | End: 2023-07-06 | Stop reason: HOSPADM

## 2023-07-05 RX ORDER — DEXAMETHASONE SODIUM PHOSPHATE 4 MG/ML
INJECTION, SOLUTION INTRA-ARTICULAR; INTRALESIONAL; INTRAMUSCULAR; INTRAVENOUS; SOFT TISSUE
Status: DISCONTINUED | OUTPATIENT
Start: 2023-07-05 | End: 2023-07-05

## 2023-07-05 RX ORDER — HALOPERIDOL 5 MG/ML
0.5 INJECTION INTRAMUSCULAR EVERY 10 MIN PRN
Status: DISCONTINUED | OUTPATIENT
Start: 2023-07-05 | End: 2023-07-05 | Stop reason: HOSPADM

## 2023-07-05 RX ORDER — MUPIROCIN 20 MG/G
1 OINTMENT TOPICAL 2 TIMES DAILY
Status: DISCONTINUED | OUTPATIENT
Start: 2023-07-05 | End: 2023-07-06 | Stop reason: HOSPADM

## 2023-07-05 RX ORDER — ONDANSETRON 2 MG/ML
4 INJECTION INTRAMUSCULAR; INTRAVENOUS EVERY 8 HOURS PRN
Status: DISCONTINUED | OUTPATIENT
Start: 2023-07-05 | End: 2023-07-06 | Stop reason: HOSPADM

## 2023-07-05 RX ORDER — ACETAMINOPHEN 500 MG
1000 TABLET ORAL
Status: COMPLETED | OUTPATIENT
Start: 2023-07-05 | End: 2023-07-05

## 2023-07-05 RX ORDER — KETAMINE HCL IN 0.9 % NACL 50 MG/5 ML
SYRINGE (ML) INTRAVENOUS
Status: DISCONTINUED | OUTPATIENT
Start: 2023-07-05 | End: 2023-07-05

## 2023-07-05 RX ORDER — SODIUM CHLORIDE 9 MG/ML
INJECTION, SOLUTION INTRAVENOUS
Status: COMPLETED | OUTPATIENT
Start: 2023-07-05 | End: 2023-07-05

## 2023-07-05 RX ORDER — OXYCODONE HYDROCHLORIDE 5 MG/1
5 TABLET ORAL
Status: DISCONTINUED | OUTPATIENT
Start: 2023-07-05 | End: 2023-07-06

## 2023-07-05 RX ORDER — SODIUM CHLORIDE 9 MG/ML
INJECTION, SOLUTION INTRAVENOUS CONTINUOUS
Status: DISCONTINUED | OUTPATIENT
Start: 2023-07-05 | End: 2023-07-06 | Stop reason: HOSPADM

## 2023-07-05 RX ORDER — MUPIROCIN 20 MG/G
1 OINTMENT TOPICAL
Status: COMPLETED | OUTPATIENT
Start: 2023-07-05 | End: 2023-07-05

## 2023-07-05 RX ORDER — PREGABALIN 75 MG/1
75 CAPSULE ORAL NIGHTLY
Status: DISCONTINUED | OUTPATIENT
Start: 2023-07-05 | End: 2023-07-06 | Stop reason: HOSPADM

## 2023-07-05 RX ORDER — FENTANYL CITRATE 50 UG/ML
25 INJECTION, SOLUTION INTRAMUSCULAR; INTRAVENOUS EVERY 5 MIN PRN
Status: DISCONTINUED | OUTPATIENT
Start: 2023-07-05 | End: 2023-07-05 | Stop reason: HOSPADM

## 2023-07-05 RX ORDER — MORPHINE SULFATE 2 MG/ML
2 INJECTION, SOLUTION INTRAMUSCULAR; INTRAVENOUS
Status: DISCONTINUED | OUTPATIENT
Start: 2023-07-05 | End: 2023-07-06

## 2023-07-05 RX ORDER — MIDAZOLAM HYDROCHLORIDE 1 MG/ML
INJECTION, SOLUTION INTRAMUSCULAR; INTRAVENOUS
Status: DISCONTINUED | OUTPATIENT
Start: 2023-07-05 | End: 2023-07-05

## 2023-07-05 RX ORDER — LEVOTHYROXINE SODIUM 112 UG/1
112 TABLET ORAL
Status: DISCONTINUED | OUTPATIENT
Start: 2023-07-06 | End: 2023-07-06 | Stop reason: HOSPADM

## 2023-07-05 RX ORDER — MIDAZOLAM HYDROCHLORIDE 1 MG/ML
4 INJECTION INTRAMUSCULAR; INTRAVENOUS
Status: DISCONTINUED | OUTPATIENT
Start: 2023-07-05 | End: 2023-07-05

## 2023-07-05 RX ORDER — FENTANYL CITRATE 50 UG/ML
100 INJECTION, SOLUTION INTRAMUSCULAR; INTRAVENOUS
Status: DISCONTINUED | OUTPATIENT
Start: 2023-07-05 | End: 2023-07-05

## 2023-07-05 RX ORDER — MIDAZOLAM HYDROCHLORIDE 1 MG/ML
.5-4 INJECTION INTRAMUSCULAR; INTRAVENOUS
Status: DISCONTINUED | OUTPATIENT
Start: 2023-07-05 | End: 2023-07-05 | Stop reason: HOSPADM

## 2023-07-05 RX ADMIN — CALCIUM CARBONATE (ANTACID) CHEW TAB 500 MG 500 MG: 500 CHEW TAB at 01:07

## 2023-07-05 RX ADMIN — SODIUM CHLORIDE, SODIUM GLUCONATE, SODIUM ACETATE, POTASSIUM CHLORIDE, MAGNESIUM CHLORIDE, SODIUM PHOSPHATE, DIBASIC, AND POTASSIUM PHOSPHATE: .53; .5; .37; .037; .03; .012; .00082 INJECTION, SOLUTION INTRAVENOUS at 09:07

## 2023-07-05 RX ADMIN — MIDAZOLAM 0.5 MG: 1 INJECTION INTRAMUSCULAR; INTRAVENOUS at 08:07

## 2023-07-05 RX ADMIN — FENTANYL CITRATE 25 MCG: 50 INJECTION, SOLUTION INTRAMUSCULAR; INTRAVENOUS at 10:07

## 2023-07-05 RX ADMIN — PHENYLEPHRINE HYDROCHLORIDE 100 MCG: 10 INJECTION INTRAVENOUS at 09:07

## 2023-07-05 RX ADMIN — FENTANYL CITRATE 25 MCG: 50 INJECTION INTRAMUSCULAR; INTRAVENOUS at 12:07

## 2023-07-05 RX ADMIN — TRANEXAMIC ACID 1000 MG: 100 INJECTION, SOLUTION INTRAVENOUS at 11:07

## 2023-07-05 RX ADMIN — ONDANSETRON 4 MG: 2 INJECTION INTRAMUSCULAR; INTRAVENOUS at 11:07

## 2023-07-05 RX ADMIN — ACETAMINOPHEN 1000 MG: 500 TABLET ORAL at 07:07

## 2023-07-05 RX ADMIN — MUPIROCIN 1 G: 20 OINTMENT TOPICAL at 08:07

## 2023-07-05 RX ADMIN — Medication 10 MG: at 10:07

## 2023-07-05 RX ADMIN — SENNOSIDES AND DOCUSATE SODIUM 1 TABLET: 50; 8.6 TABLET ORAL at 12:07

## 2023-07-05 RX ADMIN — PREGABALIN 75 MG: 75 CAPSULE ORAL at 08:07

## 2023-07-05 RX ADMIN — FENTANYL CITRATE 25 MCG: 50 INJECTION INTRAMUSCULAR; INTRAVENOUS at 01:07

## 2023-07-05 RX ADMIN — FENTANYL CITRATE 50 MCG: 50 INJECTION, SOLUTION INTRAMUSCULAR; INTRAVENOUS at 07:07

## 2023-07-05 RX ADMIN — Medication 10 MG: at 09:07

## 2023-07-05 RX ADMIN — VANCOMYCIN HYDROCHLORIDE 1000 MG: 1 INJECTION, POWDER, LYOPHILIZED, FOR SOLUTION INTRAVENOUS at 07:07

## 2023-07-05 RX ADMIN — LIDOCAINE HYDROCHLORIDE 10 MG: 10 INJECTION, SOLUTION EPIDURAL; INFILTRATION; INTRACAUDAL; PERINEURAL at 07:07

## 2023-07-05 RX ADMIN — ACETAMINOPHEN 1000 MG: 500 TABLET ORAL at 06:07

## 2023-07-05 RX ADMIN — POLYETHYLENE GLYCOL 3350 17 G: 17 POWDER, FOR SOLUTION ORAL at 01:07

## 2023-07-05 RX ADMIN — Medication 20 MG: at 08:07

## 2023-07-05 RX ADMIN — TRANEXAMIC ACID 1000 MG: 100 INJECTION, SOLUTION INTRAVENOUS at 09:07

## 2023-07-05 RX ADMIN — DEXAMETHASONE SODIUM PHOSPHATE 8 MG: 4 INJECTION, SOLUTION INTRAMUSCULAR; INTRAVENOUS at 08:07

## 2023-07-05 RX ADMIN — ASPIRIN 81 MG: 81 TABLET, COATED ORAL at 08:07

## 2023-07-05 RX ADMIN — FENTANYL CITRATE 50 MCG: 50 INJECTION, SOLUTION INTRAMUSCULAR; INTRAVENOUS at 08:07

## 2023-07-05 RX ADMIN — PREGABALIN 75 MG: 75 CAPSULE ORAL at 07:07

## 2023-07-05 RX ADMIN — SODIUM CHLORIDE: 0.9 INJECTION, SOLUTION INTRAVENOUS at 08:07

## 2023-07-05 RX ADMIN — SODIUM CHLORIDE: 9 INJECTION, SOLUTION INTRAVENOUS at 01:07

## 2023-07-05 RX ADMIN — BUPIVACAINE HYDROCHLORIDE AND EPINEPHRINE BITARTRATE 20 ML: 2.5; .0091 INJECTION, SOLUTION EPIDURAL; INFILTRATION; INTRACAUDAL; PERINEURAL at 07:07

## 2023-07-05 RX ADMIN — MEPIVACAINE HYDROCHLORIDE 3 ML: 20 INJECTION, SOLUTION EPIDURAL; INFILTRATION at 11:07

## 2023-07-05 RX ADMIN — Medication 10 MG: at 11:07

## 2023-07-05 RX ADMIN — CEFAZOLIN 2 G: 2 INJECTION, POWDER, FOR SOLUTION INTRAMUSCULAR; INTRAVENOUS at 01:07

## 2023-07-05 RX ADMIN — MUPIROCIN 1 G: 20 OINTMENT TOPICAL at 07:07

## 2023-07-05 RX ADMIN — SENNOSIDES AND DOCUSATE SODIUM 1 TABLET: 50; 8.6 TABLET ORAL at 08:07

## 2023-07-05 RX ADMIN — METHOCARBAMOL 750 MG: 750 TABLET ORAL at 02:07

## 2023-07-05 RX ADMIN — FAMOTIDINE 20 MG: 20 TABLET, FILM COATED ORAL at 12:07

## 2023-07-05 RX ADMIN — OXYCODONE HYDROCHLORIDE 10 MG: 10 TABLET ORAL at 12:07

## 2023-07-05 RX ADMIN — PROPOFOL 10 MCG/KG/MIN: 10 INJECTION, EMULSION INTRAVENOUS at 08:07

## 2023-07-05 RX ADMIN — MEPIVACAINE HYDROCHLORIDE 2.5 ML: 20 INJECTION, SOLUTION EPIDURAL; INFILTRATION at 08:07

## 2023-07-05 RX ADMIN — FENTANYL CITRATE 25 MCG: 50 INJECTION, SOLUTION INTRAMUSCULAR; INTRAVENOUS at 11:07

## 2023-07-05 RX ADMIN — ACETAMINOPHEN 1000 MG: 500 TABLET ORAL at 01:07

## 2023-07-05 RX ADMIN — FAMOTIDINE 20 MG: 20 TABLET, FILM COATED ORAL at 08:07

## 2023-07-05 RX ADMIN — METHOCARBAMOL 750 MG: 750 TABLET ORAL at 08:07

## 2023-07-05 RX ADMIN — SODIUM CHLORIDE: 9 INJECTION, SOLUTION INTRAVENOUS at 07:07

## 2023-07-05 RX ADMIN — CEFAZOLIN 2 G: 2 INJECTION, POWDER, FOR SOLUTION INTRAMUSCULAR; INTRAVENOUS at 08:07

## 2023-07-05 NOTE — TRANSFER OF CARE
"Anesthesia Transfer of Care Note    Patient: Jo-Ann Escobedo    Procedure(s) Performed: Procedure(s) (LRB):  ARTHROPLASTY, HIP, TOTAL, ANTERIOR APPROACH (Right)    Patient location: PACU    Anesthesia Type: CSE, MAC and regional    Transport from OR: Transported from OR on 6-10 L/min O2 by face mask with adequate spontaneous ventilation    Post pain: adequate analgesia    Post assessment: no apparent anesthetic complications    Post vital signs: stable    Level of consciousness: awake, alert and oriented    Nausea/Vomiting: no nausea/vomiting    Complications: none    Transfer of care protocol was followed      Last vitals:   Visit Vitals  /61   Pulse 61   Temp 36.6 °C (97.9 °F) (Temporal)   Resp 18   Ht 5' 2" (1.575 m)   Wt 75.8 kg (167 lb)   LMP  (LMP Unknown)   SpO2 97%   Breastfeeding No   BMI 30.54 kg/m²     "

## 2023-07-05 NOTE — ANESTHESIA PROCEDURE NOTES
CSE    Patient location during procedure: OR  Start time: 7/5/2023 8:21 AM  Timeout: 7/5/2023 8:21 AM  End time: 7/5/2023 8:33 AM      Staffing  Authorizing Provider: Eric Cisneros DO  Performing Provider: Eric Cisneros DO    CSE  Patient position: sitting  Prep: ChloraPrep  Patient monitoring: heart rate, continuous pulse ox and frequent blood pressure checks  Approach: midline  Spinal Needle  Needle type: Julio Cesar   Needle gauge: 25 G  Needle length: 5 in  Epidural Needle  Injection technique: ADELA air  Needle length: 3.5 in  Location: L2-3  Needle localization: anatomical landmarks   Catheter  Catheter type: springwound  Catheter at skin depth: 10 cm  Assessment  Sensory level: T8   Dermatomal levels determined by alcohol swab  Intrathecal Medications:   administered: primary anesthetic mcg of    Medications:    Medications: Intrathecal - mepivacaine 20 mg/mL (2 %) injection - Intrathecal   2.5 mL - 7/5/2023 8:29:00 AM

## 2023-07-05 NOTE — PLAN OF CARE
PT eval complete and plan of care established.        Problem: Physical Therapy  Goal: Physical Therapy Goal  Description: Goals to be met by: 2023     Patient will increase functional independence with mobility by performin. Supine to sit with supervision  2. Sit to supine with supervision  3. Rolling to Left and Right with supervision  4. Sit to stand transfer with Stand-by Assistance  5. Gait  x 100 feet with Stand-by Assistance using Rolling Walker.     Outcome: Ongoing, Progressing

## 2023-07-05 NOTE — PLAN OF CARE
Problem: Occupational Therapy  Goal: Occupational Therapy Goal  Description: Goals to be met by: 7/19/23    Patient will increase functional independence with ADLs by performing:    UE Dressing with Modified [place].  LE Dressing with Modified [place] and use of hip kit if necessary.  Grooming while standing at sink with Supervision.  Toileting from toilet with Modified [place] for hygiene and clothing management.   Supine to sit with Supervision.  Step transfer with Modified [place]  Toilet transfer to toilet with Modified [place].  [place] with BUE HEP to improve activity tolerance to complete ADLs and IADLs  Within home ambulation distances with supervision and LRAD necessary to complete ADLs.      Outcome: Ongoing, Progressing     Patient tolerated OT eval. Continue POC

## 2023-07-05 NOTE — NURSING TRANSFER
Nursing Transfer Note      7/5/2023     Transfer To: 509    Transfer via bed     Transfer with 2L NC     Transported by transport    Medicines sent: none    Chart send with patient: Yes    Notified: daughter @ bedside     Patient reassessed at: 7/5/2023 1600

## 2023-07-05 NOTE — H&P
CC:  Right hip pain     Jo-Ann Escobedo is a 82 y.o. female with Right hip pain.  Pain is worse with activity and weight bearing.  Patient has experienced interference of activities of daily living due to increased pain and decreased range of motion. Patient has failed non-operative treatment including NSAIDs, as well as greater than 3 months of activity modification. Jo-Ann Escobedo ambulates using assistive device .      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 She subsequently underwent colonoscopy 12/9 and several large diverticula in the sigmoid colon was seen with hematin throughout the colon; blood pooling also noted in the sigmoid colon, during which clip was placed f    Hematochezia      Hypothyroidism, postsurgical 7/1/2015    Mixed hyperlipidemia 9/27/2012    Multinodular goiter 7/31/2014    Paget's disease of bone 7/10/2013    SCC (squamous cell carcinoma) 12/2020     left 5th knuckle    Squamous Cell Carcinoma       in situ right neck                Past Surgical History:   Procedure Laterality Date     BREAST BIOPSY Right 2019    BREAST LUMPECTOMY Right 2019    CARPAL TUNNEL RELEASE Left 6/5/2020     Procedure: RELEASE, CARPAL TUNNEL Left;  Surgeon: Pricila Presley MD;  Location: [facility] OR;  Service: Orthopedics;  Laterality: Left;    CARPAL TUNNEL RELEASE Right 12/5/2022     Procedure: RELEASE, CARPAL TUNNEL, right;  Surgeon: Pricila Presley MD;  Location: [facility] OR;  Service: Orthopedics;  Laterality: Right;    CATARACT EXTRACTION W/  INTRAOCULAR LENS IMPLANT Bilateral      COLONOSCOPY N/A 4/15/2019     Procedure: COLONOSCOPY;  Surgeon: Artur Monet MD;  Location: [facility] (4TH FLR);  Service: Endoscopy;  Laterality: N/A;  within 1 month    COLONOSCOPY N/A 12/9/2019     Procedure: COLONOSCOPY;  Surgeon: Clyde Welch MD;  Location: [facility] (2ND FLR);  Service: Endoscopy;  Laterality: N/A;    COLONOSCOPY N/A 2/13/2023     Procedure: COLONOSCOPY;  Surgeon: Johan Hoyos MD;  Location: [facility] (4TH FLR);  Service: Endoscopy;  Laterality: N/A;  Okay for any CRS MD if Dr. Monet booked. but hopeful to get this done about 4 weeks from now  1/10 - pt called about time change and MD change. patient verbalized understanding and new instructions sent - sm  Precall complete- [place]    ENDOSCOPIC ULTRASOUND OF UPPER GASTROINTESTINAL TRACT N/A 1/22/2020     Procedure: ULTRASOUND, UPPER GI TRACT, ENDOSCOPIC;  Surgeon: Eleazar Shaffer MD;  Location: [facility] (2ND FLR);  Service: Endoscopy;  Laterality: N/A;  EUS for 10mm SML w/negative pillow sign and negative naked fat sign which was found at the incisura.  Dr Welch    EPIDURAL STEROID INJECTION Bilateral 12/17/2021     Procedure: INJECTION, STEROID, EPIDURAL, L3-L4 Bilateral DIRECT REF Transforaminal;  Surgeon: Julian Fish MD;  Location: [facility] PAIN MGT;  Service: Pain Management;  Laterality: Bilateral;    ESOPHAGOGASTRODUODENOSCOPY N/A 12/7/2019     Procedure: EGD (ESOPHAGOGASTRODUODENOSCOPY);  Surgeon: Clyde Welch MD;  Location: [facility]  ENDO (2ND FLR);  Service: Endoscopy;  Laterality: N/A;    EXCISIONAL BIOPSY Right 6/27/2019     Procedure: EXCISIONAL BIOPSY-breast;  Surgeon: Suki Dill MD;  Location: [facility] OR 2ND FLR;  Service: General;  Laterality: Right;    EYE SURGERY        HYSTERECTOMY        INJECTION Right 3/3/2023     Procedure: INJECTION, RIGHT HIP CORTICOSTEROID /LOCAL INJECTION CONTRAST DIRECT REF;  Surgeon: Julian Fish MD;  Location: [facility] [place] PAIN MGT;  Service: Pain Management;  Laterality: Right;    INJECTION FOR SENTINEL NODE IDENTIFICATION Right 7/30/2020     Procedure: INJECTION, FOR SENTINEL NODE IDENTIFICATION;  Surgeon: Suki Dill MD;  Location: [facility] OR;  Service: General;  Laterality: Right;    INJECTION OF ANESTHETIC AGENT AROUND MEDIAL BRANCH NERVES INNERVATING LUMBAR FACET JOINT Bilateral 6/7/2019     Procedure: BLOCK, NERVE, FACET JOINT, LUMBAR, MEDIAL BRANCH-deo MBB L4/5,L5/S1;  Surgeon: Jarvis Morales III, MD;  Location: [facility] CATH LAB;  Service: Pain Management;  Laterality: Bilateral;    INJECTION OF STEROID Right 6/5/2020     Procedure: INJECTION, STEROID right carpal tunel injection/ Right ring trigger finger injection;  Surgeon: Pricila Presley MD;  Location: [facility] OR;  Service: Orthopedics;  Laterality: Right;  INJECTION, STEROID right carpal tunel injection/ Right ring trigger finger injection    KNEE ARTHROSCOPY N/A       pt unsure of which knee     MASTECTOMY        OOPHORECTOMY        SENTINEL LYMPH NODE BIOPSY Right 7/30/2020     Procedure: BIOPSY, LYMPH NODE, SENTINEL;  Surgeon: Suki Dill MD;  Location: [facility] OR;  Service: General;  Laterality: Right;    SPINE SURGERY        TOTAL THYROIDECTOMY        TRANSFORAMINAL EPIDURAL INJECTION OF STEROID Bilateral 5/27/2022     Procedure: INJECTION, STEROID, EPIDURAL, TF BILATERAL L3-L4 CONTRAST DIRECT REF;  Surgeon: Julian Fish MD;  Location: [facility] [place] PAIN MGT;  Service: Pain Management;  Laterality: Bilateral;    TRANSFORAMINAL EPIDURAL  INJECTION OF STEROID Bilateral 10/21/2022     Procedure: LUMBAR TRANSFORAMINAL BILATERAL L3/4 CONTRAST DIRECT REFERRAL;  Surgeon: Julian Fish MD;  Location: [facility] MGT;  Service: Pain Management;  Laterality: Bilateral;    UNILATERAL MASTECTOMY Right 7/30/2020     Procedure: MASTECTOMY, UNILATERAL;  Surgeon: Suki Dill MD;  Location: [facility] OR;  Service: General;  Laterality: Right;    YAG Laser Capsulotomy  Left 07/29/2019 01/06/2021 OD//Dr. Hahn               Family History   Problem Relation Age of Onset    Cancer Father           lung    Dementia Mother      Glaucoma Brother      Macular degeneration Brother      Diabetes Neg Hx      Amblyopia Neg Hx      Blindness Neg Hx      Cataracts Neg Hx      Retinal detachment Neg Hx      Strabismus Neg Hx      Melanoma Neg Hx                 Review of patient's allergies indicates:   Allergen Reactions    Voltaren [diclofenac sodium] Other (See Comments)       Hematochezia and hospitalization for hematochezia.    Codeine Diarrhea and Hallucinations    Niacin preparations         Redness, warming            Current Outpatient Medications:     acetaminophen (TYLENOL) 500 MG tablet, Take 2 tablets (1,000 mg total) by mouth every 8 (eight) hours as needed for Pain., Disp: 54 tablet, Rfl: 0    ascorbic acid, vitamin C, (VITAMIN C) 100 MG tablet, Take 100 mg by mouth once daily., Disp: , Rfl:     cholecalciferol, vitamin D3, (VITAMIN D3) 50 mcg (2,000 unit) Tab, Take 1 tablet by mouth once daily., Disp: , Rfl:     co-enzyme Q-10 30 mg capsule, Take 30 mg by mouth once daily., Disp: , Rfl:     hydroCHLOROthiazide (HYDRODIURIL) 25 MG tablet, Take 0.5 tablets (12.5 mg total) by mouth once daily., Disp: 15 tablet, Rfl: 11    ketoconazole (NIZORAL) 2 % cream, Apply topically once daily. Apply daily to affected toenails for 6-12 months, Disp: 60 g, Rfl: 4    LACTOBACILLUS COMBINATION NO.8 (ADULT PROBIOTIC ORAL), Take by mouth once daily. , Disp: , Rfl:      "levothyroxine (SYNTHROID) 112 MCG tablet, Take 1 tablet (112 mcg total) by mouth before breakfast., Disp: 90 tablet, Rfl: 3    losartan (COZAAR) 100 MG tablet, Take 1 tablet (100 mg total) by mouth once daily., Disp: 90 tablet, Rfl: 3    jm-cp-jo2-dha-epa-fish-lut-carlos (OCUVITE ADULT 50 PLUS) 250 mg (90 mg-160 mg) Cap, Take by mouth., Disp: , Rfl:     sodium,potassium,mag sulfates (SUPREP BOWEL PREP KIT) 17.5-3.13-1.6 gram SolR, Follow instructions given by Endoscopy scheduling nurse, Disp: 1 kit, Rfl: 0    turmeric root extract 500 mg Cap, Take by mouth once daily. , Disp: , Rfl:     fenofibrate 160 MG Tab, TAKE 1 TABLET (160 MG TOTAL) BY MOUTH ONCE DAILY., Disp: 90 tablet, Rfl: 3    OPW GABAPENTIN 5% LIDOCAINE HCL 5% TOPICAL CREAM 50G, Apply 50 g topically 3 (three) times daily as needed (Pain). Apply topically. This compounded medication will  in 30 days. (Patient not taking: Reported on 5/3/2023), Disp: 50 g, Rfl: 1     Review of Systems:   Constitutional: no fever or chills  Eyes: no visual changes  ENT: no nasal congestion or sore throat  Respiratory: no cough or shortness of breath  Cardiovascular: no chest pain or palpitations  Gastrointestinal: no nausea or vomiting, tolerating diet  Genitourinary: no hematuria or dysuria  Integument/Breast: no rash or pruritis  Hematologic/Lymphatic: no easy bruising or lymphadenopathy  Musculoskeletal: positive for hip pain  Neurological: no seizures or tremors  Behavioral/Psych: no auditory or visual hallucinations  Endocrine: no heat or cold intolerance     PE:  Ht 5' 4" (1.626 m)   Wt 79.5 kg (175 lb 6 oz)   LMP  (LMP Unknown)   BMI 30.10 kg/m²   General: Pleasant, cooperative, NAD   Gait: antalgic  HEENT: NCAT, sclera nonicteric   Lungs: Respirations are clear, equal and unlabored.   CV: S1S2; 2+ bilateral upper and lower extremity pulses.   Skin: Intact throughout LE with no rashes, erythema, or lesions  Extremities: No LE edema, NVI lower extremities   "   Right Hip Exam:  90 degrees flexion  0 degrees extension   15 degrees internal rotation  15 degrees external rotation  20 degrees abduction  20 degrees adduction  There is pain with passive range of motion.      Radiographs: Radiographs reveal advanced degenerative changes including subchondral cyst formation, subchondral sclerosis, osteophyte formation, joint space narrowing.      Diagnosis: Primary osteoarthritis Right hip     Plan: Right total hip arthroplasty      Due to the serious nature of total joint infection and the high prevalence of community acquired MRSA, vancomycin will be used perioperatively.

## 2023-07-05 NOTE — ANESTHESIA PROCEDURE NOTES
Peripheral Block    Patient location during procedure: pre-op   Block not for primary anesthetic.  Reason for block: at surgeon's request and post-op pain management   Post-op Pain Location: Right hip      Staffing  Authorizing Provider: Alex Leo MD  Performing Provider: Brady Mae DO    Preanesthetic Checklist  Completed: patient identified, IV checked, site marked, risks and benefits discussed, surgical consent, monitors and equipment checked, pre-op evaluation and timeout performed  Peripheral Block  Patient position: supine  Prep: ChloraPrep  Patient monitoring: heart rate, cardiac monitor, continuous pulse ox, continuous capnometry and frequent blood pressure checks  Block type: PENG  Laterality: right  Injection technique: single shot  Needle  Needle type: Stimuplex   Needle gauge: 22 G  Needle length: 2 in  Needle localization: anatomical landmarks and ultrasound guidance   -ultrasound image captured on disc.  Assessment  Injection assessment: negative aspiration, negative parasthesia and local visualized surrounding nerve  Paresthesia pain: none  Heart rate change: no  Slow fractionated injection: yes    Medications:    Medications: bupivacaine 0.25%-EPINEPHrine (PF) 1:200,000 injection - Other   20 mL - 7/5/2023 7:41:00 AM    Additional Notes  VSS.  [facility] RN monitoring vitals throughout procedure.  Patient tolerated procedure well.

## 2023-07-05 NOTE — PLAN OF CARE
Pt arrived to room  via bed  Pt rates pain as tolerable . Pt denies nausea. Pt denies numbness and tinglng. . Pt dressing clean , dry and intact . Pt oriented to room  And call system . Will continue to monitor .

## 2023-07-05 NOTE — ANESTHESIA PROCEDURE NOTES
Peripheral Block    Patient location during procedure: pre-op   Block not for primary anesthetic.  Reason for block: at surgeon's request and post-op pain management   Post-op Pain Location: Right hip   Start time: 7/5/2023 7:41 AM  Timeout: 7/5/2023 7:40 AM   End time: 7/5/2023 7:43 AM    Staffing  Authorizing Provider: Alex Leo MD  Performing Provider: Brady Mae DO    Preanesthetic Checklist  Completed: patient identified, IV checked, site marked, risks and benefits discussed, surgical consent, monitors and equipment checked, pre-op evaluation and timeout performed  Peripheral Block  Patient position: supine  Prep: ChloraPrep  Patient monitoring: heart rate, cardiac monitor and continuous pulse ox  Block type: PENG  Laterality: right  Injection technique: single shot

## 2023-07-05 NOTE — PT/OT/SLP EVAL
Physical Therapy Co-Evaluation    Patient Name:  Jo-Ann Escobedo   MRN:  [id number]    Recommendations:     Discharge Recommendations: other (see comments)   Discharge Equipment Recommendations: bedside commode, walker, rolling   Barriers to discharge: None    Assessment:     Jo-Ann Escobedo is a 82 y.o. female admitted with a medical diagnosis of <principal problem not specified>.  She presents with the following impairments/functional limitations: weakness, gait instability, impaired cardiopulmonary response to activity, impaired endurance, impaired balance, decreased lower extremity function, impaired sensation, pain, impaired functional mobility, edema . Pt agreeable to participate. Prior to start of PT session, pt's BP was 186/79 mmHg. After history was obtained, BP was taken again in supine and was 175/81mmHg. Pt completed bed mobility with Bobbi. Pt completed STS with Bobbi with RW. Pt had dizziness with standing, so BP was taken again. BP in standing was 136/100 mmHg, so pt was returned to supine and no steps were taken this date.    Rehab Prognosis: Good; patient would benefit from acute skilled PT services to address these deficits and reach maximum level of function.    Recent Surgery: Procedure(s) (LRB):  ARTHROPLASTY, HIP, TOTAL, ANTERIOR APPROACH (Right) Day of Surgery    Plan:     During this hospitalization, patient to be seen daily to address the identified rehab impairments via gait training, therapeutic activities, therapeutic exercises, neuromuscular re-education and progress toward the following goals:    Plan of Care Expires:  08/04/23    Subjective     Chief Complaint: pain in R hip  Patient/Family Comments/goals: Pt asked staff to speak loudly as she is hard of hearing. Pt's R ear has about 60% hearing, and pt is almost completely deaf in L ear. Pt asked to go to the bathroom. Pt's RN notified and Purewick placed.   Pain/Comfort:  Pain Rating 1: 8/10  Location - Side 1: Right  Location 1: hip  Pain  "Addressed 1: Reposition, Distraction  Pain Rating Post-Intervention 1:  (pt did not rate)    Patients cultural, spiritual, Yarsani conflicts given the current situation: no    Living Environment:  Pt lives alone with her dog in a 2SH. Pt has a tub shower with a shower chair. Pt has a half bath on first floor but full bath and bedroom are on the second floor. Pt reports she has a chair lift on her stairs to access second floor.   Prior to admission, patients level of function was independent with ADLs and functional mobility including driving. Pt works as a realtor.  Equipment used at home: cane, quad.  DME owned (not currently used): rolling walker and bedside commode .  Upon discharge, patient will have assistance from daughter and sister.    Objective:     Communicated with RN prior to session.  Patient found supine with HOB elevated with blood pressure cuff, SCD, oxygen, peripheral IV, telemetry, pulse ox (continuous)  upon PT entry to room.    General Precautions: Standard, fall  Orthopedic Precautions:RLE weight bearing as tolerated ("Anterior total hip, WBAT with walker, no extremes of motion, hip flexion 0-90 degrees.")   Braces: N/A  Respiratory Status: Nasal cannula    Exams:  Cognitive Exam:  Patient is oriented to Person, Place, Time, and Situation  Postural Exam:  Patient presented with the following abnormalities:    -       Rounded shoulders  -       Forward head  Sensation:    -      Pt reports numbness/tingling in both feet.  RLE ROM: WFL  RLE Strength: grossly 4/5 except hip flexion 3/5, no resistance applied 2/2 surgery and precautions  LLE ROM: WFL  LLE Strength: grossly 5/5    Functional Mobility:  Bed Mobility:     Supine to Sit: minimum assistance with HOB elevated  Sit to Supine: minimum assistance with HOB elevated  Transfers:     Sit to Stand:  minimum assistance with rolling walker  Gait: not assessed this date due to BP  Balance:   Static sitting: SBA  Dynamic sitting: CGA  Static " "standing: CGA-Bobbi with RW  Dynamic standing: not assessed this date due to BP      AM-PAC 6 CLICK MOBILITY  Total Score:16       Treatment & Education:  Pt educated on R hip precautions "Anterior total hip, WBAT with walker, no extremes of motion, hip flexion 0-90 degrees."  Pt educated on role of therapy, goals of session, and benefits of mobilizing. Pt educated on calling for assistance. All questions answered within PT scope of practice. Co-evaluation performed to appropriately and safely assess pt's strength and endurance while facilitating functional tasks in addition to accommodating for pt's activity/pain tolerance.    Patient left supine with HOB elevated with all lines intact, call button in reach, and RN present.    GOALS:   Multidisciplinary Problems       Physical Therapy Goals          Problem: Physical Therapy    Goal Priority Disciplines Outcome Goal Variances Interventions   Physical Therapy Goal     PT, PT/OT Ongoing, Progressing     Description: Goals to be met by: 2023     Patient will increase functional independence with mobility by performin. Supine to sit with supervision  2. Sit to supine with supervision  3. Rolling to Left and Right with supervision  4. Sit to stand transfer with Stand-by Assistance  5. Gait  x 100 feet with Stand-by Assistance using Rolling Walker.                          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 She subsequently underwent colonoscopy 12/9 and several large diverticula in the sigmoid colon was seen with hematin throughout the colon; blood pooling also noted in the sigmoid colon, during which clip was placed f    Hematochezia     Hypothyroidism, postsurgical 07/01/2015    Mixed hyperlipidemia 09/27/2012    Multinodular goiter 07/31/2014    Paget's disease of bone 07/10/2013    SCC (squamous cell carcinoma) 12/2020    left 5th knuckle    Squamous Cell Carcinoma     in situ right neck        Past Surgical History:   Procedure Laterality Date    BREAST BIOPSY Right 2019    BREAST LUMPECTOMY Right 2019    CARPAL TUNNEL RELEASE Left 6/5/2020    Procedure: RELEASE, CARPAL TUNNEL Left;  Surgeon: Pricila Presley MD;  Location: [facility] OR;  Service: Orthopedics;  Laterality: Left;    CARPAL TUNNEL RELEASE Right 12/5/2022    Procedure: RELEASE, CARPAL TUNNEL, right;  Surgeon: Pricila Presley MD;  Location: [facility] OR;  Service: Orthopedics;  Laterality: Right;    CATARACT EXTRACTION W/  INTRAOCULAR LENS IMPLANT Bilateral     COLONOSCOPY N/A 4/15/2019    Procedure: COLONOSCOPY;  Surgeon: Artur Monet MD;  Location: [facility] (4TH FLR);  Service: Endoscopy;  Laterality: N/A;  within 1 month    COLONOSCOPY N/A 12/9/2019    Procedure: COLONOSCOPY;  Surgeon: Clyde Welch MD;  Location: [facility] (2ND FLR);  Service: Endoscopy;  Laterality: N/A;    COLONOSCOPY N/A 2/13/2023    Procedure: COLONOSCOPY;  Surgeon: Johan Hoyos MD;  Location: [facility] (4TH FLR);  Service: Endoscopy;  Laterality: N/A;  Okay for any CRS MD if Dr. Monet booked. but hopeful to get this done about 4 weeks from now  1/10 - pt called about time change and MD change. patient verbalized understanding and new instructions sent - sm  Precall complete- [place]     ENDOSCOPIC ULTRASOUND OF UPPER GASTROINTESTINAL TRACT N/A 1/22/2020    Procedure: ULTRASOUND, UPPER GI TRACT, ENDOSCOPIC;  Surgeon: Eleazar Shaffer MD;  Location: [facility] (2ND FLR);  Service: Endoscopy;  Laterality: N/A;  EUS for 10mm SML w/negative pillow sign and negative naked fat sign which was found at the incisura.  Dr Welch    EPIDURAL STEROID INJECTION Bilateral 12/17/2021    Procedure: INJECTION, STEROID, EPIDURAL, L3-L4 Bilateral DIRECT REF Transforaminal;  Surgeon: Julian Fish MD;  Location: [facility] PAIN MGT;  Service: Pain Management;  Laterality: Bilateral;    ESOPHAGOGASTRODUODENOSCOPY N/A 12/7/2019    Procedure: EGD (ESOPHAGOGASTRODUODENOSCOPY);  Surgeon: Clyde Welch MD;  Location: [facility] (2ND FLR);  Service: Endoscopy;  Laterality: N/A;    EXCISIONAL BIOPSY Right 6/27/2019    Procedure: EXCISIONAL BIOPSY-breast;  Surgeon: Suki Dill MD;  [address]  Service: General;  Laterality: Right;    EYE SURGERY      HYSTERECTOMY      INJECTION Right 3/3/2023    Procedure: INJECTION, RIGHT HIP CORTICOSTEROID /LOCAL INJECTION CONTRAST DIRECT REF;  Surgeon: Julian Fish MD;  Location: [facility] PAIN MGT;  Service: Pain Management;  Laterality: Right;    INJECTION FOR SENTINEL NODE IDENTIFICATION Right 7/30/2020    Procedure: INJECTION, FOR SENTINEL NODE IDENTIFICATION;  Surgeon: Suki Dill MD;  Location: [facility] OR;  Service: General;  Laterality: Right;    INJECTION OF ANESTHETIC AGENT AROUND MEDIAL BRANCH NERVES INNERVATING LUMBAR FACET JOINT Bilateral 6/7/2019    Procedure: BLOCK, NERVE, FACET JOINT, LUMBAR, MEDIAL BRANCH-deo MBB L4/5,L5/S1;  Surgeon: Jarvis Morales III, MD;  Location: [facility] CATH LAB;  Service: Pain Management;  Laterality: Bilateral;    INJECTION OF STEROID Right 6/5/2020    Procedure: INJECTION, STEROID right carpal tunel injection/ Right ring trigger finger injection;  Surgeon: Pricila Presley MD;  Location: [facility] OR;  Service: Orthopedics;   Laterality: Right;  INJECTION, STEROID right carpal tunel injection/ Right ring trigger finger injection    KNEE ARTHROSCOPY N/A     pt unsure of which knee     MASTECTOMY      OOPHORECTOMY      SENTINEL LYMPH NODE BIOPSY Right 7/30/2020    Procedure: BIOPSY, LYMPH NODE, SENTINEL;  Surgeon: Suki Dill MD;  Location: [facility] OR;  Service: General;  Laterality: Right;    SPINE SURGERY      TOTAL THYROIDECTOMY      TRANSFORAMINAL EPIDURAL INJECTION OF STEROID Bilateral 5/27/2022    Procedure: INJECTION, STEROID, EPIDURAL, TF BILATERAL L3-L4 CONTRAST DIRECT REF;  Surgeon: Julian Fish MD;  Location: [facility] PAIN MGT;  Service: Pain Management;  Laterality: Bilateral;    TRANSFORAMINAL EPIDURAL INJECTION OF STEROID Bilateral 10/21/2022    Procedure: LUMBAR TRANSFORAMINAL BILATERAL L3/4 CONTRAST DIRECT REFERRAL;  Surgeon: Julian Fish MD;  Location: [facility] PAIN MGT;  Service: Pain Management;  Laterality: Bilateral;    UNILATERAL MASTECTOMY Right 7/30/2020    Procedure: MASTECTOMY, UNILATERAL;  Surgeon: Skui Dill MD;  Location: [facility] OR;  Service: General;  Laterality: Right;    YAG Laser Capsulotomy  Left 07/29/2019 01/06/2021 OD//Dr. Hahn       Time Tracking:     PT Received On: 07/05/23  PT Start Time: 1425     PT Stop Time: 1451  PT Total Time (min): 26 min     Billable Minutes: Evaluation 18 min and Therapeutic Activity 8 min      07/05/2023

## 2023-07-05 NOTE — PT/OT/SLP EVAL
Occupational Therapy  Co- Evaluation & Treatment    Name: Jo-Ann Escobedo  MRN: [id number]  Admitting Diagnosis: <principal problem not specified>  Recent Surgery: Procedure(s) (LRB):  ARTHROPLASTY, HIP, TOTAL, ANTERIOR APPROACH (Right) 1 Day Post-Op    Recommendations:     Discharge Recommendations: other (see comments)  Discharge Equipment Recommendations:  walker, rolling  Barriers to discharge:  None    Assessment:     Jo-Ann Escobedo is a 82 y.o. female with a medical diagnosis of <principal problem not specified>.  She presents with dropping BP limiting ambulation during evaluation on this date. Patient is highly motivated for therapy. Patient stood with minimal assistance and rolling walker today.  Performance deficits affecting function: weakness, impaired endurance, impaired self care skills, impaired cardiopulmonary response to activity, impaired functional mobility, gait instability, impaired balance, decreased safety awareness.      Rehab Prognosis: Good; patient would benefit from acute skilled OT services to address these deficits and reach maximum level of function.       Plan:     Patient to be seen daily to address the above listed problems via self-care/home management, therapeutic activities, neuromuscular re-education, therapeutic exercises  Plan of Care Expires: 07/19/23  Plan of Care Reviewed with: patient    Subjective     Chief Complaint: patient has to urinate  Patient/Family Comments/goals: return to PLOF    Occupational Profile:  Living Environment: patient lives alone in a 2 story home with no steps to enter. Patient resides on the second floor but uses chair lift.  Previous level of function: patient was independent with ADL/IADLs, driving, and working.   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  Patient found HOB elevated with blood pressure cuff, SCD, oxygen, peripheral IV, telemetry, pulse ox (continuous) upon OT entry to room.    General Precautions: Standard, fall  Orthopedic Precautions: RLE weight bearing as tolerated  Braces: N/A  Respiratory Status: Nasal cannula, flow 1.5 L/min with nursing removing during session    Occupational Performance:    Bed Mobility:    Patient completed Scooting/Bridging with contact guard assistance  Patient completed Supine to Sit with minimum assistance and with side rail  Patient completed Sit to Supine with moderate assistance    Functional Mobility/Transfers:  Patient completed Sit <> Stand Transfer with minimum assistance  with  rolling walker   Functional Mobility: not attempted due to medical status    Activities of Daily Living:  Upper Body Dressing: moderate assistance to don gown due to line management    Cognitive/Visual Perceptual:  Cognitive/Psychosocial Skills:     -       Oriented to: Person, Place, Time, and Situation   -       Follows Commands/attention:Follows one-step commands  -       Communication: clear/fluent  -       Memory: No Deficits noted  -       Safety awareness/insight to disability: intact   -       Mood/Affect/Coping skills/emotional control: Appropriate to situation    Physical Exam:  Sensation:    -       Patient reports ongoing B hand tingling; no new onset of symptoms  Upper Extremity Range of Motion:     -       Right Upper Extremity: WFL  -       Left Upper Extremity: WFL  Upper Extremity Strength:    -       Right Upper Extremity: WFL  -       Left Upper Extremity: WFL    AMPAC 6 Click ADL:  AMPAC Total Score:      Treatment & Education:    BP monitored during session:   - supine HOB elevated at initiation of OT session: 175/81 (116)   -EOB  136/100 (110)     Patient educated on:   -anterior hip precautions and WBAT status  -purpose of OT and OT POC  -facilitation and education on proper body mechanics, energy conservation, and safety  -importance of early mobility and out of bed activities with staff assist  -overall benefits of therapy     All questions answered within OT scope and to patient's satisfaction    Patient left HOB elevated with all lines intact, call button in reach, and nursing present    Co-evaluation and treatment necessary due for safe and accurate assessment of patient's activity tolerance due to patient's medical complexity.      GOALS:   Multidisciplinary Problems       Occupational Therapy Goals          Problem: Occupational Therapy    Goal Priority Disciplines Outcome Interventions   Occupational Therapy Goal     OT, PT/OT Ongoing, Progressing    Description: Goals to be met by: 7/19/23    Patient will increase functional independence with ADLs by performing:    UE Dressing with Modified [place].  LE Dressing with Modified [place] and use of hip kit if necessary.  Grooming while standing at sink with Supervision.  Toileting from toilet with Modified [place] for hygiene and clothing management.   Supine to sit with Supervision.  Step transfer with Modified [place]  Toilet transfer to toilet with Modified [place].  [place] with BUE HEP to improve activity tolerance to complete ADLs and IADLs  Within home ambulation distances with supervision and LRAD necessary to complete ADLs.                           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 She subsequently underwent colonoscopy 12/9 and several large diverticula in the sigmoid colon was seen with hematin throughout the colon; blood pooling also noted in the sigmoid colon, during which clip was placed f    Hematochezia     Hypothyroidism, postsurgical 07/01/2015    Mixed hyperlipidemia 09/27/2012    Multinodular goiter 07/31/2014    Paget's disease of bone 07/10/2013    SCC (squamous cell carcinoma) 12/2020    left 5th knuckle    Squamous Cell Carcinoma     in situ right neck          Past Surgical History:   Procedure Laterality Date    ARTHROPLASTY OF HIP BY ANTERIOR APPROACH Right 7/5/2023    Procedure: ARTHROPLASTY, HIP, TOTAL, ANTERIOR APPROACH;  Surgeon: Federico Ramirez MD;  Location: [facility] 2ND FLR;  Service: Orthopedics;  Laterality: Right;    BREAST BIOPSY Right 2019    BREAST LUMPECTOMY Right 2019    CARPAL TUNNEL RELEASE Left 6/5/2020    Procedure: RELEASE, CARPAL TUNNEL Left;  Surgeon: Pricila Presley MD;  Location: [facility] OR;  Service: Orthopedics;  Laterality: Left;    CARPAL TUNNEL RELEASE Right 12/5/2022    Procedure: RELEASE, CARPAL TUNNEL, right;  Surgeon: Pricila Presley MD;  Location: [facility] OR;  Service: Orthopedics;  Laterality: Right;    CATARACT EXTRACTION W/  INTRAOCULAR LENS IMPLANT Bilateral     COLONOSCOPY N/A 4/15/2019    Procedure: COLONOSCOPY;  Surgeon: Artur Monet MD;  Location: [facility] ENDO (4TH FLR);  Service: Endoscopy;  Laterality: N/A;  within 1 month    COLONOSCOPY N/A 12/9/2019    Procedure: COLONOSCOPY;  Surgeon: Clyde Welch MD;  Location: [facility] ENDO (2ND FLR);  Service: Endoscopy;   Laterality: N/A;    COLONOSCOPY N/A 2/13/2023    Procedure: COLONOSCOPY;  Surgeon: Johan Hoyos MD;  Location: [facility] (4TH FLR);  Service: Endoscopy;  Laterality: N/A;  Okay for any CRS MD if Dr. Monet booked. but hopeful to get this done about 4 weeks from now  1/10 - pt called about time change and MD change. patient verbalized understanding and new instructions sent - sm  Precall complete- [place]    ENDOSCOPIC ULTRASOUND OF UPPER GASTROINTESTINAL TRACT N/A 1/22/2020    Procedure: ULTRASOUND, UPPER GI TRACT, ENDOSCOPIC;  Surgeon: Eleazar Shaffer MD;  Location: [facility] ENDO (2ND FLR);  Service: Endoscopy;  Laterality: N/A;  EUS for 10mm SML w/negative pillow sign and negative naked fat sign which was found at the incisura.  Dr Welch    EPIDURAL STEROID INJECTION Bilateral 12/17/2021    Procedure: INJECTION, STEROID, EPIDURAL, L3-L4 Bilateral DIRECT REF Transforaminal;  Surgeon: Julian Fish MD;  Location: [facility] PAIN MGT;  Service: Pain Management;  Laterality: Bilateral;    ESOPHAGOGASTRODUODENOSCOPY N/A 12/7/2019    Procedure: EGD (ESOPHAGOGASTRODUODENOSCOPY);  Surgeon: Clyde Welch MD;  Location: [facility] (2ND FLR);  Service: Endoscopy;  Laterality: N/A;    EXCISIONAL BIOPSY Right 6/27/2019    Procedure: EXCISIONAL BIOPSY-breast;  Surgeon: Suki Dill MD;  Location: [facility] 2ND FLR;  Service: General;  Laterality: Right;    EYE SURGERY      HYSTERECTOMY      INJECTION Right 3/3/2023    Procedure: INJECTION, RIGHT HIP CORTICOSTEROID /LOCAL INJECTION CONTRAST DIRECT REF;  Surgeon: Julian Fish MD;  Location: [facility] PAIN MGT;  Service: Pain Management;  Laterality: Right;    INJECTION FOR SENTINEL NODE IDENTIFICATION Right 7/30/2020    Procedure: INJECTION, FOR SENTINEL NODE IDENTIFICATION;  Surgeon: Suki Dill MD;  Location: [facility] OR;  Service: General;  Laterality: Right;    INJECTION OF ANESTHETIC AGENT AROUND MEDIAL BRANCH NERVES INNERVATING LUMBAR FACET JOINT Bilateral 6/7/2019     Procedure: BLOCK, NERVE, FACET JOINT, LUMBAR, MEDIAL BRANCH-deo MBB L4/5,L5/S1;  Surgeon: Jarvis Morales III, MD;  Location: [facility] CATH LAB;  Service: Pain Management;  Laterality: Bilateral;    INJECTION OF STEROID Right 6/5/2020    Procedure: INJECTION, STEROID right carpal tunel injection/ Right ring trigger finger injection;  Surgeon: Pricila Presley MD;  Location: [facility] OR;  Service: Orthopedics;  Laterality: Right;  INJECTION, STEROID right carpal tunel injection/ Right ring trigger finger injection    KNEE ARTHROSCOPY N/A     pt unsure of which knee     MASTECTOMY      OOPHORECTOMY      SENTINEL LYMPH NODE BIOPSY Right 7/30/2020    Procedure: BIOPSY, LYMPH NODE, SENTINEL;  Surgeon: Suki Dill MD;  Location: [facility] OR;  Service: General;  Laterality: Right;    SPINE SURGERY      TOTAL THYROIDECTOMY      TRANSFORAMINAL EPIDURAL INJECTION OF STEROID Bilateral 5/27/2022    Procedure: INJECTION, STEROID, EPIDURAL, TF BILATERAL L3-L4 CONTRAST DIRECT REF;  Surgeon: Julian Fish MD;  Location: [facility] PAIN MGT;  Service: Pain Management;  Laterality: Bilateral;    TRANSFORAMINAL EPIDURAL INJECTION OF STEROID Bilateral 10/21/2022    Procedure: LUMBAR TRANSFORAMINAL BILATERAL L3/4 CONTRAST DIRECT REFERRAL;  Surgeon: Julian Fish MD;  Location: [facility] PAIN MGT;  Service: Pain Management;  Laterality: Bilateral;    UNILATERAL MASTECTOMY Right 7/30/2020    Procedure: MASTECTOMY, UNILATERAL;  Surgeon: Suki Dill MD;  Location: [facility];  Service: General;  Laterality: Right;    YAG Laser Capsulotomy  Left 07/29/2019 01/06/2021 OD//Dr. Hahn       Time Tracking:     OT Date of Treatment: 07/05/23  OT Start Time: 1425  OT Stop Time: 1451  OT Total Time (min): 26 min    Billable Minutes:Evaluation 10  Therapeutic Activity 16    7/6/2023

## 2023-07-05 NOTE — OP NOTE
James MARTI right hip  OPERATIVE NOTE      Date of Operation:   7/5/2023    Providers Performing:   Surgeon(s):  Federico Ramirez MD  Assistant: Will Garcia MD    Preoperative Diagnosis:   Right hip osteoarthritis, M16.11    Postoperative Diagnosis:   Same    Operative Procedure:   Right Total Hip Arthroplasty, Anterior Approach, CPT 42668    Anesthesia:   Spinal+catheter  PENG    Estimated Blood Loss:   400 cc     Specimens:   None    Complications:   None, stable to PACU.    Implants Utilized:     Implant Name Type Inv. Item Serial No.  Lot No. LRB No. Used Action   CEMENT BONE SMPLX HV GENTMYCN - [id number]  CEMENT BONE SMPLX HV GENTMYCN  [employer] PEPITO. [id number] Right 1 Implanted   QUICK-USE  FEMORAL CEMENT PREPARATION KIT     [id number] Right 1 Implanted   CEMENT BONE SMPLX HV GENTMYCN - [id number]  CEMENT BONE SMPLX HV GENTMYCN  [employer] PEPITO. [id number] Right 1 Implanted   G7 OSSEOT ACETABULAR SHELL     [id number] Right 1 Implanted   BONE SCREW SELF TAPPING     [id number] Right 1 Implanted   BONE SCREW SELF TAPING     [id number] Right 1 Implanted   LINER DUAL MOBILITY G7 56MM E - [id number]  LINER DUAL MOBILITY G7 56MM E  [employer],INC [id number] Right 1 Implanted   STEM VERSYS FEMORAL [id number] - [id number]  STEM VERSYS FEMORAL [id number]  [employer]INC [id number] Right 1 Implanted   VERSYS HIP SYSTEM DISTAL CENTRALIZER     [id number] Right 1 Implanted   DUAL MOBILITY LONGEVITY HIGHLY CROSSLINKED POLYTHELENE     [id number] Right 1 Implanted   VERSYS HIP SYSTEM SLEEVE VH PROXIMAL CENTRALIZER     [id number] Right 1 Implanted   CEMENT BONE W/GENT SIMPLEX HV - [id number]  CEMENT BONE W/GENT SIMPLEX HV  [employer] PEPIOT.  Right 1 Implanted   BIOLOEX DELTA CERAMIC FEMORAL HEAD     [id number] Right 1 Implanted       Indications:   The patient has longstanding right hip pain secondary to the above diagnosis.. They have failed conservative management which includes anti-inflammatory medications and activity modification.  We reviewed the risks and benefits of the direct anterior hip replacement with the patient prior to surgery including risk of infection, lateral thigh numbness, blood clot, leg length inequality, dislocation, components loosening, components wearing out, need for revision surgery, continued pain, limping, and injury to nerves and vessels.  After discussing the risks and benefits of the procedure they would like to proceed with surgery.    Operative Notes:   The patient was met in the holding area. The operative site was marked. Anesthesia administered spinal anesthesia. The patient was placed supine on a [place] table and the operative site was identified. The hip was prepped and draped in the usual fashion. IV antibiotics were administered and a timeout was performed.     I performed a direct anterior approach to the hip. Skin incision was made and the fascia of the tensor fascia michelle was identified. I utilized the interval between the tensor fascia michelle and sartorious for direct dissection to the hip joint. I identified and coagulated the ascending branch of the lateral circumflex vessel. Standard retractors over the anterior hip capsule were placed and the capsulotomy was performed. The capsule was tagged for retraction. The femoral head was identified and the femoral neck osteotomy was made in accordance with preoperative templating. The femoral head was then removed with a corkscrew.    The standard anterior, posterior, and superior retractors were placed around the acetabulum. The capsular labrum and foveal contents were removed. Sequential acetabular reamers were used to prepare the acetabulum. Once good good fit was achieved, the final acetabular cup was selected to be one millimeter larger in diameter than the last reamer used. The cup was checked for a good rim fit and a provisional impaction was performed to verify positioning on fluoroscopy. Once this was satisfactory, the impaction was completed. I checked  to make sure there was no overhanging osteophytes anteriorly as well as making sure that there was not excessive acetabular cup exposed. Screws were placed in the cup to augment initial fixation. The final liner was impacted into place and I confirmed that it was well seated.    The hydraulic hook was placed around the femur. The femur was externally rotated and the standard calcar and greater trochanter retractors were placed. A provisional posterior capsulotomy was performed. Then, gross traction was released and the leg was extended and adducted. The appropriate release was performed to allow elevation of the femur for good visualization of the femoral canal.     A rongeur was used to open the femoral canal and a rasp was used to sound the canal and lateralize. The starter broach and sequential broaches were used until stability was appropriate.  Exposure and broaching were difficult because of the preoperative varus anatomy of her proximal femur.  We did see considerable bleeding from the canal, which could be a consequence of her underlying Paget's disease.  A trial reduction was performed and intraoperative fluoroscopy was used to confirm appropriate leg lengths and offset.  Once the trial femoral components appeared appropriately positioned, the hip was dislocated, the femur re-exposed, and the trial femoral components were removed. The canal was brushed and irrigated and the final femoral stem was impacted to the level of the neck cut using 4th generation cement technique and Simplex HV+G cement.. The final ball was impacted onto a clean, dry trunnion and the hip was reduced checking for appropriate soft tissue tension. Final intra-operative fluoroscopy was obtained to confirm implant positioning, leg length, and offset.     While checking xray, a 0.35% betadine solution was left to soak in the wound. The wound was copiously irrigated. I checked for any residual bleeders and made sure that there was  appropriate hemostasis. 1 gram of vancomycin powder was placed in the wound. The wound was closed in layers using #1 vicryl at the capsule and fascia, then 2-0 vicryl, 3-0 monocryl, and Prineo Mesh+Dermabond. A sterile Aquacel dressing was placed.     There were no complications or evidence of intraoperative fracture. All counts were correct.    The first-assistant was critical to all steps of the operation, including retraction during exposure and bone preparation, as well as the deep and superficial wound closure.  Dr. Ramirez performed and/or supervised the key portions of this surgical procedure, including evaluation of the bone cuts, reshaping of the bony elements, and insertion of the provisional and final components.    Post Op Plan:   The patient will be weightbearing as tolerated with a walker with no extremes of motion  ASA for DVT prophylaxis (81mg BID for one month)  24 hours of IV antibiotics.      Signed by: Federico Ramirez MD

## 2023-07-06 VITALS
RESPIRATION RATE: 18 BRPM | SYSTOLIC BLOOD PRESSURE: 124 MMHG | OXYGEN SATURATION: 95 % | BODY MASS INDEX: 30.87 KG/M2 | HEART RATE: 77 BPM | TEMPERATURE: 99 F | HEIGHT: 62 IN | WEIGHT: 167.75 LBS | DIASTOLIC BLOOD PRESSURE: 61 MMHG

## 2023-07-06 PROBLEM — Z96.641 S/P TOTAL RIGHT HIP ARTHROPLASTY: Status: ACTIVE | Noted: 2023-07-06

## 2023-07-06 LAB — HGB BLD-MCNC: 10.6 G/DL (ref 12–16)

## 2023-07-06 PROCEDURE — 25000003 PHARM REV CODE 250: Performed by: STUDENT IN AN ORGANIZED HEALTH CARE EDUCATION/TRAINING PROGRAM

## 2023-07-06 PROCEDURE — 97535 SELF CARE MNGMENT TRAINING: CPT | Mod: CO

## 2023-07-06 PROCEDURE — 99212 OFFICE O/P EST SF 10 MIN: CPT | Mod: ,,, | Performed by: ANESTHESIOLOGY

## 2023-07-06 PROCEDURE — 36415 COLL VENOUS BLD VENIPUNCTURE: CPT | Performed by: NURSE PRACTITIONER

## 2023-07-06 PROCEDURE — 99212 PR OFFICE/OUTPT VISIT, EST, LEVL II, 10-19 MIN: ICD-10-PCS | Mod: ,,, | Performed by: ANESTHESIOLOGY

## 2023-07-06 PROCEDURE — 97116 GAIT TRAINING THERAPY: CPT | Mod: CQ

## 2023-07-06 PROCEDURE — 97530 THERAPEUTIC ACTIVITIES: CPT | Mod: CQ

## 2023-07-06 PROCEDURE — 25000003 PHARM REV CODE 250: Performed by: NURSE PRACTITIONER

## 2023-07-06 PROCEDURE — 85018 HEMOGLOBIN: CPT | Performed by: NURSE PRACTITIONER

## 2023-07-06 RX ORDER — OXYCODONE HYDROCHLORIDE 5 MG/1
5 TABLET ORAL EVERY 4 HOURS PRN
Status: DISCONTINUED | OUTPATIENT
Start: 2023-07-06 | End: 2023-07-06

## 2023-07-06 RX ORDER — OXYCODONE HYDROCHLORIDE 5 MG/1
5 TABLET ORAL EVERY 4 HOURS PRN
Qty: 30 TABLET | Refills: 0 | Status: SHIPPED | OUTPATIENT
Start: 2023-07-06 | End: 2023-12-12

## 2023-07-06 RX ORDER — METHOCARBAMOL 750 MG/1
750 TABLET, FILM COATED ORAL 3 TIMES DAILY
Qty: 30 TABLET | Refills: 0 | Status: SHIPPED | OUTPATIENT
Start: 2023-07-06 | End: 2023-07-16

## 2023-07-06 RX ORDER — DEXTROMETHORPHAN HYDROBROMIDE, GUAIFENESIN 5; 100 MG/5ML; MG/5ML
650 LIQUID ORAL EVERY 8 HOURS
Qty: 120 TABLET | Refills: 0 | Status: SHIPPED | OUTPATIENT
Start: 2023-07-06 | End: 2023-12-12

## 2023-07-06 RX ORDER — SIMETHICONE 80 MG
1 TABLET,CHEWABLE ORAL 3 TIMES DAILY PRN
Status: DISCONTINUED | OUTPATIENT
Start: 2023-07-06 | End: 2023-07-06 | Stop reason: HOSPADM

## 2023-07-06 RX ORDER — ASPIRIN 81 MG/1
81 TABLET ORAL 2 TIMES DAILY
Qty: 60 TABLET | Refills: 0 | Status: SHIPPED | OUTPATIENT
Start: 2023-07-06 | End: 2023-12-12

## 2023-07-06 RX ORDER — FAMOTIDINE 20 MG/1
20 TABLET, FILM COATED ORAL 2 TIMES DAILY
Qty: 42 TABLET | Refills: 0 | Status: SHIPPED | OUTPATIENT
Start: 2023-07-06 | End: 2023-07-27

## 2023-07-06 RX ORDER — METHOCARBAMOL 750 MG/1
750 TABLET, FILM COATED ORAL EVERY 8 HOURS
Status: DISCONTINUED | OUTPATIENT
Start: 2023-07-06 | End: 2023-07-06 | Stop reason: HOSPADM

## 2023-07-06 RX ORDER — OXYCODONE HYDROCHLORIDE 5 MG/1
5 TABLET ORAL EVERY 4 HOURS PRN
Status: DISCONTINUED | OUTPATIENT
Start: 2023-07-06 | End: 2023-07-06 | Stop reason: HOSPADM

## 2023-07-06 RX ADMIN — POLYETHYLENE GLYCOL 3350 17 G: 17 POWDER, FOR SOLUTION ORAL at 08:07

## 2023-07-06 RX ADMIN — FAMOTIDINE 20 MG: 20 TABLET, FILM COATED ORAL at 08:07

## 2023-07-06 RX ADMIN — ASPIRIN 81 MG: 81 TABLET, COATED ORAL at 08:07

## 2023-07-06 RX ADMIN — OXYCODONE HYDROCHLORIDE 5 MG: 5 TABLET ORAL at 11:07

## 2023-07-06 RX ADMIN — ACETAMINOPHEN 1000 MG: 500 TABLET ORAL at 01:07

## 2023-07-06 RX ADMIN — ACETAMINOPHEN 1000 MG: 500 TABLET ORAL at 07:07

## 2023-07-06 RX ADMIN — OXYCODONE HYDROCHLORIDE 10 MG: 10 TABLET ORAL at 06:07

## 2023-07-06 RX ADMIN — LEVOTHYROXINE SODIUM 112 MCG: 112 TABLET ORAL at 05:07

## 2023-07-06 RX ADMIN — CALCIUM CARBONATE (ANTACID) CHEW TAB 500 MG 500 MG: 500 CHEW TAB at 09:07

## 2023-07-06 RX ADMIN — SENNOSIDES AND DOCUSATE SODIUM 1 TABLET: 50; 8.6 TABLET ORAL at 08:07

## 2023-07-06 RX ADMIN — MUPIROCIN 1 G: 20 OINTMENT TOPICAL at 08:07

## 2023-07-06 RX ADMIN — SIMETHICONE 80 MG: 80 TABLET, CHEWABLE ORAL at 09:07

## 2023-07-06 NOTE — PROGRESS NOTES
[facility]  Orthopedics  Progress Note    Patient Name: Jo-Ann Escobedo  MRN: [id number]  Admission Date: 7/5/2023  Hospital Length of Stay: 0 days  Attending Provider: Federico Ramirez MD  Primary Care Provider: Alexus Neely MD  Follow-up For: Procedure(s) (LRB):  ARTHROPLASTY, HIP, TOTAL, ANTERIOR APPROACH (Right)    Post-Operative Day: 1 Day Post-Op  Subjective:     Principal Problem:<principal problem not specified>    Principal Orthopedic Problem: now s/p right anterior MAURICIO     Interval History: Afebrile, VSS, NAEON.  Hb at 10.6 from 12.5.  Pain has been reportedly well controlled, and she denies any numbness/tingling at her RLE.  Ambulated around 100 ft w/RW assistance with physical therapy this morning.  Planning for discharge home with home health today.          Review of patient's allergies indicates:   Allergen Reactions    Voltaren [diclofenac sodium] Other (See Comments)     Hematochezia and hospitalization for hematochezia.    Codeine Diarrhea and Hallucinations    Niacin preparations      Redness, warming       Current Facility-Administered Medications   Medication    0.9%  NaCl infusion    acetaminophen tablet 1,000 mg    aspirin EC tablet 81 mg    calcium carbonate 200 mg calcium (500 mg) chewable tablet 500 mg    famotidine tablet 20 mg    levothyroxine tablet 112 mcg    methocarbamoL tablet 750 mg    mupirocin 2 % ointment 1 g    naloxone 0.4 mg/mL injection 0.02 mg    ondansetron injection 4 mg    oxyCODONE immediate release tablet 5 mg    polyethylene glycol packet 17 g    polyethylene glycol packet 17 g    pregabalin capsule 75 mg    prochlorperazine injection Soln 5 mg    senna-docusate 8.6-50 mg per tablet 1 tablet    simethicone chewable tablet 80 mg     Objective:     Vital Signs (Most Recent):  Temp: 98.7 °F (37.1 °C) (07/06/23 0734)  Pulse: 71 (07/06/23 0734)  Resp: 18 (07/06/23 0734)  BP: (!) 109/58 (07/06/23 0734)  SpO2: (!) 94 % (07/06/23 0734) Vital Signs  "(24h Range):  Temp:  [97 °F (36.1 °C)-98.7 °F (37.1 °C)] 98.7 °F (37.1 °C)  Pulse:  [59-91] 71  Resp:  [13-27] 18  SpO2:  [89 %-99 %] 94 %  BP: (109-175)/() 109/58     Weight: 76.1 kg (167 lb 12.3 oz)  Height: 5' 2" (157.5 cm)  Body mass index is 30.69 kg/m².      Intake/Output Summary (Last 24 hours) at 7/6/2023 0930  Last data filed at 7/5/2023 2018  Gross per 24 hour   Intake 680 ml   Output 650 ml   Net 30 ml        Ortho/SPM Exam  AAOx4  NAD  Reg rate  No increased WOB    RLE:  Dressing c/d/i  SILT T/SP/DP/[place]/Sa  Motor intact T/SP/DP  WWP extremities  SCDs in place and functioning      Significant Labs:   CBC:   Recent Labs   Lab 07/06/23  0410   HGB 10.6*     All pertinent labs within the past 24 hours have been reviewed.    Significant Imaging: I have reviewed all pertinent imaging results/findings.    Assessment/Plan:     * S/P right total hip arthroplasty  Jo-Ann Escobedo is a 82 y.o. old female who is now POD1 s/p right anterior MAURICIO.      Pain control: MM  PT/OT: WBAT RLE  DVT PPx: ASA 81mg BID, SCDs at all times when not ambulating  Abx: postop Ancef  Labs: Hb at 10.6 from 12  Drain: none  Conner: none     Dispo: f/u PT recs        CASSI Garcia MD  Orthopedics  [facility] - Surgery  "

## 2023-07-06 NOTE — HOSPITAL COURSE
On 7/5/23, the patient arrived to the [facility] for proper pre-operative management.  Upon completion of pre-operative preparation, the patient was taken back to the operative theatre.  Right anterior total hip arthroplasty was performed without complication and the patient was transported to the post anesthesia care unit in stable condition.  After appropriate recovery from the anaesthetic agents used during the surgery, the patient was then transported to the hospital inpatient floor.  The interim of the hospital stay from arrival on the floor up to discharge has been uncomplicated. The patient has tolerated regular diet.  The patient's pain has been controlled using a multimodal approach. Currently, the patient's pain is well controlled on an oral regimen.  The patient has been voiding without difficulty.  The patient began participation in physical therapy after surgery and has progressed throughout the entire hospital stay.  Currently, the patient's progress is sufficient to allow the them to be discharged to home with home health safely.  The patient agrees with this assessment and desires a discharge today.  She will be prescribed 1 month of aspirin 81 milligrams b.i.d. for DVT prophylaxis, and will follow up in outpatient joints clinic in 2 weeks.

## 2023-07-06 NOTE — PLAN OF CARE
[facility]    HOME HEALTH ORDERS  FACE TO FACE ENCOUNTER    Patient Name: Jo-Ann Escobedo  YOB: 1940    PCP: Alexus Neely MD   [place] [address] [facility] [place] [facility] [place] [address]  PCP Phone Number: [phone]  PCP Fax: [phone]    Encounter Date: 07/06/2023    Admit to Home Health    Diagnoses:  Active Hospital Problems    Diagnosis  POA    *S/P right total hip arthroplasty [Z96.641]  Not Applicable      Resolved Hospital Problems   No resolved problems to display.       Future Appointments   Date Time Provider Department Center   7/7/2023 12:00 PM TELEMED NURSE, NOM ORTHO [facility] ORTHO [address]   7/18/2023  1:00 PM Denise Bass NP [facility] ORTHO [address]   7/27/2023 11:00 AM Estela Frye PA-C [facility] IM [address]   8/15/2023 11:15 AM Federico Ramirez MD [facility] ORTHO [address]   9/22/2023 11:15 AM Federico Ramirez MD [facility] ORTHO [address]   9/27/2023 10:45 AM Tiara Ann DPM [facility] POD [address]   9/27/2023  2:30 PM PRE-ADMIT, ENDO -[facility] ENDO4 [facility]           I have seen and examined this patient face to face today. My clinical findings that support the need for the home health skilled services and home bound status are the following:  Requiring assistive device to leave home due to unsteady gait caused by Joint Replacement and Surgery.    Allergies:  Review of patient's allergies indicates:   Allergen Reactions    Voltaren [diclofenac sodium] Other (See Comments)     Hematochezia and hospitalization for hematochezia.    Codeine Diarrhea and Hallucinations    Niacin preparations      Redness, warming       Diet: regular diet    Activities: Weight bearing as tolerated to right lower extremity with anterior hip precautions     Nursing:   SN to complete comprehensive assessment including routine vital signs. Instruct on disease process and s/s of complications to report to MD. Follow specific home health arthoplasty protocol.  Review/verify medication list sent home with the patient at time of discharge  and instruct patient/caregiver as needed. If coumadin ordered, coumadin clinic to manage INR with INR draws 2x per week with a goal to maintain INR between 1.8 and 2.2. Frequency may be adjusted depending on start of care date.    Notify MD if SBP > 160 or < 90; DBP > 90 or < 50; HR > 120 or < 50; Temp > 101    Home Medical Equipment:  Walker, 3-1 bedside commode, transfer tub bench    CONSULTS:    Physical Therapy to evaluate and treat. Evaluate for home safety and equipment needs; Establish/upgrade home exercise program. Perform / instruct on therapeutic exercises, gait training, transfer training, and Range of Motion.    OTHER:  Occupational Therapy to evaluate and treat. Evaluate home environment for safety and equipment needs. Perform/Instruct on transfers, ADL training, ROM, and therapeutic exercises.    WOUND CARE ORDERS  Follow Home Health specific protocol.  Keep dressing clean, dry, and intact until clinic followup.        Medications: Review discharge medications with patient and family and provide education.      Current Discharge Medication List        START taking these medications    Details   acetaminophen (TYLENOL) 650 MG TbSR Take 1 tablet (650 mg total) by mouth every 8 (eight) hours.  Qty: 120 tablet, Refills: 0      famotidine (PEPCID) 20 MG tablet Take 1 tablet (20 mg total) by mouth 2 (two) times daily. for 21 days  Qty: 42 tablet, Refills: 0    Comments: Please deliver to bedside      methocarbamoL (ROBAXIN) 750 MG Tab Take 1 tablet (750 mg total) by mouth 3 (three) times daily. for 10 days  Qty: 30 tablet, Refills: 0    Comments: Please deliver to bedside      oxyCODONE (ROXICODONE) 5 MG immediate release tablet Take 1 tablet (5 mg total) by mouth every 4 (four) hours as needed for Pain.  Qty: 30 tablet, Refills: 0    Comments: Quantity prescribed more than 7 day supply? No  Please deliver to bedside            CONTINUE these medications which have NOT CHANGED    Details   acetaminophen (TYLENOL) 500 MG tablet Take 2 tablets (1,000 mg total) by mouth every 8 (eight) hours as needed for Pain.  Qty: 54 tablet, Refills: 0    Comments: Bedside delivery      ascorbic acid, vitamin C, (VITAMIN C) 100 MG tablet Take 100 mg by mouth once daily.      cholecalciferol, vitamin D3, (VITAMIN D3) 50 mcg (2,000 unit) Tab Take 1 tablet by mouth once daily.      co-enzyme Q-10 30 mg capsule Take 30 mg by mouth once daily.      cyanocobalamin (VITAMIN B-12) 250 MCG tablet Take 250 mcg by mouth once daily.      hydroCHLOROthiazide (HYDRODIURIL) 25 MG tablet Take 0.5 tablets (12.5 mg total) by mouth once daily.  Qty: 15 tablet, Refills: 11    Comments: .  Associated Diagnoses: Hypercalciuria      LACTOBACILLUS COMBINATION NO.8 (ADULT PROBIOTIC ORAL) Take by mouth once daily.       levothyroxine (SYNTHROID) 112 MCG tablet Take 1 tablet (112 mcg total) by mouth before breakfast.  Qty: 90 tablet, Refills: 3    Associated Diagnoses: Hypothyroidism, postsurgical      losartan (COZAAR) 100 MG tablet Take 1 tablet (100 mg total) by mouth once daily.  Qty: 90 tablet, Refills: 3    Comments: .      uy-az-hk5-dha-epa-fish-lut-carlos (OCUVITE ADULT 50 PLUS) 250 mg (90 mg-160 mg) Cap Take by mouth.      turmeric root extract 500 mg Cap Take by mouth once daily.       fenofibrate 160 MG Tab TAKE 1 TABLET (160 MG TOTAL) BY MOUTH ONCE DAILY.  Qty: 90 tablet, Refills: 3    Associated Diagnoses: Hyperlipidemia, unspecified hyperlipidemia type      ketoconazole (NIZORAL) 2 % cream Apply topically once daily. Apply daily to affected toenails for 6-12 months  Qty: 60 g, Refills: 4             I certify that this patient is confined to her home and needs physical therapy and occupational therapy.

## 2023-07-06 NOTE — DISCHARGE SUMMARY
[facility]  Orthopedics  Discharge Summary      Patient Name: Jo-Ann Escobedo  MRN: [id number]  Admission Date: 7/5/2023  Hospital Length of Stay: 0 days  Discharge Date and Time:  07/06/2023 10:26 AM  Attending Physician: Federico Ramirez MD   Discharging Provider: CASSI Garcia MD  Primary Care Provider: Alexus Neely MD    HPI:   No notes on file    Procedure(s) (LRB):  ARTHROPLASTY, HIP, TOTAL, ANTERIOR APPROACH (Right)      Hospital Course:  On 7/5/23, the patient arrived to the [facility] for proper pre-operative management.  Upon completion of pre-operative preparation, the patient was taken back to the operative theatre.  Right anterior total hip arthroplasty was performed without complication and the patient was transported to the post anesthesia care unit in stable condition.  After appropriate recovery from the anaesthetic agents used during the surgery, the patient was then transported to the hospital inpatient floor.  The interim of the hospital stay from arrival on the floor up to discharge has been uncomplicated. The patient has tolerated regular diet.  The patient's pain has been controlled using a multimodal approach. Currently, the patient's pain is well controlled on an oral regimen.  The patient has been voiding without difficulty.  The patient began participation in physical therapy after surgery and has progressed throughout the entire hospital stay.  Currently, the patient's progress is sufficient to allow the them to be discharged to home with home health safely.  The patient agrees with this assessment and desires a discharge today.  She will be prescribed 1 month of aspirin 81 milligrams b.i.d. for DVT prophylaxis, and will follow up in outpatient joints clinic in 2 weeks.      Goals of Care Treatment Preferences:  Code Status: Full Code      Consults (From admission, onward)        Status Ordering Provider     Inpatient consult to Social Work  Once       "  Provider:  (Not yet assigned)    Acknowledged TANYA GREENWOOD     Inpatient consult to Respiratory Care  Once        Provider:  (Not yet assigned)    Acknowledged TANYA GREENWOOD          Significant Diagnostic Studies:  Postoperative Xrays right hip showing intact and well-positioned total hip arthroplasty hardware     Pending Diagnostic Studies:     None        Final Active Diagnoses:    Diagnosis Date Noted POA    PRINCIPAL PROBLEM:  S/P right total hip arthroplasty [Z96.641] 07/06/2023 Not Applicable      Problems Resolved During this Admission:      Discharged Condition: good    [place] [facility] [place]:    Patient Instructions:      WALKER FOR HOME USE     Order Specific Question Answer Comments   Type of Walker: Adult (5'4"-6'6")    With wheels? Yes    Height: 5' 2" (1.575 m)    Weight: 76.1 kg (167 lb 12.3 oz)    Length of need (1-99 months): 99    Does patient have medical equipment at home? bath bench    Please check all that apply: Patient's condition impairs ambulation.    Please check all that apply: Patient is unable to safely ambulate without equipment.      Medications:  Reconciled Home Medications:      Medication List      START taking these medications    aspirin 81 MG EC tablet  Commonly known as: ECOTRIN  Take 1 tablet (81 mg total) by mouth 2 (two) times a day.     famotidine 20 MG tablet  Commonly known as: PEPCID  Take 1 tablet (20 mg total) by mouth 2 (two) times daily. for 21 days     methocarbamoL 750 MG Tab  Commonly known as: ROBAXIN  Take 1 tablet (750 mg total) by mouth 3 (three) times daily. for 10 days     oxyCODONE 5 MG immediate release tablet  Commonly known as: ROXICODONE  Take 1 tablet (5 mg total) by mouth every 4 (four) hours as needed for Pain.        CHANGE how you take these medications    * acetaminophen 500 MG tablet  Commonly known as: TYLENOL  Take 2 tablets (1,000 mg total) by mouth every 8 (eight) hours as needed for Pain.  What changed: " Another medication with the same name was added. Make sure you understand how and when to take each.     * acetaminophen 650 MG Tbsr  Commonly known as: TYLENOL  Take 1 tablet (650 mg total) by mouth every 8 (eight) hours.  What changed: You were already taking a medication with the same name, and this prescription was added. Make sure you understand how and when to take each.         * This list has 2 medication(s) that are the same as other medications prescribed for you. Read the directions carefully, and ask your doctor or other care provider to review them with you.            CONTINUE taking these medications    ADULT PROBIOTIC ORAL  Take by mouth once daily.     ascorbic acid (vitamin C) 100 MG tablet  Commonly known as: VITAMIN C  Take 100 mg by mouth once daily.     cholecalciferol (vitamin D3) 50 mcg (2,000 unit) Tab  Commonly known as: VITAMIN D3  Take 1 tablet by mouth once daily.     co-enzyme Q-10 30 mg capsule  Take 30 mg by mouth once daily.     cyanocobalamin 250 MCG tablet  Commonly known as: VITAMIN B-12  Take 250 mcg by mouth once daily.     fenofibrate 160 MG Tab  TAKE 1 TABLET (160 MG TOTAL) BY MOUTH ONCE DAILY.     hydroCHLOROthiazide 25 MG tablet  Commonly known as: HYDRODIURIL  Take 0.5 tablets (12.5 mg total) by mouth once daily.     ketoconazole 2 % cream  Commonly known as: NIZORAL  Apply topically once daily. Apply daily to affected toenails for 6-12 months     levothyroxine 112 MCG tablet  Commonly known as: SYNTHROID  Take 1 tablet (112 mcg total) by mouth before breakfast.     losartan 100 MG tablet  Commonly known as: COZAAR  Take 1 tablet (100 mg total) by mouth once daily.     OCUVITE ADULT 50 PLUS 250 mg (90 mg-160 mg) Cap  Generic drug: uq-qk-hc3-dha-epa-fish-lut-carlos  Take by mouth.     turmeric root extract 500 mg Cap  Take by mouth once daily.            CASSI Garcia MD  Orthopedics  [facility] - Surgery

## 2023-07-06 NOTE — SUBJECTIVE & OBJECTIVE
"Principal Problem:<principal problem not specified>    Principal Orthopedic Problem: now s/p right anterior MAURICIO     Interval History: Afebrile, VSS, NAEON.  Hb at 10.6 from 12.5.  Pain has been reportedly well controlled, and she denies any numbness/tingling at her RLE.  Ambulated around 100 ft w/RW assistance with physical therapy this morning.  Planning for discharge home with home health today.          Review of patient's allergies indicates:   Allergen Reactions    Voltaren [diclofenac sodium] Other (See Comments)     Hematochezia and hospitalization for hematochezia.    Codeine Diarrhea and Hallucinations    Niacin preparations      Redness, warming       Current Facility-Administered Medications   Medication    0.9%  NaCl infusion    acetaminophen tablet 1,000 mg    aspirin EC tablet 81 mg    calcium carbonate 200 mg calcium (500 mg) chewable tablet 500 mg    famotidine tablet 20 mg    levothyroxine tablet 112 mcg    methocarbamoL tablet 750 mg    mupirocin 2 % ointment 1 g    naloxone 0.4 mg/mL injection 0.02 mg    ondansetron injection 4 mg    oxyCODONE immediate release tablet 5 mg    polyethylene glycol packet 17 g    polyethylene glycol packet 17 g    pregabalin capsule 75 mg    prochlorperazine injection Soln 5 mg    senna-docusate 8.6-50 mg per tablet 1 tablet    simethicone chewable tablet 80 mg     Objective:     Vital Signs (Most Recent):  Temp: 98.7 °F (37.1 °C) (07/06/23 0734)  Pulse: 71 (07/06/23 0734)  Resp: 18 (07/06/23 0734)  BP: (!) 109/58 (07/06/23 0734)  SpO2: (!) 94 % (07/06/23 0734) Vital Signs (24h Range):  Temp:  [97 °F (36.1 °C)-98.7 °F (37.1 °C)] 98.7 °F (37.1 °C)  Pulse:  [59-91] 71  Resp:  [13-27] 18  SpO2:  [89 %-99 %] 94 %  BP: (109-175)/() 109/58     Weight: 76.1 kg (167 lb 12.3 oz)  Height: 5' 2" (157.5 cm)  Body mass index is 30.69 kg/m².      Intake/Output Summary (Last 24 hours) at 7/6/2023 0930  Last data filed at 7/5/2023 2018  Gross per 24 hour   Intake 680 ml "   Output 650 ml   Net 30 ml        Ortho/SPM Exam  AAOx4  NAD  Reg rate  No increased WOB    RLE:  Dressing c/d/i  SILT T/SP/DP/Jeffers/Sa  Motor intact T/SP/DP  WWP extremities  FCDs in place and functioning      Significant Labs:   CBC:   Recent Labs   Lab 07/06/23  0410   HGB 10.6*     All pertinent labs within the past 24 hours have been reviewed.    Significant Imaging: I have reviewed all pertinent imaging results/findings.

## 2023-07-06 NOTE — PLAN OF CARE
[facility]  Discharge Final Note    Primary Care Provider: Alexus Neely MD    Expected Discharge Date: 7/6/2023    Final Discharge Note (most recent)       Final Note - 07/06/23 1312          Final Note    Assessment Type Final Discharge Note     Anticipated Discharge Disposition Home-Health Care Svc   Concern Care     Hospital Resources/Appts/Education Provided Provided patient/caregiver with written discharge plan information;Appointments scheduled by Navigator/Coordinator                 RW delivered to room per [facility]    Future Appointments   Date Time Provider Department Center   7/7/2023 12:00 PM TELEMED NURSE, [facility] [address]   7/18/2023  1:00 PM Denise Bass NP [facility] [address]   7/27/2023 11:00 AM Etsela Frye PA-C [facility]   8/15/2023 11:15 AM Federico Ramirez MD [facility] ORTHO [address]   9/22/2023 11:15 AM Federico Ramirez MD [facility] ORTHO [address]   9/27/2023 10:45 AM [facility] [address]   9/27/2023  2:30 PM PRE-ADMIT, ENDO -[facility] ENDOPP4 [facility]

## 2023-07-06 NOTE — PLAN OF CARE
Problem: Adult Inpatient Plan of Care  Goal: Plan of Care Review  Outcome: Ongoing, Progressing  Goal: Patient-Specific Goal (Individualized)  Outcome: Ongoing, Progressing  Goal: Absence of Hospital-Acquired Illness or Injury  Outcome: Ongoing, Progressing  Intervention: Identify and Manage Fall Risk  Flowsheets (Taken 7/6/2023 0821)  Safety Promotion/Fall Prevention:   assistive device/personal item within reach   instructed to call staff for mobility   side rails raised x 2   nonskid shoes/socks when out of bed  Intervention: Prevent Skin Injury  Flowsheets (Taken 7/6/2023 0821)  Body Position: position changed independently  Intervention: Prevent and Manage VTE (Venous Thromboembolism) Risk  Flowsheets (Taken 7/6/2023 0821)  VTE Prevention/Management: remove, assess skin, and reapply sequential compression device  Goal: Optimal Comfort and Wellbeing  Outcome: Ongoing, Progressing

## 2023-07-06 NOTE — ANESTHESIA POST-OP PAIN MANAGEMENT
Acute Pain Service and Perioperative Surgical Home Progress Note    Interval history  Jo-Ann Escobedo is a 82 y.o., female with hx of HTN, hypothyroidism, and osteoarthritis. She is admitted s/p MAURICIO on 7/5/23.    Surgery:  Procedure(s) (LRB):  ARTHROPLASTY, HIP, TOTAL, ANTERIOR APPROACH (Right)    Post Op Day #: 1    Problem List:    Active Hospital Problems    Diagnosis  POA    *S/P right total hip arthroplasty [Z96.641]  Not Applicable      Resolved Hospital Problems   No resolved problems to display.       Subjective:       General appearance of alert, oriented, no complaints   Pain with rest: 1    Numbers   Pain with movement: 7    Numbers   Side Effects    1. Pruritis No    2. Nausea No    3. Motor Blockade No, 0=Ability to raise lower extremities off bed    4. Sedation No, 1=awake and alert    Schedule Medications:    acetaminophen  1,000 mg Oral Q6H    aspirin  81 mg Oral BID    famotidine  20 mg Oral BID    levothyroxine  112 mcg Oral Before breakfast    methocarbamoL  750 mg Oral Q8H    mupirocin  1 g Nasal BID    polyethylene glycol  17 g Oral Daily    pregabalin  75 mg Oral QHS    senna-docusate 8.6-50 mg  1 tablet Oral BID        Continuous Infusions:   sodium chloride 0.9% 150 mL/hr at 07/05/23 1336        PRN Medications:  calcium carbonate, naloxone, ondansetron, oxyCODONE, polyethylene glycol, prochlorperazine, simethicone       Antibiotics:  Antibiotics (From admission, onward)    Start     Stop Route Frequency Ordered    07/05/23 2100  mupirocin 2 % ointment 1 g         07/10 2059 Nasl 2 times daily 07/05/23 1324             Objective:         Vital Signs (Most Recent):  Temp: 37.1 °C (98.7 °F) (07/06/23 0734)  Pulse: 71 (07/06/23 0734)  Resp: 18 (07/06/23 0734)  BP: (!) 109/58 (07/06/23 0734)  SpO2: (!) 94 % (07/06/23 0734) Vital Signs Range (Last 24H):  Temp:  [36.1 °C (97 °F)-37.1 °C (98.7 °F)]   Pulse:  [59-91]   Resp:  [13-27]   BP: (109-175)/()   SpO2:  [89 %-99 %]          I  & O (Last 24H):    Intake/Output Summary (Last 24 hours) at 7/6/2023 1012  Last data filed at 7/5/2023 2018  Gross per 24 hour   Intake 680 ml   Output 650 ml   Net 30 ml       Physical Exam:    GA: Alert, comfortable, no acute distress.   Pulmonary: Normal respiratory rate, no respiratory distress.  Cardiac: Regular rate.   GI: Abdomen soft, nontender.  Musculoskeletal: TTP over right hip, dressing CDI    Laboratory: reviewed in chart  CBC:   Recent Labs     07/06/23  0410   HGB 10.6*       BMP: No results for input(s): NA, K, CO2, CL, BUN, CREATININE, GLU, MG, PHOS, CALCIUM in the last 72 hours.    No results for input(s): PT, INR, PROTIME, APTT in the last 72 hours.        Assessment:  82 yr old female with HTN, hypothyroidism, and osteoarthritis admitted s/p MAURICIO. She is doing well post operatively. Restarting home meds today. Working with therapy OOB. Passing gas and urine without issue.        Plan:  1) Pain: Multimodal pain regimen ordered which includes acetaminophen, robaxin, pregabalin, and prn oxycodone.  Well controlled on current regimen.  Will continue to monitor.    2) Hypertension: continue home losartan and HCTZ; held this morning due to soft BP, will re-evaluate   3) Hypothyroidism: continue home synthroid   4) FEN/GI: Tolerating regular diet, PRN tums and simethicone for gas and indigestion    5) Dispo: Pt working well with PT/OT. Case management and SW following along for setting up home health and physical therapy. Plan to discharge home this afternoon with COBY Bundy DO  PGY-3 Anesthesiology

## 2023-07-06 NOTE — PT/OT/SLP PROGRESS
"Occupational Therapy   Treatment    Name: Jo-Ann Escobedo  MRN: [id number]  Admitting Diagnosis:  S/P total right hip arthroplasty  1 Day Post-Op  Procedure(s):  ARTHROPLASTY, HIP, TOTAL, ANTERIOR APPROACH   Recommendations:     Discharge Recommendations: other (see comments)  Discharge Equipment Recommendations:  walker, rolling  Barriers to discharge:  None    Assessment:     Jo-Ann Escobedo is a 82 y.o. female with a medical diagnosis of S/P total right hip arthroplasty.  She presents with the following performance deficits affecting function are weakness, impaired endurance, impaired self care skills, impaired functional mobility, gait instability, impaired balance, pain, decreased safety awareness, decreased lower extremity function, decreased ROM, orthopedic precautions.     Rehab Prognosis:  Good; patient would benefit from acute skilled OT services to address these deficits and reach maximum level of function.       Plan:     Patient to be seen daily to address the above listed problems via self-care/home management, therapeutic activities, neuromuscular re-education, therapeutic exercises  Plan of Care Expires: 07/19/23  Plan of Care Reviewed with: patient    Subjective     Chief Complaint: c/o muscle soreness  Patient/Family Comments/goals: "I read the whole packet."  Pain/Comfort:  Pain Rating 1:  (unrated; c/o muscle soreness)  Location - Side 1: Right  Location - Orientation 1: lateral  Location 1: thigh  Pain Addressed 1: Reposition, Distraction  Pain Rating Post-Intervention 1: 0/10    Objective:     Communicated with: nurse  prior to session.  Patient found up in chair with SCD upon OT entry to room.  A client care conference was completed by the OTR and the SCHAFER prior to treatment by the SCHAFER to discuss the patient's POC and current status.    General Precautions: Standard, fall    Orthopedic Precautions:RLE anterior precautions, RLE weight bearing as tolerated (WBAT with walker, no extremes of motion, " hip flexion 0-90 degrees.)  Braces: N/A  Respiratory Status: Room air     Occupational Performance:     Bed Mobility:    NT    Functional Mobility/Transfers:  Patient completed Sit <> Stand Transfer with contact guard assistance  with  rolling walker   Functional Mobility: NT    Activities of Daily Living:  Upper Body Dressing: modified independence don top seated   Lower Body Dressing: minimum assistance thread R LE into underwear and pants; min cues for pacing, sequencing, maintaining ROM restrictions 2/2 ortho pxns      AMPAC 6 Click ADL: 21    Treatment & Education:  Patient educated on OT POC, goals, and current progress. Patient re-educated on anterior hip precautions as patient recalled none on first attempt. Patient would benefit from continued reinforcement to comply specifically during dressing tasks. Addressed all patient questions/concerns within SCHAFER scope of practice.     Patient left up in chair with call button in reach, family present, and SCDs donned    GOALS:   Multidisciplinary Problems       Occupational Therapy Goals          Problem: Occupational Therapy    Goal Priority Disciplines Outcome Interventions   Occupational Therapy Goal     OT, PT/OT Ongoing, Progressing    Description: Goals to be met by: 7/19/23    Patient will increase functional independence with ADLs by performing:    UE Dressing with Modified [place].  LE Dressing with Modified [place] and use of hip kit if necessary.  Grooming while standing at sink with Supervision.  Toileting from toilet with Modified [place] for hygiene and clothing management.   Supine to sit with Supervision.  Step transfer with Modified [place]  Toilet transfer to toilet with Modified [place].  [place] with BUE HEP to improve activity tolerance to complete ADLs and IADLs  Within home ambulation distances with supervision and LRAD necessary to complete ADLs.                           Time Tracking:     OT Date of Treatment:  07/06/23  OT Start Time: 0911  OT Stop Time: 0934  OT Total Time (min): 23 min    Billable Minutes:Self Care/Home Management 23    OT/EDUARDO: EDUARDO     Number of EDUARDO visits since last OT visit: 1 7/6/2023

## 2023-07-06 NOTE — PLAN OF CARE
[facility]  Discharge Assessment    Primary Care Provider: Alexus Neely MD     Discharge Assessment (most recent)       BRIEF DISCHARGE ASSESSMENT - 07/06/23 1012          Discharge Planning    Assessment Type Discharge Planning Brief Assessment     Resource/Environmental Concerns none     Support Systems [facility];Family members;Friends/neighbors     Assistance Needed TBD     Equipment Currently Used at Home bath bench;walker, standard;cane, straight     Current Living Arrangements home     Care Facility Name N/A     Patient/Family Anticipates Transition to home     Patient/Family Anticipated Services at Transition none     DME Needed Upon Discharge  other (see comments)   TBD    Discharge Plan A Home Health;Home     Discharge Plan B Home                   Sw spoke with patient in 509 for Discharge Planning Assessment. Per patient, Pt lives alone in a two story family home on a slab foundation with one steps to porch and point of entry.  Patient was independent with ADLS and DID use DME or in-home assistive equipment. Pt is not on dialysis  or coumadin,  takes medications as prescribed / keeps refilled / has resources for all daily and prescriptive needs. Preferred pharmacy is [facility] -  Will have help from  Daughter and other immediate family upon discharge - Daughter to provide transportation home.  All questions addressed. Unit and SW direct numbers provided. Will continue to follow for course of hospitalization    7/5/2023  5:49 AM  Primary osteoarthritis of right hip [M16.11]    PCP: Alexus Neely MD    PHARMACY:   [id number] - [place] - [address] [facility]  [address] [facility] [address]  Phone: [phone] Fax: [phone]    [facility]  [address]  [place] [address]  Phone: [phone] Fax: [phone]    [id number] - Closed - [place] - [address] [address]  Phone:  [phone] Fax: [phone]      Payor: [employer] MANAGED MEDICARE / Plan: [employer] CHOICES 65 / Product Type: Medicare Advantage /       Alis Buchanan LMSW  PRN - [facility]  [id number]

## 2023-07-06 NOTE — PT/OT/SLP PROGRESS
"Physical Therapy Treatment    Patient Name:  Jo-Ann Escobedo   MRN:  [id number]    Recommendations:     Discharge Recommendations: other (see comments)  Discharge Equipment Recommendations: bedside commode, walker, rolling  Barriers to discharge: None    Assessment:     Jo-Ann Escobedo is a 82 y.o. female admitted with a medical diagnosis of S/P total right hip arthroplasty.  She presents with the following impairments/functional limitations: weakness, impaired endurance, impaired functional mobility, gait instability, impaired balance, pain, decreased ROM, orthopedic precautions .    Rehab Prognosis: Good; patient would benefit from acute skilled PT services to address these deficits and reach maximum level of function.    Recent Surgery: Procedure(s) (LRB):  ARTHROPLASTY, HIP, TOTAL, ANTERIOR APPROACH (Right) 1 Day Post-Op    Plan:     During this hospitalization, patient to be seen daily to address the identified rehab impairments via gait training, therapeutic activities, therapeutic exercises, neuromuscular re-education and progress toward the following goals:    Plan of Care Expires:  08/04/23    Subjective     Chief Complaint: Pt agreeable to PT  Patient/Family Comments/goals: to return to PLOF  Pain/Comfort:  Pain Rating 1: 0/10      Objective:     Communicated with RN prior to session.  Patient found HOB elevated with peripheral IV, SCD upon PT entry to room.     General Precautions: Standard, fall  Orthopedic Precautions: RLE weight bearing as tolerated ("Anterior total hip, WBAT with walker, no extremes of motion, hip flexion 0-90 degrees.")  Braces: N/A  Respiratory Status: Room air     Functional Mobility:  Bed Mobility:     Rolling Left:  contact guard assistance  Supine to Sit: stand by assistance  Transfers:     Sit to Stand:  contact guard assistance with rolling walker  Gait: 100 feet CGA with increased weight bearing on BUE through RW, flexed posture       AM-PAC 6 CLICK MOBILITY  Turning over in bed " (including adjusting bedclothes, sheets and blankets)?: 3  Sitting down on and standing up from a chair with arms (e.g., wheelchair, bedside commode, etc.): 3  Moving from lying on back to sitting on the side of the bed?: 3  Moving to and from a bed to a chair (including a wheelchair)?: 3  Need to walk in hospital room?: 3  Climbing 3-5 steps with a railing?: 2  Basic Mobility Total Score: 17       Treatment & Education:  HEP handout reviewed and distributed.   Bedside table in front of patient and area set up for function, convenience, and safety. RN aware of patient's mobility needs and status. Questions/concerns addressed within PTA scope of practice; patient  with no further questions. Time was provided for active listening, discussion of health disposition, and discussion of safe discharge.    Patient left up in chair with all lines intact, call button in reach, and family present..    GOALS:   Multidisciplinary Problems       Physical Therapy Goals          Problem: Physical Therapy    Goal Priority Disciplines Outcome Goal Variances Interventions   Physical Therapy Goal     PT, PT/OT Ongoing, Progressing     Description: Goals to be met by: 2023     Patient will increase functional independence with mobility by performin. Supine to sit with supervision  2. Sit to supine with supervision  3. Rolling to Left and Right with supervision  4. Sit to stand transfer with Stand-by Assistance  5. Gait  x 100 feet with Stand-by Assistance using Rolling Walker.                          Time Tracking:     PT Received On: 23  PT Start Time: 0820     PT Stop Time: 0900  PT Total Time (min): 40 min     Billable Minutes: Gait Training 30 and Therapeutic Activity 10    Treatment Type: Treatment  PT/PTA: PTA     Number of PTA visits since last PT visit: 2023

## 2023-07-06 NOTE — ASSESSMENT & PLAN NOTE
Jo-Ann Escobedo is a 82 y.o. old female who is now POD1 s/p right anterior MAURICIO.      Pain control: MM  PT/OT: WBAT RLE  DVT PPx: ASA 81mg BID, SCDs at all times when not ambulating  Abx: postop Ancef  Labs: Hb at 10.6 from 12  Drain: none  Conner: none     Dispo: f/u PT recs

## 2023-07-06 NOTE — PLAN OF CARE
Patient accepted by [facility] - authorization request with accepting facility name faxed  to [facility] to deliver RW to bedside prior to pt. dc

## 2023-07-06 NOTE — ANESTHESIA POSTPROCEDURE EVALUATION
Anesthesia Post Evaluation    Patient: Jo-Ann Escobedo    Procedure(s) Performed: Procedure(s) (LRB):  ARTHROPLASTY, HIP, TOTAL, ANTERIOR APPROACH (Right)    Final Anesthesia Type: CSE      Patient location during evaluation: PACU  Patient participation: Yes- Able to Participate  Level of consciousness: awake and alert and oriented  Pain management: adequate  Airway patency: patent    PONV status at discharge: No PONV  Anesthetic complications: no      Cardiovascular status: blood pressure returned to baseline and hemodynamically stable  Respiratory status: unassisted  Hydration status: euvolemic  Follow-up not needed.          Vitals Value Taken Time   /61 07/06/23 1109   Temp 37.4 °C (99.3 °F) 07/06/23 1109   Pulse 77 07/06/23 1109   Resp 18 07/06/23 1131   SpO2 95 % 07/06/23 1109         Event Time   Out of Recovery 12:30:00         Pain/Parris Score: Pain Rating Prior to Med Admin: 7 (7/6/2023 11:31 AM)  Pain Rating Post Med Admin: 9 (7/5/2023  1:04 PM)  Parris Score: 9 (7/5/2023 12:30 PM)

## 2023-07-07 ENCOUNTER — CLINICAL SUPPORT (OUTPATIENT)
Dept: ORTHOPEDICS | Facility: CLINIC | Age: 83
End: 2023-07-07
Payer: MEDICARE

## 2023-07-07 DIAGNOSIS — Z96.641 S/P TOTAL RIGHT HIP ARTHROPLASTY: Primary | ICD-10-CM

## 2023-07-07 PROCEDURE — 99499 NO LOS: ICD-10-PCS | Mod: 95,,, | Performed by: ORTHOPAEDIC SURGERY

## 2023-07-07 PROCEDURE — 99499 UNLISTED E&M SERVICE: CPT | Mod: 95,,, | Performed by: ORTHOPAEDIC SURGERY

## 2023-07-07 NOTE — PROGRESS NOTES
Pain Scale: moderate pain, taking medication on schedule     Any issues with Fever: yes, temp this AM was 101, took tylenol, now 98, NP notified    Any issues with medications (specifically DVT prophylaxis): no     Passing gas: no   Urination: yes   Constipation: will take miralax     Completing at home exercises: no; has HEP, discussed these     Any concerns regarding their dressing/bandage:  no    First PT appointment:   awaiting HHPT    Post op appointment:  7/18 @ 1300     Any other concerns:  questions answered about mobility, movement; encouraged IS use, q2h ambulation during waking hours, HEP, expectations for bruising/swelling/pain           The patient was informed that if they have any urgent issues with their bandage, medications or any other health concerns regarding their surgery to call the 24/7 Orthopedic Post-op Hot Line at [phone]. The patient was reminded that if they have any chest pain or shortness of breath to call 911 or go to the ER.     The patient verbalized understanding and does not have any other questions.

## 2023-07-08 PROCEDURE — G0180 MD CERTIFICATION HHA PATIENT: HCPCS | Mod: ,,, | Performed by: ORTHOPAEDIC SURGERY

## 2023-07-08 PROCEDURE — G0180 PR HOME HEALTH MD CERTIFICATION: ICD-10-PCS | Mod: ,,, | Performed by: ORTHOPAEDIC SURGERY

## 2023-07-12 ENCOUNTER — PATIENT MESSAGE (OUTPATIENT)
Dept: ADMINISTRATIVE | Facility: OTHER | Age: 83
End: 2023-07-12
Payer: MEDICARE

## 2023-07-18 ENCOUNTER — OFFICE VISIT (OUTPATIENT)
Dept: ORTHOPEDICS | Facility: CLINIC | Age: 83
End: 2023-07-18
Payer: MEDICARE

## 2023-07-18 DIAGNOSIS — Z96.641 S/P HIP REPLACEMENT, RIGHT: Primary | ICD-10-CM

## 2023-07-18 PROCEDURE — 1101F PR PT FALLS ASSESS DOC 0-1 FALLS W/OUT INJ PAST YR: ICD-10-PCS | Mod: CPTII,S$GLB,, | Performed by: NURSE PRACTITIONER

## 2023-07-18 PROCEDURE — 1157F PR ADVANCE CARE PLAN OR EQUIV PRESENT IN MEDICAL RECORD: ICD-10-PCS | Mod: CPTII,S$GLB,, | Performed by: NURSE PRACTITIONER

## 2023-07-18 PROCEDURE — 99024 PR POST-OP FOLLOW-UP VISIT: ICD-10-PCS | Mod: S$GLB,,, | Performed by: NURSE PRACTITIONER

## 2023-07-18 PROCEDURE — 1160F PR REVIEW ALL MEDS BY PRESCRIBER/CLIN PHARMACIST DOCUMENTED: ICD-10-PCS | Mod: CPTII,S$GLB,, | Performed by: NURSE PRACTITIONER

## 2023-07-18 PROCEDURE — 1160F RVW MEDS BY RX/DR IN RCRD: CPT | Mod: CPTII,S$GLB,, | Performed by: NURSE PRACTITIONER

## 2023-07-18 PROCEDURE — 1157F ADVNC CARE PLAN IN RCRD: CPT | Mod: CPTII,S$GLB,, | Performed by: NURSE PRACTITIONER

## 2023-07-18 PROCEDURE — 1159F MED LIST DOCD IN RCRD: CPT | Mod: CPTII,S$GLB,, | Performed by: NURSE PRACTITIONER

## 2023-07-18 PROCEDURE — 99024 POSTOP FOLLOW-UP VISIT: CPT | Mod: S$GLB,,, | Performed by: NURSE PRACTITIONER

## 2023-07-18 PROCEDURE — 1125F PR PAIN SEVERITY QUANTIFIED, PAIN PRESENT: ICD-10-PCS | Mod: CPTII,S$GLB,, | Performed by: NURSE PRACTITIONER

## 2023-07-18 PROCEDURE — 3288F PR FALLS RISK ASSESSMENT DOCUMENTED: ICD-10-PCS | Mod: CPTII,S$GLB,, | Performed by: NURSE PRACTITIONER

## 2023-07-18 PROCEDURE — 99999 PR PBB SHADOW E&M-EST. PATIENT-LVL III: ICD-10-PCS | Mod: PBBFAC,,, | Performed by: NURSE PRACTITIONER

## 2023-07-18 PROCEDURE — 3288F FALL RISK ASSESSMENT DOCD: CPT | Mod: CPTII,S$GLB,, | Performed by: NURSE PRACTITIONER

## 2023-07-18 PROCEDURE — 1125F AMNT PAIN NOTED PAIN PRSNT: CPT | Mod: CPTII,S$GLB,, | Performed by: NURSE PRACTITIONER

## 2023-07-18 PROCEDURE — 1101F PT FALLS ASSESS-DOCD LE1/YR: CPT | Mod: CPTII,S$GLB,, | Performed by: NURSE PRACTITIONER

## 2023-07-18 PROCEDURE — 99999 PR PBB SHADOW E&M-EST. PATIENT-LVL III: CPT | Mod: PBBFAC,,, | Performed by: NURSE PRACTITIONER

## 2023-07-18 PROCEDURE — 1159F PR MEDICATION LIST DOCUMENTED IN MEDICAL RECORD: ICD-10-PCS | Mod: CPTII,S$GLB,, | Performed by: NURSE PRACTITIONER

## 2023-07-18 NOTE — PROGRESS NOTES
Jo-Ann Escobedo presents for initial post-operative visit following a right total hip arthroplasty performed by Dr. Ramirez on 7/5/2023     Exam:  Patient is ambulating well with walker. Still working with PT and OT at home   Incision is clean and dry without drainage or erythema.      Initial post-operative radiographs reviewed today revealing satisfactory position of the prosthesis.    A/P  2 weeks s/p right total hip replacement  - Patient advised to keep the incision clean and dry for the next 24 hours after which she  may wash the area with antibacterial soap in the shower, but will not submerge incision until one month post op.  - Continue aspirin for 1 month post op  - Pain medication refill not needed  - Follow up in 4 weeks with surgeon. Pt will call clinic immediately with problems/concerns.

## 2023-07-21 ENCOUNTER — DOCUMENT SCAN (OUTPATIENT)
Dept: HOME HEALTH SERVICES | Facility: HOSPITAL | Age: 83
End: 2023-07-21
Payer: MEDICARE

## 2023-07-25 ENCOUNTER — DOCUMENT SCAN (OUTPATIENT)
Dept: HOME HEALTH SERVICES | Facility: HOSPITAL | Age: 83
End: 2023-07-25
Payer: MEDICARE

## 2023-07-26 ENCOUNTER — DOCUMENT SCAN (OUTPATIENT)
Dept: HOME HEALTH SERVICES | Facility: HOSPITAL | Age: 83
End: 2023-07-26
Payer: MEDICARE

## 2023-07-27 ENCOUNTER — OFFICE VISIT (OUTPATIENT)
Dept: INTERNAL MEDICINE | Facility: CLINIC | Age: 83
End: 2023-07-27
Payer: MEDICARE

## 2023-07-27 VITALS
HEIGHT: 62 IN | DIASTOLIC BLOOD PRESSURE: 60 MMHG | HEART RATE: 102 BPM | OXYGEN SATURATION: 95 % | WEIGHT: 169.44 LBS | BODY MASS INDEX: 31.18 KG/M2 | SYSTOLIC BLOOD PRESSURE: 136 MMHG

## 2023-07-27 DIAGNOSIS — M47.816 FACET ARTHRITIS OF LUMBAR REGION: ICD-10-CM

## 2023-07-27 DIAGNOSIS — H81.10 BENIGN PAROXYSMAL POSITIONAL VERTIGO, UNSPECIFIED LATERALITY: ICD-10-CM

## 2023-07-27 DIAGNOSIS — E78.2 MIXED HYPERLIPIDEMIA: ICD-10-CM

## 2023-07-27 DIAGNOSIS — R26.9 ABNORMALITY OF GAIT AND MOBILITY: ICD-10-CM

## 2023-07-27 DIAGNOSIS — R73.03 PREDIABETES: ICD-10-CM

## 2023-07-27 DIAGNOSIS — Z00.00 ENCOUNTER FOR PREVENTIVE HEALTH EXAMINATION: Primary | ICD-10-CM

## 2023-07-27 DIAGNOSIS — M88.9 PAGET'S DISEASE OF BONE: ICD-10-CM

## 2023-07-27 DIAGNOSIS — E89.0 HYPOTHYROIDISM, POSTSURGICAL: ICD-10-CM

## 2023-07-27 DIAGNOSIS — M47.896 OTHER OSTEOARTHRITIS OF SPINE, LUMBAR REGION: ICD-10-CM

## 2023-07-27 DIAGNOSIS — E66.9 OBESITY (BMI 30.0-34.9): ICD-10-CM

## 2023-07-27 DIAGNOSIS — M16.11 PRIMARY OSTEOARTHRITIS OF RIGHT HIP: ICD-10-CM

## 2023-07-27 DIAGNOSIS — H90.3 SENSORINEURAL HEARING LOSS, BILATERAL: ICD-10-CM

## 2023-07-27 DIAGNOSIS — I10 ESSENTIAL HYPERTENSION: ICD-10-CM

## 2023-07-27 DIAGNOSIS — D05.11 DUCTAL CARCINOMA IN SITU (DCIS) OF RIGHT BREAST: ICD-10-CM

## 2023-07-27 DIAGNOSIS — Z96.641 S/P TOTAL RIGHT HIP ARTHROPLASTY: ICD-10-CM

## 2023-07-27 DIAGNOSIS — I70.0 AORTIC ATHEROSCLEROSIS: ICD-10-CM

## 2023-07-27 DIAGNOSIS — Z99.89 DEPENDENCE ON OTHER ENABLING MACHINES AND DEVICES: ICD-10-CM

## 2023-07-27 DIAGNOSIS — D51.3 OTHER DIETARY VITAMIN B12 DEFICIENCY ANEMIA: ICD-10-CM

## 2023-07-27 DIAGNOSIS — J84.10 CALCIFIED GRANULOMA OF LUNG: ICD-10-CM

## 2023-07-27 PROCEDURE — 1159F MED LIST DOCD IN RCRD: CPT | Mod: CPTII,S$GLB,, | Performed by: PHYSICIAN ASSISTANT

## 2023-07-27 PROCEDURE — 99999 PR PBB SHADOW E&M-EST. PATIENT-LVL V: ICD-10-PCS | Mod: PBBFAC,,, | Performed by: PHYSICIAN ASSISTANT

## 2023-07-27 PROCEDURE — 1159F PR MEDICATION LIST DOCUMENTED IN MEDICAL RECORD: ICD-10-PCS | Mod: CPTII,S$GLB,, | Performed by: PHYSICIAN ASSISTANT

## 2023-07-27 PROCEDURE — 1126F AMNT PAIN NOTED NONE PRSNT: CPT | Mod: CPTII,S$GLB,, | Performed by: PHYSICIAN ASSISTANT

## 2023-07-27 PROCEDURE — 1126F PR PAIN SEVERITY QUANTIFIED, NO PAIN PRESENT: ICD-10-PCS | Mod: CPTII,S$GLB,, | Performed by: PHYSICIAN ASSISTANT

## 2023-07-27 PROCEDURE — 1160F PR REVIEW ALL MEDS BY PRESCRIBER/CLIN PHARMACIST DOCUMENTED: ICD-10-PCS | Mod: CPTII,S$GLB,, | Performed by: PHYSICIAN ASSISTANT

## 2023-07-27 PROCEDURE — 3075F SYST BP GE 130 - 139MM HG: CPT | Mod: CPTII,S$GLB,, | Performed by: PHYSICIAN ASSISTANT

## 2023-07-27 PROCEDURE — 1101F PT FALLS ASSESS-DOCD LE1/YR: CPT | Mod: CPTII,S$GLB,, | Performed by: PHYSICIAN ASSISTANT

## 2023-07-27 PROCEDURE — 99999 PR PBB SHADOW E&M-EST. PATIENT-LVL V: CPT | Mod: PBBFAC,,, | Performed by: PHYSICIAN ASSISTANT

## 2023-07-27 PROCEDURE — 3288F FALL RISK ASSESSMENT DOCD: CPT | Mod: CPTII,S$GLB,, | Performed by: PHYSICIAN ASSISTANT

## 2023-07-27 PROCEDURE — 1101F PR PT FALLS ASSESS DOC 0-1 FALLS W/OUT INJ PAST YR: ICD-10-PCS | Mod: CPTII,S$GLB,, | Performed by: PHYSICIAN ASSISTANT

## 2023-07-27 PROCEDURE — G0439 PR MEDICARE ANNUAL WELLNESS SUBSEQUENT VISIT: ICD-10-PCS | Mod: S$GLB,,, | Performed by: PHYSICIAN ASSISTANT

## 2023-07-27 PROCEDURE — 1157F ADVNC CARE PLAN IN RCRD: CPT | Mod: CPTII,S$GLB,, | Performed by: PHYSICIAN ASSISTANT

## 2023-07-27 PROCEDURE — 3078F PR MOST RECENT DIASTOLIC BLOOD PRESSURE < 80 MM HG: ICD-10-PCS | Mod: CPTII,S$GLB,, | Performed by: PHYSICIAN ASSISTANT

## 2023-07-27 PROCEDURE — 3075F PR MOST RECENT SYSTOLIC BLOOD PRESS GE 130-139MM HG: ICD-10-PCS | Mod: CPTII,S$GLB,, | Performed by: PHYSICIAN ASSISTANT

## 2023-07-27 PROCEDURE — 1157F PR ADVANCE CARE PLAN OR EQUIV PRESENT IN MEDICAL RECORD: ICD-10-PCS | Mod: CPTII,S$GLB,, | Performed by: PHYSICIAN ASSISTANT

## 2023-07-27 PROCEDURE — 3078F DIAST BP <80 MM HG: CPT | Mod: CPTII,S$GLB,, | Performed by: PHYSICIAN ASSISTANT

## 2023-07-27 PROCEDURE — 1160F RVW MEDS BY RX/DR IN RCRD: CPT | Mod: CPTII,S$GLB,, | Performed by: PHYSICIAN ASSISTANT

## 2023-07-27 PROCEDURE — G0439 PPPS, SUBSEQ VISIT: HCPCS | Mod: S$GLB,,, | Performed by: PHYSICIAN ASSISTANT

## 2023-07-27 PROCEDURE — 3288F PR FALLS RISK ASSESSMENT DOCUMENTED: ICD-10-PCS | Mod: CPTII,S$GLB,, | Performed by: PHYSICIAN ASSISTANT

## 2023-07-27 NOTE — PATIENT INSTRUCTIONS
Counseling and Referral of Other Preventative  (Italic type indicates deductible and co-insurance are waived)    Patient Name: Jo-Ann Escobedo  Today's Date: 7/27/2023    Health Maintenance       Date Due Completion Date    COVID-19 Vaccine (6 - Pfizer series) 03/03/2023 11/3/2022    Influenza Vaccine (1) 09/01/2023 11/8/2022    Hemoglobin A1c (Prediabetes) 05/17/2024 5/17/2023    DEXA Scan 05/18/2025 5/18/2023    Lipid Panel 07/18/2027 7/18/2022    Colonoscopy 02/13/2028 2/13/2023    TETANUS VACCINE 10/25/2028 10/25/2018        No orders of the defined types were placed in this encounter.    The following information is provided to all patients.  This information is to help you find resources for any of the problems found today that may be affecting your health:                Living healthy guide: www.[facility].[place].[facility]      Understanding Diabetes: www.diabetes.org      Eating healthy: www.cdc.gov/healthyweight      CDC home safety checklist: www.cdc.gov/steadi/patient.html      Agency on Aging: www.goea.[place].[facility]      Alcoholics anonymous (AA): www.aa.org      Physical Activity: www.david.nih.gov/dh9zvvx      Tobacco use: www.quitwithus[place].org

## 2023-07-27 NOTE — PROGRESS NOTES
"  Jo-Ann Escobedo presented for a  Medicare AWV and comprehensive Health Risk Assessment today. The following components were reviewed and updated:    Medical history  Family History  Social history  Allergies and Current Medications  Health Risk Assessment  Health Maintenance  Care Team         ** See Completed Assessments for Annual Wellness Visit within the encounter summary.**         The following assessments were completed:  Living Situation  CAGE  Depression Screening  Timed Get Up and Go  Whisper Test  Cognitive Function Screening  Nutrition Screening  ADL Screening  PAQ Screening          Vitals:    07/27/23 1102   BP: 136/60   BP Location: Right arm   Patient Position: Sitting   BP Method: Medium (Manual)   Pulse: 102   SpO2: 95%   Weight: 76.9 kg (169 lb 6.8 oz)   Height: 5' 2" (1.575 m)     Body mass index is 30.99 kg/m².  Physical Exam  Vitals and nursing note reviewed.   Constitutional:       Appearance: Normal appearance. She is well-developed.   HENT:      Head: Normocephalic.      Right Ear: External ear normal.      Left Ear: External ear normal.   Eyes:      Pupils: Pupils are equal, round, and reactive to light.   Cardiovascular:      Rate and Rhythm: Normal rate and regular rhythm.      Heart sounds: Normal heart sounds. No murmur heard.     No friction rub. No gallop.   Pulmonary:      Effort: Pulmonary effort is normal. No respiratory distress.      Breath sounds: Normal breath sounds.   Abdominal:      Palpations: Abdomen is soft.      Tenderness: There is no abdominal tenderness.   Skin:     General: Skin is warm and dry.   Neurological:      Mental Status: She is alert.               Diagnoses and health risks identified today and associated recommendations/orders:    1. Encounter for preventive health examination  Assessments completed.  Preventative health recommendations reviewed.     2. Dependence on other enabling machines and devices  Stable, controlled on current medical therapy, " followed by PCP.    3. Abnormality of gait and mobility  Stable, controlled on current medical therapy, followed by PCP.    4. Other osteoarthritis of spine, lumbar region  Stable, controlled on current medical therapy, followed by PCP.    5. Facet arthritis of lumbar region  Stable, controlled on current medical therapy, followed by PCP.    6. Sensorineural hearing loss, bilateral  Stable, controlled on current medical therapy, followed by PCP.    7. Benign paroxysmal positional vertigo, unspecified laterality  Stable, controlled on current medical therapy, followed by PCP.    8. Calcified granuloma of lung  Stable, followed by PCP.    9. Mixed hyperlipidemia  Stable, controlled on current medical therapy, followed by PCP.    10. Essential hypertension  Stable, controlled on current medical therapy, followed by PCP.    11. Aortic atherosclerosis  Stable, controlled on current medical therapy, followed by PCP.    12. Ductal carcinoma in situ (DCIS) of right breast  Stable, controlled on current medical therapy, followed by PCP.    13. Other dietary vitamin B12 deficiency anemia  Stable, controlled on current medical therapy, followed by PCP.    14. Obesity (BMI 30.0-34.9)  Stable, controlled on current medical therapy, followed by PCP.    15. Hypothyroidism, postsurgical  Stable, controlled on current medical therapy, followed by PCP.    16. Prediabetes  Stable, controlled on current medical therapy, followed by PCP.    17. Paget's disease of bone  Stable, controlled on current medical therapy, followed by PCP.    18. Primary osteoarthritis of right hip  Stable, controlled on current medical therapy, followed by PCP.    19. S/P right total hip arthroplasty  Stable, controlled on current medical therapy, followed by PCP.      Provided Jo-Ann with a 5-10 year written screening schedule and personal prevention plan. Recommendations were developed using the USPSTF age appropriate recommendations. Education, counseling,  and referrals were provided as needed. After Visit Summary printed and given to patient which includes a list of additional screenings\tests needed.    No follow-ups on file.    Estela Frye PA-C  I offered to discuss advanced care planning, including how to pick a person who would make decisions for you if you were unable to make them for yourself, called a health care power of , and what kind of decisions you might make such as use of life sustaining treatments such as ventilators and tube feeding when faced with a life limiting illness recorded on a living will that they will need to know. (How you want to be cared for as you near the end of your natural life)     X  Patient has advanced directives on file, which we reviewed, and they do not wish to make changes.

## 2023-07-28 ENCOUNTER — PATIENT MESSAGE (OUTPATIENT)
Dept: ORTHOPEDICS | Facility: CLINIC | Age: 83
End: 2023-07-28
Payer: MEDICARE

## 2023-07-28 ENCOUNTER — PATIENT MESSAGE (OUTPATIENT)
Dept: ADMINISTRATIVE | Facility: OTHER | Age: 83
End: 2023-07-28
Payer: MEDICARE

## 2023-07-31 ENCOUNTER — EXTERNAL HOME HEALTH (OUTPATIENT)
Dept: HOME HEALTH SERVICES | Facility: HOSPITAL | Age: 83
End: 2023-07-31
Payer: MEDICARE

## 2023-07-31 ENCOUNTER — DOCUMENT SCAN (OUTPATIENT)
Dept: HOME HEALTH SERVICES | Facility: HOSPITAL | Age: 83
End: 2023-07-31
Payer: MEDICARE

## 2023-08-01 DIAGNOSIS — Z96.641 HISTORY OF RIGHT HIP REPLACEMENT: Primary | ICD-10-CM

## 2023-08-01 RX ORDER — AMOXICILLIN 500 MG/1
2000 CAPSULE ORAL
Qty: 4 CAPSULE | Refills: 1 | Status: SHIPPED | OUTPATIENT
Start: 2023-08-01 | End: 2023-12-26 | Stop reason: SDUPTHER

## 2023-08-09 ENCOUNTER — DOCUMENT SCAN (OUTPATIENT)
Dept: HOME HEALTH SERVICES | Facility: HOSPITAL | Age: 83
End: 2023-08-09
Payer: MEDICARE

## 2023-08-11 DIAGNOSIS — Z96.641 S/P HIP REPLACEMENT, RIGHT: Primary | ICD-10-CM

## 2023-08-15 ENCOUNTER — OFFICE VISIT (OUTPATIENT)
Dept: ORTHOPEDICS | Facility: CLINIC | Age: 83
End: 2023-08-15
Payer: MEDICARE

## 2023-08-15 ENCOUNTER — HOSPITAL ENCOUNTER (OUTPATIENT)
Dept: RADIOLOGY | Facility: HOSPITAL | Age: 83
Discharge: HOME | End: 2023-08-15
Attending: ORTHOPAEDIC SURGERY
Payer: MEDICARE

## 2023-08-15 VITALS — WEIGHT: 169.06 LBS | HEIGHT: 62 IN | BODY MASS INDEX: 31.11 KG/M2

## 2023-08-15 DIAGNOSIS — Z96.641 S/P TOTAL RIGHT HIP ARTHROPLASTY: Primary | ICD-10-CM

## 2023-08-15 DIAGNOSIS — Z96.641 S/P HIP REPLACEMENT, RIGHT: ICD-10-CM

## 2023-08-15 PROCEDURE — 3288F PR FALLS RISK ASSESSMENT DOCUMENTED: ICD-10-PCS | Mod: CPTII,S$GLB,, | Performed by: ORTHOPAEDIC SURGERY

## 2023-08-15 PROCEDURE — 1101F PT FALLS ASSESS-DOCD LE1/YR: CPT | Mod: CPTII,S$GLB,, | Performed by: ORTHOPAEDIC SURGERY

## 2023-08-15 PROCEDURE — 1125F AMNT PAIN NOTED PAIN PRSNT: CPT | Mod: CPTII,S$GLB,, | Performed by: ORTHOPAEDIC SURGERY

## 2023-08-15 PROCEDURE — 1160F PR REVIEW ALL MEDS BY PRESCRIBER/CLIN PHARMACIST DOCUMENTED: ICD-10-PCS | Mod: CPTII,S$GLB,, | Performed by: ORTHOPAEDIC SURGERY

## 2023-08-15 PROCEDURE — 99024 POSTOP FOLLOW-UP VISIT: CPT | Mod: S$GLB,,, | Performed by: ORTHOPAEDIC SURGERY

## 2023-08-15 PROCEDURE — 99024 PR POST-OP FOLLOW-UP VISIT: ICD-10-PCS | Mod: S$GLB,,, | Performed by: ORTHOPAEDIC SURGERY

## 2023-08-15 PROCEDURE — 99999 PR PBB SHADOW E&M-EST. PATIENT-LVL IV: ICD-10-PCS | Mod: PBBFAC,,, | Performed by: ORTHOPAEDIC SURGERY

## 2023-08-15 PROCEDURE — 3288F FALL RISK ASSESSMENT DOCD: CPT | Mod: CPTII,S$GLB,, | Performed by: ORTHOPAEDIC SURGERY

## 2023-08-15 PROCEDURE — 1157F ADVNC CARE PLAN IN RCRD: CPT | Mod: CPTII,S$GLB,, | Performed by: ORTHOPAEDIC SURGERY

## 2023-08-15 PROCEDURE — 1160F RVW MEDS BY RX/DR IN RCRD: CPT | Mod: CPTII,S$GLB,, | Performed by: ORTHOPAEDIC SURGERY

## 2023-08-15 PROCEDURE — 73502 X-RAY EXAM HIP UNI 2-3 VIEWS: CPT | Mod: 26,RT,, | Performed by: RADIOLOGY

## 2023-08-15 PROCEDURE — 1125F PR PAIN SEVERITY QUANTIFIED, PAIN PRESENT: ICD-10-PCS | Mod: CPTII,S$GLB,, | Performed by: ORTHOPAEDIC SURGERY

## 2023-08-15 PROCEDURE — 73502 X-RAY EXAM HIP UNI 2-3 VIEWS: CPT | Mod: TC,RT

## 2023-08-15 PROCEDURE — 1159F PR MEDICATION LIST DOCUMENTED IN MEDICAL RECORD: ICD-10-PCS | Mod: CPTII,S$GLB,, | Performed by: ORTHOPAEDIC SURGERY

## 2023-08-15 PROCEDURE — 1159F MED LIST DOCD IN RCRD: CPT | Mod: CPTII,S$GLB,, | Performed by: ORTHOPAEDIC SURGERY

## 2023-08-15 PROCEDURE — 1157F PR ADVANCE CARE PLAN OR EQUIV PRESENT IN MEDICAL RECORD: ICD-10-PCS | Mod: CPTII,S$GLB,, | Performed by: ORTHOPAEDIC SURGERY

## 2023-08-15 PROCEDURE — 73502 XR HIP WITH PELVIS WHEN PERFORMED, 2 OR 3  VIEWS RIGHT: ICD-10-PCS | Mod: 26,RT,, | Performed by: RADIOLOGY

## 2023-08-15 PROCEDURE — 99999 PR PBB SHADOW E&M-EST. PATIENT-LVL IV: CPT | Mod: PBBFAC,,, | Performed by: ORTHOPAEDIC SURGERY

## 2023-08-15 PROCEDURE — 1101F PR PT FALLS ASSESS DOC 0-1 FALLS W/OUT INJ PAST YR: ICD-10-PCS | Mod: CPTII,S$GLB,, | Performed by: ORTHOPAEDIC SURGERY

## 2023-08-18 ENCOUNTER — PATIENT MESSAGE (OUTPATIENT)
Dept: ADMINISTRATIVE | Facility: OTHER | Age: 83
End: 2023-08-18
Payer: MEDICARE

## 2023-08-18 NOTE — PROGRESS NOTES
Subjective:      Patient ID: Jo-Ann Escobedo is a 82 y.o. female.    Chief Complaint:   Post-op Evaluation of the Right Hip    HPI  She returns about 6 weeks out from right anterior MAURICIO.  Her pain level is only 4/10 and she is walking well without supports.  Over the last couple of days she is had to use her cane some because she thinks she overdid it and started hurting around the lateral aspect of the hip.  No falls.  No new symptoms to report otherwise.      Objective:        Ortho/SPM Exam  She does have some tenderness at the greater trochanter.  On exam the wound is well healed without redness or tenderness.  No fluctuance.  Negative Stinchfield sign.  No pain with passive flexion and rotation of the hip.  The patient walks with a steady level gait with cane.  Distal neurovascular intact.  Calves soft and nontender.          IMAGING:  X-rays show well-fixed and positioned total hip arthroplasty components without signs of loosening or other complications  Assessment:   Doing well status post right MAURICIO, with some gluteal tendinitis from overuse.      Plan:   Follow-up in 6 weeks for 12 week visit    The patient's pathophysiology was explained in detail with reference to x-rays, models, other visual aids as appropriate.  Treatment options were discussed in detail.  Questions were invited and answered to the patient's satisfaction.      Federico Ramirez MD  Orthopedic Surgery

## 2023-08-21 PROBLEM — Z00.00 HEALTHCARE MAINTENANCE: Status: RESOLVED | Noted: 2023-05-17 | Resolved: 2023-08-21

## 2023-08-29 ENCOUNTER — IMMUNIZATION (OUTPATIENT)
Dept: INTERNAL MEDICINE | Facility: CLINIC | Age: 83
End: 2023-08-29
Payer: MEDICARE

## 2023-08-29 DIAGNOSIS — Z23 NEED FOR VACCINATION: Primary | ICD-10-CM

## 2023-08-29 PROCEDURE — 0124A COVID-19, MRNA, LNP-S, BIVALENT BOOSTER, PF, 30 MCG/0.3 ML DOSE: CPT | Mod: S$GLB,,, | Performed by: INTERNAL MEDICINE

## 2023-08-29 PROCEDURE — 0124A COVID-19, MRNA, LNP-S, BIVALENT BOOSTER, PF, 30 MCG/0.3 ML DOSE: ICD-10-PCS | Mod: S$GLB,,, | Performed by: INTERNAL MEDICINE

## 2023-08-29 PROCEDURE — 91312 COVID-19, MRNA, LNP-S, BIVALENT BOOSTER, PF, 30 MCG/0.3 ML DOSE: ICD-10-PCS | Mod: S$GLB,,, | Performed by: INTERNAL MEDICINE

## 2023-08-29 PROCEDURE — 91312 COVID-19, MRNA, LNP-S, BIVALENT BOOSTER, PF, 30 MCG/0.3 ML DOSE: CPT | Mod: S$GLB,,, | Performed by: INTERNAL MEDICINE

## 2023-09-13 ENCOUNTER — IMMUNIZATION (OUTPATIENT)
Dept: INTERNAL MEDICINE | Facility: CLINIC | Age: 83
End: 2023-09-13
Payer: MEDICARE

## 2023-09-13 PROCEDURE — 90694 FLU VACCINE - QUADRIVALENT - ADJUVANTED: ICD-10-PCS | Mod: S$GLB,,, | Performed by: INTERNAL MEDICINE

## 2023-09-13 PROCEDURE — 90694 VACC AIIV4 NO PRSRV 0.5ML IM: CPT | Mod: S$GLB,,, | Performed by: INTERNAL MEDICINE

## 2023-09-13 PROCEDURE — G0008 FLU VACCINE - QUADRIVALENT - ADJUVANTED: ICD-10-PCS | Mod: S$GLB,,, | Performed by: INTERNAL MEDICINE

## 2023-09-13 PROCEDURE — G0008 ADMIN INFLUENZA VIRUS VAC: HCPCS | Mod: S$GLB,,, | Performed by: INTERNAL MEDICINE

## 2023-09-15 ENCOUNTER — DOCUMENT SCAN (OUTPATIENT)
Dept: HOME HEALTH SERVICES | Facility: HOSPITAL | Age: 83
End: 2023-09-15
Payer: MEDICARE

## 2023-09-18 ENCOUNTER — PATIENT MESSAGE (OUTPATIENT)
Dept: ADMINISTRATIVE | Facility: OTHER | Age: 83
End: 2023-09-18
Payer: MEDICARE

## 2023-09-21 ENCOUNTER — PATIENT MESSAGE (OUTPATIENT)
Dept: ADMINISTRATIVE | Facility: OTHER | Age: 83
End: 2023-09-21
Payer: MEDICARE

## 2023-09-25 ENCOUNTER — TELEPHONE (OUTPATIENT)
Dept: PODIATRY | Facility: CLINIC | Age: 83
End: 2023-09-25
Payer: MEDICARE

## 2023-09-25 NOTE — TELEPHONE ENCOUNTER
Spoke to pt and rescheduled appt. Original appt:9/27/2023  New Appt:10/30/2023. Appt reminder mailed. Pt verbalized understanding.

## 2023-09-27 ENCOUNTER — TELEPHONE (OUTPATIENT)
Dept: ENDOSCOPY | Facility: HOSPITAL | Age: 83
End: 2023-09-27

## 2023-09-27 ENCOUNTER — CLINICAL SUPPORT (OUTPATIENT)
Dept: ENDOSCOPY | Facility: HOSPITAL | Age: 83
End: 2023-09-27
Payer: MEDICARE

## 2023-09-27 VITALS — WEIGHT: 169 LBS | BODY MASS INDEX: 31.1 KG/M2 | HEIGHT: 62 IN

## 2023-09-27 DIAGNOSIS — D12.6 ADENOMATOUS POLYP OF COLON, UNSPECIFIED PART OF COLON: ICD-10-CM

## 2023-09-27 NOTE — TELEPHONE ENCOUNTER
Spoke to PATIENT to schedule procedure(s) Colonoscopy       Physician to perform procedure(s) Dr. SIMON Monet  Date of Procedure (s) 11/6/23  Arrival Time 10:30 AM  Time of Procedure(s) 11:30 AM   Location of Procedure(s) [place] [address] Floor  Type of Rx Prep sent to patient: PEG  Instructions provided to patient via [facility]    Patient was informed on the following information and verbalized understanding. Screening questionnaire reviewed with patient and complete. If procedure requires anesthesia, a responsible adult needs to be present to accompany the patient home, patient cannot drive after receiving anesthesia. Appointment details are tentative, especially check-in time. Patient will receive a prep-op call 4 days prior to confirm check-in time for procedure. If applicable the patient should contact their pharmacy to verify Rx for procedure prep is ready for pick-up. Patient was advised to call the scheduling department at [phone] if pharmacy states no Rx is available. Patient was advised to call the endoscopy scheduling department if any questions or concerns arise.      [facility] Scheduling Department

## 2023-09-27 NOTE — PLAN OF CARE
Spoke to PATIENT to schedule procedure(s) Colonoscopy       Physician to perform procedure(s) Dr. SIMON Monet  Date of Procedure (s) 11/6/23  Arrival Time 10:30 AM  Time of Procedure(s) 11:30 AM   Location of Procedure(s) [place] [address]th Floor  Type of Rx Prep sent to patient: PEG  Instructions provided to patient via [facility]    Patient was informed on the following information and verbalized understanding. Screening questionnaire reviewed with patient and complete. If procedure requires anesthesia, a responsible adult needs to be present to accompany the patient home, patient cannot drive after receiving anesthesia. Appointment details are tentative, especially check-in time. Patient will receive a prep-op call 4 days prior to confirm check-in time for procedure. If applicable the patient should contact their pharmacy to verify Rx for procedure prep is ready for pick-up. Patient was advised to call the scheduling department at [phone] if pharmacy states no Rx is available. Patient was advised to call the endoscopy scheduling department if any questions or concerns arise.      [facility] Scheduling Department

## 2023-10-05 ENCOUNTER — OFFICE VISIT (OUTPATIENT)
Dept: ORTHOPEDICS | Facility: CLINIC | Age: 83
End: 2023-10-05
Payer: MEDICARE

## 2023-10-05 VITALS — BODY MASS INDEX: 32.14 KG/M2 | WEIGHT: 174.63 LBS | HEIGHT: 62 IN

## 2023-10-05 DIAGNOSIS — Z96.641 S/P TOTAL RIGHT HIP ARTHROPLASTY: Primary | ICD-10-CM

## 2023-10-05 PROCEDURE — 1159F PR MEDICATION LIST DOCUMENTED IN MEDICAL RECORD: ICD-10-PCS | Mod: CPTII,S$GLB,, | Performed by: ORTHOPAEDIC SURGERY

## 2023-10-05 PROCEDURE — 1160F PR REVIEW ALL MEDS BY PRESCRIBER/CLIN PHARMACIST DOCUMENTED: ICD-10-PCS | Mod: CPTII,S$GLB,, | Performed by: ORTHOPAEDIC SURGERY

## 2023-10-05 PROCEDURE — 99999 PR PBB SHADOW E&M-EST. PATIENT-LVL III: ICD-10-PCS | Mod: PBBFAC,,, | Performed by: ORTHOPAEDIC SURGERY

## 2023-10-05 PROCEDURE — 1157F ADVNC CARE PLAN IN RCRD: CPT | Mod: CPTII,S$GLB,, | Performed by: ORTHOPAEDIC SURGERY

## 2023-10-05 PROCEDURE — 1101F PR PT FALLS ASSESS DOC 0-1 FALLS W/OUT INJ PAST YR: ICD-10-PCS | Mod: CPTII,S$GLB,, | Performed by: ORTHOPAEDIC SURGERY

## 2023-10-05 PROCEDURE — 99999 PR PBB SHADOW E&M-EST. PATIENT-LVL III: CPT | Mod: PBBFAC,,, | Performed by: ORTHOPAEDIC SURGERY

## 2023-10-05 PROCEDURE — 3288F FALL RISK ASSESSMENT DOCD: CPT | Mod: CPTII,S$GLB,, | Performed by: ORTHOPAEDIC SURGERY

## 2023-10-05 PROCEDURE — 1160F RVW MEDS BY RX/DR IN RCRD: CPT | Mod: CPTII,S$GLB,, | Performed by: ORTHOPAEDIC SURGERY

## 2023-10-05 PROCEDURE — 3288F PR FALLS RISK ASSESSMENT DOCUMENTED: ICD-10-PCS | Mod: CPTII,S$GLB,, | Performed by: ORTHOPAEDIC SURGERY

## 2023-10-05 PROCEDURE — 99024 PR POST-OP FOLLOW-UP VISIT: ICD-10-PCS | Mod: S$GLB,,, | Performed by: ORTHOPAEDIC SURGERY

## 2023-10-05 PROCEDURE — 99024 POSTOP FOLLOW-UP VISIT: CPT | Mod: S$GLB,,, | Performed by: ORTHOPAEDIC SURGERY

## 2023-10-05 PROCEDURE — 1125F PR PAIN SEVERITY QUANTIFIED, PAIN PRESENT: ICD-10-PCS | Mod: CPTII,S$GLB,, | Performed by: ORTHOPAEDIC SURGERY

## 2023-10-05 PROCEDURE — 1125F AMNT PAIN NOTED PAIN PRSNT: CPT | Mod: CPTII,S$GLB,, | Performed by: ORTHOPAEDIC SURGERY

## 2023-10-05 PROCEDURE — 1159F MED LIST DOCD IN RCRD: CPT | Mod: CPTII,S$GLB,, | Performed by: ORTHOPAEDIC SURGERY

## 2023-10-05 PROCEDURE — 1157F PR ADVANCE CARE PLAN OR EQUIV PRESENT IN MEDICAL RECORD: ICD-10-PCS | Mod: CPTII,S$GLB,, | Performed by: ORTHOPAEDIC SURGERY

## 2023-10-05 PROCEDURE — 1101F PT FALLS ASSESS-DOCD LE1/YR: CPT | Mod: CPTII,S$GLB,, | Performed by: ORTHOPAEDIC SURGERY

## 2023-10-10 ENCOUNTER — PATIENT MESSAGE (OUTPATIENT)
Dept: ADMINISTRATIVE | Facility: OTHER | Age: 83
End: 2023-10-10
Payer: MEDICARE

## 2023-10-10 NOTE — PROGRESS NOTES
Subjective:      Patient ID: Jo-Ann Escobedo is a 82 y.o. female.    Chief Complaint:   Post-op Evaluation (12 week eval )    HPI  She returns about 3 months out from right anterior approach total hip arthroplasty.  She does have occasional pain up to 3/10, with long walks.  However, she can walk her dog as she desires.  No new symptoms to report.      Objective:        Ortho/SPM Exam  On exam the wound is well healed without redness or tenderness.  No fluctuance.  Negative Stinchfield sign.  No pain with passive flexion and rotation of the hip.  The patient walks with a steady level gait without supports.  Distal neurovascular intact.  Calves soft and nontender.          IMAGING:  None today  Assessment:   Status post right MAURICIO, progressing well      Plan:   Anniversary follow-up with x-rays    The patient's pathophysiology was explained in detail with reference to x-rays, models, other visual aids as appropriate.  Treatment options were discussed in detail.  Questions were invited and answered to the patient's satisfaction.      Federico Ramirez MD  Orthopedic Surgery

## 2023-10-31 ENCOUNTER — PATIENT MESSAGE (OUTPATIENT)
Dept: ADMINISTRATIVE | Facility: OTHER | Age: 83
End: 2023-10-31
Payer: MEDICARE

## 2023-11-02 ENCOUNTER — PATIENT MESSAGE (OUTPATIENT)
Dept: ADMINISTRATIVE | Facility: OTHER | Age: 83
End: 2023-11-02
Payer: MEDICARE

## 2023-11-06 ENCOUNTER — ANESTHESIA (OUTPATIENT)
Dept: ENDOSCOPY | Facility: HOSPITAL | Age: 83
End: 2023-11-06
Payer: MEDICARE

## 2023-11-06 ENCOUNTER — ANESTHESIA EVENT (OUTPATIENT)
Dept: ENDOSCOPY | Facility: HOSPITAL | Age: 83
End: 2023-11-06
Payer: MEDICARE

## 2023-11-06 ENCOUNTER — HOSPITAL ENCOUNTER (OUTPATIENT)
Facility: HOSPITAL | Age: 83
Discharge: HOME | End: 2023-11-06
Attending: COLON & RECTAL SURGERY | Admitting: COLON & RECTAL SURGERY
Payer: MEDICARE

## 2023-11-06 VITALS
HEART RATE: 76 BPM | SYSTOLIC BLOOD PRESSURE: 153 MMHG | TEMPERATURE: 98 F | OXYGEN SATURATION: 96 % | HEIGHT: 62 IN | RESPIRATION RATE: 17 BRPM | BODY MASS INDEX: 32.2 KG/M2 | DIASTOLIC BLOOD PRESSURE: 75 MMHG | WEIGHT: 175 LBS

## 2023-11-06 DIAGNOSIS — Z12.11 ENCOUNTER FOR SCREENING COLONOSCOPY: ICD-10-CM

## 2023-11-06 PROCEDURE — 25000003 PHARM REV CODE 250: Performed by: NURSE ANESTHETIST, CERTIFIED REGISTERED

## 2023-11-06 PROCEDURE — E9220 PRA ENDO ANESTHESIA: ICD-10-PCS | Mod: PT,,, | Performed by: NURSE ANESTHETIST, CERTIFIED REGISTERED

## 2023-11-06 PROCEDURE — 37000009 HC ANESTHESIA EA ADD 15 MINS: Performed by: COLON & RECTAL SURGERY

## 2023-11-06 PROCEDURE — 45385 PR COLONOSCOPY,REMV LESN,SNARE: ICD-10-PCS | Mod: PT,,, | Performed by: COLON & RECTAL SURGERY

## 2023-11-06 PROCEDURE — 27201089 HC SNARE, DISP (ANY): Performed by: COLON & RECTAL SURGERY

## 2023-11-06 PROCEDURE — 88305 TISSUE EXAM BY PATHOLOGIST: ICD-10-PCS | Mod: 26,,, | Performed by: PATHOLOGY

## 2023-11-06 PROCEDURE — 88305 TISSUE EXAM BY PATHOLOGIST: CPT | Mod: 26,,, | Performed by: PATHOLOGY

## 2023-11-06 PROCEDURE — 27201012 HC FORCEPS, HOT/COLD, DISP: Performed by: COLON & RECTAL SURGERY

## 2023-11-06 PROCEDURE — 45380 COLONOSCOPY AND BIOPSY: CPT | Mod: 59,PT | Performed by: COLON & RECTAL SURGERY

## 2023-11-06 PROCEDURE — 45385 COLONOSCOPY W/LESION REMOVAL: CPT | Mod: PT | Performed by: COLON & RECTAL SURGERY

## 2023-11-06 PROCEDURE — 88305 TISSUE EXAM BY PATHOLOGIST: CPT | Mod: 59 | Performed by: PATHOLOGY

## 2023-11-06 PROCEDURE — 45380 PR COLONOSCOPY,BIOPSY: ICD-10-PCS | Mod: 59,PT,, | Performed by: COLON & RECTAL SURGERY

## 2023-11-06 PROCEDURE — 45380 COLONOSCOPY AND BIOPSY: CPT | Mod: 59,PT,, | Performed by: COLON & RECTAL SURGERY

## 2023-11-06 PROCEDURE — 45385 COLONOSCOPY W/LESION REMOVAL: CPT | Mod: PT,,, | Performed by: COLON & RECTAL SURGERY

## 2023-11-06 PROCEDURE — 37000008 HC ANESTHESIA 1ST 15 MINUTES: Performed by: COLON & RECTAL SURGERY

## 2023-11-06 PROCEDURE — 63600175 PHARM REV CODE 636 W HCPCS: Performed by: NURSE ANESTHETIST, CERTIFIED REGISTERED

## 2023-11-06 PROCEDURE — E9220 PRA ENDO ANESTHESIA: HCPCS | Mod: PT,,, | Performed by: NURSE ANESTHETIST, CERTIFIED REGISTERED

## 2023-11-06 RX ORDER — LIDOCAINE HYDROCHLORIDE 20 MG/ML
INJECTION INTRAVENOUS
Status: DISCONTINUED | OUTPATIENT
Start: 2023-11-06 | End: 2023-11-06

## 2023-11-06 RX ORDER — PROPOFOL 10 MG/ML
VIAL (ML) INTRAVENOUS CONTINUOUS PRN
Status: DISCONTINUED | OUTPATIENT
Start: 2023-11-06 | End: 2023-11-06

## 2023-11-06 RX ORDER — PROPOFOL 10 MG/ML
VIAL (ML) INTRAVENOUS
Status: DISCONTINUED | OUTPATIENT
Start: 2023-11-06 | End: 2023-11-06

## 2023-11-06 RX ORDER — SODIUM CHLORIDE 9 MG/ML
INJECTION, SOLUTION INTRAVENOUS CONTINUOUS
Status: DISCONTINUED | OUTPATIENT
Start: 2023-11-06 | End: 2023-11-06 | Stop reason: HOSPADM

## 2023-11-06 RX ORDER — SODIUM CHLORIDE 9 MG/ML
INJECTION, SOLUTION INTRAVENOUS CONTINUOUS PRN
Status: DISCONTINUED | OUTPATIENT
Start: 2023-11-06 | End: 2023-11-06

## 2023-11-06 RX ADMIN — SODIUM CHLORIDE: 0.9 INJECTION, SOLUTION INTRAVENOUS at 11:11

## 2023-11-06 RX ADMIN — SODIUM CHLORIDE: 0.9 INJECTION, SOLUTION INTRAVENOUS at 12:11

## 2023-11-06 RX ADMIN — LIDOCAINE HYDROCHLORIDE 80 MG: 20 INJECTION INTRAVENOUS at 11:11

## 2023-11-06 RX ADMIN — PROPOFOL 150 MCG/KG/MIN: 10 INJECTION, EMULSION INTRAVENOUS at 11:11

## 2023-11-06 RX ADMIN — PROPOFOL 80 MG: 10 INJECTION, EMULSION INTRAVENOUS at 11:11

## 2023-11-06 NOTE — TRANSFER OF CARE
"Anesthesia Transfer of Care Note    Patient: Jo-Ann Escobedo    Procedure(s) Performed: Procedure(s) (LRB):  COLONOSCOPY (N/A)    Patient location: PACU    Anesthesia Type: general    Transport from OR: Transported from OR on 6-10 L/min O2 by face mask with adequate spontaneous ventilation    Post pain: adequate analgesia    Post assessment: no apparent anesthetic complications and tolerated procedure well    Post vital signs: stable    Level of consciousness: sedated    Nausea/Vomiting: no nausea/vomiting    Complications: none    Transfer of care protocol was followed      Last vitals: Visit Vitals  /78   Pulse 90   Temp 36.3 °C (97.4 °F)   Resp 15   Ht 5' 2" (1.575 m)   Wt 79.4 kg (175 lb)   LMP  (LMP Unknown)   SpO2 (!) 94%   Breastfeeding No   BMI 32.01 kg/m²     "

## 2023-11-06 NOTE — ANESTHESIA PREPROCEDURE EVALUATION
[facility]  Anesthesia Pre-Operative Evaluation       Patient Name: Jo-Ann Escobedo  YOB: 1940  MRN: [id number]  [facility]: [id number]      Code Status: Prior   Date of Procedure: 11/6/2023  Procedure: Procedure(s) (LRB):  COLONOSCOPY (N/A)  Anesthesia: Choice  Pre-Operative Diagnosis: Final diagnoses:  None  Proceduralist/Surgeon(s) and Role:     * Artur Monet MD - Primary    SUBJECTIVE:   Jo-Ann Escobedo is a 82 y.o. female w/ a significant PMHx listed below.    Patient now presents for the above procedure(s). Pt appropriately NPO.     LDA:        Peripheral IV - Single Lumen 11/06/23 1004 20 G Anterior;Right Hand (Active)   Number of days: 0      Anesthesia Evaluation      Airway   Mallampati: II  TM distance: Normal  Neck ROM: Normal ROM  Dental    (+) Intact    Pulmonary    Cardiovascular   (+) hypertension    ECG reviewed  Rate: Normal    Neuro/Psych    (+) neuromuscular disease    GI/Hepatic/Renal    (+) chronic renal disease, bowel prep    Endo/Other    (+) hypothyroidism, arthritis  Abdominal                     ALLERGIES:     Review of patient's allergies indicates:   Allergen Reactions    Voltaren [diclofenac sodium] Other (See Comments)     Hematochezia and hospitalization for hematochezia.    Codeine Diarrhea and Hallucinations    Niacin preparations      Redness, warming     MEDICATIONS:     Current Outpatient Medications on File Prior to Encounter   Medication Sig Dispense Refill Last Dose    acetaminophen (TYLENOL) 500 MG tablet Take 2 tablets (1,000 mg total) by mouth every 8 (eight) hours as needed for Pain. 54 tablet 0 Past Week    acetaminophen (TYLENOL) 650 MG TbSR Take 1 tablet (650 mg total) by mouth every 8 (eight) hours. 120 tablet 0 Past Week    amoxicillin (AMOXIL) 500 MG capsule Take 4 capsules (2,000 mg total) by mouth On call Procedure (take 1 hour prior to dental appt). 4 capsule 1 Past Week    ascorbic acid, vitamin C, (VITAMIN C) 100 MG tablet Take  100 mg by mouth once daily.   Past Week    cholecalciferol, vitamin D3, (VITAMIN D3) 50 mcg (2,000 unit) Tab Take 1 tablet by mouth once daily.   Past Week    co-enzyme Q-10 30 mg capsule Take 30 mg by mouth once daily.   Past Week    cyanocobalamin (VITAMIN B-12) 250 MCG tablet Take 250 mcg by mouth once daily.   Past Week    hydroCHLOROthiazide (HYDRODIURIL) 25 MG tablet Take 0.5 tablets (12.5 mg total) by mouth once daily. 15 tablet 11 Past Week    ketoconazole (NIZORAL) 2 % cream Apply topically once daily. Apply daily to affected toenails for 6-12 months 60 g 4 Past Week    LACTOBACILLUS COMBINATION NO.8 (ADULT PROBIOTIC ORAL) Take by mouth once daily.    Past Week    levothyroxine (SYNTHROID) 112 MCG tablet Take 1 tablet (112 mcg total) by mouth before breakfast. 90 tablet 3 11/6/2023    losartan (COZAAR) 100 MG tablet Take 1 tablet (100 mg total) by mouth once daily. 90 tablet 3 Past Week    jy-dd-ne7-dha-epa-fish-lut-carlos (OCUVITE ADULT 50 PLUS) 250 mg (90 mg-160 mg) Cap Take by mouth.   Past Week    oxyCODONE (ROXICODONE) 5 MG immediate release tablet Take 1 tablet (5 mg total) by mouth every 4 (four) hours as needed for Pain. 30 tablet 0 Past Month    turmeric root extract 500 mg Cap Take by mouth once daily.    Past Week    aspirin (ECOTRIN) 81 MG EC tablet Take 1 tablet (81 mg total) by mouth 2 (two) times a day. 60 tablet 0     famotidine (PEPCID) 20 MG tablet Take 1 tablet (20 mg total) by mouth 2 (two) times daily. for 21 days 42 tablet 0     fenofibrate 160 MG Tab TAKE 1 TABLET (160 MG TOTAL) BY MOUTH ONCE DAILY. 90 tablet 3      Current Facility-Administered Medications   Medication Dose Route Frequency Provider Last Rate Last Admin    0.9%  NaCl infusion   Intravenous Continuous Artur Monet MD              History:   There are no hospital problems to display for this patient.    Patient Active Problem List   Diagnosis    PCO (posterior capsular opacification), right    Mixed hyperlipidemia     Sensorineural hearing loss, bilateral    Paget's disease of bone    Obesity (BMI 30.0-34.9)    Osteoarthritis of lumbar spine    Hypothyroidism, postsurgical    Lipoma of arm    Essential hypertension    Prediabetes    Facet arthritis of lumbar region    Aortic atherosclerosis    Ductal carcinoma in situ (DCIS) of right breast    Pseudophakia    Nephrolithiasis    BPPV (benign paroxysmal positional vertigo)    Diverticulitis of large intestine without perforation or abscess with bleeding    Abnormal CT of the head    Gastric nodule    History of GI bleed    Other dietary vitamin B12 deficiency anemia    Left carpal tunnel syndrome    Cervical radiculopathy at C7    Spondylosis of lumbar region without myelopathy or radiculopathy    Chronic right shoulder pain    Nontraumatic complete tear of right rotator cuff    Rotator cuff arthropathy of right shoulder    Spinal stenosis of lumbar region with neurogenic claudication    DDD (degenerative disc disease), lumbosacral    Lumbosacral radiculopathy    Personal history of skin cancer    Hip pain    Calcified granuloma of lung    IGTN (ingrowing toe nail)    Adenomatous polyp of colon    Primary osteoarthritis of right hip    Right upper quadrant abdominal tenderness    S/P right total hip arthroplasty      Past Medical History:   Diagnosis Date    Acute blood loss anemia 12/07/2019    After-cataract of both eyes - Both Eyes 09/26/2012    Arthritis of foot 07/11/2014    right subtalar     Cholelithiasis     Deaf, left     Diverticulitis of large intestine without perforation or abscess with bleeding 12/07/2019    Diverticulosis     Ductal carcinoma in situ (DCIS) of right breast 07/15/2019    Encounter for blood transfusion     Essential hypertension 02/28/2018    Gastric nodule     GI bleed     Hearing loss in right ear     60% hearing loss    Hematochezia 12/15/2019    12- GI was consulted and she underwent endoscopy 12/7 with normal esophagus, erythematous  mucosa in the pylorus (biopsied), normal duodenum, 10mm lesion at incisura (biopsied). She subsequently underwent colonoscopy 12/9 and several large diverticula in the sigmoid colon was seen with hematin throughout the colon; blood pooling also noted in the sigmoid colon, during which clip was placed f    Hematochezia     Hypothyroidism, postsurgical 07/01/2015    Mixed hyperlipidemia 09/27/2012    Multinodular goiter 07/31/2014    Paget's disease of bone 07/10/2013    SCC (squamous cell carcinoma) 12/2020    left 5th knuckle    Squamous Cell Carcinoma     in situ right neck      Past Surgical History:   Procedure Laterality Date    ARTHROPLASTY OF HIP BY ANTERIOR APPROACH Right 7/5/2023    Procedure: ARTHROPLASTY, HIP, TOTAL, ANTERIOR APPROACH;  Surgeon: Federico Ramirez MD;  Location: [facility] 2ND FLR;  Service: Orthopedics;  Laterality: Right;    BREAST BIOPSY Right 2019    BREAST LUMPECTOMY Right 2019    CARPAL TUNNEL RELEASE Left 6/5/2020    Procedure: RELEASE, CARPAL TUNNEL Left;  Surgeon: Pricila Presley MD;  Location: [facility] OR;  Service: Orthopedics;  Laterality: Left;    CARPAL TUNNEL RELEASE Right 12/5/2022    Procedure: RELEASE, CARPAL TUNNEL, right;  Surgeon: Pricila Presley MD;  Location: [facility] OR;  Service: Orthopedics;  Laterality: Right;    CATARACT EXTRACTION W/  INTRAOCULAR LENS IMPLANT Bilateral     COLONOSCOPY N/A 4/15/2019    Procedure: COLONOSCOPY;  Surgeon: Artur Monet MD;  Location: [facility] (4TH FLR);  Service: Endoscopy;  Laterality: N/A;  within 1 month    COLONOSCOPY N/A 12/9/2019    Procedure: COLONOSCOPY;  Surgeon: Clyde Welch MD;  Location: [facility] (2ND FLR);  Service: Endoscopy;  Laterality: N/A;    COLONOSCOPY N/A 2/13/2023    Procedure: COLONOSCOPY;  Surgeon: Johan Hoyos MD;  Location: [facility] ENDO (4TH FLR);  Service: Endoscopy;  Laterality: N/A;  Okay for any CRS MD if Dr. Monet booked. but hopeful to get this done about 4 weeks from now  1/10 - pt  called about time change and MD change. patient verbalized understanding and new instructions sent - sm  Precall complete- [place]    ENDOSCOPIC ULTRASOUND OF UPPER GASTROINTESTINAL TRACT N/A 1/22/2020    Procedure: ULTRASOUND, UPPER GI TRACT, ENDOSCOPIC;  Surgeon: Eleazar Shaffer MD;  Location: [facility] (2ND FLR);  Service: Endoscopy;  Laterality: N/A;  EUS for 10mm SML w/negative pillow sign and negative naked fat sign which was found at the incisura.  Dr Welch    EPIDURAL STEROID INJECTION Bilateral 12/17/2021    Procedure: INJECTION, STEROID, EPIDURAL, L3-L4 Bilateral DIRECT REF Transforaminal;  Surgeon: Julian Fish MD;  Location: [facility] PAIN MGT;  Service: Pain Management;  Laterality: Bilateral;    ESOPHAGOGASTRODUODENOSCOPY N/A 12/7/2019    Procedure: EGD (ESOPHAGOGASTRODUODENOSCOPY);  Surgeon: Clyde Welch MD;  Location: [facility] (2ND FLR);  Service: Endoscopy;  Laterality: N/A;    EXCISIONAL BIOPSY Right 6/27/2019    Procedure: EXCISIONAL BIOPSY-breast;  Surgeon: Suki Dill MD;  [address]  Service: General;  Laterality: Right;    EYE SURGERY      HYSTERECTOMY      INJECTION Right 3/3/2023    Procedure: INJECTION, RIGHT HIP CORTICOSTEROID /LOCAL INJECTION CONTRAST DIRECT REF;  Surgeon: Julian Fish MD;  Location: [facility] PAIN T;  Service: Pain Management;  Laterality: Right;    INJECTION FOR SENTINEL NODE IDENTIFICATION Right 7/30/2020    Procedure: INJECTION, FOR SENTINEL NODE IDENTIFICATION;  Surgeon: Suki Dill MD;  Location: [facility] OR;  Service: General;  Laterality: Right;    INJECTION OF ANESTHETIC AGENT AROUND MEDIAL BRANCH NERVES INNERVATING LUMBAR FACET JOINT Bilateral 6/7/2019    Procedure: BLOCK, NERVE, FACET JOINT, LUMBAR, MEDIAL BRANCH-deo MBB L4/5,L5/S1;  Surgeon: Jarvis Morales III, MD;  Location: [facility] CATH LAB;  Service: Pain Management;  Laterality: Bilateral;    INJECTION OF STEROID Right 6/5/2020    Procedure: INJECTION, STEROID right carpal tunel  "injection/ Right ring trigger finger injection;  Surgeon: Pricila Presley MD;  Location: [facility] OR;  Service: Orthopedics;  Laterality: Right;  INJECTION, STEROID right carpal tunel injection/ Right ring trigger finger injection    KNEE ARTHROSCOPY N/A     pt unsure of which knee     MASTECTOMY      OOPHORECTOMY      SENTINEL LYMPH NODE BIOPSY Right 7/30/2020    Procedure: BIOPSY, LYMPH NODE, SENTINEL;  Surgeon: Suki Dill MD;  Location: [facility] OR;  Service: General;  Laterality: Right;    SPINE SURGERY      TOTAL THYROIDECTOMY      TRANSFORAMINAL EPIDURAL INJECTION OF STEROID Bilateral 5/27/2022    Procedure: INJECTION, STEROID, EPIDURAL, TF BILATERAL L3-L4 CONTRAST DIRECT REF;  Surgeon: Julian Fish MD;  Location: [facility] PAIN MGT;  Service: Pain Management;  Laterality: Bilateral;    TRANSFORAMINAL EPIDURAL INJECTION OF STEROID Bilateral 10/21/2022    Procedure: LUMBAR TRANSFORAMINAL BILATERAL L3/4 CONTRAST DIRECT REFERRAL;  Surgeon: Julian Fish MD;  Location: [facility] PAIN MGT;  Service: Pain Management;  Laterality: Bilateral;    UNILATERAL MASTECTOMY Right 7/30/2020    Procedure: MASTECTOMY, UNILATERAL;  Surgeon: Suki Dill MD;  Location: [facility] OR;  Service: General;  Laterality: Right;    YAG Laser Capsulotomy  Left 07/29/2019 01/06/2021 OD//Dr. Hahn     Alcohol use:    Social History     Substance and Sexual Activity   Alcohol Use Not Currently          OBJECTIVE:   Last 3 sets of Vitals        9/27/2023     2:01 PM 10/5/2023     3:15 PM 11/6/2023    10:02 AM   Vitals - 1 value per visit   SYSTOLIC   134   DIASTOLIC   78   Pulse   90   Temp   36.3 °C (97.4 °F)   Resp   15   SPO2   94 %   Weight (lb) 169 174.6 175   Weight (kg) 76.658 79.2 79.379   Height 5' 2" (1.575 m) 5' 2" (1.575 m) 5' 2" (1.575 m)   BMI (Calculated) 30.9 31.9 32   Pain Score  Three        Vital Signs (Most Recent):  Temp: 36.3 °C (97.4 °F) (11/06/23 1002)  Pulse: 90 (11/06/23 1002)  Resp: 15 (11/06/23 1002)  BP: " "134/78 (11/06/23 1002)  SpO2: (!) 94 % (11/06/23 1002) Vital Signs Range (Last 24H):  Temp:  [36.3 °C (97.4 °F)]   Pulse:  [90]   Resp:  [15]   BP: (134)/(78)   SpO2:  [94 %]      No intake or output data in the 24 hours ending 11/06/23 1008    Body mass index is 32.01 kg/m².     Significant Labs:  Lab Results   Component Value Date    WBC 5.83 06/13/2023    HGB 10.6 (L) 07/06/2023    HCT 38.1 06/13/2023     06/13/2023    CHOL 157 07/18/2022    TRIG 99 07/18/2022    HDL 41 07/18/2022    ALT 27 06/13/2023    AST 26 06/13/2023     06/13/2023    K 4.1 06/13/2023     (H) 06/13/2023    CREATININE 0.6 06/13/2023    BUN 12 06/13/2023    CO2 24 06/13/2023    TSH 0.035 (L) 05/17/2023    INR 1.0 06/13/2023    GLUF 100 02/12/2004    HGBA1C 5.4 05/17/2023     No results found for: "PREGTESTUR", "PREGSERUM", "HCG", "HCGQUANT"   No LMP recorded (lmp unknown). Patient has had a hysterectomy.    EKG:   Results for orders placed or performed during the hospital encounter of 06/13/23   EKG 12-lead    Collection Time: 06/13/23  5:09 PM    Narrative    Test Reason : Z01.818,    Vent. Rate : 066 BPM     Atrial Rate : 066 BPM     P-R Int : 164 ms          QRS Dur : 082 ms      QT Int : 378 ms       P-R-T Axes : 047 005 032 degrees     QTc Int : 396 ms    Normal sinus rhythm  Voltage criteria for left ventricular hypertrophy  Abnormal ECG  When compared with ECG of 10-JUL-2020 11:35,  Premature atrial complexes are no longer Present  Confirmed by ADELITA DE LA GARZA MD ([id number]) on 6/14/2023 11:28:10 AM    Referred By: SHAYNA KING           Confirmed By:ADELITA DE LA GARZA MD       TTE:  No results found. However, due to the size of the patient record, not all encounters were searched. Please check Results Review for a complete set of results.  No results found for: "EF"  No results found. However, due to the size of the patient record, not all encounters were searched. Please check Results Review for a complete set of " "results.  ERIC:  No results found. However, due to the size of the patient record, not all encounters were searched. Please check Results Review for a complete set of results.  Stress Test:  No results found for this or any previous visit.     [facility]:  Results for orders placed during the hospital encounter of 06/07/19    Pain Management    Narrative  Procedure performed in the Invasive Lab  - See Procedure Log link below for nursing documentation  - See OpNote on Surgeries Tab for physician findings     PFT:  No results found for: "FEV1", "FVC", "VWF3AJH", "TLC", "DLCO"     ASSESSMENT/PLAN:                                                                                                                11/06/2023  Jo-Ann Escobedo is a 82 y.o., female.      Pre-op Assessment    I have reviewed the Patient Summary Reports.     I have reviewed the Nursing Notes. I have reviewed the NPO Status.   I have reviewed the Medications.     Review of Systems  Anesthesia Hx:  No problems with previous Anesthesia                Social:  Non-Smoker, Social Alcohol Use       Hematology/Oncology:  Hematology Normal   Oncology Normal                                   EENT/Dental:  EENT/Dental Normal           Cardiovascular:     Hypertension              ECG has been reviewed.                          Pulmonary:  Pulmonary Normal                       Renal/:  Chronic Renal Disease                Hepatic/GI:  Hepatic/GI Normal Bowel Prep.                Musculoskeletal:  Arthritis               Neurological:    Neuromuscular Disease,                                   Endocrine:   Hypothyroidism          Dermatological:  Skin Normal    Psych:  Psychiatric Normal                    Physical Exam  General: Well nourished, Cooperative, Alert and Oriented    Airway:  Mallampati: II   Mouth Opening: Normal  TM Distance: Normal  Tongue: Normal  Neck ROM: Normal ROM    Dental:  Intact    Chest/Lungs:  Clear to auscultation, Normal Respiratory " Rate    Heart:  Rate: Normal  Rhythm: Regular Rhythm  Sounds: Normal        Anesthesia Plan  Type of Anesthesia, risks & benefits discussed:    Anesthesia Type: Gen Natural Airway  Intra-op Monitoring Plan: Standard ASA Monitors  Induction:  IV  Informed Consent: Informed consent signed with the Patient and all parties understand the risks and agree with anesthesia plan.  All questions answered.   ASA Score: 2  Day of Surgery Review of History & Physical: H&P Update referred to the surgeon/provider.    Ready For Surgery From Anesthesia Perspective.     .

## 2023-11-06 NOTE — ANESTHESIA RELEASE NOTE
"Anesthesia Release from PACU Note    Patient: Jo-Ann Escobedo    Procedure(s) Performed: Procedure(s) (LRB):  COLONOSCOPY (N/A)    Anesthesia type: general    Post pain: Adequate analgesia    Post assessment: no apparent anesthetic complications and tolerated procedure well    Last Vitals:   Visit Vitals  BP (!) 153/75   Pulse 76   Temp 36.7 °C (98 °F)   Resp 17   Ht 5' 2" (1.575 m)   Wt 79.4 kg (175 lb)   LMP  (LMP Unknown)   SpO2 96%   Breastfeeding No   BMI 32.01 kg/m²       Post vital signs: stable    Level of consciousness: awake and alert     Nausea/Vomiting: no nausea/no vomiting    Complications: none    Airway Patency: patent    Respiratory: unassisted, spontaneous ventilation, room air    Cardiovascular: stable and blood pressure at baseline    Hydration: euvolemic  "

## 2023-11-06 NOTE — PROVATION PATIENT INSTRUCTIONS
Discharge Summary/Instructions after an Endoscopic Procedure  Patient Name: Jo-Ann Orr  Patient MRN: [id number]  Patient YOB: 1940 Monday, November 6, 2023  Artur Monet MD  Dear patient,  As a result of recent federal legislation (The Federal Cures Act), you may   receive lab or pathology results from your procedure in your [facility]   account before your physician is able to contact you. Your physician or   their representative will relay the results to you with their   recommendations at their soonest availability.  Thank you,  RESTRICTIONS:  During your procedure today, you received medications for sedation.  These   medications may affect your judgment, balance and coordination.  Therefore,   for 24 hours, you have the following restrictions:   - DO NOT drive a car, operate machinery, make legal/financial decisions,   sign important papers or drink alcohol.    ACTIVITY:  Today: no heavy lifting, straining or running due to procedural   sedation/anesthesia.  The following day: return to full activity including work.  DIET:  Eat and drink normally unless instructed otherwise.     TREATMENT FOR COMMON SIDE EFFECTS:  - Mild abdominal pain, nausea, belching, bloating or excessive gas:  rest,   eat lightly and use a heating pad.  - Sore Throat: treat with throat lozenges and/or gargle with warm salt   water.  - Because air was used during the procedure, expelling large amounts of air   from your rectum or belching is normal.  - If a bowel prep was taken, you may not have a bowel movement for 1-3 days.    This is normal.  SYMPTOMS TO WATCH FOR AND REPORT TO YOUR PHYSICIAN:  1. Abdominal pain or bloating, other than gas cramps.  2. Chest pain.  3. Back pain.  4. Signs of infection such as: chills or fever occurring within 24 hours   after the procedure.  5. Rectal bleeding, which would show as bright red, maroon, or black stools.   (A tablespoon of blood from the rectum is not serious, especially if    hemorrhoids are present.)  6. Vomiting.  7. Weakness or dizziness.  GO DIRECTLY TO THE NEAREST EMERGENCY ROOM IF YOU HAVE ANY OF THE FOLLOWING:      Difficulty breathing              Chills and/or fever over 101 F   Persistent vomiting and/or vomiting blood   Severe abdominal pain   Severe chest pain   Black, tarry stools   Bleeding- more than one tablespoon   Any other symptom or condition that you feel may need urgent attention  Your doctor recommends these additional instructions:  If any biopsies were taken, your doctors clinic will contact you in 1 to 2   weeks with any results.  - Discharge patient to home (ambulatory).   - Patient has a contact number available for emergencies.  The signs and   symptoms of potential delayed complications were discussed with the   patient.  Return to normal activities tomorrow.  Written discharge   instructions were provided to the patient.   - Resume previous diet.   - Continue present medications.   - Return to primary care physician as previously scheduled.   - Repeat colonoscopy date to be determined after pending pathology results   are reviewed for surveillance.  For questions, problems or results please call your physician - Artur Monet MD at Work:  [phone].  [facility], EMERGENCY ROOM PHONE NUMBER: [phone]  IF A COMPLICATION OR EMERGENCY SITUATION ARISES AND YOU ARE UNABLE TO REACH   YOUR PHYSICIAN - GO DIRECTLY TO THE EMERGENCY ROOM.  Artur Monet MD  11/6/2023 12:36:17 PM  This report has been verified and signed electronically.  Dear patient,  As a result of recent federal legislation (The Federal Cures Act), you may   receive lab or pathology results from your procedure in your [facility]   account before your physician is able to contact you. Your physician or   their representative will relay the results to you with their   recommendations at their soonest availability.  Thank you,  PROVATION

## 2023-11-06 NOTE — ANESTHESIA POSTPROCEDURE EVALUATION
Anesthesia Post Evaluation    Patient: Jo-Ann Escobedo    Procedure(s) Performed: Procedure(s) (LRB):  COLONOSCOPY (N/A)    Final Anesthesia Type: general      Patient location during evaluation: GI PACU  Patient participation: Yes- Able to Participate  Level of consciousness: awake and alert  Post-procedure vital signs: reviewed and stable  Pain management: adequate  Airway patency: patent    PONV status at discharge: No PONV  Anesthetic complications: no      Cardiovascular status: hemodynamically stable  Respiratory status: unassisted, spontaneous ventilation and room air  Hydration status: euvolemic  Follow-up not needed.          Vitals Value Taken Time   /75 11/06/23 1305   Temp 36.7 °C (98 °F) 11/06/23 1235   Pulse 76 11/06/23 1305   Resp 17 11/06/23 1305   SpO2 96 % 11/06/23 1305         Event Time   Out of Recovery 13:17:22         Pain/Parris Score: Parris Score: 10 (11/6/2023  1:05 PM)

## 2023-11-06 NOTE — H&P
Colonoscopy History and Physical      Procedure : Colonoscopy    Indications:  Screening. Last colonoscopy with large polyps  Pathology was all fragments of tubular adenoma.    Family Hx of CRC: negative    Last Colonoscopy:  2/13/2023 (Romain Prado MD)  - One 19 mm polyp in the cecum, removed with a cold snare. Resected and retrieved.    - One 20 mm polyp in the cecum, removed with a cold snare.Resected and retrieved.   - One 30 mm polyp in the ascending colon, removed with a cold snare. Resected and retrieved.   - One 10 mm polyp at 90 cm proximal to the anus, removed with a cold snare. Resected and retrieved.   - One 20 mm polyp at 90 cm proximal to the anus, removed with a cold snare. Resected and retrieved.    - One 20 mm polyp in the sigmoid colon, removed with a cold snare. Resected and retrieved.       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 She subsequently underwent colonoscopy 12/9 and several large diverticula in the sigmoid colon was seen with hematin throughout the colon; blood pooling also noted in the sigmoid colon, during which clip was placed f    Hematochezia     Hypothyroidism, postsurgical 07/01/2015    Mixed hyperlipidemia 09/27/2012    Multinodular goiter 07/31/2014    Paget's disease  of bone 07/10/2013    SCC (squamous cell carcinoma) 2020    left 5th knuckle    Squamous Cell Carcinoma     in situ right neck        Family History   Problem Relation Age of Onset    Osteoarthritis Mother     Dementia Mother     Cancer Father         lung    Glaucoma Brother     Macular degeneration Brother     No Known Problems Daughter     No Known Problems Son     Diabetes Neg Hx     Amblyopia Neg Hx     Blindness Neg Hx     Cataracts Neg Hx     Retinal detachment Neg Hx     Strabismus Neg Hx     Melanoma Neg Hx        Social History     Socioeconomic History    Marital status:    Occupational History    Occupation: realtor     Employer: [employer]     Employer: Company   Tobacco Use    Smoking status: Former     Current packs/day: 0.00     Types: Cigarettes     Quit date: 1969     Years since quittin.8    Smokeless tobacco: Never   Substance and Sexual Activity    Alcohol use: Not Currently    Drug use: No    Sexual activity: Not Currently   Other Topics Concern    Are you pregnant or think you may be? No    Breast-feeding No     Social Determinants of Health     Financial Resource Strain: Low Risk  (2023)    Overall Financial Resource Strain (CARDIA)     Difficulty of Paying Living Expenses: Not hard at all   Food Insecurity: No Food Insecurity (10/24/2023)    Hunger Vital Sign     Worried About Running Out of Food in the Last Year: Never true     Ran Out of Food in the Last Year: Never true   Transportation Needs: No Transportation Needs (10/24/2023)    PRAPARE - Transportation     Lack of Transportation (Medical): No     Lack of Transportation (Non-Medical): No   Physical Activity: Sufficiently Active (10/24/2023)    Exercise Vital Sign     Days of Exercise per Week: 7 days     Minutes of Exercise per Session: 30 min   Recent Concern: Physical Activity - Insufficiently Active (2023)    Exercise Vital Sign     Days of Exercise per Week: 7 days     Minutes of Exercise  per Session: 20 min   Stress: No Stress Concern Present (10/24/2023)    [employer] [place] [employer] - Occupational Stress Questionnaire     Feeling of Stress : Only a little   Social Connections: Moderately Isolated (10/24/2023)    Social Connection and Isolation Panel [NHANES]     Frequency of Communication with Friends and Family: More than three times a week     Frequency of Social Gatherings with Friends and Family: Three times a week     Attends Cheondo[employer] Services: Never     Active Member of Clubs or Organizations: Yes     Attends Club or Organization Meetings: More than 4 times per year     Marital Status:    Housing Stability: Low Risk  (10/24/2023)    Housing Stability Vital Sign     Unable to Pay for Housing in the Last Year: No     Number of Places Lived in the Last Year: 1     Unstable Housing in the Last Year: No       Review of patient's allergies indicates:   Allergen Reactions    Voltaren [diclofenac sodium] Other (See Comments)     Hematochezia and hospitalization for hematochezia.    Codeine Diarrhea and Hallucinations    Niacin preparations      Redness, warming       No current facility-administered medications on file prior to encounter.     Current Outpatient Medications on File Prior to Encounter   Medication Sig Dispense Refill    acetaminophen (TYLENOL) 500 MG tablet Take 2 tablets (1,000 mg total) by mouth every 8 (eight) hours as needed for Pain. 54 tablet 0    acetaminophen (TYLENOL) 650 MG TbSR Take 1 tablet (650 mg total) by mouth every 8 (eight) hours. 120 tablet 0    amoxicillin (AMOXIL) 500 MG capsule Take 4 capsules (2,000 mg total) by mouth On call Procedure (take 1 hour prior to dental appt). 4 capsule 1    ascorbic acid, vitamin C, (VITAMIN C) 100 MG tablet Take 100 mg by mouth once daily.      cholecalciferol, vitamin D3, (VITAMIN D3) 50 mcg (2,000 unit) Tab Take 1 tablet by mouth once daily.      co-enzyme Q-10 30 mg capsule Take 30 mg by mouth once daily.       cyanocobalamin (VITAMIN B-12) 250 MCG tablet Take 250 mcg by mouth once daily.      hydroCHLOROthiazide (HYDRODIURIL) 25 MG tablet Take 0.5 tablets (12.5 mg total) by mouth once daily. 15 tablet 11    ketoconazole (NIZORAL) 2 % cream Apply topically once daily. Apply daily to affected toenails for 6-12 months 60 g 4    LACTOBACILLUS COMBINATION NO.8 (ADULT PROBIOTIC ORAL) Take by mouth once daily.       levothyroxine (SYNTHROID) 112 MCG tablet Take 1 tablet (112 mcg total) by mouth before breakfast. 90 tablet 3    losartan (COZAAR) 100 MG tablet Take 1 tablet (100 mg total) by mouth once daily. 90 tablet 3    ee-sj-fg9-dha-epa-fish-lut-carlos (OCUVITE ADULT 50 PLUS) 250 mg (90 mg-160 mg) Cap Take by mouth.      oxyCODONE (ROXICODONE) 5 MG immediate release tablet Take 1 tablet (5 mg total) by mouth every 4 (four) hours as needed for Pain. 30 tablet 0    turmeric root extract 500 mg Cap Take by mouth once daily.       aspirin (ECOTRIN) 81 MG EC tablet Take 1 tablet (81 mg total) by mouth 2 (two) times a day. 60 tablet 0    famotidine (PEPCID) 20 MG tablet Take 1 tablet (20 mg total) by mouth 2 (two) times daily. for 21 days 42 tablet 0    fenofibrate 160 MG Tab TAKE 1 TABLET (160 MG TOTAL) BY MOUTH ONCE DAILY. 90 tablet 3       Review of Systems -   Respiratory ROS: negative  Cardiovascular ROS: negative  Gastrointestinal ROS: negative  Musculoskeletal ROS: negative  Neurological ROS: negative        Physical Exam:  General: no distress  Head: normocephalic  Lungs:  normal respiratory effort  Heart: regular rate  Abdomen: soft,  Non-tender  Extremities: warm and well perfused       Deep Sedation: Mallampati Score per anesthesia    ASA: III      Patient cleared for Anesthesia:  MAC    Anesthesia/Surgery risks, benefits, and alternative options discussed and understood by patient/family.    Michael Infante MD  General Surgery PGY-1

## 2023-11-09 LAB
FINAL PATHOLOGIC DIAGNOSIS: NORMAL
GROSS: NORMAL
Lab: NORMAL

## 2023-12-04 ENCOUNTER — PATIENT MESSAGE (OUTPATIENT)
Dept: ADMINISTRATIVE | Facility: OTHER | Age: 83
End: 2023-12-04
Payer: MEDICARE

## 2023-12-12 ENCOUNTER — OFFICE VISIT (OUTPATIENT)
Dept: DERMATOLOGY | Facility: CLINIC | Age: 83
End: 2023-12-12
Payer: MEDICARE

## 2023-12-12 DIAGNOSIS — L82.1 SK (SEBORRHEIC KERATOSIS): ICD-10-CM

## 2023-12-12 DIAGNOSIS — L82.0 INFLAMED SEBORRHEIC KERATOSIS: Primary | ICD-10-CM

## 2023-12-12 PROCEDURE — 1160F RVW MEDS BY RX/DR IN RCRD: CPT | Mod: CPTII,S$GLB,, | Performed by: DERMATOLOGY

## 2023-12-12 PROCEDURE — 99999 PR PBB SHADOW E&M-EST. PATIENT-LVL III: ICD-10-PCS | Mod: PBBFAC,,, | Performed by: DERMATOLOGY

## 2023-12-12 PROCEDURE — 1157F PR ADVANCE CARE PLAN OR EQUIV PRESENT IN MEDICAL RECORD: ICD-10-PCS | Mod: CPTII,S$GLB,, | Performed by: DERMATOLOGY

## 2023-12-12 PROCEDURE — 99212 OFFICE O/P EST SF 10 MIN: CPT | Mod: 25,S$GLB,, | Performed by: DERMATOLOGY

## 2023-12-12 PROCEDURE — 17110 PR DESTRUCTION BENIGN LESIONS UP TO 14: ICD-10-PCS | Mod: S$GLB,,, | Performed by: DERMATOLOGY

## 2023-12-12 PROCEDURE — 1159F MED LIST DOCD IN RCRD: CPT | Mod: CPTII,S$GLB,, | Performed by: DERMATOLOGY

## 2023-12-12 PROCEDURE — 99999 PR PBB SHADOW E&M-EST. PATIENT-LVL III: CPT | Mod: PBBFAC,,, | Performed by: DERMATOLOGY

## 2023-12-12 PROCEDURE — 1157F ADVNC CARE PLAN IN RCRD: CPT | Mod: CPTII,S$GLB,, | Performed by: DERMATOLOGY

## 2023-12-12 PROCEDURE — 17110 DESTRUCTION B9 LES UP TO 14: CPT | Mod: S$GLB,,, | Performed by: DERMATOLOGY

## 2023-12-12 PROCEDURE — 1160F PR REVIEW ALL MEDS BY PRESCRIBER/CLIN PHARMACIST DOCUMENTED: ICD-10-PCS | Mod: CPTII,S$GLB,, | Performed by: DERMATOLOGY

## 2023-12-12 PROCEDURE — 1159F PR MEDICATION LIST DOCUMENTED IN MEDICAL RECORD: ICD-10-PCS | Mod: CPTII,S$GLB,, | Performed by: DERMATOLOGY

## 2023-12-12 PROCEDURE — 99212 PR OFFICE/OUTPT VISIT, EST, LEVL II, 10-19 MIN: ICD-10-PCS | Mod: 25,S$GLB,, | Performed by: DERMATOLOGY

## 2023-12-12 NOTE — PROGRESS NOTES
Subjective:      Patient ID:  Jo-Ann Escobedo is a 83 y.o. female who presents for   Chief Complaint   Patient presents with    Lesion     Ms. Escobedo is a 83F with PMHx of HTN, hypothyroidism, SCC of left knuckle (s/p MMS with Dr. Jorge on 1/19/21) here for acute derm visit. Today, she reports having itchy and bothersome lesion on right front of her hairline. She thinks it was previously frozen off by Dr. Pacheco but came back. It does bleed when she scratches it or does hair. She has another new skin spot on her shoulder she noticed. No other changing, painful, concerning lesions today.             Review of Systems   Skin:  Positive for itching.       Objective:   Physical Exam   Neurological: She is alert and oriented to person, place, and time.   Skin:                    Diagram Legend     Erythematous scaling macule/papule c/w actinic keratosis       Vascular papule c/w angioma      Pigmented verrucoid papule/plaque c/w seborrheic keratosis      Yellow umbilicated papule c/w sebaceous hyperplasia      Irregularly shaped tan macule c/w lentigo     1-2 mm smooth white papules consistent with Milia      Movable subcutaneous cyst with punctum c/w epidermal inclusion cyst      Subcutaneous movable cyst c/w pilar cyst      Firm pink to brown papule c/w dermatofibroma      Pedunculated fleshy papule(s) c/w skin tag(s)      Evenly pigmented macule c/w junctional nevus     Mildly variegated pigmented, slightly irregular-bordered macule c/w mildly atypical nevus      Flesh colored to evenly pigmented papule c/w intradermal nevus       Pink pearly papule/plaque c/w basal cell carcinoma      Erythematous hyperkeratotic cursted plaque c/w SCC      Surgical scar with no sign of skin cancer recurrence      Open and closed comedones      Inflammatory papules and pustules      Verrucoid papule consistent consistent with wart     Erythematous eczematous patches and plaques     Dystrophic onycholytic nail with subungual debris c/w  onychomycosis     Umbilicated papule    Erythematous-base heme-crusted tan verrucoid plaque consistent with inflamed seborrheic keratosis     Erythematous Silvery Scaling Plaque c/w Psoriasis     See annotation      Assessment / Plan:        Inflamed seborrheic keratosis  Educated on benign etiology, but given irritation and inflamed nature of lesion, recommended treatment. Gave options of serial LN2 vs shave removal, but felt latter was more appropriate.     Cryosurgery procedure note:  Verbal consent from the patient is obtained including, but not limited to, risk of hypopigmentation/hyperpigmentation, scar, recurrence of lesion. Liquid nitrogen cryosurgery is applied to 1 lesions to produce a freeze injury. The patient is aware that blisters may form and is instructed on wound care with gentle cleansing and use of vaseline ointment to keep moist until healed. The patient is supplied a handout on cryosurgery and is instructed to call if lesions do not completely resolve.      SK (seborrheic keratosis)  Reassured on benign nature of lesion.           Follow up if symptoms worsen or fail to improve.

## 2023-12-12 NOTE — PROGRESS NOTES
I have seen the patient, reviewed the Resident's progress note. I have personally interviewed and examined the patient at bedside and agree with the findings.     Ana Reddy MD  [facility]

## 2023-12-21 ENCOUNTER — OFFICE VISIT (OUTPATIENT)
Dept: PODIATRY | Facility: CLINIC | Age: 83
End: 2023-12-21
Payer: MEDICARE

## 2023-12-21 VITALS
HEART RATE: 88 BPM | BODY MASS INDEX: 32.22 KG/M2 | WEIGHT: 175.06 LBS | HEIGHT: 62 IN | SYSTOLIC BLOOD PRESSURE: 125 MMHG | DIASTOLIC BLOOD PRESSURE: 74 MMHG

## 2023-12-21 DIAGNOSIS — B35.1 ONYCHOMYCOSIS DUE TO DERMATOPHYTE: Primary | ICD-10-CM

## 2023-12-21 PROCEDURE — 3074F PR MOST RECENT SYSTOLIC BLOOD PRESSURE < 130 MM HG: ICD-10-PCS | Mod: CPTII,S$GLB,, | Performed by: PODIATRIST

## 2023-12-21 PROCEDURE — 1157F PR ADVANCE CARE PLAN OR EQUIV PRESENT IN MEDICAL RECORD: ICD-10-PCS | Mod: CPTII,S$GLB,, | Performed by: PODIATRIST

## 2023-12-21 PROCEDURE — 3078F DIAST BP <80 MM HG: CPT | Mod: CPTII,S$GLB,, | Performed by: PODIATRIST

## 2023-12-21 PROCEDURE — 99213 PR OFFICE/OUTPT VISIT, EST, LEVL III, 20-29 MIN: ICD-10-PCS | Mod: S$GLB,,, | Performed by: PODIATRIST

## 2023-12-21 PROCEDURE — 99999 PR PBB SHADOW E&M-EST. PATIENT-LVL III: ICD-10-PCS | Mod: PBBFAC,,, | Performed by: PODIATRIST

## 2023-12-21 PROCEDURE — 3078F PR MOST RECENT DIASTOLIC BLOOD PRESSURE < 80 MM HG: ICD-10-PCS | Mod: CPTII,S$GLB,, | Performed by: PODIATRIST

## 2023-12-21 PROCEDURE — 1160F RVW MEDS BY RX/DR IN RCRD: CPT | Mod: CPTII,S$GLB,, | Performed by: PODIATRIST

## 2023-12-21 PROCEDURE — 1126F PR PAIN SEVERITY QUANTIFIED, NO PAIN PRESENT: ICD-10-PCS | Mod: CPTII,S$GLB,, | Performed by: PODIATRIST

## 2023-12-21 PROCEDURE — 99213 OFFICE O/P EST LOW 20 MIN: CPT | Mod: S$GLB,,, | Performed by: PODIATRIST

## 2023-12-21 PROCEDURE — 99999 PR PBB SHADOW E&M-EST. PATIENT-LVL III: CPT | Mod: PBBFAC,,, | Performed by: PODIATRIST

## 2023-12-21 PROCEDURE — 1157F ADVNC CARE PLAN IN RCRD: CPT | Mod: CPTII,S$GLB,, | Performed by: PODIATRIST

## 2023-12-21 PROCEDURE — 1126F AMNT PAIN NOTED NONE PRSNT: CPT | Mod: CPTII,S$GLB,, | Performed by: PODIATRIST

## 2023-12-21 PROCEDURE — 1159F PR MEDICATION LIST DOCUMENTED IN MEDICAL RECORD: ICD-10-PCS | Mod: CPTII,S$GLB,, | Performed by: PODIATRIST

## 2023-12-21 PROCEDURE — 1159F MED LIST DOCD IN RCRD: CPT | Mod: CPTII,S$GLB,, | Performed by: PODIATRIST

## 2023-12-21 PROCEDURE — 3074F SYST BP LT 130 MM HG: CPT | Mod: CPTII,S$GLB,, | Performed by: PODIATRIST

## 2023-12-21 PROCEDURE — 1160F PR REVIEW ALL MEDS BY PRESCRIBER/CLIN PHARMACIST DOCUMENTED: ICD-10-PCS | Mod: CPTII,S$GLB,, | Performed by: PODIATRIST

## 2023-12-21 NOTE — PROGRESS NOTES
"Subjective:     Patient ID: Jo-Ann Escobedo is a 83 y.o. female.    Chief Complaint: Foot Pain (Left great toe pain)    Jo-Ann is a 83 y.o. female who returns to clinic for follow up of thick and discolored toenails (great toe and 2nd toe) on the left foot. Jo-Ann has been using topical ketoconazole for many months with little to no improvement. Nails do not hurt but are discolored and loose. Here for further discussion/recommendations. Leaning towards stopping all treatment as they do not bother her much.     Vitals:    12/21/23 1127   BP: 125/74   Pulse: 88   Weight: 79.4 kg (175 lb 0.7 oz)   Height: 5' 2" (1.575 m)   PainSc: 0-No pain         Review of Systems   Constitutional: Negative for chills and fever.   Cardiovascular:  Negative for chest pain, claudication and leg swelling.   Respiratory:  Negative for cough and shortness of breath.    Skin:  Positive for nail changes. Negative for dry skin.   Musculoskeletal: Negative.    Gastrointestinal:  Negative for nausea and vomiting.   Neurological:  Negative for numbness and paresthesias.   Psychiatric/Behavioral:  Negative for altered mental status.         Objective:     Physical Exam  Vitals reviewed.   Constitutional:       Appearance: She is well-developed.   HENT:      Head: Normocephalic.   Cardiovascular:      Pulses:           Dorsalis pedis pulses are 2+ on the right side and 2+ on the left side.        Posterior tibial pulses are 2+ on the right side and 2+ on the left side.   Pulmonary:      Effort: No respiratory distress.   Musculoskeletal:      Right lower leg: No edema.      Left lower leg: No edema.      Comments: No pain with palpation of L hallux and 2nd toenails. Otherwise rectus foot and toe position b/l without any major deformities.    Skin:     General: Skin is warm and dry.      Findings: No erythema.      Comments: L hallux and 2nd toenails thickened with yellow/brown discoloration and subungual debris with 50-70% lysis of distal aspect " with proximal attachment. No erythema, bleeding, drainage or SOI. No proximal streaking or lymphangitis noted to dorsum of foot. Otherwise normal integumentary exam to foot.    Neurological:      Mental Status: She is alert and oriented to person, place, and time.      Sensory: No sensory deficit.   Psychiatric:         Behavior: Behavior normal.         Thought Content: Thought content normal.         Judgment: Judgment normal.             Assessment:      Encounter Diagnosis   Name Primary?    Onychomycosis due to dermatophyte Yes       Plan:     Jo-Ann was seen today for foot pain.    Diagnoses and all orders for this visit:    Onychomycosis due to dermatophyte        I counseled the patient on her conditions, their implications and medical management.    Discussed treatment options including topical, oral, laser options. Patient opting to d/c treatment at this time as nails do not bother her much.     Consider oral vs laser at later time if patient desires.     RTC PRN

## 2023-12-26 DIAGNOSIS — Z96.641 HISTORY OF RIGHT HIP REPLACEMENT: ICD-10-CM

## 2023-12-27 RX ORDER — AMOXICILLIN 500 MG/1
2000 CAPSULE ORAL
Qty: 4 CAPSULE | Refills: 1 | Status: SHIPPED | OUTPATIENT
Start: 2023-12-27

## 2023-12-28 RX ORDER — LOSARTAN POTASSIUM 100 MG/1
100 TABLET ORAL DAILY
Qty: 90 TABLET | Refills: 3 | Status: SHIPPED | OUTPATIENT
Start: 2023-12-28 | End: 2024-12-27

## 2024-01-22 ENCOUNTER — OFFICE VISIT (OUTPATIENT)
Dept: DERMATOLOGY | Facility: CLINIC | Age: 84
End: 2024-01-22
Payer: MEDICARE

## 2024-01-22 DIAGNOSIS — L82.0 INFLAMED SEBORRHEIC KERATOSIS: Primary | ICD-10-CM

## 2024-01-22 PROCEDURE — 17110 DESTRUCTION B9 LES UP TO 14: CPT | Mod: S$GLB,,, | Performed by: DERMATOLOGY

## 2024-01-22 PROCEDURE — 99499 UNLISTED E&M SERVICE: CPT | Mod: S$GLB,,, | Performed by: DERMATOLOGY

## 2024-01-22 PROCEDURE — 99999 PR PBB SHADOW E&M-EST. PATIENT-LVL III: CPT | Mod: PBBFAC,,, | Performed by: DERMATOLOGY

## 2024-01-22 NOTE — PROGRESS NOTES
Subjective:      Patient ID:  Jo-Ann Escobedo is a 83 y.o. female who presents for   Chief Complaint   Patient presents with    Follow-up     ISK     History of Present Illness: The patient presents for follow up of ISK - improved since last visit.     The patient was last seen on: 12/12/2023 for cryosurgery to ISK.  Hx of SCC of left knuckle (s/p MMS with Dr. Jorge on 1/19/2021.     Other skin complaints: none       Review of Systems    Objective:   Physical Exam   Constitutional: She appears well-developed and well-nourished. No distress.   Neurological: She is alert and oriented to person, place, and time. She is not disoriented.   Psychiatric: She has a normal mood and affect.   Skin:   Areas Examined (abnormalities noted in diagram):   Head / Face Inspection Performed            Diagram Legend     Erythematous scaling macule/papule c/w actinic keratosis       Vascular papule c/w angioma      Pigmented verrucoid papule/plaque c/w seborrheic keratosis      Yellow umbilicated papule c/w sebaceous hyperplasia      Irregularly shaped tan macule c/w lentigo     1-2 mm smooth white papules consistent with Milia      Movable subcutaneous cyst with punctum c/w epidermal inclusion cyst      Subcutaneous movable cyst c/w pilar cyst      Firm pink to brown papule c/w dermatofibroma      Pedunculated fleshy papule(s) c/w skin tag(s)      Evenly pigmented macule c/w junctional nevus     Mildly variegated pigmented, slightly irregular-bordered macule c/w mildly atypical nevus      Flesh colored to evenly pigmented papule c/w intradermal nevus       Pink pearly papule/plaque c/w basal cell carcinoma      Erythematous hyperkeratotic cursted plaque c/w SCC      Surgical scar with no sign of skin cancer recurrence      Open and closed comedones      Inflammatory papules and pustules      Verrucoid papule consistent consistent with wart     Erythematous eczematous patches and plaques     Dystrophic onycholytic nail with subungual  debris c/w onychomycosis     Umbilicated papule    Erythematous-base heme-crusted tan verrucoid plaque consistent with inflamed seborrheic keratosis     Erythematous Silvery Scaling Plaque c/w Psoriasis     See annotation      Assessment / Plan:        Inflamed seborrheic keratosis         Cryosurgery procedure note:    Verbal consent from the patient is obtained including, but not limited to, risk of hypopigmentation/hyperpigmentation, scar, recurrence of lesion. Liquid nitrogen cryosurgery is applied to 1 lesions to produce a freeze injury. The patient is aware that blisters may form and is instructed on wound care with gentle cleansing and use of vaseline ointment to keep moist until healed. The patient is supplied a handout on cryosurgery and is instructed to call if lesions do not completely resolve.    Much improved    Follow up if symptoms worsen or fail to improve.

## 2024-02-23 DIAGNOSIS — N18.2 CKD (CHRONIC KIDNEY DISEASE) STAGE 2, GFR 60-89 ML/MIN: Primary | ICD-10-CM

## 2024-02-26 ENCOUNTER — OFFICE VISIT (OUTPATIENT)
Dept: NEPHROLOGY | Facility: CLINIC | Age: 84
End: 2024-02-26
Payer: MEDICARE

## 2024-02-26 ENCOUNTER — LAB VISIT (OUTPATIENT)
Dept: LAB | Facility: HOSPITAL | Age: 84
End: 2024-02-26
Payer: MEDICARE

## 2024-02-26 VITALS
OXYGEN SATURATION: 96 % | DIASTOLIC BLOOD PRESSURE: 75 MMHG | HEART RATE: 84 BPM | WEIGHT: 180.75 LBS | BODY MASS INDEX: 33.06 KG/M2 | SYSTOLIC BLOOD PRESSURE: 128 MMHG

## 2024-02-26 DIAGNOSIS — N28.1 BILATERAL RENAL CYSTS: Primary | ICD-10-CM

## 2024-02-26 DIAGNOSIS — N20.0 NEPHROLITHIASIS: ICD-10-CM

## 2024-02-26 DIAGNOSIS — N18.2 CKD (CHRONIC KIDNEY DISEASE) STAGE 2, GFR 60-89 ML/MIN: ICD-10-CM

## 2024-02-26 DIAGNOSIS — E83.52 HYPERCALCEMIA: ICD-10-CM

## 2024-02-26 DIAGNOSIS — R82.994 HYPERCALCIURIA: ICD-10-CM

## 2024-02-26 DIAGNOSIS — N39.41 URGE INCONTINENCE OF URINE: ICD-10-CM

## 2024-02-26 DIAGNOSIS — I10 ESSENTIAL HYPERTENSION: ICD-10-CM

## 2024-02-26 LAB
ALBUMIN SERPL BCP-MCNC: 3.9 G/DL (ref 3.5–5.2)
ANION GAP SERPL CALC-SCNC: 10 MMOL/L (ref 8–16)
BASOPHILS # BLD AUTO: 0.08 K/UL (ref 0–0.2)
BASOPHILS NFR BLD: 1 % (ref 0–1.9)
BUN SERPL-MCNC: 19 MG/DL (ref 8–23)
CALCIUM SERPL-MCNC: 10.8 MG/DL (ref 8.7–10.5)
CHLORIDE SERPL-SCNC: 105 MMOL/L (ref 95–110)
CO2 SERPL-SCNC: 26 MMOL/L (ref 23–29)
CREAT SERPL-MCNC: 0.7 MG/DL (ref 0.5–1.4)
DIFFERENTIAL METHOD BLD: ABNORMAL
EOSINOPHIL # BLD AUTO: 0.2 K/UL (ref 0–0.5)
EOSINOPHIL NFR BLD: 2.1 % (ref 0–8)
ERYTHROCYTE [DISTWIDTH] IN BLOOD BY AUTOMATED COUNT: 12.6 % (ref 11.5–14.5)
EST. GFR  (NO RACE VARIABLE): >60 ML/MIN/1.73 M^2
GLUCOSE SERPL-MCNC: 80 MG/DL (ref 70–110)
HCT VFR BLD AUTO: 44.2 % (ref 37–48.5)
HGB BLD-MCNC: 14.7 G/DL (ref 12–16)
IMM GRANULOCYTES # BLD AUTO: 0.02 K/UL (ref 0–0.04)
IMM GRANULOCYTES NFR BLD AUTO: 0.3 % (ref 0–0.5)
LYMPHOCYTES # BLD AUTO: 2.6 K/UL (ref 1–4.8)
LYMPHOCYTES NFR BLD: 32.9 % (ref 18–48)
MCH RBC QN AUTO: 31.9 PG (ref 27–31)
MCHC RBC AUTO-ENTMCNC: 33.3 G/DL (ref 32–36)
MCV RBC AUTO: 96 FL (ref 82–98)
MONOCYTES # BLD AUTO: 1 K/UL (ref 0.3–1)
MONOCYTES NFR BLD: 12.3 % (ref 4–15)
NEUTROPHILS # BLD AUTO: 4 K/UL (ref 1.8–7.7)
NEUTROPHILS NFR BLD: 51.4 % (ref 38–73)
NRBC BLD-RTO: 0 /100 WBC
PHOSPHATE SERPL-MCNC: 3 MG/DL (ref 2.7–4.5)
PLATELET # BLD AUTO: 153 K/UL (ref 150–450)
PMV BLD AUTO: 11 FL (ref 9.2–12.9)
POTASSIUM SERPL-SCNC: 4.1 MMOL/L (ref 3.5–5.1)
RBC # BLD AUTO: 4.61 M/UL (ref 4–5.4)
SODIUM SERPL-SCNC: 141 MMOL/L (ref 136–145)
WBC # BLD AUTO: 7.78 K/UL (ref 3.9–12.7)

## 2024-02-26 PROCEDURE — 85025 COMPLETE CBC W/AUTO DIFF WBC: CPT | Performed by: NURSE PRACTITIONER

## 2024-02-26 PROCEDURE — 99999 PR PBB SHADOW E&M-EST. PATIENT-LVL III: CPT | Mod: PBBFAC,,, | Performed by: NURSE PRACTITIONER

## 2024-02-26 PROCEDURE — 36415 COLL VENOUS BLD VENIPUNCTURE: CPT | Performed by: NURSE PRACTITIONER

## 2024-02-26 PROCEDURE — 99214 OFFICE O/P EST MOD 30 MIN: CPT | Mod: S$GLB,,, | Performed by: NURSE PRACTITIONER

## 2024-02-26 PROCEDURE — 80069 RENAL FUNCTION PANEL: CPT | Performed by: NURSE PRACTITIONER

## 2024-02-26 NOTE — PROGRESS NOTES
Subjective:       Patient ID: Jo-Ann Escobedo is a 83 y.o.  female who presents for evaluation of renal cysts.      HPI     Prior pertinent chart reviewed.    Patient presents for new evaluation of renal cysts.  Baseline creatinine of 0.8. Significant other medical problems include nephrolithiasis, renal cysts, lumbar osteoarthritis, DDD, spinal stenosis of lumbar region with neurogenic claudication, preDM, HTN, hypothyroidism, HLD, skin cancer. She is being treated with epidural injections for pain per pain management with recent minimal effect. Noted renal cysts on MRI on 10/9/22. History of simple renal cysts on US in 2021 with largest on right measuring 4.1 cm. Also with nonobstructive nephrolithiasis. Denies known history of stones, passing stone. No longer taking NSAIDs, has in the past. She takes tylenol for pain. The patient denies taking NSAIDs, herbal supplements, or new antibiotics, recreational drugs, recent episode of dehydration, diarrhea, nausea or vomiting, acute illness, hospitalization or recent exposure to IV radiocontrast.     Update 2/20/23  - Presents for evaluation of renal cysts  - losing some weight, changing diet  - Drinking 2.5L of water per day  - Reports urinary incontinence which is chronic   - colonoscopy 1 week ago with several polyps, repeat in 3 months    Update 2/26/24:  - Presents for follow-up of renal cysts, renal stones, kidney function  - No new issues. No new urinary symptoms.     Significant family hx includes: No known    Last renal US: 1/9/23 , reviewed. Showed bilateral simple renal cysts. Bilateral nonobstructing renal stones.     Review of Systems   Constitutional:  Negative for fever.   Respiratory:  Negative for shortness of breath.    Cardiovascular:  Negative for chest pain and leg swelling.   Gastrointestinal:  Negative for diarrhea, nausea and vomiting.   Genitourinary:  Positive for frequency (chronic). Negative for difficulty urinating, dysuria and hematuria.      "   +urge incontinence   Musculoskeletal:  Positive for arthralgias (right hip pain).   Neurological:  Negative for syncope.   All other systems reviewed and are negative.    Objective:       Blood pressure 128/75, pulse 84, weight 82 kg (180 lb 12.4 oz), SpO2 96 %.  Physical Exam  Vitals reviewed.   Constitutional:       General: She is not in acute distress.     Appearance: Normal appearance.   HENT:      Head: Normocephalic and atraumatic.   Eyes:      General: No scleral icterus.  Cardiovascular:      Rate and Rhythm: Normal rate.   Pulmonary:      Effort: Pulmonary effort is normal. No respiratory distress.   Musculoskeletal:      Right lower leg: No edema.      Left lower leg: No edema.   Skin:     General: Skin is warm and dry.   Neurological:      Mental Status: She is alert and oriented to person, place, and time.   Psychiatric:         Mood and Affect: Mood normal.         Behavior: Behavior normal.         Lab Results   Component Value Date    CREATININE 0.6 06/13/2023    URICACID -0.73 02/28/2023     No results found for: "UTPCR"  Lab Results   Component Value Date     06/13/2023    K 4.1 06/13/2023    CO2 24 06/13/2023     (H) 06/13/2023     Lab Results   Component Value Date    PTH 46 10/05/2011    CALCIUM 10.4 06/13/2023    CAION 1.15 12/11/2019    PHOS 2.7 02/15/2023     Lab Results   Component Value Date    HGB 14.7 02/26/2024    WBC 7.78 02/26/2024    HCT 44.2 02/26/2024      Lab Results   Component Value Date    HGBA1C 5.4 05/17/2023     02/26/2024    BUN 12 06/13/2023     Lab Results   Component Value Date    LDLCALC 96.2 07/18/2022       US Retroperitoneal 1/9/23:  FINDINGS:  Right kidney: The right kidney measures 12.7 cm. No cortical thinning. No loss of corticomedullary distinction. Resistive index measures 0.76.  Several simple cysts, largest measuring 4.6 x 4.2 x 3.7 cm within the mid kidney.  Two nonobstructing renal stones within the inferior pole measuring 0.2 cm.  No " hydronephrosis.     Left kidney: The left kidney measures 12.9 cm. No cortical thinning. No loss of corticomedullary distinction. Resistive index measures 0.75.  Two simple cyst within the superior pole, largest measuring 3.2 x 3.2 x 3.0 cm.  Nonobstructing stone within the superior pole measuring 0.3 cm.  No hydronephrosis.     The bladder is partially distended at the time of scanning and has an unremarkable appearance.     Impression:     1. Bilateral simple renal cysts.  2. Bilateral nonobstructing renal stones.  Assessment:       1. Bilateral renal cysts    2. Hypercalcemia    3. Urge incontinence of urine    4. Nephrolithiasis    5. Hypercalciuria    6. Essential hypertension    7. CKD (chronic kidney disease) stage 2, GFR 60-89 ml/min            Plan:   Renal cysts  - bilateral simple cysts per US and MRI - largest 4.1cm right  - Continue surveillance imaging    Nephrolithiasis  - nonobstructive per imaging  - 24 hr urine shows pH 6.4, volume 880 mL, elevated urine calcium 238, elevated brushite, hydroxyapatite crystals   - Maintained on low dose HCTZ. Calcium now elevated. Repeat level.   - Recommend increased hydration >2L per day, low Na diet, low animal protein diet for stones     CKD stage 2   - baseline Cr 0.8, at baseline  - CKD in setting of age-related changes, history of NSAID use    HTN   - Stable on losartan  - Follows with digital medicine program    Urge incontinence  - Chronic, Urology referral       All questions patient had were answered.  Asked if further questions. None. F/u in clinic 1 year with labs and urine prior to next visit or sooner if needed.  ER for emergency concerns.    Summary of Plan:  - Repeat BMP  - Increase hydration. Continue diet for renal stones.   - RTC 1 year with labs for monitoring. Plan for repeat 24 hr urine at this time.

## 2024-02-28 DIAGNOSIS — Z96.641 S/P TOTAL RIGHT HIP ARTHROPLASTY: Primary | ICD-10-CM

## 2024-02-29 ENCOUNTER — HOSPITAL ENCOUNTER (OUTPATIENT)
Dept: RADIOLOGY | Facility: HOSPITAL | Age: 84
Discharge: HOME | End: 2024-02-29
Attending: ORTHOPAEDIC SURGERY
Payer: MEDICARE

## 2024-02-29 ENCOUNTER — OFFICE VISIT (OUTPATIENT)
Dept: ORTHOPEDICS | Facility: CLINIC | Age: 84
End: 2024-02-29
Payer: MEDICARE

## 2024-02-29 VITALS — BODY MASS INDEX: 33.13 KG/M2 | HEIGHT: 62 IN | WEIGHT: 180 LBS

## 2024-02-29 DIAGNOSIS — Z96.641 S/P TOTAL RIGHT HIP ARTHROPLASTY: ICD-10-CM

## 2024-02-29 DIAGNOSIS — Z96.641 S/P TOTAL RIGHT HIP ARTHROPLASTY: Primary | ICD-10-CM

## 2024-02-29 DIAGNOSIS — M61.00 POSTOPERATIVE HETEROTOPIC OSSIFICATION OF MUSCLE: ICD-10-CM

## 2024-02-29 PROCEDURE — 73502 X-RAY EXAM HIP UNI 2-3 VIEWS: CPT | Mod: TC,RT

## 2024-02-29 PROCEDURE — 73502 X-RAY EXAM HIP UNI 2-3 VIEWS: CPT | Mod: 26,RT,, | Performed by: RADIOLOGY

## 2024-02-29 PROCEDURE — 99213 OFFICE O/P EST LOW 20 MIN: CPT | Mod: S$GLB,,, | Performed by: ORTHOPAEDIC SURGERY

## 2024-02-29 PROCEDURE — 99999 PR PBB SHADOW E&M-EST. PATIENT-LVL III: CPT | Mod: PBBFAC,,, | Performed by: ORTHOPAEDIC SURGERY

## 2024-02-29 RX ORDER — METHYLPREDNISOLONE 4 MG/1
TABLET ORAL
Qty: 1 EACH | Refills: 0 | Status: SHIPPED | OUTPATIENT
Start: 2024-02-29 | End: 2024-03-13

## 2024-03-05 DIAGNOSIS — R82.994 HYPERCALCIURIA: ICD-10-CM

## 2024-03-05 RX ORDER — HYDROCHLOROTHIAZIDE 25 MG/1
12.5 TABLET ORAL
Qty: 45 TABLET | Refills: 3 | Status: SHIPPED | OUTPATIENT
Start: 2024-03-05

## 2024-03-06 PROBLEM — M61.00 POSTOPERATIVE HETEROTOPIC OSSIFICATION OF MUSCLE: Status: ACTIVE | Noted: 2024-03-06

## 2024-03-06 PROBLEM — M61.50 POSTOPERATIVE HETEROTOPIC OSSIFICATION OF MUSCLE: Status: ACTIVE | Noted: 2024-03-06

## 2024-03-06 PROBLEM — M96.89 POSTOPERATIVE HETEROTOPIC OSSIFICATION OF MUSCLE: Status: ACTIVE | Noted: 2024-03-06

## 2024-03-06 NOTE — PROGRESS NOTES
Subjective:      Patient ID: Jo-Ann Escobedo is a 83 y.o. female.    Chief Complaint:   No chief complaint on file.    HPI  She comes in for evaluation of right hip pain.  She had right total hip 07/05/2023.  This is been bothering her for about 3 weeks.  She was on all fours cleaning up after her dog, when she pulled something.  No fall.  No fever, chills, remote infections.  No significant knee pain, back pain, distal numbness or tingling.      Objective:        Ortho/SPM Exam    On exam the wound is well healed without redness or tenderness.  No fluctuance.  Negative Stinchfield sign.  Laterally based pain with passive flexion and rotation of the hip.  The patient walks with a steady level gait without supports.  Distal neurovascular intact.  Calves soft and nontender.        IMAGING:  X-rays show well-fixed and positioned hybrid total hip arthroplasty without signs of loosening or other complications.  Assessment:   Soft tissue strain, right hip      Plan:   She is reassured that her x-rays look good and that she does not appear to have any causes of failure of the hip replacement.  I think that she overstretched her soft tissues around the right hip and ended up with some inflammation there.  She does have some HO that might be playing into the symptoms as well.  We will try a Medrol Dosepak, and she will let me know if this is not effective.    The patient's pathophysiology was explained in detail with reference to x-rays, models, other visual aids as appropriate.  Treatment options were discussed in detail.  Questions were invited and answered to the patient's satisfaction.      Federico Ramirez MD  Orthopedic Surgery

## 2024-03-12 ENCOUNTER — OFFICE VISIT (OUTPATIENT)
Dept: HEMATOLOGY/ONCOLOGY | Facility: CLINIC | Age: 84
End: 2024-03-12
Payer: MEDICARE

## 2024-03-12 VITALS
RESPIRATION RATE: 16 BRPM | HEIGHT: 62 IN | SYSTOLIC BLOOD PRESSURE: 134 MMHG | HEART RATE: 86 BPM | TEMPERATURE: 100 F | OXYGEN SATURATION: 96 % | WEIGHT: 181.44 LBS | BODY MASS INDEX: 33.39 KG/M2 | DIASTOLIC BLOOD PRESSURE: 85 MMHG

## 2024-03-12 DIAGNOSIS — D05.11 DUCTAL CARCINOMA IN SITU (DCIS) OF RIGHT BREAST: Primary | ICD-10-CM

## 2024-03-12 PROCEDURE — 99213 OFFICE O/P EST LOW 20 MIN: CPT | Mod: S$GLB,,, | Performed by: INTERNAL MEDICINE

## 2024-03-12 PROCEDURE — 99999 PR PBB SHADOW E&M-EST. PATIENT-LVL IV: CPT | Mod: PBBFAC,,, | Performed by: INTERNAL MEDICINE

## 2024-03-12 NOTE — PROGRESS NOTES
Subjective:       Patient ID: Jo-Ann Escobedo is a 83 y.o. female.    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 Escobedo presents today for follow up.  I had last seen her on March 3, 2023.  On July 30, 2020 she underwent a right mastectomy and sentinel lymph node biopsy.  There was no residual DCIS identified in the mastectomy specimen, while 3 sentinel lymph nodes were negative.  Of note, on July 2, 2020 she had undergone a biopsy of the area in the right breast that was read as BI-RADS 4 on her mammogram.  The biopsy had shown evidence of DCIS which was ER positive.    Briefly, she is an 83 year-old female with a remote history of breast cancer treated 17 years ago with a left modified radical mastectomy.  Her tumor was a T2 N1 M0 carcinoma.  She was treated with four cycles of AC and 4 cycles of Taxotere in the adjuvant setting and has remained cancer free since then.   A mammogram in the spring of 2019 led to a contralateral biopsy and eventually to a lumpectomy on June 26th, 2019, for an area of DCIS, measuring 12mm.  Resection margins were clear, with the closest being 14 mm.  Her tumor was ER positive and VA negative.   She met with the radiation oncologist and decided against XRT.  She subsequently decided against adjuvant hormonal therapy and has been followed expectantly since.    A mammogram in early 2020 had shown a 6 mm area of coarse heterogeneous calcifications in a grouped distribution in the upper outer quadrant of the right breast.  A biopsy was performed on June 29, 2020 with results as above.  This was followed by the right mastectomy.    Review of Systems  Overall she feels OK, and she has no complaints today other than pains from her DJD.  She states that in July she underwent a right hip replacement surgery.  She denies any anxiety come depression, easy bruising, fevers, chills, night sweats, weight loss, nausea, vomiting, diarrhea, constipation, diplopia,  blurred vision headache, chest pain, abdominal pain, or difficulty ambulating. All other systems are negative.     Objective:      Physical Exam  GENERAL: She is alert, oriented to time, place, person; well nourished;   pleasant; in no acute physical distress.    VITAL SIGNS: Reviewed.   HEENT: Normal. There are no nasal, oral, lip, gingival, auricular, lid,   or conjunctival lesions. Mucosae are moist and pink, and there is no   thrush. Pupils are equal, reactive to light and accommodation.   Extraocular muscle movements are intact.   NECK: Supple without JVD, thyromegaly, or adenopathy.   LUNGS: Clear to auscultation without wheezing, rales, or rhonchi.   CARDIOVASCULAR: Reveals an S1, S2, no murmurs, no rubs, no gallops.   BREASTS:  She is status post bilateral mastectomies.    ABDOMEN: Soft, nontender without organomegaly. Bowel sounds are identified.   EXTREMITIES: Have no cyanosis, clubbing, or edema.    SKIN: Does not have petechiae, rashes, ot induration.    NEUROLOGIC: Motor function is 5/5, DTRs are 0-1+ bilaterally, symmetrical,   and cranial nerves within normal limits.   LYMPHATIC: There is no cervical, axillary, or supraclavicular adenopathy.    Assessment:       1. Remote history of breast cancer status post left mastectomy and chemotherapy 18 years ago..    2.     Contralateral DCIS, s/p lumpectomy, s/p local recurrence, treated with a completion mastectomy, clinically HUGO, doing well.    Plan:     I had a long discussion with her.  I again reasssured her that her chance of a cure after her mastectomy is in the range of 99%.  I do not recommend any additional treatment at this point.   Her multiple questions were answered to her satisfaction.  I will see her again in 12 months.     Route Chart for Scheduling    Med Onc Chart Routing      Follow up with physician 1 year.   Follow up with [facility]    Infusion scheduling note    Injection scheduling note    Labs    Imaging    Pharmacy appointment    Other  referrals

## 2024-03-13 ENCOUNTER — LAB VISIT (OUTPATIENT)
Dept: LAB | Facility: HOSPITAL | Age: 84
End: 2024-03-13
Payer: MEDICARE

## 2024-03-13 ENCOUNTER — OFFICE VISIT (OUTPATIENT)
Dept: INTERNAL MEDICINE | Facility: CLINIC | Age: 84
End: 2024-03-13
Payer: MEDICARE

## 2024-03-13 VITALS
OXYGEN SATURATION: 95 % | BODY MASS INDEX: 30.37 KG/M2 | HEART RATE: 81 BPM | HEIGHT: 65 IN | WEIGHT: 182.31 LBS | SYSTOLIC BLOOD PRESSURE: 132 MMHG | DIASTOLIC BLOOD PRESSURE: 72 MMHG

## 2024-03-13 DIAGNOSIS — M47.896 OTHER OSTEOARTHRITIS OF SPINE, LUMBAR REGION: ICD-10-CM

## 2024-03-13 DIAGNOSIS — Z00.00 HEALTHCARE MAINTENANCE: Primary | ICD-10-CM

## 2024-03-13 DIAGNOSIS — Z96.641 S/P TOTAL RIGHT HIP ARTHROPLASTY: ICD-10-CM

## 2024-03-13 DIAGNOSIS — R73.03 PREDIABETES: ICD-10-CM

## 2024-03-13 DIAGNOSIS — M54.17 LUMBOSACRAL RADICULOPATHY: ICD-10-CM

## 2024-03-13 DIAGNOSIS — E89.0 HYPOTHYROIDISM, POSTSURGICAL: ICD-10-CM

## 2024-03-13 DIAGNOSIS — Z85.828 PERSONAL HISTORY OF SKIN CANCER: ICD-10-CM

## 2024-03-13 DIAGNOSIS — D12.6 ADENOMATOUS POLYP OF COLON, UNSPECIFIED PART OF COLON: ICD-10-CM

## 2024-03-13 LAB
ESTIMATED AVG GLUCOSE: 114 MG/DL (ref 68–131)
HBA1C MFR BLD: 5.6 % (ref 4–5.6)

## 2024-03-13 PROCEDURE — 84443 ASSAY THYROID STIM HORMONE: CPT

## 2024-03-13 PROCEDURE — 84439 ASSAY OF FREE THYROXINE: CPT

## 2024-03-13 PROCEDURE — 99213 OFFICE O/P EST LOW 20 MIN: CPT | Mod: GC,S$GLB,,

## 2024-03-13 PROCEDURE — 83036 HEMOGLOBIN GLYCOSYLATED A1C: CPT

## 2024-03-13 PROCEDURE — 36415 COLL VENOUS BLD VENIPUNCTURE: CPT

## 2024-03-13 PROCEDURE — 99999 PR PBB SHADOW E&M-EST. PATIENT-LVL III: CPT | Mod: PBBFAC,GC,,

## 2024-03-13 RX ORDER — METHOCARBAMOL 500 MG/1
500 TABLET, FILM COATED ORAL 2 TIMES DAILY PRN
Qty: 60 TABLET | Refills: 5 | Status: SHIPPED | OUTPATIENT
Start: 2024-03-13 | End: 2024-09-09

## 2024-03-13 NOTE — ASSESSMENT & PLAN NOTE
Multiple lesions on forearms concerning for actinic keratosis.     -- F/U Dermatology, appointment set for this week

## 2024-03-13 NOTE — PATIENT INSTRUCTIONS
- Please get the labs drawn as previously discussed at your earliest convenience. You can access your results early through the [facility]. I will follow up as well. Don't hesitate to reach out to the clinic if you have further questions.  - Please continue improving your diet and exercise regimen!  - I have placed the following referrals for you: Physical Therapy. Please schedule those appointments at your earliest convenience.  - Do not take the steroid pack until you have completed your course of Physical Therapy  - You can use Robaxin (methocarbamol); it has a drowsy / fatigue side effect, so start with one dose in the evening when you are at home before increasing the frequency that you take them.    Plan to return to clinic for your next visit in 6 months. You are always welcome to reach out or schedule an appointment sooner if something else comes up.    Sincerely,  Dr Neely

## 2024-03-13 NOTE — PROGRESS NOTES
Subjective     Chief Complaint: Healthcare Maintenance    History of Present Illness:  Ms. Jo-Ann Escobedo is a 83 y.o. female presenting for follow up.    1. Prediabetes  Overview:  Hemoglobin A1C   Date Value Ref Range Status   05/17/2023 5.4 4.0 - 5.6 % Final     Comment:     ADA Screening Guidelines:  5.7-6.4%  Consistent with prediabetes  >or=6.5%  Consistent with diabetes    High levels of fetal hemoglobin interfere with the HbA1C  assay. Heterozygous hemoglobin variants (HbS, HgC, etc)do  not significantly interfere with this assay.   However, presence of multiple variants may affect accuracy.     07/18/2022 5.7 (H) 4.0 - 5.6 % Final     Comment:     ADA Screening Guidelines:  5.7-6.4%  Consistent with prediabetes  >or=6.5%  Consistent with diabetes    High levels of fetal hemoglobin interfere with the HbA1C  assay. Heterozygous hemoglobin variants (HbS, HgC, etc)do  not significantly interfere with this assay.   However, presence of multiple variants may affect accuracy.     04/30/2020 6.1 (H) 4.0 - 5.6 % Final     Comment:     ADA Screening Guidelines:  5.7-6.4%  Consistent with prediabetes  >or=6.5%  Consistent with diabetes  High levels of fetal hemoglobin interfere with the HbA1C  assay. Heterozygous hemoglobin variants (HbS, HgC, etc)do  not significantly interfere with this assay.   However, presence of multiple variants may affect accuracy.        -- Recheck A1C  -- Continue diet / exercise optimization    Orders:  -     HEMOGLOBIN A1C; Future; Expected date: 03/13/2024    2. Other osteoarthritis of spine, lumbar region  -     Ambulatory referral/consult to Physical/Occupational Therapy; Future; Expected date: 03/20/2024  -     methocarbamoL (ROBAXIN) 500 MG Tab; Take 1 tablet (500 mg total) by mouth 2 (two) times daily as needed (Back tigness / spasm).  Dispense: 60 tablet; Refill: 5    3. Hypothyroidism, postsurgical  Overview:  TSH 0.035, decreased from previous draw, which was low. On  levothyroxine 112 mcg.    -- Recheck TSH/T4; titrate levothyroxine    Orders:  -     TSH; Future; Expected date: 03/13/2024    4. Lumbosacral radiculopathy  Overview:  Multilevel lumbar spondylosis with variable neural foraminal narrowing as detailed on MRI from 10/09/2022. Patient follows closely with pain clinic (Dr Paco Fay MD) where she receives epidural injections. States they are of limited help.    Patient able to ambulate with mild assistance of cane, however patient also can ambulate without. Reports paraspinal pain of lower back, R groin pain, R hip pain - which has now resolved s/p arthroplasty. Denies urinary / fecal incontinence, changes in sensation in lower extremity and perineum.      -- Trial Robaxin 500 BID PRN  -- Did not tolerate gabapentin 100 TID PRN (light-headed)  -- Limited to no effect w/ topical Voltaren, Gabapentin/Lidocaine agents PRN  -- F/U with Pain Clinic  -- PT/OT referral for back back PT      5. Adenomatous polyp of colon, unspecified part of colon  Overview:  Recent GI c-scope with multiple adenomatous polyps. Follows with Dr Monet.     -- Per GI note and pathology results, f/u with Dr Monet in 5 years.      6. S/P right total hip arthroplasty  Overview:  Previously seen in clinic by Dr Warren for hip pain, now resolved. Did not start prescribed Medrol dose pack.    -- PT/OT referral  -- Defer systemic steroids at this time as symptoms have resolved      7. Personal history of skin cancer  Overview:  SCC left 5th knuckle 12/2020  SCC In situ right  neck     Assessment & Plan:  Multiple lesions on forearms concerning for actinic keratosis.     -- F/U Dermatology, appointment set for this week          Review of Systems   Constitutional:  Negative for chills and fever.   HENT:  Positive for hearing loss. Negative for sinus pain and sore throat.    Eyes:  Negative for discharge.   Respiratory:  Positive for wheezing. Negative for cough and shortness of breath.     Cardiovascular:  Negative for chest pain, palpitations and leg swelling.   Gastrointestinal:  Negative for abdominal pain, blood in stool, constipation, diarrhea, nausea and vomiting.   Genitourinary:  Positive for frequency (chronic). Negative for dysuria and hematuria.   Musculoskeletal:  Positive for back pain and joint pain. Negative for falls, myalgias and neck pain.   Neurological:  Negative for weakness and headaches.   Endo/Heme/Allergies:  Negative for polydipsia.       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 She subsequently underwent colonoscopy 12/9 and several large diverticula in the sigmoid colon was seen with hematin throughout the colon; blood pooling also noted in the sigmoid colon, during which clip was placed f    Hematochezia     Hypothyroidism, postsurgical 07/01/2015    Mixed hyperlipidemia 09/27/2012    Multinodular goiter 07/31/2014    Paget's disease of bone 07/10/2013    SCC (squamous cell carcinoma) 12/2020    left 5th knuckle    Squamous Cell Carcinoma     in situ right neck        Past Surgical History:   Procedure Laterality Date    ARTHROPLASTY OF HIP BY ANTERIOR APPROACH Right 7/5/2023    Procedure: ARTHROPLASTY, HIP, TOTAL, ANTERIOR APPROACH;  Surgeon:  Federico Ramirez MD;  Location: [facility] 2ND FLR;  Service: Orthopedics;  Laterality: Right;    BREAST BIOPSY Right 2019    BREAST LUMPECTOMY Right 2019    CARPAL TUNNEL RELEASE Left 6/5/2020    Procedure: RELEASE, CARPAL TUNNEL Left;  Surgeon: Pricila Presley MD;  Location: [facility] OR;  Service: Orthopedics;  Laterality: Left;    CARPAL TUNNEL RELEASE Right 12/5/2022    Procedure: RELEASE, CARPAL TUNNEL, right;  Surgeon: Pricila Presley MD;  Location: [facility] OR;  Service: Orthopedics;  Laterality: Right;    CATARACT EXTRACTION W/  INTRAOCULAR LENS IMPLANT Bilateral     COLONOSCOPY N/A 4/15/2019    Procedure: COLONOSCOPY;  Surgeon: Artur Monet MD;  Location: [facility] ENDO (4TH FLR);  Service: Endoscopy;  Laterality: N/A;  within 1 month    COLONOSCOPY N/A 12/9/2019    Procedure: COLONOSCOPY;  Surgeon: Clyde Welch MD;  Location: [facility] ENDO (2ND FLR);  Service: Endoscopy;  Laterality: N/A;    COLONOSCOPY N/A 2/13/2023    Procedure: COLONOSCOPY;  Surgeon: Johan Hoyos MD;  Location: [facility] ENDO (4TH FLR);  Service: Endoscopy;  Laterality: N/A;  Okay for any CRS MD if Dr. Monet booked. but hopeful to get this done about 4 weeks from now  1/10 - pt called about time change and MD change. patient verbalized understanding and new instructions sent - sm  Precall complete- [place]    COLONOSCOPY N/A 11/6/2023    Procedure: COLONOSCOPY;  Surgeon: Artur Monet MD;  Location: [facility] ENDO (4TH FLR);  Service: Endoscopy;  Laterality: N/A;  inst. to pt. [facility]. [facility]  referral: Dr. Neely  10/30-precall complete-MS    ENDOSCOPIC ULTRASOUND OF UPPER GASTROINTESTINAL TRACT N/A 1/22/2020    Procedure: ULTRASOUND, UPPER GI TRACT, ENDOSCOPIC;  Surgeon: Eleazar Shaffer MD;  Location: [facility] ENDO (2ND FLR);  Service: Endoscopy;  Laterality: N/A;  EUS for 10mm SML w/negative pillow sign and negative naked fat sign which was found at the incisura.  Dr Welch    EPIDURAL STEROID INJECTION Bilateral 12/17/2021     Procedure: INJECTION, STEROID, EPIDURAL, L3-L4 Bilateral DIRECT REF Transforaminal;  Surgeon: Julian Fish MD;  Location: [facility] PAIN MGT;  Service: Pain Management;  Laterality: Bilateral;    ESOPHAGOGASTRODUODENOSCOPY N/A 12/7/2019    Procedure: EGD (ESOPHAGOGASTRODUODENOSCOPY);  Surgeon: Clyde Welch MD;  Location: [facility] ENDO (2ND FLR);  Service: Endoscopy;  Laterality: N/A;    EXCISIONAL BIOPSY Right 6/27/2019    Procedure: EXCISIONAL BIOPSY-breast;  Surgeon: Suki Dill MD;  Location: [facility] 2ND FLR;  Service: General;  Laterality: Right;    EYE SURGERY      HYSTERECTOMY      INJECTION Right 3/3/2023    Procedure: INJECTION, RIGHT HIP CORTICOSTEROID /LOCAL INJECTION CONTRAST DIRECT REF;  Surgeon: Julian Fish MD;  [facility];  Service: Pain Management;  Laterality: Right;    INJECTION FOR SENTINEL NODE IDENTIFICATION Right 7/30/2020    Procedure: INJECTION, FOR SENTINEL NODE IDENTIFICATION;  Surgeon: Suki Dill MD;  Location: [facility];  Service: General;  Laterality: Right;    INJECTION OF ANESTHETIC AGENT AROUND MEDIAL BRANCH NERVES INNERVATING LUMBAR FACET JOINT Bilateral 6/7/2019    Procedure: BLOCK, NERVE, FACET JOINT, LUMBAR, MEDIAL BRANCH-deo MBB L4/5,L5/S1;  Surgeon: Jarvis Morales III, MD;  Location: [facility] CATH LAB;  Service: Pain Management;  Laterality: Bilateral;    INJECTION OF STEROID Right 6/5/2020    Procedure: INJECTION, STEROID right carpal tunel injection/ Right ring trigger finger injection;  Surgeon: Pricila Presley MD;  Location: [facility] OR;  Service: Orthopedics;  Laterality: Right;  INJECTION, STEROID right carpal tunel injection/ Right ring trigger finger injection    KNEE ARTHROSCOPY N/A     pt unsure of which knee     MASTECTOMY      OOPHORECTOMY      SENTINEL LYMPH NODE BIOPSY Right 7/30/2020    Procedure: BIOPSY, LYMPH NODE, SENTINEL;  Surgeon: Suki Dill MD;  Location: [facility];  Service: General;  Laterality: Right;    SPINE SURGERY       TOTAL THYROIDECTOMY      TRANSFORAMINAL EPIDURAL INJECTION OF STEROID Bilateral 2022    Procedure: INJECTION, STEROID, EPIDURAL, TF BILATERAL L3-L4 CONTRAST DIRECT REF;  Surgeon: Julian Fish MD;  Location: [facility] PAIN MGT;  Service: Pain Management;  Laterality: Bilateral;    TRANSFORAMINAL EPIDURAL INJECTION OF STEROID Bilateral 10/21/2022    Procedure: LUMBAR TRANSFORAMINAL BILATERAL L3/4 CONTRAST DIRECT REFERRAL;  Surgeon: Julian Fish MD;  Location: [facility] PAIN MGT;  Service: Pain Management;  Laterality: Bilateral;    UNILATERAL MASTECTOMY Right 2020    Procedure: MASTECTOMY, UNILATERAL;  Surgeon: Suki Dill MD;  Location: [facility] OR;  Service: General;  Laterality: Right;    YAG Laser Capsulotomy  Left 2019 OD//Dr. Hahn       Family History   Problem Relation Age of Onset    Osteoarthritis Mother     Dementia Mother     Cancer Father         lung    Glaucoma Brother     Macular degeneration Brother     No Known Problems Daughter     No Known Problems Son     Diabetes Neg Hx     Amblyopia Neg Hx     Blindness Neg Hx     Cataracts Neg Hx     Retinal detachment Neg Hx     Strabismus Neg Hx     Melanoma Neg Hx        Social History     Socioeconomic History    Marital status:    Occupational History    Occupation: [employer]     Employer: [employer]     Employer: [employer]   Tobacco Use    Smoking status: Former     Current packs/day: 0.00     Types: Cigarettes     Quit date: 1969     Years since quittin.2    Smokeless tobacco: Never   Substance and Sexual Activity    Alcohol use: Not Currently    Drug use: No    Sexual activity: Not Currently   Other Topics Concern    Are you pregnant or think you may be? No    Breast-feeding No     Social Determinants of Health     Financial Resource Strain: Low Risk  (2023)    Overall Financial Resource Strain (CARDIA)     Difficulty of Paying Living Expenses: Not hard at all   Food Insecurity: No Food  Insecurity (12/7/2023)    Hunger Vital Sign     Worried About Running Out of Food in the Last Year: Never true     Ran Out of Food in the Last Year: Never true   Transportation Needs: No Transportation Needs (12/7/2023)    PRAPARE - Transportation     Lack of Transportation (Medical): No     Lack of Transportation (Non-Medical): No   Physical Activity: Insufficiently Active (12/7/2023)    Exercise Vital Sign     Days of Exercise per Week: 7 days     Minutes of Exercise per Session: 20 min   Stress: No Stress Concern Present (12/7/2023)    [employer] [place] [employer] - Occupational Stress Questionnaire     Feeling of Stress : Only a little   Social Connections: Moderately Isolated (12/7/2023)    Social Connection and Isolation Panel [NHANES]     Frequency of Communication with Friends and Family: More than three times a week     Frequency of Social Gatherings with Friends and Family: More than three times a week     Attends Adventism Services: Never     Active Member of Clubs or Organizations: Yes     Attends Club or Organization Meetings: More than 4 times per year     Marital Status:    Housing Stability: Low Risk  (12/7/2023)    Housing Stability Vital Sign     Unable to Pay for Housing in the Last Year: No     Number of Places Lived in the Last Year: 1     Unstable Housing in the Last Year: No       MEDICATIONS & ALLERGIES:     Current Outpatient Medications on File Prior to Visit   Medication Sig    acetaminophen (TYLENOL) 500 MG tablet Take 2 tablets (1,000 mg total) by mouth every 8 (eight) hours as needed for Pain.    amoxicillin (AMOXIL) 500 MG capsule Take 4 capsules (2,000 mg total) by mouth On call Procedure (take 1 hour prior to dental appt).    ascorbic acid, vitamin C, (VITAMIN C) 100 MG tablet Take 100 mg by mouth once daily.    cholecalciferol, vitamin D3, (VITAMIN D3) 50 mcg (2,000 unit) Tab Take 1 tablet by mouth once daily.    co-enzyme Q-10 30 mg capsule Take 30 mg by mouth  "once daily.    cyanocobalamin (VITAMIN B-12) 250 MCG tablet Take 250 mcg by mouth once daily.    famotidine (PEPCID) 20 MG tablet Take 1 tablet (20 mg total) by mouth 2 (two) times daily. for 21 days    fenofibrate 160 MG Tab TAKE 1 TABLET (160 MG TOTAL) BY MOUTH ONCE DAILY.    hydroCHLOROthiazide (HYDRODIURIL) 25 MG tablet TAKE 1/2 TABLET BY MOUTH ONCE DAILY    ketoconazole (NIZORAL) 2 % cream Apply topically once daily. Apply daily to affected toenails for 6-12 months    LACTOBACILLUS COMBINATION NO.8 (ADULT PROBIOTIC ORAL) Take by mouth once daily.     levothyroxine (SYNTHROID) 112 MCG tablet Take 1 tablet (112 mcg total) by mouth before breakfast.    losartan (COZAAR) 100 MG tablet Take 1 tablet (100 mg total) by mouth once daily.    sm-nu-cs1-dha-epa-fish-lut-carlos (OCUVITE ADULT 50 PLUS) 250 mg (90 mg-160 mg) Cap Take by mouth.    turmeric root extract 500 mg Cap Take by mouth once daily.     [DISCONTINUED] methylPREDNISolone (MEDROL DOSEPACK) 4 mg tablet use as directed     No current facility-administered medications on file prior to visit.       Review of patient's allergies indicates:   Allergen Reactions    Voltaren [diclofenac sodium] Other (See Comments)     Hematochezia and hospitalization for hematochezia.    Codeine Diarrhea and Hallucinations    Niacin preparations      Redness, warming       OBJECTIVE:     Vital Signs:  Vitals:    03/13/24 1352   BP: 132/72   BP Location: Left arm   Patient Position: Sitting   BP Method: Medium (Manual)   Pulse: 81   SpO2: 95%   Weight: 82.7 kg (182 lb 5.1 oz)   Height: 5' 4.5" (1.638 m)       Body mass index is 30.81 kg/m².     Physical Exam  Vitals reviewed.   Constitutional:       General: She is not in acute distress.     Appearance: Normal appearance.   HENT:      Head: Normocephalic and atraumatic.   Eyes:      General:         Right eye: No discharge.         Left eye: No discharge.      Extraocular Movements: Extraocular movements intact.   Cardiovascular:    "   Rate and Rhythm: Normal rate and regular rhythm.      Pulses: Normal pulses.      Heart sounds: Normal heart sounds. No murmur heard.  Pulmonary:      Effort: Pulmonary effort is normal. No respiratory distress.      Breath sounds: Normal breath sounds.   Abdominal:      General: There is no distension.      Palpations: There is no mass.      Tenderness: There is no abdominal tenderness. There is no guarding.   Musculoskeletal:         General: No swelling, tenderness or deformity. Normal range of motion.      Cervical back: Normal range of motion. No rigidity.      Right lower leg: No edema.      Left lower leg: No edema.   Skin:     General: Skin is warm and dry.      Coloration: Skin is not jaundiced.      Findings: Lesion present.      Comments: Multiple lesions on forearms concerning for actinic keratosis    Neurological:      General: No focal deficit present.      Mental Status: She is alert and oriented to person, place, and time. Mental status is at baseline.      Motor: No weakness.   Psychiatric:         Mood and Affect: Mood normal.         Behavior: Behavior normal.         Thought Content: Thought content normal.         Laboratory  No results found for this or any previous visit (from the past 24 hour(s)).    Diagnostic Results:      There are no preventive care reminders to display for this patient.      ASSESSMENT & PLAN:     1. Prediabetes  Overview:  Hemoglobin A1C   Date Value Ref Range Status   05/17/2023 5.4 4.0 - 5.6 % Final     Comment:     ADA Screening Guidelines:  5.7-6.4%  Consistent with prediabetes  >or=6.5%  Consistent with diabetes    High levels of fetal hemoglobin interfere with the HbA1C  assay. Heterozygous hemoglobin variants (HbS, HgC, etc)do  not significantly interfere with this assay.   However, presence of multiple variants may affect accuracy.     07/18/2022 5.7 (H) 4.0 - 5.6 % Final     Comment:     ADA Screening Guidelines:  5.7-6.4%  Consistent with  prediabetes  >or=6.5%  Consistent with diabetes    High levels of fetal hemoglobin interfere with the HbA1C  assay. Heterozygous hemoglobin variants (HbS, HgC, etc)do  not significantly interfere with this assay.   However, presence of multiple variants may affect accuracy.     04/30/2020 6.1 (H) 4.0 - 5.6 % Final     Comment:     ADA Screening Guidelines:  5.7-6.4%  Consistent with prediabetes  >or=6.5%  Consistent with diabetes  High levels of fetal hemoglobin interfere with the HbA1C  assay. Heterozygous hemoglobin variants (HbS, HgC, etc)do  not significantly interfere with this assay.   However, presence of multiple variants may affect accuracy.        -- Recheck A1C  -- Continue diet / exercise optimization    Orders:  -     HEMOGLOBIN A1C; Future; Expected date: 03/13/2024    2. Other osteoarthritis of spine, lumbar region  -     Ambulatory referral/consult to Physical/Occupational Therapy; Future; Expected date: 03/20/2024  -     methocarbamoL (ROBAXIN) 500 MG Tab; Take 1 tablet (500 mg total) by mouth 2 (two) times daily as needed (Back tigness / spasm).  Dispense: 60 tablet; Refill: 5    3. Hypothyroidism, postsurgical  Overview:  TSH 0.035, decreased from previous draw, which was low. On levothyroxine 112 mcg.    -- Recheck TSH/T4; titrate levothyroxine    Orders:  -     TSH; Future; Expected date: 03/13/2024    4. Lumbosacral radiculopathy  Overview:  Multilevel lumbar spondylosis with variable neural foraminal narrowing as detailed on MRI from 10/09/2022. Patient follows closely with pain clinic (Dr Paco Fay MD) where she receives epidural injections. States they are of limited help.    Patient able to ambulate with mild assistance of cane, however patient also can ambulate without. Reports paraspinal pain of lower back, R groin pain, R hip pain - which has now resolved s/p arthroplasty. Denies urinary / fecal incontinence, changes in sensation in lower extremity and perineum.      -- Trial  Robaxin 500 BID PRN  -- Did not tolerate gabapentin 100 TID PRN (light-headed)  -- Limited to no effect w/ topical Voltaren, Gabapentin/Lidocaine agents PRN  -- F/U with Pain Clinic  -- PT/OT referral for back back PT      5. Adenomatous polyp of colon, unspecified part of colon  Overview:  Recent GI c-scope with multiple adenomatous polyps. Follows with Dr Monet.     -- Per GI note and pathology results, f/u with Dr Monet in 5 years.      6. S/P right total hip arthroplasty  Overview:  Previously seen in clinic by Dr Warren for hip pain, now resolved. Did not start prescribed Medrol dose pack.    -- PT/OT referral  -- Defer systemic steroids at this time as symptoms have resolved      7. Personal history of skin cancer  Overview:  SCC left 5th knuckle 12/2020  SCC In situ right  neck     Assessment & Plan:  Multiple lesions on forearms concerning for actinic keratosis.     -- F/U Dermatology, appointment set for this week          See above for further detail.    RTC in 6 months    Discussed with Dr Ana Miles MD -- staff attestation to follow      Nathan-jose Neely MD  Internal Medicine PGY-3  [facility]

## 2024-03-14 ENCOUNTER — OFFICE VISIT (OUTPATIENT)
Dept: DERMATOLOGY | Facility: CLINIC | Age: 84
End: 2024-03-14
Payer: MEDICARE

## 2024-03-14 ENCOUNTER — TELEPHONE (OUTPATIENT)
Dept: INTERNAL MEDICINE | Facility: CLINIC | Age: 84
End: 2024-03-14
Payer: MEDICARE

## 2024-03-14 DIAGNOSIS — L82.1 SK (SEBORRHEIC KERATOSIS): Primary | ICD-10-CM

## 2024-03-14 DIAGNOSIS — Z85.828 PERSONAL HISTORY OF SKIN CANCER: ICD-10-CM

## 2024-03-14 DIAGNOSIS — D22.9 NEVUS: ICD-10-CM

## 2024-03-14 DIAGNOSIS — E89.0 HYPOTHYROIDISM, POSTSURGICAL: ICD-10-CM

## 2024-03-14 DIAGNOSIS — L73.8 SEBACEOUS GLAND HYPERPLASIA: ICD-10-CM

## 2024-03-14 LAB
T4 FREE SERPL-MCNC: 1.29 NG/DL (ref 0.71–1.51)
TSH SERPL DL<=0.005 MIU/L-ACNC: 0.3 UIU/ML (ref 0.4–4)

## 2024-03-14 PROCEDURE — 99999 PR PBB SHADOW E&M-EST. PATIENT-LVL III: CPT | Mod: PBBFAC,,, | Performed by: DERMATOLOGY

## 2024-03-14 PROCEDURE — 99213 OFFICE O/P EST LOW 20 MIN: CPT | Mod: S$GLB,,, | Performed by: DERMATOLOGY

## 2024-03-14 RX ORDER — LEVOTHYROXINE SODIUM 100 UG/1
100 TABLET ORAL
Qty: 90 TABLET | Refills: 3 | Status: SHIPPED | OUTPATIENT
Start: 2024-03-14 | End: 2025-03-14

## 2024-03-14 NOTE — TELEPHONE ENCOUNTER
TELEPHONE ENCOUNTER    #Surgical Hypothyroidism    Discussed lab results with patient as follows: Repeat TSH slightly low. T4 normal. On levothyroxine 112 mcg daily.     -- Repeat TSH in 6-8 weeks  -- Decrease levothyroxine to 100 mcg daily    Nathan-jose Neely MD  Internal Medicine PGY-3  [facility]

## 2024-03-14 NOTE — PROGRESS NOTES
"  Subjective:      Patient ID:  Jo-Ann Escobedo is a 83 y.o. female who presents for   Chief Complaint   Patient presents with    Skin Check     TBSE     Pt here today for TBSE    Patient is here today for a "mole" check.   Pt has a history of extensive sun exposure in the past.   Pt recalls several blistering sunburns in the past- yes  Pt has history of tanning bed use- no  Pt has had moles removed in the past- yes, benign per pt  Pt has history of melanoma in first degree relatives- no    Patient with new complaint of lesion(s)  Location: R temple  Duration: >1yr  Symptoms: none  Relieving factors/Previous treatments: none      Lesion      Review of Systems   Skin:  Positive for itching, daily sunscreen use and activity-related sunscreen use. Negative for tendency to form keloidal scars.   Hematologic/Lymphatic: Bruises/bleeds easily.       Objective:   Physical Exam   Constitutional: She appears well-developed and well-nourished. No distress.   Neurological: She is alert and oriented to person, place, and time. She is not disoriented.   Psychiatric: She has a normal mood and affect.   Skin:   Areas Examined (abnormalities noted in diagram):   Scalp / Hair Palpated and Inspected  Head / Face Inspection Performed  Neck Inspection Performed  Chest / Axilla Inspection Performed  Abdomen Inspection Performed  Back Inspection Performed  RUE Inspected  LUE Inspection Performed                         Diagram Legend     Erythematous scaling macule/papule c/w actinic keratosis       Vascular papule c/w angioma      Pigmented verrucoid papule/plaque c/w seborrheic keratosis      Yellow umbilicated papule c/w sebaceous hyperplasia      Irregularly shaped tan macule c/w lentigo     1-2 mm smooth white papules consistent with Milia      Movable subcutaneous cyst with punctum c/w epidermal inclusion cyst      Subcutaneous movable cyst c/w pilar cyst      Firm pink to brown papule c/w dermatofibroma      Pedunculated fleshy " papule(s) c/w skin tag(s)      Evenly pigmented macule c/w junctional nevus     Mildly variegated pigmented, slightly irregular-bordered macule c/w mildly atypical nevus      Flesh colored to evenly pigmented papule c/w intradermal nevus       Pink pearly papule/plaque c/w basal cell carcinoma      Erythematous hyperkeratotic cursted plaque c/w SCC      Surgical scar with no sign of skin cancer recurrence      Open and closed comedones      Inflammatory papules and pustules      Verrucoid papule consistent consistent with wart     Erythematous eczematous patches and plaques     Dystrophic onycholytic nail with subungual debris c/w onychomycosis     Umbilicated papule    Erythematous-base heme-crusted tan verrucoid plaque consistent with inflamed seborrheic keratosis     Erythematous Silvery Scaling Plaque c/w Psoriasis     See annotation      Assessment / Plan:        SK (seborrheic keratosis)  These are benign inherited growths without a malignant potential. Reassurance given to patient. No treatment is necessary.     Nevus  Discussed ABCDE's of nevi.  Monitor for new mole or moles that are becoming bigger, darker, irritated, or developing irregular borders. Instructed patient to observe lesion(s) for changes and follow up in clinic if changes are noted. Patient to monitor skin at home for new or changing lesions.     Sebaceous gland hyperplasia  This is a common condition representing benign enlargement of the sebaceous lobule. It typically occurs in adulthood. Reassurance given to patient.     Personal history of skin cancer  Area of previous NMSC examined. Site well healed with no signs of recurrence.    Total body skin examination performed today including at least 12 points as noted in physical examination. No lesions suspicious for malignancy noted.    Recommend daily sun protection/avoidance, use of at least SPF 30, broad spectrum sunscreen (OTC drug), skin self examinations, and routine physician  surveillance to optimize early detection             Follow up in about 1 year (around 3/14/2025) for TBSE.

## 2024-03-26 ENCOUNTER — OFFICE VISIT (OUTPATIENT)
Dept: UROLOGY | Facility: CLINIC | Age: 84
End: 2024-03-26
Payer: MEDICARE

## 2024-03-26 VITALS
DIASTOLIC BLOOD PRESSURE: 83 MMHG | SYSTOLIC BLOOD PRESSURE: 132 MMHG | BODY MASS INDEX: 31.87 KG/M2 | WEIGHT: 179.88 LBS | HEIGHT: 63 IN | HEART RATE: 80 BPM

## 2024-03-26 DIAGNOSIS — N39.41 URGE INCONTINENCE OF URINE: Primary | ICD-10-CM

## 2024-03-26 DIAGNOSIS — R10.9 FLANK PAIN: ICD-10-CM

## 2024-03-26 PROCEDURE — 99999 PR PBB SHADOW E&M-EST. PATIENT-LVL IV: CPT | Mod: PBBFAC,,, | Performed by: UROLOGY

## 2024-03-26 PROCEDURE — 99204 OFFICE O/P NEW MOD 45 MIN: CPT | Mod: S$GLB,,, | Performed by: UROLOGY

## 2024-03-26 RX ORDER — MIRABEGRON 50 MG/1
50 TABLET, EXTENDED RELEASE ORAL DAILY
Qty: 30 TABLET | Refills: 11 | Status: SHIPPED | OUTPATIENT
Start: 2024-03-26 | End: 2025-03-26

## 2024-03-26 NOTE — PROGRESS NOTES
[facility] Department of Urology      New Overactive Bladder (OAB) Note    3/26/2024    Referred by:  Kelsey Stevens NP    HPI: Jo-Ann Escobedo is a very pleasant 83 y.o. female who is a new patient to our department referred for evaluation of urinary incontinence of  8  months duration. She reports bother is associated with urinary frequency (24hrs) (6-7x daily), without nocturia (0x per night) and with urgency that results in urinary incontinence 5 x daily. She reports no stress urinary incontinence associated with exertion. She requires daily pads (5 pads/day).  She has not made changes to fluid intake.  She reports urinary incontinence is only during the day. Patient reports urgency is independent of position.     She denies symptoms of irritative voiding including dysuria. She denies symptoms of obstructive voiding including decreased stream, hesitancy, intermittency, post void dribbling, and sense of incomplete emptying. Bladder scan PVR was 3 mL. Her history includes spondylosis, nephrolithiasis and diverticulitis.  She denies all other prior pelvic surgeries or neurologic diagnoses. She has had a hysterectomy. She does not report symptoms suggestive of advanced POP. She denies a history of constipation. She denies a history suggestive of glaucoma.  She takes HCTZ for HTN.     Previous incontinence therapies:  Behavioral Therapies  None  Medications  None  Other Therapies  No Other Therapies    A review of 10+ systems was conducted with pertinent positive and negative findings documented in HPI with all other systems reviewed and negative.    Past medical, family, surgical and social history reviewed as documented in chart with pertinent positive medical, family, surgical and social history detailed in HPI.    Exam Findings:    Const: no acute distress, conversant and alert  Eyes: anicteric, extraocular muscles intact  ENMT: normocephalic, Nl oral membranes  Cardio: no cyanosis, nl cap refill  Pulm: no  tachypnea; no resp distress  Abd: soft, no tenderness  Musc: no laceration, no tenderness  Neuro: alert; oriented x 3  Skin: warm, dry; no petichiae  Psych: no anxiety; normal speech     Assessment/Plan:    Overactive Bladder with Urgency Incontinence (new, addt'l workup): Her history is suggestive of Overactive Bladder (OAB) with predominantly Urgency Urinary Incontinence (UUI). The presence or absence of incontinence as well as the relative contribution of stress or urgency incontinence can be further clarified with a 3-day volume-based voiding/incontinence diary provided today. This will also help to screen for polyuria and help to guide us on further counseling on behavioral therapy including timed voiding and bladder training. We will review this on her return visit.     Her reported symptoms today are relatively severe and therefore, I believe it would be appropriate to begin pharmacologic therapy in addition to behavioral therapies.     Patient was given a 3-day voiding diary to complete before next visit. We will further discuss behavioral therapy at that time.   We discussed behavioral therapies including fluid/dietary modification, timed voiding with bladder training, and pelvic floor exercises.  We will begin an appropriate OAB medication based medication history, comorbid conditions and formulary coverage.   Renal U/S given the history of urolithiasis    Due to the significant risk of adverse events, I do not recommend the use of anticholinergics, especially oxybutynin in this patient. Anticholinergics such as oxybutynin chloride,have been demonstrated repeatedly to be independently associated with risk of both dementia and development of Alzheimer's disease in the elderly (age >65) (PARRIS Intern Med. 2015;175(3):401-407. doi:10.1001/jamainternmed.2014.7663). Accordingly, this patient would need to limit pharmacologic treatment for bladder symptoms to either a beta-3 agonist such as Myrbetriq (mirabegron)  or Gemtesa (vibegron). If beta-3 agonist therapy is contraindicated and pharmacologic therapy is necessary, the patient should limit anticholinergic bladder medications to those agents known to have more difficulty crossing the blood-brain barrier such as trospium.

## 2024-03-27 ENCOUNTER — CLINICAL SUPPORT (OUTPATIENT)
Dept: REHABILITATION | Facility: HOSPITAL | Age: 84
End: 2024-03-27
Payer: MEDICARE

## 2024-03-27 DIAGNOSIS — M47.896 OTHER OSTEOARTHRITIS OF SPINE, LUMBAR REGION: ICD-10-CM

## 2024-03-27 DIAGNOSIS — M53.86 DECREASED ROM OF LUMBAR SPINE: ICD-10-CM

## 2024-03-27 DIAGNOSIS — M54.50 CHRONIC BILATERAL LOW BACK PAIN WITHOUT SCIATICA: Primary | ICD-10-CM

## 2024-03-27 DIAGNOSIS — G89.29 CHRONIC BILATERAL LOW BACK PAIN WITHOUT SCIATICA: Primary | ICD-10-CM

## 2024-03-27 PROCEDURE — 97110 THERAPEUTIC EXERCISES: CPT

## 2024-03-27 PROCEDURE — 97161 PT EVAL LOW COMPLEX 20 MIN: CPT

## 2024-03-27 NOTE — PLAN OF CARE
[facility]   Physical Therapy Initial Evaluation      Date: 3/27/2024   Name: Jo-Ann Escobedo  [facility] Number: [id number]    Therapy Diagnosis:   Encounter Diagnoses   Name Primary?    Other osteoarthritis of spine, lumbar region     Chronic bilateral low back pain without sciatica Yes    Decreased ROM of lumbar spine      Physician: Alexus Neely MD    Physician Orders: PT Eval and Treat   Medical Diagnosis from Referral: M47.896 (ICD-10-CM) - Other osteoarthritis of spine, lumbar region   Evaluation Date: 3/27/2024  Authorization Period Expiration: TBD  Plan of Care Expiration: 5/27/2024  Progress Note Due: 4/27/2024  Visit # / Visits authorized: 1/ 1   FOTO: 1/5    FOTO Initial Evaluation:   FOTO 2nd F/U: NA  FOTO Discharge: NA    Precautions: Standard     Time In: 135  Time Out: 220  Total Appointment Time (timed & untimed codes): 45 minutes      SUBJECTIVE     Date of onset: years     History of current condition - Jo-Ann reports: she has been having back pain for several years. Patient reports sitting does not bother her. Patient reports standing and walking is what bothers her the most. Patient reports she hurts a lot in the morning and it takes 20-30 minutes to loosen up. Patient reports she walks her dog every morning 3 blocks. Patient reports she cannot walk long distances without pain     Falls: None    Imaging, none:     Prior Therapy: Yes  Social History:  lives alone  Occupation: Realtor   Prior Level of Function: Independent with ADLs  Current Level of Function: Independent with ADLs     Pain:  Current 1/10, worst 6/10, best 1/10   Location: bilateral back    Description: constant and nagging   Aggravating Factors: standing and walking  Easing Factors: Rest, sitting, bathing, walking, stretching     Patients goals: to satisfy the doctor      Medical History:   Past Medical History:   Diagnosis Date    Acute blood loss anemia 12/07/2019    After-cataract of both eyes - Both  Eyes 09/26/2012    Arthritis of foot 07/11/2014    right subtalar     Cholelithiasis     Deaf, left     Diverticulitis of large intestine without perforation or abscess with bleeding 12/07/2019    Diverticulosis     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 She subsequently underwent colonoscopy 12/9 and several large diverticula in the sigmoid colon was seen with hematin throughout the colon; blood pooling also noted in the sigmoid colon, during which clip was placed f    Hematochezia     Hypothyroidism, postsurgical 07/01/2015    Mixed hyperlipidemia 09/27/2012    Multinodular goiter 07/31/2014    Paget's disease of bone 07/10/2013    SCC (squamous cell carcinoma) 12/2020    left 5th knuckle    Squamous Cell Carcinoma     in situ right neck        Surgical History:   Jo-Ann Escobedo  has a past surgical history that includes Hysterectomy; Eye surgery; Spine surgery; Mastectomy; Total thyroidectomy; Cataract extraction w/  intraocular lens implant (Bilateral); Knee arthroscopy (N/A); Colonoscopy (N/A, 4/15/2019); Injection of anesthetic agent around medial branch nerves innervating lumbar facet joint (Bilateral, 6/7/2019); Excisional biopsy (Right, 6/27/2019); YAG Laser Capsulotomy  (Left, 07/29/2019); Esophagogastroduodenoscopy (N/A, 12/7/2019); Colonoscopy (N/A, 12/9/2019); Endoscopic ultrasound of upper gastrointestinal tract (N/A, 1/22/2020); Breast biopsy (Right, 2019); Oophorectomy; Breast lumpectomy (Right, 2019); Carpal tunnel release (Left, 6/5/2020); Injection of steroid (Right, 6/5/2020); Unilateral mastectomy (Right,  2020); Injection for sentinel node identification (Right, 2020); [place] lymph node biopsy (Right, 2020); Epidural steroid injection (Bilateral, 2021); Transforaminal epidural injection of steroid (Bilateral, 2022); Transforaminal epidural injection of steroid (Bilateral, 10/21/2022); Carpal tunnel release (Right, 2022); Colonoscopy (N/A, 2023); injection (Right, 3/3/2023); Arthroplasty of hip by anterior approach (Right, 2023); and Colonoscopy (N/A, 2023).    Medications:   Jo-Ann has a current medication list which includes the following prescription(s): acetaminophen, amoxicillin, ascorbic acid (vitamin c), cholecalciferol (vitamin d3), co-enzyme q-10, cyanocobalamin, famotidine, fenofibrate, hydrochlorothiazide, ketoconazole, lactobacillus combination no.8, levothyroxine, losartan, methocarbamol, mirabegron, ocuvite adult 50 plus, and turmeric root extract.    Allergies:   Review of patient's allergies indicates:   Allergen Reactions    Voltaren [diclofenac sodium] Other (See Comments)     Hematochezia and hospitalization for hematochezia.    Codeine Diarrhea and Hallucinations    Niacin preparations      Redness, warming          OBJECTIVE     Lumbar AROM: Pain/Dysfunction with Movement:   Flexion: 80 degrees    Extension: 20 degrees Pain in low back   Right side bendin degrees Pain R low back   Left side bendin degrees Pain L low back    Right rotation: 50% limited    Left rotation: 50% limited      Myotome / MMT Right Left Pain/Dysfunction with Movement   L1,2- Hip Flexion (Femoral Nerve) 4+/5 4+/5    L3,4- Knee Extension (Femoral Nerve) 4+/5 4+/5    L4,5- Ankle Dorsiflexion (Deep Fibular Nerve) 4+/5 4+/5    L5- Big Toe Extension (Deep Fibular Nerve) 4+/5 4+/5    L5, S1- Ankle plantarflexion (Tibial Nerve) 4+/5 4+/5    Hip Abduction 4+/5 4+/5    Knee Flexion 4+/5 4+/5       Palpation: TTP of paraspinals of Lumbar region         FOTO Lumbar  Survey    Therapist reviewed FOTO scores for Jo-Ann Escobedo on 3/27/2024.   FOTO documents entered into [employer] - see Media section.    Intake Score: **         TREATMENT     Total Treatment time (time-based codes) separate from Evaluation: 15 minutes      Jo-Ann received the treatments listed below:      therapeutic exercises to develop strength, endurance, ROM, flexibility, posture, and core stabilization for 15 minutes including:    Access Code: [email or web address]  URL: [email or web address]  Date: 03/27/2024  Prepared by: Demetri Castle    Exercises  - Seated Lumbar Flexion Stretch  - 2 x daily - 7 x weekly - 20 reps - 5 seconds hold  - Seated Side Bending on Chair  - 2 x daily - 7 x weekly - 15 reps  - Seated Hamstring Stretch  - 2 x daily - 7 x weekly - 3 sets - 30 seconds hold  - Supine Lower Trunk Rotation  - 2 x daily - 7 x weekly - 15 reps - 3 seconds hold  - Hooklying Single Knee to Chest  - 2 x daily - 7 x weekly - 15 reps - 3 seconds hold  - Supine Posterior Pelvic Tilt  - 2 x daily - 7 x weekly - 20 reps - 3 seconds hold  - Supine Bridge  - 2 x daily - 7 x weekly - 3 sets - 10 reps - 1 second hold  - Clamshell  - 2 x daily - 7 x weekly - 3 sets - 10 reps - 1 second hold          PATIENT EDUCATION AND HOME EXERCISES     Education provided:   - Purpose of PT  - HEP [id number]    Written Home Exercises Provided: yes. Exercises were reviewed and Jo-Ann was able to demonstrate them prior to the end of the session.  Jo-Ann demonstrated good  understanding of the education provided. See EMR under Patient Instructions for exercises provided during therapy sessions.    ASSESSMENT     Jo-Ann is a 83 y.o. female referred to outpatient Physical Therapy with a medical diagnosis of M47.896 (ICD-10-CM) - Other osteoarthritis of spine, lumbar region. Patient presents with a chronic history of back pain for several years. Patient was diagnosed with stenosis and her subjective and objective measures are congruent with  diagnosis. Patient presents with decreased lumbar range of motion and lower ex strength. Patient requested to continue exercises at home for 2 weeks and then return if needed.     Patient prognosis is Fair.   Patient will benefit from skilled outpatient Physical Therapy to address the deficits stated above and in the chart below, provide patient /family education, and to maximize patientt's level of independence.     Plan of care discussed with patient: Yes  Patient's spiritual, cultural and educational needs considered and patient is agreeable to the plan of care and goals as stated below:     Anticipated Barriers for therapy: Chronic pain     Medical Necessity is demonstrated by the following  History  Co-morbidities and personal factors that may impact the plan of care Co-morbidities:   See medical history     Personal Factors:   no deficits     high   Examination  Body Structures and Functions, activity limitations and participation restrictions that may impact the plan of care Body Regions:   back  lower extremities    Body Systems:    ROM  strength    Participation Restrictions:   None    Activity limitations:   Learning and applying knowledge  no deficits    General Tasks and Commands  no deficits    Communication  no deficits    Mobility  no deficits    Self care  no deficits    Domestic Life  no deficits    Interactions/Relationships  no deficits    Life Areas  no deficits    Community and Social Life  no deficits         high   Clinical Presentation stable and uncomplicated low   Decision Making/ Complexity Score: low     Goals:  Short Term Goals (1 Weeks):  1. Pt will be compliant with HEP to supplement PT in decreasing pain with functional mobility.    Long Term Goals (4 Weeks):   1. Pt will improve FOTO score to >/= ** to decrease perceived limitation with maintaining/changing body position.   2. Pt will perform squat with good form to reduce risk of re injury with lifting.  3. Pt will improve impaired  LE MMTs by 1/2 score to improve strength for functional tasks.  4. Pt will report no pain during lumbar ROM to promote functional mobility.       PLAN   Plan of care Certification: 3/27/2024 to 5/27/2024.    Outpatient Physical Therapy 1 times weekly for 4 weeks to include the following interventions: Manual Therapy, Moist Heat/ Ice, Neuromuscular Re-ed, Patient Education, Self Care, Therapeutic Activities, and Therapeutic Exercise.     Demetri Castle, PT      I CERTIFY THE NEED FOR THESE SERVICES FURNISHED UNDER THIS PLAN OF TREATMENT AND WHILE UNDER MY CARE   Physician's comments:     Physician's Signature: ___________________________________________________

## 2024-04-04 ENCOUNTER — PATIENT MESSAGE (OUTPATIENT)
Dept: UROLOGY | Facility: CLINIC | Age: 84
End: 2024-04-04
Payer: MEDICARE

## 2024-04-04 ENCOUNTER — HOSPITAL ENCOUNTER (OUTPATIENT)
Dept: RADIOLOGY | Facility: HOSPITAL | Age: 84
Discharge: HOME | End: 2024-04-04
Attending: UROLOGY
Payer: MEDICARE

## 2024-04-04 DIAGNOSIS — R10.9 FLANK PAIN: ICD-10-CM

## 2024-04-04 DIAGNOSIS — R10.10 PAIN OF UPPER ABDOMEN: ICD-10-CM

## 2024-04-04 PROCEDURE — 76770 US EXAM ABDO BACK WALL COMP: CPT | Mod: TC

## 2024-04-04 PROCEDURE — 76770 US EXAM ABDO BACK WALL COMP: CPT | Mod: 26,,, | Performed by: RADIOLOGY

## 2024-04-05 ENCOUNTER — OFFICE VISIT (OUTPATIENT)
Dept: URGENT CARE | Facility: CLINIC | Age: 84
End: 2024-04-05
Payer: MEDICARE

## 2024-04-05 VITALS
HEART RATE: 91 BPM | OXYGEN SATURATION: 96 % | DIASTOLIC BLOOD PRESSURE: 69 MMHG | TEMPERATURE: 97 F | SYSTOLIC BLOOD PRESSURE: 138 MMHG | RESPIRATION RATE: 16 BRPM | BODY MASS INDEX: 32.94 KG/M2 | WEIGHT: 179 LBS | HEIGHT: 62 IN

## 2024-04-05 DIAGNOSIS — J02.9 VIRAL PHARYNGITIS: Primary | ICD-10-CM

## 2024-04-05 LAB
CTP QC/QA: YES
CTP QC/QA: YES
MOLECULAR STREP A: NEGATIVE
SARS-COV-2 AG RESP QL IA.RAPID: NEGATIVE

## 2024-04-05 PROCEDURE — 87811 SARS-COV-2 COVID19 W/OPTIC: CPT | Mod: QW,S$GLB,, | Performed by: NURSE PRACTITIONER

## 2024-04-05 PROCEDURE — 99203 OFFICE O/P NEW LOW 30 MIN: CPT | Mod: S$GLB,,, | Performed by: NURSE PRACTITIONER

## 2024-04-05 PROCEDURE — 87651 STREP A DNA AMP PROBE: CPT | Mod: QW,S$GLB,, | Performed by: NURSE PRACTITIONER

## 2024-04-05 NOTE — PROGRESS NOTES
"Subjective:      Patient ID: Jo-Ann Escobedo is a 83 y.o. female.    Vitals:  height is 5' 2" (1.575 m) and weight is 81.2 kg (179 lb). Her oral temperature is 97.4 °F (36.3 °C). Her blood pressure is 138/69 and her pulse is 91. Her respiration is 16 and oxygen saturation is 96%.     Chief Complaint: Sore Throat    This is a 83 y.o female who presents today with chief complaint of sore throat x3-4 days.  Patient denies fever, chills, body aches, cough, headaches, SOB, and chest pain.  Home tx: Robitussin and reports no improvement.     Sore Throat   This is a new problem. The current episode started in the past 7 days. There has been no fever. The pain is at a severity of 4/10. The pain is mild. Associated symptoms include trouble swallowing. Pertinent negatives include no abdominal pain, congestion, coughing, diarrhea, drooling, ear discharge, ear pain, headaches, hoarse voice, plugged ear sensation, neck pain, shortness of breath, stridor, swollen glands or vomiting. Treatments tried: Robitussin. The treatment provided no relief.       Constitution: Negative for fever.   HENT:  Positive for sore throat and trouble swallowing. Negative for ear pain, ear discharge, drooling and congestion.    Neck: Negative for neck pain.   Respiratory:  Negative for cough, shortness of breath and stridor.    Gastrointestinal:  Negative for abdominal pain, vomiting and diarrhea.   Neurological:  Negative for headaches.      Objective:     Physical Exam   Constitutional: She is oriented to person, place, and time. She appears well-developed. She is cooperative.  Non-toxic appearance. She does not appear ill. No distress.   HENT:   Head: Normocephalic.   Ears:   Right Ear: Hearing, tympanic membrane, external ear and ear canal normal.   Left Ear: Hearing, tympanic membrane, external ear and ear canal normal.   Nose: Nose normal. No mucosal edema, rhinorrhea or nasal deformity. No epistaxis. Right sinus exhibits no maxillary sinus " tenderness and no frontal sinus tenderness. Left sinus exhibits no maxillary sinus tenderness and no frontal sinus tenderness.   Mouth/Throat: Uvula is midline and mucous membranes are normal. Mucous membranes are moist. No trismus in the jaw. Normal dentition. No uvula swelling. Posterior oropharyngeal erythema present. No oropharyngeal exudate or posterior oropharyngeal edema. Oropharynx is clear.   Eyes: Conjunctivae and lids are normal. No scleral icterus.   Neck: Trachea normal and phonation normal. Neck supple. No edema present. No erythema present. No neck rigidity present.   Cardiovascular: Normal rate and regular rhythm.   Pulmonary/Chest: Effort normal and breath sounds normal. No respiratory distress. She has no decreased breath sounds. She has no wheezes. She has no rhonchi.   Abdominal: Normal appearance.   Musculoskeletal: Normal range of motion.         General: No deformity. Normal range of motion.   Neurological: She is alert and oriented to person, place, and time. She exhibits normal muscle tone. Coordination normal.   Skin: Skin is warm, dry, intact, not diaphoretic and not pale.   Psychiatric: Her speech is normal and behavior is normal. Judgment and thought content normal.   Nursing note and vitals reviewed.    Results for orders placed or performed in visit on 04/05/24   POCT Strep A, Molecular   Result Value Ref Range    Molecular Strep A, POC Negative Negative     Acceptable Yes    SARS Coronavirus 2 Antigen, POCT Manual Read   Result Value Ref Range    SARS Coronavirus 2 Antigen Negative Negative     Acceptable Yes      *Note: Due to a large number of results and/or encounters for the requested time period, some results have not been displayed. A complete set of results can be found in Results Review.        Assessment:     1. Viral pharyngitis        Plan:       Viral pharyngitis  -     POCT Strep A, Molecular  -     SARS Coronavirus 2 Antigen, POCT Manual  Read  -     (Magic mouthwash) 1:1:1 diphenhydrAMINE(Benadryl) 12.5mg/5ml liq, aluminum & magnesium hydroxide-simethicone (Maalox), LIDOcaine viscous 2%; Swish and spit 15 mLs every 4 (four) hours as needed (throat pain). for mouth sores  Dispense: 250 mL; Refill: 1      Patient Instructions   Oral fluids-keep throat moist at all times   Rest  Soft foods for throat pain   Droplet and contact precautions (good handwashing, cover coughs and sneezes, no food/drink sharing, wipe down surfaces after use)   Warm salt water gargles   Tylenol for pain relief

## 2024-04-05 NOTE — PATIENT INSTRUCTIONS
Oral fluids-keep throat moist at all times   Rest  Soft foods for throat pain   Droplet and contact precautions (good handwashing, cover coughs and sneezes, no food/drink sharing, wipe down surfaces after use)   Warm salt water gargles   Tylenol for pain relief

## 2024-04-19 ENCOUNTER — HOSPITAL ENCOUNTER (OUTPATIENT)
Dept: RADIOLOGY | Facility: HOSPITAL | Age: 84
Discharge: HOME | End: 2024-04-19
Attending: UROLOGY
Payer: MEDICARE

## 2024-04-19 DIAGNOSIS — R10.10 PAIN OF UPPER ABDOMEN: ICD-10-CM

## 2024-04-19 PROCEDURE — 74176 CT ABD & PELVIS W/O CONTRAST: CPT | Mod: 26,,, | Performed by: RADIOLOGY

## 2024-04-19 PROCEDURE — 74176 CT ABD & PELVIS W/O CONTRAST: CPT | Mod: TC

## 2024-04-24 ENCOUNTER — TELEPHONE (OUTPATIENT)
Dept: INTERNAL MEDICINE | Facility: CLINIC | Age: 84
End: 2024-04-24
Payer: MEDICARE

## 2024-05-02 ENCOUNTER — OFFICE VISIT (OUTPATIENT)
Dept: OPTOMETRY | Facility: CLINIC | Age: 84
End: 2024-05-02
Payer: MEDICARE

## 2024-05-02 DIAGNOSIS — H04.123 DRY EYES, BILATERAL: ICD-10-CM

## 2024-05-02 DIAGNOSIS — Z13.5 SCREENING FOR GLAUCOMA: ICD-10-CM

## 2024-05-02 DIAGNOSIS — H35.363 DRUSEN OF MACULA OF BOTH EYES: Primary | ICD-10-CM

## 2024-05-02 DIAGNOSIS — H52.4 PRESBYOPIA: ICD-10-CM

## 2024-05-02 PROCEDURE — 92015 DETERMINE REFRACTIVE STATE: CPT | Mod: S$GLB,,, | Performed by: OPTOMETRIST

## 2024-05-02 PROCEDURE — 1126F AMNT PAIN NOTED NONE PRSNT: CPT | Mod: CPTII,S$GLB,, | Performed by: OPTOMETRIST

## 2024-05-02 PROCEDURE — 1159F MED LIST DOCD IN RCRD: CPT | Mod: CPTII,S$GLB,, | Performed by: OPTOMETRIST

## 2024-05-02 PROCEDURE — 3288F FALL RISK ASSESSMENT DOCD: CPT | Mod: CPTII,S$GLB,, | Performed by: OPTOMETRIST

## 2024-05-02 PROCEDURE — 1157F ADVNC CARE PLAN IN RCRD: CPT | Mod: CPTII,S$GLB,, | Performed by: OPTOMETRIST

## 2024-05-02 PROCEDURE — 1101F PT FALLS ASSESS-DOCD LE1/YR: CPT | Mod: CPTII,S$GLB,, | Performed by: OPTOMETRIST

## 2024-05-02 PROCEDURE — 99999 PR PBB SHADOW E&M-EST. PATIENT-LVL III: CPT | Mod: PBBFAC,,, | Performed by: OPTOMETRIST

## 2024-05-02 PROCEDURE — 92014 COMPRE OPH EXAM EST PT 1/>: CPT | Mod: S$GLB,,, | Performed by: OPTOMETRIST

## 2024-05-02 PROCEDURE — 1160F RVW MEDS BY RX/DR IN RCRD: CPT | Mod: CPTII,S$GLB,, | Performed by: OPTOMETRIST

## 2024-05-02 NOTE — PROGRESS NOTES
HPI    82 Yo female is here for routine eye exam with C/o pt say's she notices a   change in vision pt say's she has a hard times seeing street signs and   vision gets foggy, this happens at certain times of the day with Rx   glasses on.  Pt denies pain and discomfort   Occ floaters     Eye med: Soothe Xp OU BID   Last edited by Timoteo Marcus MA on 5/2/2024 10:10 AM.            Assessment /Plan     For exam results, see Encounter Report.    Drusen of macula of both eyes    Screening for glaucoma    Presbyopia    Dry eyes, bilateral      1. Sp pciol/YAG OU--pt wishes new Rx  2. Mac drusen OU (see last OCT--no CME/CNVM).  Discussed sunglasses/vitamins.  Pt non-smoker.  Re-Instructed on Amsler Grid use  3. COREY--pt to inc Ats to QID      PLAN:   Rtc 1 yr, or immediately if Amsler changes

## 2024-05-14 ENCOUNTER — OFFICE VISIT (OUTPATIENT)
Dept: UROLOGY | Facility: CLINIC | Age: 84
End: 2024-05-14
Payer: MEDICARE

## 2024-05-14 VITALS
SYSTOLIC BLOOD PRESSURE: 107 MMHG | WEIGHT: 183.19 LBS | HEIGHT: 62 IN | HEART RATE: 103 BPM | BODY MASS INDEX: 33.71 KG/M2 | DIASTOLIC BLOOD PRESSURE: 66 MMHG

## 2024-05-14 DIAGNOSIS — N39.41 URGE INCONTINENCE OF URINE: Primary | ICD-10-CM

## 2024-05-14 PROCEDURE — 3074F SYST BP LT 130 MM HG: CPT | Mod: CPTII,S$GLB,, | Performed by: UROLOGY

## 2024-05-14 PROCEDURE — 1101F PT FALLS ASSESS-DOCD LE1/YR: CPT | Mod: CPTII,S$GLB,, | Performed by: UROLOGY

## 2024-05-14 PROCEDURE — 1157F ADVNC CARE PLAN IN RCRD: CPT | Mod: CPTII,S$GLB,, | Performed by: UROLOGY

## 2024-05-14 PROCEDURE — 3288F FALL RISK ASSESSMENT DOCD: CPT | Mod: CPTII,S$GLB,, | Performed by: UROLOGY

## 2024-05-14 PROCEDURE — 3078F DIAST BP <80 MM HG: CPT | Mod: CPTII,S$GLB,, | Performed by: UROLOGY

## 2024-05-14 PROCEDURE — 99213 OFFICE O/P EST LOW 20 MIN: CPT | Mod: S$GLB,,, | Performed by: UROLOGY

## 2024-05-14 PROCEDURE — 99999 PR PBB SHADOW E&M-EST. PATIENT-LVL III: CPT | Mod: PBBFAC,,, | Performed by: UROLOGY

## 2024-05-14 PROCEDURE — 1159F MED LIST DOCD IN RCRD: CPT | Mod: CPTII,S$GLB,, | Performed by: UROLOGY

## 2024-05-14 NOTE — PROGRESS NOTES
[facility]      New Overactive Bladder (OAB) Note    5/15/2024    Referred by:  No ref. provider found    HPI: Jo-Ann Escobedo is a very pleasant 83 y.o. female who returns to our department referred for evaluation of urinary incontinence of  8  months duration. Last visit, she began Myrbetriq 50 mg. She reports her incontinence is much better, decreasing from 4 pads per day to 1 pad per day. Her nocturia has also improved. She is satisfied with her improvement.     She denies symptoms of irritative voiding including dysuria. She denies symptoms of obstructive voiding including decreased stream, hesitancy, intermittency, post void dribbling, and sense of incomplete emptying. Bladder scan PVR was 3 mL. Her history includes spondylosis, nephrolithiasis and diverticulitis.  She denies all other prior pelvic surgeries or neurologic diagnoses. She has had a hysterectomy. She does not report symptoms suggestive of advanced POP. She denies a history of constipation. She denies a history suggestive of glaucoma.  She takes HCTZ for HTN.     Previous incontinence therapies:  Behavioral Therapies  None  Medications  Myrbetriq 50 mg 2 months - Decreased from 4 daily pads to 1.   Other Therapies  No Other Therapies    A review of 10+ systems was conducted with pertinent positive and negative findings documented in HPI with all other systems reviewed and negative.    Past medical, family, surgical and social history reviewed as documented in chart with pertinent positive medical, family, surgical and social history detailed in HPI.    Exam Findings:    Const: no acute distress, conversant and alert  Eyes: anicteric, extraocular muscles intact  ENMT: normocephalic, Nl oral membranes  Cardio: no cyanosis, nl cap refill  Pulm: no tachypnea; no resp distress  Abd: soft, no tenderness  Musc: no laceration, no tenderness  Neuro: alert; oriented x 3  Skin: warm, dry; no petichiae  Psych: no anxiety; normal speech      Assessment/Plan:    Overactive Bladder with Urgency Incontinence (established, improved): She is satisfied with decrease from 4 daily pads to 1 pad. Her nocturia has also improved.     Due to the significant risk of adverse events, I do not recommend the use of anticholinergics, especially oxybutynin in this patient. Anticholinergics such as oxybutynin chloride,have been demonstrated repeatedly to be independently associated with risk of both dementia and development of Alzheimer's disease in the elderly (age >65) (PARRIS Intern Med. 2015;175(3):401-407. doi:10.1001/jamainternmed.2014.7663). Accordingly, this patient would need to limit pharmacologic treatment for bladder symptoms to either a beta-3 agonist such as Myrbetriq (mirabegron) or Gemtesa (vibegron). If beta-3 agonist therapy is contraindicated and pharmacologic therapy is necessary, the patient should limit anticholinergic bladder medications to those agents known to have more difficulty crossing the blood-brain barrier such as trospium.

## 2024-05-27 ENCOUNTER — PATIENT MESSAGE (OUTPATIENT)
Dept: ADMINISTRATIVE | Facility: OTHER | Age: 84
End: 2024-05-27
Payer: MEDICARE

## 2024-06-17 PROBLEM — Z00.00 HEALTHCARE MAINTENANCE: Status: RESOLVED | Noted: 2023-05-17 | Resolved: 2024-06-17

## 2024-07-18 ENCOUNTER — PATIENT MESSAGE (OUTPATIENT)
Dept: ADMINISTRATIVE | Facility: OTHER | Age: 84
End: 2024-07-18
Payer: MEDICARE

## 2024-08-19 DIAGNOSIS — Z96.641 HISTORY OF RIGHT HIP REPLACEMENT: ICD-10-CM

## 2024-08-19 RX ORDER — AMOXICILLIN 500 MG/1
2000 CAPSULE ORAL
Qty: 4 CAPSULE | Refills: 1 | Status: SHIPPED | OUTPATIENT
Start: 2024-08-19

## 2024-09-20 ENCOUNTER — TELEPHONE (OUTPATIENT)
Dept: NEUROSURGERY | Facility: CLINIC | Age: 84
End: 2024-09-20
Payer: MEDICARE

## 2024-09-20 ENCOUNTER — OFFICE VISIT (OUTPATIENT)
Dept: INTERNAL MEDICINE | Facility: CLINIC | Age: 84
End: 2024-09-20
Payer: MEDICARE

## 2024-09-20 VITALS
DIASTOLIC BLOOD PRESSURE: 70 MMHG | HEART RATE: 104 BPM | SYSTOLIC BLOOD PRESSURE: 118 MMHG | HEIGHT: 62 IN | BODY MASS INDEX: 33.71 KG/M2 | WEIGHT: 183.19 LBS | OXYGEN SATURATION: 95 %

## 2024-09-20 DIAGNOSIS — M17.12 ARTHRITIS OF LEFT KNEE: ICD-10-CM

## 2024-09-20 DIAGNOSIS — M54.17 LUMBOSACRAL RADICULOPATHY: Primary | ICD-10-CM

## 2024-09-20 DIAGNOSIS — M54.50 CHRONIC BILATERAL LOW BACK PAIN WITHOUT SCIATICA: ICD-10-CM

## 2024-09-20 DIAGNOSIS — M48.062 SPINAL STENOSIS OF LUMBAR REGION WITH NEUROGENIC CLAUDICATION: Primary | ICD-10-CM

## 2024-09-20 DIAGNOSIS — G89.29 CHRONIC BILATERAL LOW BACK PAIN WITHOUT SCIATICA: ICD-10-CM

## 2024-09-20 DIAGNOSIS — Z85.3 HISTORY OF BREAST CANCER: ICD-10-CM

## 2024-09-20 PROCEDURE — 99999 PR PBB SHADOW E&M-EST. PATIENT-LVL V: CPT | Mod: PBBFAC,,, | Performed by: NURSE PRACTITIONER

## 2024-09-20 RX ORDER — METHYLPREDNISOLONE 4 MG/1
TABLET ORAL
Qty: 21 EACH | Refills: 0 | Status: SHIPPED | OUTPATIENT
Start: 2024-09-20 | End: 2024-10-11

## 2024-09-20 RX ORDER — METHOCARBAMOL 500 MG/1
500 TABLET, FILM COATED ORAL 3 TIMES DAILY
COMMUNITY

## 2024-09-20 NOTE — PROGRESS NOTES
INTERNAL MEDICINE PROGRESS/URGENT CARE NOTE    CHIEF COMPLAINT     Chief Complaint   Patient presents with    Back Pain    Hand Pain       HPI     Jo-Ann Escobedo is a 83 y.o. female who presents for an urgent/follow up visit today.    Pt with chronic pain c/o worsening low back pain and hand pain for a while. Her left knee has also been bothering her.   Uses voltaren on hand but doesn't get much relief. Has seen ortho for had. Had CT release and feels like it didn't help much. Takes tylenol when it gets really bad.   Has tried muscle relaxers, lidocaine patches, PT, pain mgmnt with multiple ESIs, gabapentin without any real relief.   Pain is much worse in the morning when she first gets up.   Still working.     Pain and back is nonradiating.  No saddle anesthesia bladder or bowel dysfunction.    Can not take NSAIDs due to history of GI bleed from NSAIDs    Of note, she has a history of breast cancer.  Underwent a right mastectomy in June of 2020.  This was a reoccurrence from breast cancer that she had 17 years ago who had a left modified radical mastectomy. It keeps showing on her portal that she needs a mmg.    Patient Active Problem List   Diagnosis    PCO (posterior capsular opacification), right    Mixed hyperlipidemia    Sensorineural hearing loss, bilateral    Paget's disease of bone    Obesity (BMI 30.0-34.9)    Osteoarthritis of lumbar spine    Hypothyroidism, postsurgical    Lipoma of arm    Essential hypertension    Prediabetes    Facet arthritis of lumbar region    Aortic atherosclerosis    Ductal carcinoma in situ (DCIS) of right breast    Pseudophakia    Nephrolithiasis    BPPV (benign paroxysmal positional vertigo)    Diverticulitis of large intestine without perforation or abscess with bleeding    Abnormal CT of the head    Gastric nodule    History of GI bleed    Other dietary vitamin B12 deficiency anemia    Left carpal tunnel syndrome    Cervical radiculopathy at C7    Spondylosis of lumbar region  without myelopathy or radiculopathy    Chronic right shoulder pain    Nontraumatic complete tear of right rotator cuff    Rotator cuff arthropathy of right shoulder    Spinal stenosis of lumbar region with neurogenic claudication    DDD (degenerative disc disease), lumbosacral    Lumbosacral radiculopathy    Personal history of skin cancer    Hip pain    Calcified granuloma of lung    IGTN (ingrowing toe nail)    Adenomatous polyp of colon    Primary osteoarthritis of right hip    Right upper quadrant abdominal tenderness    S/P right total hip arthroplasty    Postoperative heterotopic ossification of muscle    Chronic bilateral low back pain without sciatica    Decreased ROM of lumbar spine        Past Medical History:  Past Medical History:   Diagnosis Date    Acute blood loss anemia 12/07/2019    After-cataract of both eyes - Both Eyes 09/26/2012    Arthritis of foot 07/11/2014    right subtalar     Cholelithiasis     Deaf, left     Diverticulitis of large intestine without perforation or abscess with bleeding 12/07/2019    Diverticulosis     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 She subsequently underwent colonoscopy 12/9 and several large diverticula in the sigmoid colon was seen with hematin throughout the colon; blood pooling also noted in the sigmoid colon, during which clip was placed f    Hematochezia     Hypothyroidism, postsurgical 07/01/2015    Mixed hyperlipidemia 09/27/2012    Multinodular goiter 07/31/2014    Paget's disease of bone 07/10/2013    SCC (squamous cell carcinoma) 12/2020    left 5th knuckle    Squamous  Cell Carcinoma     in situ right neck         Past Surgical History:  Past Surgical History:   Procedure Laterality Date    ARTHROPLASTY OF HIP BY ANTERIOR APPROACH Right 7/5/2023    Procedure: ARTHROPLASTY, HIP, TOTAL, ANTERIOR APPROACH;  Surgeon: Federico Ramirez MD;  Location: [facility] 2ND FLR;  Service: Orthopedics;  Laterality: Right;    BREAST BIOPSY Right 2019    BREAST LUMPECTOMY Right 2019    CARPAL TUNNEL RELEASE Left 6/5/2020    Procedure: RELEASE, CARPAL TUNNEL Left;  Surgeon: Pricila Presley MD;  Location: [facility] OR;  Service: Orthopedics;  Laterality: Left;    CARPAL TUNNEL RELEASE Right 12/5/2022    Procedure: RELEASE, CARPAL TUNNEL, right;  Surgeon: Pricila Presley MD;  Location: [facility] OR;  Service: Orthopedics;  Laterality: Right;    CATARACT EXTRACTION W/  INTRAOCULAR LENS IMPLANT Bilateral     COLONOSCOPY N/A 4/15/2019    Procedure: COLONOSCOPY;  Surgeon: Artur Monet MD;  Location: [facility] (4TH FLR);  Service: Endoscopy;  Laterality: N/A;  within 1 month    COLONOSCOPY N/A 12/9/2019    Procedure: COLONOSCOPY;  Surgeon: Clyde Welch MD;  Location: [facility] ENDO (2ND FLR);  Service: Endoscopy;  Laterality: N/A;    COLONOSCOPY N/A 2/13/2023    Procedure: COLONOSCOPY;  Surgeon: Johan Hoyos MD;  Location: [facility] ENDO (4TH FLR);  Service: Endoscopy;  Laterality: N/A;  Okay for any CRS MD if Dr. Monet booked. but hopeful to get this done about 4 weeks from now  1/10 - pt called about time change and MD change. patient verbalized understanding and new instructions sent -   Precall complete- [place]    COLONOSCOPY N/A 11/6/2023    Procedure: COLONOSCOPY;  Surgeon: Artur Monet MD;  Location: [facility] ENDO (4TH FLR);  Service: Endoscopy;  Laterality: N/A;  inst. to pt. portall. Tb  referral: Dr. Neely  10/30-precall complete-MS    ENDOSCOPIC ULTRASOUND OF UPPER GASTROINTESTINAL TRACT N/A 1/22/2020    Procedure: ULTRASOUND, UPPER GI TRACT, ENDOSCOPIC;  Surgeon: Eleazar Shaffer,  MD;  Location: [facility] ENDO (2ND FLR);  Service: Endoscopy;  Laterality: N/A;  EUS for 10mm SML w/negative pillow sign and negative naked fat sign which was found at the incisura.  Dr Welch    EPIDURAL STEROID INJECTION Bilateral 12/17/2021    Procedure: INJECTION, STEROID, EPIDURAL, L3-L4 Bilateral DIRECT REF Transforaminal;  Surgeon: Julian Fish MD;  Location: [facility] PAIN MGT;  Service: Pain Management;  Laterality: Bilateral;    ESOPHAGOGASTRODUODENOSCOPY N/A 12/7/2019    Procedure: EGD (ESOPHAGOGASTRODUODENOSCOPY);  Surgeon: Clyde Welch MD;  Location: [facility] ENDO (2ND FLR);  Service: Endoscopy;  Laterality: N/A;    EXCISIONAL BIOPSY Right 6/27/2019    Procedure: EXCISIONAL BIOPSY-breast;  Surgeon: Suki Dill MD;  Location: [facility] 2ND FLR;  Service: General;  Laterality: Right;    EYE SURGERY      HYSTERECTOMY      INJECTION Right 3/3/2023    Procedure: INJECTION, RIGHT HIP CORTICOSTEROID /LOCAL INJECTION CONTRAST DIRECT REF;  Surgeon: Julian Fish MD;  Location: [facility] PAIN MGT;  Service: Pain Management;  Laterality: Right;    INJECTION FOR SENTINEL NODE IDENTIFICATION Right 7/30/2020    Procedure: INJECTION, FOR SENTINEL NODE IDENTIFICATION;  Surgeon: Suki Dill MD;  Location: [facility] OR;  Service: General;  Laterality: Right;    INJECTION OF ANESTHETIC AGENT AROUND MEDIAL BRANCH NERVES INNERVATING LUMBAR FACET JOINT Bilateral 6/7/2019    Procedure: BLOCK, NERVE, FACET JOINT, LUMBAR, MEDIAL BRANCH-deo MBB L4/5,L5/S1;  Surgeon: Jarvis Morales III, MD;  Location: [facility] CATH LAB;  Service: Pain Management;  Laterality: Bilateral;    INJECTION OF STEROID Right 6/5/2020    Procedure: INJECTION, STEROID right carpal tunel injection/ Right ring trigger finger injection;  Surgeon: Pricila Presley MD;  Location: [facility] OR;  Service: Orthopedics;  Laterality: Right;  INJECTION, STEROID right carpal tunel injection/ Right ring trigger finger injection    KNEE ARTHROSCOPY N/A     pt unsure of  which knee     MASTECTOMY      OOPHORECTOMY      SENTINEL LYMPH NODE BIOPSY Right 7/30/2020    Procedure: BIOPSY, LYMPH NODE, SENTINEL;  Surgeon: Suki Dill MD;  Location: [facility] OR;  Service: General;  Laterality: Right;    SPINE SURGERY      TOTAL THYROIDECTOMY      TRANSFORAMINAL EPIDURAL INJECTION OF STEROID Bilateral 5/27/2022    Procedure: INJECTION, STEROID, EPIDURAL, TF BILATERAL L3-L4 CONTRAST DIRECT REF;  Surgeon: Julian Fish MD;  Location: [facility] PAIN MGT;  Service: Pain Management;  Laterality: Bilateral;    TRANSFORAMINAL EPIDURAL INJECTION OF STEROID Bilateral 10/21/2022    Procedure: LUMBAR TRANSFORAMINAL BILATERAL L3/4 CONTRAST DIRECT REFERRAL;  Surgeon: Julian Fish MD;  Location: [facility] PAIN MGT;  Service: Pain Management;  Laterality: Bilateral;    UNILATERAL MASTECTOMY Right 7/30/2020    Procedure: MASTECTOMY, UNILATERAL;  Surgeon: Suki Dill MD;  Location: [facility] OR;  Service: General;  Laterality: Right;    YAG Laser Capsulotomy  Left 07/29/2019 01/06/2021 OD//Dr. Hahn        Allergies:  Review of patient's allergies indicates:   Allergen Reactions    Voltaren [diclofenac sodium] Other (See Comments)     Hematochezia and hospitalization for hematochezia.    Codeine Diarrhea and Hallucinations    Niacin preparations      Redness, warming       Home Medications:    Current Outpatient Medications:     (Magic mouthwash) 1:1:1 diphenhydrAMINE(Benadryl) 12.5mg/5ml liq, aluminum & magnesium hydroxide-simethicone (Maalox), LIDOcaine viscous 2%, Swish and spit 15 mLs every 4 (four) hours as needed (throat pain). for mouth sores, Disp: 250 mL, Rfl: 1    acetaminophen (TYLENOL) 500 MG tablet, Take 2 tablets (1,000 mg total) by mouth every 8 (eight) hours as needed for Pain., Disp: 54 tablet, Rfl: 0    amoxicillin (AMOXIL) 500 MG capsule, TAKE 4 CAPSULES (2,000 MG TOTAL) BY MOUTH ON CALL PROCEDURE (TAKE 1 HOUR PRIOR TO DENTAL APPT)., Disp: 4 capsule, Rfl: 1    ascorbic acid, vitamin C,  (VITAMIN C) 100 MG tablet, Take 100 mg by mouth once daily., Disp: , Rfl:     cholecalciferol, vitamin D3, (VITAMIN D3) 50 mcg (2,000 unit) Tab, Take 1 tablet by mouth once daily., Disp: , Rfl:     co-enzyme Q-10 30 mg capsule, Take 30 mg by mouth once daily., Disp: , Rfl:     cyanocobalamin (VITAMIN B-12) 250 MCG tablet, Take 250 mcg by mouth once daily., Disp: , Rfl:     hydroCHLOROthiazide (HYDRODIURIL) 25 MG tablet, TAKE 1/2 TABLET BY MOUTH ONCE DAILY, Disp: 45 tablet, Rfl: 3    ketoconazole (NIZORAL) 2 % cream, Apply topically once daily. Apply daily to affected toenails for 6-12 months, Disp: 60 g, Rfl: 4    LACTOBACILLUS COMBINATION NO.8 (ADULT PROBIOTIC ORAL), Take by mouth once daily. , Disp: , Rfl:     levothyroxine (SYNTHROID) 100 MCG tablet, Take 1 tablet (100 mcg total) by mouth before breakfast., Disp: 90 tablet, Rfl: 3    losartan (COZAAR) 100 MG tablet, Take 1 tablet (100 mg total) by mouth once daily., Disp: 90 tablet, Rfl: 3    mirabegron (MYRBETRIQ) 50 mg Tb24, Take 1 tablet (50 mg total) by mouth once daily., Disp: 30 tablet, Rfl: 11    gv-ph-ef2-dha-epa-fish-lut-carlos (OCUVITE ADULT 50 PLUS) 250 mg (90 mg-160 mg) Cap, Take by mouth., Disp: , Rfl:     turmeric root extract 500 mg Cap, Take by mouth once daily. , Disp: , Rfl:     famotidine (PEPCID) 20 MG tablet, Take 1 tablet (20 mg total) by mouth 2 (two) times daily. for 21 days, Disp: 42 tablet, Rfl: 0    fenofibrate 160 MG Tab, TAKE 1 TABLET (160 MG TOTAL) BY MOUTH ONCE DAILY., Disp: 90 tablet, Rfl: 3    methocarbamoL (ROBAXIN) 500 MG Tab, Take 500 mg by mouth 3 (three) times daily., Disp: , Rfl:     methylPREDNISolone (MEDROL DOSEPACK) 4 mg tablet, use as directed, Disp: 21 each, Rfl: 0     Review of Systems:  Review of Systems   Constitutional:  Negative for fever.   Respiratory:  Negative for cough and shortness of breath.    Cardiovascular:  Negative for chest pain.   Musculoskeletal:  Positive for arthralgias and back pain.   Neurological:  " Positive for numbness.         PHYSICAL EXAM     Vitals:    09/20/24 1039   BP: 118/70   BP Location: Right arm   Patient Position: Sitting   BP Method: Small (Manual)   Pulse: 104   SpO2: 95%   Weight: 83.1 kg (183 lb 3.2 oz)   Height: 5' 2" (1.575 m)      Body mass index is 33.51 kg/m².     Physical Exam  Vitals reviewed.   Constitutional:       Appearance: Normal appearance.   HENT:      Head: Normocephalic.   Cardiovascular:      Rate and Rhythm: Normal rate and regular rhythm.   Pulmonary:      Effort: Pulmonary effort is normal.      Breath sounds: Normal breath sounds.   Musculoskeletal:      Left hand: Decreased range of motion. Normal capillary refill. Normal pulse.      Lumbar back: Tenderness present. No bony tenderness.      Left knee: No swelling or bony tenderness. Normal range of motion. Tenderness present.      Comments: Knee cracks when she fully extends   Skin:     General: Skin is warm and dry.   Neurological:      General: No focal deficit present.      Mental Status: She is alert.   Psychiatric:         Mood and Affect: Mood normal.         Behavior: Behavior normal.         LABS     Lab Results   Component Value Date    HGBA1C 5.6 03/13/2024     CMP  Sodium   Date Value Ref Range Status   02/26/2024 141 136 - 145 mmol/L Final     Potassium   Date Value Ref Range Status   02/26/2024 4.1 3.5 - 5.1 mmol/L Final     Chloride   Date Value Ref Range Status   02/26/2024 105 95 - 110 mmol/L Final     CO2   Date Value Ref Range Status   02/26/2024 26 23 - 29 mmol/L Final     Glucose   Date Value Ref Range Status   02/26/2024 80 70 - 110 mg/dL Final     BUN   Date Value Ref Range Status   02/26/2024 19 8 - 23 mg/dL Final     Creatinine   Date Value Ref Range Status   02/26/2024 0.7 0.5 - 1.4 mg/dL Final     Calcium   Date Value Ref Range Status   02/26/2024 10.8 (H) 8.7 - 10.5 mg/dL Final     Total Protein   Date Value Ref Range Status   06/13/2023 6.6 6.0 - 8.4 g/dL Final     Albumin   Date Value Ref " Range Status   02/26/2024 3.9 3.5 - 5.2 g/dL Final     Total Bilirubin   Date Value Ref Range Status   06/13/2023 0.5 0.1 - 1.0 mg/dL Final     Comment:     For infants and newborns, interpretation of results should be based  on gestational age, weight and in agreement with clinical  observations.    Premature Infant recommended reference ranges:  Up to 24 hours.............<8.0 mg/dL  Up to 48 hours............<12.0 mg/dL  3-5 days..................<15.0 mg/dL  6-29 days.................<15.0 mg/dL       Alkaline Phosphatase   Date Value Ref Range Status   06/13/2023 152 (H) 55 - 135 U/L Final     AST   Date Value Ref Range Status   06/13/2023 26 10 - 40 U/L Final     ALT   Date Value Ref Range Status   06/13/2023 27 10 - 44 U/L Final     Anion Gap   Date Value Ref Range Status   02/26/2024 10 8 - 16 mmol/L Final     eGFR if    Date Value Ref Range Status   07/18/2022 >60.0 >60 mL/min/1.73 m^2 Final     eGFR if non    Date Value Ref Range Status   07/18/2022 >60.0 >60 mL/min/1.73 m^2 Final     Comment:     Calculation used to obtain the estimated glomerular filtration  rate (eGFR) is the CKD-EPI equation.        Lab Results   Component Value Date    WBC 7.78 02/26/2024    HGB 14.7 02/26/2024    HCT 44.2 02/26/2024    MCV 96 02/26/2024     02/26/2024     Lab Results   Component Value Date    CHOL 157 07/18/2022    CHOL 173 05/04/2019    CHOL 196 05/04/2018     Lab Results   Component Value Date    HDL 41 07/18/2022    HDL 40 05/04/2019    HDL 47 05/04/2018     Lab Results   Component Value Date    LDLCALC 96.2 07/18/2022    LDLCALC 114.6 05/04/2019    LDLCALC 120.6 05/04/2018     Lab Results   Component Value Date    TRIG 99 07/18/2022    TRIG 92 05/04/2019    TRIG 142 05/04/2018     Lab Results   Component Value Date    CHOLHDL 26.1 07/18/2022    CHOLHDL 23.1 05/04/2019    CHOLHDL 24.0 05/04/2018     Lab Results   Component Value Date    TSH 0.296 (L) 03/13/2024    [id number] 8.3  05/01/2017       ASSESSMENT     1. Spinal stenosis of lumbar region with neurogenic claudication    2. Chronic bilateral low back pain without sciatica    3. Arthritis of left knee    4. History of breast cancer         PLAN  Seems like she has exhausted conservative mgmnt and treatment along with ESIs.  She saw Dr. Fay a few times and recommended KARLI at 1st but considered TLIF. I will refer back to him as this may be the last option for relief.   Will give her a medrol pack for hand and knee and refer back to ortho for knee.   Discussed to wait until she finishes her steroid pack to receive her flu shot.   She is interested in establishing with the staff PCP here. will have her follow up with Dr. Mckenzie in 1 month for an annual    1. Chronic bilateral low back pain without sciatica  - Ambulatory referral/consult to Neurosurgery; Future  - methylPREDNISolone (MEDROL DOSEPACK) 4 mg tablet; use as directed  Dispense: 21 each; Refill: 0    2. Spinal stenosis of lumbar region with neurogenic claudication  - Ambulatory referral/consult to Neurosurgery; Future  - methylPREDNISolone (MEDROL DOSEPACK) 4 mg tablet; use as directed  Dispense: 21 each; Refill: 0    3. Arthritis of left knee  - Ambulatory referral/consult to Orthopedics; Future    4. History of breast cancer       Follow up with PCP     Patient was counseled on when to seek emergent care. Patient's plan/treatment was discussed including medications and possible side effects. Verbalized understanding of all instructions.     This note was partly generated with Lemon voice recognition software. I apologize for any possible typographical errors.          DORON Tapia  Department of Internal Medicine - [facility]  09/20/2024

## 2024-09-20 NOTE — PATIENT INSTRUCTIONS
1 month follow up to establish care with Dr. Mckenzie  Referral to ortho and spine.   Medrol pack.

## 2024-10-03 ENCOUNTER — OFFICE VISIT (OUTPATIENT)
Dept: NEUROSURGERY | Facility: CLINIC | Age: 84
End: 2024-10-03
Payer: MEDICARE

## 2024-10-03 ENCOUNTER — HOSPITAL ENCOUNTER (OUTPATIENT)
Dept: RADIOLOGY | Facility: HOSPITAL | Age: 84
Discharge: HOME | End: 2024-10-03
Attending: NURSE PRACTITIONER
Payer: MEDICARE

## 2024-10-03 VITALS — TEMPERATURE: 99 F | HEART RATE: 87 BPM | SYSTOLIC BLOOD PRESSURE: 145 MMHG | DIASTOLIC BLOOD PRESSURE: 84 MMHG

## 2024-10-03 DIAGNOSIS — G89.29 CHRONIC BILATERAL LOW BACK PAIN WITHOUT SCIATICA: ICD-10-CM

## 2024-10-03 DIAGNOSIS — M54.50 CHRONIC BILATERAL LOW BACK PAIN WITHOUT SCIATICA: ICD-10-CM

## 2024-10-03 DIAGNOSIS — M48.07 SPINAL STENOSIS, LUMBOSACRAL REGION: ICD-10-CM

## 2024-10-03 DIAGNOSIS — M54.9 DORSALGIA, UNSPECIFIED: Primary | ICD-10-CM

## 2024-10-03 DIAGNOSIS — M54.17 LUMBOSACRAL RADICULOPATHY: ICD-10-CM

## 2024-10-03 DIAGNOSIS — M48.062 SPINAL STENOSIS OF LUMBAR REGION WITH NEUROGENIC CLAUDICATION: ICD-10-CM

## 2024-10-03 DIAGNOSIS — M47.816 FACET ARTHRITIS OF LUMBAR REGION: ICD-10-CM

## 2024-10-03 PROCEDURE — 99213 OFFICE O/P EST LOW 20 MIN: CPT | Mod: S$GLB,,, | Performed by: NURSE PRACTITIONER

## 2024-10-03 PROCEDURE — 3077F SYST BP >= 140 MM HG: CPT | Mod: CPTII,S$GLB,, | Performed by: NURSE PRACTITIONER

## 2024-10-03 PROCEDURE — 72110 X-RAY EXAM L-2 SPINE 4/>VWS: CPT | Mod: 26,,, | Performed by: RADIOLOGY

## 2024-10-03 PROCEDURE — 1160F RVW MEDS BY RX/DR IN RCRD: CPT | Mod: CPTII,S$GLB,, | Performed by: NURSE PRACTITIONER

## 2024-10-03 PROCEDURE — 1125F AMNT PAIN NOTED PAIN PRSNT: CPT | Mod: CPTII,S$GLB,, | Performed by: NURSE PRACTITIONER

## 2024-10-03 PROCEDURE — 3288F FALL RISK ASSESSMENT DOCD: CPT | Mod: CPTII,S$GLB,, | Performed by: NURSE PRACTITIONER

## 2024-10-03 PROCEDURE — 99999 PR PBB SHADOW E&M-EST. PATIENT-LVL IV: CPT | Mod: PBBFAC,,, | Performed by: NURSE PRACTITIONER

## 2024-10-03 PROCEDURE — 1101F PT FALLS ASSESS-DOCD LE1/YR: CPT | Mod: CPTII,S$GLB,, | Performed by: NURSE PRACTITIONER

## 2024-10-03 PROCEDURE — 1159F MED LIST DOCD IN RCRD: CPT | Mod: CPTII,S$GLB,, | Performed by: NURSE PRACTITIONER

## 2024-10-03 PROCEDURE — 1157F ADVNC CARE PLAN IN RCRD: CPT | Mod: CPTII,S$GLB,, | Performed by: NURSE PRACTITIONER

## 2024-10-03 PROCEDURE — 3079F DIAST BP 80-89 MM HG: CPT | Mod: CPTII,S$GLB,, | Performed by: NURSE PRACTITIONER

## 2024-10-03 PROCEDURE — 72110 X-RAY EXAM L-2 SPINE 4/>VWS: CPT | Mod: TC

## 2024-10-04 ENCOUNTER — TELEPHONE (OUTPATIENT)
Dept: NEUROSURGERY | Facility: CLINIC | Age: 84
End: 2024-10-04
Payer: MEDICARE

## 2024-10-04 NOTE — PROGRESS NOTES
Neurosurgery  Established Patient    SUBJECTIVE:     History of Present Illness: Jo-Ann Escobedo is a 83 y.o. female being seen in clinic today as a referral from her PCP to discuss concerns with worsening low back pain despite conservative treatment. The patient was seen by Dr. Hernandez in 2021 for similar complaints and recommended proceeding with further evaluation of her hip prior to considering surgical options for her lumbar spine. Today, she describes the pain as constant and aching across the low back with radiation down the anterior aspect of the legs. States that she also struggles with knee pain.  Rates the pain as a 4/10. Aggravating factors include standing and walking. Denies weakness, numbness or tingling, b/b dysfunction, saddle anesthesia, or gait instability. She has obtained injections and PT in the past without lasting relief.     Review of patient's allergies indicates:   Allergen Reactions    Voltaren [diclofenac sodium] Other (See Comments)     Hematochezia and hospitalization for hematochezia.    Codeine Diarrhea and Hallucinations    Niacin preparations      Redness, warming       Current Outpatient Medications   Medication Sig Dispense Refill    (Magic mouthwash) 1:1:1 diphenhydrAMINE(Benadryl) 12.5mg/5ml liq, aluminum & magnesium hydroxide-simethicone (Maalox), LIDOcaine viscous 2% Swish and spit 15 mLs every 4 (four) hours as needed (throat pain). for mouth sores 250 mL 1    acetaminophen (TYLENOL) 500 MG tablet Take 2 tablets (1,000 mg total) by mouth every 8 (eight) hours as needed for Pain. 54 tablet 0    amoxicillin (AMOXIL) 500 MG capsule TAKE 4 CAPSULES (2,000 MG TOTAL) BY MOUTH ON CALL PROCEDURE (TAKE 1 HOUR PRIOR TO DENTAL APPT). 4 capsule 1    ascorbic acid, vitamin C, (VITAMIN C) 100 MG tablet Take 100 mg by mouth once daily.      cholecalciferol, vitamin D3, (VITAMIN D3) 50 mcg (2,000 unit) Tab Take 1 tablet by mouth once daily.      co-enzyme Q-10 30 mg capsule Take 30 mg by  mouth once daily.      cyanocobalamin (VITAMIN B-12) 250 MCG tablet Take 250 mcg by mouth once daily.      hydroCHLOROthiazide (HYDRODIURIL) 25 MG tablet TAKE 1/2 TABLET BY MOUTH ONCE DAILY 45 tablet 3    ketoconazole (NIZORAL) 2 % cream Apply topically once daily. Apply daily to affected toenails for 6-12 months 60 g 4    LACTOBACILLUS COMBINATION NO.8 (ADULT PROBIOTIC ORAL) Take by mouth once daily.       levothyroxine (SYNTHROID) 100 MCG tablet Take 1 tablet (100 mcg total) by mouth before breakfast. 90 tablet 3    losartan (COZAAR) 100 MG tablet Take 1 tablet (100 mg total) by mouth once daily. 90 tablet 3    methocarbamoL (ROBAXIN) 500 MG Tab Take 500 mg by mouth 3 (three) times daily.      methylPREDNISolone (MEDROL DOSEPACK) 4 mg tablet use as directed 21 each 0    mirabegron (MYRBETRIQ) 50 mg Tb24 Take 1 tablet (50 mg total) by mouth once daily. 30 tablet 11    ha-hk-jp3-dha-epa-fish-lut-carlos (OCUVITE ADULT 50 PLUS) 250 mg (90 mg-160 mg) Cap Take by mouth.      turmeric root extract 500 mg Cap Take by mouth once daily.       famotidine (PEPCID) 20 MG tablet Take 1 tablet (20 mg total) by mouth 2 (two) times daily. for 21 days 42 tablet 0    fenofibrate 160 MG Tab TAKE 1 TABLET (160 MG TOTAL) BY MOUTH ONCE DAILY. 90 tablet 3     No current facility-administered medications for this visit.       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 She subsequently underwent colonoscopy 12/9 and several large diverticula in the sigmoid colon was seen with hematin throughout the colon; blood pooling also noted in the sigmoid colon, during which clip was placed f    Hematochezia     Hypothyroidism, postsurgical 07/01/2015    Mixed hyperlipidemia 09/27/2012    Multinodular goiter 07/31/2014    Paget's disease of bone 07/10/2013    SCC (squamous cell carcinoma) 12/2020    left 5th knuckle    Squamous Cell Carcinoma     in situ right neck      Past Surgical History:   Procedure Laterality Date    ARTHROPLASTY OF HIP BY ANTERIOR APPROACH Right 7/5/2023    Procedure: ARTHROPLASTY, HIP, TOTAL, ANTERIOR APPROACH;  Surgeon: Federico Ramirez MD;  Location: [facility] 2ND FLR;  Service: Orthopedics;  Laterality: Right;    BREAST BIOPSY Right 2019    BREAST LUMPECTOMY Right 2019    CARPAL TUNNEL RELEASE Left 6/5/2020    Procedure: RELEASE, CARPAL TUNNEL Left;  Surgeon: Pricila Presley MD;  Location: [facility] OR;  Service: Orthopedics;  Laterality: Left;    CARPAL TUNNEL RELEASE Right 12/5/2022    Procedure: RELEASE, CARPAL TUNNEL, right;  Surgeon: Pricila Presley MD;  Location: [facility] OR;  Service: Orthopedics;  Laterality: Right;    CATARACT EXTRACTION W/  INTRAOCULAR LENS IMPLANT Bilateral     COLONOSCOPY N/A 4/15/2019    Procedure: COLONOSCOPY;  Surgeon: Artur Monet MD;  Location: [facility] (4TH FLR);  Service: Endoscopy;  Laterality: N/A;  within 1 month    COLONOSCOPY N/A 12/9/2019    Procedure: COLONOSCOPY;  Surgeon: Clyde Welch MD;  Location: [facility] (2ND FLR);  Service: Endoscopy;  Laterality: N/A;    COLONOSCOPY N/A 2/13/2023    Procedure: COLONOSCOPY;  Surgeon: Johan Hoyos MD;  Location: [facility] (4TH FLR);  Service: Endoscopy;  Laterality: N/A;  Okay for any CRS MD if Dr. Monet booked. but hopeful  to get this done about 4 weeks from now  1/10 - pt called about time change and MD change. patient verbalized understanding and new instructions sent - sm  Precall complete- [place]    COLONOSCOPY N/A 11/6/2023    Procedure: COLONOSCOPY;  Surgeon: Artur Monet MD;  Location: [facility] (4TH FLR);  Service: Endoscopy;  Laterality: N/A;  inst. to pt. [facility]  referral: Dr. Neely  10/30-precall complete-[place]    ENDOSCOPIC ULTRASOUND OF UPPER GASTROINTESTINAL TRACT N/A 1/22/2020    Procedure: ULTRASOUND, UPPER GI TRACT, ENDOSCOPIC;  Surgeon: Eleazar Shaffer MD;  Location: [facility] (2ND FLR);  Service: Endoscopy;  Laterality: N/A;  EUS for 10mm SML w/negative pillow sign and negative naked fat sign which was found at the incisura.  Dr Welch    EPIDURAL STEROID INJECTION Bilateral 12/17/2021    Procedure: INJECTION, STEROID, EPIDURAL, L3-L4 Bilateral DIRECT REF Transforaminal;  Surgeon: Julian Fish MD;  Location: [facility] PAIN MGT;  Service: Pain Management;  Laterality: Bilateral;    ESOPHAGOGASTRODUODENOSCOPY N/A 12/7/2019    Procedure: EGD (ESOPHAGOGASTRODUODENOSCOPY);  Surgeon: Clyde Welch MD;  Location: [facility] (2ND FLR);  Service: Endoscopy;  Laterality: N/A;    EXCISIONAL BIOPSY Right 6/27/2019    Procedure: EXCISIONAL BIOPSY-breast;  Surgeon: Suki Dill MD;  Location: [facility] 2ND FLR;  Service: General;  Laterality: Right;    EYE SURGERY      HYSTERECTOMY      INJECTION Right 3/3/2023    Procedure: INJECTION, RIGHT HIP CORTICOSTEROID /LOCAL INJECTION CONTRAST DIRECT REF;  Surgeon: Julian Fish MD;  Location: [facility] PAIN MGT;  Service: Pain Management;  Laterality: Right;    INJECTION FOR SENTINEL NODE IDENTIFICATION Right 7/30/2020    Procedure: INJECTION, FOR SENTINEL NODE IDENTIFICATION;  Surgeon: Suki Dill MD;  Location: [facility] OR;  Service: General;  Laterality: Right;    INJECTION OF ANESTHETIC AGENT AROUND MEDIAL BRANCH NERVES INNERVATING LUMBAR FACET JOINT Bilateral 6/7/2019     Procedure: BLOCK, NERVE, FACET JOINT, LUMBAR, MEDIAL BRANCH-deo MBB L4/5,L5/S1;  Surgeon: Jarvis Morales III, MD;  Location: [facility] CATH LAB;  Service: Pain Management;  Laterality: Bilateral;    INJECTION OF STEROID Right 6/5/2020    Procedure: INJECTION, STEROID right carpal tunel injection/ Right ring trigger finger injection;  Surgeon: Pricila Presley MD;  Location: [facility] OR;  Service: Orthopedics;  Laterality: Right;  INJECTION, STEROID right carpal tunel injection/ Right ring trigger finger injection    KNEE ARTHROSCOPY N/A     pt unsure of which knee     MASTECTOMY      OOPHORECTOMY      SENTINEL LYMPH NODE BIOPSY Right 7/30/2020    Procedure: BIOPSY, LYMPH NODE, SENTINEL;  Surgeon: Suki Dill MD;  Location: [facility] OR;  Service: General;  Laterality: Right;    SPINE SURGERY      TOTAL THYROIDECTOMY      TRANSFORAMINAL EPIDURAL INJECTION OF STEROID Bilateral 5/27/2022    Procedure: INJECTION, STEROID, EPIDURAL, TF BILATERAL L3-L4 CONTRAST DIRECT REF;  Surgeon: Julian Fish MD;  Location: [facility] PAIN MGT;  Service: Pain Management;  Laterality: Bilateral;    TRANSFORAMINAL EPIDURAL INJECTION OF STEROID Bilateral 10/21/2022    Procedure: LUMBAR TRANSFORAMINAL BILATERAL L3/4 CONTRAST DIRECT REFERRAL;  Surgeon: Julian Fish MD;  Location: [facility] PAIN MGT;  Service: Pain Management;  Laterality: Bilateral;    UNILATERAL MASTECTOMY Right 7/30/2020    Procedure: MASTECTOMY, UNILATERAL;  Surgeon: Suki Dill MD;  Location: [facility] OR;  Service: General;  Laterality: Right;    YAG Laser Capsulotomy  Left 07/29/2019 01/06/2021 OD//Dr. Hahn     Family History       Problem Relation (Age of Onset)    Cancer Father    Dementia Mother    Glaucoma Brother    Macular degeneration Brother    No Known Problems Daughter, Son    Osteoarthritis Mother          Social History     Socioeconomic History    Marital status:    Occupational History    Occupation: realtor     Employer: [employer]      Employer: [employer]   Tobacco Use    Smoking status: Former     Current packs/day: 0.00     Types: Cigarettes     Quit date: 1969     Years since quittin.7    Smokeless tobacco: Never   Substance and Sexual Activity    Alcohol use: Not Currently    Drug use: No    Sexual activity: Not Currently   Other Topics Concern    Are you pregnant or think you may be? No    Breast-feeding No     Social Drivers of Health     Financial Resource Strain: Low Risk  (2023)    Overall Financial Resource Strain (CARDIA)     Difficulty of Paying Living Expenses: Not hard at all   Food Insecurity: No Food Insecurity (2023)    Hunger Vital Sign     Worried About Running Out of Food in the Last Year: Never true     Ran Out of Food in the Last Year: Never true   Transportation Needs: No Transportation Needs (2023)    PRAPARE - Transportation     Lack of Transportation (Medical): No     Lack of Transportation (Non-Medical): No   Physical Activity: Insufficiently Active (2023)    Exercise Vital Sign     Days of Exercise per Week: 7 days     Minutes of Exercise per Session: 20 min   Stress: No Stress Concern Present (2023)    [employer] - Occupational Stress Questionnaire     Feeling of Stress : Only a little   Housing Stability: Low Risk  (2023)    Housing Stability Vital Sign     Unable to Pay for Housing in the Last Year: No     Number of Places Lived in the Last Year: 1     Unstable Housing in the Last Year: No       Review of Systems    OBJECTIVE:     Vital Signs  Temp: 99 °F (37.2 °C)  Pulse: 87  BP: (!) 145/84  Pain Score:   4  There is no height or weight on file to calculate BMI.    Neurosurgery Physical Exam  General: well developed, well nourished, no distress.   Head: normocephalic, atraumatic  Neurologic: Alert and oriented. Thought content appropriate.  GCS: Motor: 6/Verbal: 5/Eyes: 4 GCS Total: 15  Mental Status: Awake, Alert, Oriented x 4  Language: No  aphasia  Speech: No dysarthria  Cranial nerves: face symmetric, tongue midline, CN II-XII grossly intact.   Eyes: pupils equal, round, reactive to light with accomodation, EOMI.   Pulmonary: normal respirations, no signs of respiratory distress  Abdomen: soft, non-distended  Skin: Skin is warm, dry and intact.  Sensory: intact to light touch throughout  Motor Strength:Moves all extremities spontaneously with good tone.   Strength  Deltoids Triceps Biceps Wrist Extension Wrist Flexion Hand    Upper: R 5/5 5/5 5/5 5/5 5/5 5/5    L 5/5 5/5 5/5 5/5 5/5 5/5     HF KE KF DF PF EHL   Lower: R 5/5 5/5 5/5 5/5 5/5 5/5    L 5/5 5/5 5/5 5/5 5/5 5/5     Cerebellar:   Gait antalgic     Cervical:   ROM: Full with flexion, extension, lateral rotation and ear-to-shoulder bend.   Midline TTP: Negative.     Thoracic:  Midline TTP: Negative.     Lumbar:  Midline TTP: Negative.  Straight Leg Test: Positive LEFT.    Diagnostic Results:  I have personally reviewed the x-ray lumbar spine flex/ex dated 10/3/24 which shows no instability. Multilevel degenerative changes.      ASSESSMENT/PLAN:     Jo-Ann Escobedo is a 83 y.o. female seen in clinic today as a referral from her PCP to discuss concerns with worsening lumbar radiculopathy despite conservative treatment. I have ordered updated MRI lumbar spine for worsening stenosis and CT lumbar spine for surgical planning. The patient is apprehensive about surgery but frustrated with the pain affecting her quality of life and ADLs. The patient would like to follow-up in clinic with Dr. Hernandez to discuss next steps and the imaging. I have encouraged her to contact the clinic with any questions, concerns, or adverse clinical changes. She verbalized understanding.      DORON Bassett  Neurosurgery  [facility].      Note dictated with voice recognition software, please excuse any grammatical errors.

## 2024-10-04 NOTE — TELEPHONE ENCOUNTER
Called pt and lvm to schedule consult.    Joanie Thomas MA  [facility]  Neurosurgery.          ----- Message from Med Assistant Peralta sent at 10/4/2024  1:46 PM CDT -----  Regarding: appointment  Heyyy,      Can you please assist in getting this pt scheduled for an appointment?    Thanks

## 2024-10-08 DIAGNOSIS — M25.562 LEFT KNEE PAIN, UNSPECIFIED CHRONICITY: Primary | ICD-10-CM

## 2024-10-09 ENCOUNTER — OFFICE VISIT (OUTPATIENT)
Dept: ORTHOPEDICS | Facility: CLINIC | Age: 84
End: 2024-10-09
Payer: MEDICARE

## 2024-10-09 ENCOUNTER — HOSPITAL ENCOUNTER (OUTPATIENT)
Dept: RADIOLOGY | Facility: HOSPITAL | Age: 84
Discharge: HOME | End: 2024-10-09
Attending: ORTHOPAEDIC SURGERY
Payer: MEDICARE

## 2024-10-09 DIAGNOSIS — M25.562 LEFT KNEE PAIN, UNSPECIFIED CHRONICITY: ICD-10-CM

## 2024-10-09 DIAGNOSIS — M25.561 PAIN IN BOTH KNEES, UNSPECIFIED CHRONICITY: Primary | ICD-10-CM

## 2024-10-09 DIAGNOSIS — M17.11 PRIMARY OSTEOARTHRITIS OF RIGHT KNEE: ICD-10-CM

## 2024-10-09 DIAGNOSIS — M25.561 PAIN IN BOTH KNEES, UNSPECIFIED CHRONICITY: ICD-10-CM

## 2024-10-09 DIAGNOSIS — M25.562 PAIN IN BOTH KNEES, UNSPECIFIED CHRONICITY: Primary | ICD-10-CM

## 2024-10-09 DIAGNOSIS — M17.12 ARTHRITIS OF LEFT KNEE: ICD-10-CM

## 2024-10-09 DIAGNOSIS — M25.562 PAIN IN BOTH KNEES, UNSPECIFIED CHRONICITY: ICD-10-CM

## 2024-10-09 DIAGNOSIS — M17.12 PRIMARY OSTEOARTHRITIS OF LEFT KNEE: ICD-10-CM

## 2024-10-09 PROCEDURE — 1159F MED LIST DOCD IN RCRD: CPT | Mod: CPTII,S$GLB,, | Performed by: ORTHOPAEDIC SURGERY

## 2024-10-09 PROCEDURE — 1160F RVW MEDS BY RX/DR IN RCRD: CPT | Mod: CPTII,S$GLB,, | Performed by: ORTHOPAEDIC SURGERY

## 2024-10-09 PROCEDURE — 99213 OFFICE O/P EST LOW 20 MIN: CPT | Mod: 25,S$GLB,, | Performed by: ORTHOPAEDIC SURGERY

## 2024-10-09 PROCEDURE — 1157F ADVNC CARE PLAN IN RCRD: CPT | Mod: CPTII,S$GLB,, | Performed by: ORTHOPAEDIC SURGERY

## 2024-10-09 PROCEDURE — 99999 PR PBB SHADOW E&M-EST. PATIENT-LVL II: CPT | Mod: PBBFAC,,, | Performed by: ORTHOPAEDIC SURGERY

## 2024-10-09 PROCEDURE — 20610 DRAIN/INJ JOINT/BURSA W/O US: CPT | Mod: 50,S$GLB,, | Performed by: ORTHOPAEDIC SURGERY

## 2024-10-09 PROCEDURE — 73564 X-RAY EXAM KNEE 4 OR MORE: CPT | Mod: 26,50,, | Performed by: RADIOLOGY

## 2024-10-09 PROCEDURE — 73564 X-RAY EXAM KNEE 4 OR MORE: CPT | Mod: TC,50

## 2024-10-09 RX ADMIN — TRIAMCINOLONE ACETONIDE 40 MG: 40 INJECTION, SUSPENSION INTRA-ARTICULAR; INTRAMUSCULAR at 10:10

## 2024-10-14 RX ORDER — TRIAMCINOLONE ACETONIDE 40 MG/ML
40 INJECTION, SUSPENSION INTRA-ARTICULAR; INTRAMUSCULAR
Status: DISCONTINUED | OUTPATIENT
Start: 2024-10-09 | End: 2024-10-14 | Stop reason: HOSPADM

## 2024-10-14 NOTE — PROGRESS NOTES
Subjective:      Patient ID: Jo-Ann Escobedo is a 83 y.o. female.    Chief Complaint:   Pain of the Left Knee and Pain of the Right Knee    HPI  She has an established patient who comes in with a new complaint of bilateral knee pain.  She does have chronic low back problems, and she does have follow-up for that.  However, she feels that her activity-related knee pain is separate from that.  The left is worse than the right.  No night pain interfering with sleep.  No fall, accident, injury.  She has a history of total hip replacement and has no symptoms referable to that.  No groin pain, distal numbness or tingling.      Objective:        Ortho/SPM Exam    There is is a varus deformity, which is partially passively correctable.  Active range of motion is 5-115 degrees.  Anterior crepitus with active extension.  Patellar mobility is limited.  Boggy synovitis without effusion.  Medial joint line tenderness predominates.  No instability to varus/valgus/Lachman's stressing.  No pain in the groin with flexion and internal rotation of the hip which is not limited.  Skin intact.  Distal neurovascular intact.  Flip test negative.        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  No acute findings or suspicious bone defects  Assessment:   DJD, knees, advanced      Plan:   She is not interested in discussing knee replacement currently, especially since she is still trying to get her spine issues sorted.  We did give her cortisone injections today, and she will let me know if this does not provide adequate improvement.  She understands these can be repeated as often as every 3 months if needed.    The patient's pathophysiology was explained in detail with reference to x-rays, models, other visual aids as appropriate.  Treatment options were discussed in detail.  Questions were invited and answered to the patient's  satisfaction.      Federico Ramirez MD  Orthopedic Surgery

## 2024-10-14 NOTE — PROCEDURES
Large Joint Aspiration/Injection: bilateral knee    Date/Time: 10/9/2024 10:45 AM    Performed by: Federico Ramirez MD  Authorized by: Federico Ramirez MD    Consent Done?:  Yes (Verbal)  Indications:  Pain and arthritis  Site marked: the procedure site was marked    Timeout: prior to procedure the correct patient, procedure, and site was verified    Prep: patient was prepped and draped in usual sterile fashion      Local anesthesia used?: Yes    Local anesthetic:  Lidocaine 1% without epinephrine  Anesthetic total (ml):  5      Details:  Needle Size:  25 G  Ultrasonic Guidance for needle placement?: No    Approach:  Anterolateral  Location:  Knee  Laterality:  Bilateral  Site:  Bilateral knee  Medications (Right):  40 mg triamcinolone acetonide 40 mg/mL  Medications (Left):  40 mg triamcinolone acetonide 40 mg/mL  Patient tolerance:  Patient tolerated the procedure well with no immediate complications

## 2024-10-21 ENCOUNTER — PATIENT MESSAGE (OUTPATIENT)
Dept: INTERNAL MEDICINE | Facility: CLINIC | Age: 84
End: 2024-10-21
Payer: MEDICARE

## 2024-10-21 ENCOUNTER — LAB VISIT (OUTPATIENT)
Dept: LAB | Facility: HOSPITAL | Age: 84
End: 2024-10-21
Payer: MEDICARE

## 2024-10-21 ENCOUNTER — OFFICE VISIT (OUTPATIENT)
Dept: INTERNAL MEDICINE | Facility: CLINIC | Age: 84
End: 2024-10-21
Payer: MEDICARE

## 2024-10-21 VITALS
HEART RATE: 86 BPM | WEIGHT: 183 LBS | HEIGHT: 62 IN | BODY MASS INDEX: 33.68 KG/M2 | SYSTOLIC BLOOD PRESSURE: 132 MMHG | OXYGEN SATURATION: 96 % | DIASTOLIC BLOOD PRESSURE: 84 MMHG

## 2024-10-21 DIAGNOSIS — I70.0 AORTIC ATHEROSCLEROSIS: ICD-10-CM

## 2024-10-21 DIAGNOSIS — E55.9 VITAMIN D DEFICIENCY: ICD-10-CM

## 2024-10-21 DIAGNOSIS — G95.9 CERVICAL MYELOPATHY: ICD-10-CM

## 2024-10-21 DIAGNOSIS — R79.9 ABNORMAL FINDING OF BLOOD CHEMISTRY, UNSPECIFIED: ICD-10-CM

## 2024-10-21 DIAGNOSIS — I10 PRIMARY HYPERTENSION: ICD-10-CM

## 2024-10-21 DIAGNOSIS — E89.0 POSTOPERATIVE HYPOTHYROIDISM: ICD-10-CM

## 2024-10-21 DIAGNOSIS — M46.96 UNSPECIFIED INFLAMMATORY SPONDYLOPATHY, LUMBAR REGION: ICD-10-CM

## 2024-10-21 DIAGNOSIS — Z23 NEED FOR VACCINATION: ICD-10-CM

## 2024-10-21 DIAGNOSIS — E89.0 HYPOTHYROIDISM, POSTSURGICAL: ICD-10-CM

## 2024-10-21 DIAGNOSIS — R51.9 LEFT TEMPORAL HEADACHE: Primary | ICD-10-CM

## 2024-10-21 LAB
25(OH)D3+25(OH)D2 SERPL-MCNC: 69 NG/ML (ref 30–96)
ALBUMIN SERPL BCP-MCNC: 3.8 G/DL (ref 3.5–5.2)
ALP SERPL-CCNC: 183 U/L (ref 40–150)
ALT SERPL W/O P-5'-P-CCNC: 32 U/L (ref 10–44)
ANION GAP SERPL CALC-SCNC: 10 MMOL/L (ref 8–16)
AST SERPL-CCNC: 24 U/L (ref 10–40)
BASOPHILS # BLD AUTO: 0.07 K/UL (ref 0–0.2)
BASOPHILS NFR BLD: 0.8 % (ref 0–1.9)
BILIRUB SERPL-MCNC: 0.6 MG/DL (ref 0.1–1)
BUN SERPL-MCNC: 22 MG/DL (ref 8–23)
CALCIUM SERPL-MCNC: 10.7 MG/DL (ref 8.7–10.5)
CHLORIDE SERPL-SCNC: 106 MMOL/L (ref 95–110)
CHOLEST SERPL-MCNC: 184 MG/DL (ref 120–199)
CHOLEST/HDLC SERPL: 3.5 {RATIO} (ref 2–5)
CO2 SERPL-SCNC: 27 MMOL/L (ref 23–29)
CREAT SERPL-MCNC: 0.8 MG/DL (ref 0.5–1.4)
DIFFERENTIAL METHOD BLD: ABNORMAL
EOSINOPHIL # BLD AUTO: 0.1 K/UL (ref 0–0.5)
EOSINOPHIL NFR BLD: 1.1 % (ref 0–8)
ERYTHROCYTE [DISTWIDTH] IN BLOOD BY AUTOMATED COUNT: 13.2 % (ref 11.5–14.5)
EST. GFR  (NO RACE VARIABLE): >60 ML/MIN/1.73 M^2
ESTIMATED AVG GLUCOSE: 126 MG/DL (ref 68–131)
GLUCOSE SERPL-MCNC: 120 MG/DL (ref 70–110)
HBA1C MFR BLD: 6 % (ref 4–5.6)
HCT VFR BLD AUTO: 46.2 % (ref 37–48.5)
HDLC SERPL-MCNC: 53 MG/DL (ref 40–75)
HDLC SERPL: 28.8 % (ref 20–50)
HGB BLD-MCNC: 15.4 G/DL (ref 12–16)
IMM GRANULOCYTES # BLD AUTO: 0.06 K/UL (ref 0–0.04)
IMM GRANULOCYTES NFR BLD AUTO: 0.7 % (ref 0–0.5)
LDLC SERPL CALC-MCNC: 91 MG/DL (ref 63–159)
LYMPHOCYTES # BLD AUTO: 1.8 K/UL (ref 1–4.8)
LYMPHOCYTES NFR BLD: 20.8 % (ref 18–48)
MCH RBC QN AUTO: 31.8 PG (ref 27–31)
MCHC RBC AUTO-ENTMCNC: 33.3 G/DL (ref 32–36)
MCV RBC AUTO: 95 FL (ref 82–98)
MONOCYTES # BLD AUTO: 0.9 K/UL (ref 0.3–1)
MONOCYTES NFR BLD: 11 % (ref 4–15)
NEUTROPHILS # BLD AUTO: 5.6 K/UL (ref 1.8–7.7)
NEUTROPHILS NFR BLD: 65.6 % (ref 38–73)
NONHDLC SERPL-MCNC: 131 MG/DL
NRBC BLD-RTO: 0 /100 WBC
PLATELET # BLD AUTO: 191 K/UL (ref 150–450)
PMV BLD AUTO: 10.9 FL (ref 9.2–12.9)
POTASSIUM SERPL-SCNC: 4 MMOL/L (ref 3.5–5.1)
PROT SERPL-MCNC: 7.5 G/DL (ref 6–8.4)
RBC # BLD AUTO: 4.85 M/UL (ref 4–5.4)
SODIUM SERPL-SCNC: 143 MMOL/L (ref 136–145)
T4 FREE SERPL-MCNC: 1.11 NG/DL (ref 0.71–1.51)
TRIGL SERPL-MCNC: 200 MG/DL (ref 30–150)
TSH SERPL DL<=0.005 MIU/L-ACNC: 2.56 UIU/ML (ref 0.4–4)
WBC # BLD AUTO: 8.53 K/UL (ref 3.9–12.7)

## 2024-10-21 PROCEDURE — 85025 COMPLETE CBC W/AUTO DIFF WBC: CPT | Performed by: INTERNAL MEDICINE

## 2024-10-21 PROCEDURE — 99999 PR PBB SHADOW E&M-EST. PATIENT-LVL III: CPT | Mod: PBBFAC,,, | Performed by: INTERNAL MEDICINE

## 2024-10-21 PROCEDURE — 84443 ASSAY THYROID STIM HORMONE: CPT | Performed by: INTERNAL MEDICINE

## 2024-10-21 PROCEDURE — 82306 VITAMIN D 25 HYDROXY: CPT | Performed by: INTERNAL MEDICINE

## 2024-10-21 PROCEDURE — 80061 LIPID PANEL: CPT | Performed by: INTERNAL MEDICINE

## 2024-10-21 PROCEDURE — 80053 COMPREHEN METABOLIC PANEL: CPT | Performed by: INTERNAL MEDICINE

## 2024-10-21 PROCEDURE — 90653 IIV ADJUVANT VACCINE IM: CPT | Mod: S$GLB,,, | Performed by: INTERNAL MEDICINE

## 2024-10-21 PROCEDURE — 3079F DIAST BP 80-89 MM HG: CPT | Mod: CPTII,S$GLB,, | Performed by: INTERNAL MEDICINE

## 2024-10-21 PROCEDURE — 83036 HEMOGLOBIN GLYCOSYLATED A1C: CPT | Performed by: INTERNAL MEDICINE

## 2024-10-21 PROCEDURE — 1125F AMNT PAIN NOTED PAIN PRSNT: CPT | Mod: CPTII,S$GLB,, | Performed by: INTERNAL MEDICINE

## 2024-10-21 PROCEDURE — 99214 OFFICE O/P EST MOD 30 MIN: CPT | Mod: S$GLB,,, | Performed by: INTERNAL MEDICINE

## 2024-10-21 PROCEDURE — G0008 ADMIN INFLUENZA VIRUS VAC: HCPCS | Mod: S$GLB,,, | Performed by: INTERNAL MEDICINE

## 2024-10-21 PROCEDURE — 3075F SYST BP GE 130 - 139MM HG: CPT | Mod: CPTII,S$GLB,, | Performed by: INTERNAL MEDICINE

## 2024-10-21 PROCEDURE — 84439 ASSAY OF FREE THYROXINE: CPT | Performed by: INTERNAL MEDICINE

## 2024-10-21 PROCEDURE — 1157F ADVNC CARE PLAN IN RCRD: CPT | Mod: CPTII,S$GLB,, | Performed by: INTERNAL MEDICINE

## 2024-10-21 PROCEDURE — 3288F FALL RISK ASSESSMENT DOCD: CPT | Mod: CPTII,S$GLB,, | Performed by: INTERNAL MEDICINE

## 2024-10-21 PROCEDURE — 1101F PT FALLS ASSESS-DOCD LE1/YR: CPT | Mod: CPTII,S$GLB,, | Performed by: INTERNAL MEDICINE

## 2024-10-21 RX ORDER — LEVOTHYROXINE SODIUM 100 UG/1
100 TABLET ORAL
Qty: 90 TABLET | Refills: 3 | Status: CANCELLED | OUTPATIENT
Start: 2024-10-21 | End: 2025-10-21

## 2024-10-21 NOTE — PROGRESS NOTES
Subjective:       Patient ID: Jo-Ann Escobedo is a 83 y.o. female.    Chief Complaint: Follow-up    Presents to establish care.     Having Left temporal pain, lasts 5-30 minutes. Feels like a bruise. Feels tender to touch. Started about 5 months ago and occurs about 2 times per month.         PMHx    HTN: on losartam and HCTZ.     Hypothyroid on synthroid.     Follows with ortho for bilateral knee pain.  Had recent injection which did help    Follows with the Neurosurgery for chronic lower back pain and spinal stenosis.  She has completed PT and numerous injections.  She saw Neurosurgery recently and plan is to check a CT and MRI of her lumbar spine.    history of breast cancer.  Underwent a right mastectomy in June of 2020.  This was a reoccurrence from breast cancer that she had 17 years ago and had a left modified radical mastectomy.  Also completed chemotherapy at that time  Of note she did not complete XRT in 2020 and she also did not take any adjuvant hormonal therapy.  Follows up with Dr. Mueller yearly    Health Maintenance:  Mammogram: s/p mastectomy.   DEXA:  Normal May 20, 2023  Lipids:  Ordered today  Vaccines:  Up-to-date      Follow-up  Pertinent negatives include no abdominal pain, arthralgias, chest pain, chills, coughing, headaches, myalgias, nausea or vomiting.     Review of Systems   Constitutional:  Negative for activity change, appetite change and chills.   HENT:  Negative for ear pain, sinus pressure/congestion and sneezing.    Respiratory:  Negative for cough and shortness of breath.    Cardiovascular:  Negative for chest pain, palpitations and leg swelling.   Gastrointestinal:  Negative for abdominal distention, abdominal pain, constipation, diarrhea, nausea and vomiting.   Genitourinary:  Negative for dysuria and hematuria.   Musculoskeletal:  Negative for arthralgias, back pain and myalgias.   Neurological:  Negative for dizziness and headaches.   Psychiatric/Behavioral:  Negative for  agitation. The patient is not nervous/anxious.            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 She subsequently underwent colonoscopy 12/9 and several large diverticula in the sigmoid colon was seen with hematin throughout the colon; blood pooling also noted in the sigmoid colon, during which clip was placed f    Hematochezia     Hypothyroidism, postsurgical 07/01/2015    Mixed hyperlipidemia 09/27/2012    Multinodular goiter 07/31/2014    Paget's disease of bone 07/10/2013    SCC (squamous cell carcinoma) 12/2020    left 5th knuckle    Squamous Cell Carcinoma     in situ right neck      Past Surgical History:   Procedure Laterality Date    ARTHROPLASTY OF HIP BY ANTERIOR APPROACH Right 7/5/2023    Procedure: ARTHROPLASTY, HIP, TOTAL, ANTERIOR APPROACH;  Surgeon: Federico Ramirez MD;  Location: [address];  Service: Orthopedics;  Laterality: Right;    BREAST BIOPSY Right 2019    BREAST LUMPECTOMY Right 2019    CARPAL TUNNEL RELEASE Left 6/5/2020    Procedure: RELEASE, CARPAL TUNNEL Left;  Surgeon: Pricila Presley MD;  Location: [facility];  Service: Orthopedics;  Laterality: Left;    CARPAL TUNNEL RELEASE Right 12/5/2022    Procedure: RELEASE, CARPAL TUNNEL, right;  Surgeon: Pricila Presley  MD;  Location: [facility] OR;  Service: Orthopedics;  Laterality: Right;    CATARACT EXTRACTION W/  INTRAOCULAR LENS IMPLANT Bilateral     COLONOSCOPY N/A 4/15/2019    Procedure: COLONOSCOPY;  Surgeon: Artur Monet MD;  Location: [facility] (4TH FLR);  Service: Endoscopy;  Laterality: N/A;  within 1 month    COLONOSCOPY N/A 12/9/2019    Procedure: COLONOSCOPY;  Surgeon: Clyde Welch MD;  Location: [facility] (2ND FLR);  Service: Endoscopy;  Laterality: N/A;    COLONOSCOPY N/A 2/13/2023    Procedure: COLONOSCOPY;  Surgeon: Johan Hoyos MD;  Location: [facility] (4TH FLR);  Service: Endoscopy;  Laterality: N/A;  Okay for any CRS MD if Dr. Monet booked. but hopeful to get this done about 4 weeks from now  1/10 - pt called about time change and MD change. patient verbalized understanding and new instructions sent - sm  Precall complete- [place]    COLONOSCOPY N/A 11/6/2023    Procedure: COLONOSCOPY;  Surgeon: Artur Monet MD;  Location: [facility] (4TH FLR);  Service: Endoscopy;  Laterality: N/A;  inst. to pt. portall. [facility]  referral: Dr. Neely  10/30-precall complete-[place]    ENDOSCOPIC ULTRASOUND OF UPPER GASTROINTESTINAL TRACT N/A 1/22/2020    Procedure: ULTRASOUND, UPPER GI TRACT, ENDOSCOPIC;  Surgeon: Eleazar Shaffer MD;  Location: [facility] (2ND FLR);  Service: Endoscopy;  Laterality: N/A;  EUS for 10mm SML w/negative pillow sign and negative naked fat sign which was found at the incisura.  Dr Welch    EPIDURAL STEROID INJECTION Bilateral 12/17/2021    Procedure: INJECTION, STEROID, EPIDURAL, L3-L4 Bilateral DIRECT REF Transforaminal;  Surgeon: Julian Fish MD;  Location: [facility] PAIN MGT;  Service: Pain Management;  Laterality: Bilateral;    ESOPHAGOGASTRODUODENOSCOPY N/A 12/7/2019    Procedure: EGD (ESOPHAGOGASTRODUODENOSCOPY);  Surgeon: Clyde Welch MD;  Location: [facility] (2ND FLR);  Service: Endoscopy;  Laterality: N/A;    EXCISIONAL BIOPSY Right 6/27/2019    Procedure: EXCISIONAL  BIOPSY-breast;  Surgeon: Suki Dill MD;  Location: [address] FLR;  Service: General;  Laterality: Right;    EYE SURGERY      HYSTERECTOMY      INJECTION Right 3/3/2023    Procedure: INJECTION, RIGHT HIP CORTICOSTEROID /LOCAL INJECTION CONTRAST DIRECT REF;  Surgeon: Julian Fish MD;  Location: [facility] PAIN MGT;  Service: Pain Management;  Laterality: Right;    INJECTION FOR SENTINEL NODE IDENTIFICATION Right 7/30/2020    Procedure: INJECTION, FOR SENTINEL NODE IDENTIFICATION;  Surgeon: Suki Dill MD;  Location: [facility] OR;  Service: General;  Laterality: Right;    INJECTION OF ANESTHETIC AGENT AROUND MEDIAL BRANCH NERVES INNERVATING LUMBAR FACET JOINT Bilateral 6/7/2019    Procedure: BLOCK, NERVE, FACET JOINT, LUMBAR, MEDIAL BRANCH-deo MBB L4/5,L5/S1;  Surgeon: Jarvis Morales III, MD;  Location: [facility] CATH LAB;  Service: Pain Management;  Laterality: Bilateral;    INJECTION OF STEROID Right 6/5/2020    Procedure: INJECTION, STEROID right carpal tunel injection/ Right ring trigger finger injection;  Surgeon: Pricila Presley MD;  Location: [facility] OR;  Service: Orthopedics;  Laterality: Right;  INJECTION, STEROID right carpal tunel injection/ Right ring trigger finger injection    KNEE ARTHROSCOPY N/A     pt unsure of which knee     MASTECTOMY      OOPHORECTOMY      SENTINEL LYMPH NODE BIOPSY Right 7/30/2020    Procedure: BIOPSY, LYMPH NODE, SENTINEL;  Surgeon: Suki Dill MD;  Location: [facility] OR;  Service: General;  Laterality: Right;    SPINE SURGERY      TOTAL THYROIDECTOMY      TRANSFORAMINAL EPIDURAL INJECTION OF STEROID Bilateral 5/27/2022    Procedure: INJECTION, STEROID, EPIDURAL, TF BILATERAL L3-L4 CONTRAST DIRECT REF;  Surgeon: Julian Fish MD;  Location: [facility] PAIN MGT;  Service: Pain Management;  Laterality: Bilateral;    TRANSFORAMINAL EPIDURAL INJECTION OF STEROID Bilateral 10/21/2022    Procedure: LUMBAR TRANSFORAMINAL BILATERAL L3/4 CONTRAST DIRECT REFERRAL;  Surgeon: Julian  MD Abe;  Location: [facility] PAIN MGT;  Service: Pain Management;  Laterality: Bilateral;    UNILATERAL MASTECTOMY Right 7/30/2020    Procedure: MASTECTOMY, UNILATERAL;  Surgeon: Suki Dill MD;  Location: [facility] OR;  Service: General;  Laterality: Right;    YAG Laser Capsulotomy  Left 07/29/2019 01/06/2021 OD//Dr. Hahn      Patient Active Problem List   Diagnosis    PCO (posterior capsular opacification), right    Mixed hyperlipidemia    Sensorineural hearing loss, bilateral    Paget's disease of bone    Obesity (BMI 30.0-34.9)    Osteoarthritis of lumbar spine    Hypothyroidism, postsurgical    Lipoma of arm    Essential hypertension    Prediabetes    Facet arthritis of lumbar region    Aortic atherosclerosis    Ductal carcinoma in situ (DCIS) of right breast    Pseudophakia    Nephrolithiasis    BPPV (benign paroxysmal positional vertigo)    Diverticulitis of large intestine without perforation or abscess with bleeding    Abnormal CT of the head    Gastric nodule    History of GI bleed    Other dietary vitamin B12 deficiency anemia    Left carpal tunnel syndrome    Cervical radiculopathy at C7    Spondylosis of lumbar region without myelopathy or radiculopathy    Chronic right shoulder pain    Nontraumatic complete tear of right rotator cuff    Rotator cuff arthropathy of right shoulder    Spinal stenosis of lumbar region with neurogenic claudication    DDD (degenerative disc disease), lumbosacral    Lumbosacral radiculopathy    Personal history of skin cancer    Hip pain    Calcified granuloma of lung    IGTN (ingrowing toe nail)    Adenomatous polyp of colon    Primary osteoarthritis of right hip    Right upper quadrant abdominal tenderness    S/P right total hip arthroplasty    Postoperative heterotopic ossification of muscle    Chronic bilateral low back pain without sciatica    Decreased ROM of lumbar spine    Pulmonary fibrosis, unspecified    Cervical myelopathy    Unspecified inflammatory  spondylopathy, lumbar region        Objective:      Physical Exam  Constitutional:       Appearance: Normal appearance.   HENT:      Head: Normocephalic.   Cardiovascular:      Rate and Rhythm: Normal rate and regular rhythm.      Pulses: Normal pulses.      Heart sounds: Normal heart sounds.   Pulmonary:      Effort: Pulmonary effort is normal.      Breath sounds: Normal breath sounds.   Abdominal:      General: Abdomen is flat. Bowel sounds are normal.      Palpations: Abdomen is soft.   Musculoskeletal:         General: Normal range of motion.      Cervical back: Normal range of motion and neck supple.   Skin:     General: Skin is warm and dry.   Neurological:      General: No focal deficit present.      Mental Status: She is alert and oriented to person, place, and time.   Psychiatric:         Mood and Affect: Mood normal.         Assessment:       Problem List Items Addressed This Visit          Neuro    Cervical myelopathy       Cardiac/Vascular    Aortic atherosclerosis    Relevant Orders    Lipid Panel (Completed)       Orthopedic    Unspecified inflammatory spondylopathy, lumbar region     Other Visit Diagnoses       Left temporal headache    -  Primary    Relevant Orders    CT Head Without Contrast    Vitamin D deficiency        Relevant Orders    Vitamin D 25 hydroxy (Completed)    Postoperative hypothyroidism        Relevant Orders    TSH (Completed)    T4, FREE (Completed)    Primary hypertension        Relevant Orders    Comprehensive Metabolic Panel (Completed)    CBC Auto Differential (Completed)    Hemoglobin A1C (Completed)    Need for vaccination        Relevant Orders    Influenza - Trivalent (Adjuvanted) (Completed)            Plan:         Jo-Ann was seen today for follow-up.    Diagnoses and all orders for this visit:    Left temporal headache  -     CT Head Without Contrast; Future    Vitamin D deficiency  -     Vitamin D 25 hydroxy; Future  Check levels continue replacement    Postoperative  hypothyroidism  -     TSH; Future  -     T4, FREE; Future  Check levels and continue Synthroid    Primary hypertension  Well-controlled on current meds    Aortic atherosclerosis  -     Lipid Panel; Future    Need for vaccination  -     Influenza - Trivalent (Adjuvanted)    Cervical myelopathy  Unspecified inflammatory spondylopathy, lumbar region  Follow up Neurosurgery and upcoming imaging               Bibiana Mckenzie MD   Internal Medicine   Primary Care

## 2024-10-22 PROBLEM — J84.10 PULMONARY FIBROSIS, UNSPECIFIED: Status: ACTIVE | Noted: 2024-10-22

## 2024-10-22 PROBLEM — M46.96 UNSPECIFIED INFLAMMATORY SPONDYLOPATHY, LUMBAR REGION: Status: ACTIVE | Noted: 2024-10-22

## 2024-10-22 PROBLEM — G95.9 CERVICAL MYELOPATHY: Status: ACTIVE | Noted: 2024-10-22

## 2024-10-22 NOTE — TELEPHONE ENCOUNTER
Pt inquiring about 10/21 lab result and informing provider that she is currently taking Synthroid 100 mcg daily as indicated in chart    Would like for any changes in rx to be sent to [facility] in [place] if needed

## 2024-10-24 ENCOUNTER — PATIENT MESSAGE (OUTPATIENT)
Dept: ADMINISTRATIVE | Facility: OTHER | Age: 84
End: 2024-10-24
Payer: MEDICARE

## 2024-10-25 ENCOUNTER — HOSPITAL ENCOUNTER (OUTPATIENT)
Dept: RADIOLOGY | Facility: HOSPITAL | Age: 84
Discharge: HOME | End: 2024-10-25
Attending: NURSE PRACTITIONER
Payer: MEDICARE

## 2024-10-25 ENCOUNTER — HOSPITAL ENCOUNTER (OUTPATIENT)
Dept: RADIOLOGY | Facility: HOSPITAL | Age: 84
Discharge: HOME | End: 2024-10-25
Attending: INTERNAL MEDICINE
Payer: MEDICARE

## 2024-10-25 DIAGNOSIS — M54.17 LUMBOSACRAL RADICULOPATHY: ICD-10-CM

## 2024-10-25 DIAGNOSIS — M48.07 SPINAL STENOSIS, LUMBOSACRAL REGION: ICD-10-CM

## 2024-10-25 DIAGNOSIS — G89.29 CHRONIC BILATERAL LOW BACK PAIN WITHOUT SCIATICA: ICD-10-CM

## 2024-10-25 DIAGNOSIS — M47.816 FACET ARTHRITIS OF LUMBAR REGION: ICD-10-CM

## 2024-10-25 DIAGNOSIS — M54.50 CHRONIC BILATERAL LOW BACK PAIN WITHOUT SCIATICA: ICD-10-CM

## 2024-10-25 DIAGNOSIS — R51.9 LEFT TEMPORAL HEADACHE: ICD-10-CM

## 2024-10-25 DIAGNOSIS — M54.9 DORSALGIA, UNSPECIFIED: ICD-10-CM

## 2024-10-25 PROCEDURE — 72148 MRI LUMBAR SPINE W/O DYE: CPT | Mod: TC

## 2024-10-25 PROCEDURE — 72131 CT LUMBAR SPINE W/O DYE: CPT | Mod: TC

## 2024-10-25 PROCEDURE — 72148 MRI LUMBAR SPINE W/O DYE: CPT | Mod: 26,,, | Performed by: RADIOLOGY

## 2024-10-25 PROCEDURE — 72131 CT LUMBAR SPINE W/O DYE: CPT | Mod: 26,,, | Performed by: RADIOLOGY

## 2024-10-25 PROCEDURE — 70450 CT HEAD/BRAIN W/O DYE: CPT | Mod: 26,,, | Performed by: RADIOLOGY

## 2024-10-25 PROCEDURE — 70450 CT HEAD/BRAIN W/O DYE: CPT | Mod: TC

## 2024-10-26 ENCOUNTER — PATIENT MESSAGE (OUTPATIENT)
Dept: OTHER | Facility: OTHER | Age: 84
End: 2024-10-26
Payer: MEDICARE

## 2024-12-12 ENCOUNTER — OFFICE VISIT (OUTPATIENT)
Dept: NEUROSURGERY | Facility: CLINIC | Age: 84
End: 2024-12-12
Payer: MEDICARE

## 2024-12-12 VITALS
DIASTOLIC BLOOD PRESSURE: 76 MMHG | SYSTOLIC BLOOD PRESSURE: 124 MMHG | WEIGHT: 183 LBS | HEART RATE: 86 BPM | HEIGHT: 62 IN | BODY MASS INDEX: 33.68 KG/M2

## 2024-12-12 DIAGNOSIS — M47.816 SPONDYLOSIS OF LUMBAR REGION WITHOUT MYELOPATHY OR RADICULOPATHY: ICD-10-CM

## 2024-12-12 DIAGNOSIS — M51.371 DEGENERATION OF INTERVERTEBRAL DISC OF LUMBOSACRAL REGION WITH LOWER EXTREMITY PAIN: Primary | ICD-10-CM

## 2024-12-12 DIAGNOSIS — M47.816 FACET ARTHRITIS OF LUMBAR REGION: ICD-10-CM

## 2024-12-12 DIAGNOSIS — M54.17 LUMBOSACRAL RADICULOPATHY: ICD-10-CM

## 2024-12-12 DIAGNOSIS — M47.26 OSTEOARTHRITIS OF SPINE WITH RADICULOPATHY, LUMBAR REGION: ICD-10-CM

## 2024-12-12 PROCEDURE — 99999 PR PBB SHADOW E&M-EST. PATIENT-LVL III: CPT | Mod: PBBFAC,,, | Performed by: NEUROLOGICAL SURGERY

## 2024-12-12 NOTE — PROGRESS NOTES
Neurosurgery  Established Patient    SUBJECTIVE:     History of Present Illness:  Jo-Ann Escobedo is a 83 y.o. female being seen in clinic today as a referral from her PCP to discuss concerns with worsening low back pain despite conservative treatment. The patient was seen by Dr. Hernandez in 2021 for similar complaints and recommended proceeding with further evaluation of her hip prior to considering surgical options for her lumbar spine. Today, she describes the pain as constant and aching across the low back with radiation down the anterior aspect of the legs. States that she also struggles with knee pain.  Rates the pain as a 4/10. Aggravating factors include standing and walking. Denies weakness, numbness or tingling, b/b dysfunction, saddle anesthesia, or gait instability. She has obtained injections and PT in the past without lasting relief.     Interval History 12/12/2024:  Ms. Escobedo is an 84-year-old female who is seeing me today in follow-up.  Her last neurosurgery clinic appointment was on October 3, 2024 with Jael Virgen NP.  At that time, she complained of worsening low back pain with radiation into the anterior aspects of her legs bilaterally.  She also complained of bilateral knee pain.  She was sent for an updated MRI of the lumbar spine as well as CT scan of the lumbar spine.  She is here today to see me in follow-up.  In the meantime, since the time of her last appointment, she has been seen by Dr. Ramirez of Orthopedic surgery for bilateral knee pain.  At the time of her last appointment with him, she underwent bilateral knee injections.  Since the time of the use knee injections, she states that her back pain has pretty much completely resolved.  Currently, she denies significant low back pain.  She denies any radiation to her legs.  She also denies any knee pain.  She states that she is walking better and she is walking up to a half a mi a day.  She denies any paresthesias or weakness in her lower  extremities.    Review of patient's allergies indicates:   Allergen Reactions    Voltaren [diclofenac sodium] Other (See Comments)     Hematochezia and hospitalization for hematochezia.    Codeine Diarrhea and Hallucinations    Niacin preparations      Redness, warming       Current Outpatient Medications   Medication Sig Dispense Refill    acetaminophen (TYLENOL) 500 MG tablet Take 2 tablets (1,000 mg total) by mouth every 8 (eight) hours as needed for Pain. 54 tablet 0    amoxicillin (AMOXIL) 500 MG capsule TAKE 4 CAPSULES (2,000 MG TOTAL) BY MOUTH ON CALL PROCEDURE (TAKE 1 HOUR PRIOR TO DENTAL APPT). 4 capsule 1    ascorbic acid, vitamin C, (VITAMIN C) 100 MG tablet Take 100 mg by mouth once daily.      cholecalciferol, vitamin D3, (VITAMIN D3) 50 mcg (2,000 unit) Tab Take 1 tablet by mouth once daily.      co-enzyme Q-10 30 mg capsule Take 30 mg by mouth once daily.      cyanocobalamin (VITAMIN B-12) 250 MCG tablet Take 250 mcg by mouth once daily.      hydroCHLOROthiazide (HYDRODIURIL) 25 MG tablet TAKE 1/2 TABLET BY MOUTH ONCE DAILY 45 tablet 3    ketoconazole (NIZORAL) 2 % cream Apply topically once daily. Apply daily to affected toenails for 6-12 months 60 g 4    LACTOBACILLUS COMBINATION NO.8 (ADULT PROBIOTIC ORAL) Take by mouth once daily.       levothyroxine (SYNTHROID) 100 MCG tablet Take 1 tablet (100 mcg total) by mouth before breakfast. 90 tablet 3    losartan (COZAAR) 100 MG tablet Take 1 tablet (100 mg total) by mouth once daily. 90 tablet 3    mirabegron (MYRBETRIQ) 50 mg Tb24 Take 1 tablet (50 mg total) by mouth once daily. 30 tablet 11    dk-cs-ar4-dha-epa-fish-lut-carlos (OCUVITE ADULT 50 PLUS) 250 mg (90 mg-160 mg) Cap Take by mouth.      turmeric root extract 500 mg Cap Take by mouth once daily.        No current facility-administered medications for this visit.       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 She subsequently underwent colonoscopy 12/9 and several large diverticula in the sigmoid colon was seen with hematin throughout the colon; blood pooling also noted in the sigmoid colon, during which clip was placed f    Hematochezia     Hypothyroidism, postsurgical 07/01/2015    Mixed hyperlipidemia 09/27/2012    Multinodular goiter 07/31/2014    Paget's disease of bone 07/10/2013    SCC (squamous cell carcinoma) 12/2020    left 5th knuckle    Squamous Cell Carcinoma     in situ right neck      Past Surgical History:   Procedure Laterality Date    ARTHROPLASTY OF HIP BY ANTERIOR APPROACH Right 7/5/2023    Procedure: ARTHROPLASTY, HIP, TOTAL, ANTERIOR APPROACH;  Surgeon: Federico Ramirez MD;  Location: [address];  Service: Orthopedics;  Laterality: Right;    BREAST BIOPSY Right 2019    BREAST LUMPECTOMY Right 2019    CARPAL TUNNEL RELEASE Left 6/5/2020    Procedure: RELEASE, CARPAL TUNNEL Left;  Surgeon: Pricila Presley MD;  Location: [facility] OR;  Service: Orthopedics;  Laterality: Left;    CARPAL TUNNEL RELEASE Right 12/5/2022    Procedure: RELEASE, CARPAL TUNNEL, right;  Surgeon: Pricila Presley MD;  Location: [facility] OR;  Service: Orthopedics;  Laterality: Right;    CATARACT EXTRACTION W/  INTRAOCULAR LENS IMPLANT Bilateral     COLONOSCOPY N/A 4/15/2019    Procedure: COLONOSCOPY;   Surgeon: Artur Monet MD;  Location: [facility] (4TH FLR);  Service: Endoscopy;  Laterality: N/A;  within 1 month    COLONOSCOPY N/A 12/9/2019    Procedure: COLONOSCOPY;  Surgeon: Clyde Welch MD;  Location: [facility] (2ND FLR);  Service: Endoscopy;  Laterality: N/A;    COLONOSCOPY N/A 2/13/2023    Procedure: COLONOSCOPY;  Surgeon: Johan Hoyos MD;  Location: [facility] (4TH FLR);  Service: Endoscopy;  Laterality: N/A;  Okay for any CRS MD if Dr. Monet booked. but hopeful to get this done about 4 weeks from now  1/10 - pt called about time change and MD change. patient verbalized understanding and new instructions sent - sm  Precall complete- [place]    COLONOSCOPY N/A 11/6/2023    Procedure: COLONOSCOPY;  Surgeon: Artur Monet MD;  Location: [facility] (4TH FLR);  Service: Endoscopy;  Laterality: N/A;  inst. to pt. [facility]. [facility]  referral: Dr. Neely  10/30-precall complete-[place]    ENDOSCOPIC ULTRASOUND OF UPPER GASTROINTESTINAL TRACT N/A 1/22/2020    Procedure: ULTRASOUND, UPPER GI TRACT, ENDOSCOPIC;  Surgeon: Eleazar Shaffer MD;  Location: [facility] (2ND FLR);  Service: Endoscopy;  Laterality: N/A;  EUS for 10mm SML w/negative pillow sign and negative naked fat sign which was found at the incisura.  Dr Welch    EPIDURAL STEROID INJECTION Bilateral 12/17/2021    Procedure: INJECTION, STEROID, EPIDURAL, L3-L4 Bilateral DIRECT REF Transforaminal;  Surgeon: Julian Fish MD;  Location: [facility] PAIN MGT;  Service: Pain Management;  Laterality: Bilateral;    ESOPHAGOGASTRODUODENOSCOPY N/A 12/7/2019    Procedure: EGD (ESOPHAGOGASTRODUODENOSCOPY);  Surgeon: Clyde Welch MD;  Location: [facility] (2ND FLR);  Service: Endoscopy;  Laterality: N/A;    EXCISIONAL BIOPSY Right 6/27/2019    Procedure: EXCISIONAL BIOPSY-breast;  Surgeon: Suki Dill MD;  [address];  Service: General;  Laterality: Right;    EYE SURGERY      HYSTERECTOMY      INJECTION Right 3/3/2023    Procedure:  INJECTION, RIGHT HIP CORTICOSTEROID /LOCAL INJECTION CONTRAST DIRECT REF;  Surgeon: Julian Fish MD;  Location: [facility] PAIN MGT;  Service: Pain Management;  Laterality: Right;    INJECTION FOR SENTINEL NODE IDENTIFICATION Right 7/30/2020    Procedure: INJECTION, FOR SENTINEL NODE IDENTIFICATION;  Surgeon: Suki Dill MD;  Location: [facility] OR;  Service: General;  Laterality: Right;    INJECTION OF ANESTHETIC AGENT AROUND MEDIAL BRANCH NERVES INNERVATING LUMBAR FACET JOINT Bilateral 6/7/2019    Procedure: BLOCK, NERVE, FACET JOINT, LUMBAR, MEDIAL BRANCH-deo MBB L4/5,L5/S1;  Surgeon: Jarvis Morales III, MD;  Location: [facility] CATH LAB;  Service: Pain Management;  Laterality: Bilateral;    INJECTION OF STEROID Right 6/5/2020    Procedure: INJECTION, STEROID right carpal tunel injection/ Right ring trigger finger injection;  Surgeon: Pricila Presley MD;  Location: [facility] OR;  Service: Orthopedics;  Laterality: Right;  INJECTION, STEROID right carpal tunel injection/ Right ring trigger finger injection    KNEE ARTHROSCOPY N/A     pt unsure of which knee     MASTECTOMY      OOPHORECTOMY      SENTINEL LYMPH NODE BIOPSY Right 7/30/2020    Procedure: BIOPSY, LYMPH NODE, SENTINEL;  Surgeon: Suki Dill MD;  Location: [facility] OR;  Service: General;  Laterality: Right;    SPINE SURGERY      TOTAL THYROIDECTOMY      TRANSFORAMINAL EPIDURAL INJECTION OF STEROID Bilateral 5/27/2022    Procedure: INJECTION, STEROID, EPIDURAL, TF BILATERAL L3-L4 CONTRAST DIRECT REF;  Surgeon: Julian Fish MD;  Location: [facility] PAIN MGT;  Service: Pain Management;  Laterality: Bilateral;    TRANSFORAMINAL EPIDURAL INJECTION OF STEROID Bilateral 10/21/2022    Procedure: LUMBAR TRANSFORAMINAL BILATERAL L3/4 CONTRAST DIRECT REFERRAL;  Surgeon: Julian Fish MD;  Location: [facility] PAIN MGT;  Service: Pain Management;  Laterality: Bilateral;    UNILATERAL MASTECTOMY Right 7/30/2020    Procedure: MASTECTOMY, UNILATERAL;  Surgeon: Suki SALGUERO  MD Fuentes;  Location: [facility];  Service: General;  Laterality: Right;    YAG Laser Capsulotomy  Left 2019 OD//Dr. Hahn     Family History       Problem Relation (Age of Onset)    Cancer Father    Dementia Mother    Glaucoma Brother    Macular degeneration Brother    No Known Problems Daughter, Son    Osteoarthritis Mother          Social History     Socioeconomic History    Marital status:    Occupational History    Occupation: [employer]     Employer: [employer]     Employer: [employer]   Tobacco Use    Smoking status: Former     Current packs/day: 0.00     Types: Cigarettes     Quit date: 1969     Years since quittin.9    Smokeless tobacco: Never   Substance and Sexual Activity    Alcohol use: Not Currently    Drug use: No    Sexual activity: Not Currently   Other Topics Concern    Are you pregnant or think you may be? No    Breast-feeding No     Social Drivers of Health     Financial Resource Strain: Low Risk  (2023)    Overall Financial Resource Strain (CARDIA)     Difficulty of Paying Living Expenses: Not hard at all   Food Insecurity: No Food Insecurity (2023)    Hunger Vital Sign     Worried About Running Out of Food in the Last Year: Never true     Ran Out of Food in the Last Year: Never true   Transportation Needs: No Transportation Needs (2023)    PRAPARE - Transportation     Lack of Transportation (Medical): No     Lack of Transportation (Non-Medical): No   Physical Activity: Insufficiently Active (2023)    Exercise Vital Sign     Days of Exercise per Week: 7 days     Minutes of Exercise per Session: 20 min   Stress: No Stress Concern Present (2023)    [employer] - Occupational Stress Questionnaire     Feeling of Stress : Only a little   Housing Stability: Low Risk  (2023)    Housing Stability Vital Sign     Unable to Pay for Housing in the Last Year: No     Number of Places Lived in the Last  Year: 1     Unstable Housing in the Last Year: No       Review of Systems   Constitutional:  Positive for unexpected weight change. Negative for activity change, diaphoresis, fatigue and fever.   HENT:  Positive for hearing loss. Negative for nosebleeds, tinnitus and trouble swallowing.    Eyes:  Positive for visual disturbance.   Respiratory:  Negative for cough, shortness of breath and wheezing.    Cardiovascular:  Negative for chest pain and palpitations.   Gastrointestinal:  Positive for abdominal pain. Negative for abdominal distention, blood in stool, constipation, diarrhea, nausea and vomiting.   Endocrine: Negative for cold intolerance and heat intolerance.   Genitourinary:  Negative for difficulty urinating, dysuria, frequency and urgency.   Musculoskeletal:  Positive for back pain, gait problem and neck pain. Negative for joint swelling, myalgias and neck stiffness.   Skin:  Negative for color change, rash and wound.   Allergic/Immunologic: Negative for environmental allergies and food allergies.   Neurological:  Negative for dizziness, seizures, facial asymmetry, speech difficulty, weakness, light-headedness, numbness and headaches.   Hematological:  Bruises/bleeds easily.   Psychiatric/Behavioral:  Negative for agitation, behavioral problems, dysphoric mood and hallucinations. The patient is not nervous/anxious.        OBJECTIVE:     Vital Signs     There is no height or weight on file to calculate BMI.    Physical Exam:  Vitals reviewed.    Constitutional: She appears well-developed and well-nourished. No distress.     Eyes: Pupils are equal, round, and reactive to light. Conjunctivae and EOM are normal.     Cardiovascular: Normal rate, regular rhythm, normal pulses and no edema.     Abdominal: Soft. Bowel sounds are normal.     Skin: Skin displays no rash on trunk and no rash on extremities. Skin displays no lesions on trunk and no lesions on extremities.     Psych/Behavior: She is alert. She is  oriented to person, place, and time. She has a normal mood and affect.     Musculoskeletal: Gait is normal.        Neck: Range of motion is full. There is no tenderness. Muscle strength is 5/5. Tone is normal.        Back: Range of motion is full. There is no tenderness. Muscle strength is 5/5. Tone is normal.        Right Upper Extremities: Range of motion is full. There is no tenderness. Muscle strength is 5/5. Tone is normal.        Left Upper Extremities: Range of motion is full. There is no tenderness. Muscle strength is 5/5. Tone is normal.       Right Lower Extremities: Range of motion is full. There is no tenderness. Muscle strength is 5/5. Tone is normal.        Left Lower Extremities: Range of motion is full. There is no tenderness. Muscle strength is 5/5. Tone is normal.     Neurological:        Coordination: She has a normal Romberg Test, normal finger to nose coordination and normal tandem walking coordination.        Sensory: There is no sensory deficit in the trunk. There is no sensory deficit in the extremities.        DTRs: DTRs are DTRS NORMAL AND SYMMETRICnormal and symmetric. She displays no Babinski's sign on the right side. She displays no Babinski's sign on the left side.        Cranial nerves: Cranial nerve(s) II, III, IV, V, VI, VII, VIII, IX, X, XI and XII are intact.         Diagnostic Results:  She has an MRI of the lumbar spine available for review which I personally reviewed.  This shows multilevel lumbar spondylosis.  There is degenerative disc disease worse at the L3-4 and L4-5 levels.  There is mild-to-moderate central canal stenosis at L3-4.  There is bilateral neuroforaminal narrowing at L3-4, L4-5, and L5-S1.  There is fairly significant facet arthropathy throughout the lumbar spine.    ASSESSMENT/PLAN:     Ms. Escobedo is an 84-year-old female with a previous history of low back pain that dates back approximately 30 years.  I last saw the patient in 2021 for the same complaints.   More recently, she was seen in October 2024 for low back pain with radiation into her bilateral anterior thighs.  Since the time of bilateral knee injections, she actually denies any low back pain at all.  She is doing quite well.  Her MRI of the lumbar spine is as described above.  Although she has multilevel lumbar spondylosis, since she has no pain at this time, I would not recommend any neurosurgical intervention.  If we were to consider any surgical intervention since she has failed conservative therapies in the past, we would likely be talking about a multilevel instrumentation and fusion.  The patient is not really interested in surgery.  Perhaps, if her pain comes back, she would be a good candidate for a spinal cord stimulator trial.  In any event, the patient is doing well enough at this time to not proceed with any further intervention.  I will see her on an as-needed basis.  She knows she can call with any further questions or concerns in the meantime.        Note dictated with voice recognition software, please excuse any grammatical errors.

## 2025-01-09 DIAGNOSIS — R82.994 HYPERCALCIURIA: ICD-10-CM

## 2025-01-13 ENCOUNTER — TELEPHONE (OUTPATIENT)
Dept: UROLOGY | Facility: CLINIC | Age: 85
End: 2025-01-13
Payer: MEDICARE

## 2025-01-13 DIAGNOSIS — Z96.641 HISTORY OF RIGHT HIP REPLACEMENT: ICD-10-CM

## 2025-01-13 DIAGNOSIS — E89.0 HYPOTHYROIDISM, POSTSURGICAL: ICD-10-CM

## 2025-01-13 NOTE — TELEPHONE ENCOUNTER
Called pt regarding message in portal. Offered pt appt to discuss alternative medication since myrbetriq is no longer covered b y her insurance. Pt stated she has about a month of medicine left, but she feels like the myrbetriq is not working at all. Scheduled appt for 1/29/25 @ 10:40 am    ----- Message from Marielos sent at 1/13/2025  3:24 PM CST -----  Contact: [phone] Patient  Requesting an RX refill or new RX.    Is this a refill or new RX: new - pt states insurance no longer covers - still necessary to take? Pt still has 90 day supply.    RX name and strength (copy/paste from chart):  mirabegron (MYRBETRIQ) 50 mg Tb24 30 tablet 11 3/26/2024 3/26/2025 --    Is this a 30 day or 90 day RX: 30    Pharmacy name and phone # (copy/paste from chart):    [facility]/pharmacy #[id number] - [place], [place] - [address] [facility]  [address]  [facility] [address]  Phone: [phone] Fax: [phone]       The doctors have asked that we provide their patients with the following 2 reminders -- prescription refills can take up to 72 hours, and a friendly reminder that in the future you can use your [facility] account to request refills: yes

## 2025-01-13 NOTE — TELEPHONE ENCOUNTER
----- Message from Marielos sent at 1/13/2025  3:17 PM CST -----  Contact: [phone] Patient  Requesting an RX refill or new RX.    Is this a refill or new RX: new -  needed asap    RX name and strength (copy/paste from chart):  amoxicillin (AMOXIL) 500 MG capsule 4 capsule 1 8/19/2024 - No    Is this a 30 day or 90 day RX:     Pharmacy name and phone # (copy/paste from chart):    [facility]/pharmacy #[address] [place] [address] [address] [address]  Phone: [phone] Fax: [phone]    Requesting an RX refill or new RX.    Is this a refill or new RX: new - reduce to 88mg? - out completely     RX name and strength (copy/paste from chart):  levothyroxine (SYNTHROID) 100 MCG tablet 90 tablet 3 3/14/2024 3/14/2025 No    Is this a 30 day or 90 day RX: 90    Pharmacy name and phone # (copy/paste from chart):    [facility]/pharmacy #[address] [place] [address] [address] [place] [address]  Phone: [phone] Fax: [phone]    Requesting an RX refill or new RX.    Is this a refill or new RX: new - 0 refills remaining    RX name and strength (copy/paste from chart):  losartan (COZAAR) 100 MG tablet 90 tablet 3 12/28/2023 12/27/2024 No    Is this a 30 day or 90 day RX: 90    Pharmacy name and phone # (copy/paste from chart):    [facility]/pharmacy #[address] [place] [address] [address] [address]  Phone: [phone] Fax: [phone]      The doctors have asked that we provide their patients with the following 2 reminders -- prescription refills can take up to 72 hours, and a friendly reminder that in the future you can use your [facility] account to request refills: yes

## 2025-01-13 NOTE — TELEPHONE ENCOUNTER
No care due was identified.  [facility] Embedded Care Due Messages. Reference number: [id number].   1/13/2025 3:54:50 PM CST

## 2025-01-14 DIAGNOSIS — Z96.641 HISTORY OF RIGHT HIP REPLACEMENT: ICD-10-CM

## 2025-01-14 RX ORDER — HYDROCHLOROTHIAZIDE 25 MG/1
12.5 TABLET ORAL
Qty: 45 TABLET | Refills: 3 | Status: SHIPPED | OUTPATIENT
Start: 2025-01-14

## 2025-01-14 RX ORDER — AMOXICILLIN 500 MG/1
2000 CAPSULE ORAL
Qty: 4 CAPSULE | Refills: 1 | Status: SHIPPED | OUTPATIENT
Start: 2025-01-14

## 2025-01-15 DIAGNOSIS — E89.0 HYPOTHYROIDISM, POSTSURGICAL: ICD-10-CM

## 2025-01-20 RX ORDER — LOSARTAN POTASSIUM 100 MG/1
100 TABLET ORAL DAILY
Qty: 90 TABLET | Refills: 3 | OUTPATIENT
Start: 2025-01-20 | End: 2026-01-20

## 2025-01-20 RX ORDER — LEVOTHYROXINE SODIUM 100 UG/1
100 TABLET ORAL
Qty: 90 TABLET | Refills: 3 | OUTPATIENT
Start: 2025-01-20 | End: 2026-01-20

## 2025-01-20 RX ORDER — AMOXICILLIN 500 MG/1
2000 CAPSULE ORAL
Qty: 4 CAPSULE | Refills: 1 | OUTPATIENT
Start: 2025-01-20

## 2025-01-20 RX ORDER — LEVOTHYROXINE SODIUM 100 UG/1
100 TABLET ORAL
Qty: 90 TABLET | Refills: 3 | Status: SHIPPED | OUTPATIENT
Start: 2025-01-20 | End: 2026-01-20

## 2025-01-29 ENCOUNTER — TELEPHONE (OUTPATIENT)
Dept: INTERNAL MEDICINE | Facility: CLINIC | Age: 85
End: 2025-01-29
Payer: MEDICARE

## 2025-01-29 ENCOUNTER — OFFICE VISIT (OUTPATIENT)
Dept: UROLOGY | Facility: CLINIC | Age: 85
End: 2025-01-29
Payer: MEDICARE

## 2025-01-29 VITALS
HEART RATE: 108 BPM | WEIGHT: 185.44 LBS | SYSTOLIC BLOOD PRESSURE: 133 MMHG | DIASTOLIC BLOOD PRESSURE: 67 MMHG | BODY MASS INDEX: 33.91 KG/M2

## 2025-01-29 DIAGNOSIS — I10 PRIMARY HYPERTENSION: Primary | ICD-10-CM

## 2025-01-29 DIAGNOSIS — R73.03 PREDIABETES: ICD-10-CM

## 2025-01-29 DIAGNOSIS — E89.0 HYPOTHYROIDISM, POSTSURGICAL: ICD-10-CM

## 2025-01-29 DIAGNOSIS — N39.41 URGE INCONTINENCE OF URINE: Primary | ICD-10-CM

## 2025-01-29 PROCEDURE — 3075F SYST BP GE 130 - 139MM HG: CPT | Mod: CPTII,S$GLB,, | Performed by: UROLOGY

## 2025-01-29 PROCEDURE — G2211 COMPLEX E/M VISIT ADD ON: HCPCS | Mod: S$GLB,,, | Performed by: UROLOGY

## 2025-01-29 PROCEDURE — 99999 PR PBB SHADOW E&M-EST. PATIENT-LVL III: CPT | Mod: PBBFAC,,, | Performed by: UROLOGY

## 2025-01-29 PROCEDURE — 3078F DIAST BP <80 MM HG: CPT | Mod: CPTII,S$GLB,, | Performed by: UROLOGY

## 2025-01-29 PROCEDURE — 1157F ADVNC CARE PLAN IN RCRD: CPT | Mod: CPTII,S$GLB,, | Performed by: UROLOGY

## 2025-01-29 PROCEDURE — 1159F MED LIST DOCD IN RCRD: CPT | Mod: CPTII,S$GLB,, | Performed by: UROLOGY

## 2025-01-29 PROCEDURE — 99214 OFFICE O/P EST MOD 30 MIN: CPT | Mod: S$GLB,,, | Performed by: UROLOGY

## 2025-01-29 NOTE — TELEPHONE ENCOUNTER
Call pt no answer left a voice mail stated that she need to make a appt to see  and also do lab work,

## 2025-01-29 NOTE — PROGRESS NOTES
[facility]      Return Overactive Bladder (OAB) Note    1/29/2025    Referred by:  No ref. provider found     CHIEF COMPLAINT:  Patient presents for follow-up of overactive bladder symptoms and to discuss medication options due to insurance coverage issues with her current prescription.    HPI:  Patient is an 84-year-old female returning for follow-up of overactive bladder symptoms. She was initially seen as a new patient for this condition in May 2024, at which time she had been taking Mirabegron 50 mg for 2 months. The medication had improved her symptoms, reducing her pad usage from 4 times daily to daily, and improving her nocturia.    Currently, the patient reports incontinence, requiring pad changes 3-4 times daily. She expresses uncertainty about the necessity and effectiveness of her medication, noting that while it has made some difference, she questions if the benefit justifies the cost. Her overactive bladder symptoms appeared to start immediately following a hip replacement surgery, although the exact connection between the two events is unclear.    Patient takes hydrochlorothiazide for hypertension and has no history of glaucoma. She denies obstructive voiding symptoms or other voiding symptoms.    MEDICATIONS:  Patient is on Mirabegron 50 mg daily for overactive bladder, which has decreased pad usage from 4 times daily to 1 time per day and improved nocturia. She is also on Hydrochlorothiazide for hypertension.    MEDICAL HISTORY:  Patient has a history of overactive bladder and hypertension. Patient has gone through menopause.    SURGICAL HISTORY:  Patient underwent hip replacement surgery. Her overactive bladder symptoms began immediately after this procedure.    TEST RESULTS:  A bladder scan conducted in May 2024 showed a residual of 3 mL. During the current visit, another bladder scan revealed a residual of 0 mL.          A review of 10+ systems was conducted with pertinent  positive and negative findings documented in HPI with all other systems reviewed and negative.    Past medical, family, surgical and social history reviewed as documented in chart with pertinent positive medical, family, surgical and social history detailed in HPI.    Previous OAB therapies:  Behavioral Therapies  : (fluid/dietary modification) -  provided some but insufficient benefit  Medications  mirabegron (Myrbetriq) 50 mg >12 months (cost or insurance coverage prohibited continuation)  Other Therapies  No Other Therapies    Previous NORMA therapies:  no previous therapy      Exam Findings:    Physical Exam    General: No acute distress. Nontoxic appearing.  HENT: Normocephalic. Atraumatic.  Respiratory: Normal respiratory effort. No conversational dyspnea. No audible wheezing.  Abdomen: No obvious distension.  Skin: No visible abnormalities.  Extremities: No edema upper extremities. No edema lower extremities.  Neurological: Alert and oriented x3. Normal speech.  Psychiatric: Normal mood. Normal affect. No evidence of SI.  Female Genitourinary: Bladder scan residual: 3 milliL. Postpartum residual: 0 milliL.          Assessment/Plan:    Assessment & Plan    IMPRESSION:  Assessed efficacy of Mirabegron for overactive bladder symptoms in 84-year-old patient  Considered Mirabegron and Gemtesa as medication options due to minimal cognitive impact  Evaluated potential connection between hip replacement surgery and onset of overactive bladder symptoms, though direct causation unclear    PLAN SUMMARY:  - Discontinue Mirabegron 50 mg for 1-2 weeks to evaluate effectiveness  - Contact office if Gemtesa prescription needed before follow-up  - Performed bladder scan residual: 0 mL  - Follow-up in 3 months  - Discussed potential treatments: Botox injections and neuromodulator implants    OVERACTIVE BLADDER:  - Explained process of determining medication efficacy by discontinuing current treatment and observing symptoms.  -  Discussed potential treatment options beyond medications, including Botox injections and neuromodulator implants.  - Discontinued Mirabegron 50 mg for 1-2 weeks to evaluate its effectiveness.  - Performed bladder scan residual: 0 mL.  - Contact the office if Gemtesa prescription is needed before the follow-up visit.    PERSONAL HISTORY OF OTHER MEDICAL TREATMENT:  - Clarified that the connection between hip replacement surgery and overactive bladder symptoms is not well understood.    FOLLOW-UP:  - Follow up in 3 months.              Visit complexity today is associated with medical care services that are part of the ongoing care related to the single serious and/or complex condition of Overactive bladder (OAB). A longitudinal relationship exists or is being developed between the patient and this practitioner for the care of this condition.

## 2025-01-29 NOTE — TELEPHONE ENCOUNTER
----- Message from Christa sent at 1/29/2025  3:39 PM CST -----  Contact: [phone]@[email or web address]  Patient is returning a phone call. Yes     Who left a message for the patient: Ricki Sarkar MA    Does patient know what this is regarding:      Would you like a call back, or a response through your [facility] portal?:   Call back     Comments:

## 2025-01-29 NOTE — TELEPHONE ENCOUNTER
----- Message from Bibiana Mckenzie MD sent at 1/29/2025  9:15 AM CST -----  Please let her know its time for follow up. She needs labs before the visit which I have ordered. Can you help her schedule for end of February? thanks

## 2025-02-12 ENCOUNTER — LAB VISIT (OUTPATIENT)
Dept: LAB | Facility: HOSPITAL | Age: 85
End: 2025-02-12
Payer: MEDICARE

## 2025-02-12 DIAGNOSIS — E89.0 HYPOTHYROIDISM, POSTSURGICAL: ICD-10-CM

## 2025-02-12 DIAGNOSIS — R73.03 PREDIABETES: ICD-10-CM

## 2025-02-12 DIAGNOSIS — I10 PRIMARY HYPERTENSION: ICD-10-CM

## 2025-02-12 LAB
ALBUMIN SERPL BCP-MCNC: 3.7 G/DL (ref 3.5–5.2)
ALP SERPL-CCNC: 157 U/L (ref 40–150)
ALT SERPL W/O P-5'-P-CCNC: 18 U/L (ref 10–44)
ANION GAP SERPL CALC-SCNC: 13 MMOL/L (ref 8–16)
AST SERPL-CCNC: 23 U/L (ref 10–40)
BILIRUB SERPL-MCNC: 0.8 MG/DL (ref 0.1–1)
BUN SERPL-MCNC: 17 MG/DL (ref 8–23)
CALCIUM SERPL-MCNC: 10 MG/DL (ref 8.7–10.5)
CHLORIDE SERPL-SCNC: 107 MMOL/L (ref 95–110)
CO2 SERPL-SCNC: 21 MMOL/L (ref 23–29)
CREAT SERPL-MCNC: 0.6 MG/DL (ref 0.5–1.4)
EST. GFR  (NO RACE VARIABLE): >60 ML/MIN/1.73 M^2
ESTIMATED AVG GLUCOSE: 120 MG/DL (ref 68–131)
GLUCOSE SERPL-MCNC: 105 MG/DL (ref 70–110)
HBA1C MFR BLD: 5.8 % (ref 4–5.6)
POTASSIUM SERPL-SCNC: 4.1 MMOL/L (ref 3.5–5.1)
PROT SERPL-MCNC: 6.9 G/DL (ref 6–8.4)
SODIUM SERPL-SCNC: 141 MMOL/L (ref 136–145)
TSH SERPL DL<=0.005 MIU/L-ACNC: 2.36 UIU/ML (ref 0.4–4)

## 2025-02-12 PROCEDURE — 83036 HEMOGLOBIN GLYCOSYLATED A1C: CPT | Performed by: INTERNAL MEDICINE

## 2025-02-12 PROCEDURE — 84443 ASSAY THYROID STIM HORMONE: CPT | Performed by: INTERNAL MEDICINE

## 2025-02-12 PROCEDURE — 80053 COMPREHEN METABOLIC PANEL: CPT | Performed by: INTERNAL MEDICINE

## 2025-02-13 ENCOUNTER — TELEPHONE (OUTPATIENT)
Dept: INTERNAL MEDICINE | Facility: CLINIC | Age: 85
End: 2025-02-13
Payer: MEDICARE

## 2025-02-13 NOTE — TELEPHONE ENCOUNTER
----- Message from Bibiana Mckenzie MD sent at 2/13/2025  9:05 AM CST -----  Please help her schedule follow up for March or April.

## 2025-02-18 ENCOUNTER — HOSPITAL ENCOUNTER (OUTPATIENT)
Dept: RADIOLOGY | Facility: HOSPITAL | Age: 85
Discharge: HOME | End: 2025-02-18
Attending: INTERNAL MEDICINE
Payer: MEDICARE

## 2025-02-18 ENCOUNTER — OFFICE VISIT (OUTPATIENT)
Dept: INTERNAL MEDICINE | Facility: CLINIC | Age: 85
End: 2025-02-18
Payer: MEDICARE

## 2025-02-18 VITALS
SYSTOLIC BLOOD PRESSURE: 131 MMHG | HEART RATE: 92 BPM | OXYGEN SATURATION: 98 % | DIASTOLIC BLOOD PRESSURE: 62 MMHG | BODY MASS INDEX: 34.37 KG/M2 | WEIGHT: 186.75 LBS | HEIGHT: 62 IN

## 2025-02-18 DIAGNOSIS — G56.03 BILATERAL CARPAL TUNNEL SYNDROME: ICD-10-CM

## 2025-02-18 DIAGNOSIS — M25.512 ACUTE PAIN OF LEFT SHOULDER: Primary | ICD-10-CM

## 2025-02-18 DIAGNOSIS — N39.3 STRESS INCONTINENCE OF URINE: ICD-10-CM

## 2025-02-18 DIAGNOSIS — I10 ESSENTIAL HYPERTENSION: ICD-10-CM

## 2025-02-18 DIAGNOSIS — G95.9 CERVICAL MYELOPATHY: ICD-10-CM

## 2025-02-18 DIAGNOSIS — M25.512 ACUTE PAIN OF LEFT SHOULDER: ICD-10-CM

## 2025-02-18 PROCEDURE — 73030 X-RAY EXAM OF SHOULDER: CPT | Mod: TC,LT

## 2025-02-18 RX ORDER — MIRABEGRON 50 MG/1
50 TABLET, FILM COATED, EXTENDED RELEASE ORAL DAILY
Qty: 30 TABLET | Refills: 11 | Status: SHIPPED | OUTPATIENT
Start: 2025-02-18 | End: 2026-02-18

## 2025-02-18 NOTE — PROGRESS NOTES
Subjective:       Patient ID: Jo-Ann Escobedo is a 84 y.o. female.    Chief Complaint: Follow-up    Presents for follow up.     Has been having worsening left shoulder pain over the last month. Pain when reaching up or behind her shoulder.     Also having some paraesthesias in bilateral hands. Has hx of carpal tunnel.     PMHx    HTN: on losartam and HCTZ.     Urinary urge incontinence: follows with Urology. Would like to try myrbetriq again.     Hypothyroid on synthroid.     Follows with ortho for bilateral knee pain.  Had recent injection which did help    Follows with the Neurosurgery for chronic lower back pain and spinal stenosis.  She has completed PT and numerous injections.  She saw Neurosurgery recently and plan is to check a CT and MRI of her lumbar spine.    history of breast cancer.  Underwent a right mastectomy in June of 2020.  This was a reoccurrence from breast cancer that she had 17 years ago and had a left modified radical mastectomy.  Also completed chemotherapy at that time  Of note she did not complete XRT in 2020 and she also did not take any adjuvant hormonal therapy.  Follows up with Dr. Mueller yearly    Health Maintenance:  Mammogram: s/p mastectomy.   DEXA:  Normal May 20, 2023  Lipids:  Ordered today  Vaccines:  Up-to-date      Follow-up  Pertinent negatives include no abdominal pain, arthralgias, chest pain, chills, coughing, headaches, myalgias, nausea or vomiting.     Review of Systems   Constitutional:  Negative for activity change, appetite change and chills.   HENT:  Negative for ear pain, sinus pressure/congestion and sneezing.    Respiratory:  Negative for cough and shortness of breath.    Cardiovascular:  Negative for chest pain, palpitations and leg swelling.   Gastrointestinal:  Negative for abdominal distention, abdominal pain, constipation, diarrhea, nausea and vomiting.   Genitourinary:  Negative for dysuria and hematuria.   Musculoskeletal:  Negative for arthralgias, back pain  and myalgias.   Neurological:  Negative for dizziness and headaches.   Psychiatric/Behavioral:  Negative for agitation. The patient is not nervous/anxious.            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 She subsequently underwent colonoscopy 12/9 and several large diverticula in the sigmoid colon was seen with hematin throughout the colon; blood pooling also noted in the sigmoid colon, during which clip was placed f    Hematochezia     Hypothyroidism, postsurgical 07/01/2015    Mixed hyperlipidemia 09/27/2012    Multinodular goiter 07/31/2014    Paget's disease of bone 07/10/2013    SCC (squamous cell carcinoma) 12/2020    left 5th knuckle    Squamous Cell Carcinoma     in situ right neck      Past Surgical History:   Procedure Laterality Date    ARTHROPLASTY OF HIP BY ANTERIOR APPROACH Right 7/5/2023    Procedure: ARTHROPLASTY, HIP, TOTAL, ANTERIOR APPROACH;  Surgeon: Federico Ramirez MD;  Location: [address];  Service: Orthopedics;  Laterality: Right;    BREAST BIOPSY Right 2019    BREAST LUMPECTOMY Right 2019    CARPAL TUNNEL RELEASE Left 6/5/2020    Procedure: RELEASE, CARPAL TUNNEL Left;  Surgeon: Pricila Presley MD;  Location: [facility];  Service: Orthopedics;  Laterality: Left;    CARPAL  TUNNEL RELEASE Right 12/5/2022    Procedure: RELEASE, CARPAL TUNNEL, right;  Surgeon: Pricila Presley MD;  Location: [facility] OR;  Service: Orthopedics;  Laterality: Right;    CATARACT EXTRACTION W/  INTRAOCULAR LENS IMPLANT Bilateral     COLONOSCOPY N/A 4/15/2019    Procedure: COLONOSCOPY;  Surgeon: Artur Monet MD;  Location: [facility] (4TH FLR);  Service: Endoscopy;  Laterality: N/A;  within 1 month    COLONOSCOPY N/A 12/9/2019    Procedure: COLONOSCOPY;  Surgeon: Clyde Welch MD;  Location: [facility] (2ND FLR);  Service: Endoscopy;  Laterality: N/A;    COLONOSCOPY N/A 2/13/2023    Procedure: COLONOSCOPY;  Surgeon: Johan Hoyos MD;  Location: [facility] (4TH FLR);  Service: Endoscopy;  Laterality: N/A;  Okay for any CRS MD if Dr. Monet booked. but hopeful to get this done about 4 weeks from now  1/10 - pt called about time change and MD change. patient verbalized understanding and new instructions sent - sm  Precall complete- [place]    COLONOSCOPY N/A 11/6/2023    Procedure: COLONOSCOPY;  Surgeon: Artur Monet MD;  Location: [facility] (4TH FLR);  Service: Endoscopy;  Laterality: N/A;  inst. to pt. [facility]. [facility]  referral: Dr. Neely  10/30-precall complete-[place]    ENDOSCOPIC ULTRASOUND OF UPPER GASTROINTESTINAL TRACT N/A 1/22/2020    Procedure: ULTRASOUND, UPPER GI TRACT, ENDOSCOPIC;  Surgeon: Eleazar Shaffer MD;  Location: [facility] (2ND FLR);  Service: Endoscopy;  Laterality: N/A;  EUS for 10mm SML w/negative pillow sign and negative naked fat sign which was found at the incisura.  Dr Welch    EPIDURAL STEROID INJECTION Bilateral 12/17/2021    Procedure: INJECTION, STEROID, EPIDURAL, L3-L4 Bilateral DIRECT REF Transforaminal;  Surgeon: Julian Fish MD;  Location: [facility] PAIN MGT;  Service: Pain Management;  Laterality: Bilateral;    ESOPHAGOGASTRODUODENOSCOPY N/A 12/7/2019    Procedure: EGD (ESOPHAGOGASTRODUODENOSCOPY);  Surgeon: Clyde Welch MD;  Location: [facility] (2ND FLR);   Service: Endoscopy;  Laterality: N/A;    EXCISIONAL BIOPSY Right 6/27/2019    Procedure: EXCISIONAL BIOPSY-breast;  Surgeon: Suki Dill MD;  Location: [address];  Service: General;  Laterality: Right;    EYE SURGERY      HYSTERECTOMY      INJECTION Right 3/3/2023    Procedure: INJECTION, RIGHT HIP CORTICOSTEROID /LOCAL INJECTION CONTRAST DIRECT REF;  Surgeon: Julian Fish MD;  Location: [facility] PAIN [facility]GT;  Service: Pain Management;  Laterality: Right;    INJECTION FOR SENTINEL NODE IDENTIFICATION Right 7/30/2020    Procedure: INJECTION, FOR SENTINEL NODE IDENTIFICATION;  Surgeon: Suki Dill MD;  Location: [facility] OR;  Service: General;  Laterality: Right;    INJECTION OF ANESTHETIC AGENT AROUND MEDIAL BRANCH NERVES INNERVATING LUMBAR FACET JOINT Bilateral 6/7/2019    Procedure: BLOCK, NERVE, FACET JOINT, LUMBAR, MEDIAL BRANCH-deo MBB L4/5,L5/S1;  Surgeon: Jarvis Morales III, MD;  Location: [facility] CATH LAB;  Service: Pain Management;  Laterality: Bilateral;    INJECTION OF STEROID Right 6/5/2020    Procedure: INJECTION, STEROID right carpal tunel injection/ Right ring trigger finger injection;  Surgeon: Pricila Presley MD;  Location: [facility] OR;  Service: Orthopedics;  Laterality: Right;  INJECTION, STEROID right carpal tunel injection/ Right ring trigger finger injection    KNEE ARTHROSCOPY N/A     pt unsure of which knee     MASTECTOMY      OOPHORECTOMY      SENTINEL LYMPH NODE BIOPSY Right 7/30/2020    Procedure: BIOPSY, LYMPH NODE, SENTINEL;  Surgeon: Suki Dill MD;  Location: [facility] OR;  Service: General;  Laterality: Right;    SPINE SURGERY      TOTAL THYROIDECTOMY      TRANSFORAMINAL EPIDURAL INJECTION OF STEROID Bilateral 5/27/2022    Procedure: INJECTION, STEROID, EPIDURAL, TF BILATERAL L3-L4 CONTRAST DIRECT REF;  Surgeon: Julian Fish MD;  Location: [facility] PAIN MGT;  Service: Pain Management;  Laterality: Bilateral;    TRANSFORAMINAL EPIDURAL INJECTION OF STEROID Bilateral 10/21/2022     Procedure: LUMBAR TRANSFORAMINAL BILATERAL L3/4 CONTRAST DIRECT REFERRAL;  Surgeon: Julian Fish MD;  Location: [facility] PAIN MGT;  Service: Pain Management;  Laterality: Bilateral;    UNILATERAL MASTECTOMY Right 7/30/2020    Procedure: MASTECTOMY, UNILATERAL;  Surgeon: Suki Dill MD;  Location: [facility] OR;  Service: General;  Laterality: Right;    YAG Laser Capsulotomy  Left 07/29/2019 01/06/2021 OD//Dr. Hahn      Patient Active Problem List   Diagnosis    PCO (posterior capsular opacification), right    Mixed hyperlipidemia    Sensorineural hearing loss, bilateral    Paget's disease of bone    Obesity (BMI 30.0-34.9)    Osteoarthritis of lumbar spine    Hypothyroidism, postsurgical    Lipoma of arm    Essential hypertension    Prediabetes    Facet arthritis of lumbar region    Aortic atherosclerosis    Ductal carcinoma in situ (DCIS) of right breast    Pseudophakia    Nephrolithiasis    BPPV (benign paroxysmal positional vertigo)    Diverticulitis of large intestine without perforation or abscess with bleeding    Abnormal CT of the head    Gastric nodule    History of GI bleed    Other dietary vitamin B12 deficiency anemia    Left carpal tunnel syndrome    Cervical radiculopathy at C7    Spondylosis of lumbar region without myelopathy or radiculopathy    Chronic right shoulder pain    Nontraumatic complete tear of right rotator cuff    Rotator cuff arthropathy of right shoulder    Spinal stenosis of lumbar region with neurogenic claudication    DDD (degenerative disc disease), lumbosacral    Lumbosacral radiculopathy    Personal history of skin cancer    Hip pain    Calcified granuloma of lung    IGTN (ingrowing toe nail)    Adenomatous polyp of colon    Primary osteoarthritis of right hip    Right upper quadrant abdominal tenderness    S/P right total hip arthroplasty    Postoperative heterotopic ossification of muscle    Chronic bilateral low back pain without sciatica    Decreased ROM of lumbar  spine    Cervical myelopathy    Unspecified inflammatory spondylopathy, lumbar region        Objective:      Physical Exam  Constitutional:       Appearance: Normal appearance.   HENT:      Head: Normocephalic.   Cardiovascular:      Rate and Rhythm: Normal rate and regular rhythm.      Pulses: Normal pulses.      Heart sounds: Normal heart sounds.   Pulmonary:      Effort: Pulmonary effort is normal.      Breath sounds: Normal breath sounds.   Abdominal:      General: Abdomen is flat. Bowel sounds are normal.      Palpations: Abdomen is soft.   Musculoskeletal:         General: Normal range of motion.      Cervical back: Normal range of motion and neck supple.   Skin:     General: Skin is warm and dry.   Neurological:      General: No focal deficit present.      Mental Status: She is alert and oriented to person, place, and time.   Psychiatric:         Mood and Affect: Mood normal.         Assessment:       Problem List Items Addressed This Visit          Neuro    Cervical myelopathy       Cardiac/Vascular    Mixed hyperlipidemia    Essential hypertension     Other Visit Diagnoses         Acute pain of left shoulder    -  Primary    Relevant Orders    X-Ray Shoulder 2 or More Views Left (Completed)    Ambulatory referral/consult to Orthopedics    Ambulatory Referral/Consult to Physical/Occupational Therapy      Bilateral carpal tunnel syndrome        Relevant Orders    Ambulatory referral/consult to Hand Surgery      Stress incontinence of urine        Relevant Medications    mirabegron (MYRBETRIQ) 50 mg Tb24              Plan:           Jo-Ann was seen today for follow-up.    Diagnoses and all orders for this visit:    Acute pain of left shoulder  -     X-Ray Shoulder 2 or More Views Left; Future  -     Ambulatory referral/consult to Orthopedics; Future  -     Ambulatory Referral/Consult to Physical/Occupational Therapy; Future  Check XRAY.   Start PT  Refer to ortho for possible injections.     Bilateral carpal  tunnel syndrome  -     Ambulatory referral/consult to Hand Surgery; Future    Cervical myelopathy  Symptoms stable. Follow up NSGY.        Essential hypertension  Well controlled on hctz and losartan.     Stress incontinence of urine  -     mirabegron (MYRBETRIQ) 50 mg Tb24; Take 1 tablet (50 mg total) by mouth once daily.  Refill Myrbetriq and follow up urology              Bibiana Mckenzie MD   Internal Medicine   Primary Care

## 2025-02-19 ENCOUNTER — RESULTS FOLLOW-UP (OUTPATIENT)
Dept: INTERNAL MEDICINE | Facility: CLINIC | Age: 85
End: 2025-02-19

## 2025-02-21 PROBLEM — J84.10 PULMONARY FIBROSIS, UNSPECIFIED: Status: RESOLVED | Noted: 2024-10-22 | Resolved: 2025-02-21

## 2025-02-24 RX ORDER — LOSARTAN POTASSIUM 100 MG/1
100 TABLET ORAL DAILY
Qty: 90 TABLET | Refills: 3 | Status: SHIPPED | OUTPATIENT
Start: 2025-02-24 | End: 2026-02-24

## 2025-02-26 ENCOUNTER — OFFICE VISIT (OUTPATIENT)
Dept: DERMATOLOGY | Facility: CLINIC | Age: 85
End: 2025-02-26
Payer: MEDICARE

## 2025-02-26 DIAGNOSIS — L30.9 DERMATITIS: ICD-10-CM

## 2025-02-26 DIAGNOSIS — L91.8 SKIN TAG: ICD-10-CM

## 2025-02-26 DIAGNOSIS — L60.3 NAIL DYSTROPHY: ICD-10-CM

## 2025-02-26 DIAGNOSIS — L82.1 SK (SEBORRHEIC KERATOSIS): Primary | ICD-10-CM

## 2025-02-26 DIAGNOSIS — L73.8 SEBACEOUS GLAND HYPERPLASIA: ICD-10-CM

## 2025-02-26 DIAGNOSIS — Z85.828 PERSONAL HISTORY OF SKIN CANCER: ICD-10-CM

## 2025-02-26 DIAGNOSIS — D22.9 NEVUS: ICD-10-CM

## 2025-02-26 PROCEDURE — 1159F MED LIST DOCD IN RCRD: CPT | Mod: CPTII,S$GLB,, | Performed by: DERMATOLOGY

## 2025-02-26 PROCEDURE — 1126F AMNT PAIN NOTED NONE PRSNT: CPT | Mod: CPTII,S$GLB,, | Performed by: DERMATOLOGY

## 2025-02-26 PROCEDURE — 3288F FALL RISK ASSESSMENT DOCD: CPT | Mod: CPTII,S$GLB,, | Performed by: DERMATOLOGY

## 2025-02-26 PROCEDURE — 1160F RVW MEDS BY RX/DR IN RCRD: CPT | Mod: CPTII,S$GLB,, | Performed by: DERMATOLOGY

## 2025-02-26 PROCEDURE — 1157F ADVNC CARE PLAN IN RCRD: CPT | Mod: CPTII,S$GLB,, | Performed by: DERMATOLOGY

## 2025-02-26 PROCEDURE — 99999 PR PBB SHADOW E&M-EST. PATIENT-LVL III: CPT | Mod: PBBFAC,,, | Performed by: DERMATOLOGY

## 2025-02-26 PROCEDURE — 1101F PT FALLS ASSESS-DOCD LE1/YR: CPT | Mod: CPTII,S$GLB,, | Performed by: DERMATOLOGY

## 2025-02-26 PROCEDURE — G2211 COMPLEX E/M VISIT ADD ON: HCPCS | Mod: S$GLB,,, | Performed by: DERMATOLOGY

## 2025-02-26 PROCEDURE — 99213 OFFICE O/P EST LOW 20 MIN: CPT | Mod: S$GLB,,, | Performed by: DERMATOLOGY

## 2025-02-26 NOTE — PROGRESS NOTES
Subjective:      Patient ID:  Jo-Ann Escobedo is a 84 y.o. female who presents for   Chief Complaint   Patient presents with    Skin Check     TBSE     History of Present Illness: The patient presents for follow up of skin check.    The patient was last seen on: 3/14/2024 for SK, Nevus, Sebaceous gland hyperplasia.  Personal history of skin cancer  Area of previous NMSC examined. Site well healed with no signs of recurrence.  Hx of SCC    Patient with new area of concern:   Location: forehead (bumps on forehead)  Duration: + yrs  Symptoms- itching   Previous treatments: Olay conditioner    C/o brittle nails.      Hx of SCC        Review of Systems   Skin:  Positive for itching, daily sunscreen use and activity-related sunscreen use. Negative for tendency to form keloidal scars and wears hat.   Hematologic/Lymphatic: Bruises/bleeds easily.       Objective:   Physical Exam   Constitutional: She appears well-developed and well-nourished. No distress.   Neurological: She is alert and oriented to person, place, and time. She is not disoriented.   Psychiatric: She has a normal mood and affect.   Skin:   Areas Examined (abnormalities noted in diagram):   Scalp / Hair Palpated and Inspected  Head / Face Inspection Performed  Neck Inspection Performed  Chest / Axilla Inspection Performed  Abdomen Inspection Performed  Genitals / Buttocks / Groin Inspection Performed  Back Inspection Performed  RUE Inspected  LUE Inspection Performed  RLE Inspected  LLE Inspection Performed  Nails and Digits Inspection Performed                         Diagram Legend     Erythematous scaling macule/papule c/w actinic keratosis       Vascular papule c/w angioma      Pigmented verrucoid papule/plaque c/w seborrheic keratosis      Yellow umbilicated papule c/w sebaceous hyperplasia      Irregularly shaped tan macule c/w lentigo     1-2 mm smooth white papules consistent with Milia      Movable subcutaneous cyst with punctum c/w epidermal  inclusion cyst      Subcutaneous movable cyst c/w pilar cyst      Firm pink to brown papule c/w dermatofibroma      Pedunculated fleshy papule(s) c/w skin tag(s)      Evenly pigmented macule c/w junctional nevus     Mildly variegated pigmented, slightly irregular-bordered macule c/w mildly atypical nevus      Flesh colored to evenly pigmented papule c/w intradermal nevus       Pink pearly papule/plaque c/w basal cell carcinoma      Erythematous hyperkeratotic cursted plaque c/w SCC      Surgical scar with no sign of skin cancer recurrence      Open and closed comedones      Inflammatory papules and pustules      Verrucoid papule consistent consistent with wart     Erythematous eczematous patches and plaques     Dystrophic onycholytic nail with subungual debris c/w onychomycosis     Umbilicated papule    Erythematous-base heme-crusted tan verrucoid plaque consistent with inflamed seborrheic keratosis     Erythematous Silvery Scaling Plaque c/w Psoriasis     See annotation      Assessment / Plan:        SK (seborrheic keratosis)  These are benign inherited growths without a malignant potential. Reassurance given to patient. No treatment is necessary.     Nevus  Discussed ABCDE's of nevi.  Monitor for new mole or moles that are becoming bigger, darker, irritated, or developing irregular borders. Brochure provided. Instructed patient to observe lesion(s) for changes and follow up in clinic if changes are noted. Patient to monitor skin at home for new or changing lesions.     Sebaceous gland hyperplasia  This is a common condition representing benign enlargement of the sebaceous lobule. It typically occurs in adulthood. Reassurance given to patient. '    Skin tag  Reassurance given to patient. No treatment is necessary.   Treatment of benign, asymptomatic lesions may be considered cosmetic.    Dermatitis  Moisturize forehead regularly   Can use hydrocortisone cream if needed    Nail dystrophy  -Recommend trimming  nails  -Discontinue overzealous manicuring or cuticle manipulation, polish only- no dip or gel  -Lance nail products are designed to condition and strengthen nails.  Can be purchased online or at [employer] Citra 2 is another over the counter nail product that can help with brittle nails; can be purchased at [employer] or Online    HISTORY OF NMSC  Pt with history of non melanoma skin cancer  Total body skin examination performed today including at least 12 points as noted in physical examination. No suspicious lesions noted.Monitor for new or changing lesions as discussed such as pink scaly patches that are occasionally tender or not healing, 'pimples' that never go away, lesions that are not healing or bleeding.            Follow up in about 1 year (around 2/26/2026) for UBSE.

## 2025-02-26 NOTE — PATIENT INSTRUCTIONS
-Recommend trimming nails  -Discontinue overzealous manicuring or cuticle manipulation, polish only- no dip or gel  -Lance nail products are designed to condition and strengthen nails.  Can be purchased online or at [employer]   -Nail candy Citra 2 is another over the counter nail product that can help with brittle nails; can be purchased at [employer] or Online    We would like to see you back in the clinic in 12 months.  You will be able to schedule this appointment by calling or by using your [facility] portal 3 months before this time. Because our schedule fills so quickly, please set a reminder in your phone or on your calendar to schedule 3 months before you are due to come in so that we can see you in a timely fashion.  You should also receive a reminder from us in the mail. This will help us ensure we can continue to provide excellent healthcare for you. Thank you.

## 2025-03-06 ENCOUNTER — PATIENT MESSAGE (OUTPATIENT)
Dept: ORTHOPEDICS | Facility: CLINIC | Age: 85
End: 2025-03-06
Payer: MEDICARE

## 2025-03-06 DIAGNOSIS — M79.642 BILATERAL HAND PAIN: Primary | ICD-10-CM

## 2025-03-06 DIAGNOSIS — M79.641 BILATERAL HAND PAIN: Primary | ICD-10-CM

## 2025-03-11 ENCOUNTER — OFFICE VISIT (OUTPATIENT)
Dept: HEMATOLOGY/ONCOLOGY | Facility: CLINIC | Age: 85
End: 2025-03-11
Payer: MEDICARE

## 2025-03-11 VITALS
BODY MASS INDEX: 34.24 KG/M2 | DIASTOLIC BLOOD PRESSURE: 66 MMHG | OXYGEN SATURATION: 94 % | RESPIRATION RATE: 17 BRPM | SYSTOLIC BLOOD PRESSURE: 125 MMHG | HEIGHT: 62 IN | WEIGHT: 186.06 LBS | TEMPERATURE: 99 F | HEART RATE: 85 BPM

## 2025-03-11 DIAGNOSIS — D05.11 DUCTAL CARCINOMA IN SITU (DCIS) OF RIGHT BREAST: Primary | ICD-10-CM

## 2025-03-11 PROCEDURE — 3288F FALL RISK ASSESSMENT DOCD: CPT | Mod: CPTII,S$GLB,, | Performed by: INTERNAL MEDICINE

## 2025-03-11 PROCEDURE — 99214 OFFICE O/P EST MOD 30 MIN: CPT | Mod: S$GLB,,, | Performed by: INTERNAL MEDICINE

## 2025-03-11 PROCEDURE — 1157F ADVNC CARE PLAN IN RCRD: CPT | Mod: CPTII,S$GLB,, | Performed by: INTERNAL MEDICINE

## 2025-03-11 PROCEDURE — 1125F AMNT PAIN NOTED PAIN PRSNT: CPT | Mod: CPTII,S$GLB,, | Performed by: INTERNAL MEDICINE

## 2025-03-11 PROCEDURE — 3074F SYST BP LT 130 MM HG: CPT | Mod: CPTII,S$GLB,, | Performed by: INTERNAL MEDICINE

## 2025-03-11 PROCEDURE — 1101F PT FALLS ASSESS-DOCD LE1/YR: CPT | Mod: CPTII,S$GLB,, | Performed by: INTERNAL MEDICINE

## 2025-03-11 PROCEDURE — 1159F MED LIST DOCD IN RCRD: CPT | Mod: CPTII,S$GLB,, | Performed by: INTERNAL MEDICINE

## 2025-03-11 PROCEDURE — 3078F DIAST BP <80 MM HG: CPT | Mod: CPTII,S$GLB,, | Performed by: INTERNAL MEDICINE

## 2025-03-11 PROCEDURE — 99999 PR PBB SHADOW E&M-EST. PATIENT-LVL IV: CPT | Mod: PBBFAC,,, | Performed by: INTERNAL MEDICINE

## 2025-03-11 NOTE — PROGRESS NOTES
Subjective:       Patient ID: Jo-Ann Escobedo is a 84 y.o. female.    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 Escobedo presents today for follow up.  I had last seen her in March 2024.  On July 30, 2020 she underwent a right mastectomy and sentinel lymph node biopsy.  There was no residual DCIS identified in the mastectomy specimen, while 3 sentinel lymph nodes were negative.  Of note, on July 2, 2020 she had undergone a biopsy of the area in the right breast that was read as BI-RADS 4 on her mammogram.  The biopsy had shown evidence of DCIS which was ER positive.    Briefly, she is an 84 year-old female with a remote history of breast cancer treated 18 years ago with a left modified radical mastectomy.  Her tumor was a T2 N1 M0 carcinoma.  She was treated with four cycles of AC and 4 cycles of Taxotere in the adjuvant setting and has remained cancer free since then.   A mammogram in the spring of 2019 led to a contralateral biopsy and eventually to a lumpectomy on June 26th, 2019, for an area of DCIS, measuring 12mm.  Resection margins were clear, with the closest being 14 mm.  Her tumor was ER positive and MI negative.   She met with the radiation oncologist and decided against XRT.  She subsequently decided against adjuvant hormonal therapy and has been followed expectantly since.    A mammogram in early 2020 had shown a 6 mm area of coarse heterogeneous calcifications in a grouped distribution in the upper outer quadrant of the right breast.  A biopsy was performed on June 29, 2020 with results as above.  This was followed by the right mastectomy.    Review of Systems  Overall she feels OK, and she has no complaints today other than pains from her DJD.  She states that in July she underwent a right hip replacement surgery.  She denies any anxiety come depression, easy bruising, fevers, chills, night sweats, weight loss, nausea, vomiting, diarrhea, constipation, diplopia, blurred  vision headache, chest pain, abdominal pain, or difficulty ambulating. All other systems are negative.     Objective:      Physical Exam  GENERAL: She is alert, oriented to time, place, person; well nourished;   pleasant; in no acute physical distress.    VITAL SIGNS: Reviewed.   HEENT: Normal. There are no nasal, oral, lip, gingival, auricular, lid,   or conjunctival lesions. Mucosae are moist and pink, and there is no   thrush. Pupils are equal, reactive to light and accommodation.   Extraocular muscle movements are intact.   NECK: Supple without JVD, thyromegaly, or adenopathy.   LUNGS: Clear to auscultation without wheezing, rales, or rhonchi.   CARDIOVASCULAR: Reveals an S1, S2, no murmurs, no rubs, no gallops.   BREASTS:  She is status post bilateral mastectomies.    ABDOMEN: Soft, nontender without organomegaly. Bowel sounds are identified.   EXTREMITIES: Have no cyanosis, clubbing, or edema.    SKIN: Does not have petechiae, rashes, ot induration.    NEUROLOGIC: Motor function is 5/5, DTRs are 0-1+ bilaterally, symmetrical,   and cranial nerves within normal limits.   LYMPHATIC: There is no cervical, axillary, or supraclavicular adenopathy.    Assessment:       1. Remote history of breast cancer status post left mastectomy and chemotherapy 18 years ago..    2.     Contralateral DCIS, s/p lumpectomy, s/p local recurrence, treated with a completion mastectomy, clinically HUGO, doing well.    Plan:     I had a long discussion with her.  I again reasssured her that her chance of a cure after her mastectomy is in the range of 99%.  I do not recommend any additional treatment at this point.  She had a lot of questions about what constitutes appropriate follow up and I gave her a copy of the [facility] March 1, 2023 paper delineating the standard of care for follow up.     Her multiple questions were answered to her satisfaction.  I will see her again in 12 months.   I spent 32 minutes reviewing the available records and  evaluating the patient, out of which over 50% of the time was spent face to face with the patient in counseling and coordinating this patient's care.    Route Chart for Scheduling    Med Onc Chart Routing      Follow up with physician 1 year.   Follow up with [facility]A    Infusion scheduling note    Injection scheduling note    Labs    Imaging    Pharmacy appointment    Other referrals

## 2025-03-13 ENCOUNTER — TELEPHONE (OUTPATIENT)
Dept: INTERNAL MEDICINE | Facility: CLINIC | Age: 85
End: 2025-03-13
Payer: MEDICARE

## 2025-03-13 NOTE — TELEPHONE ENCOUNTER
----- Message from Bibiana Mckenzie MD sent at 3/12/2025  5:21 PM CDT -----  Please let her know that the XRAY of her shoulder did not show any fractures but did show possible bursitis. She can follow up with the orthopedist for possible injections if it is still bothering   her.   Thanks   ----- Message -----  From: Alex, Rad Results In  Sent: 2/19/2025   9:29 AM CDT  To: Bibiana Mckenzie MD

## 2025-03-13 NOTE — TELEPHONE ENCOUNTER
The patient states she meet with the orthopedist last week and she feels much better now so she doesn't feel the need to see one again at this time.

## 2025-03-19 ENCOUNTER — TELEPHONE (OUTPATIENT)
Dept: ORTHOPEDICS | Facility: CLINIC | Age: 85
End: 2025-03-19
Payer: MEDICARE

## 2025-03-24 DIAGNOSIS — Z00.00 ENCOUNTER FOR MEDICARE ANNUAL WELLNESS EXAM: ICD-10-CM

## 2025-03-25 ENCOUNTER — HOSPITAL ENCOUNTER (OUTPATIENT)
Dept: RADIOLOGY | Facility: OTHER | Age: 85
Discharge: HOME | End: 2025-03-25
Attending: ORTHOPAEDIC SURGERY
Payer: MEDICARE

## 2025-03-25 ENCOUNTER — OFFICE VISIT (OUTPATIENT)
Dept: ORTHOPEDICS | Facility: CLINIC | Age: 85
End: 2025-03-25
Payer: MEDICARE

## 2025-03-25 DIAGNOSIS — M79.642 BILATERAL HAND PAIN: ICD-10-CM

## 2025-03-25 DIAGNOSIS — M65.30 TRIGGER FINGER OF LEFT HAND, UNSPECIFIED FINGER: Primary | ICD-10-CM

## 2025-03-25 DIAGNOSIS — M79.641 BILATERAL HAND PAIN: ICD-10-CM

## 2025-03-25 DIAGNOSIS — G56.03 BILATERAL CARPAL TUNNEL SYNDROME: ICD-10-CM

## 2025-03-25 PROCEDURE — 99214 OFFICE O/P EST MOD 30 MIN: CPT | Mod: 25,S$GLB,, | Performed by: ORTHOPAEDIC SURGERY

## 2025-03-25 PROCEDURE — 1101F PT FALLS ASSESS-DOCD LE1/YR: CPT | Mod: CPTII,S$GLB,, | Performed by: ORTHOPAEDIC SURGERY

## 2025-03-25 PROCEDURE — 1157F ADVNC CARE PLAN IN RCRD: CPT | Mod: CPTII,S$GLB,, | Performed by: ORTHOPAEDIC SURGERY

## 2025-03-25 PROCEDURE — 1159F MED LIST DOCD IN RCRD: CPT | Mod: CPTII,S$GLB,, | Performed by: ORTHOPAEDIC SURGERY

## 2025-03-25 PROCEDURE — 3288F FALL RISK ASSESSMENT DOCD: CPT | Mod: CPTII,S$GLB,, | Performed by: ORTHOPAEDIC SURGERY

## 2025-03-25 PROCEDURE — 20550 NJX 1 TENDON SHEATH/LIGAMENT: CPT | Mod: LT,S$GLB,, | Performed by: ORTHOPAEDIC SURGERY

## 2025-03-25 PROCEDURE — 1125F AMNT PAIN NOTED PAIN PRSNT: CPT | Mod: CPTII,S$GLB,, | Performed by: ORTHOPAEDIC SURGERY

## 2025-03-25 PROCEDURE — 73130 X-RAY EXAM OF HAND: CPT | Mod: TC,50,FY

## 2025-03-25 PROCEDURE — 73130 X-RAY EXAM OF HAND: CPT | Mod: 26,50,, | Performed by: RADIOLOGY

## 2025-03-25 PROCEDURE — 99999 PR PBB SHADOW E&M-EST. PATIENT-LVL III: CPT | Mod: PBBFAC,,, | Performed by: ORTHOPAEDIC SURGERY

## 2025-03-25 RX ADMIN — DEXAMETHASONE SODIUM PHOSPHATE 4 MG: 4 INJECTION, SOLUTION INTRA-ARTICULAR; INTRALESIONAL; INTRAMUSCULAR; INTRAVENOUS; SOFT TISSUE at 01:03

## 2025-03-25 NOTE — PROCEDURES
Tendon Sheath    Date/Time: 3/25/2025 1:15 PM    Performed by: Pricila Presley MD  Authorized by: Pricila Presley MD    Consent Done?:  Yes (Verbal)  Indications:  Pain  Timeout: prior to procedure the correct patient, procedure, and site was verified    Prep: patient was prepped and draped in usual sterile fashion      Local anesthesia used?: Yes    Anesthesia:  Local infiltration  Local anesthetic:  Lidocaine 1% without epinephrine  Location:  Long finger  Site:  L long MCP  Ultrasonic guidance for needle placement?: Yes    Needle size:  25 G  Medications:  4 mg dexAMETHasone 4 mg/mL

## 2025-03-25 NOTE — PROGRESS NOTES
[facility]  History & Physical  Orthopedics    SUBJECTIVE:      Jo-Ann Escobedo is a 84 y.o. female who is right-hand dominant who presents with right greater than left hand stiffness and tingling.  She reports tingling to the entire right hand and stiffness of her left hand.  She is status post right carpal tunnel release and left carpal tunnel release with Dr. Desai in 2022.  Upon carpal tunnel release in 2022, EMG showed moderate to severe carpal tunnel syndrome on the right side.  She also reports that her left long finger and ring finger can get stuck and has difficulty moving her fingers at times.    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 She subsequently underwent colonoscopy 12/9 and several large diverticula in the sigmoid colon was seen with hematin throughout the colon; blood pooling also noted in the sigmoid colon, during which clip was placed f    Hematochezia     Hypothyroidism, postsurgical 07/01/2015    Mixed hyperlipidemia 09/27/2012    Multinodular goiter 07/31/2014    Paget's disease of bone 07/10/2013    SCC (squamous cell carcinoma) 12/2020    left 5th knuckle    Squamous Cell Carcinoma     in situ right neck      Past Surgical History:   Procedure Laterality Date     ARTHROPLASTY OF HIP BY ANTERIOR APPROACH Right 7/5/2023    Procedure: ARTHROPLASTY, HIP, TOTAL, ANTERIOR APPROACH;  Surgeon: Federico Ramirez MD;  Location: [facility] OR 2ND FLR;  Service: Orthopedics;  Laterality: Right;    BREAST BIOPSY Right 2019    BREAST LUMPECTOMY Right 2019    CARPAL TUNNEL RELEASE Left 6/5/2020    Procedure: RELEASE, CARPAL TUNNEL Left;  Surgeon: Pricila Presley MD;  Location: [facility] OR;  Service: Orthopedics;  Laterality: Left;    CARPAL TUNNEL RELEASE Right 12/5/2022    Procedure: RELEASE, CARPAL TUNNEL, right;  Surgeon: Pricila Presley MD;  Location: [facility] OR;  Service: Orthopedics;  Laterality: Right;    CATARACT EXTRACTION W/  INTRAOCULAR LENS IMPLANT Bilateral     COLONOSCOPY N/A 4/15/2019    Procedure: COLONOSCOPY;  Surgeon: Artur Monet MD;  Location: [facility] ENDO (4TH FLR);  Service: Endoscopy;  Laterality: N/A;  within 1 month    COLONOSCOPY N/A 12/9/2019    Procedure: COLONOSCOPY;  Surgeon: Clyde Welch MD;  Location: [facility] ENDO (2ND FLR);  Service: Endoscopy;  Laterality: N/A;    COLONOSCOPY N/A 2/13/2023    Procedure: COLONOSCOPY;  Surgeon: Johan Hoyos MD;  Location: [facility] ENDO (4TH FLR);  Service: Endoscopy;  Laterality: N/A;  Okay for any CRS MD if Dr. Monet booked. but hopeful to get this done about 4 weeks from now  1/10 - pt called about time change and MD change. patient verbalized understanding and new instructions sent - sm  Precall complete- [place]    COLONOSCOPY N/A 11/6/2023    Procedure: COLONOSCOPY;  Surgeon: Artur Monet MD;  Location: [facility] ENDO (4TH FLR);  Service: Endoscopy;  Laterality: N/A;  inst. to pt. [place]. [facility]  referral: Dr. Neely  10/30-precall complete-[place]    ENDOSCOPIC ULTRASOUND OF UPPER GASTROINTESTINAL TRACT N/A 1/22/2020    Procedure: ULTRASOUND, UPPER GI TRACT, ENDOSCOPIC;  Surgeon: Eleazar Shaffer MD;  Location: [facility] ENDO (2ND FLR);  Service: Endoscopy;  Laterality: N/A;  EUS for 10mm SML w/negative pillow sign and  negative naked fat sign which was found at the incisura.  Dr Welch    EPIDURAL STEROID INJECTION Bilateral 12/17/2021    Procedure: INJECTION, STEROID, EPIDURAL, L3-L4 Bilateral DIRECT REF Transforaminal;  Surgeon: Julian Fish MD;  Location: [facility] PAIN T;  Service: Pain Management;  Laterality: Bilateral;    ESOPHAGOGASTRODUODENOSCOPY N/A 12/7/2019    Procedure: EGD (ESOPHAGOGASTRODUODENOSCOPY);  Surgeon: Clyde Welch MD;  Location: [facility] (2ND FLR);  Service: Endoscopy;  Laterality: N/A;    EXCISIONAL BIOPSY Right 6/27/2019    Procedure: EXCISIONAL BIOPSY-breast;  Surgeon: Suki Dill MD;  Location: [address];  Service: General;  Laterality: Right;    EYE SURGERY      HYSTERECTOMY      INJECTION Right 3/3/2023    Procedure: INJECTION, RIGHT HIP CORTICOSTEROID /LOCAL INJECTION CONTRAST DIRECT REF;  Surgeon: Julian Fish MD;  Location: [facility];  Service: Pain Management;  Laterality: Right;    INJECTION FOR SENTINEL NODE IDENTIFICATION Right 7/30/2020    Procedure: INJECTION, FOR SENTINEL NODE IDENTIFICATION;  Surgeon: Suki Dill MD;  Location: [facility];  Service: General;  Laterality: Right;    INJECTION OF ANESTHETIC AGENT AROUND MEDIAL BRANCH NERVES INNERVATING LUMBAR FACET JOINT Bilateral 6/7/2019    Procedure: BLOCK, NERVE, FACET JOINT, LUMBAR, MEDIAL BRANCH-deo MBB L4/5,L5/S1;  Surgeon: Jarvis Morales III, MD;  Location: [facility] CATH LAB;  Service: Pain Management;  Laterality: Bilateral;    INJECTION OF STEROID Right 6/5/2020    Procedure: INJECTION, STEROID right carpal tunel injection/ Right ring trigger finger injection;  Surgeon: Pricila Presley MD;  Location: [facility] OR;  Service: Orthopedics;  Laterality: Right;  INJECTION, STEROID right carpal tunel injection/ Right ring trigger finger injection    KNEE ARTHROSCOPY N/A     pt unsure of which knee     MASTECTOMY      OOPHORECTOMY      SENTINEL LYMPH NODE BIOPSY Right 7/30/2020    Procedure: BIOPSY, LYMPH  NODE, SENTINEL;  Surgeon: Suki Dill MD;  Location: [facility] OR;  Service: General;  Laterality: Right;    SPINE SURGERY      TOTAL THYROIDECTOMY      TRANSFORAMINAL EPIDURAL INJECTION OF STEROID Bilateral 5/27/2022    Procedure: INJECTION, STEROID, EPIDURAL, TF BILATERAL L3-L4 CONTRAST DIRECT REF;  Surgeon: Julian Fish MD;  Location: [facility] PAIN MGT;  Service: Pain Management;  Laterality: Bilateral;    TRANSFORAMINAL EPIDURAL INJECTION OF STEROID Bilateral 10/21/2022    Procedure: LUMBAR TRANSFORAMINAL BILATERAL L3/4 CONTRAST DIRECT REFERRAL;  Surgeon: Julian Fish MD;  Location: [facility] PAIN MGT;  Service: Pain Management;  Laterality: Bilateral;    UNILATERAL MASTECTOMY Right 7/30/2020    Procedure: MASTECTOMY, UNILATERAL;  Surgeon: Suki Dill MD;  Location: [facility] OR;  Service: General;  Laterality: Right;    YAG Laser Capsulotomy  Left 07/29/2019 01/06/2021 OD//Dr. Hahn     Review of patient's allergies indicates:   Allergen Reactions    Voltaren [diclofenac sodium] Other (See Comments)     Hematochezia and hospitalization for hematochezia.    Codeine Diarrhea and Hallucinations    Niacin preparations      Redness, warming     Social History     Social History Narrative    Not on file     Family History   Problem Relation Name Age of Onset    Osteoarthritis Mother      Dementia Mother      Cancer Father          lung    Glaucoma Brother x5     Macular degeneration Brother x5     No Known Problems Daughter x1     No Known Problems Son x1     Diabetes Neg Hx      Amblyopia Neg Hx      Blindness Neg Hx      Cataracts Neg Hx      Retinal detachment Neg Hx      Strabismus Neg Hx      Melanoma Neg Hx         Current Medications[1]      Review of Systems:  As per HPI otherwise noncontributory    OBJECTIVE:      Vital Signs (Most Recent):  There were no vitals filed for this visit.  There is no height or weight on file to calculate BMI.      Physical Exam:  Constitutional: The patient appears  well-developed and well-nourished. No distress.   Skin: No lesions appreciated  Head: Normocephalic and atraumatic.   Nose: Nose normal.   Ears: No deformities seen  Eyes: Conjunctivae and EOM are normal.   Neck: No tracheal deviation present.   Cardiovascular: Normal rate and intact distal pulses.    Pulmonary/Chest: Effort normal. No respiratory distress.   Abdominal: There is no guarding.   Neurological: The patient is alert.   Psychiatric: The patient has a normal mood and affect.     Bilateral Hand/Wrist Examination:    Observation/Inspection:  Swelling  none    Deformity  none  Discoloration  none     Scars   none    Atrophy  mild right thenar    HAND/WRIST EXAMINATION:  Finkelstein's Test   Neg  WHAT Test    Neg  Snuff box tenderness   Neg  Kirby's Test    Neg  Hook of Hamate Tenderness  Neg  CMC grind    Neg  Circumduction test   Neg    Tender to palpation to the A1 pulleys of the left long finger and ring finger, no active triggering on examination    Neurovascular Exam:  Digits WWP, brisk CR < 3s throughout  NVI motor/LTS to M/R/U nerves, radial pulse 2+  Tinel's Test - Carpal Tunnel  positive left  Tinel's Test - Cubital Tunnel  Neg  Phalen's Test    Neg  Median Nerve Compression Test Neg    ROM hand full, painless    ROM wrist full, painless    ROM elbow full, painless    Abdomen not guarded  Respirations nonlabored  Perfusion intact    Diagnostic Results:     Imaging - I independently viewed the patient's imaging as well as the radiology report.  Xrays of the patient's bilateral hands demonstrate no evidence of any acute fractures or dislocations or significant degenerative changes.    EMG - N/A    ASSESSMENT/PLAN:      84 y.o. yo female with left long finger and left ring finger trigger finger, continued numbness from severe carpal tunnel syndrome status post right carpal tunnel release.    Plan: The patient and I had a thorough discussion today.  We discussed the working diagnosis as well as several  other potential alternative diagnoses.  Treatment options were discussed, both conservative and surgical.  Conservative treatment options would include things such as activity modifications, workplace modifications, a period of rest, oral vs topical OTC and prescription anti-inflammatory medications, occupational therapy, splinting/bracing, immobilization, corticosteroid injections, and others.  Surgical options were discussed as well.     At this time, the patient would like to proceed with left long finger and ring finger steroid injection and paraffin treatment.    Should the patient's symptoms worsen, persist, or fail to improve they should return for reevaluation and I would be happy to see them back anytime.             [1]   Current Outpatient Medications:     acetaminophen (TYLENOL) 500 MG tablet, Take 2 tablets (1,000 mg total) by mouth every 8 (eight) hours as needed for Pain., Disp: 54 tablet, Rfl: 0    amoxicillin (AMOXIL) 500 MG capsule, TAKE 4 CAPSULES (2,000 MG TOTAL) BY MOUTH ON CALL PROCEDURE (TAKE 1 HOUR PRIOR TO DENTAL APPT). (Patient not taking: Reported on 3/11/2025), Disp: 4 capsule, Rfl: 1    ascorbic acid, vitamin C, (VITAMIN C) 100 MG tablet, Take 100 mg by mouth once daily. (Patient not taking: Reported on 3/11/2025), Disp: , Rfl:     cholecalciferol, vitamin D3, (VITAMIN D3) 50 mcg (2,000 unit) Tab, Take 1 tablet by mouth once daily., Disp: , Rfl:     co-enzyme Q-10 30 mg capsule, Take 30 mg by mouth once daily. (Patient not taking: Reported on 3/11/2025), Disp: , Rfl:     hydroCHLOROthiazide (HYDRODIURIL) 25 MG tablet, TAKE 1/2 TABLET BY MOUTH EVERY DAY, Disp: 45 tablet, Rfl: 3    LACTOBACILLUS COMBINATION NO.8 (ADULT PROBIOTIC ORAL), Take by mouth once daily. , Disp: , Rfl:     levothyroxine (SYNTHROID) 100 MCG tablet, Take 1 tablet (100 mcg total) by mouth before breakfast., Disp: 90 tablet, Rfl: 3    losartan (COZAAR) 100 MG tablet, Take 1 tablet (100 mg total) by mouth once daily.,  Disp: 90 tablet, Rfl: 3    mirabegron (MYRBETRIQ) 50 mg Tb24, Take 1 tablet (50 mg total) by mouth once daily., Disp: 30 tablet, Rfl: 11    an-if-lo8-dha-epa-fish-lut-carlos (OCUVITE ADULT 50 PLUS) 250 mg (90 mg-160 mg) Cap, Take by mouth., Disp: , Rfl:     turmeric root extract 500 mg Cap, Take by mouth once daily. , Disp: , Rfl:

## 2025-03-25 NOTE — PROCEDURES
Tendon Sheath    Date/Time: 3/25/2025 1:15 PM    Performed by: Pricila Presley MD  Authorized by: Pricila Presley MD    Consent Done?:  Yes (Verbal)  Indications:  Pain  Timeout: prior to procedure the correct patient, procedure, and site was verified    Prep: patient was prepped and draped in usual sterile fashion      Local anesthesia used?: Yes    Anesthesia:  Local infiltration  Local anesthetic:  Lidocaine 1% without epinephrine  Location:  Ring finger  Site:  L ring MCP  Ultrasonic guidance for needle placement?: Yes    Needle size:  25 G  Medications:  4 mg dexAMETHasone 4 mg/mL

## 2025-03-27 ENCOUNTER — PATIENT MESSAGE (OUTPATIENT)
Dept: ADMINISTRATIVE | Facility: HOSPITAL | Age: 85
End: 2025-03-27
Payer: MEDICARE

## 2025-03-28 ENCOUNTER — PATIENT OUTREACH (OUTPATIENT)
Dept: ADMINISTRATIVE | Facility: HOSPITAL | Age: 85
End: 2025-03-28
Payer: MEDICARE

## 2025-03-28 DIAGNOSIS — Z78.0 MENOPAUSE: Primary | ICD-10-CM

## 2025-03-28 RX ORDER — DEXAMETHASONE SODIUM PHOSPHATE 4 MG/ML
4 INJECTION, SOLUTION INTRA-ARTICULAR; INTRALESIONAL; INTRAMUSCULAR; INTRAVENOUS; SOFT TISSUE
Status: DISCONTINUED | OUTPATIENT
Start: 2025-03-25 | End: 2025-03-28 | Stop reason: HOSPADM

## 2025-03-28 NOTE — PROGRESS NOTES
Chart reviewed and updated. Reconciled immunizations, health maintenance and care everywhere.  Placed Dexa scan referral and responded to portal msg.      Aury Hoffmann, [facility]  Panel Care Coordinator  [facility]  [phone]  Bibiana Faye MD

## 2025-03-28 NOTE — PROGRESS NOTES
Patient ID: Jo-Ann Escobedo is a 84 y.o. female.    Chief Complaint: No chief complaint on file.    History of Present Illness    CHIEF COMPLAINT:  Left hand trigger fingers affecting the long and ring fingers.    HPI:  Ms. Escobedo presents with ongoing issues in both hands. She previously had a right carpal tunnel release in 2020, with her EMG showing moderate to severe carpal tunnel syndrome at the time. She reports never having full use of either hand. The right hand has made more progress, while the left hand continues to be stiff, difficult to work with, and tingling. Fingers lock up. She describes varying levels of discomfort, noting some days are better than others. Finger locking occurs during the day, with more pain in the morning and late at night, describing it as discomfort that is out of the norm. Her hands have better motion today than usual. She has difficulty with activities such as sewing due to her condition. Ms. Escobedo has also been diagnosed with trigger finger in her left long finger and ring finger.    PREVIOUS TREATMENTS:  Ms. Escobedo has been undergoing paraffin treatments (hot wax) with a hand therapist. She reports that these treatments feel good and help alleviate her joint stiffness.    SURGICAL HISTORY:  Ms. Escobedo underwent a right carpal tunnel release in 2020.    WORK STATUS:  Ms. Escobedo experiences difficulty with sewing, which may be related to her work or hobby activities.    IMAGING:  An EMG performed prior to the patient's 2020 carpal tunnel release revealed moderate to severe findings.      ROS:  General: denies fever, denies chills, denies fatigue, denies weight gain, denies weight loss  Eyes: denies vision changes, denies redness, denies discharge  ENT: denies ear pain, denies nasal congestion, denies sore throat  Cardiovascular: denies chest pain, denies palpitations, denies lower extremity edema  Respiratory: denies cough, denies shortness of breath  Gastrointestinal: denies  abdominal pain, denies nausea, denies vomiting, denies diarrhea, denies constipation, denies blood in stool  Genitourinary: denies dysuria, denies hematuria, denies frequency  Musculoskeletal: denies joint pain, denies muscle pain, reports joint stiffness, reports nightime pain  Skin: denies rash, denies lesion  Neurological: denies headache, denies dizziness, denies numbness, reports tingling  Psychiatric: denies anxiety, denies depression, denies sleep difficulty         Physical Exam    Musculoskeletal: Swollen tendon in left long finger. Ring finger trigger (Left). Middle finger trigger (Left). Good composite fist on both sides.         Assessment & Plan     Ordered 2 steroid injections for trigger fingers.   Steroid injections are the first treatment option for trigger fingers.   If symptoms persist, surgical intervention may be necessary.   Use paraffin (hot wax) treatment at home, especially in the morning, to address joint stiffness.              No follow-ups on file.    This note was generated with the assistance of ambient listening technology. Verbal consent was obtained by the patient and accompanying visitor(s) for the recording of patient appointment to facilitate this note. I attest to having reviewed and edited the generated note for accuracy, though some syntax or spelling errors may persist. Please contact the author of this note for any clarification.

## 2025-04-15 ENCOUNTER — PATIENT MESSAGE (OUTPATIENT)
Dept: ADMINISTRATIVE | Facility: OTHER | Age: 85
End: 2025-04-15
Payer: MEDICARE

## 2025-04-30 ENCOUNTER — OFFICE VISIT (OUTPATIENT)
Dept: UROLOGY | Facility: CLINIC | Age: 85
End: 2025-04-30
Payer: MEDICARE

## 2025-04-30 VITALS
DIASTOLIC BLOOD PRESSURE: 75 MMHG | BODY MASS INDEX: 33.91 KG/M2 | HEART RATE: 93 BPM | WEIGHT: 185.44 LBS | SYSTOLIC BLOOD PRESSURE: 121 MMHG

## 2025-04-30 DIAGNOSIS — N32.81 OAB (OVERACTIVE BLADDER): ICD-10-CM

## 2025-04-30 DIAGNOSIS — N39.41 URGE INCONTINENCE OF URINE: Primary | ICD-10-CM

## 2025-04-30 PROCEDURE — 99214 OFFICE O/P EST MOD 30 MIN: CPT | Mod: S$GLB,,, | Performed by: UROLOGY

## 2025-04-30 PROCEDURE — 3078F DIAST BP <80 MM HG: CPT | Mod: CPTII,S$GLB,, | Performed by: UROLOGY

## 2025-04-30 PROCEDURE — 3074F SYST BP LT 130 MM HG: CPT | Mod: CPTII,S$GLB,, | Performed by: UROLOGY

## 2025-04-30 PROCEDURE — 1125F AMNT PAIN NOTED PAIN PRSNT: CPT | Mod: CPTII,S$GLB,, | Performed by: UROLOGY

## 2025-04-30 PROCEDURE — 1159F MED LIST DOCD IN RCRD: CPT | Mod: CPTII,S$GLB,, | Performed by: UROLOGY

## 2025-04-30 PROCEDURE — 99999 PR PBB SHADOW E&M-EST. PATIENT-LVL III: CPT | Mod: PBBFAC,,, | Performed by: UROLOGY

## 2025-04-30 PROCEDURE — G2211 COMPLEX E/M VISIT ADD ON: HCPCS | Mod: S$GLB,,, | Performed by: UROLOGY

## 2025-04-30 PROCEDURE — 1157F ADVNC CARE PLAN IN RCRD: CPT | Mod: CPTII,S$GLB,, | Performed by: UROLOGY

## 2025-04-30 PROCEDURE — 3288F FALL RISK ASSESSMENT DOCD: CPT | Mod: CPTII,S$GLB,, | Performed by: UROLOGY

## 2025-04-30 PROCEDURE — 1101F PT FALLS ASSESS-DOCD LE1/YR: CPT | Mod: CPTII,S$GLB,, | Performed by: UROLOGY

## 2025-04-30 NOTE — PROGRESS NOTES
[facility]      Return Overactive Bladder (OAB) Note    4/30/2025    Referred by:  No ref. provider found    History of Present Illness    CHIEF COMPLAINT:  Patient, an 84-year-old female, presents for a follow-up visit regarding her overactive bladder symptoms.    HPI:  Patient is an 84-year-old established female being followed for overactive bladder. She was last seen in January 2025 for a return visit. Previously, she was on Myrbetriq for 2 months, which initially improved her symptoms, reducing pad usage from 4 times daily to daily and improving her nocturia. By January, symptoms had worsened again, requiring pad changes 4 times daily.    At her last visit, they discussed stopping Myrbetriq as it was no longer effective. She reports stopping the medication for 25 days, but then her prescription was refilled for the next 3 months, which she picked up. After restarting the medication, she noticed a slight improvement in her symptoms, but it was not significant. She is currently leaking 3-4 times daily.    Her incontinence issues became more noticeable after hip surgery. Before the surgery, she had leakage occasionally, about 3 times per week. The frequency increased after the surgery, which she attributes to possible effects from the anesthesia rather than the hip procedure itself.    She reports staying hydrated and drinking a lot of water, noting that some of her incontinence events occur when she has been consuming water.    She denies obstructive symptoms including hesitancy, straining, and intermittency. She denies having a history of glaucoma.    MEDICATIONS:  Patient is on Myrbetriq for overactive bladder, which has been slightly effective. She is also taking Hydrochlorothiazide for hypertension. Patient stopped Myrbetriq for 25 days and then restarted it.    MEDICAL HISTORY:  Patient has a history of overactive bladder and hypertension.    SURGICAL HISTORY:  Patient underwent hip  surgery. Her incontinence symptoms worsened after this surgery, possibly due to the effects of anesthesia.    TEST RESULTS:  A urinalysis was performed today, showing negative results for leucocyte esterase, nitrate, and blood. Patient underwent a post-void residual (bladder scan) today, which showed 0 mL. Her previous post-void residual scan results were 3 mL and 0 mL respectively.          A review of 10+ systems was conducted with pertinent positive and negative findings documented in HPI with all other systems reviewed and negative.    Past medical, family, surgical and social history reviewed as documented in chart with pertinent positive medical, family, surgical and social history detailed in HPI.    Previous OAB therapies:  Behavioral Therapies  : (fluid/dietary modification) -  provided some but insufficient benefit  Medications  mirabegron (Myrbetriq) 50 mg >12 months (cost or insurance coverage prohibited continuation)  Other Therapies  No Other Therapies     Previous NORMA therapies:  no previous therapy      Exam Findings:    Physical Exam    General: No acute distress. Nontoxic appearing.  HENT: Normocephalic. Atraumatic.  Respiratory: Normal respiratory effort. No conversational dyspnea. No audible wheezing.  Abdomen: No obvious distension.  Skin: No visible abnormalities.  Extremities: No edema upper extremities. No edema lower extremities.  Neurological: Alert and oriented x3. Normal speech.  Psychiatric: Normal mood. Normal affect. No evidence of SI.          Assessment/Plan:    IMPRESSION:  Evaluated response to Myrbetriq for overactive bladder; noted initial improvement followed by diminished efficacy and discontinued medication.  Assessed post-void residual volume via bladder scan (0 mL), indicating adequate bladder emptying.  Conducted urinalysis: negative for leucocyte esterase, nitrate, and blood.  Started GEMTESA for overactive bladder treatment based on preference and current symptom  burden.  Discussed potential long-term effects of anesthesia as a possible contributor to urinary symptoms post-hip surgery.    PLAN SUMMARY:  - Started GEMTESA for overactive bladder treatment  - Urinalysis performed: negative for leucocyte esterase, nitrate, and blood  - Post-void residual volume assessed via bladder scan (0 mL)  - Patient advised to maintain proper hydration  - Educated on overactive bladder treatment options  - Follow-up in approximately 6 weeks to assess effectiveness of new medication (GEMTESA)    OVERACTIVE BLADDER:  - Explained that overactive bladder treatment is primarily based on bothersome symptoms and quality of life impact. Informed about various treatment options available for overactive bladder, including medications, Botox injections, and neuromodulation. Advised against dehydration as a method to control incontinence, emphasizing it as an unhealthy trade-off. Patient to maintain proper hydration despite incontinence concerns. Recommend paying more attention to urination time frames. Started GEMTESA for overactive bladder treatment based on preference and current symptom burden.    DIAGNOSTIC PROCEDURES:  - Assessed post-void residual volume via bladder scan (0 mL), indicating adequate bladder emptying. Conducted urinalysis: negative for leucocyte esterase, nitrate, and blood.    FOLLOW-UP CARE:  - Follow up in approximately 6 weeks to assess effectiveness of new medication (GEMTESA).              Visit complexity today is associated with medical care services that are part of the ongoing care related to the single serious and/or complex condition of Overactive bladder (OAB). A longitudinal relationship exists or is being developed between the patient and this practitioner for the care of this condition.

## 2025-05-01 ENCOUNTER — PATIENT MESSAGE (OUTPATIENT)
Dept: ADMINISTRATIVE | Facility: OTHER | Age: 85
End: 2025-05-01
Payer: MEDICARE

## 2025-05-12 NOTE — PROGRESS NOTES
"[facility] Hospital Medicine  Progress Note    Patient Name: Jo-Ann Escobedo  MRN: [id number]  Patient Class: OP- Observation   Admission Date: 12/6/2019  Length of Stay: 0 days  Attending Physician: Dirk Garcias MD  Primary Care Provider: Dakota Kerr MD    [facility] Medicine Team: [facility] F Nenita Zavala PA-C    Subjective:     Principal Problem:Diverticulitis of large intestine without perforation or abscess with bleeding        HPI:  Jo-Ann Escobedo is a 79 y.o. F with diverticulosis with previous hx of diverticulitis + abscess, CKD2, HTN, remote h/o breast cancer s/p L modified radical mastectomy + chemo who presents to [facility] 12/6 for acute onset BRBPR x24 hours with associated melena, diarrhea.  Patient was seen by CRS today in the clinic and underwent a GYPSY and anoscope which showed normal exterior anus with melanotic stool on anoscope.  She was advised to present to ED for possible upper GI bleed.  Patient denies any f/c/sweats, abd pain, nausea, hematemesis, CP, SOB, dizziness, lightheadedness.  Of note, patient was recently restarted on diclofenac 75 mg PO BID in September.     ED: AFVSS, no leukocytosis. UA with 3+ occult blood, >100 RBCs, 12 WBCs.  CTA A/P shows no evidence of acute bleeding but does show "Findings concerning for uncomplicated diverticulitis or colitis about the descending and sigmoid colon junction".  Given 1L IVF, protonix, zosyn.  Hg 10. (previously 13-14)    Overview/Hospital Course:  Mrs. Escobedo was admitted to observation for diverticulitis. Patient started on zosyn, protonix IV, IVF. Patient with multiple episodes of maroon colored blood in stool. Hgb 10.5-->8.4. Type and screened, patient transfused 1U pRBC in setting of weakness and continued bleeding. GI consulted, plan for endoscopy tomorrow. UA positive for infection and hematuria. Hematuria likely from nephrolithiasis. Endoscopy with erythematous mucosa in pylorus, biopsied, and 10 mm " gastric nodule, biopsied. Will need upper endoscopic ultrasound at next available appointment for follow up 10 mm nodule.     Interval History: Patient without further episodes of bleeding overnight. She is currently without complaints. Hgb 9.5--> 8.8. CT abdomen pelvis today to rule out/ in diverticulitis and ischemic colitis.     Review of Systems   Constitutional: Positive for fatigue. Negative for chills and fever.   HENT: Negative for congestion and rhinorrhea.    Respiratory: Negative for cough and shortness of breath.    Cardiovascular: Negative for chest pain and palpitations.   Gastrointestinal: Negative for abdominal pain, blood in stool and nausea.   Genitourinary: Negative for difficulty urinating and hematuria.   Musculoskeletal: Negative for arthralgias and joint swelling.   Neurological: Negative for dizziness, weakness and light-headedness.   Psychiatric/Behavioral: Negative for agitation and behavioral problems.     Objective:     Vital Signs (Most Recent):  Temp: 98.6 °F (37 °C) (12/08/19 0722)  Pulse: 71 (12/08/19 0722)  Resp: 17 (12/08/19 0722)  BP: (!) 118/59 (12/08/19 0722)  SpO2: 96 % (12/08/19 1154) Vital Signs (24h Range):  Temp:  [97.7 °F (36.5 °C)-99.5 °F (37.5 °C)] 98.6 °F (37 °C)  Pulse:  [67-88] 71  Resp:  [15-18] 17  SpO2:  [93 %-98 %] 96 %  BP: (118-162)/(59-72) 118/59     Weight: 86.4 kg (190 lb 7.6 oz)  Body mass index is 33.74 kg/m².    Intake/Output Summary (Last 24 hours) at 12/8/2019 1216  Last data filed at 12/8/2019 0525  Gross per 24 hour   Intake 400 ml   Output 1650 ml   Net -1250 ml      Physical Exam   Constitutional: She is oriented to person, place, and time. She appears well-developed and well-nourished.   HENT:   Head: Normocephalic and atraumatic.   Eyes: Pupils are equal, round, and reactive to light. EOM are normal.   Neck: Normal range of motion. Neck supple.   Cardiovascular: Normal rate and regular rhythm.   Pulmonary/Chest: Effort normal and breath sounds  normal.   Abdominal: Soft. Bowel sounds are normal. She exhibits no distension. There is no tenderness. There is no guarding.   Musculoskeletal: Normal range of motion.   Neurological: She is alert and oriented to person, place, and time.   Skin: Skin is warm and dry.   Psychiatric: She has a normal mood and affect. Her behavior is normal.   Nursing note and vitals reviewed.      Significant Labs:   CBC:   Recent Labs   Lab 12/07/19  0417 12/07/19  0800 12/07/19  1701 12/08/19  0434   WBC 6.69  --  8.31 5.78   HGB 8.8* 8.4* 9.5* 8.6*   HCT 28.4* 27.9* 29.9* 28.2*     --  201 200     CMP:   Recent Labs   Lab 12/06/19  1912 12/07/19  0417 12/08/19  0434    140 143   K 4.1 3.8 3.9    111* 111*   CO2 26 24 25   GLU 94 95 108   BUN 30* 23 20   CREATININE 0.9 0.8 1.0   CALCIUM 9.7 8.4* 8.5*   PROT 6.9 5.6* 5.4*   ALBUMIN 3.8 3.1* 2.9*   BILITOT 0.4 0.4 0.5   ALKPHOS 66 55 51*   AST 23 19 18   ALT 16 12 12   ANIONGAP 7* 5* 7*   EGFRNONAA >60.0 >60.0 53.7*       Significant Imaging: I have reviewed all pertinent imaging results/findings within the past 24 hours.      Assessment/Plan:      * Diverticulitis of large intestine without perforation or abscess with bleeding  80 y/o with h/o diverticulitis with abscess presents with BRBPR and melena.  GYPSY/anoscope unrevealing per CRS note. CTA A/P show uncomplicated diverticulitis (vs colitis) of descending/sigmoid colon without active bleeding.    - GIB pathway started  - c/w zosyn 10-14 days, mIVF  - Hg 10.5 (BL 12-13)--> 8.4, transfused 1U pRBC--> 9.5--> 8.6  - transfused 1U pRBC   - GI consulted, appreciate recs  - EGD with erythematous mucosa and 10 mm polyp, both biopsied  - CT abdomen pelvis today to rule out/ in diverticulitis and ischemic colitis - if no diverticulitis will proceed with colonoscopy  - NPO, PPI BID  - Place 2 large bore IVs, volume resuscitation as needed  - Trend H/H (Transfuse pRBC for Hb < 7 g/dL)  - anemia labs unremarkable, B12 224,  started on supplements  - Avoid nonsteroidal agents, antiplatelet agents and anticoagulants if possible  - Colonoscopy 4/2019: Three 3 to 6 mm polyps in the transverse colon and in the ascending colon, removed with a cold snare. Resected and retrieved.  Diverticulosis in the sigmoid colon and in the descending colon.    Hematuria  likely 2/2 nephrolithiasis  - f.u with urology    Acute blood loss anemia  See above    Essential hypertension  Stable  - holding home losartan 75 in setting of bleeding    Chronic kidney insufficiency  Stable    Hypothyroidism, postsurgical  - c/w synthroid      VTE Risk Mitigation (From admission, onward)         Ordered     IP VTE HIGH RISK PATIENT  Once      12/07/19 0317     Place PETRA hose  Until discontinued      12/07/19 0317     Place sequential compression device  Until discontinued      12/07/19 0317                      TRE WayC  Department of Hospital Medicine   [facility]     Statement Selected

## 2025-05-13 ENCOUNTER — OFFICE VISIT (OUTPATIENT)
Dept: OPTOMETRY | Facility: CLINIC | Age: 85
End: 2025-05-13
Payer: COMMERCIAL

## 2025-05-13 DIAGNOSIS — Z01.00 ROUTINE EYE EXAM: Primary | ICD-10-CM

## 2025-05-13 DIAGNOSIS — H35.363 DRUSEN OF MACULA OF BOTH EYES: ICD-10-CM

## 2025-05-13 DIAGNOSIS — H52.4 PRESBYOPIA: ICD-10-CM

## 2025-05-13 DIAGNOSIS — Z13.5 SCREENING FOR GLAUCOMA: ICD-10-CM

## 2025-05-13 PROCEDURE — 92015 DETERMINE REFRACTIVE STATE: CPT | Mod: S$GLB,,, | Performed by: OPTOMETRIST

## 2025-05-13 PROCEDURE — 92134 CPTRZ OPH DX IMG PST SGM RTA: CPT | Mod: S$GLB,,, | Performed by: OPTOMETRIST

## 2025-05-13 PROCEDURE — 92014 COMPRE OPH EXAM EST PT 1/>: CPT | Mod: S$GLB,,, | Performed by: OPTOMETRIST

## 2025-05-13 PROCEDURE — 99999 PR PBB SHADOW E&M-EST. PATIENT-LVL III: CPT | Mod: PBBFAC,,, | Performed by: OPTOMETRIST

## 2025-05-13 NOTE — PROGRESS NOTES
HPI    83 Y/o female is here for routine eye exam with C/o pt has been having   with vision both near and distance say's her vision has been blurry and   she sometimes has to close her eye's to refocus and so that vision can   clear up.   Pt denies pain and discomfort   Occ floaters     Eye med: Soothe Xp OU PRN   Last edited by Timoteo Marcus MA on 5/13/2025  2:53 PM.            Assessment /Plan     For exam results, see Encounter Report.    Routine eye exam    Presbyopia    Drusen of macula of both eyes  -     OCT, Retina - OU - Both Eyes    Screening for glaucoma      1. Sp pciol/YAG OU--pt wishes new Rx  2. Mac drusen OU (see OCT--no CME/CNVM).  Discussed sunglasses/vitamins.  Pt non-smoker.  Re-Instructed on Amsler Grid use  3. COREY--pt to inc Ats to QID      PLAN:     Rtc 1 yr, or immediately if Amsler changes

## 2025-05-22 ENCOUNTER — HOSPITAL ENCOUNTER (OUTPATIENT)
Dept: RADIOLOGY | Facility: HOSPITAL | Age: 85
Discharge: HOME | End: 2025-05-22
Attending: INTERNAL MEDICINE
Payer: MEDICARE

## 2025-05-22 DIAGNOSIS — Z78.0 MENOPAUSE: ICD-10-CM

## 2025-05-22 PROCEDURE — 77080 DXA BONE DENSITY AXIAL: CPT | Mod: TC

## 2025-06-04 ENCOUNTER — RESULTS FOLLOW-UP (OUTPATIENT)
Dept: INTERNAL MEDICINE | Facility: CLINIC | Age: 85
End: 2025-06-04

## 2025-06-18 ENCOUNTER — OFFICE VISIT (OUTPATIENT)
Dept: UROLOGY | Facility: CLINIC | Age: 85
End: 2025-06-18
Payer: MEDICARE

## 2025-06-18 VITALS
HEART RATE: 108 BPM | BODY MASS INDEX: 33.47 KG/M2 | DIASTOLIC BLOOD PRESSURE: 71 MMHG | SYSTOLIC BLOOD PRESSURE: 116 MMHG | WEIGHT: 183 LBS

## 2025-06-18 DIAGNOSIS — N39.41 URGE INCONTINENCE OF URINE: ICD-10-CM

## 2025-06-18 DIAGNOSIS — N32.81 OAB (OVERACTIVE BLADDER): Primary | ICD-10-CM

## 2025-06-18 PROCEDURE — 99999 PR PBB SHADOW E&M-EST. PATIENT-LVL III: CPT | Mod: PBBFAC,,, | Performed by: UROLOGY

## 2025-06-18 PROCEDURE — 1159F MED LIST DOCD IN RCRD: CPT | Mod: CPTII,S$GLB,, | Performed by: UROLOGY

## 2025-06-18 PROCEDURE — G2211 COMPLEX E/M VISIT ADD ON: HCPCS | Mod: S$GLB,,, | Performed by: UROLOGY

## 2025-06-18 PROCEDURE — 99214 OFFICE O/P EST MOD 30 MIN: CPT | Mod: S$GLB,,, | Performed by: UROLOGY

## 2025-06-18 PROCEDURE — 1101F PT FALLS ASSESS-DOCD LE1/YR: CPT | Mod: CPTII,S$GLB,, | Performed by: UROLOGY

## 2025-06-18 PROCEDURE — 3078F DIAST BP <80 MM HG: CPT | Mod: CPTII,S$GLB,, | Performed by: UROLOGY

## 2025-06-18 PROCEDURE — 1157F ADVNC CARE PLAN IN RCRD: CPT | Mod: CPTII,S$GLB,, | Performed by: UROLOGY

## 2025-06-18 PROCEDURE — 3288F FALL RISK ASSESSMENT DOCD: CPT | Mod: CPTII,S$GLB,, | Performed by: UROLOGY

## 2025-06-18 PROCEDURE — 3074F SYST BP LT 130 MM HG: CPT | Mod: CPTII,S$GLB,, | Performed by: UROLOGY

## 2025-06-18 RX ORDER — TROSPIUM CHLORIDE 20 MG/1
20 TABLET, FILM COATED ORAL 2 TIMES DAILY
Qty: 60 TABLET | Refills: 11 | Status: SHIPPED | OUTPATIENT
Start: 2025-06-18 | End: 2026-06-18

## 2025-06-18 NOTE — PROGRESS NOTES
[facility]      Return Overactive Bladder (OAB) Note    6/18/2025    Referred by:  No ref. provider found    CHIEF COMPLAINT:  Patient, an 84-year-old woman, presents for follow-up of overactive bladder symptoms and to discuss medication options.    HPI:  Patient is an 84-year-old woman with a history of overactive bladder. She was last evaluated in April 2025 and previously in January 2025. Initially, she had been on Myrbetriq for about 2 months, which improved her symptoms, reducing her pad changes from 4 times daily and improving her nocturia. By January 2025, symptoms had returned to baseline, requiring 4 pad changes per day. She stopped Myrbetriq, restarted it without noticing much difference, and then discontinued it again. She had increased incontinence after hip surgery, likely due to decreased ability to ambulate. She has been taking Gemtesa, which was prescribed as an alternative to Myrbetriq, but the effectiveness is unclear. She reports increased forgetfulness within the last 10 days. She currently drinks 5-6 12-ounce glasses of water a day, rarely consumes soda, and does not drink alcohol. She takes a probiotic for potential constipation.    She denies any obstructive symptoms including hesitancy, straining, or intermittency. She does not have a history of glaucoma.    MEDICATIONS:  Patient is on Gemtesa for overactive bladder and a probiotic for digestive health. She has discontinued Myrbetriq (mirabegron) for overactive bladder due to lack of effectiveness.    MEDICAL HISTORY:  Patient has a history of overactive bladder, which was diagnosed prior to April 2025.    SURGICAL HISTORY:  Patient underwent hip surgery, though the date and specific indication are not specified. This surgery resulted in decreased ability to ambulate and was coincident with increased incontinence.        A review of 10+ systems was conducted with pertinent positive and negative findings documented in HPI  with all other systems reviewed and negative.    Past medical, family, surgical and social history reviewed as documented in chart with pertinent positive medical, family, surgical and social history detailed in HPI.    Previous OAB therapies:  Behavioral Therapies  : (fluid/dietary modification) -  provided some but insufficient benefit  Medications  mirabegron (Myrbetriq) 50 mg >12 months (cost or insurance coverage prohibited continuation)  Gemtesa 75 mg >3 months (limited benefit)  Other Therapies  No Other Therapies     Previous NORMA therapies:  no previous therapy      Exam Findings:    Physical Exam    General: No acute distress. Nontoxic appearing.  HENT: Normocephalic. Atraumatic.  Respiratory: Normal respiratory effort. No conversational dyspnea. No audible wheezing.  Abdomen: No obvious distension.  Skin: No visible abnormalities.  Extremities: No edema upper extremities. No edema lower extremities.  Neurological: Alert and oriented x3. Normal speech.  Psychiatric: Normal mood. Normal affect. No evidence of SI.          Assessment/Plan:    Assessment & Plan    IMPRESSION:  84-year-old female with overactive bladder, previously treated with Myrbetriq and Gemtesa with limited success.  Considered Sanctura (trospium) as a third-line option due to its lower risk of cognitive side effects in elderly patients.  If Sanctura is ineffective or causes intolerable side effects, may need to consider more invasive therapies like Botox or neuromodulation.    PLAN SUMMARY:  1. Started trospium (Sanctura) for overactive bladder given difficulty of this medication to cross blood brain barrier.  2. Dosing frequency (once or twice daily) to be determined based on insurance formulary coverage.  3. If twice daily dosing required, take with morning and evening medications.  4. Follow up in a few months to assess medication efficacy and tolerability.    OVERACTIVE BLADDER:  1. Explained the increased risk of cognitive side  effects from antimuscarinic bladder medications in patients over 80 years old. Discussed the blood-brain barrier becoming more permeable with age, leading to higher risk of cognitive impairment from certain medications. Informed patient about potential side effects of Sanctura, including dry mouth, dry eyes, and constipation, but emphasized these are not guaranteed to occur. Explained that the goal of overactive bladder treatment is to improve quality of life, and the patient has the right to discontinue treatment if side effects outweigh benefits. Started trospium (Sanctura) for overactive bladder. Dosing frequency (once or twice daily) to be determined based on insurance formulary coverage. If twice daily dosing is required, take with other regular morning and evening medications.    FOLLOW-UP:  1. Follow up in a few months to assess medication efficacy and tolerability.              Visit complexity today is associated with medical care services that are part of the ongoing care related to the single serious and/or complex condition of Overactive bladder (OAB). A longitudinal relationship exists or is being developed between the patient and this practitioner for the care of this condition.

## 2025-06-20 ENCOUNTER — PATIENT MESSAGE (OUTPATIENT)
Dept: UROLOGY | Facility: CLINIC | Age: 85
End: 2025-06-20
Payer: MEDICARE

## 2025-08-13 ENCOUNTER — OFFICE VISIT (OUTPATIENT)
Dept: UROLOGY | Facility: CLINIC | Age: 85
End: 2025-08-13
Payer: MEDICARE

## 2025-08-13 VITALS
DIASTOLIC BLOOD PRESSURE: 68 MMHG | BODY MASS INDEX: 32.94 KG/M2 | HEART RATE: 98 BPM | SYSTOLIC BLOOD PRESSURE: 110 MMHG | WEIGHT: 180.13 LBS

## 2025-08-13 DIAGNOSIS — K59.00 CONSTIPATION, UNSPECIFIED CONSTIPATION TYPE: ICD-10-CM

## 2025-08-13 DIAGNOSIS — N39.41 URGE INCONTINENCE OF URINE: Primary | ICD-10-CM

## 2025-08-13 DIAGNOSIS — R35.1 NOCTURIA: ICD-10-CM

## 2025-08-13 PROCEDURE — 1159F MED LIST DOCD IN RCRD: CPT | Mod: CPTII,S$GLB,, | Performed by: UROLOGY

## 2025-08-13 PROCEDURE — 1101F PT FALLS ASSESS-DOCD LE1/YR: CPT | Mod: CPTII,S$GLB,, | Performed by: UROLOGY

## 2025-08-13 PROCEDURE — 3074F SYST BP LT 130 MM HG: CPT | Mod: CPTII,S$GLB,, | Performed by: UROLOGY

## 2025-08-13 PROCEDURE — 99214 OFFICE O/P EST MOD 30 MIN: CPT | Mod: S$GLB,,, | Performed by: UROLOGY

## 2025-08-13 PROCEDURE — 99999 PR PBB SHADOW E&M-EST. PATIENT-LVL III: CPT | Mod: PBBFAC,,, | Performed by: UROLOGY

## 2025-08-13 PROCEDURE — 3288F FALL RISK ASSESSMENT DOCD: CPT | Mod: CPTII,S$GLB,, | Performed by: UROLOGY

## 2025-08-13 PROCEDURE — 1157F ADVNC CARE PLAN IN RCRD: CPT | Mod: CPTII,S$GLB,, | Performed by: UROLOGY

## 2025-08-13 PROCEDURE — 3078F DIAST BP <80 MM HG: CPT | Mod: CPTII,S$GLB,, | Performed by: UROLOGY

## 2025-08-19 ENCOUNTER — TELEPHONE (OUTPATIENT)
Dept: DERMATOLOGY | Facility: CLINIC | Age: 85
End: 2025-08-19
Payer: MEDICARE

## 2025-08-20 ENCOUNTER — OFFICE VISIT (OUTPATIENT)
Dept: DERMATOLOGY | Facility: CLINIC | Age: 85
End: 2025-08-20
Payer: MEDICARE

## 2025-08-20 DIAGNOSIS — D48.5 NEOPLASM OF UNCERTAIN BEHAVIOR OF SKIN: Primary | ICD-10-CM

## 2025-08-20 PROCEDURE — 99999 PR PBB SHADOW E&M-EST. PATIENT-LVL III: CPT | Mod: PBBFAC,,, | Performed by: DERMATOLOGY

## 2025-08-20 PROCEDURE — 99499 UNLISTED E&M SERVICE: CPT | Mod: S$GLB,,, | Performed by: DERMATOLOGY

## 2025-08-20 PROCEDURE — 11102 TANGNTL BX SKIN SINGLE LES: CPT | Mod: S$GLB,,, | Performed by: DERMATOLOGY

## 2025-08-27 DIAGNOSIS — L57.0 AK (ACTINIC KERATOSIS): Primary | ICD-10-CM

## 2025-08-27 RX ORDER — FLUOROURACIL 50 MG/G
CREAM TOPICAL
Qty: 40 G | Refills: 1 | Status: SHIPPED | OUTPATIENT
Start: 2025-08-27

## (undated) DEVICE — BLADE SAGITTAL 18 X 1.27 X 90M

## (undated) DEVICE — DRAPE STERI-DRAPE 1000 17X11IN

## (undated) DEVICE — GLOVE BIOGEL PI MICRO INDIC 7

## (undated) DEVICE — SUT ETHILON BL MONO P3

## (undated) DEVICE — NDL HYPO REG 25G X 1 1/2

## (undated) DEVICE — Device

## (undated) DEVICE — HOOD T7 W/ PEEL AWAY LENS

## (undated) DEVICE — TRAY MINOR GEN SURG

## (undated) DEVICE — APPLICATOR CHLORAPREP ORN 26ML

## (undated) DEVICE — SYS LABEL CORRECT MED

## (undated) DEVICE — SUT 2/0 30IN SILK BLK BRAI

## (undated) DEVICE — COVER MAYO STND XL 30X57IN

## (undated) DEVICE — DRAIN CHANNEL ROUND 19FR

## (undated) DEVICE — CLOSURE SKIN STERI STRIP 1/2X4

## (undated) DEVICE — TUBE SUCTION KAMVAC MINI 20/BX

## (undated) DEVICE — BANDAGE MATRIX HK LOOP 2IN 5YD

## (undated) DEVICE — SOL BETADINE 5%

## (undated) DEVICE — SUT 4/0 18IN ETHILON BL P3

## (undated) DEVICE — PACK UNIVERSAL SPLIT II

## (undated) DEVICE — SUT VICRYL 3-0 27 SH

## (undated) DEVICE — ADHESIVE DERMABOND ADVANCED

## (undated) DEVICE — NDL 22GA X1 1/2 REG BEVEL

## (undated) DEVICE — KIT NERVE BLOCK PREP BAPTIST

## (undated) DEVICE — SOL 0.9% NACL IRRI.IN STERIL

## (undated) DEVICE — BANDAGE ELASTIC 3X5 VELCRO ST

## (undated) DEVICE — GOWN SMARTGOWN LVL4 X-LONG XL

## (undated) DEVICE — BRA SURGICAL MED 36-38

## (undated) DEVICE — ELECTRODE REM PLYHSV RETURN 9

## (undated) DEVICE — SEE MEDLINE ITEM 157131

## (undated) DEVICE — TUBE SUCTION YANKAUER

## (undated) DEVICE — CONTAINER SPECIMEN STRL 4OZ

## (undated) DEVICE — SUT MONOCRYL 3-0 PS-2 UND

## (undated) DEVICE — SUT VICRYL 4-0 18 P-3

## (undated) DEVICE — NDL 18GA X1 1/2 REG BEVEL

## (undated) DEVICE — SEALER AQUAMANTYS 2.3 BIPOLAR

## (undated) DEVICE — SPONGE LAP 18X18 PREWASHED

## (undated) DEVICE — DRESSING AQUACEL AG RBBN 2X45

## (undated) DEVICE — CONTAINER SPECIMEN 4OZ

## (undated) DEVICE — SYR B-D DISP CONTROL 10CC100/C

## (undated) DEVICE — SEE MEDLINE ITEM 157150

## (undated) DEVICE — TOURNIQUET SB QC DP 18X4IN

## (undated) DEVICE — SUT 2/0 36IN COATED VICRYL

## (undated) DEVICE — DRESSING LEUKOPLAST FLEX 1X3IN

## (undated) DEVICE — GAUZE FLUFF XXLG 36X36 2 PLY

## (undated) DEVICE — KIT IRR SUCTION HND PIECE

## (undated) DEVICE — BANDAGE ELASTIC ACE 2IN 10/CA

## (undated) DEVICE — SPONGE COTTON TRAY 4X4IN

## (undated) DEVICE — DRAPE THREE-QTR REINF 53X77IN

## (undated) DEVICE — DRAPE STERI INSTRUMENT 1018

## (undated) DEVICE — KIT PT CARE HANA PROFX SSXT

## (undated) DEVICE — DRESSING ADAPTIC TOUCH 3X4

## (undated) DEVICE — SUT 4-0 VICRYL / SH

## (undated) DEVICE — DRESSING AQUACEL AG 3.5X10IN

## (undated) DEVICE — SUT ETHIBOND XTRA 1 OS-6

## (undated) DEVICE — ELECTRODE BLADE INSULATED 1 IN

## (undated) DEVICE — GAUZE SPONGE 4X4 12PLY

## (undated) DEVICE — SUT 1 36IN COATED VICRYL UN

## (undated) DEVICE — PACK DRAPE UNIVERSAL CONVERTOR

## (undated) DEVICE — BLADE ELECTRO EXTENDED.

## (undated) DEVICE — DRAPE IOBAN 2 STERI

## (undated) DEVICE — BNDG COFLEX FOAM LF2 ST 4X5YD

## (undated) DEVICE — SEE MEDLINE ITEM 152622

## (undated) DEVICE — GLOVE BIOGEL PI MICRO SZ 7

## (undated) DEVICE — DRESSING N ADH OIL EMUL 3X3

## (undated) DEVICE — COVER PROBE NL STRL 3.6X96IN

## (undated) DEVICE — KIT TOTAL HIP HPOFH OMC

## (undated) DEVICE — SUT CTD VICRYL 4-0 BR PS-2

## (undated) DEVICE — EVACUATOR WOUND BULB 100CC

## (undated) DEVICE — BRA POST SURGICAL WHT 40-42IN

## (undated) DEVICE — DRAPE C-ARM ELAS CLIP 42X120IN

## (undated) DEVICE — ADHESIVE MASTISOL VIAL 48/BX

## (undated) DEVICE — SYS CLSR DERMABOND PRINEO 22CM

## (undated) DEVICE — UNDERGLOVES BIOGEL PI SZ 7 LF

## (undated) DEVICE — KIT BONE CEMENT PREPARATION

## (undated) DEVICE — GLOVE BIOGEL SKINSENSE PI 7.0

## (undated) DEVICE — SYR 10CC LUER LOCK

## (undated) DEVICE — SUT 3-0 12-18IN SILK

## (undated) DEVICE — ELECTRODE BLADE TEFLON 6

## (undated) DEVICE — SEE MEDLINE ITEM 146417

## (undated) DEVICE — SET PULL UP PROBE COVER 5X48IN